# Patient Record
Sex: MALE | Race: WHITE | NOT HISPANIC OR LATINO | Employment: OTHER | URBAN - METROPOLITAN AREA
[De-identification: names, ages, dates, MRNs, and addresses within clinical notes are randomized per-mention and may not be internally consistent; named-entity substitution may affect disease eponyms.]

---

## 2017-01-01 ENCOUNTER — COMMUNICATION - HEALTHEAST (OUTPATIENT)
Dept: FAMILY MEDICINE | Facility: CLINIC | Age: 67
End: 2017-01-01

## 2017-01-01 DIAGNOSIS — E11.9 DIABETES MELLITUS (H): ICD-10-CM

## 2017-01-23 ENCOUNTER — COMMUNICATION - HEALTHEAST (OUTPATIENT)
Dept: FAMILY MEDICINE | Facility: CLINIC | Age: 67
End: 2017-01-23

## 2017-01-23 DIAGNOSIS — E78.5 HYPERLIPIDEMIA: ICD-10-CM

## 2017-02-13 ENCOUNTER — OFFICE VISIT - HEALTHEAST (OUTPATIENT)
Dept: NURSING | Facility: CLINIC | Age: 67
End: 2017-02-13

## 2017-02-13 DIAGNOSIS — E11.9 DM TYPE 2 (DIABETES MELLITUS, TYPE 2) (H): ICD-10-CM

## 2017-02-13 DIAGNOSIS — E11.8 TYPE 2 DIABETES MELLITUS WITH COMPLICATION, WITHOUT LONG-TERM CURRENT USE OF INSULIN (H): ICD-10-CM

## 2017-02-13 DIAGNOSIS — I10 ESSENTIAL HYPERTENSION WITH GOAL BLOOD PRESSURE LESS THAN 140/90: ICD-10-CM

## 2017-02-13 DIAGNOSIS — E78.5 HYPERLIPIDEMIA: ICD-10-CM

## 2017-02-13 DIAGNOSIS — E11.9 DIABETES (H): ICD-10-CM

## 2017-02-13 DIAGNOSIS — E11.9 DIABETES MELLITUS (H): ICD-10-CM

## 2017-02-13 DIAGNOSIS — E55.9 VITAMIN D DEFICIENCY: ICD-10-CM

## 2017-02-13 DIAGNOSIS — E78.00 PURE HYPERCHOLESTEROLEMIA: ICD-10-CM

## 2017-02-13 LAB — HBA1C MFR BLD: 7.1 % (ref 3.5–6)

## 2017-02-14 ENCOUNTER — COMMUNICATION - HEALTHEAST (OUTPATIENT)
Dept: FAMILY MEDICINE | Facility: CLINIC | Age: 67
End: 2017-02-14

## 2017-02-20 ENCOUNTER — AMBULATORY - HEALTHEAST (OUTPATIENT)
Dept: NURSING | Facility: CLINIC | Age: 67
End: 2017-02-20

## 2017-02-20 ENCOUNTER — COMMUNICATION - HEALTHEAST (OUTPATIENT)
Dept: NURSING | Facility: CLINIC | Age: 67
End: 2017-02-20

## 2017-02-20 DIAGNOSIS — I10 ESSENTIAL HYPERTENSION WITH GOAL BLOOD PRESSURE LESS THAN 130/80: ICD-10-CM

## 2017-02-20 DIAGNOSIS — Z01.30 BP CHECK: ICD-10-CM

## 2017-03-24 ENCOUNTER — AMBULATORY - HEALTHEAST (OUTPATIENT)
Dept: LAB | Facility: CLINIC | Age: 67
End: 2017-03-24

## 2017-03-24 ENCOUNTER — AMBULATORY - HEALTHEAST (OUTPATIENT)
Dept: NURSING | Facility: CLINIC | Age: 67
End: 2017-03-24

## 2017-03-24 DIAGNOSIS — I10 ESSENTIAL HYPERTENSION WITH GOAL BLOOD PRESSURE LESS THAN 130/80: ICD-10-CM

## 2017-03-29 ENCOUNTER — AMBULATORY - HEALTHEAST (OUTPATIENT)
Dept: NURSING | Facility: CLINIC | Age: 67
End: 2017-03-29

## 2017-03-29 DIAGNOSIS — I10 ESSENTIAL HYPERTENSION WITH GOAL BLOOD PRESSURE LESS THAN 130/80: ICD-10-CM

## 2017-03-31 ENCOUNTER — COMMUNICATION - HEALTHEAST (OUTPATIENT)
Dept: FAMILY MEDICINE | Facility: CLINIC | Age: 67
End: 2017-03-31

## 2017-04-18 ENCOUNTER — COMMUNICATION - HEALTHEAST (OUTPATIENT)
Dept: NURSING | Facility: CLINIC | Age: 67
End: 2017-04-18

## 2017-04-28 ENCOUNTER — OFFICE VISIT - HEALTHEAST (OUTPATIENT)
Dept: FAMILY MEDICINE | Facility: CLINIC | Age: 67
End: 2017-04-28

## 2017-04-28 DIAGNOSIS — I10 ESSENTIAL HYPERTENSION WITH GOAL BLOOD PRESSURE LESS THAN 130/80: ICD-10-CM

## 2017-04-28 ASSESSMENT — MIFFLIN-ST. JEOR: SCORE: 2191.01

## 2017-05-12 ENCOUNTER — OFFICE VISIT - HEALTHEAST (OUTPATIENT)
Dept: NURSING | Facility: CLINIC | Age: 67
End: 2017-05-12

## 2017-05-12 DIAGNOSIS — E11.8 TYPE 2 DIABETES MELLITUS WITH COMPLICATION, WITHOUT LONG-TERM CURRENT USE OF INSULIN (H): ICD-10-CM

## 2017-05-12 DIAGNOSIS — E11.9 DIABETES MELLITUS (H): ICD-10-CM

## 2017-05-12 DIAGNOSIS — I10 ESSENTIAL HYPERTENSION WITH GOAL BLOOD PRESSURE LESS THAN 130/80: ICD-10-CM

## 2017-05-12 LAB — HBA1C MFR BLD: 8.2 % (ref 3.5–6)

## 2017-05-18 ENCOUNTER — RECORDS - HEALTHEAST (OUTPATIENT)
Dept: ADMINISTRATIVE | Facility: OTHER | Age: 67
End: 2017-05-18

## 2017-06-04 ENCOUNTER — COMMUNICATION - HEALTHEAST (OUTPATIENT)
Dept: NURSING | Facility: CLINIC | Age: 67
End: 2017-06-04

## 2017-06-08 ENCOUNTER — COMMUNICATION - HEALTHEAST (OUTPATIENT)
Dept: FAMILY MEDICINE | Facility: CLINIC | Age: 67
End: 2017-06-08

## 2017-06-08 DIAGNOSIS — I10 ESSENTIAL HYPERTENSION WITH GOAL BLOOD PRESSURE LESS THAN 130/80: ICD-10-CM

## 2017-06-12 ENCOUNTER — OFFICE VISIT - HEALTHEAST (OUTPATIENT)
Dept: NURSING | Facility: CLINIC | Age: 67
End: 2017-06-12

## 2017-06-12 DIAGNOSIS — E11.9 DIABETES MELLITUS (H): ICD-10-CM

## 2017-06-12 DIAGNOSIS — E11.9 TYPE 2 DIABETES MELLITUS WITHOUT COMPLICATION, WITHOUT LONG-TERM CURRENT USE OF INSULIN (H): ICD-10-CM

## 2017-06-26 ENCOUNTER — RECORDS - HEALTHEAST (OUTPATIENT)
Dept: ADMINISTRATIVE | Facility: OTHER | Age: 67
End: 2017-06-26

## 2017-06-27 ENCOUNTER — RECORDS - HEALTHEAST (OUTPATIENT)
Dept: ADMINISTRATIVE | Facility: OTHER | Age: 67
End: 2017-06-27

## 2017-07-03 ENCOUNTER — OFFICE VISIT - HEALTHEAST (OUTPATIENT)
Dept: FAMILY MEDICINE | Facility: CLINIC | Age: 67
End: 2017-07-03

## 2017-07-03 ENCOUNTER — AMBULATORY - HEALTHEAST (OUTPATIENT)
Dept: FAMILY MEDICINE | Facility: CLINIC | Age: 67
End: 2017-07-03

## 2017-07-03 DIAGNOSIS — Z01.818 PREOPERATIVE EVALUATION TO RULE OUT SURGICAL CONTRAINDICATION: ICD-10-CM

## 2017-07-03 DIAGNOSIS — D12.4 ADENOMA OF DESCENDING COLON: ICD-10-CM

## 2017-07-03 DIAGNOSIS — I48.92 ATRIAL FLUTTER WITH RAPID VENTRICULAR RESPONSE (H): ICD-10-CM

## 2017-07-03 DIAGNOSIS — K91.840 COLONOSCOPY CAUSING POST-PROCEDURAL BLEEDING: ICD-10-CM

## 2017-07-03 DIAGNOSIS — R93.89 ABNORMAL FINDINGS ON DIAGNOSTIC IMAGING OF OTHER SPECIFIED BODY STRUCTURES: ICD-10-CM

## 2017-07-03 LAB
ATRIAL RATE - MUSE: 123 BPM
ATRIAL RATE - MUSE: 248 BPM
DIASTOLIC BLOOD PRESSURE - MUSE: NORMAL MMHG
DIASTOLIC BLOOD PRESSURE - MUSE: NORMAL MMHG
INTERPRETATION ECG - MUSE: NORMAL
INTERPRETATION ECG - MUSE: NORMAL
P AXIS - MUSE: 259 DEGREES
P AXIS - MUSE: NORMAL DEGREES
PR INTERVAL - MUSE: NORMAL MS
PR INTERVAL - MUSE: NORMAL MS
QRS DURATION - MUSE: 100 MS
QRS DURATION - MUSE: 142 MS
QT - MUSE: 282 MS
QT - MUSE: 284 MS
QTC - MUSE: 403 MS
QTC - MUSE: 408 MS
R AXIS - MUSE: -22 DEGREES
R AXIS - MUSE: -27 DEGREES
SYSTOLIC BLOOD PRESSURE - MUSE: NORMAL MMHG
SYSTOLIC BLOOD PRESSURE - MUSE: NORMAL MMHG
T AXIS - MUSE: -4 DEGREES
T AXIS - MUSE: 16 DEGREES
VENTRICULAR RATE- MUSE: 123 BPM
VENTRICULAR RATE- MUSE: 124 BPM

## 2017-07-03 ASSESSMENT — MIFFLIN-ST. JEOR: SCORE: 2207.06

## 2017-07-05 ENCOUNTER — OFFICE VISIT - HEALTHEAST (OUTPATIENT)
Dept: CARDIOLOGY | Facility: CLINIC | Age: 67
End: 2017-07-05

## 2017-07-05 DIAGNOSIS — I10 ESSENTIAL HYPERTENSION WITH GOAL BLOOD PRESSURE LESS THAN 130/80: ICD-10-CM

## 2017-07-05 DIAGNOSIS — I35.0 AORTIC STENOSIS, MILD: ICD-10-CM

## 2017-07-05 DIAGNOSIS — E78.2 MIXED HYPERLIPIDEMIA: ICD-10-CM

## 2017-07-05 DIAGNOSIS — G47.30 SLEEP APNEA, UNSPECIFIED TYPE: ICD-10-CM

## 2017-07-05 DIAGNOSIS — I48.92 ATRIAL FLUTTER WITH RAPID VENTRICULAR RESPONSE (H): ICD-10-CM

## 2017-07-05 ASSESSMENT — MIFFLIN-ST. JEOR: SCORE: 2203.59

## 2017-07-12 ENCOUNTER — HOSPITAL ENCOUNTER (OUTPATIENT)
Dept: CARDIOLOGY | Facility: HOSPITAL | Age: 67
Discharge: HOME | End: 2017-07-12
Attending: INTERNAL MEDICINE

## 2017-07-12 DIAGNOSIS — I35.0 AORTIC STENOSIS, MILD: ICD-10-CM

## 2017-07-12 DIAGNOSIS — I48.92 ATRIAL FLUTTER WITH RAPID VENTRICULAR RESPONSE (H): ICD-10-CM

## 2017-07-12 DIAGNOSIS — G47.30 SLEEP APNEA, UNSPECIFIED TYPE: ICD-10-CM

## 2017-07-12 ASSESSMENT — MIFFLIN-ST. JEOR: SCORE: 2193.28

## 2017-07-13 ENCOUNTER — COMMUNICATION - HEALTHEAST (OUTPATIENT)
Dept: FAMILY MEDICINE | Facility: CLINIC | Age: 67
End: 2017-07-13

## 2017-07-13 LAB
AORTIC ROOT: 3.7 CM
AORTIC VALVE MEAN VELOCITY: 136 CM/S
AV DIMENSIONLESS INDEX VTI: 0.3
AV MEAN GRADIENT: 8 MMHG
AV PEAK GRADIENT: 13.4 MMHG
AV VALVE AREA: 1.1 CM2
AV VELOCITY RATIO: 0.4
BSA FOR ECHO PROCEDURE: 2.65 M2
CV BLOOD PRESSURE: NORMAL MMHG
CV ECHO HEIGHT: 71 IN
CV ECHO WEIGHT: 310 LBS
DOP CALC AO PEAK VEL: 183 CM/S
DOP CALC AO VTI: 43.1 CM
DOP CALC LVOT AREA: 3.46 CM2
DOP CALC LVOT DIAMETER: 2.1 CM
DOP CALC LVOT PEAK VEL: 68.8 CM/S
DOP CALC LVOT STROKE VOLUME: 49.5 CM3
DOP CALCLVOT PEAK VEL VTI: 14.3 CM
EJECTION FRACTION: 66 % (ref 55–75)
FRACTIONAL SHORTENING: 24.1 % (ref 28–44)
INTERVENTRICULAR SEPTUM IN END DIASTOLE: 1 CM (ref 0.6–1)
IVS/PW RATIO: 0.7
LA AREA 1: 19.9 CM2
LA AREA 2: 16.5 CM2
LEFT ATRIUM LENGTH: 4.91 CM
LEFT ATRIUM SIZE: 4.1 CM
LEFT ATRIUM TO AORTIC ROOT RATIO: 1.11 NO UNITS
LEFT ATRIUM VOLUME INDEX: 21.5 ML/M2
LEFT ATRIUM VOLUME: 56.8 CM3
LEFT VENTRICLE CARDIAC INDEX: 1.8 L/MIN/M2
LEFT VENTRICLE CARDIAC OUTPUT: 4.8 L/MIN
LEFT VENTRICLE DIASTOLIC VOLUME INDEX: 54.3 CM3/M2 (ref 34–74)
LEFT VENTRICLE DIASTOLIC VOLUME: 144 CM3 (ref 62–150)
LEFT VENTRICLE HEART RATE: 96 BPM
LEFT VENTRICLE MASS INDEX: 112.1 G/M2
LEFT VENTRICLE SYSTOLIC VOLUME INDEX: 18.5 CM3/M2 (ref 11–31)
LEFT VENTRICLE SYSTOLIC VOLUME: 49 CM3 (ref 21–61)
LEFT VENTRICULAR INTERNAL DIMENSION IN DIASTOLE: 5.8 CM (ref 4.2–5.8)
LEFT VENTRICULAR INTERNAL DIMENSION IN SYSTOLE: 4.4 CM (ref 2.5–4)
LEFT VENTRICULAR MASS: 297 G
LEFT VENTRICULAR OUTFLOW TRACT MEAN GRADIENT: 1 MMHG
LEFT VENTRICULAR OUTFLOW TRACT MEAN VELOCITY: 49.3 CM/S
LEFT VENTRICULAR OUTFLOW TRACT PEAK GRADIENT: 2 MMHG
LEFT VENTRICULAR POSTERIOR WALL IN END DIASTOLE: 1.4 CM (ref 0.6–1)
LV STROKE VOLUME INDEX: 18.7 ML/M2
MITRAL VALVE DECELERATION SLOPE: 3800 MM/S2
MITRAL VALVE PRESSURE HALF-TIME: 66 MS
MV AVERAGE E/E' RATIO: 10.1 CM/S
MV DECELERATION TIME: 275 MS
MV E'TISSUE VEL-LAT: 8.97 CM/S
MV E'TISSUE VEL-MED: 8.77 CM/S
MV LATERAL E/E' RATIO: 10
MV MEDIAL E/E' RATIO: 10.2
MV PEAK E VELOCITY: 89.8 CM/S
MV VALVE AREA PRESSURE 1/2 METHOD: 3.3 CM2
NUC REST DIASTOLIC VOLUME INDEX: 4960 LBS
NUC REST SYSTOLIC VOLUME INDEX: 71 IN
RIGHT VENTRICULAR INTERNAL DIMENSION IN DYSTOLE: 3.1 CM
TRICUSPID VALVE ANULAR PLANE SYSTOLIC EXCURSION: 1.8 CM

## 2017-07-14 ENCOUNTER — COMMUNICATION - HEALTHEAST (OUTPATIENT)
Dept: CARDIOLOGY | Facility: CLINIC | Age: 67
End: 2017-07-14

## 2017-07-14 DIAGNOSIS — I48.92 ATRIAL FLUTTER WITH RAPID VENTRICULAR RESPONSE (H): ICD-10-CM

## 2017-07-17 ENCOUNTER — AMBULATORY - HEALTHEAST (OUTPATIENT)
Dept: NURSING | Facility: CLINIC | Age: 67
End: 2017-07-17

## 2017-07-18 ENCOUNTER — OFFICE VISIT - HEALTHEAST (OUTPATIENT)
Dept: CARDIOLOGY | Facility: CLINIC | Age: 67
End: 2017-07-18

## 2017-07-18 DIAGNOSIS — I10 ESSENTIAL HYPERTENSION WITH GOAL BLOOD PRESSURE LESS THAN 130/80: ICD-10-CM

## 2017-07-18 DIAGNOSIS — I48.92 ATRIAL FLUTTER WITH RAPID VENTRICULAR RESPONSE (H): ICD-10-CM

## 2017-07-18 ASSESSMENT — MIFFLIN-ST. JEOR: SCORE: 2161.53

## 2017-07-19 ENCOUNTER — AMBULATORY - HEALTHEAST (OUTPATIENT)
Dept: CARDIOLOGY | Facility: CLINIC | Age: 67
End: 2017-07-19

## 2017-07-19 ENCOUNTER — COMMUNICATION - HEALTHEAST (OUTPATIENT)
Dept: FAMILY MEDICINE | Facility: CLINIC | Age: 67
End: 2017-07-19

## 2017-07-19 DIAGNOSIS — I48.92 ATRIAL FLUTTER WITH RAPID VENTRICULAR RESPONSE (H): ICD-10-CM

## 2017-07-25 ENCOUNTER — AMBULATORY - HEALTHEAST (OUTPATIENT)
Dept: LAB | Facility: CLINIC | Age: 67
End: 2017-07-25

## 2017-07-25 DIAGNOSIS — I48.92 ATRIAL FLUTTER WITH RAPID VENTRICULAR RESPONSE (H): ICD-10-CM

## 2017-07-28 ENCOUNTER — AMBULATORY - HEALTHEAST (OUTPATIENT)
Dept: LAB | Facility: CLINIC | Age: 67
End: 2017-07-28

## 2017-07-28 DIAGNOSIS — I48.92 ATRIAL FLUTTER WITH RAPID VENTRICULAR RESPONSE (H): ICD-10-CM

## 2017-07-31 ENCOUNTER — AMBULATORY - HEALTHEAST (OUTPATIENT)
Dept: LAB | Facility: CLINIC | Age: 67
End: 2017-07-31

## 2017-07-31 ENCOUNTER — COMMUNICATION - HEALTHEAST (OUTPATIENT)
Dept: FAMILY MEDICINE | Facility: CLINIC | Age: 67
End: 2017-07-31

## 2017-07-31 DIAGNOSIS — I48.92 ATRIAL FLUTTER WITH RAPID VENTRICULAR RESPONSE (H): ICD-10-CM

## 2017-08-01 ENCOUNTER — COMMUNICATION - HEALTHEAST (OUTPATIENT)
Dept: FAMILY MEDICINE | Facility: CLINIC | Age: 67
End: 2017-08-01

## 2017-08-01 DIAGNOSIS — I48.92 ATRIAL FLUTTER WITH RAPID VENTRICULAR RESPONSE (H): ICD-10-CM

## 2017-08-02 ENCOUNTER — COMMUNICATION - HEALTHEAST (OUTPATIENT)
Dept: CARDIOLOGY | Facility: CLINIC | Age: 67
End: 2017-08-02

## 2017-08-02 ENCOUNTER — COMMUNICATION - HEALTHEAST (OUTPATIENT)
Dept: FAMILY MEDICINE | Facility: CLINIC | Age: 67
End: 2017-08-02

## 2017-08-02 DIAGNOSIS — I48.92 ATRIAL FLUTTER WITH RAPID VENTRICULAR RESPONSE (H): ICD-10-CM

## 2017-08-07 ENCOUNTER — COMMUNICATION - HEALTHEAST (OUTPATIENT)
Dept: FAMILY MEDICINE | Facility: CLINIC | Age: 67
End: 2017-08-07

## 2017-08-07 ENCOUNTER — AMBULATORY - HEALTHEAST (OUTPATIENT)
Dept: LAB | Facility: CLINIC | Age: 67
End: 2017-08-07

## 2017-08-07 DIAGNOSIS — I48.92 ATRIAL FLUTTER WITH RAPID VENTRICULAR RESPONSE (H): ICD-10-CM

## 2017-08-14 ENCOUNTER — AMBULATORY - HEALTHEAST (OUTPATIENT)
Dept: LAB | Facility: CLINIC | Age: 67
End: 2017-08-14

## 2017-08-14 ENCOUNTER — OFFICE VISIT - HEALTHEAST (OUTPATIENT)
Dept: NURSING | Facility: CLINIC | Age: 67
End: 2017-08-14

## 2017-08-14 DIAGNOSIS — I48.92 ATRIAL FLUTTER WITH RAPID VENTRICULAR RESPONSE (H): ICD-10-CM

## 2017-08-14 LAB
CHOLEST SERPL-MCNC: 109 MG/DL
FASTING STATUS PATIENT QL REPORTED: YES
HBA1C MFR BLD: 7.6 % (ref 3.5–6)
HDLC SERPL-MCNC: 28 MG/DL
LDLC SERPL CALC-MCNC: 50 MG/DL
TRIGL SERPL-MCNC: 155 MG/DL

## 2017-08-21 ENCOUNTER — COMMUNICATION - HEALTHEAST (OUTPATIENT)
Dept: FAMILY MEDICINE | Facility: CLINIC | Age: 67
End: 2017-08-21

## 2017-08-21 ENCOUNTER — COMMUNICATION - HEALTHEAST (OUTPATIENT)
Dept: INTERNAL MEDICINE | Facility: CLINIC | Age: 67
End: 2017-08-21

## 2017-08-21 ENCOUNTER — AMBULATORY - HEALTHEAST (OUTPATIENT)
Dept: CARDIOLOGY | Facility: CLINIC | Age: 67
End: 2017-08-21

## 2017-08-21 ENCOUNTER — OFFICE VISIT - HEALTHEAST (OUTPATIENT)
Dept: CARDIOLOGY | Facility: CLINIC | Age: 67
End: 2017-08-21

## 2017-08-21 DIAGNOSIS — E78.2 MIXED HYPERLIPIDEMIA: ICD-10-CM

## 2017-08-21 DIAGNOSIS — I48.92 ATRIAL FLUTTER WITH RAPID VENTRICULAR RESPONSE (H): ICD-10-CM

## 2017-08-21 DIAGNOSIS — I35.0 AORTIC STENOSIS, MILD: ICD-10-CM

## 2017-08-21 DIAGNOSIS — I48.92 ATRIAL FLUTTER (H): ICD-10-CM

## 2017-08-21 DIAGNOSIS — G47.33 OBSTRUCTIVE SLEEP APNEA SYNDROME: ICD-10-CM

## 2017-08-21 ASSESSMENT — MIFFLIN-ST. JEOR: SCORE: 2197.82

## 2017-08-25 ENCOUNTER — ANESTHESIA - HEALTHEAST (OUTPATIENT)
Dept: CARDIOLOGY | Facility: CLINIC | Age: 67
End: 2017-08-25

## 2017-08-25 ENCOUNTER — HOSPITAL ENCOUNTER (OUTPATIENT)
Dept: CARDIOLOGY | Facility: CLINIC | Age: 67
Discharge: HOME | End: 2017-08-25
Attending: NURSE PRACTITIONER | Admitting: INTERNAL MEDICINE

## 2017-08-25 DIAGNOSIS — I48.92 ATRIAL FLUTTER WITH RAPID VENTRICULAR RESPONSE (H): ICD-10-CM

## 2017-08-25 ASSESSMENT — MIFFLIN-ST. JEOR: SCORE: 2220.26

## 2017-08-26 LAB
ATRIAL RATE - MUSE: 81 BPM
DIASTOLIC BLOOD PRESSURE - MUSE: NORMAL MMHG
INTERPRETATION ECG - MUSE: NORMAL
P AXIS - MUSE: 54 DEGREES
PR INTERVAL - MUSE: 192 MS
QRS DURATION - MUSE: 102 MS
QT - MUSE: 384 MS
QTC - MUSE: 446 MS
R AXIS - MUSE: -25 DEGREES
SYSTOLIC BLOOD PRESSURE - MUSE: NORMAL MMHG
T AXIS - MUSE: 7 DEGREES
VENTRICULAR RATE- MUSE: 81 BPM

## 2017-08-28 ENCOUNTER — COMMUNICATION - HEALTHEAST (OUTPATIENT)
Dept: INTERNAL MEDICINE | Facility: CLINIC | Age: 67
End: 2017-08-28

## 2017-09-01 ENCOUNTER — AMBULATORY - HEALTHEAST (OUTPATIENT)
Dept: LAB | Facility: CLINIC | Age: 67
End: 2017-09-01

## 2017-09-01 ENCOUNTER — COMMUNICATION - HEALTHEAST (OUTPATIENT)
Dept: INTERNAL MEDICINE | Facility: CLINIC | Age: 67
End: 2017-09-01

## 2017-09-01 DIAGNOSIS — I48.92 ATRIAL FLUTTER (H): ICD-10-CM

## 2017-09-08 ENCOUNTER — COMMUNICATION - HEALTHEAST (OUTPATIENT)
Dept: INTERNAL MEDICINE | Facility: CLINIC | Age: 67
End: 2017-09-08

## 2017-09-08 ENCOUNTER — AMBULATORY - HEALTHEAST (OUTPATIENT)
Dept: LAB | Facility: CLINIC | Age: 67
End: 2017-09-08

## 2017-09-08 DIAGNOSIS — I48.92 ATRIAL FLUTTER (H): ICD-10-CM

## 2017-09-21 ENCOUNTER — OFFICE VISIT - HEALTHEAST (OUTPATIENT)
Dept: CARDIOLOGY | Facility: CLINIC | Age: 67
End: 2017-09-21

## 2017-09-21 DIAGNOSIS — I35.0 AORTIC STENOSIS, MILD: ICD-10-CM

## 2017-09-21 DIAGNOSIS — I48.92 ATRIAL FLUTTER WITH RAPID VENTRICULAR RESPONSE (H): ICD-10-CM

## 2017-09-21 DIAGNOSIS — I10 ESSENTIAL HYPERTENSION WITH GOAL BLOOD PRESSURE LESS THAN 130/80: ICD-10-CM

## 2017-09-21 DIAGNOSIS — G47.33 OBSTRUCTIVE SLEEP APNEA SYNDROME: ICD-10-CM

## 2017-09-21 ASSESSMENT — MIFFLIN-ST. JEOR: SCORE: 2228.43

## 2017-10-23 ENCOUNTER — AMBULATORY - HEALTHEAST (OUTPATIENT)
Dept: FAMILY MEDICINE | Facility: CLINIC | Age: 67
End: 2017-10-23

## 2017-10-23 ENCOUNTER — OFFICE VISIT - HEALTHEAST (OUTPATIENT)
Dept: FAMILY MEDICINE | Facility: CLINIC | Age: 67
End: 2017-10-23

## 2017-10-23 ENCOUNTER — COMMUNICATION - HEALTHEAST (OUTPATIENT)
Dept: INTERNAL MEDICINE | Facility: CLINIC | Age: 67
End: 2017-10-23

## 2017-10-23 DIAGNOSIS — C18.6 ADENOCARCINOMA OF DESCENDING COLON (H): ICD-10-CM

## 2017-10-23 DIAGNOSIS — I35.0 AS (AORTIC STENOSIS): ICD-10-CM

## 2017-10-23 DIAGNOSIS — I48.92 ATRIAL FLUTTER (H): ICD-10-CM

## 2017-10-23 DIAGNOSIS — I10 ESSENTIAL HYPERTENSION WITH GOAL BLOOD PRESSURE LESS THAN 130/80: ICD-10-CM

## 2017-10-23 DIAGNOSIS — Z01.818 PREOPERATIVE EVALUATION TO RULE OUT SURGICAL CONTRAINDICATION: ICD-10-CM

## 2017-10-23 LAB
ATRIAL RATE - MUSE: 62 BPM
DIASTOLIC BLOOD PRESSURE - MUSE: NORMAL MMHG
INTERPRETATION ECG - MUSE: NORMAL
P AXIS - MUSE: 63 DEGREES
PR INTERVAL - MUSE: 186 MS
QRS DURATION - MUSE: 100 MS
QT - MUSE: 416 MS
QTC - MUSE: 422 MS
R AXIS - MUSE: -8 DEGREES
SYSTOLIC BLOOD PRESSURE - MUSE: NORMAL MMHG
T AXIS - MUSE: 9 DEGREES
VENTRICULAR RATE- MUSE: 62 BPM

## 2017-10-23 ASSESSMENT — MIFFLIN-ST. JEOR: SCORE: 2228.43

## 2017-10-24 ENCOUNTER — COMMUNICATION - HEALTHEAST (OUTPATIENT)
Dept: FAMILY MEDICINE | Facility: CLINIC | Age: 67
End: 2017-10-24

## 2017-10-28 ENCOUNTER — COMMUNICATION - HEALTHEAST (OUTPATIENT)
Dept: FAMILY MEDICINE | Facility: CLINIC | Age: 67
End: 2017-10-28

## 2017-11-03 ENCOUNTER — RECORDS - HEALTHEAST (OUTPATIENT)
Dept: ADMINISTRATIVE | Facility: OTHER | Age: 67
End: 2017-11-03

## 2017-11-07 ENCOUNTER — COMMUNICATION - HEALTHEAST (OUTPATIENT)
Dept: INTERNAL MEDICINE | Facility: CLINIC | Age: 67
End: 2017-11-07

## 2017-11-07 ENCOUNTER — OFFICE VISIT - HEALTHEAST (OUTPATIENT)
Dept: FAMILY MEDICINE | Facility: CLINIC | Age: 67
End: 2017-11-07

## 2017-11-07 DIAGNOSIS — I48.92 ATRIAL FLUTTER (H): ICD-10-CM

## 2017-11-07 DIAGNOSIS — Z90.49 S/P LAPAROSCOPIC COLECTOMY: ICD-10-CM

## 2017-11-07 DIAGNOSIS — D62 ACUTE BLOOD LOSS ANEMIA: ICD-10-CM

## 2017-11-07 ASSESSMENT — MIFFLIN-ST. JEOR: SCORE: 2187.6

## 2017-11-08 ENCOUNTER — RECORDS - HEALTHEAST (OUTPATIENT)
Dept: ADMINISTRATIVE | Facility: OTHER | Age: 67
End: 2017-11-08

## 2017-11-12 ENCOUNTER — COMMUNICATION - HEALTHEAST (OUTPATIENT)
Dept: FAMILY MEDICINE | Facility: CLINIC | Age: 67
End: 2017-11-12

## 2017-11-15 ENCOUNTER — AMBULATORY - HEALTHEAST (OUTPATIENT)
Dept: LAB | Facility: CLINIC | Age: 67
End: 2017-11-15

## 2017-11-15 ENCOUNTER — COMMUNICATION - HEALTHEAST (OUTPATIENT)
Dept: INTERNAL MEDICINE | Facility: CLINIC | Age: 67
End: 2017-11-15

## 2017-11-15 DIAGNOSIS — I48.92 ATRIAL FLUTTER (H): ICD-10-CM

## 2017-11-17 ENCOUNTER — OFFICE VISIT - HEALTHEAST (OUTPATIENT)
Dept: NURSING | Facility: CLINIC | Age: 67
End: 2017-11-17

## 2017-11-17 LAB — HBA1C MFR BLD: 7.6 % (ref 3.5–6)

## 2017-11-18 ENCOUNTER — COMMUNICATION - HEALTHEAST (OUTPATIENT)
Dept: NURSING | Facility: CLINIC | Age: 67
End: 2017-11-18

## 2017-11-18 DIAGNOSIS — E11.9 DIABETES MELLITUS (H): ICD-10-CM

## 2017-11-27 ENCOUNTER — RECORDS - HEALTHEAST (OUTPATIENT)
Dept: ADMINISTRATIVE | Facility: OTHER | Age: 67
End: 2017-11-27

## 2017-11-29 ENCOUNTER — COMMUNICATION - HEALTHEAST (OUTPATIENT)
Dept: INTERNAL MEDICINE | Facility: CLINIC | Age: 67
End: 2017-11-29

## 2017-11-29 ENCOUNTER — AMBULATORY - HEALTHEAST (OUTPATIENT)
Dept: LAB | Facility: CLINIC | Age: 67
End: 2017-11-29

## 2017-11-29 DIAGNOSIS — I48.92 ATRIAL FLUTTER (H): ICD-10-CM

## 2017-12-01 ENCOUNTER — RECORDS - HEALTHEAST (OUTPATIENT)
Dept: ADMINISTRATIVE | Facility: OTHER | Age: 67
End: 2017-12-01

## 2017-12-13 ENCOUNTER — COMMUNICATION - HEALTHEAST (OUTPATIENT)
Dept: FAMILY MEDICINE | Facility: CLINIC | Age: 67
End: 2017-12-13

## 2017-12-13 ENCOUNTER — AMBULATORY - HEALTHEAST (OUTPATIENT)
Dept: LAB | Facility: CLINIC | Age: 67
End: 2017-12-13

## 2017-12-13 ENCOUNTER — COMMUNICATION - HEALTHEAST (OUTPATIENT)
Dept: INTERNAL MEDICINE | Facility: CLINIC | Age: 67
End: 2017-12-13

## 2017-12-13 DIAGNOSIS — I48.92 ATRIAL FLUTTER WITH RAPID VENTRICULAR RESPONSE (H): ICD-10-CM

## 2017-12-13 DIAGNOSIS — I48.92 ATRIAL FLUTTER (H): ICD-10-CM

## 2017-12-21 ENCOUNTER — RECORDS - HEALTHEAST (OUTPATIENT)
Dept: ADMINISTRATIVE | Facility: OTHER | Age: 67
End: 2017-12-21

## 2017-12-27 ENCOUNTER — AMBULATORY - HEALTHEAST (OUTPATIENT)
Dept: LAB | Facility: CLINIC | Age: 67
End: 2017-12-27

## 2017-12-27 ENCOUNTER — COMMUNICATION - HEALTHEAST (OUTPATIENT)
Dept: INTERNAL MEDICINE | Facility: CLINIC | Age: 67
End: 2017-12-27

## 2017-12-27 DIAGNOSIS — I48.92 ATRIAL FLUTTER (H): ICD-10-CM

## 2018-01-10 ENCOUNTER — AMBULATORY - HEALTHEAST (OUTPATIENT)
Dept: LAB | Facility: CLINIC | Age: 68
End: 2018-01-10

## 2018-01-10 ENCOUNTER — COMMUNICATION - HEALTHEAST (OUTPATIENT)
Dept: NURSING | Facility: CLINIC | Age: 68
End: 2018-01-10

## 2018-01-10 DIAGNOSIS — I48.92 ATRIAL FLUTTER (H): ICD-10-CM

## 2018-01-10 LAB — INR PPP: 4.4 (ref 0.9–1.1)

## 2018-01-24 ENCOUNTER — AMBULATORY - HEALTHEAST (OUTPATIENT)
Dept: LAB | Facility: CLINIC | Age: 68
End: 2018-01-24

## 2018-01-24 ENCOUNTER — COMMUNICATION - HEALTHEAST (OUTPATIENT)
Dept: INTERNAL MEDICINE | Facility: CLINIC | Age: 68
End: 2018-01-24

## 2018-01-24 DIAGNOSIS — I48.92 ATRIAL FLUTTER (H): ICD-10-CM

## 2018-01-24 LAB — INR PPP: 2 (ref 0.9–1.1)

## 2018-01-26 ENCOUNTER — COMMUNICATION - HEALTHEAST (OUTPATIENT)
Dept: CARDIOLOGY | Facility: CLINIC | Age: 68
End: 2018-01-26

## 2018-01-26 DIAGNOSIS — I48.92 ATRIAL FLUTTER WITH RAPID VENTRICULAR RESPONSE (H): ICD-10-CM

## 2018-02-01 ENCOUNTER — COMMUNICATION - HEALTHEAST (OUTPATIENT)
Dept: FAMILY MEDICINE | Facility: CLINIC | Age: 68
End: 2018-02-01

## 2018-02-01 DIAGNOSIS — I10 ESSENTIAL HYPERTENSION WITH GOAL BLOOD PRESSURE LESS THAN 130/80: ICD-10-CM

## 2018-02-01 DIAGNOSIS — I10 ESSENTIAL HYPERTENSION WITH GOAL BLOOD PRESSURE LESS THAN 140/90: ICD-10-CM

## 2018-02-09 ENCOUNTER — AMBULATORY - HEALTHEAST (OUTPATIENT)
Dept: LAB | Facility: CLINIC | Age: 68
End: 2018-02-09

## 2018-02-09 ENCOUNTER — COMMUNICATION - HEALTHEAST (OUTPATIENT)
Dept: INTERNAL MEDICINE | Facility: CLINIC | Age: 68
End: 2018-02-09

## 2018-02-09 DIAGNOSIS — I48.92 ATRIAL FLUTTER (H): ICD-10-CM

## 2018-02-09 LAB — INR PPP: 2.8 (ref 0.9–1.1)

## 2018-02-19 ENCOUNTER — OFFICE VISIT - HEALTHEAST (OUTPATIENT)
Dept: NURSING | Facility: CLINIC | Age: 68
End: 2018-02-19

## 2018-02-19 ENCOUNTER — COMMUNICATION - HEALTHEAST (OUTPATIENT)
Dept: INTERNAL MEDICINE | Facility: CLINIC | Age: 68
End: 2018-02-19

## 2018-02-19 DIAGNOSIS — I48.92 ATRIAL FLUTTER (H): ICD-10-CM

## 2018-02-19 DIAGNOSIS — E11.9 DM TYPE 2 (DIABETES MELLITUS, TYPE 2) (H): ICD-10-CM

## 2018-02-19 DIAGNOSIS — D62 ACUTE BLOOD LOSS ANEMIA: ICD-10-CM

## 2018-02-19 DIAGNOSIS — E55.9 VITAMIN D DEFICIENCY: ICD-10-CM

## 2018-02-19 DIAGNOSIS — E11.9 DIABETES MELLITUS (H): ICD-10-CM

## 2018-02-19 DIAGNOSIS — E78.5 HYPERLIPIDEMIA: ICD-10-CM

## 2018-02-19 LAB
BASOPHILS # BLD AUTO: 0.1 THOU/UL (ref 0–0.2)
BASOPHILS NFR BLD AUTO: 1 % (ref 0–2)
EOSINOPHIL # BLD AUTO: 0.5 THOU/UL (ref 0–0.4)
EOSINOPHIL NFR BLD AUTO: 6 % (ref 0–6)
ERYTHROCYTE [DISTWIDTH] IN BLOOD BY AUTOMATED COUNT: 13.9 % (ref 11–14.5)
HBA1C MFR BLD: 8 % (ref 3.5–6)
HCT VFR BLD AUTO: 39.2 % (ref 40–54)
HGB BLD-MCNC: 13.2 G/DL (ref 14–18)
INR PPP: 2.4 (ref 0.9–1.1)
LYMPHOCYTES # BLD AUTO: 3.2 THOU/UL (ref 0.8–4.4)
LYMPHOCYTES NFR BLD AUTO: 37 % (ref 20–40)
MCH RBC QN AUTO: 29.1 PG (ref 27–34)
MCHC RBC AUTO-ENTMCNC: 33.6 G/DL (ref 32–36)
MCV RBC AUTO: 87 FL (ref 80–100)
MONOCYTES # BLD AUTO: 0.9 THOU/UL (ref 0–0.9)
MONOCYTES NFR BLD AUTO: 10 % (ref 2–10)
NEUTROPHILS # BLD AUTO: 4 THOU/UL (ref 2–7.7)
NEUTROPHILS NFR BLD AUTO: 47 % (ref 50–70)
PLATELET # BLD AUTO: 315 THOU/UL (ref 140–440)
PMV BLD AUTO: 7.2 FL (ref 7–10)
RBC # BLD AUTO: 4.52 MILL/UL (ref 4.4–6.2)
WBC: 8.7 THOU/UL (ref 4–11)

## 2018-02-20 ENCOUNTER — COMMUNICATION - HEALTHEAST (OUTPATIENT)
Dept: FAMILY MEDICINE | Facility: CLINIC | Age: 68
End: 2018-02-20

## 2018-02-20 DIAGNOSIS — E11.9 DM TYPE 2 (DIABETES MELLITUS, TYPE 2) (H): ICD-10-CM

## 2018-02-20 LAB — 25(OH)D3 SERPL-MCNC: 32.3 NG/ML (ref 30–80)

## 2018-03-01 ENCOUNTER — COMMUNICATION - HEALTHEAST (OUTPATIENT)
Dept: FAMILY MEDICINE | Facility: CLINIC | Age: 68
End: 2018-03-01

## 2018-03-01 DIAGNOSIS — E78.5 HYPERLIPIDEMIA: ICD-10-CM

## 2018-03-10 ENCOUNTER — COMMUNICATION - HEALTHEAST (OUTPATIENT)
Dept: FAMILY MEDICINE | Facility: CLINIC | Age: 68
End: 2018-03-10

## 2018-03-10 DIAGNOSIS — I48.92 ATRIAL FLUTTER (H): ICD-10-CM

## 2018-03-12 ENCOUNTER — COMMUNICATION - HEALTHEAST (OUTPATIENT)
Dept: FAMILY MEDICINE | Facility: CLINIC | Age: 68
End: 2018-03-12

## 2018-03-19 ENCOUNTER — AMBULATORY - HEALTHEAST (OUTPATIENT)
Dept: LAB | Facility: CLINIC | Age: 68
End: 2018-03-19

## 2018-03-19 ENCOUNTER — COMMUNICATION - HEALTHEAST (OUTPATIENT)
Dept: INTERNAL MEDICINE | Facility: CLINIC | Age: 68
End: 2018-03-19

## 2018-03-19 DIAGNOSIS — I48.92 ATRIAL FLUTTER (H): ICD-10-CM

## 2018-03-19 LAB — INR PPP: 2.7 (ref 0.9–1.1)

## 2018-03-20 ENCOUNTER — OFFICE VISIT - HEALTHEAST (OUTPATIENT)
Dept: FAMILY MEDICINE | Facility: CLINIC | Age: 68
End: 2018-03-20

## 2018-03-20 DIAGNOSIS — R10.9 LEFT LATERAL ABDOMINAL PAIN: ICD-10-CM

## 2018-03-20 DIAGNOSIS — L72.3 SEBACEOUS CYST: ICD-10-CM

## 2018-03-20 ASSESSMENT — MIFFLIN-ST. JEOR: SCORE: 2228.43

## 2018-04-28 ENCOUNTER — COMMUNICATION - HEALTHEAST (OUTPATIENT)
Dept: FAMILY MEDICINE | Facility: CLINIC | Age: 68
End: 2018-04-28

## 2018-04-28 DIAGNOSIS — E11.9 DIABETES MELLITUS (H): ICD-10-CM

## 2018-04-30 ENCOUNTER — AMBULATORY - HEALTHEAST (OUTPATIENT)
Dept: LAB | Facility: CLINIC | Age: 68
End: 2018-04-30

## 2018-04-30 ENCOUNTER — COMMUNICATION - HEALTHEAST (OUTPATIENT)
Dept: ANTICOAGULATION | Facility: CLINIC | Age: 68
End: 2018-04-30

## 2018-04-30 DIAGNOSIS — I48.92 ATRIAL FLUTTER (H): ICD-10-CM

## 2018-04-30 LAB — INR PPP: 3 (ref 0.9–1.1)

## 2018-05-02 ENCOUNTER — AMBULATORY - HEALTHEAST (OUTPATIENT)
Dept: PHARMACY | Facility: CLINIC | Age: 68
End: 2018-05-02

## 2018-05-19 ENCOUNTER — AMBULATORY - HEALTHEAST (OUTPATIENT)
Dept: PHARMACY | Facility: CLINIC | Age: 68
End: 2018-05-19

## 2018-05-19 DIAGNOSIS — D64.9 ANEMIA: ICD-10-CM

## 2018-05-21 ENCOUNTER — COMMUNICATION - HEALTHEAST (OUTPATIENT)
Dept: PHARMACY | Facility: CLINIC | Age: 68
End: 2018-05-21

## 2018-05-21 ENCOUNTER — COMMUNICATION - HEALTHEAST (OUTPATIENT)
Dept: ANTICOAGULATION | Facility: CLINIC | Age: 68
End: 2018-05-21

## 2018-05-21 ENCOUNTER — OFFICE VISIT - HEALTHEAST (OUTPATIENT)
Dept: PHARMACY | Facility: CLINIC | Age: 68
End: 2018-05-21

## 2018-05-21 ENCOUNTER — AMBULATORY - HEALTHEAST (OUTPATIENT)
Dept: LAB | Facility: CLINIC | Age: 68
End: 2018-05-21

## 2018-05-21 DIAGNOSIS — D64.9 ANEMIA: ICD-10-CM

## 2018-05-21 DIAGNOSIS — I48.92 ATRIAL FLUTTER (H): ICD-10-CM

## 2018-05-21 LAB
BASOPHILS # BLD AUTO: 0.1 THOU/UL (ref 0–0.2)
BASOPHILS NFR BLD AUTO: 1 % (ref 0–2)
CHOLEST SERPL-MCNC: 98 MG/DL
EOSINOPHIL # BLD AUTO: 0.6 THOU/UL (ref 0–0.4)
EOSINOPHIL NFR BLD AUTO: 7 % (ref 0–6)
ERYTHROCYTE [DISTWIDTH] IN BLOOD BY AUTOMATED COUNT: 14.1 % (ref 11–14.5)
FASTING STATUS PATIENT QL REPORTED: YES
HBA1C MFR BLD: 8.3 % (ref 3.5–6)
HCT VFR BLD AUTO: 39.7 % (ref 40–54)
HDLC SERPL-MCNC: 26 MG/DL
HGB BLD-MCNC: 13.2 G/DL (ref 14–18)
INR PPP: 3.6 (ref 0.9–1.1)
LDLC SERPL CALC-MCNC: 40 MG/DL
LYMPHOCYTES # BLD AUTO: 4.1 THOU/UL (ref 0.8–4.4)
LYMPHOCYTES NFR BLD AUTO: 46 % (ref 20–40)
MCH RBC QN AUTO: 28.4 PG (ref 27–34)
MCHC RBC AUTO-ENTMCNC: 33.1 G/DL (ref 32–36)
MCV RBC AUTO: 86 FL (ref 80–100)
MONOCYTES # BLD AUTO: 0.7 THOU/UL (ref 0–0.9)
MONOCYTES NFR BLD AUTO: 8 % (ref 2–10)
NEUTROPHILS # BLD AUTO: 3.5 THOU/UL (ref 2–7.7)
NEUTROPHILS NFR BLD AUTO: 39 % (ref 50–70)
PLATELET # BLD AUTO: 296 THOU/UL (ref 140–440)
PMV BLD AUTO: 6.8 FL (ref 7–10)
RBC # BLD AUTO: 4.64 MILL/UL (ref 4.4–6.2)
TRIGL SERPL-MCNC: 158 MG/DL
WBC: 9 THOU/UL (ref 4–11)

## 2018-05-22 ENCOUNTER — COMMUNICATION - HEALTHEAST (OUTPATIENT)
Dept: FAMILY MEDICINE | Facility: CLINIC | Age: 68
End: 2018-05-22

## 2018-06-04 ENCOUNTER — AMBULATORY - HEALTHEAST (OUTPATIENT)
Dept: LAB | Facility: CLINIC | Age: 68
End: 2018-06-04

## 2018-06-04 ENCOUNTER — COMMUNICATION - HEALTHEAST (OUTPATIENT)
Dept: ANTICOAGULATION | Facility: CLINIC | Age: 68
End: 2018-06-04

## 2018-06-04 DIAGNOSIS — I48.92 ATRIAL FLUTTER (H): ICD-10-CM

## 2018-06-04 LAB — INR PPP: 2.9 (ref 0.9–1.1)

## 2018-06-08 ENCOUNTER — COMMUNICATION - HEALTHEAST (OUTPATIENT)
Dept: FAMILY MEDICINE | Facility: CLINIC | Age: 68
End: 2018-06-08

## 2018-06-08 ENCOUNTER — COMMUNICATION - HEALTHEAST (OUTPATIENT)
Dept: ANTICOAGULATION | Facility: CLINIC | Age: 68
End: 2018-06-08

## 2018-06-08 DIAGNOSIS — I48.92 ATRIAL FLUTTER (H): ICD-10-CM

## 2018-06-14 ENCOUNTER — COMMUNICATION - HEALTHEAST (OUTPATIENT)
Dept: FAMILY MEDICINE | Facility: CLINIC | Age: 68
End: 2018-06-14

## 2018-06-14 DIAGNOSIS — E11.9 DIABETES MELLITUS (H): ICD-10-CM

## 2018-06-18 ENCOUNTER — COMMUNICATION - HEALTHEAST (OUTPATIENT)
Dept: ANTICOAGULATION | Facility: CLINIC | Age: 68
End: 2018-06-18

## 2018-06-18 ENCOUNTER — AMBULATORY - HEALTHEAST (OUTPATIENT)
Dept: LAB | Facility: CLINIC | Age: 68
End: 2018-06-18

## 2018-06-18 DIAGNOSIS — I48.92 ATRIAL FLUTTER (H): ICD-10-CM

## 2018-06-18 LAB — INR PPP: 4.6 (ref 0.9–1.1)

## 2018-06-27 ENCOUNTER — AMBULATORY - HEALTHEAST (OUTPATIENT)
Dept: LAB | Facility: CLINIC | Age: 68
End: 2018-06-27

## 2018-06-27 ENCOUNTER — COMMUNICATION - HEALTHEAST (OUTPATIENT)
Dept: ANTICOAGULATION | Facility: CLINIC | Age: 68
End: 2018-06-27

## 2018-06-27 DIAGNOSIS — I48.92 ATRIAL FLUTTER (H): ICD-10-CM

## 2018-06-27 LAB — INR PPP: 2.8 (ref 0.9–1.1)

## 2018-07-11 ENCOUNTER — AMBULATORY - HEALTHEAST (OUTPATIENT)
Dept: LAB | Facility: CLINIC | Age: 68
End: 2018-07-11

## 2018-07-11 ENCOUNTER — COMMUNICATION - HEALTHEAST (OUTPATIENT)
Dept: ANTICOAGULATION | Facility: CLINIC | Age: 68
End: 2018-07-11

## 2018-07-11 DIAGNOSIS — I48.92 ATRIAL FLUTTER (H): ICD-10-CM

## 2018-07-11 LAB — INR PPP: 3 (ref 0.9–1.1)

## 2018-07-25 ENCOUNTER — AMBULATORY - HEALTHEAST (OUTPATIENT)
Dept: LAB | Facility: CLINIC | Age: 68
End: 2018-07-25

## 2018-07-25 ENCOUNTER — COMMUNICATION - HEALTHEAST (OUTPATIENT)
Dept: FAMILY MEDICINE | Facility: CLINIC | Age: 68
End: 2018-07-25

## 2018-07-25 ENCOUNTER — COMMUNICATION - HEALTHEAST (OUTPATIENT)
Dept: LAB | Facility: CLINIC | Age: 68
End: 2018-07-25

## 2018-07-25 DIAGNOSIS — I48.92 ATRIAL FLUTTER (H): ICD-10-CM

## 2018-07-25 LAB — INR PPP: 4.56 (ref 0.9–1.1)

## 2018-08-01 ENCOUNTER — AMBULATORY - HEALTHEAST (OUTPATIENT)
Dept: LAB | Facility: CLINIC | Age: 68
End: 2018-08-01

## 2018-08-01 ENCOUNTER — COMMUNICATION - HEALTHEAST (OUTPATIENT)
Dept: ANTICOAGULATION | Facility: CLINIC | Age: 68
End: 2018-08-01

## 2018-08-01 DIAGNOSIS — I48.92 ATRIAL FLUTTER (H): ICD-10-CM

## 2018-08-01 LAB — INR PPP: 2 (ref 0.9–1.1)

## 2018-08-08 ENCOUNTER — COMMUNICATION - HEALTHEAST (OUTPATIENT)
Dept: ADMINISTRATIVE | Facility: CLINIC | Age: 68
End: 2018-08-08

## 2018-08-10 ENCOUNTER — AMBULATORY - HEALTHEAST (OUTPATIENT)
Dept: LAB | Facility: CLINIC | Age: 68
End: 2018-08-10

## 2018-08-10 ENCOUNTER — COMMUNICATION - HEALTHEAST (OUTPATIENT)
Dept: ANTICOAGULATION | Facility: CLINIC | Age: 68
End: 2018-08-10

## 2018-08-10 DIAGNOSIS — I48.92 ATRIAL FLUTTER (H): ICD-10-CM

## 2018-08-10 LAB — INR PPP: 2.4 (ref 0.9–1.1)

## 2018-08-22 ENCOUNTER — AMBULATORY - HEALTHEAST (OUTPATIENT)
Dept: LAB | Facility: CLINIC | Age: 68
End: 2018-08-22

## 2018-08-22 ENCOUNTER — COMMUNICATION - HEALTHEAST (OUTPATIENT)
Dept: ANTICOAGULATION | Facility: CLINIC | Age: 68
End: 2018-08-22

## 2018-08-22 ENCOUNTER — OFFICE VISIT - HEALTHEAST (OUTPATIENT)
Dept: PHARMACY | Facility: CLINIC | Age: 68
End: 2018-08-22

## 2018-08-22 DIAGNOSIS — I48.92 ATRIAL FLUTTER (H): ICD-10-CM

## 2018-08-22 DIAGNOSIS — I48.92 ATRIAL FLUTTER WITH RAPID VENTRICULAR RESPONSE (H): ICD-10-CM

## 2018-08-22 DIAGNOSIS — E78.5 HYPERLIPIDEMIA: ICD-10-CM

## 2018-08-22 DIAGNOSIS — I10 ESSENTIAL HYPERTENSION WITH GOAL BLOOD PRESSURE LESS THAN 130/80: ICD-10-CM

## 2018-08-22 DIAGNOSIS — E11.9 DIABETES MELLITUS (H): ICD-10-CM

## 2018-08-22 LAB
ANION GAP SERPL CALCULATED.3IONS-SCNC: 11 MMOL/L (ref 5–18)
BUN SERPL-MCNC: 12 MG/DL (ref 8–22)
CALCIUM SERPL-MCNC: 9.5 MG/DL (ref 8.5–10.5)
CHLORIDE BLD-SCNC: 107 MMOL/L (ref 98–107)
CO2 SERPL-SCNC: 23 MMOL/L (ref 22–31)
CREAT SERPL-MCNC: 0.88 MG/DL (ref 0.7–1.3)
GFR SERPL CREATININE-BSD FRML MDRD: >60 ML/MIN/1.73M2
GLUCOSE BLD-MCNC: 138 MG/DL (ref 70–125)
HBA1C MFR BLD: 7 % (ref 3.5–6)
INR PPP: 1.6 (ref 0.9–1.1)
POTASSIUM BLD-SCNC: 4.2 MMOL/L (ref 3.5–5)
SODIUM SERPL-SCNC: 141 MMOL/L (ref 136–145)

## 2018-08-31 ENCOUNTER — COMMUNICATION - HEALTHEAST (OUTPATIENT)
Dept: ANTICOAGULATION | Facility: CLINIC | Age: 68
End: 2018-08-31

## 2018-08-31 ENCOUNTER — AMBULATORY - HEALTHEAST (OUTPATIENT)
Dept: LAB | Facility: CLINIC | Age: 68
End: 2018-08-31

## 2018-08-31 DIAGNOSIS — I48.92 ATRIAL FLUTTER (H): ICD-10-CM

## 2018-08-31 LAB — INR PPP: 1.6 (ref 0.9–1.1)

## 2018-09-17 ENCOUNTER — COMMUNICATION - HEALTHEAST (OUTPATIENT)
Dept: ANTICOAGULATION | Facility: CLINIC | Age: 68
End: 2018-09-17

## 2018-09-17 ENCOUNTER — AMBULATORY - HEALTHEAST (OUTPATIENT)
Dept: LAB | Facility: CLINIC | Age: 68
End: 2018-09-17

## 2018-09-17 DIAGNOSIS — I48.92 ATRIAL FLUTTER (H): ICD-10-CM

## 2018-09-17 LAB — INR PPP: 2.2 (ref 0.9–1.1)

## 2018-10-01 ENCOUNTER — COMMUNICATION - HEALTHEAST (OUTPATIENT)
Dept: ANTICOAGULATION | Facility: CLINIC | Age: 68
End: 2018-10-01

## 2018-10-01 ENCOUNTER — AMBULATORY - HEALTHEAST (OUTPATIENT)
Dept: LAB | Facility: CLINIC | Age: 68
End: 2018-10-01

## 2018-10-01 DIAGNOSIS — I48.92 ATRIAL FLUTTER (H): ICD-10-CM

## 2018-10-01 LAB — INR PPP: 1.8 (ref 0.9–1.1)

## 2018-10-15 ENCOUNTER — COMMUNICATION - HEALTHEAST (OUTPATIENT)
Dept: ANTICOAGULATION | Facility: CLINIC | Age: 68
End: 2018-10-15

## 2018-10-15 ENCOUNTER — AMBULATORY - HEALTHEAST (OUTPATIENT)
Dept: LAB | Facility: CLINIC | Age: 68
End: 2018-10-15

## 2018-10-15 DIAGNOSIS — I48.92 ATRIAL FLUTTER (H): ICD-10-CM

## 2018-10-15 LAB — INR PPP: 1.9 (ref 0.9–1.1)

## 2018-10-31 ENCOUNTER — RECORDS - HEALTHEAST (OUTPATIENT)
Dept: ADMINISTRATIVE | Facility: OTHER | Age: 68
End: 2018-10-31

## 2018-11-07 ENCOUNTER — COMMUNICATION - HEALTHEAST (OUTPATIENT)
Dept: FAMILY MEDICINE | Facility: CLINIC | Age: 68
End: 2018-11-07

## 2018-11-07 DIAGNOSIS — I10 ESSENTIAL HYPERTENSION WITH GOAL BLOOD PRESSURE LESS THAN 140/90: ICD-10-CM

## 2018-11-12 ENCOUNTER — COMMUNICATION - HEALTHEAST (OUTPATIENT)
Dept: FAMILY MEDICINE | Facility: CLINIC | Age: 68
End: 2018-11-12

## 2018-11-12 ENCOUNTER — AMBULATORY - HEALTHEAST (OUTPATIENT)
Dept: LAB | Facility: CLINIC | Age: 68
End: 2018-11-12

## 2018-11-12 ENCOUNTER — COMMUNICATION - HEALTHEAST (OUTPATIENT)
Dept: ANTICOAGULATION | Facility: CLINIC | Age: 68
End: 2018-11-12

## 2018-11-12 DIAGNOSIS — I48.92 ATRIAL FLUTTER (H): ICD-10-CM

## 2018-11-12 LAB — INR PPP: 1.8 (ref 0.9–1.1)

## 2018-11-19 ENCOUNTER — AMBULATORY - HEALTHEAST (OUTPATIENT)
Dept: LAB | Facility: CLINIC | Age: 68
End: 2018-11-19

## 2018-11-19 ENCOUNTER — COMMUNICATION - HEALTHEAST (OUTPATIENT)
Dept: ANTICOAGULATION | Facility: CLINIC | Age: 68
End: 2018-11-19

## 2018-11-19 DIAGNOSIS — I48.92 ATRIAL FLUTTER (H): ICD-10-CM

## 2018-11-19 LAB — INR PPP: 2 (ref 0.9–1.1)

## 2018-11-26 ENCOUNTER — AMBULATORY - HEALTHEAST (OUTPATIENT)
Dept: PHARMACY | Facility: CLINIC | Age: 68
End: 2018-11-26

## 2018-11-27 ENCOUNTER — OFFICE VISIT - HEALTHEAST (OUTPATIENT)
Dept: PHARMACY | Facility: CLINIC | Age: 68
End: 2018-11-27

## 2018-11-27 ENCOUNTER — AMBULATORY - HEALTHEAST (OUTPATIENT)
Dept: LAB | Facility: CLINIC | Age: 68
End: 2018-11-27

## 2018-11-27 ENCOUNTER — COMMUNICATION - HEALTHEAST (OUTPATIENT)
Dept: ANTICOAGULATION | Facility: CLINIC | Age: 68
End: 2018-11-27

## 2018-11-27 DIAGNOSIS — I48.92 ATRIAL FLUTTER (H): ICD-10-CM

## 2018-11-27 DIAGNOSIS — E11.65 UNCONTROLLED TYPE 2 DIABETES MELLITUS WITH HYPERGLYCEMIA (H): ICD-10-CM

## 2018-11-27 DIAGNOSIS — R20.8 DECREASED SENSATION OF FOOT: ICD-10-CM

## 2018-11-27 LAB
CREAT UR-MCNC: 127.1 MG/DL
HBA1C MFR BLD: 8.3 % (ref 3.5–6)
INR PPP: 2.5 (ref 0.9–1.1)
MICROALBUMIN UR-MCNC: 1.5 MG/DL (ref 0–1.99)
MICROALBUMIN/CREAT UR: 11.8 MG/G

## 2018-11-30 ENCOUNTER — COMMUNICATION - HEALTHEAST (OUTPATIENT)
Dept: FAMILY MEDICINE | Facility: CLINIC | Age: 68
End: 2018-11-30

## 2018-11-30 ENCOUNTER — COMMUNICATION - HEALTHEAST (OUTPATIENT)
Dept: ADMINISTRATIVE | Facility: CLINIC | Age: 68
End: 2018-11-30

## 2018-11-30 DIAGNOSIS — R20.8 DECREASED SENSATION OF FOOT: ICD-10-CM

## 2018-12-11 ENCOUNTER — COMMUNICATION - HEALTHEAST (OUTPATIENT)
Dept: ANTICOAGULATION | Facility: CLINIC | Age: 68
End: 2018-12-11

## 2018-12-11 ENCOUNTER — AMBULATORY - HEALTHEAST (OUTPATIENT)
Dept: LAB | Facility: CLINIC | Age: 68
End: 2018-12-11

## 2018-12-11 DIAGNOSIS — I48.92 ATRIAL FLUTTER (H): ICD-10-CM

## 2018-12-11 LAB — INR PPP: 2.3 (ref 0.9–1.1)

## 2018-12-28 ENCOUNTER — COMMUNICATION - HEALTHEAST (OUTPATIENT)
Dept: FAMILY MEDICINE | Facility: CLINIC | Age: 68
End: 2018-12-28

## 2018-12-28 DIAGNOSIS — R20.8 DECREASED SENSATION OF FOOT: ICD-10-CM

## 2019-01-15 ENCOUNTER — COMMUNICATION - HEALTHEAST (OUTPATIENT)
Dept: ANTICOAGULATION | Facility: CLINIC | Age: 69
End: 2019-01-15

## 2019-01-17 ENCOUNTER — AMBULATORY - HEALTHEAST (OUTPATIENT)
Dept: LAB | Facility: CLINIC | Age: 69
End: 2019-01-17

## 2019-01-17 ENCOUNTER — COMMUNICATION - HEALTHEAST (OUTPATIENT)
Dept: ANTICOAGULATION | Facility: CLINIC | Age: 69
End: 2019-01-17

## 2019-01-17 DIAGNOSIS — I48.92 ATRIAL FLUTTER (H): ICD-10-CM

## 2019-01-17 LAB — INR PPP: 2.7 (ref 0.9–1.1)

## 2019-02-13 ENCOUNTER — COMMUNICATION - HEALTHEAST (OUTPATIENT)
Dept: NURSING | Facility: CLINIC | Age: 69
End: 2019-02-13

## 2019-02-13 DIAGNOSIS — E11.9 DM TYPE 2 (DIABETES MELLITUS, TYPE 2) (H): ICD-10-CM

## 2019-02-14 ENCOUNTER — RECORDS - HEALTHEAST (OUTPATIENT)
Dept: ADMINISTRATIVE | Facility: OTHER | Age: 69
End: 2019-02-14

## 2019-02-18 ENCOUNTER — COMMUNICATION - HEALTHEAST (OUTPATIENT)
Dept: ANTICOAGULATION | Facility: CLINIC | Age: 69
End: 2019-02-18

## 2019-02-18 ENCOUNTER — AMBULATORY - HEALTHEAST (OUTPATIENT)
Dept: LAB | Facility: CLINIC | Age: 69
End: 2019-02-18

## 2019-02-18 DIAGNOSIS — I48.92 ATRIAL FLUTTER (H): ICD-10-CM

## 2019-02-18 LAB — INR PPP: 2.4 (ref 0.9–1.1)

## 2019-03-14 ENCOUNTER — RECORDS - HEALTHEAST (OUTPATIENT)
Dept: ADMINISTRATIVE | Facility: OTHER | Age: 69
End: 2019-03-14

## 2019-03-25 ENCOUNTER — COMMUNICATION - HEALTHEAST (OUTPATIENT)
Dept: FAMILY MEDICINE | Facility: CLINIC | Age: 69
End: 2019-03-25

## 2019-03-25 DIAGNOSIS — R20.8 DECREASED SENSATION OF FOOT: ICD-10-CM

## 2019-04-05 ENCOUNTER — OFFICE VISIT - HEALTHEAST (OUTPATIENT)
Dept: CARDIOLOGY | Facility: CLINIC | Age: 69
End: 2019-04-05

## 2019-04-05 DIAGNOSIS — I35.0 AORTIC STENOSIS, MILD: ICD-10-CM

## 2019-04-05 DIAGNOSIS — G47.33 OBSTRUCTIVE SLEEP APNEA SYNDROME: ICD-10-CM

## 2019-04-05 DIAGNOSIS — I48.92 ATRIAL FLUTTER (H): ICD-10-CM

## 2019-04-05 ASSESSMENT — MIFFLIN-ST. JEOR: SCORE: 2239.95

## 2019-04-09 ENCOUNTER — COMMUNICATION - HEALTHEAST (OUTPATIENT)
Dept: ANTICOAGULATION | Facility: CLINIC | Age: 69
End: 2019-04-09

## 2019-04-09 ENCOUNTER — AMBULATORY - HEALTHEAST (OUTPATIENT)
Dept: LAB | Facility: CLINIC | Age: 69
End: 2019-04-09

## 2019-04-09 DIAGNOSIS — I48.92 ATRIAL FLUTTER (H): ICD-10-CM

## 2019-04-09 LAB
ATRIAL RATE - MUSE: 73 BPM
DIASTOLIC BLOOD PRESSURE - MUSE: NORMAL MMHG
INR PPP: 2.7 (ref 0.9–1.1)
INTERPRETATION ECG - MUSE: NORMAL
P AXIS - MUSE: 52 DEGREES
PR INTERVAL - MUSE: 182 MS
QRS DURATION - MUSE: 98 MS
QT - MUSE: 404 MS
QTC - MUSE: 445 MS
R AXIS - MUSE: -36 DEGREES
SYSTOLIC BLOOD PRESSURE - MUSE: NORMAL MMHG
T AXIS - MUSE: -1 DEGREES
VENTRICULAR RATE- MUSE: 73 BPM

## 2019-04-19 ENCOUNTER — HOSPITAL ENCOUNTER (OUTPATIENT)
Dept: CARDIOLOGY | Facility: CLINIC | Age: 69
Discharge: HOME | End: 2019-04-19
Attending: INTERNAL MEDICINE

## 2019-04-19 DIAGNOSIS — G47.33 OBSTRUCTIVE SLEEP APNEA SYNDROME: ICD-10-CM

## 2019-04-19 DIAGNOSIS — I48.92 ATRIAL FLUTTER (H): ICD-10-CM

## 2019-04-19 LAB
AORTIC ROOT: 3.2 CM
AORTIC VALVE MEAN VELOCITY: 2 CM/S
ASCENDING AORTA: 4.2 CM
AV DIMENSIONLESS INDEX VTI: 0.3
AV MEAN GRADIENT: 21 MMHG
AV VALVE AREA: 1.9 CM2
BSA FOR ECHO PROCEDURE: 2.69 M2
CV BLOOD PRESSURE: NORMAL MMHG
CV ECHO HEIGHT: 71.5 IN
CV ECHO WEIGHT: 318 LBS
DOP CALC AO VTI: 64 CM
DOP CALC LVOT AREA: 5.72 CM2
DOP CALC LVOT DIAMETER: 2.7 CM
DOP CALC LVOT STROKE VOLUME: 120.2 CM3
DOP CALC MV VTI: 34.7 CM
DOP CALCLVOT PEAK VEL VTI: 21 CM
EJECTION FRACTION: 65 % (ref 55–75)
FRACTIONAL SHORTENING: 31.4 % (ref 28–44)
INTERVENTRICULAR SEPTUM IN END DIASTOLE: 1.7 CM (ref 0.6–1)
IVS/PW RATIO: 1.3
LA AREA 1: 19.2 CM2
LA AREA 2: 15.6 CM2
LEFT ATRIUM LENGTH: 5.2 CM
LEFT ATRIUM SIZE: 4 CM
LEFT ATRIUM VOLUME INDEX: 18.2 ML/M2
LEFT ATRIUM VOLUME: 49 ML
LEFT VENTRICLE CARDIAC INDEX: 2.9 L/MIN/M2
LEFT VENTRICLE CARDIAC OUTPUT: 7.9 L/MIN
LEFT VENTRICLE DIASTOLIC VOLUME INDEX: 42 CM3/M2 (ref 34–74)
LEFT VENTRICLE DIASTOLIC VOLUME: 113 CM3 (ref 62–150)
LEFT VENTRICLE HEART RATE: 66 BPM
LEFT VENTRICLE MASS INDEX: 123.6 G/M2
LEFT VENTRICLE SYSTOLIC VOLUME INDEX: 14.9 CM3/M2 (ref 11–31)
LEFT VENTRICLE SYSTOLIC VOLUME: 40 CM3 (ref 21–61)
LEFT VENTRICULAR INTERNAL DIMENSION IN DIASTOLE: 5.1 CM (ref 4.2–5.8)
LEFT VENTRICULAR INTERNAL DIMENSION IN SYSTOLE: 3.5 CM (ref 2.5–4)
LEFT VENTRICULAR MASS: 332.4 G
LEFT VENTRICULAR POSTERIOR WALL IN END DIASTOLE: 1.3 CM (ref 0.6–1)
LV STROKE VOLUME INDEX: 44.7 ML/M2
Lab: 8.2 MSEC
MITRAL VALVE E/A RATIO: 0.9
MV AREA VTI: 3.46 CM2
MV DECELERATION TIME: 236 MSEC
MV E'TISSUE VEL-MED: 6.43 CM/S
MV MEAN GRADIENT: 2 MMHG
MV MEDIAL E/E' RATIO: 12.6
MV PEAK A VELOCITY: 94.9 CM/S
MV PEAK E VELOCITY: 81.2 CM/S
MV VALVE AREA BY CONTINUITY EQUATION: 3.5 CM2
NUC REST DIASTOLIC VOLUME INDEX: 5088 LBS
NUC REST SYSTOLIC VOLUME INDEX: 71.5 IN
TRICUSPID VALVE ANULAR PLANE SYSTOLIC EXCURSION: 1.9 CM

## 2019-04-19 ASSESSMENT — MIFFLIN-ST. JEOR: SCORE: 2237.5

## 2019-04-26 ENCOUNTER — COMMUNICATION - HEALTHEAST (OUTPATIENT)
Dept: FAMILY MEDICINE | Facility: CLINIC | Age: 69
End: 2019-04-26

## 2019-04-26 DIAGNOSIS — I48.92 ATRIAL FLUTTER (H): ICD-10-CM

## 2019-05-02 ENCOUNTER — COMMUNICATION - HEALTHEAST (OUTPATIENT)
Dept: FAMILY MEDICINE | Facility: CLINIC | Age: 69
End: 2019-05-02

## 2019-05-02 DIAGNOSIS — I10 ESSENTIAL HYPERTENSION WITH GOAL BLOOD PRESSURE LESS THAN 140/90: ICD-10-CM

## 2019-05-16 ENCOUNTER — OFFICE VISIT - HEALTHEAST (OUTPATIENT)
Dept: FAMILY MEDICINE | Facility: CLINIC | Age: 69
End: 2019-05-16

## 2019-05-16 ENCOUNTER — COMMUNICATION - HEALTHEAST (OUTPATIENT)
Dept: ANTICOAGULATION | Facility: CLINIC | Age: 69
End: 2019-05-16

## 2019-05-16 DIAGNOSIS — E78.2 MIXED HYPERLIPIDEMIA: ICD-10-CM

## 2019-05-16 DIAGNOSIS — E11.59 TYPE 2 DIABETES MELLITUS WITH OTHER CIRCULATORY COMPLICATIONS (H): ICD-10-CM

## 2019-05-16 DIAGNOSIS — I48.92 ATRIAL FLUTTER (H): ICD-10-CM

## 2019-05-16 DIAGNOSIS — E66.9 OBESITY WITHOUT SERIOUS COMORBIDITY, UNSPECIFIED CLASSIFICATION, UNSPECIFIED OBESITY TYPE: ICD-10-CM

## 2019-05-16 DIAGNOSIS — N42.9 DISORDER OF PROSTATE: ICD-10-CM

## 2019-05-16 DIAGNOSIS — M79.661 PAIN OF RIGHT LOWER LEG: ICD-10-CM

## 2019-05-16 DIAGNOSIS — I10 ESSENTIAL HYPERTENSION WITH GOAL BLOOD PRESSURE LESS THAN 130/80: ICD-10-CM

## 2019-05-16 DIAGNOSIS — Z12.5 SCREENING FOR PROSTATE CANCER: ICD-10-CM

## 2019-05-16 LAB
ALBUMIN SERPL-MCNC: 3.9 G/DL (ref 3.5–5)
ALP SERPL-CCNC: 77 U/L (ref 45–120)
ALT SERPL W P-5'-P-CCNC: 58 U/L (ref 0–45)
ANION GAP SERPL CALCULATED.3IONS-SCNC: 14 MMOL/L (ref 5–18)
AST SERPL W P-5'-P-CCNC: 48 U/L (ref 0–40)
BILIRUB SERPL-MCNC: 0.8 MG/DL (ref 0–1)
BUN SERPL-MCNC: 12 MG/DL (ref 8–22)
CALCIUM SERPL-MCNC: 9.6 MG/DL (ref 8.5–10.5)
CHLORIDE BLD-SCNC: 103 MMOL/L (ref 98–107)
CHOLEST SERPL-MCNC: 89 MG/DL
CO2 SERPL-SCNC: 23 MMOL/L (ref 22–31)
CREAT SERPL-MCNC: 1.03 MG/DL (ref 0.7–1.3)
CREAT UR-MCNC: 170.2 MG/DL
FASTING STATUS PATIENT QL REPORTED: YES
GFR SERPL CREATININE-BSD FRML MDRD: >60 ML/MIN/1.73M2
GLUCOSE BLD-MCNC: 179 MG/DL (ref 70–125)
HBA1C MFR BLD: 8.8 % (ref 3.5–6)
HDLC SERPL-MCNC: 23 MG/DL
INR PPP: 2.7 (ref 0.9–1.1)
LDLC SERPL CALC-MCNC: 32 MG/DL
MICROALBUMIN UR-MCNC: 4.21 MG/DL (ref 0–1.99)
MICROALBUMIN/CREAT UR: 24.7 MG/G
POTASSIUM BLD-SCNC: 4.3 MMOL/L (ref 3.5–5)
PROT SERPL-MCNC: 7.7 G/DL (ref 6–8)
PSA SERPL-MCNC: 1.4 NG/ML (ref 0–4.5)
SODIUM SERPL-SCNC: 140 MMOL/L (ref 136–145)
TRIGL SERPL-MCNC: 171 MG/DL
TSH SERPL DL<=0.005 MIU/L-ACNC: 1.67 UIU/ML (ref 0.3–5)

## 2019-05-16 ASSESSMENT — MIFFLIN-ST. JEOR: SCORE: 2253.38

## 2019-05-20 ENCOUNTER — COMMUNICATION - HEALTHEAST (OUTPATIENT)
Dept: FAMILY MEDICINE | Facility: CLINIC | Age: 69
End: 2019-05-20

## 2019-06-04 ENCOUNTER — RECORDS - HEALTHEAST (OUTPATIENT)
Dept: HEALTH INFORMATION MANAGEMENT | Facility: CLINIC | Age: 69
End: 2019-06-04

## 2019-06-04 ENCOUNTER — COMMUNICATION - HEALTHEAST (OUTPATIENT)
Dept: FAMILY MEDICINE | Facility: CLINIC | Age: 69
End: 2019-06-04

## 2019-06-04 DIAGNOSIS — E11.42 DIABETIC PERIPHERAL NEUROPATHY (H): ICD-10-CM

## 2019-06-05 ENCOUNTER — COMMUNICATION - HEALTHEAST (OUTPATIENT)
Dept: FAMILY MEDICINE | Facility: CLINIC | Age: 69
End: 2019-06-05

## 2019-06-05 DIAGNOSIS — E11.42 DIABETIC PERIPHERAL NEUROPATHY (H): ICD-10-CM

## 2019-06-29 ENCOUNTER — COMMUNICATION - HEALTHEAST (OUTPATIENT)
Dept: FAMILY MEDICINE | Facility: CLINIC | Age: 69
End: 2019-06-29

## 2019-06-29 DIAGNOSIS — E11.42 DIABETIC PERIPHERAL NEUROPATHY (H): ICD-10-CM

## 2019-07-02 ENCOUNTER — COMMUNICATION - HEALTHEAST (OUTPATIENT)
Dept: ANTICOAGULATION | Facility: CLINIC | Age: 69
End: 2019-07-02

## 2019-07-02 DIAGNOSIS — I48.92 ATRIAL FLUTTER (H): ICD-10-CM

## 2019-07-18 ENCOUNTER — COMMUNICATION - HEALTHEAST (OUTPATIENT)
Dept: ANTICOAGULATION | Facility: CLINIC | Age: 69
End: 2019-07-18

## 2019-07-24 ENCOUNTER — COMMUNICATION - HEALTHEAST (OUTPATIENT)
Dept: ANTICOAGULATION | Facility: CLINIC | Age: 69
End: 2019-07-24

## 2019-07-24 ENCOUNTER — AMBULATORY - HEALTHEAST (OUTPATIENT)
Dept: LAB | Facility: CLINIC | Age: 69
End: 2019-07-24

## 2019-07-24 DIAGNOSIS — I48.92 ATRIAL FLUTTER (H): ICD-10-CM

## 2019-07-24 LAB — INR PPP: 3.8 (ref 0.9–1.1)

## 2019-08-07 ENCOUNTER — AMBULATORY - HEALTHEAST (OUTPATIENT)
Dept: LAB | Facility: CLINIC | Age: 69
End: 2019-08-07

## 2019-08-07 ENCOUNTER — COMMUNICATION - HEALTHEAST (OUTPATIENT)
Dept: ANTICOAGULATION | Facility: CLINIC | Age: 69
End: 2019-08-07

## 2019-08-07 DIAGNOSIS — I48.92 ATRIAL FLUTTER (H): ICD-10-CM

## 2019-08-07 LAB — INR PPP: 3 (ref 0.9–1.1)

## 2019-08-10 ENCOUNTER — COMMUNICATION - HEALTHEAST (OUTPATIENT)
Dept: FAMILY MEDICINE | Facility: CLINIC | Age: 69
End: 2019-08-10

## 2019-08-10 DIAGNOSIS — I48.92 ATRIAL FLUTTER WITH RAPID VENTRICULAR RESPONSE (H): ICD-10-CM

## 2019-08-15 ENCOUNTER — COMMUNICATION - HEALTHEAST (OUTPATIENT)
Dept: FAMILY MEDICINE | Facility: CLINIC | Age: 69
End: 2019-08-15

## 2019-08-15 DIAGNOSIS — I48.92 ATRIAL FLUTTER (H): ICD-10-CM

## 2019-08-21 ENCOUNTER — COMMUNICATION - HEALTHEAST (OUTPATIENT)
Dept: ANTICOAGULATION | Facility: CLINIC | Age: 69
End: 2019-08-21

## 2019-08-21 ENCOUNTER — AMBULATORY - HEALTHEAST (OUTPATIENT)
Dept: LAB | Facility: CLINIC | Age: 69
End: 2019-08-21

## 2019-08-21 DIAGNOSIS — I48.92 ATRIAL FLUTTER (H): ICD-10-CM

## 2019-08-21 LAB — INR PPP: 3.8 (ref 0.9–1.1)

## 2019-08-23 ENCOUNTER — COMMUNICATION - HEALTHEAST (OUTPATIENT)
Dept: FAMILY MEDICINE | Facility: CLINIC | Age: 69
End: 2019-08-23

## 2019-08-23 DIAGNOSIS — I10 ESSENTIAL HYPERTENSION WITH GOAL BLOOD PRESSURE LESS THAN 130/80: ICD-10-CM

## 2019-08-23 DIAGNOSIS — E11.42 DIABETIC PERIPHERAL NEUROPATHY (H): ICD-10-CM

## 2019-09-04 ENCOUNTER — AMBULATORY - HEALTHEAST (OUTPATIENT)
Dept: LAB | Facility: CLINIC | Age: 69
End: 2019-09-04

## 2019-09-04 ENCOUNTER — COMMUNICATION - HEALTHEAST (OUTPATIENT)
Dept: ANTICOAGULATION | Facility: CLINIC | Age: 69
End: 2019-09-04

## 2019-09-04 DIAGNOSIS — I48.92 ATRIAL FLUTTER (H): ICD-10-CM

## 2019-09-04 LAB — INR PPP: 2.3 (ref 0.9–1.1)

## 2019-09-11 ENCOUNTER — COMMUNICATION - HEALTHEAST (OUTPATIENT)
Dept: FAMILY MEDICINE | Facility: CLINIC | Age: 69
End: 2019-09-11

## 2019-09-11 DIAGNOSIS — E11.9 DIABETES MELLITUS (H): ICD-10-CM

## 2019-09-17 ENCOUNTER — AMBULATORY - HEALTHEAST (OUTPATIENT)
Dept: PHARMACY | Facility: CLINIC | Age: 69
End: 2019-09-17

## 2019-09-17 DIAGNOSIS — E11.59 TYPE 2 DIABETES MELLITUS WITH OTHER CIRCULATORY COMPLICATIONS (H): ICD-10-CM

## 2019-09-18 ENCOUNTER — COMMUNICATION - HEALTHEAST (OUTPATIENT)
Dept: FAMILY MEDICINE | Facility: CLINIC | Age: 69
End: 2019-09-18

## 2019-09-18 ENCOUNTER — COMMUNICATION - HEALTHEAST (OUTPATIENT)
Dept: ANTICOAGULATION | Facility: CLINIC | Age: 69
End: 2019-09-18

## 2019-09-18 ENCOUNTER — AMBULATORY - HEALTHEAST (OUTPATIENT)
Dept: LAB | Facility: CLINIC | Age: 69
End: 2019-09-18

## 2019-09-18 DIAGNOSIS — I48.92 ATRIAL FLUTTER (H): ICD-10-CM

## 2019-09-18 DIAGNOSIS — E11.59 TYPE 2 DIABETES MELLITUS WITH OTHER CIRCULATORY COMPLICATIONS (H): ICD-10-CM

## 2019-09-18 LAB
HBA1C MFR BLD: 7.7 % (ref 3.5–6)
INR PPP: 2 (ref 0.9–1.1)

## 2019-09-23 ENCOUNTER — COMMUNICATION - HEALTHEAST (OUTPATIENT)
Dept: FAMILY MEDICINE | Facility: CLINIC | Age: 69
End: 2019-09-23

## 2019-09-30 ENCOUNTER — COMMUNICATION - HEALTHEAST (OUTPATIENT)
Dept: NURSING | Facility: CLINIC | Age: 69
End: 2019-09-30

## 2019-10-02 ENCOUNTER — COMMUNICATION - HEALTHEAST (OUTPATIENT)
Dept: FAMILY MEDICINE | Facility: CLINIC | Age: 69
End: 2019-10-02

## 2019-10-16 ENCOUNTER — AMBULATORY - HEALTHEAST (OUTPATIENT)
Dept: LAB | Facility: CLINIC | Age: 69
End: 2019-10-16

## 2019-10-16 ENCOUNTER — OFFICE VISIT - HEALTHEAST (OUTPATIENT)
Dept: PHARMACY | Facility: CLINIC | Age: 69
End: 2019-10-16

## 2019-10-16 ENCOUNTER — COMMUNICATION - HEALTHEAST (OUTPATIENT)
Dept: ANTICOAGULATION | Facility: CLINIC | Age: 69
End: 2019-10-16

## 2019-10-16 DIAGNOSIS — E11.42 DIABETIC PERIPHERAL NEUROPATHY (H): ICD-10-CM

## 2019-10-16 DIAGNOSIS — N40.1 BENIGN PROSTATIC HYPERPLASIA WITH INCOMPLETE BLADDER EMPTYING: ICD-10-CM

## 2019-10-16 DIAGNOSIS — R39.14 BENIGN PROSTATIC HYPERPLASIA WITH INCOMPLETE BLADDER EMPTYING: ICD-10-CM

## 2019-10-16 DIAGNOSIS — I48.92 ATRIAL FLUTTER (H): ICD-10-CM

## 2019-10-16 DIAGNOSIS — Z23 FLU VACCINE NEED: ICD-10-CM

## 2019-10-16 DIAGNOSIS — R20.8 DECREASED SENSATION OF FOOT: ICD-10-CM

## 2019-10-16 DIAGNOSIS — I48.92 ATRIAL FLUTTER WITH RAPID VENTRICULAR RESPONSE (H): ICD-10-CM

## 2019-10-16 DIAGNOSIS — E11.59 TYPE 2 DIABETES MELLITUS WITH OTHER CIRCULATORY COMPLICATIONS (H): ICD-10-CM

## 2019-10-16 LAB — INR PPP: 2.6 (ref 0.9–1.1)

## 2019-10-23 ENCOUNTER — COMMUNICATION - HEALTHEAST (OUTPATIENT)
Dept: FAMILY MEDICINE | Facility: CLINIC | Age: 69
End: 2019-10-23

## 2019-10-23 DIAGNOSIS — I10 ESSENTIAL HYPERTENSION WITH GOAL BLOOD PRESSURE LESS THAN 140/90: ICD-10-CM

## 2019-11-08 ENCOUNTER — COMMUNICATION - HEALTHEAST (OUTPATIENT)
Dept: FAMILY MEDICINE | Facility: CLINIC | Age: 69
End: 2019-11-08

## 2019-11-08 DIAGNOSIS — N40.1 BENIGN PROSTATIC HYPERPLASIA WITH INCOMPLETE BLADDER EMPTYING: ICD-10-CM

## 2019-11-08 DIAGNOSIS — R39.14 BENIGN PROSTATIC HYPERPLASIA WITH INCOMPLETE BLADDER EMPTYING: ICD-10-CM

## 2019-11-08 DIAGNOSIS — E78.5 HYPERLIPIDEMIA: ICD-10-CM

## 2019-11-13 ENCOUNTER — AMBULATORY - HEALTHEAST (OUTPATIENT)
Dept: LAB | Facility: CLINIC | Age: 69
End: 2019-11-13

## 2019-11-13 ENCOUNTER — COMMUNICATION - HEALTHEAST (OUTPATIENT)
Dept: ANTICOAGULATION | Facility: CLINIC | Age: 69
End: 2019-11-13

## 2019-11-13 DIAGNOSIS — I48.92 ATRIAL FLUTTER (H): ICD-10-CM

## 2019-11-13 LAB — INR PPP: 2.4 (ref 0.9–1.1)

## 2019-12-19 ENCOUNTER — AMBULATORY - HEALTHEAST (OUTPATIENT)
Dept: PHARMACY | Facility: CLINIC | Age: 69
End: 2019-12-19

## 2019-12-19 DIAGNOSIS — E11.59 TYPE 2 DIABETES MELLITUS WITH OTHER CIRCULATORY COMPLICATIONS (H): ICD-10-CM

## 2019-12-20 ENCOUNTER — AMBULATORY - HEALTHEAST (OUTPATIENT)
Dept: LAB | Facility: CLINIC | Age: 69
End: 2019-12-20

## 2019-12-20 ENCOUNTER — COMMUNICATION - HEALTHEAST (OUTPATIENT)
Dept: ANTICOAGULATION | Facility: CLINIC | Age: 69
End: 2019-12-20

## 2019-12-20 ENCOUNTER — OFFICE VISIT - HEALTHEAST (OUTPATIENT)
Dept: PHARMACY | Facility: CLINIC | Age: 69
End: 2019-12-20

## 2019-12-20 DIAGNOSIS — I48.92 ATRIAL FLUTTER (H): ICD-10-CM

## 2019-12-20 DIAGNOSIS — R39.14 BENIGN PROSTATIC HYPERPLASIA WITH INCOMPLETE BLADDER EMPTYING: ICD-10-CM

## 2019-12-20 DIAGNOSIS — R20.8 DECREASED SENSATION OF FOOT: ICD-10-CM

## 2019-12-20 DIAGNOSIS — E11.59 TYPE 2 DIABETES MELLITUS WITH OTHER CIRCULATORY COMPLICATIONS (H): ICD-10-CM

## 2019-12-20 DIAGNOSIS — N40.1 BENIGN PROSTATIC HYPERPLASIA WITH INCOMPLETE BLADDER EMPTYING: ICD-10-CM

## 2019-12-20 LAB
HBA1C MFR BLD: 7.8 % (ref 3.5–6)
INR PPP: 2.8 (ref 0.9–1.1)

## 2020-01-24 ENCOUNTER — AMBULATORY - HEALTHEAST (OUTPATIENT)
Dept: LAB | Facility: CLINIC | Age: 70
End: 2020-01-24

## 2020-01-24 ENCOUNTER — COMMUNICATION - HEALTHEAST (OUTPATIENT)
Dept: ANTICOAGULATION | Facility: CLINIC | Age: 70
End: 2020-01-24

## 2020-01-24 DIAGNOSIS — I48.92 ATRIAL FLUTTER (H): ICD-10-CM

## 2020-01-24 LAB — INR PPP: 2.9 (ref 0.9–1.1)

## 2020-02-01 ENCOUNTER — COMMUNICATION - HEALTHEAST (OUTPATIENT)
Dept: NURSING | Facility: CLINIC | Age: 70
End: 2020-02-01

## 2020-02-01 DIAGNOSIS — E11.9 DM TYPE 2 (DIABETES MELLITUS, TYPE 2) (H): ICD-10-CM

## 2020-02-03 ENCOUNTER — COMMUNICATION - HEALTHEAST (OUTPATIENT)
Dept: FAMILY MEDICINE | Facility: CLINIC | Age: 70
End: 2020-02-03

## 2020-02-06 ENCOUNTER — COMMUNICATION - HEALTHEAST (OUTPATIENT)
Dept: FAMILY MEDICINE | Facility: CLINIC | Age: 70
End: 2020-02-06

## 2020-02-06 DIAGNOSIS — I48.92 ATRIAL FLUTTER (H): ICD-10-CM

## 2020-02-07 ENCOUNTER — COMMUNICATION - HEALTHEAST (OUTPATIENT)
Dept: FAMILY MEDICINE | Facility: CLINIC | Age: 70
End: 2020-02-07

## 2020-02-07 DIAGNOSIS — N40.1 BENIGN PROSTATIC HYPERPLASIA WITH INCOMPLETE BLADDER EMPTYING: ICD-10-CM

## 2020-02-07 DIAGNOSIS — R39.14 BENIGN PROSTATIC HYPERPLASIA WITH INCOMPLETE BLADDER EMPTYING: ICD-10-CM

## 2020-02-28 ENCOUNTER — AMBULATORY - HEALTHEAST (OUTPATIENT)
Dept: LAB | Facility: CLINIC | Age: 70
End: 2020-02-28

## 2020-02-28 ENCOUNTER — COMMUNICATION - HEALTHEAST (OUTPATIENT)
Dept: ANTICOAGULATION | Facility: CLINIC | Age: 70
End: 2020-02-28

## 2020-02-28 DIAGNOSIS — I48.92 ATRIAL FLUTTER (H): ICD-10-CM

## 2020-02-28 LAB — INR PPP: 2.7 (ref 0.9–1.1)

## 2020-03-25 ENCOUNTER — AMBULATORY - HEALTHEAST (OUTPATIENT)
Dept: CARDIOLOGY | Facility: CLINIC | Age: 70
End: 2020-03-25

## 2020-03-25 DIAGNOSIS — I35.0 AORTIC STENOSIS, MILD: ICD-10-CM

## 2020-03-25 DIAGNOSIS — I48.92 ATRIAL FLUTTER (H): ICD-10-CM

## 2020-03-25 DIAGNOSIS — G47.33 OBSTRUCTIVE SLEEP APNEA SYNDROME: ICD-10-CM

## 2020-03-26 ENCOUNTER — COMMUNICATION - HEALTHEAST (OUTPATIENT)
Dept: CARDIOLOGY | Facility: CLINIC | Age: 70
End: 2020-03-26

## 2020-04-16 ENCOUNTER — COMMUNICATION - HEALTHEAST (OUTPATIENT)
Dept: FAMILY MEDICINE | Facility: CLINIC | Age: 70
End: 2020-04-16

## 2020-04-16 DIAGNOSIS — I10 ESSENTIAL HYPERTENSION WITH GOAL BLOOD PRESSURE LESS THAN 140/90: ICD-10-CM

## 2020-04-24 ENCOUNTER — COMMUNICATION - HEALTHEAST (OUTPATIENT)
Dept: ANTICOAGULATION | Facility: CLINIC | Age: 70
End: 2020-04-24

## 2020-04-24 ENCOUNTER — AMBULATORY - HEALTHEAST (OUTPATIENT)
Dept: LAB | Facility: CLINIC | Age: 70
End: 2020-04-24

## 2020-04-24 DIAGNOSIS — I48.92 ATRIAL FLUTTER (H): ICD-10-CM

## 2020-04-24 LAB — INR PPP: 3 (ref 0.9–1.1)

## 2020-05-04 ENCOUNTER — COMMUNICATION - HEALTHEAST (OUTPATIENT)
Dept: FAMILY MEDICINE | Facility: CLINIC | Age: 70
End: 2020-05-04

## 2020-05-04 DIAGNOSIS — E78.5 HYPERLIPIDEMIA: ICD-10-CM

## 2020-05-06 ENCOUNTER — OFFICE VISIT - HEALTHEAST (OUTPATIENT)
Dept: FAMILY MEDICINE | Facility: CLINIC | Age: 70
End: 2020-05-06

## 2020-05-06 DIAGNOSIS — Z12.5 SCREENING FOR PROSTATE CANCER: ICD-10-CM

## 2020-05-06 DIAGNOSIS — I10 ESSENTIAL HYPERTENSION WITH GOAL BLOOD PRESSURE LESS THAN 130/80: ICD-10-CM

## 2020-05-06 DIAGNOSIS — R39.14 BENIGN PROSTATIC HYPERPLASIA WITH INCOMPLETE BLADDER EMPTYING: ICD-10-CM

## 2020-05-06 DIAGNOSIS — N40.1 BENIGN PROSTATIC HYPERPLASIA WITH INCOMPLETE BLADDER EMPTYING: ICD-10-CM

## 2020-05-06 DIAGNOSIS — R21 RASH AND NONSPECIFIC SKIN ERUPTION: ICD-10-CM

## 2020-05-06 DIAGNOSIS — E11.59 TYPE 2 DIABETES MELLITUS WITH OTHER CIRCULATORY COMPLICATIONS (H): ICD-10-CM

## 2020-05-06 DIAGNOSIS — E78.2 MIXED HYPERLIPIDEMIA: ICD-10-CM

## 2020-05-14 ENCOUNTER — COMMUNICATION - HEALTHEAST (OUTPATIENT)
Dept: FAMILY MEDICINE | Facility: CLINIC | Age: 70
End: 2020-05-14

## 2020-05-14 DIAGNOSIS — I10 ESSENTIAL HYPERTENSION WITH GOAL BLOOD PRESSURE LESS THAN 130/80: ICD-10-CM

## 2020-06-07 ENCOUNTER — COMMUNICATION - HEALTHEAST (OUTPATIENT)
Dept: FAMILY MEDICINE | Facility: CLINIC | Age: 70
End: 2020-06-07

## 2020-06-07 DIAGNOSIS — E11.9 DIABETES MELLITUS (H): ICD-10-CM

## 2020-06-13 ENCOUNTER — COMMUNICATION - HEALTHEAST (OUTPATIENT)
Dept: FAMILY MEDICINE | Facility: CLINIC | Age: 70
End: 2020-06-13

## 2020-06-13 DIAGNOSIS — E11.42 DIABETIC PERIPHERAL NEUROPATHY (H): ICD-10-CM

## 2020-07-07 ENCOUNTER — COMMUNICATION - HEALTHEAST (OUTPATIENT)
Dept: ANTICOAGULATION | Facility: CLINIC | Age: 70
End: 2020-07-07

## 2020-07-09 ENCOUNTER — AMBULATORY - HEALTHEAST (OUTPATIENT)
Dept: PHARMACY | Facility: CLINIC | Age: 70
End: 2020-07-09

## 2020-07-09 DIAGNOSIS — R39.14 BENIGN PROSTATIC HYPERPLASIA WITH INCOMPLETE BLADDER EMPTYING: ICD-10-CM

## 2020-07-09 DIAGNOSIS — E11.59 TYPE 2 DIABETES MELLITUS WITH OTHER CIRCULATORY COMPLICATIONS (H): ICD-10-CM

## 2020-07-09 DIAGNOSIS — N40.1 BENIGN PROSTATIC HYPERPLASIA WITH INCOMPLETE BLADDER EMPTYING: ICD-10-CM

## 2020-07-09 PROCEDURE — 99606 MTMS BY PHARM EST 15 MIN: CPT | Performed by: PHARMACIST

## 2020-07-13 ENCOUNTER — AMBULATORY - HEALTHEAST (OUTPATIENT)
Dept: LAB | Facility: CLINIC | Age: 70
End: 2020-07-13

## 2020-07-13 ENCOUNTER — HOSPITAL ENCOUNTER (OUTPATIENT)
Dept: CARDIOLOGY | Facility: CLINIC | Age: 70
Discharge: HOME | End: 2020-07-13
Attending: INTERNAL MEDICINE

## 2020-07-13 ENCOUNTER — COMMUNICATION - HEALTHEAST (OUTPATIENT)
Dept: ANTICOAGULATION | Facility: CLINIC | Age: 70
End: 2020-07-13

## 2020-07-13 DIAGNOSIS — G47.33 OBSTRUCTIVE SLEEP APNEA SYNDROME: ICD-10-CM

## 2020-07-13 DIAGNOSIS — I48.92 ATRIAL FLUTTER (H): ICD-10-CM

## 2020-07-13 DIAGNOSIS — E11.59 TYPE 2 DIABETES MELLITUS WITH OTHER CIRCULATORY COMPLICATIONS (H): ICD-10-CM

## 2020-07-13 DIAGNOSIS — R39.14 BENIGN PROSTATIC HYPERPLASIA WITH INCOMPLETE BLADDER EMPTYING: ICD-10-CM

## 2020-07-13 DIAGNOSIS — R21 RASH AND NONSPECIFIC SKIN ERUPTION: ICD-10-CM

## 2020-07-13 DIAGNOSIS — Z12.5 SCREENING FOR PROSTATE CANCER: ICD-10-CM

## 2020-07-13 DIAGNOSIS — I35.0 AORTIC STENOSIS, MILD: ICD-10-CM

## 2020-07-13 DIAGNOSIS — E78.2 MIXED HYPERLIPIDEMIA: ICD-10-CM

## 2020-07-13 DIAGNOSIS — N40.1 BENIGN PROSTATIC HYPERPLASIA WITH INCOMPLETE BLADDER EMPTYING: ICD-10-CM

## 2020-07-13 LAB
ALBUMIN SERPL-MCNC: 3.9 G/DL (ref 3.5–5)
ALP SERPL-CCNC: 79 U/L (ref 45–120)
ALT SERPL W P-5'-P-CCNC: 34 U/L (ref 0–45)
ANION GAP SERPL CALCULATED.3IONS-SCNC: 9 MMOL/L (ref 5–18)
AORTIC VALVE MEAN VELOCITY: 223 CM/S
ASCENDING AORTA: 3.4 CM
AST SERPL W P-5'-P-CCNC: 30 U/L (ref 0–40)
AV DIMENSIONLESS INDEX VTI: 0.3
AV MEAN GRADIENT: 23 MMHG
AV PEAK GRADIENT: 38.9 MMHG
AV VALVE AREA: 1 CM2
AV VELOCITY RATIO: 0.3
BILIRUB SERPL-MCNC: 1 MG/DL (ref 0–1)
BSA FOR ECHO PROCEDURE: 2.65 M2
BUN SERPL-MCNC: 13 MG/DL (ref 8–22)
CALCIUM SERPL-MCNC: 9.4 MG/DL (ref 8.5–10.5)
CHLORIDE BLD-SCNC: 101 MMOL/L (ref 98–107)
CHOLEST SERPL-MCNC: 96 MG/DL
CO2 SERPL-SCNC: 25 MMOL/L (ref 22–31)
CREAT SERPL-MCNC: 1.15 MG/DL (ref 0.7–1.3)
CREAT UR-MCNC: 252.7 MG/DL
CV BLOOD PRESSURE: ABNORMAL MMHG
CV ECHO HEIGHT: 72.5 IN
CV ECHO WEIGHT: 303 LBS
DOP CALC AO PEAK VEL: 312 CM/S
DOP CALC AO VTI: 64.8 CM
DOP CALC LVOT AREA: 3.14 CM2
DOP CALC LVOT DIAMETER: 2 CM
DOP CALC LVOT PEAK VEL: 89.3 CM/S
DOP CALC LVOT STROKE VOLUME: 65.9 CM3
DOP CALC MV VTI: 29.6 CM
DOP CALCLVOT PEAK VEL VTI: 21 CM
ECHO EJECTION FRACTION ESTIMATED: 65 %
FASTING STATUS PATIENT QL REPORTED: YES
FRACTIONAL SHORTENING: 20 % (ref 28–44)
GFR SERPL CREATININE-BSD FRML MDRD: >60 ML/MIN/1.73M2
GLUCOSE BLD-MCNC: 153 MG/DL (ref 70–125)
HBA1C MFR BLD: 7.9 %
HDLC SERPL-MCNC: 19 MG/DL
INR PPP: 2.67 (ref 0.9–1.1)
INTERVENTRICULAR SEPTUM IN END DIASTOLE: 1 CM (ref 0.6–1)
IVS/PW RATIO: 0.9
LA AREA 1: 20.8 CM2
LA AREA 2: 16.9 CM2
LDLC SERPL CALC-MCNC: 36 MG/DL
LEFT ATRIUM LENGTH: 5.65 CM
LEFT ATRIUM VOLUME INDEX: 20 ML/M2
LEFT ATRIUM VOLUME: 52.9 ML
LEFT VENTRICLE MASS INDEX: 85.8 G/M2
LEFT VENTRICULAR INTERNAL DIMENSION IN DIASTOLE: 5.5 CM (ref 4.2–5.8)
LEFT VENTRICULAR INTERNAL DIMENSION IN SYSTOLE: 4.4 CM (ref 2.5–4)
LEFT VENTRICULAR MASS: 227.4 G
LEFT VENTRICULAR OUTFLOW TRACT MEAN GRADIENT: 2 MMHG
LEFT VENTRICULAR OUTFLOW TRACT MEAN VELOCITY: 56.9 CM/S
LEFT VENTRICULAR OUTFLOW TRACT PEAK GRADIENT: 3 MMHG
LEFT VENTRICULAR POSTERIOR WALL IN END DIASTOLE: 1.1 CM (ref 0.6–1)
LV STROKE VOLUME INDEX: 24.9 ML/M2
MICROALBUMIN UR-MCNC: 5.21 MG/DL (ref 0–1.99)
MICROALBUMIN/CREAT UR: 20.6 MG/G
MITRAL VALVE E/A RATIO: 0.9
MITRAL VALVE MEAN INFLOW VELOCITY: 55.8 CM/S
MITRAL VALVE PEAK VELOCITY: 92.3 CM/S
MV AREA VTI: 2.23 CM2
MV AVERAGE E/E' RATIO: 11.3 CM/S
MV DECELERATION TIME: 303 MS
MV E'TISSUE VEL-LAT: 7.41 CM/S
MV E'TISSUE VEL-MED: 5.85 CM/S
MV LATERAL E/E' RATIO: 10.1
MV MEAN GRADIENT: 1 MMHG
MV MEDIAL E/E' RATIO: 12.8
MV PEAK A VELOCITY: 83.9 CM/S
MV PEAK E VELOCITY: 75.1 CM/S
MV PEAK GRADIENT: 3.4 MMHG
MV VALVE AREA BY CONTINUITY EQUATION: 2.2 CM2
NUC REST DIASTOLIC VOLUME INDEX: 4848 LBS
NUC REST SYSTOLIC VOLUME INDEX: 72.5 IN
POTASSIUM BLD-SCNC: 4.2 MMOL/L (ref 3.5–5)
PROT SERPL-MCNC: 7.8 G/DL (ref 6–8)
PSA SERPL-MCNC: 1.7 NG/ML (ref 0–4.5)
SODIUM SERPL-SCNC: 135 MMOL/L (ref 136–145)
TRICUSPID VALVE ANULAR PLANE SYSTOLIC EXCURSION: 2 CM
TRIGL SERPL-MCNC: 205 MG/DL

## 2020-07-13 ASSESSMENT — MIFFLIN-ST. JEOR: SCORE: 2185.34

## 2020-07-16 ENCOUNTER — COMMUNICATION - HEALTHEAST (OUTPATIENT)
Dept: FAMILY MEDICINE | Facility: CLINIC | Age: 70
End: 2020-07-16

## 2020-07-20 ENCOUNTER — COMMUNICATION - HEALTHEAST (OUTPATIENT)
Dept: FAMILY MEDICINE | Facility: CLINIC | Age: 70
End: 2020-07-20

## 2020-07-20 DIAGNOSIS — I10 ESSENTIAL HYPERTENSION WITH GOAL BLOOD PRESSURE LESS THAN 140/90: ICD-10-CM

## 2020-07-21 ENCOUNTER — COMMUNICATION - HEALTHEAST (OUTPATIENT)
Dept: CARDIOLOGY | Facility: CLINIC | Age: 70
End: 2020-07-21

## 2020-07-22 ENCOUNTER — OFFICE VISIT - HEALTHEAST (OUTPATIENT)
Dept: CARDIOLOGY | Facility: CLINIC | Age: 70
End: 2020-07-22

## 2020-07-22 DIAGNOSIS — G47.33 OBSTRUCTIVE SLEEP APNEA SYNDROME: ICD-10-CM

## 2020-07-22 DIAGNOSIS — I10 ESSENTIAL HYPERTENSION WITH GOAL BLOOD PRESSURE LESS THAN 130/80: ICD-10-CM

## 2020-07-22 DIAGNOSIS — I35.0 AORTIC VALVE STENOSIS, MODERATE: ICD-10-CM

## 2020-07-22 DIAGNOSIS — I48.92 PAROXYSMAL ATRIAL FLUTTER (H): ICD-10-CM

## 2020-07-23 ENCOUNTER — OFFICE VISIT - HEALTHEAST (OUTPATIENT)
Dept: FAMILY MEDICINE | Facility: CLINIC | Age: 70
End: 2020-07-23

## 2020-07-23 DIAGNOSIS — R22.1 NECK MASS: ICD-10-CM

## 2020-07-23 ASSESSMENT — MIFFLIN-ST. JEOR: SCORE: 2133.87

## 2020-07-26 ENCOUNTER — COMMUNICATION - HEALTHEAST (OUTPATIENT)
Dept: FAMILY MEDICINE | Facility: CLINIC | Age: 70
End: 2020-07-26

## 2020-07-26 ENCOUNTER — HOSPITAL ENCOUNTER (OUTPATIENT)
Dept: CT IMAGING | Facility: HOSPITAL | Age: 70
Discharge: HOME | End: 2020-07-26
Attending: FAMILY MEDICINE

## 2020-07-26 DIAGNOSIS — I48.92 ATRIAL FLUTTER WITH RAPID VENTRICULAR RESPONSE (H): ICD-10-CM

## 2020-07-26 DIAGNOSIS — R22.1 NECK MASS: ICD-10-CM

## 2020-07-27 ENCOUNTER — AMBULATORY - HEALTHEAST (OUTPATIENT)
Dept: ULTRASOUND IMAGING | Facility: CLINIC | Age: 70
End: 2020-07-27

## 2020-07-27 ENCOUNTER — AMBULATORY - HEALTHEAST (OUTPATIENT)
Dept: FAMILY MEDICINE | Facility: CLINIC | Age: 70
End: 2020-07-27

## 2020-07-27 ENCOUNTER — COMMUNICATION - HEALTHEAST (OUTPATIENT)
Dept: SCHEDULING | Facility: CLINIC | Age: 70
End: 2020-07-27

## 2020-07-27 DIAGNOSIS — Z11.59 ENCOUNTER FOR SCREENING FOR OTHER VIRAL DISEASES: ICD-10-CM

## 2020-07-27 DIAGNOSIS — R22.1 NECK MASS: ICD-10-CM

## 2020-07-27 DIAGNOSIS — R93.89 ABNORMAL CT SCAN, NECK: ICD-10-CM

## 2020-07-29 ENCOUNTER — OFFICE VISIT - HEALTHEAST (OUTPATIENT)
Dept: OTOLARYNGOLOGY | Facility: CLINIC | Age: 70
End: 2020-07-29

## 2020-07-29 DIAGNOSIS — E07.9 THYROID MASS: ICD-10-CM

## 2020-08-10 ENCOUNTER — COMMUNICATION - HEALTHEAST (OUTPATIENT)
Dept: OTOLARYNGOLOGY | Facility: CLINIC | Age: 70
End: 2020-08-10

## 2020-08-10 DIAGNOSIS — C85.90 LYMPHOMA (H): ICD-10-CM

## 2020-08-10 DIAGNOSIS — C83.398 DIFFUSE LARGE B-CELL LYMPHOMA OF EXTRANODAL SITE: ICD-10-CM

## 2020-08-11 ENCOUNTER — COMMUNICATION - HEALTHEAST (OUTPATIENT)
Dept: ONCOLOGY | Facility: HOSPITAL | Age: 70
End: 2020-08-11

## 2020-08-14 ENCOUNTER — OFFICE VISIT - HEALTHEAST (OUTPATIENT)
Dept: ONCOLOGY | Facility: HOSPITAL | Age: 70
End: 2020-08-14

## 2020-08-14 ENCOUNTER — COMMUNICATION - HEALTHEAST (OUTPATIENT)
Dept: ONCOLOGY | Facility: HOSPITAL | Age: 70
End: 2020-08-14

## 2020-08-14 ENCOUNTER — AMBULATORY - HEALTHEAST (OUTPATIENT)
Dept: INTERVENTIONAL RADIOLOGY/VASCULAR | Facility: HOSPITAL | Age: 70
End: 2020-08-14

## 2020-08-14 ENCOUNTER — AMBULATORY - HEALTHEAST (OUTPATIENT)
Dept: INFUSION THERAPY | Facility: HOSPITAL | Age: 70
End: 2020-08-14

## 2020-08-14 DIAGNOSIS — C83.398 DIFFUSE LARGE B-CELL LYMPHOMA OF EXTRANODAL SITE: ICD-10-CM

## 2020-08-14 DIAGNOSIS — C85.90 LYMPHOMA (H): ICD-10-CM

## 2020-08-14 DIAGNOSIS — C83.31 DIFFUSE LARGE B-CELL LYMPHOMA, LYMPH NODES OF HEAD, FACE, AND NECK (H): ICD-10-CM

## 2020-08-14 DIAGNOSIS — Z11.59 ENCOUNTER FOR SCREENING FOR OTHER VIRAL DISEASES: ICD-10-CM

## 2020-08-14 LAB
BASOPHILS # BLD AUTO: 0 THOU/UL (ref 0–0.2)
BASOPHILS NFR BLD AUTO: 0 % (ref 0–2)
EOSINOPHIL # BLD AUTO: 0.3 THOU/UL (ref 0–0.4)
EOSINOPHIL NFR BLD AUTO: 2 % (ref 0–6)
ERYTHROCYTE [DISTWIDTH] IN BLOOD BY AUTOMATED COUNT: 14.5 % (ref 11–14.5)
HCT VFR BLD AUTO: 40.1 % (ref 40–54)
HGB BLD-MCNC: 12.8 G/DL (ref 14–18)
LDH SERPL L TO P-CCNC: 226 U/L (ref 125–220)
LYMPHOCYTES # BLD AUTO: 3.9 THOU/UL (ref 0.8–4.4)
LYMPHOCYTES NFR BLD AUTO: 37 % (ref 20–40)
MCH RBC QN AUTO: 29 PG (ref 27–34)
MCHC RBC AUTO-ENTMCNC: 31.9 G/DL (ref 32–36)
MCV RBC AUTO: 91 FL (ref 80–100)
MONOCYTES # BLD AUTO: 0.9 THOU/UL (ref 0–0.9)
MONOCYTES NFR BLD AUTO: 9 % (ref 2–10)
NEUTROPHILS # BLD AUTO: 5.4 THOU/UL (ref 2–7.7)
NEUTROPHILS NFR BLD AUTO: 51 % (ref 50–70)
PLATELET # BLD AUTO: 348 THOU/UL (ref 140–440)
PMV BLD AUTO: 8.6 FL (ref 8.5–12.5)
RBC # BLD AUTO: 4.42 MILL/UL (ref 4.4–6.2)
WBC: 10.6 THOU/UL (ref 4–11)

## 2020-08-14 ASSESSMENT — MIFFLIN-ST. JEOR: SCORE: 2120.7

## 2020-08-17 ENCOUNTER — AMBULATORY - HEALTHEAST (OUTPATIENT)
Dept: ONCOLOGY | Facility: HOSPITAL | Age: 70
End: 2020-08-17

## 2020-08-17 ENCOUNTER — COMMUNICATION - HEALTHEAST (OUTPATIENT)
Dept: FAMILY MEDICINE | Facility: CLINIC | Age: 70
End: 2020-08-17

## 2020-08-17 DIAGNOSIS — Z51.11 ENCOUNTER FOR ANTINEOPLASTIC CHEMOTHERAPY: ICD-10-CM

## 2020-08-17 LAB
LAB AP CHARGES (HE HISTORICAL CONVERSION): ABNORMAL
PATH REPORT.ADDENDUM SPEC: ABNORMAL
PATH REPORT.COMMENTS IMP SPEC: ABNORMAL
PATH REPORT.COMMENTS IMP SPEC: ABNORMAL
PATH REPORT.FINAL DX SPEC: ABNORMAL
PATH REPORT.GROSS SPEC: ABNORMAL
PATH REPORT.MICROSCOPIC SPEC OTHER STN: ABNORMAL
PATH REPORT.MICROSCOPIC SPEC OTHER STN: ABNORMAL
PATH REPORT.RELEVANT HX SPEC: ABNORMAL
RESULT FLAG (HE HISTORICAL CONVERSION): ABNORMAL

## 2020-08-18 ENCOUNTER — COMMUNICATION - HEALTHEAST (OUTPATIENT)
Dept: ONCOLOGY | Facility: HOSPITAL | Age: 70
End: 2020-08-18

## 2020-08-19 ENCOUNTER — INFUSION - HEALTHEAST (OUTPATIENT)
Dept: INFUSION THERAPY | Facility: HOSPITAL | Age: 70
End: 2020-08-19

## 2020-08-19 ENCOUNTER — AMBULATORY - HEALTHEAST (OUTPATIENT)
Dept: LAB | Facility: HOSPITAL | Age: 70
End: 2020-08-19

## 2020-08-19 DIAGNOSIS — C83.31 DIFFUSE LARGE B-CELL LYMPHOMA, LYMPH NODES OF HEAD, FACE, AND NECK (H): ICD-10-CM

## 2020-08-19 DIAGNOSIS — C85.90 LYMPHOMA (H): ICD-10-CM

## 2020-08-19 LAB
BASOPHILS # BLD AUTO: 0 THOU/UL (ref 0–0.2)
BASOPHILS NFR BLD AUTO: 0 % (ref 0–2)
EOSINOPHIL # BLD AUTO: 0.3 THOU/UL (ref 0–0.4)
EOSINOPHIL NFR BLD AUTO: 3 % (ref 0–6)
ERYTHROCYTE [DISTWIDTH] IN BLOOD BY AUTOMATED COUNT: 14.6 % (ref 11–14.5)
HCT VFR BLD AUTO: 39.4 % (ref 40–54)
HGB BLD-MCNC: 12.5 G/DL (ref 14–18)
IMM GRANULOCYTES # BLD: 0 THOU/UL
IMM GRANULOCYTES NFR BLD: 0 %
LYMPHOCYTES # BLD AUTO: 3.1 THOU/UL (ref 0.8–4.4)
LYMPHOCYTES NFR BLD AUTO: 35 % (ref 20–40)
MCH RBC QN AUTO: 29.3 PG (ref 27–34)
MCHC RBC AUTO-ENTMCNC: 31.7 G/DL (ref 32–36)
MCV RBC AUTO: 92 FL (ref 80–100)
MONOCYTES # BLD AUTO: 0.9 THOU/UL (ref 0–0.9)
MONOCYTES NFR BLD AUTO: 10 % (ref 2–10)
NEUTROPHILS # BLD AUTO: 4.6 THOU/UL (ref 2–7.7)
NEUTROPHILS NFR BLD AUTO: 51 % (ref 50–70)
PLATELET # BLD AUTO: 289 THOU/UL (ref 140–440)
PMV BLD AUTO: 8.5 FL (ref 8.5–12.5)
RBC # BLD AUTO: 4.27 MILL/UL (ref 4.4–6.2)
WBC: 8.9 THOU/UL (ref 4–11)

## 2020-08-20 ENCOUNTER — AMBULATORY - HEALTHEAST (OUTPATIENT)
Dept: FAMILY MEDICINE | Facility: CLINIC | Age: 70
End: 2020-08-20

## 2020-08-20 DIAGNOSIS — Z11.59 ENCOUNTER FOR SCREENING FOR OTHER VIRAL DISEASES: ICD-10-CM

## 2020-08-20 LAB
LAB AP CHARGES (HE HISTORICAL CONVERSION): NORMAL
PATH REPORT.COMMENTS IMP SPEC: NORMAL
PATH REPORT.FINAL DX SPEC: NORMAL
PATH REPORT.MICROSCOPIC SPEC OTHER STN: NORMAL
RESULT FLAG (HE HISTORICAL CONVERSION): NORMAL

## 2020-08-21 ENCOUNTER — COMMUNICATION - HEALTHEAST (OUTPATIENT)
Dept: FAMILY MEDICINE | Facility: CLINIC | Age: 70
End: 2020-08-21

## 2020-08-22 ENCOUNTER — COMMUNICATION - HEALTHEAST (OUTPATIENT)
Dept: SCHEDULING | Facility: CLINIC | Age: 70
End: 2020-08-22

## 2020-08-24 ENCOUNTER — COMMUNICATION - HEALTHEAST (OUTPATIENT)
Dept: ANTICOAGULATION | Facility: CLINIC | Age: 70
End: 2020-08-24

## 2020-08-24 ENCOUNTER — HOSPITAL ENCOUNTER (OUTPATIENT)
Dept: INTERVENTIONAL RADIOLOGY/VASCULAR | Facility: HOSPITAL | Age: 70
Setting detail: RADIATION/ONCOLOGY SERIES
Discharge: STILL ADMITTED OR ADMINISTRATIVE | End: 2020-08-24
Attending: INTERNAL MEDICINE

## 2020-08-24 DIAGNOSIS — C85.90 LYMPHOMA (H): ICD-10-CM

## 2020-08-24 LAB — INR PPP: 1.13 (ref 0.9–1.1)

## 2020-08-24 ASSESSMENT — MIFFLIN-ST. JEOR: SCORE: 2118.43

## 2020-08-27 ENCOUNTER — HOSPITAL ENCOUNTER (OUTPATIENT)
Dept: PET IMAGING | Facility: HOSPITAL | Age: 70
Discharge: HOME | End: 2020-08-27
Attending: INTERNAL MEDICINE

## 2020-08-27 ENCOUNTER — HOSPITAL ENCOUNTER (OUTPATIENT)
Dept: PET IMAGING | Facility: HOSPITAL | Age: 70
Setting detail: RADIATION/ONCOLOGY SERIES
Discharge: STILL ADMITTED OR ADMINISTRATIVE | End: 2020-08-27
Attending: INTERNAL MEDICINE

## 2020-08-27 DIAGNOSIS — C85.90 LYMPHOMA (H): ICD-10-CM

## 2020-08-27 DIAGNOSIS — C83.31 DIFFUSE LARGE B-CELL LYMPHOMA, LYMPH NODES OF HEAD, FACE, AND NECK (H): ICD-10-CM

## 2020-08-27 LAB — GLUCOSE BLDC GLUCOMTR-MCNC: 111 MG/DL (ref 70–139)

## 2020-08-27 ASSESSMENT — MIFFLIN-ST. JEOR: SCORE: 2118.43

## 2020-08-28 LAB — SPECIMEN STATUS: NORMAL

## 2020-08-31 ENCOUNTER — AMBULATORY - HEALTHEAST (OUTPATIENT)
Dept: LAB | Facility: CLINIC | Age: 70
End: 2020-08-31

## 2020-08-31 ENCOUNTER — OFFICE VISIT - HEALTHEAST (OUTPATIENT)
Dept: ONCOLOGY | Facility: HOSPITAL | Age: 70
End: 2020-08-31

## 2020-08-31 ENCOUNTER — COMMUNICATION - HEALTHEAST (OUTPATIENT)
Dept: ANTICOAGULATION | Facility: CLINIC | Age: 70
End: 2020-08-31

## 2020-08-31 DIAGNOSIS — I48.92 ATRIAL FLUTTER (H): ICD-10-CM

## 2020-08-31 DIAGNOSIS — C83.398 DIFFUSE LARGE B-CELL LYMPHOMA OF EXTRANODAL SITE: ICD-10-CM

## 2020-08-31 DIAGNOSIS — Z51.11 ENCOUNTER FOR ANTINEOPLASTIC CHEMOTHERAPY: ICD-10-CM

## 2020-08-31 LAB
BKR LAB AP CORE BIOPSY/ASPIRATE CLOT: NORMAL
INR PPP: 2.3 (ref 0.9–1.1)
LAB AP ASPIRATE SMEAR (HE HISTORICAL CONVERSION): NORMAL
LAB AP BONE MARROW DIFF (HE HISTORICAL CONVERSION): NORMAL
LAB AP CHARGES (HE HISTORICAL CONVERSION): NORMAL
PATH REPORT.ADDENDUM SPEC: NORMAL
PATH REPORT.COMMENTS IMP SPEC: NORMAL
PATH REPORT.FINAL DX SPEC: NORMAL
PATH REPORT.MICROSCOPIC SPEC OTHER STN: NORMAL
PATH REPORT.RELEVANT HX SPEC: NORMAL
RESULT FLAG (HE HISTORICAL CONVERSION): NORMAL

## 2020-09-10 ENCOUNTER — COMMUNICATION - HEALTHEAST (OUTPATIENT)
Dept: ONCOLOGY | Facility: HOSPITAL | Age: 70
End: 2020-09-10

## 2020-09-11 ENCOUNTER — COMMUNICATION - HEALTHEAST (OUTPATIENT)
Dept: ONCOLOGY | Facility: HOSPITAL | Age: 70
End: 2020-09-11

## 2020-09-14 ENCOUNTER — COMMUNICATION - HEALTHEAST (OUTPATIENT)
Dept: ANTICOAGULATION | Facility: CLINIC | Age: 70
End: 2020-09-14

## 2020-09-14 ENCOUNTER — AMBULATORY - HEALTHEAST (OUTPATIENT)
Dept: INFUSION THERAPY | Facility: HOSPITAL | Age: 70
End: 2020-09-14

## 2020-09-14 ENCOUNTER — OFFICE VISIT - HEALTHEAST (OUTPATIENT)
Dept: ONCOLOGY | Facility: HOSPITAL | Age: 70
End: 2020-09-14

## 2020-09-14 DIAGNOSIS — Z51.11 ENCOUNTER FOR ANTINEOPLASTIC CHEMOTHERAPY: ICD-10-CM

## 2020-09-14 DIAGNOSIS — I48.92 ATRIAL FLUTTER (H): ICD-10-CM

## 2020-09-14 DIAGNOSIS — C83.398 DIFFUSE LARGE B-CELL LYMPHOMA OF EXTRANODAL SITE: ICD-10-CM

## 2020-09-14 LAB
ALBUMIN SERPL-MCNC: 3.8 G/DL (ref 3.5–5)
ALP SERPL-CCNC: 88 U/L (ref 45–120)
ALT SERPL W P-5'-P-CCNC: 40 U/L (ref 0–45)
ANION GAP SERPL CALCULATED.3IONS-SCNC: 10 MMOL/L (ref 5–18)
AST SERPL W P-5'-P-CCNC: 31 U/L (ref 0–40)
BASOPHILS # BLD AUTO: 0 THOU/UL (ref 0–0.2)
BASOPHILS NFR BLD AUTO: 0 % (ref 0–2)
BILIRUB SERPL-MCNC: 0.8 MG/DL (ref 0–1)
BUN SERPL-MCNC: 16 MG/DL (ref 8–22)
CALCIUM SERPL-MCNC: 9.1 MG/DL (ref 8.5–10.5)
CHLORIDE BLD-SCNC: 101 MMOL/L (ref 98–107)
CO2 SERPL-SCNC: 27 MMOL/L (ref 22–31)
CREAT SERPL-MCNC: 1.2 MG/DL (ref 0.7–1.3)
EOSINOPHIL # BLD AUTO: 0.3 THOU/UL (ref 0–0.4)
EOSINOPHIL NFR BLD AUTO: 4 % (ref 0–6)
ERYTHROCYTE [DISTWIDTH] IN BLOOD BY AUTOMATED COUNT: 14.7 % (ref 11–14.5)
GFR SERPL CREATININE-BSD FRML MDRD: 60 ML/MIN/1.73M2
GLUCOSE BLD-MCNC: 143 MG/DL (ref 70–125)
HBV CORE AB SERPL QL IA: NEGATIVE
HBV SURFACE AG SERPL QL IA: NEGATIVE
HCT VFR BLD AUTO: 38 % (ref 40–54)
HGB BLD-MCNC: 12.1 G/DL (ref 14–18)
IMM GRANULOCYTES # BLD: 0 THOU/UL
IMM GRANULOCYTES NFR BLD: 0 %
INR PPP: 2.57 (ref 0.9–1.1)
LYMPHOCYTES # BLD AUTO: 2.9 THOU/UL (ref 0.8–4.4)
LYMPHOCYTES NFR BLD AUTO: 37 % (ref 20–40)
MCH RBC QN AUTO: 29.4 PG (ref 27–34)
MCHC RBC AUTO-ENTMCNC: 31.8 G/DL (ref 32–36)
MCV RBC AUTO: 92 FL (ref 80–100)
MONOCYTES # BLD AUTO: 0.7 THOU/UL (ref 0–0.9)
MONOCYTES NFR BLD AUTO: 9 % (ref 2–10)
NEUTROPHILS # BLD AUTO: 3.9 THOU/UL (ref 2–7.7)
NEUTROPHILS NFR BLD AUTO: 50 % (ref 50–70)
PLATELET # BLD AUTO: 300 THOU/UL (ref 140–440)
PMV BLD AUTO: 8.6 FL (ref 8.5–12.5)
POTASSIUM BLD-SCNC: 4.3 MMOL/L (ref 3.5–5)
PROT SERPL-MCNC: 7.4 G/DL (ref 6–8)
RBC # BLD AUTO: 4.12 MILL/UL (ref 4.4–6.2)
SODIUM SERPL-SCNC: 138 MMOL/L (ref 136–145)
WBC: 7.8 THOU/UL (ref 4–11)

## 2020-09-14 ASSESSMENT — MIFFLIN-ST. JEOR: SCORE: 2127.05

## 2020-09-15 ENCOUNTER — INFUSION - HEALTHEAST (OUTPATIENT)
Dept: INFUSION THERAPY | Facility: HOSPITAL | Age: 70
End: 2020-09-15

## 2020-09-15 DIAGNOSIS — Z51.11 ENCOUNTER FOR ANTINEOPLASTIC CHEMOTHERAPY: ICD-10-CM

## 2020-09-15 DIAGNOSIS — C83.398 DIFFUSE LARGE B-CELL LYMPHOMA OF EXTRANODAL SITE: ICD-10-CM

## 2020-09-18 ENCOUNTER — COMMUNICATION - HEALTHEAST (OUTPATIENT)
Dept: ANTICOAGULATION | Facility: CLINIC | Age: 70
End: 2020-09-18

## 2020-09-18 ENCOUNTER — AMBULATORY - HEALTHEAST (OUTPATIENT)
Dept: LAB | Facility: CLINIC | Age: 70
End: 2020-09-18

## 2020-09-18 DIAGNOSIS — C83.398 DIFFUSE LARGE B-CELL LYMPHOMA OF EXTRANODAL SITE: ICD-10-CM

## 2020-09-18 DIAGNOSIS — I48.92 ATRIAL FLUTTER (H): ICD-10-CM

## 2020-09-18 LAB
ALBUMIN SERPL-MCNC: 3.6 G/DL (ref 3.5–5)
ALP SERPL-CCNC: 87 U/L (ref 45–120)
ALT SERPL W P-5'-P-CCNC: 33 U/L (ref 0–45)
ANION GAP SERPL CALCULATED.3IONS-SCNC: 15 MMOL/L (ref 5–18)
AST SERPL W P-5'-P-CCNC: 19 U/L (ref 0–40)
BASOPHILS # BLD AUTO: 0 THOU/UL (ref 0–0.2)
BASOPHILS NFR BLD AUTO: 0 % (ref 0–2)
BILIRUB SERPL-MCNC: 0.5 MG/DL (ref 0–1)
BUN SERPL-MCNC: 24 MG/DL (ref 8–22)
CALCIUM SERPL-MCNC: 8.7 MG/DL (ref 8.5–10.5)
CHLORIDE BLD-SCNC: 102 MMOL/L (ref 98–107)
CO2 SERPL-SCNC: 20 MMOL/L (ref 22–31)
CREAT SERPL-MCNC: 1.1 MG/DL (ref 0.7–1.3)
EOSINOPHIL COUNT (ABSOLUTE): 0 THOU/UL (ref 0–0.4)
EOSINOPHIL NFR BLD AUTO: 0 % (ref 0–6)
ERYTHROCYTE [DISTWIDTH] IN BLOOD BY AUTOMATED COUNT: 14.7 % (ref 11–14.5)
GFR SERPL CREATININE-BSD FRML MDRD: >60 ML/MIN/1.73M2
GLUCOSE BLD-MCNC: 185 MG/DL (ref 70–125)
HCT VFR BLD AUTO: 36.3 % (ref 40–54)
HGB BLD-MCNC: 11.9 G/DL (ref 14–18)
IMMATURE GRANULOCYTE % - MAN (DIFF): 2 %
IMMATURE GRANULOCYTE ABSOLUTE - MAN (DIFF): 0.3 THOU/UL
INR PPP: 4.6 (ref 0.9–1.1)
LYMPHOCYTES # BLD AUTO: 3.2 THOU/UL (ref 0.8–4.4)
LYMPHOCYTES NFR BLD AUTO: 14 % (ref 20–40)
MCH RBC QN AUTO: 29.5 PG (ref 27–34)
MCHC RBC AUTO-ENTMCNC: 32.8 G/DL (ref 32–36)
MCV RBC AUTO: 90 FL (ref 80–100)
METAMYELOCYTES (ABSOLUTE): 0.2 THOU/UL
METAMYELOCYTES NFR BLD MANUAL: 1 %
MONOCYTES # BLD AUTO: 0.7 THOU/UL (ref 0–0.9)
MONOCYTES NFR BLD AUTO: 3 % (ref 2–10)
MYELOCYTES (ABSOLUTE): 0.1 THOU/UL
MYELOCYTES NFR BLD MANUAL: 1 %
OVALOCYTES: ABNORMAL
PLAT MORPH BLD: NORMAL
PLATELET # BLD AUTO: 278 THOU/UL (ref 140–440)
PMV BLD AUTO: 9 FL (ref 8.5–12.5)
POTASSIUM BLD-SCNC: 4 MMOL/L (ref 3.5–5)
PROT SERPL-MCNC: 6.9 G/DL (ref 6–8)
RBC # BLD AUTO: 4.04 MILL/UL (ref 4.4–6.2)
SODIUM SERPL-SCNC: 137 MMOL/L (ref 136–145)
TOTAL NEUTROPHILS-ABS(DIFF): 18.9 THOU/UL (ref 2–7.7)
TOTAL NEUTROPHILS-REL(DIFF): 82 % (ref 50–70)
WBC: 23.2 THOU/UL (ref 4–11)

## 2020-09-21 ENCOUNTER — COMMUNICATION - HEALTHEAST (OUTPATIENT)
Dept: ANTICOAGULATION | Facility: CLINIC | Age: 70
End: 2020-09-21

## 2020-09-21 ENCOUNTER — AMBULATORY - HEALTHEAST (OUTPATIENT)
Dept: INFUSION THERAPY | Facility: HOSPITAL | Age: 70
End: 2020-09-21

## 2020-09-21 ENCOUNTER — OFFICE VISIT - HEALTHEAST (OUTPATIENT)
Dept: ONCOLOGY | Facility: HOSPITAL | Age: 70
End: 2020-09-21

## 2020-09-21 DIAGNOSIS — Z51.11 ENCOUNTER FOR ANTINEOPLASTIC CHEMOTHERAPY: ICD-10-CM

## 2020-09-21 DIAGNOSIS — C83.398 DIFFUSE LARGE B-CELL LYMPHOMA OF EXTRANODAL SITE: ICD-10-CM

## 2020-09-21 DIAGNOSIS — I48.92 ATRIAL FLUTTER (H): ICD-10-CM

## 2020-09-21 LAB
ALBUMIN SERPL-MCNC: 3.6 G/DL (ref 3.5–5)
ALP SERPL-CCNC: 92 U/L (ref 45–120)
ALT SERPL W P-5'-P-CCNC: 27 U/L (ref 0–45)
ANION GAP SERPL CALCULATED.3IONS-SCNC: 9 MMOL/L (ref 5–18)
AST SERPL W P-5'-P-CCNC: 16 U/L (ref 0–40)
BASOPHILS # BLD AUTO: 0 THOU/UL (ref 0–0.2)
BASOPHILS NFR BLD AUTO: 0 % (ref 0–2)
BILIRUB SERPL-MCNC: 1 MG/DL (ref 0–1)
BUN SERPL-MCNC: 26 MG/DL (ref 8–22)
CALCIUM SERPL-MCNC: 8.7 MG/DL (ref 8.5–10.5)
CHLORIDE BLD-SCNC: 101 MMOL/L (ref 98–107)
CO2 SERPL-SCNC: 27 MMOL/L (ref 22–31)
CREAT SERPL-MCNC: 1.07 MG/DL (ref 0.7–1.3)
DOHLE BODIES: ABNORMAL
EOSINOPHIL COUNT (ABSOLUTE): 0 THOU/UL (ref 0–0.4)
EOSINOPHIL NFR BLD AUTO: 0 % (ref 0–6)
ERYTHROCYTE [DISTWIDTH] IN BLOOD BY AUTOMATED COUNT: 14.6 % (ref 11–14.5)
GFR SERPL CREATININE-BSD FRML MDRD: >60 ML/MIN/1.73M2
GLUCOSE BLD-MCNC: 150 MG/DL (ref 70–125)
HCT VFR BLD AUTO: 39 % (ref 40–54)
HGB BLD-MCNC: 12.5 G/DL (ref 14–18)
IMMATURE GRANULOCYTE % - MAN (DIFF): 0 %
IMMATURE GRANULOCYTE ABSOLUTE - MAN (DIFF): 0 THOU/UL
INR PPP: 4.18 (ref 0.9–1.1)
LYMPHOCYTES # BLD AUTO: 2.2 THOU/UL (ref 0.8–4.4)
LYMPHOCYTES NFR BLD AUTO: 35 % (ref 20–40)
MCH RBC QN AUTO: 29.1 PG (ref 27–34)
MCHC RBC AUTO-ENTMCNC: 32.1 G/DL (ref 32–36)
MCV RBC AUTO: 91 FL (ref 80–100)
MONOCYTES # BLD AUTO: 0 THOU/UL (ref 0–0.9)
MONOCYTES NFR BLD AUTO: 0 % (ref 2–10)
PLAT MORPH BLD: NORMAL
PLATELET # BLD AUTO: 226 THOU/UL (ref 140–440)
PMV BLD AUTO: 8.6 FL (ref 8.5–12.5)
POTASSIUM BLD-SCNC: 4 MMOL/L (ref 3.5–5)
PROT SERPL-MCNC: 6.8 G/DL (ref 6–8)
RBC # BLD AUTO: 4.29 MILL/UL (ref 4.4–6.2)
SODIUM SERPL-SCNC: 137 MMOL/L (ref 136–145)
TOTAL NEUTROPHILS-ABS(DIFF): 4 THOU/UL (ref 2–7.7)
TOTAL NEUTROPHILS-REL(DIFF): 65 % (ref 50–70)
TOXIC GRANULATION: ABNORMAL
WBC: 6.2 THOU/UL (ref 4–11)

## 2020-09-28 ENCOUNTER — COMMUNICATION - HEALTHEAST (OUTPATIENT)
Dept: ANTICOAGULATION | Facility: CLINIC | Age: 70
End: 2020-09-28

## 2020-09-28 ENCOUNTER — AMBULATORY - HEALTHEAST (OUTPATIENT)
Dept: LAB | Facility: CLINIC | Age: 70
End: 2020-09-28

## 2020-09-28 DIAGNOSIS — I48.92 ATRIAL FLUTTER (H): ICD-10-CM

## 2020-09-28 LAB — INR PPP: 3.3 (ref 0.9–1.1)

## 2020-10-01 ENCOUNTER — COMMUNICATION - HEALTHEAST (OUTPATIENT)
Dept: ONCOLOGY | Facility: HOSPITAL | Age: 70
End: 2020-10-01

## 2020-10-01 DIAGNOSIS — C83.398 DIFFUSE LARGE B-CELL LYMPHOMA OF EXTRANODAL SITE: ICD-10-CM

## 2020-10-01 DIAGNOSIS — Z51.11 ENCOUNTER FOR ANTINEOPLASTIC CHEMOTHERAPY: ICD-10-CM

## 2020-10-05 ENCOUNTER — INFUSION - HEALTHEAST (OUTPATIENT)
Dept: INFUSION THERAPY | Facility: HOSPITAL | Age: 70
End: 2020-10-05

## 2020-10-05 ENCOUNTER — OFFICE VISIT - HEALTHEAST (OUTPATIENT)
Dept: ONCOLOGY | Facility: HOSPITAL | Age: 70
End: 2020-10-05

## 2020-10-05 ENCOUNTER — COMMUNICATION - HEALTHEAST (OUTPATIENT)
Dept: ANTICOAGULATION | Facility: CLINIC | Age: 70
End: 2020-10-05

## 2020-10-05 ENCOUNTER — AMBULATORY - HEALTHEAST (OUTPATIENT)
Dept: INFUSION THERAPY | Facility: HOSPITAL | Age: 70
End: 2020-10-05

## 2020-10-05 DIAGNOSIS — I48.92 ATRIAL FLUTTER (H): ICD-10-CM

## 2020-10-05 DIAGNOSIS — C83.398 DIFFUSE LARGE B-CELL LYMPHOMA OF EXTRANODAL SITE: ICD-10-CM

## 2020-10-05 DIAGNOSIS — Z51.11 ENCOUNTER FOR ANTINEOPLASTIC CHEMOTHERAPY: ICD-10-CM

## 2020-10-05 LAB
ALBUMIN SERPL-MCNC: 3.6 G/DL (ref 3.5–5)
ALP SERPL-CCNC: 98 U/L (ref 45–120)
ALT SERPL W P-5'-P-CCNC: 41 U/L (ref 0–45)
ANION GAP SERPL CALCULATED.3IONS-SCNC: 10 MMOL/L (ref 5–18)
AST SERPL W P-5'-P-CCNC: 26 U/L (ref 0–40)
BASOPHILS # BLD AUTO: 0 THOU/UL (ref 0–0.2)
BASOPHILS NFR BLD AUTO: 0 % (ref 0–2)
BILIRUB SERPL-MCNC: 0.7 MG/DL (ref 0–1)
BUN SERPL-MCNC: 13 MG/DL (ref 8–22)
CALCIUM SERPL-MCNC: 9 MG/DL (ref 8.5–10.5)
CHLORIDE BLD-SCNC: 102 MMOL/L (ref 98–107)
CO2 SERPL-SCNC: 27 MMOL/L (ref 22–31)
CREAT SERPL-MCNC: 1.23 MG/DL (ref 0.7–1.3)
EOSINOPHIL # BLD AUTO: 0 THOU/UL (ref 0–0.4)
EOSINOPHIL NFR BLD AUTO: 0 % (ref 0–6)
ERYTHROCYTE [DISTWIDTH] IN BLOOD BY AUTOMATED COUNT: 15 % (ref 11–14.5)
GFR SERPL CREATININE-BSD FRML MDRD: 59 ML/MIN/1.73M2
GLUCOSE BLD-MCNC: 206 MG/DL (ref 70–125)
HCT VFR BLD AUTO: 37.2 % (ref 40–54)
HGB BLD-MCNC: 12 G/DL (ref 14–18)
IMM GRANULOCYTES # BLD: 0.1 THOU/UL
IMM GRANULOCYTES NFR BLD: 1 %
INR PPP: 2.98 (ref 0.9–1.1)
LYMPHOCYTES # BLD AUTO: 2.6 THOU/UL (ref 0.8–4.4)
LYMPHOCYTES NFR BLD AUTO: 24 % (ref 20–40)
MCH RBC QN AUTO: 29.4 PG (ref 27–34)
MCHC RBC AUTO-ENTMCNC: 32.3 G/DL (ref 32–36)
MCV RBC AUTO: 91 FL (ref 80–100)
MONOCYTES # BLD AUTO: 0.9 THOU/UL (ref 0–0.9)
MONOCYTES NFR BLD AUTO: 8 % (ref 2–10)
NEUTROPHILS # BLD AUTO: 7.1 THOU/UL (ref 2–7.7)
NEUTROPHILS NFR BLD AUTO: 67 % (ref 50–70)
PLATELET # BLD AUTO: 307 THOU/UL (ref 140–440)
PMV BLD AUTO: 8.7 FL (ref 8.5–12.5)
POTASSIUM BLD-SCNC: 4 MMOL/L (ref 3.5–5)
PROT SERPL-MCNC: 7.3 G/DL (ref 6–8)
RBC # BLD AUTO: 4.08 MILL/UL (ref 4.4–6.2)
SODIUM SERPL-SCNC: 139 MMOL/L (ref 136–145)
WBC: 10.7 THOU/UL (ref 4–11)

## 2020-10-05 ASSESSMENT — MIFFLIN-ST. JEOR: SCORE: 2090.31

## 2020-10-07 ENCOUNTER — COMMUNICATION - HEALTHEAST (OUTPATIENT)
Dept: FAMILY MEDICINE | Facility: CLINIC | Age: 70
End: 2020-10-07

## 2020-10-08 ENCOUNTER — AMBULATORY - HEALTHEAST (OUTPATIENT)
Dept: LAB | Facility: CLINIC | Age: 70
End: 2020-10-08

## 2020-10-08 ENCOUNTER — COMMUNICATION - HEALTHEAST (OUTPATIENT)
Dept: ANTICOAGULATION | Facility: CLINIC | Age: 70
End: 2020-10-08

## 2020-10-08 DIAGNOSIS — I48.92 ATRIAL FLUTTER (H): ICD-10-CM

## 2020-10-08 LAB — INR PPP: 4.1 (ref 0.9–1.1)

## 2020-10-12 ENCOUNTER — AMBULATORY - HEALTHEAST (OUTPATIENT)
Dept: PHARMACY | Facility: CLINIC | Age: 70
End: 2020-10-12

## 2020-10-12 DIAGNOSIS — E11.59 TYPE 2 DIABETES MELLITUS WITH OTHER CIRCULATORY COMPLICATIONS (H): ICD-10-CM

## 2020-10-13 ENCOUNTER — AMBULATORY - HEALTHEAST (OUTPATIENT)
Dept: LAB | Facility: CLINIC | Age: 70
End: 2020-10-13

## 2020-10-13 ENCOUNTER — COMMUNICATION - HEALTHEAST (OUTPATIENT)
Dept: ANTICOAGULATION | Facility: CLINIC | Age: 70
End: 2020-10-13

## 2020-10-13 ENCOUNTER — COMMUNICATION - HEALTHEAST (OUTPATIENT)
Dept: SCHEDULING | Facility: CLINIC | Age: 70
End: 2020-10-13

## 2020-10-13 DIAGNOSIS — E11.59 TYPE 2 DIABETES MELLITUS WITH OTHER CIRCULATORY COMPLICATIONS (H): ICD-10-CM

## 2020-10-13 DIAGNOSIS — I48.92 ATRIAL FLUTTER (H): ICD-10-CM

## 2020-10-13 LAB
HBA1C MFR BLD: 8.6 %
INR PPP: 2.1 (ref 0.9–1.1)

## 2020-10-16 ENCOUNTER — AMBULATORY - HEALTHEAST (OUTPATIENT)
Dept: LAB | Facility: CLINIC | Age: 70
End: 2020-10-16

## 2020-10-16 ENCOUNTER — COMMUNICATION - HEALTHEAST (OUTPATIENT)
Dept: ANTICOAGULATION | Facility: CLINIC | Age: 70
End: 2020-10-16

## 2020-10-16 DIAGNOSIS — I48.92 ATRIAL FLUTTER (H): ICD-10-CM

## 2020-10-16 LAB — INR PPP: 2.6 (ref 0.9–1.1)

## 2020-10-26 ENCOUNTER — INFUSION - HEALTHEAST (OUTPATIENT)
Dept: INFUSION THERAPY | Facility: HOSPITAL | Age: 70
End: 2020-10-26

## 2020-10-26 ENCOUNTER — COMMUNICATION - HEALTHEAST (OUTPATIENT)
Dept: FAMILY MEDICINE | Facility: CLINIC | Age: 70
End: 2020-10-26

## 2020-10-26 ENCOUNTER — OFFICE VISIT - HEALTHEAST (OUTPATIENT)
Dept: ONCOLOGY | Facility: HOSPITAL | Age: 70
End: 2020-10-26

## 2020-10-26 ENCOUNTER — AMBULATORY - HEALTHEAST (OUTPATIENT)
Dept: INFUSION THERAPY | Facility: HOSPITAL | Age: 70
End: 2020-10-26

## 2020-10-26 ENCOUNTER — COMMUNICATION - HEALTHEAST (OUTPATIENT)
Dept: ANTICOAGULATION | Facility: CLINIC | Age: 70
End: 2020-10-26

## 2020-10-26 DIAGNOSIS — E11.9 DIABETES MELLITUS (H): ICD-10-CM

## 2020-10-26 DIAGNOSIS — Z79.899 ENCOUNTER FOR MONITORING CARDIOTOXIC DRUG THERAPY: ICD-10-CM

## 2020-10-26 DIAGNOSIS — C83.398 DIFFUSE LARGE B-CELL LYMPHOMA OF EXTRANODAL SITE: ICD-10-CM

## 2020-10-26 DIAGNOSIS — Z51.11 ENCOUNTER FOR ANTINEOPLASTIC CHEMOTHERAPY: ICD-10-CM

## 2020-10-26 DIAGNOSIS — Z51.81 ENCOUNTER FOR MONITORING CARDIOTOXIC DRUG THERAPY: ICD-10-CM

## 2020-10-26 DIAGNOSIS — I48.92 ATRIAL FLUTTER (H): ICD-10-CM

## 2020-10-26 LAB
ALBUMIN SERPL-MCNC: 3.5 G/DL (ref 3.5–5)
ALP SERPL-CCNC: 98 U/L (ref 45–120)
ALT SERPL W P-5'-P-CCNC: 40 U/L (ref 0–45)
ANION GAP SERPL CALCULATED.3IONS-SCNC: 14 MMOL/L (ref 5–18)
AST SERPL W P-5'-P-CCNC: 24 U/L (ref 0–40)
BASOPHILS # BLD AUTO: 0 THOU/UL (ref 0–0.2)
BASOPHILS NFR BLD AUTO: 0 % (ref 0–2)
BILIRUB SERPL-MCNC: 0.8 MG/DL (ref 0–1)
BUN SERPL-MCNC: 16 MG/DL (ref 8–22)
CALCIUM SERPL-MCNC: 8.6 MG/DL (ref 8.5–10.5)
CHLORIDE BLD-SCNC: 102 MMOL/L (ref 98–107)
CO2 SERPL-SCNC: 25 MMOL/L (ref 22–31)
CREAT SERPL-MCNC: 1.47 MG/DL (ref 0.7–1.3)
EOSINOPHIL # BLD AUTO: 0 THOU/UL (ref 0–0.4)
EOSINOPHIL NFR BLD AUTO: 0 % (ref 0–6)
ERYTHROCYTE [DISTWIDTH] IN BLOOD BY AUTOMATED COUNT: 16 % (ref 11–14.5)
GFR SERPL CREATININE-BSD FRML MDRD: 48 ML/MIN/1.73M2
GLUCOSE BLD-MCNC: 197 MG/DL (ref 70–125)
HCT VFR BLD AUTO: 34.9 % (ref 40–54)
HGB BLD-MCNC: 11.2 G/DL (ref 14–18)
IMM GRANULOCYTES # BLD: 0.1 THOU/UL
IMM GRANULOCYTES NFR BLD: 1 %
INR PPP: 3.29 (ref 0.9–1.1)
LYMPHOCYTES # BLD AUTO: 2.4 THOU/UL (ref 0.8–4.4)
LYMPHOCYTES NFR BLD AUTO: 19 % (ref 20–40)
MCH RBC QN AUTO: 29.5 PG (ref 27–34)
MCHC RBC AUTO-ENTMCNC: 32.1 G/DL (ref 32–36)
MCV RBC AUTO: 92 FL (ref 80–100)
MONOCYTES # BLD AUTO: 0.9 THOU/UL (ref 0–0.9)
MONOCYTES NFR BLD AUTO: 7 % (ref 2–10)
NEUTROPHILS # BLD AUTO: 9.2 THOU/UL (ref 2–7.7)
NEUTROPHILS NFR BLD AUTO: 73 % (ref 50–70)
PLATELET # BLD AUTO: 276 THOU/UL (ref 140–440)
PMV BLD AUTO: 8.8 FL (ref 8.5–12.5)
POTASSIUM BLD-SCNC: 3.5 MMOL/L (ref 3.5–5)
PROT SERPL-MCNC: 6.7 G/DL (ref 6–8)
RBC # BLD AUTO: 3.8 MILL/UL (ref 4.4–6.2)
SODIUM SERPL-SCNC: 141 MMOL/L (ref 136–145)
WBC: 12.6 THOU/UL (ref 4–11)

## 2020-10-26 ASSESSMENT — MIFFLIN-ST. JEOR: SCORE: 2042.23

## 2020-10-30 ENCOUNTER — AMBULATORY - HEALTHEAST (OUTPATIENT)
Dept: LAB | Facility: CLINIC | Age: 70
End: 2020-10-30

## 2020-10-30 ENCOUNTER — COMMUNICATION - HEALTHEAST (OUTPATIENT)
Dept: ANTICOAGULATION | Facility: CLINIC | Age: 70
End: 2020-10-30

## 2020-10-30 DIAGNOSIS — I48.92 ATRIAL FLUTTER (H): ICD-10-CM

## 2020-10-30 LAB — INR PPP: 2.6 (ref 0.9–1.1)

## 2020-11-12 ENCOUNTER — COMMUNICATION - HEALTHEAST (OUTPATIENT)
Dept: ANTICOAGULATION | Facility: CLINIC | Age: 70
End: 2020-11-12

## 2020-11-13 ENCOUNTER — HOSPITAL ENCOUNTER (OUTPATIENT)
Dept: PET IMAGING | Facility: HOSPITAL | Age: 70
Setting detail: RADIATION/ONCOLOGY SERIES
Discharge: STILL ADMITTED OR ADMINISTRATIVE | End: 2020-11-13
Attending: INTERNAL MEDICINE

## 2020-11-13 ENCOUNTER — HOSPITAL ENCOUNTER (OUTPATIENT)
Dept: CARDIOLOGY | Facility: HOSPITAL | Age: 70
Discharge: HOME | End: 2020-11-13

## 2020-11-13 DIAGNOSIS — Z51.81 ENCOUNTER FOR MONITORING CARDIOTOXIC DRUG THERAPY: ICD-10-CM

## 2020-11-13 DIAGNOSIS — Z79.899 ENCOUNTER FOR MONITORING CARDIOTOXIC DRUG THERAPY: ICD-10-CM

## 2020-11-13 DIAGNOSIS — C83.398 DIFFUSE LARGE B-CELL LYMPHOMA OF EXTRANODAL SITE: ICD-10-CM

## 2020-11-13 DIAGNOSIS — Z51.11 ENCOUNTER FOR ANTINEOPLASTIC CHEMOTHERAPY: ICD-10-CM

## 2020-11-13 LAB
AORTIC ROOT: 2.2 CM
AORTIC VALVE MEAN VELOCITY: 231 CM/S
ASCENDING AORTA: 3.9 CM
AV DIMENSIONLESS INDEX VTI: 0.3
AV MEAN GRADIENT: 25 MMHG
AV PEAK GRADIENT: 44.1 MMHG
AV VALVE AREA: 1 CM2
AV VELOCITY RATIO: 0.3
BSA FOR ECHO PROCEDURE: 2.51 M2
CV BLOOD PRESSURE: ABNORMAL MMHG
CV ECHO HEIGHT: 72 IN
CV ECHO WEIGHT: 273 LBS
DOP CALC AO PEAK VEL: 332 CM/S
DOP CALC AO VTI: 66.5 CM
DOP CALC LVOT AREA: 4.15 CM2
DOP CALC LVOT DIAMETER: 2.3 CM
DOP CALC LVOT PEAK VEL: 89.4 CM/S
DOP CALC LVOT STROKE VOLUME: 69.3 CM3
DOP CALCLVOT PEAK VEL VTI: 16.7 CM
EJECTION FRACTION: 67 % (ref 55–75)
FRACTIONAL SHORTENING: 40.2 % (ref 28–44)
GLUCOSE BLDC GLUCOMTR-MCNC: 166 MG/DL (ref 70–139)
INTERVENTRICULAR SEPTUM IN END DIASTOLE: 1.15 CM (ref 0.6–1)
IVS/PW RATIO: 1.1
LA AREA 1: 16.1 CM2
LA AREA 2: 15.9 CM2
LEFT ATRIUM LENGTH: 4.7 CM
LEFT ATRIUM SIZE: 3 CM
LEFT ATRIUM VOLUME INDEX: 18.4 ML/M2
LEFT ATRIUM VOLUME: 46.3 ML
LEFT VENTRICLE CARDIAC INDEX: 1.9 L/MIN/M2
LEFT VENTRICLE CARDIAC OUTPUT: 4.6 L/MIN
LEFT VENTRICLE DIASTOLIC VOLUME INDEX: 43.8 CM3/M2 (ref 34–74)
LEFT VENTRICLE DIASTOLIC VOLUME: 110 CM3 (ref 62–150)
LEFT VENTRICLE HEART RATE: 67 BPM
LEFT VENTRICLE MASS INDEX: 83.5 G/M2
LEFT VENTRICLE SYSTOLIC VOLUME INDEX: 14.5 CM3/M2 (ref 11–31)
LEFT VENTRICLE SYSTOLIC VOLUME: 36.3 CM3 (ref 21–61)
LEFT VENTRICULAR INTERNAL DIMENSION IN DIASTOLE: 4.98 CM (ref 4.2–5.8)
LEFT VENTRICULAR INTERNAL DIMENSION IN SYSTOLE: 2.98 CM (ref 2.5–4)
LEFT VENTRICULAR MASS: 209.7 G
LEFT VENTRICULAR OUTFLOW TRACT MEAN GRADIENT: 2 MMHG
LEFT VENTRICULAR OUTFLOW TRACT MEAN VELOCITY: 57.2 CM/S
LEFT VENTRICULAR OUTFLOW TRACT PEAK GRADIENT: 3 MMHG
LEFT VENTRICULAR POSTERIOR WALL IN END DIASTOLE: 1.08 CM (ref 0.6–1)
LV STROKE VOLUME INDEX: 27.6 ML/M2
MITRAL VALVE E/A RATIO: 1
MV AVERAGE E/E' RATIO: 7.2 CM/S
MV DECELERATION TIME: 356 MS
MV E'TISSUE VEL-LAT: 10.5 CM/S
MV E'TISSUE VEL-MED: 6.96 CM/S
MV LATERAL E/E' RATIO: 6
MV MEDIAL E/E' RATIO: 9
MV PEAK A VELOCITY: 64 CM/S
MV PEAK E VELOCITY: 62.6 CM/S
NUC REST DIASTOLIC VOLUME INDEX: 4368 LBS
NUC REST SYSTOLIC VOLUME INDEX: 72 IN
TRICUSPID VALVE ANULAR PLANE SYSTOLIC EXCURSION: 1.8 CM

## 2020-11-13 ASSESSMENT — MIFFLIN-ST. JEOR: SCORE: 2041.32

## 2020-11-16 ENCOUNTER — OFFICE VISIT - HEALTHEAST (OUTPATIENT)
Dept: ONCOLOGY | Facility: HOSPITAL | Age: 70
End: 2020-11-16

## 2020-11-16 ENCOUNTER — AMBULATORY - HEALTHEAST (OUTPATIENT)
Dept: INFUSION THERAPY | Facility: HOSPITAL | Age: 70
End: 2020-11-16

## 2020-11-16 ENCOUNTER — COMMUNICATION - HEALTHEAST (OUTPATIENT)
Dept: ANTICOAGULATION | Facility: CLINIC | Age: 70
End: 2020-11-16

## 2020-11-16 ENCOUNTER — INFUSION - HEALTHEAST (OUTPATIENT)
Dept: INFUSION THERAPY | Facility: HOSPITAL | Age: 70
End: 2020-11-16

## 2020-11-16 DIAGNOSIS — Z51.11 ENCOUNTER FOR ANTINEOPLASTIC CHEMOTHERAPY: ICD-10-CM

## 2020-11-16 DIAGNOSIS — C83.398 DIFFUSE LARGE B-CELL LYMPHOMA OF EXTRANODAL SITE: ICD-10-CM

## 2020-11-16 DIAGNOSIS — I48.92 ATRIAL FLUTTER (H): ICD-10-CM

## 2020-11-16 LAB
ALBUMIN SERPL-MCNC: 3.1 G/DL (ref 3.5–5)
ALP SERPL-CCNC: 104 U/L (ref 45–120)
ALT SERPL W P-5'-P-CCNC: 23 U/L (ref 0–45)
ANION GAP SERPL CALCULATED.3IONS-SCNC: 14 MMOL/L (ref 5–18)
AST SERPL W P-5'-P-CCNC: 22 U/L (ref 0–40)
BASOPHILS # BLD AUTO: 0 THOU/UL (ref 0–0.2)
BASOPHILS NFR BLD AUTO: 0 % (ref 0–2)
BILIRUB SERPL-MCNC: 0.8 MG/DL (ref 0–1)
BUN SERPL-MCNC: 15 MG/DL (ref 8–22)
CALCIUM SERPL-MCNC: 8.2 MG/DL (ref 8.5–10.5)
CHLORIDE BLD-SCNC: 103 MMOL/L (ref 98–107)
CO2 SERPL-SCNC: 23 MMOL/L (ref 22–31)
CREAT SERPL-MCNC: 1.9 MG/DL (ref 0.7–1.3)
EOSINOPHIL # BLD AUTO: 0 THOU/UL (ref 0–0.4)
EOSINOPHIL NFR BLD AUTO: 0 % (ref 0–6)
ERYTHROCYTE [DISTWIDTH] IN BLOOD BY AUTOMATED COUNT: 18.2 % (ref 11–14.5)
GFR SERPL CREATININE-BSD FRML MDRD: 35 ML/MIN/1.73M2
GLUCOSE BLD-MCNC: 201 MG/DL (ref 70–125)
HCT VFR BLD AUTO: 33.3 % (ref 40–54)
HGB BLD-MCNC: 10.8 G/DL (ref 14–18)
IMM GRANULOCYTES # BLD: 0.1 THOU/UL
IMM GRANULOCYTES NFR BLD: 1 %
INR PPP: 1.64 (ref 0.9–1.1)
LYMPHOCYTES # BLD AUTO: 1.5 THOU/UL (ref 0.8–4.4)
LYMPHOCYTES NFR BLD AUTO: 16 % (ref 20–40)
MCH RBC QN AUTO: 29.7 PG (ref 27–34)
MCHC RBC AUTO-ENTMCNC: 32.4 G/DL (ref 32–36)
MCV RBC AUTO: 92 FL (ref 80–100)
MONOCYTES # BLD AUTO: 0.8 THOU/UL (ref 0–0.9)
MONOCYTES NFR BLD AUTO: 8 % (ref 2–10)
NEUTROPHILS # BLD AUTO: 6.8 THOU/UL (ref 2–7.7)
NEUTROPHILS NFR BLD AUTO: 74 % (ref 50–70)
PLATELET # BLD AUTO: 295 THOU/UL (ref 140–440)
PMV BLD AUTO: 8.8 FL (ref 8.5–12.5)
POTASSIUM BLD-SCNC: 2.7 MMOL/L (ref 3.5–5)
PROT SERPL-MCNC: 6.4 G/DL (ref 6–8)
RBC # BLD AUTO: 3.64 MILL/UL (ref 4.4–6.2)
SODIUM SERPL-SCNC: 140 MMOL/L (ref 136–145)
WBC: 9.2 THOU/UL (ref 4–11)

## 2020-11-19 ENCOUNTER — AMBULATORY - HEALTHEAST (OUTPATIENT)
Dept: INFUSION THERAPY | Facility: HOSPITAL | Age: 70
End: 2020-11-19

## 2020-11-19 ENCOUNTER — COMMUNICATION - HEALTHEAST (OUTPATIENT)
Dept: ONCOLOGY | Facility: HOSPITAL | Age: 70
End: 2020-11-19

## 2020-11-19 DIAGNOSIS — E87.6 HYPOKALEMIA: ICD-10-CM

## 2020-11-19 DIAGNOSIS — C83.398 DIFFUSE LARGE B-CELL LYMPHOMA OF EXTRANODAL SITE: ICD-10-CM

## 2020-11-19 LAB
ANION GAP SERPL CALCULATED.3IONS-SCNC: 11 MMOL/L (ref 5–18)
BUN SERPL-MCNC: 27 MG/DL (ref 8–22)
CALCIUM SERPL-MCNC: 7.8 MG/DL (ref 8.5–10.5)
CHLORIDE BLD-SCNC: 107 MMOL/L (ref 98–107)
CO2 SERPL-SCNC: 22 MMOL/L (ref 22–31)
CREAT SERPL-MCNC: 1.86 MG/DL (ref 0.7–1.3)
GFR SERPL CREATININE-BSD FRML MDRD: 36 ML/MIN/1.73M2
GLUCOSE BLD-MCNC: 189 MG/DL (ref 70–125)
POTASSIUM BLD-SCNC: 3.1 MMOL/L (ref 3.5–5)
SODIUM SERPL-SCNC: 140 MMOL/L (ref 136–145)

## 2020-11-20 ENCOUNTER — AMBULATORY - HEALTHEAST (OUTPATIENT)
Dept: LAB | Facility: CLINIC | Age: 70
End: 2020-11-20

## 2020-11-20 ENCOUNTER — COMMUNICATION - HEALTHEAST (OUTPATIENT)
Dept: ANTICOAGULATION | Facility: CLINIC | Age: 70
End: 2020-11-20

## 2020-11-20 DIAGNOSIS — I48.92 ATRIAL FLUTTER (H): ICD-10-CM

## 2020-11-20 LAB — INR PPP: 2.9 (ref 0.9–1.1)

## 2020-11-23 ENCOUNTER — COMMUNICATION - HEALTHEAST (OUTPATIENT)
Dept: ONCOLOGY | Facility: HOSPITAL | Age: 70
End: 2020-11-23

## 2020-11-25 ENCOUNTER — COMMUNICATION - HEALTHEAST (OUTPATIENT)
Dept: ONCOLOGY | Facility: HOSPITAL | Age: 70
End: 2020-11-25

## 2020-11-25 ENCOUNTER — AMBULATORY - HEALTHEAST (OUTPATIENT)
Dept: INFUSION THERAPY | Facility: HOSPITAL | Age: 70
End: 2020-11-25

## 2020-11-25 ENCOUNTER — COMMUNICATION - HEALTHEAST (OUTPATIENT)
Dept: ANTICOAGULATION | Facility: CLINIC | Age: 70
End: 2020-11-25

## 2020-11-25 DIAGNOSIS — I48.92 ATRIAL FLUTTER (H): ICD-10-CM

## 2020-11-25 DIAGNOSIS — E87.6 HYPOKALEMIA: ICD-10-CM

## 2020-11-25 LAB
INR PPP: 3.51 (ref 0.9–1.1)
POTASSIUM BLD-SCNC: 2.7 MMOL/L (ref 3.5–5)

## 2020-11-27 ENCOUNTER — COMMUNICATION - HEALTHEAST (OUTPATIENT)
Dept: SCHEDULING | Facility: CLINIC | Age: 70
End: 2020-11-27

## 2020-12-01 ENCOUNTER — AMBULATORY - HEALTHEAST (OUTPATIENT)
Dept: CARE COORDINATION | Facility: CLINIC | Age: 70
End: 2020-12-01

## 2020-12-01 ENCOUNTER — COMMUNICATION - HEALTHEAST (OUTPATIENT)
Dept: ANTICOAGULATION | Facility: CLINIC | Age: 70
End: 2020-12-01

## 2020-12-01 DIAGNOSIS — D70.2 DRUG-INDUCED NEUTROPENIA (H): ICD-10-CM

## 2020-12-02 ENCOUNTER — OFFICE VISIT - HEALTHEAST (OUTPATIENT)
Dept: PHARMACY | Facility: CLINIC | Age: 70
End: 2020-12-02

## 2020-12-02 DIAGNOSIS — I48.92 PAROXYSMAL ATRIAL FLUTTER (H): ICD-10-CM

## 2020-12-02 DIAGNOSIS — C83.398 DIFFUSE LARGE B-CELL LYMPHOMA OF EXTRANODAL SITE: ICD-10-CM

## 2020-12-02 DIAGNOSIS — E87.6 HYPOKALEMIA: ICD-10-CM

## 2020-12-02 DIAGNOSIS — E11.59 TYPE 2 DIABETES MELLITUS WITH OTHER CIRCULATORY COMPLICATIONS (H): ICD-10-CM

## 2020-12-02 PROCEDURE — 99605 MTMS BY PHARM NP 15 MIN: CPT | Performed by: PHARMACIST

## 2020-12-04 ENCOUNTER — COMMUNICATION - HEALTHEAST (OUTPATIENT)
Dept: CARE COORDINATION | Facility: CLINIC | Age: 70
End: 2020-12-04

## 2020-12-05 ENCOUNTER — AMBULATORY - HEALTHEAST (OUTPATIENT)
Dept: ONCOLOGY | Facility: HOSPITAL | Age: 70
End: 2020-12-05

## 2020-12-05 DIAGNOSIS — C83.398 DIFFUSE LARGE B-CELL LYMPHOMA OF EXTRANODAL SITE: ICD-10-CM

## 2020-12-07 ENCOUNTER — AMBULATORY - HEALTHEAST (OUTPATIENT)
Dept: ANTICOAGULATION | Facility: CLINIC | Age: 70
End: 2020-12-07

## 2020-12-08 ENCOUNTER — AMBULATORY - HEALTHEAST (OUTPATIENT)
Dept: OTHER | Facility: CLINIC | Age: 70
End: 2020-12-08

## 2020-12-09 ENCOUNTER — COMMUNICATION - HEALTHEAST (OUTPATIENT)
Dept: ONCOLOGY | Facility: HOSPITAL | Age: 70
End: 2020-12-09

## 2020-12-09 ENCOUNTER — COMMUNICATION - HEALTHEAST (OUTPATIENT)
Dept: SCHEDULING | Facility: CLINIC | Age: 70
End: 2020-12-09

## 2020-12-09 DIAGNOSIS — C83.398 DIFFUSE LARGE B-CELL LYMPHOMA OF EXTRANODAL SITE: ICD-10-CM

## 2020-12-10 ENCOUNTER — COMMUNICATION - HEALTHEAST (OUTPATIENT)
Dept: ANTICOAGULATION | Facility: CLINIC | Age: 70
End: 2020-12-10

## 2020-12-11 ENCOUNTER — COMMUNICATION - HEALTHEAST (OUTPATIENT)
Dept: GERIATRICS | Facility: CLINIC | Age: 70
End: 2020-12-11

## 2020-12-13 ASSESSMENT — MIFFLIN-ST. JEOR: SCORE: 2004.12

## 2020-12-14 ENCOUNTER — RECORDS - HEALTHEAST (OUTPATIENT)
Dept: LAB | Facility: CLINIC | Age: 70
End: 2020-12-14

## 2020-12-14 ENCOUNTER — OFFICE VISIT - HEALTHEAST (OUTPATIENT)
Dept: GERIATRICS | Facility: CLINIC | Age: 70
End: 2020-12-14

## 2020-12-14 DIAGNOSIS — E66.01 MORBID OBESITY (H): ICD-10-CM

## 2020-12-14 DIAGNOSIS — S82.832D CLOSED FRACTURE OF DISTAL END OF LEFT FIBULA WITH ROUTINE HEALING, UNSPECIFIED FRACTURE MORPHOLOGY, SUBSEQUENT ENCOUNTER: ICD-10-CM

## 2020-12-14 DIAGNOSIS — N18.31 TYPE 2 DIABETES MELLITUS WITH STAGE 3A CHRONIC KIDNEY DISEASE, WITHOUT LONG-TERM CURRENT USE OF INSULIN (H): ICD-10-CM

## 2020-12-14 DIAGNOSIS — I10 ESSENTIAL HYPERTENSION: ICD-10-CM

## 2020-12-14 DIAGNOSIS — E11.42 DIABETIC POLYNEUROPATHY ASSOCIATED WITH TYPE 2 DIABETES MELLITUS (H): ICD-10-CM

## 2020-12-14 DIAGNOSIS — F34.1 DYSTHYMIA: ICD-10-CM

## 2020-12-14 DIAGNOSIS — I48.0 PAROXYSMAL ATRIAL FIBRILLATION (H): ICD-10-CM

## 2020-12-14 DIAGNOSIS — C83.398 DIFFUSE LARGE B-CELL LYMPHOMA OF EXTRANODAL SITE: ICD-10-CM

## 2020-12-14 DIAGNOSIS — E11.22 TYPE 2 DIABETES MELLITUS WITH STAGE 3A CHRONIC KIDNEY DISEASE, WITHOUT LONG-TERM CURRENT USE OF INSULIN (H): ICD-10-CM

## 2020-12-15 LAB
ANION GAP SERPL CALCULATED.3IONS-SCNC: 13 MMOL/L (ref 5–18)
BUN SERPL-MCNC: 15 MG/DL (ref 8–22)
CALCIUM SERPL-MCNC: 8.7 MG/DL (ref 8.5–10.5)
CHLORIDE BLD-SCNC: 106 MMOL/L (ref 98–107)
CO2 SERPL-SCNC: 18 MMOL/L (ref 22–31)
CREAT SERPL-MCNC: 1.32 MG/DL (ref 0.7–1.3)
GFR SERPL CREATININE-BSD FRML MDRD: 54 ML/MIN/1.73M2
GLUCOSE BLD-MCNC: 131 MG/DL (ref 70–125)
POTASSIUM BLD-SCNC: 4.7 MMOL/L (ref 3.5–5)
SODIUM SERPL-SCNC: 137 MMOL/L (ref 136–145)

## 2020-12-15 ASSESSMENT — MIFFLIN-ST. JEOR: SCORE: 2004.12

## 2020-12-16 ENCOUNTER — OFFICE VISIT - HEALTHEAST (OUTPATIENT)
Dept: GERIATRICS | Facility: CLINIC | Age: 70
End: 2020-12-16

## 2020-12-16 DIAGNOSIS — I48.0 PAROXYSMAL ATRIAL FIBRILLATION (H): ICD-10-CM

## 2020-12-16 DIAGNOSIS — E11.22 TYPE 2 DIABETES MELLITUS WITH STAGE 3A CHRONIC KIDNEY DISEASE, WITHOUT LONG-TERM CURRENT USE OF INSULIN (H): ICD-10-CM

## 2020-12-16 DIAGNOSIS — N18.31 TYPE 2 DIABETES MELLITUS WITH STAGE 3A CHRONIC KIDNEY DISEASE, WITHOUT LONG-TERM CURRENT USE OF INSULIN (H): ICD-10-CM

## 2020-12-16 DIAGNOSIS — E11.42 DIABETIC POLYNEUROPATHY ASSOCIATED WITH TYPE 2 DIABETES MELLITUS (H): ICD-10-CM

## 2020-12-16 DIAGNOSIS — S82.832D CLOSED FRACTURE OF DISTAL END OF LEFT FIBULA WITH ROUTINE HEALING, UNSPECIFIED FRACTURE MORPHOLOGY, SUBSEQUENT ENCOUNTER: ICD-10-CM

## 2020-12-16 DIAGNOSIS — F34.1 DYSTHYMIA: ICD-10-CM

## 2020-12-16 DIAGNOSIS — E66.01 MORBID OBESITY (H): ICD-10-CM

## 2020-12-16 DIAGNOSIS — C83.398 DIFFUSE LARGE B-CELL LYMPHOMA OF EXTRANODAL SITE: ICD-10-CM

## 2020-12-16 DIAGNOSIS — I10 ESSENTIAL HYPERTENSION: ICD-10-CM

## 2020-12-19 ENCOUNTER — COMMUNICATION - HEALTHEAST (OUTPATIENT)
Dept: FAMILY MEDICINE | Facility: CLINIC | Age: 70
End: 2020-12-19

## 2020-12-19 DIAGNOSIS — I48.92 ATRIAL FLUTTER (H): ICD-10-CM

## 2020-12-20 ASSESSMENT — MIFFLIN-ST. JEOR: SCORE: 2004.12

## 2020-12-21 ENCOUNTER — OFFICE VISIT - HEALTHEAST (OUTPATIENT)
Dept: GERIATRICS | Facility: CLINIC | Age: 70
End: 2020-12-21

## 2020-12-21 DIAGNOSIS — I10 ESSENTIAL HYPERTENSION: ICD-10-CM

## 2020-12-21 DIAGNOSIS — E66.01 MORBID OBESITY (H): ICD-10-CM

## 2020-12-21 DIAGNOSIS — E11.42 DIABETIC POLYNEUROPATHY ASSOCIATED WITH TYPE 2 DIABETES MELLITUS (H): ICD-10-CM

## 2020-12-21 DIAGNOSIS — E11.22 TYPE 2 DIABETES MELLITUS WITH STAGE 3A CHRONIC KIDNEY DISEASE, WITHOUT LONG-TERM CURRENT USE OF INSULIN (H): ICD-10-CM

## 2020-12-21 DIAGNOSIS — S82.832D CLOSED FRACTURE OF DISTAL END OF LEFT FIBULA WITH ROUTINE HEALING, UNSPECIFIED FRACTURE MORPHOLOGY, SUBSEQUENT ENCOUNTER: ICD-10-CM

## 2020-12-21 DIAGNOSIS — F34.1 DYSTHYMIA: ICD-10-CM

## 2020-12-21 DIAGNOSIS — N18.31 TYPE 2 DIABETES MELLITUS WITH STAGE 3A CHRONIC KIDNEY DISEASE, WITHOUT LONG-TERM CURRENT USE OF INSULIN (H): ICD-10-CM

## 2020-12-21 DIAGNOSIS — I48.0 PAROXYSMAL ATRIAL FIBRILLATION (H): ICD-10-CM

## 2020-12-21 DIAGNOSIS — C83.398 DIFFUSE LARGE B-CELL LYMPHOMA OF EXTRANODAL SITE: ICD-10-CM

## 2020-12-23 ENCOUNTER — COMMUNICATION - HEALTHEAST (OUTPATIENT)
Dept: GERIATRICS | Facility: CLINIC | Age: 70
End: 2020-12-23

## 2020-12-23 ENCOUNTER — AMBULATORY - HEALTHEAST (OUTPATIENT)
Dept: GERIATRICS | Facility: CLINIC | Age: 70
End: 2020-12-23

## 2020-12-28 ENCOUNTER — COMMUNICATION - HEALTHEAST (OUTPATIENT)
Dept: ONCOLOGY | Facility: HOSPITAL | Age: 70
End: 2020-12-28

## 2020-12-30 ENCOUNTER — COMMUNICATION - HEALTHEAST (OUTPATIENT)
Dept: ONCOLOGY | Facility: HOSPITAL | Age: 70
End: 2020-12-30

## 2020-12-30 DIAGNOSIS — C83.398 DIFFUSE LARGE B-CELL LYMPHOMA OF EXTRANODAL SITE: ICD-10-CM

## 2021-01-05 ENCOUNTER — COMMUNICATION - HEALTHEAST (OUTPATIENT)
Dept: ANTICOAGULATION | Facility: CLINIC | Age: 71
End: 2021-01-05

## 2021-01-20 ENCOUNTER — COMMUNICATION - HEALTHEAST (OUTPATIENT)
Dept: FAMILY MEDICINE | Facility: CLINIC | Age: 71
End: 2021-01-20

## 2021-01-20 DIAGNOSIS — I48.92 ATRIAL FLUTTER WITH RAPID VENTRICULAR RESPONSE (H): ICD-10-CM

## 2021-01-22 ENCOUNTER — COMMUNICATION - HEALTHEAST (OUTPATIENT)
Dept: ANTICOAGULATION | Facility: CLINIC | Age: 71
End: 2021-01-22

## 2021-02-08 ENCOUNTER — COMMUNICATION - HEALTHEAST (OUTPATIENT)
Dept: ADMINISTRATIVE | Facility: HOSPITAL | Age: 71
End: 2021-02-08

## 2021-02-09 ENCOUNTER — COMMUNICATION - HEALTHEAST (OUTPATIENT)
Dept: ANTICOAGULATION | Facility: CLINIC | Age: 71
End: 2021-02-09

## 2021-02-09 ENCOUNTER — OFFICE VISIT - HEALTHEAST (OUTPATIENT)
Dept: FAMILY MEDICINE | Facility: CLINIC | Age: 71
End: 2021-02-09

## 2021-02-09 DIAGNOSIS — I48.92 ATRIAL FLUTTER (H): ICD-10-CM

## 2021-02-09 DIAGNOSIS — C83.398 DIFFUSE LARGE B-CELL LYMPHOMA OF EXTRANODAL SITE: ICD-10-CM

## 2021-02-09 DIAGNOSIS — E66.01 MORBID OBESITY (H): ICD-10-CM

## 2021-02-09 DIAGNOSIS — D70.2 OTHER DRUG-INDUCED AGRANULOCYTOSIS (H): ICD-10-CM

## 2021-02-09 DIAGNOSIS — E11.42 DIABETIC POLYNEUROPATHY ASSOCIATED WITH TYPE 2 DIABETES MELLITUS (H): ICD-10-CM

## 2021-02-09 DIAGNOSIS — D62 ACUTE BLOOD LOSS ANEMIA: ICD-10-CM

## 2021-02-09 LAB
ALBUMIN SERPL-MCNC: 3.8 G/DL (ref 3.5–5)
ALP SERPL-CCNC: 96 U/L (ref 45–120)
ALT SERPL W P-5'-P-CCNC: 38 U/L (ref 0–45)
ANION GAP SERPL CALCULATED.3IONS-SCNC: 15 MMOL/L (ref 5–18)
AST SERPL W P-5'-P-CCNC: 35 U/L (ref 0–40)
BASOPHILS # BLD AUTO: 0 THOU/UL (ref 0–0.2)
BASOPHILS NFR BLD AUTO: 1 % (ref 0–2)
BILIRUB SERPL-MCNC: 0.5 MG/DL (ref 0–1)
BUN SERPL-MCNC: 13 MG/DL (ref 8–22)
CALCIUM SERPL-MCNC: 8.6 MG/DL (ref 8.5–10.5)
CHLORIDE BLD-SCNC: 106 MMOL/L (ref 98–107)
CO2 SERPL-SCNC: 22 MMOL/L (ref 22–31)
CREAT SERPL-MCNC: 1.27 MG/DL (ref 0.7–1.3)
EOSINOPHIL # BLD AUTO: 0.2 THOU/UL (ref 0–0.4)
EOSINOPHIL NFR BLD AUTO: 2 % (ref 0–6)
ERYTHROCYTE [DISTWIDTH] IN BLOOD BY AUTOMATED COUNT: 14 % (ref 11–14.5)
GFR SERPL CREATININE-BSD FRML MDRD: 56 ML/MIN/1.73M2
GLUCOSE BLD-MCNC: 147 MG/DL (ref 70–125)
HBA1C MFR BLD: 5.2 %
HCT VFR BLD AUTO: 37.8 % (ref 40–54)
HGB BLD-MCNC: 11.6 G/DL (ref 14–18)
IMM GRANULOCYTES # BLD: 0.1 THOU/UL
IMM GRANULOCYTES NFR BLD: 1 %
INR PPP: 1.6 (ref 0.9–1.1)
LYMPHOCYTES # BLD AUTO: 1.4 THOU/UL (ref 0.8–4.4)
LYMPHOCYTES NFR BLD AUTO: 21 % (ref 20–40)
MCH RBC QN AUTO: 29.3 PG (ref 27–34)
MCHC RBC AUTO-ENTMCNC: 30.7 G/DL (ref 32–36)
MCV RBC AUTO: 96 FL (ref 80–100)
MONOCYTES # BLD AUTO: 0.6 THOU/UL (ref 0–0.9)
MONOCYTES NFR BLD AUTO: 9 % (ref 2–10)
NEUTROPHILS # BLD AUTO: 4.6 THOU/UL (ref 2–7.7)
NEUTROPHILS NFR BLD AUTO: 66 % (ref 50–70)
PLATELET # BLD AUTO: 246 THOU/UL (ref 140–440)
PMV BLD AUTO: 8.3 FL (ref 7–10)
POTASSIUM BLD-SCNC: 4.1 MMOL/L (ref 3.5–5)
PROT SERPL-MCNC: 6.8 G/DL (ref 6–8)
RBC # BLD AUTO: 3.96 MILL/UL (ref 4.4–6.2)
SODIUM SERPL-SCNC: 143 MMOL/L (ref 136–145)
WBC: 7 THOU/UL (ref 4–11)

## 2021-02-09 ASSESSMENT — MIFFLIN-ST. JEOR: SCORE: 1946.06

## 2021-02-12 ENCOUNTER — COMMUNICATION - HEALTHEAST (OUTPATIENT)
Dept: FAMILY MEDICINE | Facility: CLINIC | Age: 71
End: 2021-02-12

## 2021-02-12 ENCOUNTER — RECORDS - HEALTHEAST (OUTPATIENT)
Dept: ADMINISTRATIVE | Facility: OTHER | Age: 71
End: 2021-02-12

## 2021-02-12 DIAGNOSIS — R39.14 BENIGN PROSTATIC HYPERPLASIA WITH INCOMPLETE BLADDER EMPTYING: ICD-10-CM

## 2021-02-12 DIAGNOSIS — N40.1 BENIGN PROSTATIC HYPERPLASIA WITH INCOMPLETE BLADDER EMPTYING: ICD-10-CM

## 2021-02-15 ENCOUNTER — AMBULATORY - HEALTHEAST (OUTPATIENT)
Dept: ANTICOAGULATION | Facility: CLINIC | Age: 71
End: 2021-02-15

## 2021-02-17 ENCOUNTER — OFFICE VISIT - HEALTHEAST (OUTPATIENT)
Dept: ONCOLOGY | Facility: HOSPITAL | Age: 71
End: 2021-02-17

## 2021-02-17 ENCOUNTER — AMBULATORY - HEALTHEAST (OUTPATIENT)
Dept: INFUSION THERAPY | Facility: HOSPITAL | Age: 71
End: 2021-02-17

## 2021-02-17 DIAGNOSIS — C83.398 DIFFUSE LARGE B-CELL LYMPHOMA OF EXTRANODAL SITE: ICD-10-CM

## 2021-02-17 DIAGNOSIS — E83.42 HYPOMAGNESEMIA: ICD-10-CM

## 2021-02-17 LAB
ALBUMIN SERPL-MCNC: 3.8 G/DL (ref 3.5–5)
ALP SERPL-CCNC: 102 U/L (ref 45–120)
ALT SERPL W P-5'-P-CCNC: 39 U/L (ref 0–45)
ANION GAP SERPL CALCULATED.3IONS-SCNC: 12 MMOL/L (ref 5–18)
AST SERPL W P-5'-P-CCNC: 34 U/L (ref 0–40)
BASOPHILS # BLD AUTO: 0 THOU/UL (ref 0–0.2)
BASOPHILS NFR BLD AUTO: 1 % (ref 0–2)
BILIRUB SERPL-MCNC: 0.3 MG/DL (ref 0–1)
BUN SERPL-MCNC: 13 MG/DL (ref 8–22)
CALCIUM SERPL-MCNC: 8.6 MG/DL (ref 8.5–10.5)
CHLORIDE BLD-SCNC: 106 MMOL/L (ref 98–107)
CO2 SERPL-SCNC: 24 MMOL/L (ref 22–31)
CREAT SERPL-MCNC: 1.22 MG/DL (ref 0.7–1.3)
EOSINOPHIL # BLD AUTO: 0.2 THOU/UL (ref 0–0.4)
EOSINOPHIL NFR BLD AUTO: 3 % (ref 0–6)
ERYTHROCYTE [DISTWIDTH] IN BLOOD BY AUTOMATED COUNT: 14 % (ref 11–14.5)
GFR SERPL CREATININE-BSD FRML MDRD: 59 ML/MIN/1.73M2
GLUCOSE BLD-MCNC: 136 MG/DL (ref 70–125)
HCT VFR BLD AUTO: 36.1 % (ref 40–54)
HGB BLD-MCNC: 11.1 G/DL (ref 14–18)
IMM GRANULOCYTES # BLD: 0.1 THOU/UL
IMM GRANULOCYTES NFR BLD: 2 %
LYMPHOCYTES # BLD AUTO: 1.8 THOU/UL (ref 0.8–4.4)
LYMPHOCYTES NFR BLD AUTO: 25 % (ref 20–40)
MAGNESIUM SERPL-MCNC: 1 MG/DL (ref 1.8–2.6)
MCH RBC QN AUTO: 29 PG (ref 27–34)
MCHC RBC AUTO-ENTMCNC: 30.7 G/DL (ref 32–36)
MCV RBC AUTO: 94 FL (ref 80–100)
MONOCYTES # BLD AUTO: 0.7 THOU/UL (ref 0–0.9)
MONOCYTES NFR BLD AUTO: 10 % (ref 2–10)
NEUTROPHILS # BLD AUTO: 4.5 THOU/UL (ref 2–7.7)
NEUTROPHILS NFR BLD AUTO: 61 % (ref 50–70)
PLATELET # BLD AUTO: 267 THOU/UL (ref 140–440)
PMV BLD AUTO: 8.9 FL (ref 8.5–12.5)
POTASSIUM BLD-SCNC: 4.2 MMOL/L (ref 3.5–5)
PROT SERPL-MCNC: 6.8 G/DL (ref 6–8)
RBC # BLD AUTO: 3.83 MILL/UL (ref 4.4–6.2)
SODIUM SERPL-SCNC: 142 MMOL/L (ref 136–145)
WBC: 7.3 THOU/UL (ref 4–11)

## 2021-02-17 ASSESSMENT — MIFFLIN-ST. JEOR: SCORE: 1938.81

## 2021-02-24 ENCOUNTER — COMMUNICATION - HEALTHEAST (OUTPATIENT)
Dept: ANTICOAGULATION | Facility: CLINIC | Age: 71
End: 2021-02-24

## 2021-02-26 ENCOUNTER — AMBULATORY - HEALTHEAST (OUTPATIENT)
Dept: LAB | Facility: CLINIC | Age: 71
End: 2021-02-26

## 2021-02-26 ENCOUNTER — COMMUNICATION - HEALTHEAST (OUTPATIENT)
Dept: ANTICOAGULATION | Facility: CLINIC | Age: 71
End: 2021-02-26

## 2021-02-26 DIAGNOSIS — I48.92 ATRIAL FLUTTER (H): ICD-10-CM

## 2021-02-26 LAB — INR PPP: 1.9 (ref 0.9–1.1)

## 2021-03-05 ENCOUNTER — RECORDS - HEALTHEAST (OUTPATIENT)
Dept: ADMINISTRATIVE | Facility: OTHER | Age: 71
End: 2021-03-05

## 2021-03-09 ENCOUNTER — THERAPY VISIT (OUTPATIENT)
Dept: PHYSICAL THERAPY | Facility: CLINIC | Age: 71
End: 2021-03-09
Payer: MEDICARE

## 2021-03-09 DIAGNOSIS — M25.572 ANKLE PAIN, LEFT: Primary | ICD-10-CM

## 2021-03-09 DIAGNOSIS — S82.899A ANKLE FRACTURE: ICD-10-CM

## 2021-03-09 PROCEDURE — 97161 PT EVAL LOW COMPLEX 20 MIN: CPT | Mod: GP | Performed by: PHYSICAL THERAPIST

## 2021-03-09 PROCEDURE — 97110 THERAPEUTIC EXERCISES: CPT | Mod: GP | Performed by: PHYSICAL THERAPIST

## 2021-03-09 PROCEDURE — 97530 THERAPEUTIC ACTIVITIES: CPT | Mod: GP | Performed by: PHYSICAL THERAPIST

## 2021-03-09 NOTE — PROGRESS NOTES
Physical Therapy Initial Evaluation  March 9, 2021     Precautions/Restrictions/MD instructions: PT eval and treat.gait, ROM, strength, proprioception, up to 12 visits    Subjective:   Date of Onset: Mid December 2020. MD order 3/5/21  C/C: ankle tightness, weakness and swelling  Quality of pain is dull and aching. Pains are described as constant in nature. Pain is worse: morning. Pain is rated 0/10.   History of symptoms: Pains began suddenly as the result of a fall on the ice. Since onset, symptoms are improving. Previous episodes: history of a left ankle fracture   Worsened by:  Walking, standing, stairs.    Alleviated by: Nothing, Rest, Ice, Movement and Elevation.    General health as reported by patient: good  Pertinent medical/surgical history: history of lymphonic cancer, concussions/dizziness, diabetes, HTN, sleep disorder,/apnea,  Numbness/tingling from diabetes. He denies night pain, significant current illness or recent hospital admission. He denies any regional implanted devices. He denies history of surgery in the area.    Imaging: x-ray. Current occupational status: retired. Patient's goals are: decrease pain, improve tolerance to walking without the brace. Return to MD:  6 weeks if needed      Objective:  ANKLE:   ROM L ROM R   Dorsiflexion 8 10   Plantarflexion 35 55   Inversion 20 20   Eversion 15 15   G Toe Ext 30 65     Joint Mobility:   Left Right   Talocrural hypomobile hypomobile   Subtalar hypomobile WNL   Calcaneonavicular hypomobile WNL   Calcaneocuboid WNL WNL   Cuneonavicular hypomobile WNL   Ray mobility hypomobile WNL       Palpation:     Gait: Ambulates in trilock brace without assistive device. Demos shortened step length and downward gaze.      Assessment/Plan:    The patient is a 71 year old male with chief complaint of left distal fibular fracture.    The patient has the following significant findings with corresponding treatment plan.  Diagnosis 1:  Left distal fibular fracture     Pain -  hot/cold therapy, manual therapy, splint/taping/bracing/orthotics, education and home program  Decreased ROM/flexibility - manual therapy and therapeutic exercise  Decreased strength - therapeutic exercise and therapeutic activities  Impaired balance - neuro re-education and therapeutic activities  Decreased proprioception - neuro re-education and therapeutic activities  Impaired gait - gait training  Impaired muscle performance - neuro re-education  Decreased function - therapeutic activities        Therapy Evaluation Codes:     Cumulative Therapy Evaluation is: Low complexity.    Previous and current functional limitations:  (See Goal Flow Sheet for this information)    Short term and Long term goals: (See Goal Flow Sheet for this information)     Communication ability:  Patient appears to be able to clearly communicate and understand verbal and written communication and follow directions correctly.  Treatment Explanation - The following has been discussed with the patient: RX ordered/plan of care, anticipated outcomes, and possible risks and side effects.  This patient would benefit from PT intervention to resume normal activities.   Rehab potential is good.    Frequency:  1 X week, once daily  Duration:  for 4 weeks tapering to 2 X a month over 2 months  Discharge Plan: Achieve all LTGs, be independent in home treatment program, and reach maximal therapeutic benefit.    Please refer to the daily flowsheet for treatment today, total treatment time and time spent performing 1:1 timed codes.

## 2021-03-09 NOTE — LETTER
DEPARTMENT OF HEALTH AND HUMAN SERVICES  [facility] MEDICA[facility] SERVICES    PLAN/UPDATED PLAN OF PROGRESS FOR OUTPATIENT REHABILITATION        PATIENTS NAME:  Cam Walden   : 1950  PROVIDER NUMBER:    [id number]  [id number]   [facility]: [facility]  MEDICAL RECORD NUMBER: [id number]   START OF CARE DATE:  SOC Date: 21   TYPE:  PT  PRIMARY/TREATMENT DIAGNOSIS: (Pertinent Medical Diagnosis)  Ankle pain, left  Ankle fracture  VISITS FROM START OF CARE:  Rxs Used: 1     Physical Therapy Initial Evaluation  2021     Precautions/Restrictions/MD instructions: PT eval and treat.gait, ROM, strength, proprioception, up to 12 visits    Subjective:   Date of Onset: . MD order 3/5/21  C/C: ankle tightness, weakness and swelling  Quality of pain is dull and aching. Pains are described as constant in nature. Pain is worse: morning. Pain is rated 0/10.   History of symptoms: Pains began suddenly as the result of a fall on the ice. Since onset, symptoms are improving. Previous episodes: history of a left ankle fracture   Worsened by:  Walking, standing, stairs.    Alleviated by: Nothing, Rest, Ice, Movement and Elevation.    General health as reported by patient: good  Pertinent medical/surgical history: history of lymphonic cancer, concussions/dizziness, diabetes, HTN, sleep disorder,/apnea,  Numbness/tingling from diabetes. He denies night pain, significant current illness or recent hospital admission. He denies any regional implanted devices. He denies history of surgery in the area.  Imaging: x-ray. Current occupational status: retired. Patient's goals are: decrease pain, improve tolerance to walking without the brace. Return to MD:  6 weeks if needed      Objective:      PATIENTS NAME:  Cam Walden   : 1950    ANKLE:   ROM L ROM R   Dorsiflexion 8 10   Plantarflexion 35 55   Inversion 20 20   Eversion 15 15    G Toe Ext 30 65     Joint Mobility:   Left Right   Talocrural hypomobile hypomobile   Subtalar hypomobile WNL   Calcaneonavicular hypomobile WNL   Calcaneocuboid WNL WNL   Cuneonavicular hypomobile WNL   Ray mobility hypomobile WNL     Palpation:     Gait: Ambulates in trilock brace without assistive device. Demos shortened step length and downward gaze.    Assessment/Plan:    The patient is a 71 year old male with chief complaint of left distal fibular fracture.    The patient has the following significant findings with corresponding treatment plan.  Diagnosis 1:  Left distal fibular fracture    Pain -  hot/cold therapy, manual therapy, splint/taping/bracing/orthotics, education and home program  Decreased ROM/flexibility - manual therapy and therapeutic exercise  Decreased strength - therapeutic exercise and therapeutic activities  Impaired balance - neuro re-education and therapeutic activities  Decreased proprioception - neuro re-education and therapeutic activities  Impaired gait - gait training  Impaired muscle performance - neuro re-education  Decreased function - therapeutic activities    Therapy Evaluation Codes:     Cumulative Therapy Evaluation is: Low complexity.    Previous and current functional limitations:  (See Goal Flow Sheet for this information)    Short term and Long term goals: (See Goal Flow Sheet for this information)     Communication ability:  Patient appears to be able to clearly communicate and understand verbal and written communication and follow directions correctly.  PATIENTS NAME:  Cam Walden   : 1950    Treatment Explanation - The following has been discussed with the patient: RX ordered/plan of care, anticipated outcomes, and possible risks and side effects.  This patient would benefit from PT intervention to resume normal activities.   Rehab potential is good.    Frequency:  1 X week, once daily  Duration:  for 4 weeks tapering to 2 X a month over 2  "months  Discharge Plan: Achieve all LTGs, be independent in home treatment program, and reach maximal therapeutic benefit.         Caregiver Signature/Credentials _____________________________ Date ________       Treating Provider: Gi Zaragoza DPT   I have reviewed and certified the need for these services and plan of treatment while under my care.        PHYSICIAN'S SIGNATURE:   _________________________________________  Date___________   Elpidio Cox MD    Certification period:  Beginning of Cert date period: 03/09/21 to  End of Cert period date: 06/06/21     Functional Level Progress Report: Please see attached \"Goal Flow sheet for Functional level.\"    ____X____ Continue Services or       ________ DC Services                Service dates: From  SOC Date: 03/09/21 date to present                         "

## 2021-03-12 ENCOUNTER — COMMUNICATION - HEALTHEAST (OUTPATIENT)
Dept: ANTICOAGULATION | Facility: CLINIC | Age: 71
End: 2021-03-12

## 2021-03-12 ENCOUNTER — AMBULATORY - HEALTHEAST (OUTPATIENT)
Dept: LAB | Facility: CLINIC | Age: 71
End: 2021-03-12

## 2021-03-12 DIAGNOSIS — I48.92 ATRIAL FLUTTER (H): ICD-10-CM

## 2021-03-12 LAB — INR PPP: 2 (ref 0.9–1.1)

## 2021-03-15 ENCOUNTER — THERAPY VISIT (OUTPATIENT)
Dept: PHYSICAL THERAPY | Facility: CLINIC | Age: 71
End: 2021-03-15
Payer: MEDICARE

## 2021-03-15 DIAGNOSIS — S82.899A ANKLE FRACTURE: ICD-10-CM

## 2021-03-15 DIAGNOSIS — M25.572 ANKLE PAIN, LEFT: ICD-10-CM

## 2021-03-15 PROCEDURE — 97112 NEUROMUSCULAR REEDUCATION: CPT | Mod: GP | Performed by: PHYSICAL THERAPIST

## 2021-03-15 PROCEDURE — 97140 MANUAL THERAPY 1/> REGIONS: CPT | Mod: GP | Performed by: PHYSICAL THERAPIST

## 2021-03-15 PROCEDURE — 97110 THERAPEUTIC EXERCISES: CPT | Mod: GP | Performed by: PHYSICAL THERAPIST

## 2021-03-17 ENCOUNTER — AMBULATORY - HEALTHEAST (OUTPATIENT)
Dept: INFUSION THERAPY | Facility: HOSPITAL | Age: 71
End: 2021-03-17

## 2021-03-17 ENCOUNTER — COMMUNICATION - HEALTHEAST (OUTPATIENT)
Dept: ONCOLOGY | Facility: HOSPITAL | Age: 71
End: 2021-03-17

## 2021-03-17 DIAGNOSIS — C83.398 DIFFUSE LARGE B-CELL LYMPHOMA OF EXTRANODAL SITE: ICD-10-CM

## 2021-03-17 DIAGNOSIS — E83.42 HYPOMAGNESEMIA: ICD-10-CM

## 2021-03-17 LAB — MAGNESIUM SERPL-MCNC: 1.4 MG/DL (ref 1.8–2.6)

## 2021-03-23 ENCOUNTER — THERAPY VISIT (OUTPATIENT)
Dept: PHYSICAL THERAPY | Facility: CLINIC | Age: 71
End: 2021-03-23
Payer: MEDICARE

## 2021-03-23 DIAGNOSIS — M25.572 ANKLE PAIN, LEFT: ICD-10-CM

## 2021-03-23 DIAGNOSIS — S82.899A ANKLE FRACTURE: ICD-10-CM

## 2021-03-23 PROCEDURE — 97112 NEUROMUSCULAR REEDUCATION: CPT | Mod: GP | Performed by: PHYSICAL THERAPIST

## 2021-03-23 PROCEDURE — 97110 THERAPEUTIC EXERCISES: CPT | Mod: GP | Performed by: PHYSICAL THERAPIST

## 2021-03-23 PROCEDURE — 97530 THERAPEUTIC ACTIVITIES: CPT | Mod: GP | Performed by: PHYSICAL THERAPIST

## 2021-03-25 ENCOUNTER — AMBULATORY - HEALTHEAST (OUTPATIENT)
Dept: ANTICOAGULATION | Facility: CLINIC | Age: 71
End: 2021-03-25

## 2021-03-25 DIAGNOSIS — I48.92 ATRIAL FLUTTER (H): ICD-10-CM

## 2021-03-30 ENCOUNTER — THERAPY VISIT (OUTPATIENT)
Dept: PHYSICAL THERAPY | Facility: CLINIC | Age: 71
End: 2021-03-30
Payer: MEDICARE

## 2021-03-30 DIAGNOSIS — M25.572 ANKLE PAIN, LEFT: ICD-10-CM

## 2021-03-30 DIAGNOSIS — S82.899A ANKLE FRACTURE: ICD-10-CM

## 2021-03-30 PROCEDURE — 97110 THERAPEUTIC EXERCISES: CPT | Mod: GP | Performed by: PHYSICAL THERAPIST

## 2021-03-30 PROCEDURE — 97112 NEUROMUSCULAR REEDUCATION: CPT | Mod: GP | Performed by: PHYSICAL THERAPIST

## 2021-03-31 ENCOUNTER — AMBULATORY - HEALTHEAST (OUTPATIENT)
Dept: INFUSION THERAPY | Facility: HOSPITAL | Age: 71
End: 2021-03-31

## 2021-03-31 DIAGNOSIS — C83.398 DIFFUSE LARGE B-CELL LYMPHOMA OF EXTRANODAL SITE: ICD-10-CM

## 2021-03-31 DIAGNOSIS — E83.42 HYPOMAGNESEMIA: ICD-10-CM

## 2021-03-31 LAB — MAGNESIUM SERPL-MCNC: 1.3 MG/DL (ref 1.8–2.6)

## 2021-04-02 ENCOUNTER — COMMUNICATION - HEALTHEAST (OUTPATIENT)
Dept: ANTICOAGULATION | Facility: CLINIC | Age: 71
End: 2021-04-02

## 2021-04-02 ENCOUNTER — AMBULATORY - HEALTHEAST (OUTPATIENT)
Dept: LAB | Facility: CLINIC | Age: 71
End: 2021-04-02

## 2021-04-02 DIAGNOSIS — I48.92 ATRIAL FLUTTER (H): ICD-10-CM

## 2021-04-02 LAB — INR PPP: 3 (ref 0.9–1.1)

## 2021-04-06 ENCOUNTER — THERAPY VISIT (OUTPATIENT)
Dept: PHYSICAL THERAPY | Facility: CLINIC | Age: 71
End: 2021-04-06
Payer: MEDICARE

## 2021-04-06 DIAGNOSIS — S82.899A ANKLE FRACTURE: ICD-10-CM

## 2021-04-06 DIAGNOSIS — M25.572 ANKLE PAIN, LEFT: ICD-10-CM

## 2021-04-06 PROCEDURE — 97110 THERAPEUTIC EXERCISES: CPT | Mod: GP | Performed by: PHYSICAL THERAPIST

## 2021-04-06 PROCEDURE — 97112 NEUROMUSCULAR REEDUCATION: CPT | Mod: GP | Performed by: PHYSICAL THERAPIST

## 2021-04-21 ENCOUNTER — COMMUNICATION - HEALTHEAST (OUTPATIENT)
Dept: FAMILY MEDICINE | Facility: CLINIC | Age: 71
End: 2021-04-21

## 2021-04-21 DIAGNOSIS — K64.9 HEMORRHOIDS, UNSPECIFIED HEMORRHOID TYPE: ICD-10-CM

## 2021-04-21 DIAGNOSIS — I48.92 ATRIAL FLUTTER WITH RAPID VENTRICULAR RESPONSE (H): ICD-10-CM

## 2021-04-23 ENCOUNTER — COMMUNICATION - HEALTHEAST (OUTPATIENT)
Dept: NURSING | Facility: CLINIC | Age: 71
End: 2021-04-23

## 2021-04-23 DIAGNOSIS — E11.9 DM TYPE 2 (DIABETES MELLITUS, TYPE 2) (H): ICD-10-CM

## 2021-05-12 ENCOUNTER — COMMUNICATION - HEALTHEAST (OUTPATIENT)
Dept: ANTICOAGULATION | Facility: CLINIC | Age: 71
End: 2021-05-12

## 2021-05-14 ENCOUNTER — HOSPITAL ENCOUNTER (OUTPATIENT)
Dept: CT IMAGING | Facility: HOSPITAL | Age: 71
Discharge: HOME | End: 2021-05-14
Payer: MEDICARE

## 2021-05-14 ENCOUNTER — AMBULATORY - HEALTHEAST (OUTPATIENT)
Dept: INFUSION THERAPY | Facility: HOSPITAL | Age: 71
End: 2021-05-14

## 2021-05-14 ENCOUNTER — HOSPITAL ENCOUNTER (OUTPATIENT)
Dept: CT IMAGING | Facility: HOSPITAL | Age: 71
Setting detail: RADIATION/ONCOLOGY SERIES
Discharge: STILL ADMITTED OR ADMINISTRATIVE | End: 2021-05-14
Attending: INTERNAL MEDICINE
Payer: MEDICARE

## 2021-05-14 ENCOUNTER — COMMUNICATION - HEALTHEAST (OUTPATIENT)
Dept: ANTICOAGULATION | Facility: CLINIC | Age: 71
End: 2021-05-14

## 2021-05-14 DIAGNOSIS — C83.398 DIFFUSE LARGE B-CELL LYMPHOMA OF EXTRANODAL SITE: ICD-10-CM

## 2021-05-14 DIAGNOSIS — I48.92 ATRIAL FLUTTER (H): ICD-10-CM

## 2021-05-14 LAB
ALBUMIN SERPL-MCNC: 4 G/DL (ref 3.5–5)
ALP SERPL-CCNC: 128 U/L (ref 45–120)
ALT SERPL W P-5'-P-CCNC: 37 U/L (ref 0–45)
ANION GAP SERPL CALCULATED.3IONS-SCNC: 10 MMOL/L (ref 5–18)
AST SERPL W P-5'-P-CCNC: 29 U/L (ref 0–40)
BASOPHILS # BLD AUTO: 0 THOU/UL (ref 0–0.2)
BASOPHILS NFR BLD AUTO: 0 % (ref 0–2)
BILIRUB SERPL-MCNC: 0.7 MG/DL (ref 0–1)
BUN SERPL-MCNC: 15 MG/DL (ref 8–22)
CALCIUM SERPL-MCNC: 8.6 MG/DL (ref 8.5–10.5)
CHLORIDE BLD-SCNC: 108 MMOL/L (ref 98–107)
CO2 SERPL-SCNC: 22 MMOL/L (ref 22–31)
CREAT SERPL-MCNC: 1.45 MG/DL (ref 0.7–1.3)
EOSINOPHIL # BLD AUTO: 0.2 THOU/UL (ref 0–0.4)
EOSINOPHIL NFR BLD AUTO: 2 % (ref 0–6)
ERYTHROCYTE [DISTWIDTH] IN BLOOD BY AUTOMATED COUNT: 16.7 % (ref 11–14.5)
GFR SERPL CREATININE-BSD FRML MDRD: 48 ML/MIN/1.73M2
GLUCOSE BLD-MCNC: 151 MG/DL (ref 70–125)
HCT VFR BLD AUTO: 36.6 % (ref 40–54)
HGB BLD-MCNC: 11.5 G/DL (ref 14–18)
IMM GRANULOCYTES # BLD: 0.2 THOU/UL
IMM GRANULOCYTES NFR BLD: 3 %
INR PPP: 2.68 (ref 0.9–1.1)
LDH SERPL L TO P-CCNC: 294 U/L (ref 125–220)
LYMPHOCYTES # BLD AUTO: 2.3 THOU/UL (ref 0.8–4.4)
LYMPHOCYTES NFR BLD AUTO: 24 % (ref 20–40)
MCH RBC QN AUTO: 28.3 PG (ref 27–34)
MCHC RBC AUTO-ENTMCNC: 31.4 G/DL (ref 32–36)
MCV RBC AUTO: 90 FL (ref 80–100)
MONOCYTES # BLD AUTO: 0.9 THOU/UL (ref 0–0.9)
MONOCYTES NFR BLD AUTO: 10 % (ref 2–10)
NEUTROPHILS # BLD AUTO: 5.7 THOU/UL (ref 2–7.7)
NEUTROPHILS NFR BLD AUTO: 61 % (ref 50–70)
PLATELET # BLD AUTO: 271 THOU/UL (ref 140–440)
PMV BLD AUTO: 8.7 FL (ref 8.5–12.5)
POTASSIUM BLD-SCNC: 3.8 MMOL/L (ref 3.5–5)
PROT SERPL-MCNC: 7.2 G/DL (ref 6–8)
RBC # BLD AUTO: 4.06 MILL/UL (ref 4.4–6.2)
SODIUM SERPL-SCNC: 140 MMOL/L (ref 136–145)
WBC: 9.4 THOU/UL (ref 4–11)

## 2021-05-17 ENCOUNTER — OFFICE VISIT - HEALTHEAST (OUTPATIENT)
Dept: ONCOLOGY | Facility: HOSPITAL | Age: 71
End: 2021-05-17

## 2021-05-17 DIAGNOSIS — C83.398 DIFFUSE LARGE B-CELL LYMPHOMA OF EXTRANODAL SITE: ICD-10-CM

## 2021-05-26 VITALS
SYSTOLIC BLOOD PRESSURE: 109 MMHG | TEMPERATURE: 98.2 F | OXYGEN SATURATION: 95 % | HEART RATE: 60 BPM | DIASTOLIC BLOOD PRESSURE: 60 MMHG

## 2021-05-27 VITALS
DIASTOLIC BLOOD PRESSURE: 59 MMHG | HEART RATE: 65 BPM | TEMPERATURE: 98.7 F | SYSTOLIC BLOOD PRESSURE: 110 MMHG | OXYGEN SATURATION: 95 %

## 2021-05-27 VITALS
SYSTOLIC BLOOD PRESSURE: 132 MMHG | HEART RATE: 76 BPM | BODY MASS INDEX: 33.39 KG/M2 | DIASTOLIC BLOOD PRESSURE: 81 MMHG | OXYGEN SATURATION: 98 % | TEMPERATURE: 98.5 F | WEIGHT: 253.1 LBS

## 2021-05-27 NOTE — PROGRESS NOTES
[facility] Office Note    Assessment / Plan:    1. Atrial flutter.  No symptomatic recurrence since cardioversion 2 years ago.  Continue warfarin and metoprolol  2. Aortic stenosis mild 2017.    Will recheck echocardiogram  3. Morbid obesity, mild sleep apnea currently not on treatment.  Not interested in another trial of treatment   4. sedentary lifestyle.  Encouraged walking in a swimming pool or using a stationary bicycle with low resistance as means to exercise without bothering his knees so much    Plan follow-up in 1 year  ______________________________________________________________________    Subjective:    I had the opportunity to see Cam Walden at the [facility]. Cam Walden is a 69 y.o. male with a history of hypertension and morbid obesity who presented with new diagnosis of atrial flutter 7/2017.  Rate control was achieved with metoprolol 200 mg daily, he was treated with warfarin anticoagulation and underwent cardioversion.  Since the cardioversion he has not noticed any further heart racing.  He does not feel that his stamina has improved any.  He still does not engage in any regular exercise.  His weight is been stable.  He is tolerated warfarin with no bleeding or bruising issues.  He has had no evidence of recurrent colon cancer since his partial colectomy.    He continues to sleep in his recliner.  He was diagnosed with mild sleep apnea in years past and feels that his sleep is restorative.  He sleeps 4 hours then is up for a while and goes back to sleep for another couple of hours.  He denies daytime sleepiness otherwise.  He is also not interested in another trial of CPAP.  His activities  have not changed since I saw him last, remaining quite sedentary.  He continues to teach piano lessons.    His weight is up 2 pounds from when I saw him last.  His blood sugars have been somewhat challenging to control.  We discussed options for exercise that her lower  stress on his knees      ______________________________________________________________________    Problem List:  Patient Active Problem List   Diagnosis     Aortic stenosis, mild     Essential hypertension with goal blood pressure less than 130/80     Sleep apnea     Hyperlipidemia     Decreased sensation of feet.     Plantar fasciitis, bilateral     Skin lesion     Colon polyps     Colonoscopy causing post-procedural bleeding     Acute blood loss anemia     Type 2 diabetes mellitus, uncontrolled (H)     Atrial flutter with rapid ventricular response (H)     Atrial flutter (H)     Adenocarcinoma of descending colon (H)     Medical History:  Past Medical History:   Diagnosis Date     Aortic stenosis     mild     Atrial fibrillation (H)      Atrial flutter with rapid ventricular response (H)      Colon polyps 08/08/2016    Per colonoscopy 8/8/16.  Large and small.  Some removed. Others to be removed surgically. (per patient)     Diabetes mellitus (H)     DM II     Diabetes mellitus, type II (H)      GI bleed     Following colon polyp removal     Hyperlipidemia      Hypertension      Morbid obesity (H)      JOHNNY (obstructive sleep apnea)     no cpap     Rectal bleeding      Surgical History:  Past Surgical History:   Procedure Laterality Date     CARDIOVERSION  08/25/2017     Social History:  Social History     Socioeconomic History     Marital status: Single     Spouse name: Not on file     Number of children: Not on file     Years of education: Not on file     Highest education level: Not on file   Occupational History     Not on file   Social Needs     Financial resource strain: Not on file     Food insecurity:     Worry: Not on file     Inability: Not on file     Transportation needs:     Medical: Not on file     Non-medical: Not on file   Tobacco Use     Smoking status: Never Smoker     Smokeless tobacco: Never Used   Substance and Sexual Activity     Alcohol use: No     Drug use: No     Sexual activity: No    Lifestyle     Physical activity:     Days per week: Not on file     Minutes per session: Not on file     Stress: Not on file   Relationships     Social connections:     Talks on phone: Not on file     Gets together: Not on file     Attends [employer] service: Not on file     Active member of club or organization: Not on file     Attends meetings of clubs or organizations: Not on file     Relationship status: Not on file     Intimate partner violence:     Fear of current or ex partner: Not on file     Emotionally abused: Not on file     Physically abused: Not on file     Forced sexual activity: Not on file   Other Topics Concern     Not on file   Social History Narrative     Not on file     Sleep History:  Sleeps for 4 hours in a recliner then later in the morning sleeps for another 2 hours in his recliner  Exercise History:  None    Review of Systems:   General: WNL  Eyes: WNL  Ears/Nose/Throat: WNL  Lungs: WNL  Heart: WNL  Stomach: WNL  Bladder: WNL  Muscle/Joints: Joint Pain  Skin: WNL  Nervous System: WNL  Mental Health: WNL     Blood: WNL          Family History:  Family History   Problem Relation Age of Onset     Dementia Mother      Asthma Father          Allergies:  Allergies   Allergen Reactions     Lisinopril Cough     Trulicity [Dulaglutide] Nausea Only     Medications:  Current Outpatient Medications   Medication Sig Dispense Refill     atorvastatin (LIPITOR) 40 MG tablet Take 1 tablet (40 mg total) by mouth daily. 90 tablet 3     blood glucose test (ACCU-CHEK SMARTVIEW TEST STRIP) strips Use to check blood sugars twice daily as directed 100 strip 11     gabapentin (NEURONTIN) 300 MG capsule TAKE 2 CAPSULES (600 MG TOTAL) BY MOUTH AT BEDTIME. 60 capsule 2     generic lancets (ACCU-CHEK FASTCLIX) Use to check blood sugar twice per day. 102 each 11     glipiZIDE (GLUCOTROL XL) 10 MG 24 hr tablet Take 1 tablet (10 mg total) by mouth every morning. 90 tablet 3     hydroCHLOROthiazide (HYDRODIURIL) 25 MG  "tablet Take 1 tablet (25 mg total) by mouth daily. 90 tablet 3     losartan (COZAAR) 100 MG tablet Take 1 tablet (100 mg total) by mouth daily. 90 tablet 1     metFORMIN (GLUCOPHAGE) 1000 MG tablet TAKE 1 TABLET (1,000 MG TOTAL) BY MOUTH 2 (TWO) TIMES A DAY WITH MEALS. 180 tablet 3     warfarin (COUMADIN/JANTOVEN) 5 MG tablet Take 5 mg on Wed and Saturday and 10 mg rest of the week as directed. Dose adjusted according to INR result.. 144 tablet 1     cholecalciferol, vitamin D3, (VITAMIN D3) 1,000 unit capsule Take 1 capsule (1,000 Units total) by mouth daily. 100 capsule 2     metFORMIN (GLUCOPHAGE) 1000 MG tablet TAKE 1 TABLET BY MOUTH TWICE DAILY WITH MEALS 60 tablet 0     metoprolol tartrate (LOPRESSOR) 100 MG tablet Take 1 tablet (100 mg total) by mouth 2 (two) times a day. 180 tablet 3     senna (SENOKOT) 8.6 mg tablet Take 1 tablet by mouth daily as needed.        No current facility-administered medications for this visit.        Objective:   Wt Readings from Last 3 Encounters:   04/05/19 (!) 318 lb (144.2 kg)   11/27/18 (!) 315 lb (142.9 kg)   08/22/18 (!) 306 lb (138.8 kg)     Vital signs:  /72 (Patient Site: Right Arm, Patient Position: Sitting, Cuff Size: Adult Large)   Pulse 76   Resp 16   Ht 5' 11.65\" (1.82 m)   Wt (!) 318 lb (144.2 kg)   BMI 43.55 kg/m        Physical Exam:    GENERAL APPEARANCE: Alert, cooperative and in no acute distress.  HEENT: Conjunctivae not injected.  No scleral icterus. No Xanthelasma. Oral mucous membranes pink and moist.  NECK: No JVD.  No Hepatojugular reflux. Thyroid not enlarged.  CHEST: clear to auscultation  CARDIOVASCULAR: Regular S1, S2 without clicks or rubs.  2/6 harsh systolic murmur best audible at upper right sternal border radiating throughout the precordium.  Brachial, radial and posterior tibial pulses are intact and symmetric. No carotid bruits noted.  ABDOMEN: Obese.  nOntender. BS+. No bruits.  EXTREMITIES: Trace bilateral pretibial " edema.  SKIN:  No rash, bruising      ECG today shows sinus rhythm at 73 bpm.  Left axis deviation, and borderline criteria for left ventricular hypertrophy    Lab Results:  LIPIDS:  Lab Results   Component Value Date    CHOL 98 05/21/2018    CHOL 109 08/14/2017    CHOL 104 01/12/2016     Lab Results   Component Value Date    HDL 26 (L) 05/21/2018    HDL 28 (L) 08/14/2017    HDL 35 (L) 01/12/2016     Lab Results   Component Value Date    LDLCALC 40 05/21/2018    LDLCALC 50 08/14/2017    LDLCALC 49 01/12/2016     Lab Results   Component Value Date    TRIG 158 (H) 05/21/2018    TRIG 155 (H) 08/14/2017    TRIG 102 01/12/2016     Holter monitor 7/2017:  24-hour Holter monitor was applied 7/12/2017 with data analysis/interpretation 7/13/2017  Atrial flutter seen throughout the recording  Which ventricular rate is 104 bpm and ranges between  bpm  No bradycardia or pauses  A single PVC  This activity diary was reviewed-no symptoms or awareness of palpitations/arrhythmias noted  Discussion  Persistent atrial flutter.  This appears to be typical flutter and may be isthmus dependent  Elevated ventricular rate is noted with elevated average heart rate and at times heart rate up to 194 bpm  This rhythm may be amenable to isthmus/flutter ablation    Echocardiogram 7/2017:  Summary   1. The left ventricle is mildly enlarged. Left ventricular systolic performance is at the lower limits of normal. The ejection fraction is estimated to be 50-55%.   2. There is mild concentric increase in left ventricular wall thickness.   3. There is mild aortic stenosis.   4. There is mild left atrial enlargement.            HANNAH LIM MD [facility]  [phone]    This note created using Dragon voice recognition software.  Sound alike errors may have escaped editing.

## 2021-05-27 NOTE — TELEPHONE ENCOUNTER
ANTICOAGULATION  MANAGEMENT    Assessment     Today's INR result of 2.70 is Therapeutic (goal INR of 2.0-3.0)        Warfarin taken as previously instructed    No new diet changes affecting INR    No new medication/supplements affecting INR    Continues to tolerate warfarin with no reported s/s of bleeding or thromboembolism     Previous INR was Therapeutic    Plan:     Left a detailed message for Cam regarding INR result and instructed:     Warfarin Dosing Instructions:  Continue current warfarin dose 5 mg daily on WED / SAT; and 10 mg daily rest of week  (0 % change)    Instructed patient to follow up no later than: 6-8 weeks    Education provided: importance of consistent vitamin K intake, importance of notifying clinic for changes in medications and importance of notifying clinic for diarrhea, nausea/vomiting, reduced intake and/or illness    Instructed to call the [facility] for any changes, questions or concerns. (#[phone])   ?   Yvonne Duncan RN    Subjective/Objective:      Cam Walden, a 69 y.o. male is on warfarin.     Cam reports:     Home warfarin dose: as updated on anticoagulation calendar per template     Missed doses: No     Medication changes:  No     S/S of bleeding or thromboembolism:  No     New Injury or illness:  No     Changes in diet or alcohol consumption:  No     Upcoming surgery, procedure or cardioversion:  No    Anticoagulation Episode Summary     Current INR goal:   2.0-3.0   TTR:   55.9 % (1.6 y)   Next INR check:   6/4/2019   INR from last check:   2.70 (4/9/2019)   Weekly max warfarin dose:      Target end date:   Indefinite   INR check location:      Preferred lab:      Send INR reminders to:   ANTICOAGULATION POOL C (DTN,VAD,CGR,GAV)    Indications    Atrial flutter (H) [I48.92]           Comments:            Anticoagulation Care Providers     Provider Role Specialty Phone number    Kamaljit Plata MD Referring Family Medicine [phone]

## 2021-05-27 NOTE — TELEPHONE ENCOUNTER
ANTICOAGULATION  MANAGEMENT PROGRAM    Cam Walden is overdue for INR check.  Reminder call made.    Left message for Cam. If returning call, please schedule INR check as soon as possible.    Yvonne Duncan RN

## 2021-05-27 NOTE — TELEPHONE ENCOUNTER
RN cannot approve Refill Request    RN can NOT refill this medication PCP messaged that patient is overdue for Office Visit.      Madhavi Stallings, [facility] Triage/Med Refill 3/25/2019    Requested Prescriptions   Pending Prescriptions Disp Refills     gabapentin (NEURONTIN) 300 MG capsule [Pharmacy Med Name: GABAPENTIN 300 MG CAPSULE] 60 capsule 2     Sig: TAKE 2 CAPSULES (600 MG TOTAL) BY MOUTH AT BEDTIME.    Gabapentin/Levetiracetam/Tiagabine Refill Protocol  Failed - 3/25/2019  1:55 AM       Failed - PCP or prescribing provider visit in past 12 months or next 3 months    Last office visit with prescriber/PCP: 3/20/2018 Kamaljit Plata MD OR same dept: Visit date not found OR same specialty: 3/20/2018 Kamaljit Plata MD  Last physical: 10/23/2017 Last MTM visit: Visit date not found   Next visit within 3 mo: Visit date not found  Next physical within 3 mo: Visit date not found  Prescriber OR PCP: Kamaljit Plata MD  Last diagnosis associated with med order: 1. Decreased sensation of feet.  - gabapentin (NEURONTIN) 300 MG capsule [Pharmacy Med Name: GABAPENTIN 300 MG CAPSULE]; TAKE 2 CAPSULES (600 MG TOTAL) BY MOUTH AT BEDTIME.  Dispense: 60 capsule; Refill: 2    If protocol passes may refill for 12 months if within 3 months of last provider visit (or a total of 15 months).

## 2021-05-27 NOTE — PATIENT INSTRUCTIONS - HE
1. Try using a stationary bicycle with low resistance, or walking in a swimming pool, to help get your 20 minutes of daily exercise in.  This will help with weight control as well as blood sugar control and lower your risk of cardiovascular disease.  2. We will recheck an echocardiogram to look for any evidence of significant narrowing of your aortic valve.  3. I look forward to seeing you again in 1 year.  No medication changes today

## 2021-05-28 NOTE — PROGRESS NOTES
OFFICE VISIT - FAMILY MEDICINE     ASSESSMENT AND PLAN     1. Type 2 diabetes mellitus with other circulatory complications (H)   Glycosylated Hemoglobin A1c    Glycosylated Hemoglobin A1c    US Ankle Brachial Indices   2. Essential hypertension with goal blood pressure less than 130/80  Comprehensive Metabolic Panel    Microalbumin, Random Urine    Comprehensive Metabolic Panel    Microalbumin, Random Urine   3. Mixed hyperlipidemia  Lipid Cascade    Lipid Cascade   4. Obesity without serious comorbidity, unspecified classification, unspecified obesity type  Thyroid Cascade    Thyroid Stearns   5. Screening for prostate cancer  PSA (Prostatic-Specific Antigen), Diagnostic    PSA (Prostatic-Specific Antigen), Diagnostic   6. Disorder of prostate   PSA (Prostatic-Specific Antigen), Diagnostic    PSA (Prostatic-Specific Antigen), Diagnostic   7. Atrial flutter (H)  INR   8. Pain of right lower leg  US Ankle Brachial Indices   Uncontrolled diabetes type 2 with circulatory problem included peripheral neuropathies, will get an DENIS to rule out PAD, unfortunately taking high-dose gabapentin with no improvement, I did check with patient insurance Lyrica price is prohibitive, could do topical treatment like CBD oil, compound gabapentin, amitriptyline could also be an option at a low but this could increased his  risk of fall.  A1c has been increasing discussed about engaging in healthy lifestyle changes, weight loss program, unfortunately insurance would not cover DPP 4 inhibitor, he is allergic to Trulicity, could consider low-dose daily insulin like Basaglar etc.or an additional half a tablet of glipizide later in the evening, continue to monitor his blood sugar at home..  Atrial flutter fibrillation has been rate controlled with metoprolol,he is also anticoagulated with Coumadin, has been managed by the anticoagulation clinic.  Addendum, left message the patient voicemail with different option treatment.    CHIEF  "COMPLAINT   Tremors (rt. foot at night, gabapentin not helping now.)    HPI   Cam Walden is a 69 y.o. male.  Updated MIIC - Needs PC-23 -  No Colonoscopy, Per Dr. Plata  Following up on diabetes type 2, blood sugar usually 145 and above, he checks once a day, unfortunately A1c went up from 8.3% last time to 8.8%.  Currently taking metformin 1000 g twice daily, and glipizide XL 10 mg around noontime with a major meal.  Right more than left foot peripheral neuropathy symptoms is getting worse has been taking high dose of gabapentin with no significant improvement.  He does have atrial flutter, is on anticoagulation with Coumadin daily, recent INR is 2.7 denies any bleeding.  Unfortunately patient has not been active with regular physical activity.  Does have a history of colon cancer surgery.    Review of Systems As per [facility], otherwise negative.    OBJECTIVE   /80 (Patient Site: Left Arm, Patient Position: Sitting, Cuff Size: Adult Large)   Pulse 66   Ht 6' 0.5\" (1.842 m)   Wt (!) 318 lb (144.2 kg)   SpO2 93%   BMI 42.54 kg/m     Physical Exam   Constitutional: He is oriented to person, place, and time. He appears well-developed and well-nourished.   HENT:   Head: Normocephalic and atraumatic.   Neck: Normal range of motion. Neck supple. No JVD present. No tracheal deviation present. No thyromegaly present.   Cardiovascular: Normal rate, regular rhythm, normal heart sounds and intact distal pulses. Exam reveals no gallop and no friction rub.   No murmur heard.  Pulmonary/Chest: Effort normal and breath sounds normal. No respiratory distress. He has no wheezes. He has no rales.   Musculoskeletal: He exhibits no edema or tenderness.   Lymphadenopathy:     He has no cervical adenopathy.   Neurological: He is alert and oriented to person, place, and time. Coordination normal.   Loss of sensation of monofilament testing in both feet.   Psychiatric: He has a normal mood and affect. Judgment and thought " content normal.       [facility]     Family History   Problem Relation Age of Onset     Dementia Mother      Asthma Father      Social History     Socioeconomic History     Marital status: Single     Spouse name: Not on file     Number of children: Not on file     Years of education: Not on file     Highest education level: Not on file   Occupational History     Not on file   Social Needs     Financial resource strain: Not on file     Food insecurity:     Worry: Not on file     Inability: Not on file     Transportation needs:     Medical: Not on file     Non-medical: Not on file   Tobacco Use     Smoking status: Never Smoker     Smokeless tobacco: Never Used   Substance and Sexual Activity     Alcohol use: No     Drug use: No     Sexual activity: Never   Lifestyle     Physical activity:     Days per week: Not on file     Minutes per session: Not on file     Stress: Not on file   Relationships     Social connections:     Talks on phone: Not on file     Gets together: Not on file     Attends Zoroastrianism service: Not on file     Active member of club or organization: Not on file     Attends meetings of clubs or organizations: Not on file     Relationship status: Not on file     Intimate partner violence:     Fear of current or ex partner: Not on file     Emotionally abused: Not on file     Physically abused: Not on file     Forced sexual activity: Not on file   Other Topics Concern     Not on file   Social History Narrative     Not on file     Relevant history was reviewed with the patient today, unless noted in HPI, nothing is pertinent for this visit.  [facility]     Patient Active Problem List    Diagnosis Date Noted     Atrial flutter (H) 08/21/2017     Atrial flutter with rapid ventricular response (H) 07/03/2017     Type 2 diabetes mellitus with other circulatory complications (H) 08/12/2016     Acute blood loss anemia      Colon polyps 08/08/2016     Colonoscopy causing post-procedural bleeding 08/08/2016     Plantar  fasciitis, bilateral 07/26/2016     Skin lesion 07/26/2016     Hyperlipidemia 01/12/2016     Decreased sensation of feet. 01/12/2016     Essential hypertension with goal blood pressure less than 130/80 06/03/2015     Sleep apnea 06/03/2015     Aortic stenosis, mild 04/16/2015     Past Surgical History:   Procedure Laterality Date     CARDIOVERSION  08/25/2017       RESULTS/CONSULTS (Lab/Rad)     Recent Results (from the past 168 hour(s))   Glycosylated Hemoglobin A1c   Result Value Ref Range    Hemoglobin A1c 8.8 (H) 3.5 - 6.0 %   Comprehensive Metabolic Panel   Result Value Ref Range    Sodium 140 136 - 145 mmol/L    Potassium 4.3 3.5 - 5.0 mmol/L    Chloride 103 98 - 107 mmol/L    CO2 23 22 - 31 mmol/L    Anion Gap, Calculation 14 5 - 18 mmol/L    Glucose 179 (H) 70 - 125 mg/dL    BUN 12 8 - 22 mg/dL    Creatinine 1.03 0.70 - 1.30 mg/dL    GFR MDRD Af Amer >60 >60 mL/min/1.73m2    GFR MDRD Non Af Amer >60 >60 mL/min/1.73m2    Bilirubin, Total 0.8 0.0 - 1.0 mg/dL    Calcium 9.6 8.5 - 10.5 mg/dL    Protein, Total 7.7 6.0 - 8.0 g/dL    Albumin 3.9 3.5 - 5.0 g/dL    Alkaline Phosphatase 77 45 - 120 U/L    AST 48 (H) 0 - 40 U/L    ALT 58 (H) 0 - 45 U/L   PSA (Prostatic-Specific Antigen), Diagnostic   Result Value Ref Range    PSA 1.4 0.0 - 4.5 ng/mL   Lipid Cascade   Result Value Ref Range    Cholesterol 89 <=199 mg/dL    Triglycerides 171 (H) <=149 mg/dL    HDL Cholesterol 23 (L) >=40 mg/dL    LDL Calculated 32 <=129 mg/dL    Patient Fasting > 8hrs? Yes    Thyroid Cascade   Result Value Ref Range    TSH 1.67 0.30 - 5.00 uIU/mL   INR   Result Value Ref Range    INR 2.70 (H) 0.90 - 1.10   Microalbumin, Random Urine   Result Value Ref Range    Microalbumin, Random Urine 4.21 (H) 0.00 - 1.99 mg/dL    Creatinine, Urine 170.2 mg/dL    Microalbumin/Creatinine Ratio Random Urine 24.7 (H) <=19.9 mg/g     No results found.  MEDICATIONS     Current Outpatient Medications on File Prior to Visit   Medication Sig Dispense Refill      atorvastatin (LIPITOR) 40 MG tablet Take 1 tablet (40 mg total) by mouth daily. 90 tablet 3     blood glucose test (ACCU-CHEK SMARTVIEW TEST STRIP) strips Use to check blood sugars twice daily as directed 100 strip 11     cholecalciferol, vitamin D3, (VITAMIN D3) 1,000 unit capsule Take 1 capsule (1,000 Units total) by mouth daily. 100 capsule 2     gabapentin (NEURONTIN) 300 MG capsule TAKE 2 CAPSULES (600 MG TOTAL) BY MOUTH AT BEDTIME. 60 capsule 2     generic lancets (ACCU-CHEK FASTCLIX) Use to check blood sugar twice per day. 102 each 11     glipiZIDE (GLUCOTROL XL) 10 MG 24 hr tablet Take 1 tablet (10 mg total) by mouth every morning. 90 tablet 3     hydroCHLOROthiazide (HYDRODIURIL) 25 MG tablet Take 1 tablet (25 mg total) by mouth daily. 90 tablet 3     hydrocortisone 2.5 % cream        ketoconazole (NIZORAL) 2 % cream MIX IN 1:1 RATIO WITH HYDROCORTISONE AND APPLY TO AFFECTED AREAS TWICE DAILY TOPICALLY  5     losartan (COZAAR) 100 MG tablet TAKE 1 TABLET BY MOUTH EVERY DAY 90 tablet 1     metFORMIN (GLUCOPHAGE) 1000 MG tablet TAKE 1 TABLET (1,000 MG TOTAL) BY MOUTH 2 (TWO) TIMES A DAY WITH MEALS. 180 tablet 3     metoprolol tartrate (LOPRESSOR) 100 MG tablet Take 1 tablet (100 mg total) by mouth 2 (two) times a day. 180 tablet 3     warfarin (COUMADIN/JANTOVEN) 5 MG tablet TAKE ONE TO TWO TABLETS (5 -10 MG) BY MOUTH DAILY AS DIRECTED. ADUST DOSE ACCORDING TO INR. 60 tablet 0     [DISCONTINUED] metFORMIN (GLUCOPHAGE) 1000 MG tablet TAKE 1 TABLET BY MOUTH TWICE DAILY WITH MEALS 60 tablet 0     [DISCONTINUED] senna (SENOKOT) 8.6 mg tablet Take 1 tablet by mouth daily as needed.        No current facility-administered medications on file prior to visit.        HEALTH MAINTENANCE / SCREENING   PHQ-2 Total Score: 0 (5/16/2019  9:38 AM)  , No data recorded,No data recorded  Immunization History   Administered Date(s) Administered     Hep A, Adult IM (19yr & older) 09/29/2015     Hep B, Adult 09/29/2015,  10/27/2015     Influenza, inj, historic,unspecified 09/29/2015     Influenza, seasonal,quad inj 36+ mos 09/29/2015     Influenza,seasonal quad, PF, 36+MOS 10/17/2016     Pneumo Conj 13-V (2010&after) 09/29/2015     Tdap 09/29/2015     ZOSTER, LIVE 10/02/2015     Health Maintenance   Topic     ZOSTER VACCINES (2 of 3)     PNEUMOCOCCAL POLYSACCHARIDE VACCINE AGE 65 AND OVER      INFLUENZA VACCINE RULE BASED (Season Ended)     DIABETES HEMOGLOBIN A1C      DIABETES FOLLOW-UP      DIABETES FOOT EXAM      DIABETES OPHTHALMOLOGY EXAM      DIABETES URINE MICROALBUMIN      FALL RISK ASSESSMENT      ADVANCE DIRECTIVES DISCUSSED WITH PATIENT      TD 18+ HE      PNEUMOCOCCAL CONJUGATE VACCINE FOR ADULTS (PCV13 OR PREVNAR)      COLONOSCOPY      The following high BMI interventions were performed this visit: encouragement to exercise and weight loss from baseline weight  Kamaljit Plata MD  Family Medicine, [facility]     This note was dictated using a voice recognition software.  Any grammatical or context distortion are unintentional and inherent to the software.

## 2021-05-28 NOTE — TELEPHONE ENCOUNTER
Routed to the anticoagulation pool.    Yvonne Harding, RN, BSN, PHN  [facility] Medication Refill Nurse  4/26/2019  8:17 AM

## 2021-05-28 NOTE — TELEPHONE ENCOUNTER
ACN called patient to discuss INR result. No answer and no voicemail to leave message. ACN will try again later today.    Yvonne Duncan, RN, ACN  [facility] Anticoagulation Nurse  [phone]

## 2021-05-28 NOTE — TELEPHONE ENCOUNTER
Sent refill for Warfarin 5 mg tablets, 60 count with 0 refill. (30-day supply)    Patient over-due for an OC with PCP, last OV 3/20/18. Included message with RX to schedule an OV with provider.    Lab Results   Component Value Date    INR 2.70 (H) 04/09/2019    INR 2.40 (H) 02/18/2019    INR 2.70 (H) 01/17/2019       Yvonne Duncan, RN  Anticoagulation Nurse  [phone]

## 2021-05-28 NOTE — TELEPHONE ENCOUNTER
ANTICOAGULATION  MANAGEMENT    Assessment     Today's INR result of 2.70 is Therapeutic (goal INR of 2.0-3.0)        Warfarin taken as previously instructed    No new diet changes affecting INR    No new medication/supplements affecting INR    Continues to tolerate warfarin with no reported s/s of bleeding or thromboembolism     Previous INR was Therapeutic    Plan:     Left a detailed message for Cam regarding INR result and instructed:     Warfarin Dosing Instructions:  Continue current warfarin dose 5 mg daily on WED / SAT; and 10 mg daily rest of week  (0 % change)    Instructed patient to follow up no later than: 6-8 weeks    Education provided: importance of consistent vitamin K intake, importance of notifying clinic for changes in medications and importance of notifying clinic for diarrhea, nausea/vomiting, reduced intake and/or illness    Instructed to call the [facility] for any changes, questions or concerns. (#[phone])   ?   Yvonne Duncan RN    Subjective/Objective:      Cam Walden, a 69 y.o. male is on warfarin.     Cam reports:     Home warfarin dose: as updated on anticoagulation calendar per template     Missed doses: No     Medication changes:  No     S/S of bleeding or thromboembolism:  No     New Injury or illness:  No     Changes in diet or alcohol consumption:  No     Upcoming surgery, procedure or cardioversion:  No    Anticoagulation Episode Summary     Current INR goal:   2.0-3.0   TTR:   58.5 % (1.7 y)   Next INR check:   7/11/2019   INR from last check:   2.70 (5/16/2019)   Weekly max warfarin dose:      Target end date:   Indefinite   INR check location:      Preferred lab:      Send INR reminders to:   ANTICOAGULATION POOL C (DTN,VAD,CGR,GAV)    Indications    Atrial flutter (H) [I48.92]           Comments:            Anticoagulation Care Providers     Provider Role Specialty Phone number    Kamaljit Plata MD Referring Family Medicine [phone]

## 2021-05-29 NOTE — TELEPHONE ENCOUNTER
Pt came into the clinic today. States the neuropathy is worse, medication that he has is not working. [place] originally suggested taking lyrica but pt did not want to pay for it. But since, pt has not had any improvement with current medication and is willing to pay for the Lyrica now.     Please send prescription for Lyrica to pts pharmacy ([facility]).  Please call pt with any questions.

## 2021-05-29 NOTE — TELEPHONE ENCOUNTER
New prescription Lyrica was sent to patient pharmacy, to start with 50 mg p.o. 3 times a day, may increase to 100 mg 3 times again after a 1 week based on response and tolerability.    Stop the gabapentin before starting the Lyrica.

## 2021-05-29 NOTE — TELEPHONE ENCOUNTER
Spoke with patient, relayed message and directions below per Dr. Plata. Patient verbalized understanding.

## 2021-05-29 NOTE — TELEPHONE ENCOUNTER
Medication Request  Medication name: Gabapentin   Pharmacy Name and Location: [facility]/pharmacy #[id number] - [place], [place]   Reason for request: Per [facility] / [facility]/pharmacy #[id number] - [place], [place] the cost of the Lyrica (below) is $400.00 per month and they are looking for more affordable option for the patient.     pregabalin (LYRICA) 50 MG capsule 90 capsule 2 6/4/2019     Sig: Initial, 50 mg po 3 times a day; may increase to MAX, 100 mg po 3 times a day (300 mg/day) within 1 week based on efficacy and tolerability    Sent to pharmacy as: pregabalin (LYRICA) 50 MG capsule    E-Prescribing Status: Receipt confirmed by pharmacy (6/4/2019  2:06 PM CDT)        When did you use medication last?:  NA  Patient offered appointment:  No  Okay to leave a detailed message: no

## 2021-05-30 VITALS — HEIGHT: 70 IN | WEIGHT: 313 LBS | BODY MASS INDEX: 44.81 KG/M2

## 2021-05-30 VITALS — WEIGHT: 312.5 LBS | BODY MASS INDEX: 44.84 KG/M2

## 2021-05-30 NOTE — TELEPHONE ENCOUNTER
ANTICOAGULATION  MANAGEMENT    Assessment     Today's INR result of 3.8 is Supratherapeutic (goal INR of 2.0-3.0)        Warfarin taken as previously instructed    No new diet changes affecting INR    Interaction between duloxetine and warfarin may be affecting INR started 6/9    Continues to tolerate warfarin with no reported s/s of bleeding or thromboembolism     Previous INR was Therapeutic    Plan:     Spoke with Cam regarding INR result and instructed:     Warfarin Dosing Instructions:  hold today then change warfarin dose to 5 mg daily on mon/wed/sat; and 10 mg daily rest of week  (8.3 % change)    Instructed patient to follow up no later than: two weeks, scheduled    Cam verbalizes understanding and agrees to warfarin dosing plan.    Instructed to call the [facility] for any changes, questions or concerns. (#[phone])   ?   Jaqueline Chua RN    Subjective/Objective:      Cam Walden, a 69 y.o. male is on warfarin.     Cam reports:     Home warfarin dose: as updated on anticoagulation calendar per template     Missed doses: No     Medication changes:  No     S/S of bleeding or thromboembolism:  No     New Injury or illness:  No     Changes in diet or alcohol consumption:  No     Upcoming surgery, procedure or cardioversion:  No    Anticoagulation Episode Summary     Current INR goal:   2.0-3.0   TTR:   55.4 % (1.9 y)   Next INR check:   8/7/2019   INR from last check:   3.80! (7/24/2019)   Weekly max warfarin dose:      Target end date:   Indefinite   INR check location:      Preferred lab:      Send INR reminders to:   [facility]    Indications    Atrial flutter (H) [I48.92]           Comments:            Anticoagulation Care Providers     Provider Role Specialty Phone number    Kamaljit Plata MD Referring Family Medicine [phone]

## 2021-05-30 NOTE — TELEPHONE ENCOUNTER
Anticoagulation Annual Referral Renewal Review    Cam Walden's chart reviewed for annual renewal of referral to anticoagulation monitoring.        Criteria for anticoagulation nurse and/or pharmacist renewal met   Warfarin indication: Atrial Flutter Yes, per indication   Current with INR monitoring/compliant Yes Yes   Date of last office visit 5/16/19 Yes, had office visit within last year   Time in Therapeutic Range (TTR) 95.28 % Yes, TTR > 60%       Cam Walden met all criteria for anticoagulation management program initiated renewal.  New INR standing orders and anticoagulation referral renewal placed.      Yvonne Duncan RN  10:05 AM

## 2021-05-30 NOTE — TELEPHONE ENCOUNTER
ANTICOAGULATION  MANAGEMENT PROGRAM    Cam Walden is overdue for INR check.  Reminder call made.    Spoke with Cam and scheduled INR appointment on 7/24/19 .    Yvonne Duncan RN

## 2021-05-31 VITALS — HEIGHT: 72 IN | WEIGHT: 314.2 LBS | BODY MASS INDEX: 42.56 KG/M2

## 2021-05-31 VITALS — BODY MASS INDEX: 43.4 KG/M2 | WEIGHT: 310 LBS | HEIGHT: 71 IN

## 2021-05-31 VITALS — WEIGHT: 307 LBS | HEIGHT: 72 IN | BODY MASS INDEX: 41.58 KG/M2

## 2021-05-31 VITALS — BODY MASS INDEX: 41.99 KG/M2 | WEIGHT: 310 LBS | HEIGHT: 72 IN

## 2021-05-31 VITALS — HEIGHT: 71 IN | BODY MASS INDEX: 43.54 KG/M2 | WEIGHT: 311 LBS

## 2021-05-31 VITALS — WEIGHT: 310.75 LBS | BODY MASS INDEX: 42.09 KG/M2 | HEIGHT: 72 IN

## 2021-05-31 VITALS — HEIGHT: 72 IN | WEIGHT: 315 LBS | BODY MASS INDEX: 42.66 KG/M2

## 2021-05-31 VITALS — HEIGHT: 71 IN | BODY MASS INDEX: 42.42 KG/M2 | WEIGHT: 303 LBS

## 2021-05-31 NOTE — TELEPHONE ENCOUNTER
ANTICOAGULATION  MANAGEMENT    Assessment     Today's INR result of 3.8 is Supratherapeutic (goal INR of 2.0-3.0)        Warfarin taken as previously instructed    No new diet changes affecting INR    No new medication/supplements affecting INR    Continues to tolerate warfarin with no reported s/s of bleeding or thromboembolism     Previous INR was Therapeutic    Plan:     Spoke with Cam regarding INR result and instructed:     Warfarin Dosing Instructions:  Take 2.5 mg today then change warfarin dose to 10 mg daily on Sundays, Tuesdays and Thursdays; and 5 mg daily rest of week  (9.1 % change)    Instructed patient to follow up no later than: 1-2 weeks.    Education provided: importance of consistent vitamin K intake, impact of vitamin K foods on INR, importance of therapeutic range, importance of following up for INR monitoring at instructed interval and importance of taking warfarin as instructed    Cam verbalizes understanding and agrees to warfarin dosing plan.    Instructed to call the [facility] for any changes, questions or concerns. (#[phone])   ?   Jackie Rao RN    Subjective/Objective:      Cam Walden, a 69 y.o. male is on warfarin.     Cam reports:     Home warfarin dose: verbally confirmed home dose with Cam and updated on anticoagulation calendar     Missed doses: No     Medication changes:  No     S/S of bleeding or thromboembolism:  No     New Injury or illness:  No     Changes in diet or alcohol consumption:  No     Upcoming surgery, procedure or cardioversion:  No    Anticoagulation Episode Summary     Current INR goal:   2.0-3.0   TTR:   53.2 % (2 y)   Next INR check:   9/4/2019   INR from last check:   3.80! (8/21/2019)   Weekly max warfarin dose:      Target end date:   Indefinite   INR check location:      Preferred lab:      Send INR reminders to:   [facility]    Indications    Atrial flutter (H) [I48.92]           Comments:            Anticoagulation Care  Providers     Provider Role Specialty Phone number    Kamaljit Plata MD Referring Family Medicine [phone]

## 2021-05-31 NOTE — TELEPHONE ENCOUNTER
RN cannot approve Refill Request    RN can NOT refill this medication Protocol failed and NO refill given. Last office visit: 5/16/2019 Kamaljit Plata MD Last Physical: 10/23/2017 Last MTM visit: Visit date not found Last visit same specialty: 5/16/2019 Kamaljit Plata MD.  Next visit within 3 mo: Visit date not found  Next physical within 3 mo: Visit date not found      Yvonne Harding, [facility] Triage/Med Refill 8/15/2019    Requested Prescriptions   Pending Prescriptions Disp Refills     warfarin (COUMADIN/JANTOVEN) 5 MG tablet [Pharmacy Med Name: WARFARIN SODIUM 5 MG TABLET] 180 tablet 1     Sig: TAKE 1 TO 2 TABLETS BY MOUTH EVERY DAY AS DIRECTED *ADJUST PER INR*       Warfarin Refill Protocol  Failed - 8/15/2019  1:12 AM        Failed -  Route to appropriate pool/provider     Last Anticoagulation Summary:   Anticoagulation Episode Summary     Current INR goal:   2.0-3.0   TTR:   54.3 % (1.9 y)   Next INR check:   8/21/2019   INR from last check:   3.00 (8/7/2019)   Weekly max warfarin dose:      Target end date:   Indefinite   INR check location:      Preferred lab:      Send INR reminders to:   [facility]    Indications    Atrial flutter (H) [I48.92]           Comments:            Anticoagulation Care Providers     Provider Role Specialty Phone number    Kamaljit Plata MD Referring Family Medicine [phone]                Passed - Provider visit in last year     Last office visit with prescriber/PCP: 5/16/2019 Kamaljit Plata MD OR same dept: 5/16/2019 Kamaljit Plata MD OR same specialty: 5/16/2019 Kamaljit Plata MD  Last physical: 10/23/2017 Last MTM visit: Visit date not found    Next appt within 3 mo: Visit date not found Next physical within 3 mo: Visit date not found  Prescriber OR PCP: Kamaljit Plata MD  Last diagnosis associated with med order: 1. Atrial flutter (H)  - warfarin (COUMADIN/JANTOVEN) 5 MG  tablet [Pharmacy Med Name: WARFARIN SODIUM 5 MG TABLET]; TAKE 1 TO 2 TABLETS BY MOUTH EVERY DAY AS DIRECTED *ADJUST PER INR*  Dispense: 180 tablet; Refill: 1    If protocol passes may refill for 6 months if within 3 months of last provider visit (or a total of 9 months).

## 2021-05-31 NOTE — TELEPHONE ENCOUNTER
Refill Approved    Rx renewed per Medication Renewal Policy. Medication was last renewed on 6/5/19.8/22/18    Madhavi Stallings, [facility] Triage/Med Refill 8/23/2019     Requested Prescriptions   Pending Prescriptions Disp Refills     hydroCHLOROthiazide (HYDRODIURIL) 25 MG tablet [Pharmacy Med Name: HYDROCHLOROTHIAZIDE 25 MG TAB] 90 tablet 3     Sig: TAKE 1 TABLET BY MOUTH DAILY.       Diuretics/Combination Diuretics Refill Protocol  Passed - 8/23/2019  1:31 PM        Passed - Visit with PCP or prescribing provider visit in past 12 months     Last office visit with prescriber/PCP: 5/16/2019 Kamaljit Plata MD OR same dept: 5/16/2019 Kamaljit Plata MD OR same specialty: 5/16/2019 Kamaljit Plata MD  Last physical: 10/23/2017 Last MTM visit: Visit date not found   Next visit within 3 mo: Visit date not found  Next physical within 3 mo: Visit date not found  Prescriber OR PCP: Kamaljit Plata MD  Last diagnosis associated with med order: 1. Essential hypertension with goal blood pressure less than 130/80  - hydroCHLOROthiazide (HYDRODIURIL) 25 MG tablet [Pharmacy Med Name: HYDROCHLOROTHIAZIDE 25 MG TAB]; TAKE 1 TABLET BY MOUTH DAILY.  Dispense: 90 tablet; Refill: 3    2. Diabetic peripheral neuropathy (H)  - DULoxetine (CYMBALTA) 30 MG capsule [Pharmacy Med Name: DULOXETINE HCL DR 30 MG CAP]; TAKE 1 CAPSULE BY MOUTH ONCE DAILY FOR ONE WEEK, THEN INCREASE TO 1 CAPSULE TWO TIMES A DAY  Dispense: 120 capsule; Refill: 1    If protocol passes may refill for 12 months if within 3 months of last provider visit (or a total of 15 months).             Passed - Serum Potassium in past 12 months      Lab Results   Component Value Date    Potassium 4.3 05/16/2019             Passed - Serum Sodium in past 12 months      Lab Results   Component Value Date    Sodium 140 05/16/2019             Passed - Blood pressure on file in past 12 months     BP Readings from Last 1 Encounters:   05/16/19  110/80             Passed - Serum Creatinine in past 12 months      Creatinine   Date Value Ref Range Status   05/16/2019 1.03 0.70 - 1.30 mg/dL Final             DULoxetine (CYMBALTA) 30 MG capsule [Pharmacy Med Name: DULOXETINE HCL DR 30 MG CAP] 120 capsule 1     Sig: TAKE 1 CAPSULE BY MOUTH ONCE DAILY FOR ONE WEEK, THEN INCREASE TO 1 CAPSULE TWO TIMES A DAY       Tricyclics/Misc Antidepressant/Antianxiety Meds Refill Protocol Passed - 8/23/2019  1:31 PM        Passed - PCP or prescribing provider visit in last year     Last office visit with prescriber/PCP: 5/16/2019 Kamaljit Plata MD OR same dept: 5/16/2019 Kamaljit Plata MD OR same specialty: 5/16/2019 Kamaljit Plata MD  Last physical: 10/23/2017 Last MTM visit: Visit date not found   Next visit within 3 mo: Visit date not found  Next physical within 3 mo: Visit date not found  Prescriber OR PCP: Kamaljit Plata MD  Last diagnosis associated with med order: 1. Essential hypertension with goal blood pressure less than 130/80  - hydroCHLOROthiazide (HYDRODIURIL) 25 MG tablet [Pharmacy Med Name: HYDROCHLOROTHIAZIDE 25 MG TAB]; TAKE 1 TABLET BY MOUTH DAILY.  Dispense: 90 tablet; Refill: 3    2. Diabetic peripheral neuropathy (H)  - DULoxetine (CYMBALTA) 30 MG capsule [Pharmacy Med Name: DULOXETINE HCL DR 30 MG CAP]; TAKE 1 CAPSULE BY MOUTH ONCE DAILY FOR ONE WEEK, THEN INCREASE TO 1 CAPSULE TWO TIMES A DAY  Dispense: 120 capsule; Refill: 1    If protocol passes may refill for 12 months if within 3 months of last provider visit (or a total of 15 months).

## 2021-05-31 NOTE — TELEPHONE ENCOUNTER
Refill Approved    Rx renewed per Medication Renewal Policy. Medication was last renewed on 8/22/18.    Denisha Wilson, [facility] Triage/Med Refill 8/10/2019     Requested Prescriptions   Pending Prescriptions Disp Refills     metoprolol tartrate (LOPRESSOR) 100 MG tablet [Pharmacy Med Name: METOPROLOL TARTRATE 100 MG TAB] 180 tablet 3     Sig: TAKE 1 TABLET BY MOUTH TWICE A DAY. **REPLACES 50MG STRENGTH**       Beta-Blockers Refill Protocol Passed - 8/10/2019  8:31 AM        Passed - PCP or prescribing provider visit in past 12 months or next 3 months     Last office visit with prescriber/PCP: 5/16/2019 Kamaljit Plata MD OR same dept: 5/16/2019 Kamaljit Plata MD OR same specialty: 5/16/2019 Kamaljit Plata MD  Last physical: 10/23/2017 Last MTM visit: Visit date not found   Next visit within 3 mo: Visit date not found  Next physical within 3 mo: Visit date not found  Prescriber OR PCP: Kamaljit Plata MD  Last diagnosis associated with med order: 1. Atrial flutter with rapid ventricular response (H)  - metoprolol tartrate (LOPRESSOR) 100 MG tablet [Pharmacy Med Name: METOPROLOL TARTRATE 100 MG TAB]; TAKE 1 TABLET BY MOUTH TWICE A DAY. **REPLACES 50MG STRENGTH**  Dispense: 180 tablet; Refill: 3    If protocol passes may refill for 12 months if within 3 months of last provider visit (or a total of 15 months).             Passed - Blood pressure filed in past 12 months     BP Readings from Last 1 Encounters:   05/16/19 110/80

## 2021-05-31 NOTE — TELEPHONE ENCOUNTER
ANTICOAGULATION  MANAGEMENT    Assessment     Today's INR result of 3.0 is Therapeutic (goal INR of 2.0-3.0)        Warfarin taken differently than instructed, but no impact to total weekly dose    No new diet changes affecting INR    No new medication/supplements affecting INR    Continues to tolerate warfarin with no reported s/s of bleeding or thromboembolism     Previous INR was Supratherapeutic    Plan:     Spoke with Cam regarding INR result and instructed:     Warfarin Dosing Instructions:  Continue current warfarin dose 5 mg daily on Mon/Wed/Fri; and 10 mg daily rest of week  (0 % change)    Instructed patient to follow up no later than: two weeks    Education provided: importance of therapeutic range    Cam verbalizes understanding and agrees to warfarin dosing plan.    Instructed to call the [facility] for any changes, questions or concerns. (#[phone])   ?   Blessing Oliveira RN    Subjective/Objective:      Cam Walden, a 69 y.o. male is on warfarin.     Cam reports:     Home warfarin dose: template incorrect; verbally confirmed home dose with Cam and updated on anticoagulation calendar     Missed doses: No     Medication changes:  No     S/S of bleeding or thromboembolism:  No     New Injury or illness:  No     Changes in diet or alcohol consumption:  No     Upcoming surgery, procedure or cardioversion:  No    Anticoagulation Episode Summary     Current INR goal:   2.0-3.0   TTR:   54.3 % (1.9 y)   Next INR check:   8/21/2019   INR from last check:   3.00 (8/7/2019)   Weekly max warfarin dose:      Target end date:   Indefinite   INR check location:      Preferred lab:      Send INR reminders to:   [facility]    Indications    Atrial flutter (H) [I48.92]           Comments:            Anticoagulation Care Providers     Provider Role Specialty Phone number    Kamaljit Plata MD Referring Family Medicine [phone]

## 2021-06-01 ENCOUNTER — RECORDS - HEALTHEAST (OUTPATIENT)
Dept: ADMINISTRATIVE | Facility: CLINIC | Age: 71
End: 2021-06-01

## 2021-06-01 VITALS — WEIGHT: 306 LBS | BODY MASS INDEX: 42.08 KG/M2

## 2021-06-01 VITALS — WEIGHT: 315 LBS | HEIGHT: 72 IN | BODY MASS INDEX: 42.66 KG/M2

## 2021-06-01 VITALS — WEIGHT: 315 LBS | BODY MASS INDEX: 43.73 KG/M2

## 2021-06-01 NOTE — TELEPHONE ENCOUNTER
Medication Question or Clarification  Who is calling: Pharmacy: [facility]  What medication are you calling about? (include dose and sig) Accu-Chek Smartview Test Strips, use to check blood sugar twice a day  Who prescribed the medication?: Kamaljit Plaat MD   What is your question/concern?: Medicare B can not be billed for non-insulin dependent diabetics for testing more than once a day.   Could pharmacy have a new prescription for testing once a day?  Pharmacy: [facility] #[id number]  Okay to leave a detailed message?: Yes  Site CMT - Please call the pharmacy to obtain any additional needed information.

## 2021-06-01 NOTE — TELEPHONE ENCOUNTER
ANTICOAGULATION  MANAGEMENT    Assessment     Today's INR result of 2.30 is Therapeutic (goal INR of 2.0-3.0)        Warfarin taken as previously instructed    No new diet changes affecting INR    No new medication/supplements affecting INR    Continues to tolerate warfarin with no reported s/s of bleeding or thromboembolism     Previous INR was Supratherapeutic    Plan:     Spoke with Cam regarding INR result and instructed:     Warfarin Dosing Instructions:  Continue current warfarin dose 10 mg daily on SUN / TUE / THUR; and 5 mg daily rest of week  (0 % change)    Instructed patient to follow up no later than: 2 weeks    Education provided: importance of therapeutic range and target INR goal and significance of current INR result    Cam verbalizes understanding and agrees to warfarin dosing plan.    Instructed to call the [facility] for any changes, questions or concerns. (#[phone])   ?   Yvonne Duncan RN    Subjective/Objective:      Cam Walden, a 69 y.o. male is on warfarin.     Cam reports:     Home warfarin dose: as updated on anticoagulation calendar per template     Missed doses: No     Medication changes:  No     S/S of bleeding or thromboembolism:  No     New Injury or illness:  No     Changes in diet or alcohol consumption:  No     Upcoming surgery, procedure or cardioversion:  No    Anticoagulation Episode Summary     Current INR goal:   2.0-3.0   TTR:   66.5 %   Next INR check:   9/18/2019   INR from last check:   2.30 (9/4/2019)   Weekly max warfarin dose:      Target end date:   Indefinite   INR check location:      Preferred lab:      Send INR reminders to:   [facility]    Indications    Atrial flutter (H) [I48.92]           Comments:            Anticoagulation Care Providers     Provider Role Specialty Phone number    Kamaljit Plata MD Referring Family Medicine [phone]

## 2021-06-01 NOTE — TELEPHONE ENCOUNTER
ANTICOAGULATION  MANAGEMENT    Assessment     Today's INR result of 2.00 is Therapeutic (goal INR of 2.0-3.0)        Warfarin taken as previously instructed    No new diet changes affecting INR    No new medication/supplements affecting INR    Continues to tolerate warfarin with no reported s/s of bleeding or thromboembolism     Previous INR was Therapeutic    Plan:     Spoke with Cam regarding INR result and instructed:     Warfarin Dosing Instructions:  Continue current warfarin dose 10 mg daily on SUN / TUE / THUR; and 5 mg daily rest of week  (0 % change)    Instructed patient to follow up no later than: 2-4 weeks    Education provided: target INR goal and significance of current INR result    Cam verbalizes understanding and agrees to warfarin dosing plan.    Instructed to call the [facility] for any changes, questions or concerns. (#[phone])   ?   Yvonne Duncan RN    Subjective/Objective:      Cam Walden, a 69 y.o. male is on warfarin.     Cam reports:     Home warfarin dose: as updated on anticoagulation calendar per template     Missed doses: No     Medication changes:  No     S/S of bleeding or thromboembolism:  No     New Injury or illness:  No     Changes in diet or alcohol consumption:  No     Upcoming surgery, procedure or cardioversion:  No    Anticoagulation Episode Summary     Current INR goal:   2.0-3.0   TTR:   69.1 %   Next INR check:   10/16/2019   INR from last check:   2.00 (9/18/2019)   Weekly max warfarin dose:      Target end date:   Indefinite   INR check location:      Preferred lab:      Send INR reminders to:   [facility]    Indications    Atrial flutter (H) [I48.92]           Comments:            Anticoagulation Care Providers     Provider Role Specialty Phone number    Kamaljit Plata MD Referring Family Medicine [phone]

## 2021-06-01 NOTE — TELEPHONE ENCOUNTER
Refill Approved    Rx renewed per Medication Renewal Policy. Medication was last renewed on 8/22/18.    Madhavi Stallings, [facility] Triage/Med Refill 9/11/2019     Requested Prescriptions   Pending Prescriptions Disp Refills     glipiZIDE (GLUCOTROL XL) 10 MG 24 hr tablet [Pharmacy Med Name: GLIPIZIDE ER 10 MG TABLET] 90 tablet 3     Sig: TAKE 1 TABLET BY MOUTH EVERY DAY IN THE MORNING       Oral Hypoglycemics Refill Protocol Passed - 9/11/2019  1:54 AM        Passed - Visit with PCP or prescribing provider visit in last 6 months       Last office visit with prescriber/PCP: 5/16/2019 OR same dept: 5/16/2019 Kamaljit Plata MD OR same specialty: 5/16/2019 Kamaljit Plata MD Last physical: Visit date not found Last MTM visit: Visit date not found         Next appt within 3 mo: Visit date not found  Next physical within 3 mo: Visit date not found  Prescriber OR PCP: Kamaljit Plata MD  Last diagnosis associated with med order: 1. Diabetes mellitus (H)  - glipiZIDE (GLUCOTROL XL) 10 MG 24 hr tablet [Pharmacy Med Name: GLIPIZIDE ER 10 MG TABLET]; TAKE 1 TABLET BY MOUTH EVERY DAY IN THE MORNING  Dispense: 90 tablet; Refill: 3     If protocol passes may refill for 12 months if within 3 months of last provider visit (or a total of 15 months).           Passed - A1C in last 6 months     Hemoglobin A1c   Date Value Ref Range Status   05/16/2019 8.8 (H) 3.5 - 6.0 % Final               Passed - Microalbumin in last year      Microalbumin, Random Urine   Date Value Ref Range Status   05/16/2019 4.21 (H) 0.00 - 1.99 mg/dL Final                  Passed - Blood pressure in last year     BP Readings from Last 1 Encounters:   05/16/19 110/80             Passed - Serum creatinine in last year     Creatinine   Date Value Ref Range Status   05/16/2019 1.03 0.70 - 1.30 mg/dL Final

## 2021-06-01 NOTE — TELEPHONE ENCOUNTER
----- Message from Angie Russell PharmD sent at 9/18/2019  9:59 AM CDT -----  Please let patient know that his A1c has improved to 7.7%! Goal is <7%, but it is moving in the right direction, keep it up! I will try to see him at his next INR appointment to discuss further.

## 2021-06-01 NOTE — TELEPHONE ENCOUNTER
Refill Approved    Rx renewed per Medication Renewal Policy. Medication was last renewed on 2/19/2018.  Last OV 5/16/2019.    Dolores Almanzar, [facility] Triage/Med Refill 9/30/2019     Requested Prescriptions   Pending Prescriptions Disp Refills     blood glucose test (ACCU-CHEK SMARTVIEW TEST STRIP) strips [Pharmacy Med Name: ACCU-CHEK SMARTVIEW TEST STRIP] 100 strip 11     Sig: USE TO CHECK BLOOD SUGARS TWICE DAILY AS DIRECTED       Diabetic Supplies Refill Protocol Passed - 9/30/2019  1:13 PM        Passed - Visit with PCP or prescribing provider visit in last 6 months     Last office visit with prescriber/PCP: 5/16/2019 Kamaljit Plata MD OR same dept: Visit date not found OR same specialty: Visit date not found  Last physical: 10/23/2017 Last MTM visit: 2/19/2018 Angie Russell, PharmD   Next visit within 3 mo: Visit date not found  Next physical within 3 mo: Visit date not found  Prescriber OR PCP: Kamaljit Plata MD  Last diagnosis associated with med order: 1. Uncontrolled type 2 diabetes mellitus with complication, without long-term current use of insulin (H)  - ACCU-CHEK SMARTVIEW TEST STRIP strips [Pharmacy Med Name: ACCU-CHEK SMARTVIEW TEST STRIP]; USE TO CHECK BLOOD SUGARS TWICE DAILY AS DIRECTED  Dispense: 100 strip; Refill: 11    If protocol passes may refill for 12 months if within 3 months of last provider visit (or a total of 15 months).             Passed - A1C in last 6 months     Hemoglobin A1c   Date Value Ref Range Status   09/18/2019 7.7 (H) 3.5 - 6.0 % Final

## 2021-06-02 VITALS — WEIGHT: 315 LBS | BODY MASS INDEX: 42.66 KG/M2 | HEIGHT: 72 IN

## 2021-06-02 VITALS — BODY MASS INDEX: 43.32 KG/M2 | WEIGHT: 315 LBS

## 2021-06-02 PROBLEM — S82.899A ANKLE FRACTURE: Status: RESOLVED | Noted: 2021-03-09 | Resolved: 2021-06-02

## 2021-06-02 PROBLEM — M25.572 ANKLE PAIN, LEFT: Status: RESOLVED | Noted: 2021-03-09 | Resolved: 2021-06-02

## 2021-06-02 NOTE — TELEPHONE ENCOUNTER
Relayed MD message to patient. Patient verbalized understanding. No further questions.     KWABENA/RIVER

## 2021-06-02 NOTE — TELEPHONE ENCOUNTER
Patient insurance will only cover blood sugar test strips for once a day, I did send a new  prescription to his pharmacy with a new sig.

## 2021-06-02 NOTE — TELEPHONE ENCOUNTER
FYI - Status Update  Who is Calling: Patient  Update: Patient was checking the status on why he can't fill his test strips. Patient updated with the pharmacy's message below. Patient stated he is almost out and wants this addressed as soon as possible.   Okay to leave a detailed message?:  No

## 2021-06-02 NOTE — TELEPHONE ENCOUNTER
ANTICOAGULATION  MANAGEMENT    Assessment     Today's INR result of 2.60 is Therapeutic (goal INR of 2.0-3.0)        Warfarin taken as previously instructed    No new diet changes affecting INR    No new medication/supplements affecting INR    Continues to tolerate warfarin with no reported s/s of bleeding or thromboembolism     Previous INR was Therapeutic    Plan:     Spoke with Cam regarding INR result and instructed:     Warfarin Dosing Instructions:  Continue current warfarin dose 10 mg daily on SUN / TUE / THUR; and 5 mg daily rest of week  (0 % change)    Instructed patient to follow up no later than: 4 weeks    Education provided: importance of therapeutic range    Cam verbalizes understanding and agrees to warfarin dosing plan.    Instructed to call the [facility] for any changes, questions or concerns. (#[phone])   ?   Yvonne Duncan RN    Subjective/Objective:      Cam Walden, a 69 y.o. male is on warfarin.     Cam reports:     Home warfarin dose: as updated on anticoagulation calendar per template     Missed doses: No     Medication changes:  No     S/S of bleeding or thromboembolism:  No     New Injury or illness:  No     Changes in diet or alcohol consumption:  No     Upcoming surgery, procedure or cardioversion:  No    Anticoagulation Episode Summary     Current INR goal:   2.0-3.0   TTR:   76.4 %   Next INR check:   11/13/2019   INR from last check:   2.60 (10/16/2019)   Weekly max warfarin dose:      Target end date:   Indefinite   INR check location:      Preferred lab:      Send INR reminders to:   [facility]    Indications    Atrial flutter (H) [I48.92]           Comments:            Anticoagulation Care Providers     Provider Role Specialty Phone number    Kamaljit Plata MD Referring Family Medicine [phone]

## 2021-06-02 NOTE — PROGRESS NOTES
MTM Follow Up Encounter  Assessment & Plan                                                     Neuropathy: Improved with duloxetine 30 mg two times a day. Will refill today.      Type 2 Diabetes:  improved from lifestyle changes. He has lost weight. A1C not at goal of <7% but improved. FBG overall at goal  mg/dL. Will have him continue lifestyle changes and see if his A1c improves further at follow up.     Atrial Flutter: Stable. Continue to follow with cardiology.     Urinary Issues: May be a prostate issue. Will trial tamsulosin 0.4 mg daily after dinner and see if his symptoms improve.   PLAN:   1. Start tamsulosin 0.4 mg daily    Follow Up  2 months     Subjective & Objective                                                       Cam Walden is a 69 y.o. male coming in for a follow up visit for Medication Therapy Management. He was referred to me from Kamaljit Plata MD    Chief Complaint: Diabetes follow up. Having some urinary issues. Refill of duloxetine.     Neuropathy: Currently taking duloxetine 30 mg two times a day. No longer taking gabapentin. Lyrica was $400 therefore started on duloxetine 30 mg two times a day. Was taking once daily AM but found that it did not last until bedtime, therefore last week satrted two times a day and it has been helpful. More effective than gabapentin. Gabapentin started at last MTM appt due to tingling in right foot, on the edge of his foot. Reports he is now sleeping well and would like to continue the medication.      Type 2 Diabetes: Currently taking metformin 1000 mg two times a day and glipizide XL 10 mg once daily.   Tests BG 1 times daily fasting AM.   Blood sugars per lo, 130, 96, 120, 132, 122, 138, 120, 154, 98, 127, 134, 102, 105, 118, 107, 117, 98, 98, 92  Last A1c checked 19 = 7.7%, previously 8.8% on 19.   Made improvement on A1c and weight loss by eating less carbs and smaller portions. Was eating a lot of spahetti and now  eats half and saves the rest.   Microalbumin checked 5/16/19  Last lipids checked 5/16/19. Taking atorvastatin 40 mg daily.   Wt Readings from Last 3 Encounters:   10/16/19 (!) 305 lb (138.3 kg)   05/16/19 (!) 318 lb (144.2 kg)   04/19/19 (!) 318 lb (144.2 kg)     Atrial Flutter: Taking metoprolol tartrate 100 mg two times a day and warfarin. Follows with cardiology.     Urinary Issues: Reports trouble urinating. Sleeps in a recliner and feels he needs to walk around in order to make himself urinate. Stream varies, usually better after walking around. Not emptying bladder. Nocturia x1-2.     PMH: reviewed in EPIC   Allergies/ADRs: reviewed in EPIC   Alcohol: reviewed in EPIC  Tobacco:   Social History     Tobacco Use   Smoking Status Never Smoker   Smokeless Tobacco Never Used     Today's Vitals:   Vitals:    10/16/19 1027   Weight: (!) 305 lb (138.3 kg)     ----------------    Much or all of the text in this note was generated through the use of Dragon Dictate voice-to-text software. Errors in spelling or words which seem out of context are unintentional. Sound alike errors, in particular, may have escaped editing.    The patient declined an after visit summary    I spent 30 minutes with this patient today;   All changes were made via collaborative practice agreement with Kamaljit Plata MD. A copy of the visit note was provided to the patient's provider.     Angie Russell, Pharm.D., [facility]  Medication Therapy Management Pharmacist  [facility]     Current Outpatient Medications   Medication Sig Dispense Refill     atorvastatin (LIPITOR) 40 MG tablet Take 1 tablet (40 mg total) by mouth daily. 90 tablet 3     blood glucose test (ACCU-CHEK SMARTVIEW TEST STRIP) strips USE TO CHECK BLOOD SUGARS  DAILY AS DIRECTED 200 strip prn     cholecalciferol, vitamin D3, (VITAMIN D3) 1,000 unit capsule Take 1 capsule (1,000 Units total) by mouth daily. 100 capsule 2     DULoxetine (CYMBALTA) 30 MG  capsule TAKE 1 CAPSULE BY MOUTH ONCE DAILY FOR ONE WEEK, THEN INCREASE TO 1 CAPSULE TWO TIMES A  capsule 2     gabapentin (NEURONTIN) 300 MG capsule TAKE 2 CAPSULES (600 MG TOTAL) BY MOUTH AT BEDTIME. 60 capsule 2     generic lancets (ACCU-CHEK FASTCLIX) Use to check blood sugar twice per day. 102 each 11     glipiZIDE (GLUCOTROL XL) 10 MG 24 hr tablet TAKE 1 TABLET BY MOUTH EVERY DAY IN THE MORNING 90 tablet 2     hydroCHLOROthiazide (HYDRODIURIL) 25 MG tablet TAKE 1 TABLET BY MOUTH DAILY. 90 tablet 2     hydrocortisone 2.5 % cream        ketoconazole (NIZORAL) 2 % cream MIX IN 1:1 RATIO WITH HYDROCORTISONE AND APPLY TO AFFECTED AREAS TWICE DAILY TOPICALLY  5     losartan (COZAAR) 100 MG tablet TAKE 1 TABLET BY MOUTH EVERY DAY 90 tablet 1     metFORMIN (GLUCOPHAGE) 1000 MG tablet TAKE 1 TABLET (1,000 MG TOTAL) BY MOUTH 2 (TWO) TIMES A DAY WITH MEALS. 180 tablet 3     metoprolol tartrate (LOPRESSOR) 100 MG tablet TAKE 1 TABLET BY MOUTH TWICE A DAY. **REPLACES 50MG STRENGTH** 180 tablet 3     warfarin (COUMADIN/JANTOVEN) 5 MG tablet TAKE ONE TO TWO TABLETS (5 -10 MG) BY MOUTH DAILY AS DIRECTED. ADUST DOSE ACCORDING TO INR. 60 tablet 0     warfarin (COUMADIN/JANTOVEN) 5 MG tablet TAKE 1 TO 2 TABLETS (5 - 10 MG) BY MOUTH DAILY AS DIRECTED. DOSE ADJUSTED PER INR RESULT. 180 tablet 1     No current facility-administered medications for this visit.

## 2021-06-02 NOTE — PROGRESS NOTES
Discharge Note    Progress reporting period is from initial evaluation date (please see noted date below) to Apr 6, 2021.      Cam failed to follow up and current status is unknown.  Please see information below for last relevant information on current status.  Patient seen for 5 visits.    SUBJECTIVE  Subjective changes noted by patient:  Cam notes he is walking slightly faster. He has been mildly sore in his thighs but thinks this is realated to the new exericses.  He feels 75% back to normal     Changes in function:  Yes (See Goal flowsheet attached for changes in current functional level)  Adverse reaction to treatment or activity: None    OBJECTIVE  Changes noted in objective findings: Full and pain free ROM. Prone knee flexion  on R,  100-115 on L.      ASSESSMENT/PLAN  Diagnosis: distal fibular fracture   Updated problem list and treatment plan:   Decreased ROM/flexibility - HEP  Decreased strength - HEP  Impaired gait - HEP  Impaired balance - HEP  STG/LTGs have been met or progress has been made towards goals:  Yes, please see goal flowsheet for most current information  Assessment of Progress: current status is unknown.    Last current status: Pt is progressing as expected   Self Management Plans:  HEP  I have re-evaluated this patient and find that the nature, scope, duration and intensity of the therapy is appropriate for the medical condition of the patient.  Cam continues to require the following intervention to meet STG and LTG's:  HEP.    Recommendations:  Discharge with current home program.  Patient to follow up with MD as needed.    Please refer to the daily flowsheet for treatment today, total treatment time and time spent performing 1:1 timed codes.

## 2021-06-02 NOTE — TELEPHONE ENCOUNTER
Refill Approved    Rx renewed per Medication Renewal Policy. Medication was last renewed on 5/2/19.    Madhavi Stallings, [facility] Connection Triage/Med Refill 10/23/2019     Requested Prescriptions   Pending Prescriptions Disp Refills     losartan (COZAAR) 100 MG tablet [Pharmacy Med Name: LOSARTAN POTASSIUM 100 MG TAB] 90 tablet 1     Sig: TAKE 1 TABLET BY MOUTH EVERY DAY       Angiotensin Receptor Blocker Protocol Passed - 10/23/2019  2:07 AM        Passed - PCP or prescribing provider visit in past 12 months       Last office visit with prescriber/PCP: 5/16/2019 Kamaljit Plata MD OR same dept: 5/16/2019 Kamaljit Plata MD OR same specialty: 5/16/2019 Kamaljit Plata MD  Last physical: 10/23/2017 Last MTM visit: Visit date not found   Next visit within 3 mo: Visit date not found  Next physical within 3 mo: Visit date not found  Prescriber OR PCP: Kamaljit Plata MD  Last diagnosis associated with med order: 1. Essential hypertension with goal blood pressure less than 140/90  - losartan (COZAAR) 100 MG tablet [Pharmacy Med Name: LOSARTAN POTASSIUM 100 MG TAB]; TAKE 1 TABLET BY MOUTH EVERY DAY  Dispense: 90 tablet; Refill: 1    If protocol passes may refill for 12 months if within 3 months of last provider visit (or a total of 15 months).             Passed - Serum potassium within the past 12 months     Lab Results   Component Value Date    Potassium 4.3 05/16/2019             Passed - Blood pressure filed in past 12 months     BP Readings from Last 1 Encounters:   05/16/19 110/80             Passed - Serum creatinine within the past 12 months     Creatinine   Date Value Ref Range Status   05/16/2019 1.03 0.70 - 1.30 mg/dL Final

## 2021-06-03 VITALS — HEIGHT: 73 IN | WEIGHT: 315 LBS | BODY MASS INDEX: 41.75 KG/M2

## 2021-06-03 VITALS — BODY MASS INDEX: 40.8 KG/M2 | WEIGHT: 305 LBS

## 2021-06-03 NOTE — TELEPHONE ENCOUNTER
Refill Approved    Rx renewed per Medication Renewal Policy. Medication was last renewed on 10/16/19.    Denisha Wilson, [facility] Connection Triage/Med Refill 11/8/2019     Requested Prescriptions   Pending Prescriptions Disp Refills     tamsulosin (FLOMAX) 0.4 mg cap [Pharmacy Med Name: TAMSULOSIN HCL 0.4 MG CAPSULE] 30 capsule 2     Sig: TAKE 1 CAPSULE BY MOUTH DAILY AFTER SUPPER.       Alfuzosin/Tamsulosin/Silodosin Refill Protocol  Passed - 11/8/2019 11:33 AM        Passed - PCP or prescribing provider visit in past 12 months       Last office visit with prescriber/PCP: 5/16/2019 Kamaljit Plata MD OR same dept: 5/16/2019 Kamaljit Plata MD OR same specialty: 5/16/2019 Kamaljit Plata MD  Last physical: 10/23/2017 Last MTM visit: Visit date not found   Next visit within 3 mo: Visit date not found  Next physical within 3 mo: Visit date not found  Prescriber OR PCP: Kamaljit Plata MD  Last diagnosis associated with med order: 1. Benign prostatic hyperplasia with incomplete bladder emptying  - tamsulosin (FLOMAX) 0.4 mg cap [Pharmacy Med Name: TAMSULOSIN HCL 0.4 MG CAPSULE]; TAKE 1 CAPSULE BY MOUTH DAILY AFTER SUPPER.  Dispense: 30 capsule; Refill: 2    If protocol passes may refill for 12 months if within 3 months of last provider visit (or a total of 15 months).

## 2021-06-03 NOTE — TELEPHONE ENCOUNTER
ANTICOAGULATION  MANAGEMENT    Assessment     Today's INR result of 2.40 is Therapeutic (goal INR of 2.0-3.0)        Warfarin taken as previously instructed    No new diet changes affecting INR    No new medication/supplements affecting INR    Continues to tolerate warfarin with no reported s/s of bleeding or thromboembolism     Previous INR was Therapeutic at 2.60 on 10/15/19.    Reported doing well with no new complaints / concerns.    Plan:     Spoke with Cam regarding INR result and instructed:     Warfarin Dosing Instructions:  (has 5mg tabs)   - Continue current warfarin dose 10 mg daily on Sun/Tues/Thurs; and 5 mg daily rest of week.    Instructed patient to follow up no later than:  4-6 wks.   - scheduled on 12/20/19 during f/u with [facility].    Education provided: target INR goal and significance of current INR result    Cam verbalizes understanding and agrees to warfarin dosing plan.    Instructed to call the [facility] for any changes, questions or concerns. (#[phone])   ?   Fabiola Rodriguez RN    Subjective/Objective:      Cam Walden, a 69 y.o. male is on warfarin.     Cam reports:     Home warfarin dose: as updated on anticoagulation calendar per template     Missed doses: No     Medication changes:  No     S/S of bleeding or thromboembolism:  No     New Injury or illness:  No     Changes in diet or alcohol consumption:  No     Upcoming surgery, procedure or cardioversion:  No    Anticoagulation Episode Summary     Current INR goal:   2.0-3.0   TTR:   85.9 %   Next INR check:   12/25/2019   INR from last check:   2.40 (11/13/2019)   Weekly max warfarin dose:      Target end date:   Indefinite   INR check location:      Preferred lab:      Send INR reminders to:   [facility]    Indications    Atrial flutter (H) [I48.92]           Comments:            Anticoagulation Care Providers     Provider Role Specialty Phone number    Kamaljit Plata MD Referring Family  Medicine [phone]

## 2021-06-03 NOTE — TELEPHONE ENCOUNTER
Refill Approved    Rx renewed per Medication Renewal Policy. Medication was last renewed on 8/22/18    Fay Maza, [facility] Connection Triage/Med Refill 11/8/2019     Requested Prescriptions   Pending Prescriptions Disp Refills     atorvastatin (LIPITOR) 40 MG tablet [Pharmacy Med Name: ATORVASTATIN 40 MG TABLET] 90 tablet 3     Sig: TAKE 1 TABLET BY MOUTH DAILY.       Statins Refill Protocol (Hmg CoA Reductase Inhibitors) Passed - 11/8/2019  3:47 AM        Passed - PCP or prescribing provider visit in past 12 months      Last office visit with prescriber/PCP: 5/16/2019 Kamaljit Plata MD OR same dept: 5/16/2019 Kamaljit Plata MD OR same specialty: 5/16/2019 Kamaljit Plata MD  Last physical: 10/23/2017 Last MTM visit: Visit date not found   Next visit within 3 mo: Visit date not found  Next physical within 3 mo: Visit date not found  Prescriber OR PCP: Kamaljit Plata MD  Last diagnosis associated with med order: 1. Hyperlipidemia  - atorvastatin (LIPITOR) 40 MG tablet [Pharmacy Med Name: ATORVASTATIN 40 MG TABLET]; TAKE 1 TABLET BY MOUTH DAILY.  Dispense: 90 tablet; Refill: 3    If protocol passes may refill for 12 months if within 3 months of last provider visit (or a total of 15 months).

## 2021-06-04 VITALS
WEIGHT: 290.5 LBS | TEMPERATURE: 98.4 F | SYSTOLIC BLOOD PRESSURE: 147 MMHG | DIASTOLIC BLOOD PRESSURE: 75 MMHG | OXYGEN SATURATION: 96 % | BODY MASS INDEX: 39.4 KG/M2 | HEART RATE: 76 BPM

## 2021-06-04 VITALS
WEIGHT: 290.5 LBS | BODY MASS INDEX: 39.35 KG/M2 | SYSTOLIC BLOOD PRESSURE: 128 MMHG | HEIGHT: 72 IN | OXYGEN SATURATION: 96 % | DIASTOLIC BLOOD PRESSURE: 75 MMHG | HEART RATE: 62 BPM | TEMPERATURE: 98.7 F

## 2021-06-04 VITALS — WEIGHT: 290 LBS | BODY MASS INDEX: 39.28 KG/M2 | HEIGHT: 72 IN

## 2021-06-04 VITALS
SYSTOLIC BLOOD PRESSURE: 122 MMHG | HEART RATE: 68 BPM | HEIGHT: 72 IN | DIASTOLIC BLOOD PRESSURE: 76 MMHG | TEMPERATURE: 99.1 F | BODY MASS INDEX: 39.74 KG/M2 | RESPIRATION RATE: 20 BRPM | WEIGHT: 293.38 LBS

## 2021-06-04 VITALS — BODY MASS INDEX: 40.16 KG/M2 | HEIGHT: 73 IN | WEIGHT: 303 LBS

## 2021-06-04 VITALS
WEIGHT: 292.6 LBS | SYSTOLIC BLOOD PRESSURE: 110 MMHG | DIASTOLIC BLOOD PRESSURE: 72 MMHG | HEART RATE: 81 BPM | BODY MASS INDEX: 39.14 KG/M2 | RESPIRATION RATE: 16 BRPM | OXYGEN SATURATION: 97 %

## 2021-06-04 VITALS — BODY MASS INDEX: 40.53 KG/M2 | WEIGHT: 303 LBS

## 2021-06-04 NOTE — TELEPHONE ENCOUNTER
ANTICOAGULATION  MANAGEMENT    Assessment     Today's INR result of 2.8 is Therapeutic (goal INR of 2.0-3.0)        Warfarin taken as previously instructed    No new diet changes affecting INR    No new medication/supplements affecting INR    Continues to tolerate warfarin with no reported s/s of bleeding or thromboembolism     Previous INR was Therapeutic    Plan:     Spoke with Cam regarding INR result and instructed:     Warfarin Dosing Instructions:  Continue current warfarin dose 10 mg daily on Sundays, Tuesdays and Thursdays; and 5 mg daily rest of week  (0 % change)    Instructed patient to follow up no later than: 4-6 weeks.    Education provided: importance of therapeutic range, importance of following up for INR monitoring at instructed interval and importance of taking warfarin as instructed    Cam verbalizes understanding and agrees to warfarin dosing plan.    Instructed to call the [facility] for any changes, questions or concerns. (#[phone])   ?   Jackie Rao RN    Subjective/Objective:      Cam Walden, a 69 y.o. male is on warfarin.     Cam reports:     Home warfarin dose: verbally confirmed home dose with Cam and updated on anticoagulation calendar     Missed doses: No     Medication changes:  No     S/S of bleeding or thromboembolism:  No     New Injury or illness:  No     Changes in diet or alcohol consumption:  No     Upcoming surgery, procedure or cardioversion:  No    Anticoagulation Episode Summary     Current INR goal:   2.0-3.0   TTR:   76.6 % (1 y)   Next INR check:   1/31/2020   INR from last check:   2.80 (12/20/2019)   Weekly max warfarin dose:      Target end date:   Indefinite   INR check location:      Preferred lab:      Send INR reminders to:   [facility]    Indications    Atrial flutter (H) [I48.92]           Comments:            Anticoagulation Care Providers     Provider Role Specialty Phone number    Kamaljit Plata MD Referring  Family Medicine [phone]

## 2021-06-04 NOTE — PROGRESS NOTES
MTM Follow Up Encounter  Assessment & Plan                                                     Type 2 Diabetes:  stable. A1C not at goal of <7%. FBG overall at goal  mg/dL and look great. He continues to lose weight. Elected to recheck a1c in 3 months therefore will have him continue diet changes and weight loss. He would benefit from an GLP1 agonist to help with weight loss (nausea with Trulicity).  PLAN:   1. A1c today. Continue current medications, lifestyle changes, and recheck in 3 months.      Urinary Issues: Improvement in symptoms. Will have patient continue current dose.     Neuropathy: Stable. Recommended to continue current regimen.     Follow Up  3 months - call to set up per patient     Subjective & Objective                                                       Cam Walden is a 69 y.o. male coming in for a follow up visit for Medication Therapy Management. He was referred to me from Kamaljit Plata MD    Chief Complaint: follow up since last MTM appt 10/16/19     Type 2 Diabetes: Currently taking metformin 1000 mg two times a day and glipizide XL 10 mg once daily.   Tests BG 1 times daily fasting AM.   Blood sugars per lo, 137, 127, 130, 115, 156, 126, 130, 131, 127, 133, 133, 91, 135, 152, 141, 166, 128, 117, 115, 94, 116, 123, 94, 130, [id number], 123, 111  Last A1c checked today = 7.8%, previously 19 = 7.7%  Made improvement on A1c and weight loss by eating less carbs and smaller portions. Was eating a lot of spahetti and now eats half and saves the rest.   Microalbumin checked 19  Last lipids checked 19. Taking atorvastatin 40 mg daily.         Wt Readings from Last 3 Encounters:   19 (!) 303 lb (137.4 kg)   10/16/19 (!) 305 lb (138.3 kg)   19 (!) 318 lb (144.2 kg)     Urinary Issues: Taking tamsulosin 0.4 mg daily -- started at last MTM appt due to trouble urinating. Reports it was extremely helpful even after first dose. Urination is much  "\"free-er,\" not pushing, more complete. Having steady stream. Does still have nocturia every 4 hours. A little dizziness when he gets up quick but not bothersome and no falls.     Neuropathy: Currently taking duloxetine 30 mg two times a day. No longer taking gabapentin. Lyrica was $400 therefore started on duloxetine 30 mg two times a day. Was taking once daily AM but found that it did not last until bedtime, therefore last week started two times a day and it has been helpful. More effective than gabapentin. Gabapentin started at past [facility] appt due to tingling in right foot, on the edge of his foot. Reports he is now sleeping well and would like to continue the medication. No neuropathy complaints today.        PMH: reviewed in EPIC   Allergies/ADRs: reviewed in EPIC   Alcohol: reviewed in EPIC  Tobacco:   Social History     Tobacco Use   Smoking Status Never Smoker   Smokeless Tobacco Never Used     Today's Vitals:   Vitals:    12/20/19 0839   Weight: (!) 303 lb (137.4 kg)     ----------------    The patient declined an after visit summary    I spent 15 minutes with this patient today;   All changes were made via collaborative practice agreement with Kamaljit Plata MD. A copy of the visit note was provided to the patient's provider.     Angie Russell, Pharm.D., [facility]  Medication Therapy Management Pharmacist  [facility]     Current Outpatient Medications   Medication Sig Dispense Refill     atorvastatin (LIPITOR) 40 MG tablet TAKE 1 TABLET BY MOUTH DAILY. 90 tablet 1     blood glucose test (ACCU-CHEK SMARTVIEW TEST STRIP) strips USE TO CHECK BLOOD SUGARS  DAILY AS DIRECTED 200 strip prn     cholecalciferol, vitamin D3, (VITAMIN D3) 1,000 unit capsule Take 1 capsule (1,000 Units total) by mouth daily. 100 capsule 2     DULoxetine (CYMBALTA) 30 MG capsule Take 1 capsule (30 mg total) by mouth 2 (two) times a day. 180 capsule 2     generic lancets (ACCU-CHEK FASTCLIX) Use to check blood " sugar twice per day. 102 each 11     glipiZIDE (GLUCOTROL XL) 10 MG 24 hr tablet TAKE 1 TABLET BY MOUTH EVERY DAY IN THE MORNING 90 tablet 2     hydroCHLOROthiazide (HYDRODIURIL) 25 MG tablet TAKE 1 TABLET BY MOUTH DAILY. 90 tablet 2     hydrocortisone 2.5 % cream        ketoconazole (NIZORAL) 2 % cream MIX IN 1:1 RATIO WITH HYDROCORTISONE AND APPLY TO AFFECTED AREAS TWICE DAILY TOPICALLY  5     losartan (COZAAR) 100 MG tablet TAKE 1 TABLET BY MOUTH EVERY DAY 90 tablet 1     metFORMIN (GLUCOPHAGE) 1000 MG tablet TAKE 1 TABLET (1,000 MG TOTAL) BY MOUTH 2 (TWO) TIMES A DAY WITH MEALS. 180 tablet 3     metoprolol tartrate (LOPRESSOR) 100 MG tablet TAKE 1 TABLET BY MOUTH TWICE A DAY. **REPLACES 50MG STRENGTH** 180 tablet 3     tamsulosin (FLOMAX) 0.4 mg cap TAKE 1 CAPSULE BY MOUTH DAILY AFTER SUPPER. 90 capsule 0     warfarin (COUMADIN/JANTOVEN) 5 MG tablet TAKE 1 TO 2 TABLETS (5 - 10 MG) BY MOUTH DAILY AS DIRECTED. DOSE ADJUSTED PER INR RESULT. 180 tablet 1     No current facility-administered medications for this visit.

## 2021-06-05 VITALS
HEART RATE: 78 BPM | WEIGHT: 264.8 LBS | SYSTOLIC BLOOD PRESSURE: 103 MMHG | DIASTOLIC BLOOD PRESSURE: 67 MMHG | RESPIRATION RATE: 20 BRPM | OXYGEN SATURATION: 98 % | TEMPERATURE: 98.2 F | BODY MASS INDEX: 35.87 KG/M2 | HEIGHT: 72 IN

## 2021-06-05 VITALS
HEART RATE: 92 BPM | SYSTOLIC BLOOD PRESSURE: 136 MMHG | TEMPERATURE: 98.2 F | OXYGEN SATURATION: 95 % | BODY MASS INDEX: 34.72 KG/M2 | DIASTOLIC BLOOD PRESSURE: 80 MMHG | WEIGHT: 256 LBS

## 2021-06-05 VITALS
TEMPERATURE: 98 F | HEIGHT: 72 IN | WEIGHT: 273.2 LBS | DIASTOLIC BLOOD PRESSURE: 62 MMHG | SYSTOLIC BLOOD PRESSURE: 116 MMHG | OXYGEN SATURATION: 95 % | HEART RATE: 78 BPM | BODY MASS INDEX: 37 KG/M2

## 2021-06-05 VITALS
DIASTOLIC BLOOD PRESSURE: 68 MMHG | WEIGHT: 289 LBS | HEART RATE: 72 BPM | SYSTOLIC BLOOD PRESSURE: 137 MMHG | BODY MASS INDEX: 39.2 KG/M2 | OXYGEN SATURATION: 97 % | TEMPERATURE: 98.5 F

## 2021-06-05 VITALS
DIASTOLIC BLOOD PRESSURE: 76 MMHG | HEIGHT: 72 IN | TEMPERATURE: 98 F | HEART RATE: 78 BPM | OXYGEN SATURATION: 93 % | BODY MASS INDEX: 38.44 KG/M2 | WEIGHT: 283.8 LBS | SYSTOLIC BLOOD PRESSURE: 130 MMHG

## 2021-06-05 VITALS
BODY MASS INDEX: 34.13 KG/M2 | WEIGHT: 252 LBS | TEMPERATURE: 96.3 F | HEIGHT: 72 IN | DIASTOLIC BLOOD PRESSURE: 84 MMHG | RESPIRATION RATE: 17 BRPM | SYSTOLIC BLOOD PRESSURE: 130 MMHG | HEART RATE: 88 BPM

## 2021-06-05 VITALS
HEIGHT: 72 IN | BODY MASS INDEX: 33.92 KG/M2 | DIASTOLIC BLOOD PRESSURE: 76 MMHG | WEIGHT: 250.4 LBS | HEART RATE: 75 BPM | SYSTOLIC BLOOD PRESSURE: 133 MMHG | OXYGEN SATURATION: 96 %

## 2021-06-05 VITALS
HEART RATE: 70 BPM | RESPIRATION RATE: 16 BRPM | HEIGHT: 72 IN | OXYGEN SATURATION: 94 % | BODY MASS INDEX: 35.87 KG/M2 | DIASTOLIC BLOOD PRESSURE: 82 MMHG | SYSTOLIC BLOOD PRESSURE: 120 MMHG | WEIGHT: 264.8 LBS | TEMPERATURE: 98 F

## 2021-06-05 VITALS — WEIGHT: 259 LBS | BODY MASS INDEX: 35.13 KG/M2

## 2021-06-05 VITALS
HEIGHT: 72 IN | BODY MASS INDEX: 39.54 KG/M2 | OXYGEN SATURATION: 94 % | SYSTOLIC BLOOD PRESSURE: 146 MMHG | WEIGHT: 291.9 LBS | DIASTOLIC BLOOD PRESSURE: 83 MMHG | HEART RATE: 87 BPM | TEMPERATURE: 98.3 F

## 2021-06-05 VITALS — WEIGHT: 273 LBS | WEIGHT: 255.8 LBS | BODY MASS INDEX: 34.69 KG/M2 | HEIGHT: 72 IN | BODY MASS INDEX: 36.98 KG/M2

## 2021-06-05 VITALS
RESPIRATION RATE: 18 BRPM | WEIGHT: 264.8 LBS | BODY MASS INDEX: 35.87 KG/M2 | HEART RATE: 71 BPM | SYSTOLIC BLOOD PRESSURE: 119 MMHG | OXYGEN SATURATION: 95 % | TEMPERATURE: 98.7 F | DIASTOLIC BLOOD PRESSURE: 67 MMHG | HEIGHT: 72 IN

## 2021-06-05 NOTE — TELEPHONE ENCOUNTER
Refill  NOT   Given    Refill given per Policy, patient informed they are overdue for Office Visit   OV 5/16/19    Denisha Wilson, [facility] Connection Triage/Med Refill 2/2/2020    Requested Prescriptions   Pending Prescriptions Disp Refills     metFORMIN (GLUCOPHAGE) 1000 MG tablet [Pharmacy Med Name: METFORMIN HCL 1,000 MG TABLET] 180 tablet 3     Sig: TAKE 1 TABLET BY MOUTH TWICE A DAY WITH MEALS       Metformin Refill Protocol Failed - 2/1/2020 10:56 AM        Failed - Visit with PCP or prescribing provider visit in last 6 months or next 3 months     Last office visit with prescriber/PCP: Visit date not found OR same dept: Visit date not found OR same specialty: Visit date not found Last physical: Visit date not found Last MTM visit: 2/19/2018 Angie Russell, PharmD         Next appt within 3 mo: Visit date not found  Next physical within 3 mo: Visit date not found  Prescriber OR PCP: Kamaljit Plata MD  Last diagnosis associated with med order: 1. DM type 2 (diabetes mellitus, type 2) (H)  - metFORMIN (GLUCOPHAGE) 1000 MG tablet [Pharmacy Med Name: METFORMIN HCL 1,000 MG TABLET]; TAKE 1 TABLET BY MOUTH TWICE A DAY WITH MEALS  Dispense: 180 tablet; Refill: 3     If protocol passes may refill for 12 months if within 3 months of last provider visit (or a total of 15 months).           Passed - Blood pressure in last 12 months     BP Readings from Last 1 Encounters:   05/16/19 110/80             Passed - LFT or AST or ALT in last 12 months     Albumin   Date Value Ref Range Status   05/16/2019 3.9 3.5 - 5.0 g/dL Final     Bilirubin, Total   Date Value Ref Range Status   05/16/2019 0.8 0.0 - 1.0 mg/dL Final     Bilirubin, Direct   Date Value Ref Range Status   01/12/2016 0.3 <=0.5 mg/dL Final     Alkaline Phosphatase   Date Value Ref Range Status   05/16/2019 77 45 - 120 U/L Final     AST   Date Value Ref Range Status   05/16/2019 48 (H) 0 - 40 U/L Final     ALT   Date Value Ref Range Status   05/16/2019  58 (H) 0 - 45 U/L Final     Protein, Total   Date Value Ref Range Status   05/16/2019 7.7 6.0 - 8.0 g/dL Final                Passed - GFR or Serum Creatinine in last 6 months     GFR MDRD Non Af Amer   Date Value Ref Range Status   05/16/2019 >60 >60 mL/min/1.73m2 Final     GFR MDRD Af Amer   Date Value Ref Range Status   05/16/2019 >60 >60 mL/min/1.73m2 Final             Passed - A1C in last 6 months     Hemoglobin A1c   Date Value Ref Range Status   12/20/2019 7.8 (H) 3.5 - 6.0 % Final               Passed - Microalbumin in last year      Microalbumin, Random Urine   Date Value Ref Range Status   05/16/2019 4.21 (H) 0.00 - 1.99 mg/dL Final

## 2021-06-05 NOTE — TELEPHONE ENCOUNTER
Medication Question or Clarification  Who is calling: Patient  What medication are you calling about (include dose and sig)?:   tamsulosin (FLOMAX) 0.4 mg cap 90 capsule 2 12/20/2019  No   Sig: TAKE 1 CAPSULE BY MOUTH DAILY AFTER SUPPER.   Sent to pharmacy as: tamsulosin 0.4 mg capsule (FLOMAX       Who prescribed the medication?: Angie Russell- Authorized by: Kamaljit Plata MD  What is your question/concern?: Please forward this message to Angie as this medication is not as effective as it was prior and Angie advised this could be increased.   Requested Pharmacy: [facility]  Okay to leave a detailed message?: Yes

## 2021-06-05 NOTE — TELEPHONE ENCOUNTER
Central PA team  [phone]  Pool: HE PA MED ([id number])          PA has been initiated.       PA form completed and faxed insurance via Cover My Meds     Key:  [id number]     Medication:  tamsulosin     Insurance:  [employer]        Response will be received via fax and may take up to 5-10 business days depending on plan

## 2021-06-05 NOTE — TELEPHONE ENCOUNTER
Medication Question or Clarification  Who is calling: Patient  What medication are you calling about (include dose and sig)?:    Disp Refills Start End    tamsulosin (FLOMAX) 0.4 mg cap 90 capsule 2 12/20/2019     Sig: TAKE 1 CAPSULE BY MOUTH DAILY AFTER SUPPER.    Sent to pharmacy as: tamsulosin 0.4 mg capsule (FLOMAX)    E-Prescribing Status: Receipt confirmed by pharmacy (12/20/2019  8:45 AM CST)      Who prescribed the medication?: Kamaljit Plata MD   What is your question/concern?: Patient stated that the medication is not working as well as before. Patient stated that he was informed by Lesley Adams that if this should happen to call the clinic and Lesley Adams will increase the dose. Patient stated that he got 6 pills left and would like a new prescription with increased dose sent.  Requested Pharmacy: [facility] #[id number]  Okay to leave a detailed message?: Yes  [phone]

## 2021-06-05 NOTE — TELEPHONE ENCOUNTER
ANTICOAGULATION  MANAGEMENT    Assessment     Today's INR result of 2.9 is Therapeutic (goal INR of 2.0-3.0)        Warfarin taken as previously instructed    No new diet changes affecting INR    No new medication/supplements affecting INR    Continues to tolerate warfarin with no reported s/s of bleeding or thromboembolism     Previous INR was Therapeutic    Plan:     Spoke with Cam regarding INR result and instructed:     Warfarin Dosing Instructions:  Continue current warfarin dose 10 mg daily on Sundays, Tuesdays and Thursdays; and 5 mg daily rest of week  (0 % change)    Instructed patient to follow up no later than: 4-6 weeks.    Education provided: importance of therapeutic range, importance of following up for INR monitoring at instructed interval and importance of taking warfarin as instructed    Cam verbalizes understanding and agrees to warfarin dosing plan.    Instructed to call the [facility] for any changes, questions or concerns. (#[phone])   ?   Jackie Rao RN    Subjective/Objective:      Cam Walden, a 69 y.o. male is on warfarin.     Cam reports:     Home warfarin dose: verbally confirmed home dose with Cam and updated on anticoagulation calendar     Missed doses: No     Medication changes:  No     S/S of bleeding or thromboembolism:  No     New Injury or illness:  No     Changes in diet or alcohol consumption:  No     Upcoming surgery, procedure or cardioversion:  No    Anticoagulation Episode Summary     Current INR goal:   2.0-3.0   TTR:   76.6 % (1 y)   Next INR check:   3/6/2020   INR from last check:   2.90 (1/24/2020)   Weekly max warfarin dose:      Target end date:   Indefinite   INR check location:      Preferred lab:      Send INR reminders to:   [facility]    Indications    Atrial flutter (H) [I48.92]           Comments:            Anticoagulation Care Providers     Provider Role Specialty Phone number    Kamaljit Plata MD Referring Family  Medicine [phone]

## 2021-06-05 NOTE — TELEPHONE ENCOUNTER
RN cannot approve Refill Request    RN can NOT refill this medication Protocol failed and NO refill given..    Madhavi Stallings, [facility] Connection Triage/Med Refill 2/6/2020    Requested Prescriptions   Pending Prescriptions Disp Refills     warfarin ANTICOAGULANT (COUMADIN/JANTOVEN) 5 MG tablet [Pharmacy Med Name: WARFARIN SODIUM 5 MG TABLET] 180 tablet 1     Sig: TAKE 1 TO 2 TABLETS (5 - 10 MG) BY MOUTH DAILY AS DIRECTED. DOSE ADJUSTED PER INR RESULT.       Warfarin Refill Protocol  Failed - 2/6/2020  9:41 AM        Failed -  Route to appropriate pool/provider     Last Anticoagulation Summary:   Anticoagulation Episode Summary     Current INR goal:   2.0-3.0   TTR:   75.7 % (11.7 mo)   Next INR check:   3/6/2020   INR from last check:   2.90 (1/24/2020)   Weekly max warfarin dose:      Target end date:   Indefinite   INR check location:      Preferred lab:      Send INR reminders to:   [facility]    Indications    Atrial flutter (H) [I48.92]           Comments:            Anticoagulation Care Providers     Provider Role Specialty Phone number    Kamaljit Plata MD Referring Family Medicine [phone]                Passed - Provider visit in last year     Last office visit with prescriber/PCP: 5/16/2019 Kamaljit Plata MD OR same dept: 5/16/2019 Kamaljit Plata MD OR same specialty: 5/16/2019 Kamaljit Plata MD  Last physical: 10/23/2017 Last MTM visit: Visit date not found    Next appt within 3 mo: Visit date not found Next physical within 3 mo: Visit date not found  Prescriber OR PCP: Kamaljit Plata MD  Last diagnosis associated with med order: 1. Atrial flutter (H)  - warfarin ANTICOAGULANT (COUMADIN/JANTOVEN) 5 MG tablet [Pharmacy Med Name: WARFARIN SODIUM 5 MG TABLET]; TAKE 1 TO 2 TABLETS (5 - 10 MG) BY MOUTH DAILY AS DIRECTED. DOSE ADJUSTED PER INR RESULT.  Dispense: 180 tablet; Refill: 1    If protocol passes may refill for 6 months if within 3  months of last provider visit (or a total of 9 months).

## 2021-06-06 NOTE — TELEPHONE ENCOUNTER
ANTICOAGULATION  MANAGEMENT    Assessment     Today's INR result of 2.7 is Therapeutic (goal INR of 2.0-3.0)        Warfarin taken as previously instructed    No new diet changes affecting INR    No new medication/supplements affecting INR    No interaction expected between tamsulosin and warfarin    Continues to tolerate warfarin with no reported s/s of bleeding or thromboembolism     Previous INR was Therapeutic    Plan:     Spoke with Cam regarding INR result and instructed:     Warfarin Dosing Instructions:  Continue current warfarin dose 10 mg daily on Sundays, Tuesdays, and Thursdays; and 5 mg daily rest of week  (0 % change)    Instructed patient to follow up no later than: 6-8 weeks     Education provided: importance of therapeutic range, importance of following up for INR monitoring at instructed interval and importance of taking warfarin as instructed    Cam verbalizes understanding and agrees to warfarin dosing plan.    Instructed to call the [facility] for any changes, questions or concerns. (#[phone])   ?   Sobia Ibrahim RN    Subjective/Objective:      Cam Walden, a 70 y.o. male is on warfarin.     Cam reports:     Home warfarin dose: verbally confirmed home dose with Cam and updated on anticoagulation calendar     Missed doses: No     Medication changes:  Yes, tamsulosin not potential interaction (appears patient may have been on this medication previously)      S/S of bleeding or thromboembolism:  No     New Injury or illness:  No     Changes in diet or alcohol consumption:  No     Upcoming surgery, procedure or cardioversion:  No    Anticoagulation Episode Summary     Current INR goal:   2.0-3.0   TTR:   76.6 % (1 y)   Next INR check:   4/24/2020   INR from last check:   2.70 (2/28/2020)   Weekly max warfarin dose:      Target end date:   Indefinite   INR check location:      Preferred lab:      Send INR reminders to:   [facility]    Indications    Atrial flutter (H)  [I48.92]           Comments:            Anticoagulation Care Providers     Provider Role Specialty Phone number    Kamaljit Plata MD Referring Family Medicine [phone]

## 2021-06-07 NOTE — TELEPHONE ENCOUNTER
ANTICOAGULATION  MANAGEMENT    Assessment     Today's INR result of 3.0 is Therapeutic (goal INR of 2.0-3.0)        Warfarin taken as previously instructed    No new diet changes affecting INR    No new medication/supplements affecting INR    Continues to tolerate warfarin with no reported s/s of bleeding or thromboembolism     Previous INR was Therapeutic    Plan:     Spoke with Cam regarding INR result and instructed:     Warfarin Dosing Instructions:  Continue current warfarin dose 10 mg daily on Sundays, Tuesdays, and Thursdays; and 5 mg daily rest of week  (0 % change)    Instructed patient to follow up no later than: 8 weeks     Education provided: importance of therapeutic range, importance of following up for INR monitoring at instructed interval and importance of taking warfarin as instructed    Cam verbalizes understanding and agrees to warfarin dosing plan.    Instructed to call the [facility] for any changes, questions or concerns. (#[phone])   ?   Sobia Ibrahim RN    Subjective/Objective:      Cam Walden, a 70 y.o. male is on warfarin.     Cam reports:     Home warfarin dose: verbally confirmed home dose with Cam and updated on anticoagulation calendar     Missed doses: No     Medication changes:  No     S/S of bleeding or thromboembolism:  No     New Injury or illness:  No     Changes in diet or alcohol consumption:  No     Upcoming surgery, procedure or cardioversion:  No    Anticoagulation Episode Summary     Current INR goal:   2.0-3.0   TTR:   76.6 % (1 y)   Next INR check:   6/19/2020   INR from last check:   3.00 (4/24/2020)   Weekly max warfarin dose:      Target end date:   Indefinite   INR check location:      Preferred lab:      Send INR reminders to:   [facility]    Indications    Atrial flutter (H) [I48.92]           Comments:            Anticoagulation Care Providers     Provider Role Specialty Phone number    Kamaljit Plata MD Referring Family  Medicine [phone]

## 2021-06-07 NOTE — TELEPHONE ENCOUNTER
Spoke with patient and I have scheduled him a phone visit with PCP for a med check.    KWABENA/RIVER

## 2021-06-07 NOTE — PROGRESS NOTES
"Cam Walden is a 70 y.o. male who is being evaluated via a billable video visit.      The patient has been notified of following:     \"This video visit will be conducted via a call between you and your physician/provider. We have found that certain health care needs can be provided without the need for an in-person physical exam.  This service lets us provide the care you need with a video conversation.  If a prescription is necessary we can send it directly to your pharmacy.  If lab work is needed we can place an order for that and you can then stop by our lab to have the test done at a later time.    Video visits are billed at different rates depending on your insurance coverage. Please reach out to your insurance provider with any questions.    If during the course of the call the physician/provider feels a video visit is not appropriate, you will not be charged for this service.\"    Patient has given verbal consent to a Video visit? Yes    Patient would like to receive their AVS by AVS Preference: Mail a copy.    Patient would like the video invitation sent by: Send to e-mail at: [email or web address]    Will anyone else be joining your video visit? No        Video Start Time: 10:00 AM    Additional provider notes: Benign prostatic hypertrophy with lots, currently on Flomax 0.4 mg for about 7 to 9 months, initially medication was helping with flow of the urine, but has been noticing less efficacy lately.  Wondering if going  up in the dosage could help make a difference.  New rash in the anterior neck area, mildly itchy, according to patient related to him sleeping with this head forward.  Minimal improvement with antifungal cream.  No sign of progression.  Diabetes type 2, appears stable, has not been checking daily.  Is on glipizide on metformin.  Hypertension controlled with losartan HCTZ.  Teaching piano, currently doing it with Zoom application.    Cam had a video visit  today for follow-up and " rash.    Diagnoses and all orders for this visit:    Benign prostatic hyperplasia with incomplete bladder emptying  -     tamsulosin (FLOMAX) 0.4 mg cap; Take 2 capsules (0.8 mg total) by mouth daily.  -     PSA (Prostatic-Specific Antigen), Annual Screen; Future    Rash and nonspecific skin eruption  -     ketoconazole (NIZORAL) 2 % cream; Apply topically daily.  -     Microalbumin, Random Urine; Future  -     Comprehensive Metabolic Panel; Future    Essential hypertension with goal blood pressure less than 130/80  Continue current management, continue healthy lifestyle changes.  Type 2 diabetes mellitus with other circulatory complications (H)  -     Glycosylated Hemoglobin A1c; Future  -     Microalbumin, Random Urine; Future  -     Comprehensive Metabolic Panel; Future  ReCheck A1c with next blood drawn.  Mixed hyperlipidemia  -     Lipid Cascade; Future    Screening for prostate cancer  -     PSA (Prostatic-Specific Antigen), Annual Screen; Future      Video-Visit Details    Type of service:  Video Visit    Video End Time (time video stopped): 10:21 AM  Originating Location (pt. Location): Home    Distant Location (provider location):  [facility]     Platform used for Video Visit: Rita Plata MD

## 2021-06-07 NOTE — TELEPHONE ENCOUNTER
----- Message from Osvaldo Alfonso MD sent at 3/25/2020  4:23 PM CDT -----  Regarding: RE: yearly f/u scheduled 4/1  Yes, f/u after echo. Office visit is best.  [facility] [place] Message -----  From: Dee Dee Sanches RN  Sent: 3/25/2020   8:42 AM CDT  To: Osvaldo Alfonso MD  Subject: yearly f/u scheduled 4/1                         Yearly follow up with echo prior - echo is not done.  Do you want to postpone yearly follow up until echo is done?  Thank you - Dee Dee

## 2021-06-08 NOTE — TELEPHONE ENCOUNTER
RN cannot approve Refill Request    RN can NOT refill this medication Protocol failed and NO refill given.    Madhavi Stallings, [facility] Triage/Med Refill 6/9/2020    Requested Prescriptions   Pending Prescriptions Disp Refills     glipiZIDE (GLUCOTROL XL) 10 MG 24 hr tablet [Pharmacy Med Name: GLIPIZIDE ER 10 MG TABLET] 90 tablet 2     Sig: TAKE 1 TABLET BY MOUTH EVERY DAY IN THE MORNING       Oral Hypoglycemics Refill Protocol Failed - 6/7/2020 10:03 AM        Failed - Visit with PCP or prescribing provider visit in last 6 months       Last office visit with prescriber/PCP: Visit date not found OR same dept: Visit date not found OR same specialty: 5/16/2019 Kamaljit Plata MD Last physical: Visit date not found Last MTM visit: Visit date not found         Next appt within 3 mo: Visit date not found  Next physical within 3 mo: Visit date not found  Prescriber OR PCP: Kamaljit Plata MD  Last diagnosis associated with med order: 1. Diabetes mellitus (H)  - glipiZIDE (GLUCOTROL XL) 10 MG 24 hr tablet [Pharmacy Med Name: GLIPIZIDE ER 10 MG TABLET]; TAKE 1 TABLET BY MOUTH EVERY DAY IN THE MORNING  Dispense: 90 tablet; Refill: 2     If protocol passes may refill for 12 months if within 3 months of last provider visit (or a total of 15 months).           Failed - Microalbumin in last year      Microalbumin, Random Urine   Date Value Ref Range Status   05/16/2019 4.21 (H) 0.00 - 1.99 mg/dL Final                  Failed - Blood pressure in last year     BP Readings from Last 1 Encounters:   05/16/19 110/80             Failed - Serum creatinine in last year     Creatinine   Date Value Ref Range Status   05/16/2019 1.03 0.70 - 1.30 mg/dL Final             Passed - A1C in last 6 months     Hemoglobin A1c   Date Value Ref Range Status   12/20/2019 7.8 (H) 3.5 - 6.0 % Final

## 2021-06-08 NOTE — PROGRESS NOTES
Medication Therapy Management Follow Up Visit     ASSESSMENT AND PLAN    Diabetes: nearly controlled at goal of <7 per ADA guidelines. Self reported FBGs are mostly within goal <130. Cam would benefit from increasing physical activity and making diet modifications to have tighter control of his BG. Additional drug therapy is not indicated at this time.    -Checked A1c today   -Educated on glipizide side effects  -Continue metformin and glipizide    Hypertension: Uncontrolled with elevated BP today above goal <140/90 per JNC8 guidelines. Cam would benefit from rechecking BP again to determine if additional drug therapy is needed at this time. Cam is currently at maximum dose of losartan.   -Recheck blood pressure in 1 week to assess need for additional drug therapy     Stroke/ASCVD Prevention Controlled with low aspirin and high intensity statin per ADA and AHA/ACC guidelines.     History of Vitamin D Deficiency: Possibly controlled with restarting vitamin D 1000 units at last visit.   -Checked vitamin D level today       FOLLOW-UP PLAN  Cam was advised to follow up for a blood pressure check in 1 week and full MTM visit in three months.      SUBJECTIVE AND OBJECTIVE  Cam Walden is a 66 y.o. male who was referred by Kamaljit Plata MD for MTM services.  Cam's chief concern today is blood sugar.      Diabetes: Cam did not bring his glucometer today. Blood sugars have been up for the past week and thinks it's due to diet. Had one FBG of 157 after eating [employer] the day before. Was 137 this morning, but typically is around 120s. Was wondering if it's time to increase his glipizide. When he stopped Januvia (due to cost) he did not notice a spike in BG. Denies hypoglycemia with glipizide, but is gaining weight. Sometimes checks BG after work, before dinner and has readings in the high 90s. Cam doesn't think he can increase exercise due to schedule and knee pain. His house  has stairs which puts too much pressure on his knees. He is considering going to pool therapy once a week with a friend, but states that he's been resistant to it. He knows he should watch his diet and has diabetes cookbooks. Now has [employer] Part D plan and is unsure if Accu-Chek is covered.    A1c = 7.1 (2/13/17)    Hypertension: Does not check blood pressure at home. Does take losartan 100mg daily. Denies headache, chest pain, blurry vision.   BP Readings from Last 3 Encounters:   02/13/17 154/96, (!) 168/100 (repeat 30 mins later)   11/10/16 138/88   10/17/16 132/82     Stroke/ASCVD Prevention  Restarted aspirin 81mg after GI surgery was cancelled. Takes atorvastatin 40mg daily.     History of Vitamin D Deficiency: Takes vitamin D 1000 units daily.  Vitamin D level = 31.2 (07/26/16)    We reviewed Cam's medication list with them, discussing reason for use, directions for use, and potential side effects of each medication.  Indication, safety, efficacy, and convenience was assessed for all reviewed medications.     Current Outpatient Prescriptions   Medication Sig Dispense Refill     ACCU-CHEK SMARTVIEW TEST STRIP strips TEST BLOOD GLUCOSE 2 TIMES DAILY 50 each 10     aspirin 81 MG EC tablet TAKE 1 TABLET BY MOUTH DAILY 90 tablet 3     atorvastatin (LIPITOR) 40 MG tablet TAKE ONE TABLET BY MOUTH AT BEDTIME  30 tablet 0     blood-glucose meter (ACCU-CHEK ABIMBOLA) Misc Use to check blood sugar twice per day. 1 each 1     cholecalciferol, vitamin D3, (VITAMIN D3) 1,000 unit capsule Take 1 capsule (1,000 Units total) by mouth daily. 30 capsule 5     glipiZIDE (GLUCOTROL) 5 MG 24 hr tablet Take 1 tablet (5 mg total) by mouth daily with breakfast. 30 tablet 5     glipiZIDE (GLUCOTROL) 5 MG 24 hr tablet TAKE 1 TABLET (5 MG TOTAL) BY MOUTH DAILY WITH BREAKFAST. 30 tablet 4     lancets (ACCU-CHEK FASTCLIX) Misc Use to check blood sugar twice per day. 102 each 11     losartan (COZAAR) 100 MG tablet Take 1 tablet by mouth  daily 90 tablet 3     metFORMIN (GLUCOPHAGE) 1000 MG tablet TAKE ONE TABLET BY MOUTH TWO TIMES A DAY WITH MEALS (Patient taking differently: Take 1,000 mg by mouth 2 (two) times a day with meals. TAKE ONE TABLET BY MOUTH TWO TIMES A DAY WITH MEALS) 60 tablet 11     senna (SENOKOT) 8.6 mg tablet Take 1 tablet by mouth bedtime.       sitaGLIPtin (JANUVIA) 100 MG tablet Take 1 tablet by mouth once daily in the morning (Patient taking differently: Take 100 mg by mouth daily. Take 1 tablet by mouth once daily in the morning ) 30 tablet 11     No current facility-administered medications for this visit.        Cam was provided with a printed AVS, and this care plan was communicated via EMR with his primary care provider, Kamaljit Plata MD, and is the authorizing prescriber for this visit.  Direct supervision was available by either the patient's PCP or another available physician when needed.    Time spent: 30 minutes    Landry Amador PharmD IV Student  Note was reviewed and precepted by Angie Russell PharmBurakD., BCACP.

## 2021-06-08 NOTE — TELEPHONE ENCOUNTER
Due to be seen with labs    Rx renewed per Medication Renewal Policy. Medication was last renewed on 8/23/19.    Madhavi Stallings, [facility] Triage/Med Refill 5/15/2020     Requested Prescriptions   Pending Prescriptions Disp Refills     hydroCHLOROthiazide (HYDRODIURIL) 25 MG tablet [Pharmacy Med Name: HYDROCHLOROTHIAZIDE 25 MG TAB] 90 tablet 2     Sig: TAKE 1 TABLET BY MOUTH EVERY DAY       Diuretics/Combination Diuretics Refill Protocol  Passed - 5/14/2020  8:23 AM        Passed - Visit with PCP or prescribing provider visit in past 12 months     Last office visit with prescriber/PCP: 5/16/2019 Kamaljit Plata MD OR same dept: 5/16/2019 Kamaljit Plata MD OR same specialty: 5/16/2019 Kamaljit Plata MD  Last physical: 10/23/2017 Last MTM visit: Visit date not found   Next visit within 3 mo: Visit date not found  Next physical within 3 mo: Visit date not found  Prescriber OR PCP: Kamaljit Plata MD  Last diagnosis associated with med order: 1. Essential hypertension with goal blood pressure less than 130/80  - hydroCHLOROthiazide (HYDRODIURIL) 25 MG tablet [Pharmacy Med Name: HYDROCHLOROTHIAZIDE 25 MG TAB]; TAKE 1 TABLET BY MOUTH EVERY DAY  Dispense: 90 tablet; Refill: 2    If protocol passes may refill for 12 months if within 3 months of last provider visit (or a total of 15 months).             Passed - Serum Potassium in past 12 months      No results found for: LN-POTASSIUM          Passed - Serum Sodium in past 12 months      No results found for: LN-SODIUM          Passed - Blood pressure on file in past 12 months     BP Readings from Last 1 Encounters:   05/16/19 110/80             Passed - Serum Creatinine in past 12 months      Creatinine   Date Value Ref Range Status   05/16/2019 1.03 0.70 - 1.30 mg/dL Final

## 2021-06-08 NOTE — TELEPHONE ENCOUNTER
RN cannot approve Refill Request    RN can NOT refill this medication PCP messaged that patient is overdue for Office Visit. Last office visit: 5/16/2019 Kamaljit Plata MD Last Physical: 10/23/2017 Last MTM visit: Visit date not found Last visit same specialty: 5/16/2019 Kamaljit Plata MD.  Next visit within 3 mo: Visit date not found  Next physical within 3 mo: Visit date not found      Sonya Sweet, [facility] Triage/Med Refill 6/14/2020    Requested Prescriptions   Pending Prescriptions Disp Refills     DULoxetine (CYMBALTA) 30 MG capsule [Pharmacy Med Name: DULOXETINE HCL DR 30 MG CAP] 180 capsule 2     Sig: TAKE 1 CAPSULE BY MOUTH TWICE A DAY       Tricyclics/Misc Antidepressant/Antianxiety Meds Refill Protocol Failed - 6/13/2020 10:02 AM        Failed - PCP or prescribing provider visit in last year     Last office visit with prescriber/PCP: 5/16/2019 Kamaljit Plata MD OR same dept: Visit date not found OR same specialty: 5/16/2019 Kamaljit Plata MD  Last physical: 10/23/2017 Last MTM visit: Visit date not found   Next visit within 3 mo: Visit date not found  Next physical within 3 mo: Visit date not found  Prescriber OR PCP: Kamaljit Plaat MD  Last diagnosis associated with med order: 1. Diabetic peripheral neuropathy (H)  - DULoxetine (CYMBALTA) 30 MG capsule [Pharmacy Med Name: DULOXETINE HCL DR 30 MG CAP]; TAKE 1 CAPSULE BY MOUTH TWICE A DAY  Dispense: 180 capsule; Refill: 2    If protocol passes may refill for 12 months if within 3 months of last provider visit (or a total of 15 months).

## 2021-06-09 NOTE — TELEPHONE ENCOUNTER
Wellness Screening Tool  Symptom Screening:  Do you have one of the following NEW symptoms:    Fever (subjective or >100.0)?  No    A new cough?  No    Shortness of breath?  No     Chills? No     New loss of taste or smell? No     Generalized body aches? No     New persistent headache? No     New sore throat? No     Nausea, vomiting, or diarrhea?  No    Within the past 3 weeks, have you been exposed to someone with a known positive illness below:    COVID-19 (known or suspected)?  No    Chicken pox?  No    Mealses?  No    Pertussis?  No    Patient notified of visitor policy- They may have one person accompany them to their appointment, but they will need to wear a mask and will be screened upon arrival for symptoms: Yes  Pt informed to wear a mask: Yes  Pt notified if they develop any symptoms listed above, prior to their appointment, they are to call the clinic directly at [phone] for further instructions.  Yes  Patient's appointment status: Patient will be seen in clinic as scheduled on Wed. 7-22-20 @ 0910 in [facility] clinic with Dr. Alfonso.  mg

## 2021-06-09 NOTE — TELEPHONE ENCOUNTER
Refill Approved    Rx renewed per Medication Renewal Policy. Medication was last renewed on 4/16/20.    Madhavi Stallings, [facility] Triage/Med Refill 7/21/2020     Requested Prescriptions   Pending Prescriptions Disp Refills     losartan (COZAAR) 100 MG tablet [Pharmacy Med Name: LOSARTAN POTASSIUM 100 MG TAB] 90 tablet 0     Sig: TAKE 1 TABLET BY MOUTH EVERY DAY       Angiotensin Receptor Blocker Protocol Passed - 7/20/2020 12:34 PM        Passed - PCP or prescribing provider visit in past 12 months       Last office visit with prescriber/PCP: 5/16/2019 Kamaljit Plata MD OR same dept: Visit date not found OR same specialty: 5/16/2019 Kamalijt Plata MD  Last physical: 10/23/2017 Last MTM visit: Visit date not found   Next visit within 3 mo: Visit date not found  Next physical within 3 mo: Visit date not found  Prescriber OR PCP: Kamaljit Plata MD  Last diagnosis associated with med order: 1. Essential hypertension with goal blood pressure less than 140/90  - losartan (COZAAR) 100 MG tablet [Pharmacy Med Name: LOSARTAN POTASSIUM 100 MG TAB]; TAKE 1 TABLET BY MOUTH EVERY DAY  Dispense: 90 tablet; Refill: 0    If protocol passes may refill for 12 months if within 3 months of last provider visit (or a total of 15 months).             Passed - Serum potassium within the past 12 months     Lab Results   Component Value Date    Potassium 4.2 07/13/2020             Passed - Blood pressure filed in past 12 months     BP Readings from Last 1 Encounters:   07/13/20 130/70             Passed - Serum creatinine within the past 12 months     Creatinine   Date Value Ref Range Status   07/13/2020 1.15 0.70 - 1.30 mg/dL Final

## 2021-06-09 NOTE — TELEPHONE ENCOUNTER
ANTICOAGULATION  MANAGEMENT PROGRAM    Cam Walden is overdue for INR check.     Was unable to reach patient and was unable to leave a voicemail. If patient calls, please schedule INR appointment as soon as possible.       Sobia Ibrahim RN

## 2021-06-09 NOTE — PATIENT INSTRUCTIONS - HE
1. Try to walk for 20 minutes such as around the block at least once a week, and time your trip to see that your stamina is stable.  2. Check in with Dr. Mateusz rodriguez about your neck mass  3. Call if lightheadedness develops while walking, or if you develop chest pain or loss of consciousness.

## 2021-06-09 NOTE — PROGRESS NOTES
ASSESSMENT and pain  1. Neck mass  He has a large anterior neck swelling measuring approximately 6 cm x 2 cm in the mass is nonpulsatile it is firm.  The posterior aspect of the mass is darkly pigmented.  He needs to have a CT of his neck to ascertain the nature of the mass, differential could include lymph node enlargement, malignancy thyroiditis or thyroid involvement.  I will also refer him to ENT I will call him with the CT results when they are available  - CT Soft Tissue Neck With Contrast; Future  - Ambulatory referral to ENT    There are no Patient Instructions on file for this visit.    Orders Placed This Encounter   Procedures     CT Soft Tissue Neck With Contrast     Standing Status:   Future     Standing Expiration Date:   7/23/2021     Order Specific Question:   Can the procedure be changed per Radiologist protocol?     Answer:   Yes     Order Specific Question:   If this is a diagnostic procedure, have the patient's age and recent imaging history been considered?     Answer:   Yes     Ambulatory referral to ENT     Referral Priority:   Urgent     Referral Type:   Consultation     Referral Reason:   Evaluation and Treatment     Requested Specialty:   Otolaryngology     Number of Visits Requested:   1     There are no discontinued medications.    No follow-ups on file.    CHIEF COMPLAINT:  Chief Complaint   Patient presents with     Pos, grouth front of neck.       HISTORY OF PRESENT ILLNESS:  Cam is a 70 y.o. male here to follow-up for an anterior neck mass that was noted cardiology visit approximately 1 day ago.  He has a medical history significant for hypertension, atrial flutter, type 2 diabetes and aortic stenosis.  He says the mass in his neck is been increasing in size over the last 6 months he states it is not tender or painful.  He states that he does sleep in a chair and he believes that his chin is been rubbing on the anterior portion of his neck.  He denies any changes in his voice or  "inability to swallow.  He notes no major changes in weight he has been losing weight by not eating out.  Notes no new fatigue.  He notes no other swellings in his neck.  He is on warfarin.  He notes no bruising on any other portions of his body.    REVIEW OF SYSTEMS:   HEENT positive for mass on anterior portion of neck  Otherwise 12 point review  All other systems are negative according to the patient    PFSH:    He is a  and teaches piano  He has never used tobacco related products    TOBACCO USE:  Social History     Tobacco Use   Smoking Status Never Smoker   Smokeless Tobacco Never Used       VITALS:  Vitals:    07/23/20 1000   BP: 122/76   Pulse: 68   Resp: 20   Temp: 99.1  F (37.3  C)   TempSrc: Oral   Weight: (!) 293 lb 6 oz (133.1 kg)   Height: 6' 0.01\" (1.829 m)     Wt Readings from Last 3 Encounters:   07/23/20 (!) 293 lb 6 oz (133.1 kg)   07/22/20 (!) 292 lb 9.6 oz (132.7 kg)   07/13/20 (!) 303 lb (137.4 kg)       PHYSICAL EXAM:  Obese  male sitting comfortably exam no acute distress  HEENT neck supple there is a large well demarcated darkly pigmented skin mass measuring 8 x 3 cm located on the posterior aspect of his neck approximately 2 cm anterior to the thyroid gland.  The area is nontender and its nonpulsatile.,  There is no lymph enlargement noted in the neck  Respiratory system clear to auscultation equal breath sounds no wheeze no crackles  CVS regular rhythm grade 2/6 systolic murmur noted    DATA REVIEWED:  Additional History from Old Records Summarized (2): 2  Reviewed clinic note from Dr. Alfonso from cardiology.  No change in treatment.  Decision to Obtain Records (1): 9  Radiology Tests Summarized or Ordered (1): 1  CT neck ordered  Labs Reviewed or Ordered (1): 0  Medicine Test Summarized or Ordered (1): 0  Independent Review of EKG or X-RAY(2 each): 0    The visit lasted a total of 22 minutes face to face with the patient. Over 50% of the time was spent counseling and " educating the patient about neck masses with diagrams    MEDICATIONS:  Current Outpatient Medications   Medication Sig Dispense Refill     atorvastatin (LIPITOR) 40 MG tablet Take 1 tablet (40 mg total) by mouth daily. 90 tablet 3     blood glucose test (ACCU-CHEK SMARTVIEW TEST STRIP) strips USE TO CHECK BLOOD SUGARS  DAILY AS DIRECTED 200 strip prn     cholecalciferol, vitamin D3, (VITAMIN D3) 1,000 unit capsule Take 1 capsule (1,000 Units total) by mouth daily. 100 capsule 2     DULoxetine (CYMBALTA) 30 MG capsule TAKE 1 CAPSULE BY MOUTH TWICE A  capsule 2     generic lancets (ACCU-CHEK FASTCLIX) Use to check blood sugar twice per day. 102 each 11     glipiZIDE (GLUCOTROL XL) 10 MG 24 hr tablet TAKE 1 TABLET BY MOUTH EVERY DAY IN THE MORNING 90 tablet 1     hydroCHLOROthiazide (HYDRODIURIL) 25 MG tablet TAKE 1 TABLET BY MOUTH EVERY DAY 90 tablet 2     ketoconazole (NIZORAL) 2 % cream Apply topically daily. 60 g 1     losartan (COZAAR) 100 MG tablet TAKE 1 TABLET BY MOUTH EVERY DAY 90 tablet 3     metFORMIN (GLUCOPHAGE) 1000 MG tablet TAKE 1 TABLET BY MOUTH TWICE A DAY WITH MEALS 180 tablet 3     metoprolol tartrate (LOPRESSOR) 100 MG tablet TAKE 1 TABLET BY MOUTH TWICE A DAY. **REPLACES 50MG STRENGTH** 180 tablet 3     tamsulosin (FLOMAX) 0.4 mg cap Take 2 capsules (0.8 mg total) by mouth daily. 180 capsule 2     warfarin ANTICOAGULANT (COUMADIN/JANTOVEN) 5 MG tablet TAKE 1 TO 2 TABLETS (5 - 10 MG) BY MOUTH DAILY AS DIRECTED. DOSE ADJUSTED PER INR RESULT. 180 tablet 1     No current facility-administered medications for this visit.      Korin KYLE

## 2021-06-09 NOTE — PROGRESS NOTES
MTM Follow Up Encounter  Assessment & Plan                                                     Type 2 Diabetes:  borderline controlled. A1C was not at goal of <7%, he has always been borderline 7-8% and hesitant for additional medication changes. FBG not at goal  mg/dL per reported values. Will be at [facility] next week and I encouraged him to get his labs done then (future orders placed by Dr. Plata). I will follow up with him after the lab results have returned.     Urinary Issues: Improved. Continue current dose.     Follow Up  After labs on 7/13 -- will be having ECHO and I recommended completing labs at [facility].     Subjective & Objective                                                       Cam Walden is a 70 y.o. male coming in for a follow up visit for Medication Therapy Management. He was referred to me from Kamaljit Plata MD    Patient consented to a telehealth visit: Yes    Chief Complaint: Follow up since last MTM appt 12/20/19.   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     Medication Adherence/Access: No issues.     Type 2 Diabetes: Currently taking metformin 1000 mg two times a day and glipizide XL 10 mg once daily.   Tests BG 0-1 times daily fasting AM.   Blood sugars per log: fasting AM tend to be 130-140. Earlier on they were 160, not sure what changed. This morning not checked.   Last A1c checked 12/20/19 = 7.8%, previously 9/18/19 = 7.7%  Microalbumin checked 5/16/19  Last lipids checked 5/16/19. Taking atorvastatin 40 mg daily.   Wt Readings from Last 3 Encounters:   12/20/19 (!) 303 lb (137.4 kg)   10/16/19 (!) 305 lb (138.3 kg)   05/16/19 (!) 318 lb (144.2 kg)     Urinary Issues: Taking tamsulosin 0.8 mg daily -- dose increased on 5/6/20 by Dr. Plata since it stopped working as well.  Reports the higher dose has been more helpful. Less pushing. When urinate feels like completing. Less nocturia, now only once.         PMH: reviewed in  EPIC   Allergies/ADRs: reviewed in EPIC   Alcohol: reviewed in EPIC  Tobacco:   Social History     Tobacco Use   Smoking Status Never Smoker   Smokeless Tobacco Never Used     Today's Vitals: There were no vitals filed for this visit.  ----------------    The patient declined an after visit summary    I spent 7 minutes with this patient today;   All changes were made via collaborative practice agreement with Kamaljit Plata MD. A copy of the visit note was provided to the patient's provider.     Angie Russell, Pharm.D., BCACP  Medication Therapy Management Pharmacist  [facility]    Telemedicine Visit Details    Type of service:  Telephone     Start Time: 1:18 pm  End Time: 1:26 pm    Originating Location (pt. Location): Home    Distant Location (provider location):  [facility] MEDICATION THERAPY MANAGEMENT [facility]    Mode of Communication: Telephone    Current Outpatient Medications   Medication Sig Dispense Refill     atorvastatin (LIPITOR) 40 MG tablet Take 1 tablet (40 mg total) by mouth daily. 90 tablet 3     blood glucose test (ACCU-CHEK SMARTVIEW TEST STRIP) strips USE TO CHECK BLOOD SUGARS  DAILY AS DIRECTED 200 strip prn     cholecalciferol, vitamin D3, (VITAMIN D3) 1,000 unit capsule Take 1 capsule (1,000 Units total) by mouth daily. 100 capsule 2     DULoxetine (CYMBALTA) 30 MG capsule TAKE 1 CAPSULE BY MOUTH TWICE A  capsule 2     generic lancets (ACCU-CHEK FASTCLIX) Use to check blood sugar twice per day. 102 each 11     glipiZIDE (GLUCOTROL XL) 10 MG 24 hr tablet TAKE 1 TABLET BY MOUTH EVERY DAY IN THE MORNING 90 tablet 1     hydroCHLOROthiazide (HYDRODIURIL) 25 MG tablet TAKE 1 TABLET BY MOUTH EVERY DAY 90 tablet 2     ketoconazole (NIZORAL) 2 % cream MIX IN 1:1 RATIO WITH HYDROCORTISONE AND APPLY TO AFFECTED AREAS TWICE DAILY TOPICALLY  5     ketoconazole (NIZORAL) 2 % cream Apply topically daily. 60 g 1     losartan (COZAAR) 100 MG tablet TAKE 1 TABLET BY  MOUTH EVERY DAY 90 tablet 0     metFORMIN (GLUCOPHAGE) 1000 MG tablet TAKE 1 TABLET BY MOUTH TWICE A DAY WITH MEALS 180 tablet 3     metoprolol tartrate (LOPRESSOR) 100 MG tablet TAKE 1 TABLET BY MOUTH TWICE A DAY. **REPLACES 50MG STRENGTH** 180 tablet 3     tamsulosin (FLOMAX) 0.4 mg cap Take 2 capsules (0.8 mg total) by mouth daily. 180 capsule 2     warfarin ANTICOAGULANT (COUMADIN/JANTOVEN) 5 MG tablet TAKE 1 TO 2 TABLETS (5 - 10 MG) BY MOUTH DAILY AS DIRECTED. DOSE ADJUSTED PER INR RESULT. 180 tablet 1     No current facility-administered medications for this visit.

## 2021-06-09 NOTE — TELEPHONE ENCOUNTER
ANTICOAGULATION  MANAGEMENT    Assessment     Today's INR result of 2.67 is Therapeutic (goal INR of 2.0-3.0)        Warfarin taken as previously instructed    No new diet changes affecting INR    Concurrent use of ibuprofen  and warfarin may increase risk of bleeding, but not expected to affect INR - informed patient of this     Continues to tolerate warfarin with no reported s/s of bleeding or thromboembolism     Previous INR was Therapeutic    Plan:     Spoke with Cam regarding INR result and instructed:     Warfarin Dosing Instructions:  Continue current warfarin dose 10 mg daily on Sundays, Tuesdays, and Thursdays; and 5 mg daily rest of week  (0 % change)    Instructed patient to follow up no later than: 8 weeks     Education provided: importance of therapeutic range, importance of following up for INR monitoring at instructed interval and importance of taking warfarin as instructed    Cam verbalizes understanding and agrees to warfarin dosing plan.    Instructed to call the [facility] for any changes, questions or concerns. (#[phone])   ?   Sobia Ibrahim RN    Subjective/Objective:      Cam Walden, a 70 y.o. male is on warfarin.     Cam reports:     Home warfarin dose: verbally confirmed home dose with Cam  and updated on anticoagulation calendar     Missed doses: No     Medication changes:  Yes, ibuprofen      S/S of bleeding or thromboembolism:  No     New Injury or illness:  No, reports had a fall 5 weeks ago      Changes in diet or alcohol consumption:  No     Upcoming surgery, procedure or cardioversion:  No    Anticoagulation Episode Summary     Current INR goal:   2.0-3.0   TTR:   87.5 % (1 y)   Next INR check:   9/7/2020   INR from last check:   2.67 (7/13/2020)   Weekly max warfarin dose:      Target end date:   Indefinite   INR check location:      Preferred lab:      Send INR reminders to:   [facility]    Indications    Atrial flutter (H) [I48.92]           Comments:             Anticoagulation Care Providers     Provider Role Specialty Phone number    Kamaljit Plata MD Referring Family Medicine [phone]

## 2021-06-10 NOTE — TELEPHONE ENCOUNTER
Please call Aurea at ph. [phone]/[facility] requesting to speak to Dr García regarding results.     Libra Carpenter RN  [facility]    Additional Information    Nursing judgment    Protocols used: NO PROTOCOL AVAILABLE - INFORMATION ONLY-A-[place]

## 2021-06-10 NOTE — TELEPHONE ENCOUNTER
Medication Question or Clarification  Who is calling: Celia, [facility]  What medication are you calling about (include dose and sig)?: warfarin 5 mg, varying dose in correlation with INR level  Who prescribed the medication?: Kamaljit Plata MD   What is your question/concern?: Patient is having chemo port placed on 8/4/24/20.  [facility] would like patient off warfarin for 3 to 5 days.  Patient will need to have his INR level less than or equal to 3.0 on the day of placement.  [facility] will check INR level Cam arrives to his appointment.  Please notified patient of holding of warfarin instructions.  No need to call Na back.  Requested Pharmacy: n/a  Okay to leave a detailed message?: No

## 2021-06-10 NOTE — PRE-PROCEDURE
Procedure Name: right chest port placement   Date/Time: 8/24/2020 7:57 AM  Written consent obtained?: Yes  Risks and benefits: Risks, benefits and alternatives were discussed  Consent given by: patient  Expected level of sedation: moderate  ASA Class: Class 3- Severe systemic disease, definite functional limitations  Mallampati: Grade 2- soft palate, base of uvula, tonsillar pillars, and portion of posterior pharyngeal wall visible  Patient states understanding of procedure being performed: Yes  Patient's understanding of procedure matches consent: Yes  Procedure consent matches procedure scheduled: Yes  Appropriately NPO: yes  Lungs: lungs clear with good breath sounds bilaterally  Heart: normal heart sounds and rate  History & Physical reviewed: Abbreviated history and physical done prior to moderate sedation  Statement of review: I have reviewed the lab findings, diagnostic data, medications, and the plan for sedation

## 2021-06-10 NOTE — PROGRESS NOTES
"MTM Follow Up Encounter  ASSESSMENT AND PLAN  Hypertension: BP is at goal of <140/90 mmHg per JNC8.  Will recheck a BMP today since it has not been rechecked since HCTZ dose increased.  Increased urination likely due to fluid intake in the morning.  Continue current regimen.  PLAN:   1.  BMP checked today, results pending.     Type 2 Diabetes:  Worsened. A1C not at goal of <7%. FBP unknown if at goal  mg/dL -- patient did not have glucometer and roughly reported values. Recommended to bring blood sugars to future appointments. He seemed surprised by his worsened A1c. Will increased glipizide today to 10 mg. Reviewed the risk of weight gain with glipizide and the importance of making lifestyle changes. Discussed portion sizes and filling pasta with protein to \"bulk\" it with less carbs. Encouraged activity. Will call him in one month to have him report me his blood sugars, which he plans on writing in a log book.   PLAN:   1. Increase glipizide XL to 10 mg QAM.     MTM FOLLOW UP  1 month phone call   3 months in person - A1c check    SUBJECTIVE AND OBJECTIVE  Cam Walden is a 67 y.o. male here for a follow-up medication therapy management (MTM) appointment.     Hypertension: Currently taking HCTZ 25 mg QAM (dose increased form 12.5 mg on 3/31/17) and losartan 100 mg daily.  Made an appointment for blood pressure check a few weeks ago due to high blood pressure found at his colonoscopy.  Patient does not monitor BP at home. Complains of no side effects, denies lightheadedness/dizziness.  Denies chest pain, headache, anuria, or blurry vision.  Denies significant urination since starting hydrochlorothiazide- he drinks 2 cups of coffee in the morning and 4 cups of water when taking his morning medicines which she feels like more so contributes.  Last BMP 3/24/2017    Type 2 Diabetes: Currently taking glipizide XL 5 mg every morning and metformin 1000 mg twice daily. Tests BG 2 times daily.  Did not bring " glucometer today.  Reports blood sugars: Usually 130s in the morning, denies blood sugars in the 200s.  Reports sometimes checking 2 hours after dinner and it would be 158.  Last A1c checked today = 8.2% (previously 7.1% on 2/13/2017).   Hypoglycemia none  Admits to making no lifestyle changes since last MTM appointment.  Feels like his biggest dietary issue is eating a lot of spaghetti.  Has not started exercising at all.          Cam's medication list was reviewed with them, discussing reason for use, directions for use, and potential side effects of each medication as needed. Indication, safety, efficacy, and convenience was assessed for all medications addressed above.  No environmental factors were noted currently affecting patient.  This care plan was communicated via EMR with his primary care provider, Kamaljit Plata MD, who is the authorizing prescriber for this visit.  Direct supervision was available by either the patient's PCP or other available provider.    Time and complexity billing metrics are included in the docflowsheet linked to this visit    Time spent: 20 min  Angie Russell, Pharm.D., BCACP   MTM Pharmacist at [facility]

## 2021-06-10 NOTE — TELEPHONE ENCOUNTER
I called Cam to check in as I haven't spoken to him since I helped schedule his initial consult.  He states he is doing ok and has his first procedure tomorrow.  He has multiple things scheduled and I asked but he feels well informed and doesn't have any questions for me at this time.  I encouraged him to call me if he needs anything and I will continue to follow up with him.  He appreciated the check in.

## 2021-06-10 NOTE — TELEPHONE ENCOUNTER
Spoke with Na at [facility]. Reports patient is having port placement on 8/24/2020. INR needs to be 3.0 or less than in order to undergo procedure. Requesting holding orders.     Patient reports he has been holding warfarin since 8/18/2020. Patient had a past surgery polyp removal (10/31/207) and no bridging was required (see 10/23/2017). Warfarin holding instructions needed. Pharmacist to review.     Sobia Ibrahim RN

## 2021-06-10 NOTE — PROGRESS NOTES
ASSESSMENT & PLAN:  1. Essential hypertension   Encouraged patient to continue healthy lifestyle changes, hydrochlorothiazide was just recently increased, will give a little bit of time for the medication to work, if blood pressure is persistently elevated will add a beta-blocker like bystolic, because patient does have anxiety hopefully that will also help relieve his mild tachycardia associated with anxiety.  He will follow-up with the in-house pharmacist in 1 month.      No orders of the defined types were placed in this encounter.    There are no discontinued medications.    No Follow-up on file.    CHIEF COMPLAINT:  Chief Complaint   Patient presents with     Hypertension     F/U     Diabetes     F/U       HISTORY OF PRESENT ILLNESS:  Cam is a 67 y.o. male presenting to the clinic today for a medication check.     Hypertension: He is currently taking 100 mg of losartan and 25 mg of hydrochlorothiazide daily. The hydrochlorothiazide was recently increased in the past few months. He has been urinating frequently with the addition of the HCTZ, but he admits that he drinks quite a bit of water. He had a colonoscopy one week ago, and his blood pressure was elevated at that time, so he was told to follow up with his PCP. He thinks that this blood pressure was elevated during the colonoscopy because he was nervous. He has another appointment with the [facility] pharmacist next month.     Diabetes: His last hemoglobin A1c was 7.1% on 2/13/2017. He is taking 1000 mg of metformin twice daily and 5 mg of glipizide daily.     Hyperlipidemia: He takes 40 mg of atorvastatin daily.     Health Maintenance: There was a polyp seen on his recent colonoscopy, and it was biopsied. He was told he may need to have a partial colon resection pending the biopsy results.      REVIEW OF SYSTEMS:   He denies blood in the stool. He takes a vitamin D supplement and a baby aspirin. All other systems are negative.    PFSH:  Reviewed, as below.  "    TOBACCO USE:  History   Smoking Status     Never Smoker   Smokeless Tobacco     Never Used       VITALS:  Vitals:    04/28/17 0742 04/28/17 0802   BP: 134/88 134/88   Patient Site: Right Arm    Pulse: 72    Resp: 15    Temp: 97.4  F (36.3  C)    TempSrc: Oral    SpO2: 95%    Weight: (!) 313 lb (142 kg)    Height: 5' 10\" (1.778 m)      Wt Readings from Last 3 Encounters:   04/28/17 (!) 313 lb (142 kg)   02/13/17 (!) 312 lb 8 oz (141.7 kg)   10/17/16 (!) 308 lb (139.7 kg)     Body mass index is 44.91 kg/(m^2).    PHYSICAL EXAM:  GENERAL:  Patient alert, in no acute distress.  REPEAT BP BY MD: 134/88, RUE, sitting.   RESP:  Clear to auscultation without crackles, wheezes or distress.  Normal respiratory effort.   CV:  Regular rate and rhythm without murmurs, rubs or gallops.    NEURO:  CN II-XII intact, motor & sensory function all intact.  DTR and reflexes normal.  PSYCHIATRIC:  Alert & oriented with normal mood and affect.  Good judgment and insight.      ADDITIONAL HISTORY SUMMARIZED (2): Reviewed 2/13/2017 [facility] pharmacy note regarding medications.   DECISION TO OBTAIN EXTRA INFORMATION (1): None.   RADIOLOGY TESTS (1): None.  LABS (1): Labs from 2/13/2017 and 3/24/2017 reviewed.   MEDICINE TESTS (1): None.  INDEPENDENT REVIEW (2 each): None.     The visit lasted a total of 11 minutes face to face with the patient. Over 50% of the time was spent counseling and educating the patient about his medications.    IKeith, am scribing for and in the presence of, Dr. Plata.    I, Dr. Plata, personally performed the services described in this documentation, as scribed by Keith Mcqueen in my presence, and it is both accurate and complete.    Dragon dictation was used for this note.  Speech recognition errors are a possibility.    MEDICATIONS:  Current Outpatient Prescriptions   Medication Sig Dispense Refill     aspirin 81 MG EC tablet Take 1 tablet (81 mg total) by mouth daily. 90 tablet 3     atorvastatin " (LIPITOR) 40 MG tablet Take 1 tablet (40 mg total) by mouth daily. 90 tablet 3     blood glucose test (ACCU-CHEK SMARTVIEW TEST STRIP) strips Use to check blood sugars twice daily as directed 100 each 11     blood-glucose meter (ACCU-CHEK ABIMBOLA) Misc Use to check blood sugar twice per day. 1 each 1     cholecalciferol, vitamin D3, (VITAMIN D3) 1,000 unit capsule Take 1 capsule (1,000 Units total) by mouth daily. 100 capsule 2     generic lancets (ACCU-CHEK FASTCLIX) Use to check blood sugar twice per day. 102 each 11     glipiZIDE (GLUCOTROL) 5 MG 24 hr tablet Take 1 tablet (5 mg total) by mouth every morning. 90 tablet 0     hydroCHLOROthiazide (HYDRODIURIL) 25 MG tablet Take 1 tablet (25 mg total) by mouth daily. 30 tablet 1     losartan (COZAAR) 100 MG tablet Take 1 tablet (100 mg total) by mouth daily. 90 tablet 3     metFORMIN (GLUCOPHAGE) 1000 MG tablet Take 1 tablet (1,000 mg total) by mouth 2 (two) times a day with meals. 180 tablet 3     senna (SENOKOT) 8.6 mg tablet Take 1 tablet by mouth bedtime.       No current facility-administered medications for this visit.        Total data points: 3      There are no Patient Instructions on file for this visit.

## 2021-06-10 NOTE — TELEPHONE ENCOUNTER
Orders being requested: Coumadin/Warfarin Hold  Reason service is needed/diagnosis: Patient is having chemo port placed on 8/4/24/20.  [facility] would like patient off warfarin for 3 to 5 days.  Patient will need to have his INR level less than or equal to 3.0 on the day of placement.  [facility] will check INR level Cam arrives to his appointment.  When are orders needed by: asap- this information got relayed to the patient based on the encounter from 8.17.2020-but radiology never got the documentation.  Encounter from the [facility] radiology did not need a call back, but Na is saying she needs this documentation.    Where to send Orders:  Phone [phone]  Okay to leave detailed message?  Yes

## 2021-06-10 NOTE — TELEPHONE ENCOUNTER
Informed patient of plan below. He will resume warfarin  if cleared by provider performing the port placement and start by taking  a one time booster dose of warfarin 15 mg, and resume his maintenance dose warfarin 10 mg Sundays, Tuesdays, and Thursdays, and 5 mg all other days of the week. Patient agreed with plan, verbalized understanding, INR recheck scheduled 8/31.    Confirmed with NA earlier, she has access to Epic so she will be able to obtain these holding orders.     Sobia Ibrahim RN

## 2021-06-10 NOTE — PROGRESS NOTES
Pt arrived [facility] for his bone marrow Bx, pt has a bruised area on his marshal from his lymph node Bx, Went over today's plan of care, questions and follow up, Went over BM procedure, and after care, Pre med's given, DR Estrada here BM Bx done, Pt observed for 3 hrs after waiting for his ride, Irene box lunch, vs's bandage dry AVS given ad gone over . PT left [facility] at 1430 to meet his ride with staff.   Mylene Ramos

## 2021-06-10 NOTE — TELEPHONE ENCOUNTER
TERESE-PROCEDURAL ANTICOAGULATION  MANAGEMENT    Assessment     Warfarin interruption plan for Port Placement on 8/24.    Atrial Fibrillation      Risk stratification for thromboembolism due to afib indication for anticoag: low. (2017 ACC periprocedure pathway for NVAF)      [id number] = 3 (Hypertension, Diabetes, Age 65-74)      Tolerated hold without bridge 2017 for surgery      Additional factors: Diffuse Large B-cell lymphoma      3-5 day hold requested by radiology; Patient started holding already 8/18 day after he was contacted by clinic (= 6 day hold)    NVAF: 2017 ACC periprocedure pathway for NVAF advises NO bridge for low risk stratification ([id number] score <=4 or and no prior hx of stroke, TIA or systemic embolism)    Plan       Pre-Procedure:    Reasonable to continue holding warfarin (shorter hold would have been preferred however)    No bridge from Afib perspective         Post-Procedure:    Resume warfarin dose if okay with provider doing procedure on night of procedure, 8/24 PM:  15 mg x 1 then resume 10 mg on Tue, Thur, Sun; 5 mg daily rest of week    Recheck INR 7 days after resuming warfarin     Will request confirmation from PCP/ONC if bridging should be added for lymphoma  ?   Osiris Ash, PharmD    Subjective/Objective:      Cam Walden, a 70 y.o. male    Reason for Anticoagulation: Atrial Fibrillation    Goal INR Range: 2-3    Patient bridged in past: No 9/2017 polyp removal surgery    Pertinent History: cardioversion 2017    Wt Readings from Last 3 Encounters:   08/14/20 (!) 290 lb 8 oz (131.8 kg)   07/23/20 (!) 293 lb 6 oz (133.1 kg)   07/22/20 (!) 292 lb 9.6 oz (132.7 kg)      Ideal body weight: 77.6 kg (171 lb 1.2 oz)  Adjusted ideal body weight: 99.3 kg (218 lb 13.5 oz)     Lab Results   Component Value Date    INR 2.67 (H) 07/13/2020    INR 3.00 (H) 04/24/2020    INR 2.70 (H) 02/28/2020     Lab Results   Component Value Date    HGB 12.5 (L) 08/19/2020    HCT 39.4 (L)  08/19/2020     08/19/2020

## 2021-06-10 NOTE — TELEPHONE ENCOUNTER
ANTICOAGULATION  MANAGEMENT    Assessment     Today's INR result of 1.13 is Subtherapeutic (goal INR of 2.0-3.0)        Warfarin recently held as instructed which may be affecting INR - patient had port placement with sedation today    No new diet changes affecting INR    No new medication/supplements affecting INR    Continues to tolerate warfarin with no reported s/s of bleeding or thromboembolism     Previous INR was Therapeutic    Plan:     Spoke on phone with Cam regarding INR result and instructed:     Warfarin Dosing Instructions:  Take one time booster dose warfarin 15 mg  then continue current warfarin dose 10 mg daily on Sundays, Tuesdays and Thursdays; and 5 mg daily rest of week  (0 % change)    (as previously instructed)     No bridging was needed for this procedure see 8/21 for further details     Instructed patient to follow up no later than: 8/31    Education provided: importance of therapeutic range, importance of following up for INR monitoring at instructed interval, importance of taking warfarin as instructed and when to seek medical attention/emergency care    Cam verbalizes understanding and agrees to warfarin dosing plan.    Instructed to call the [facility] for any changes, questions or concerns. (#[phone])   ?   Sobia Ibrahim RN    Subjective/Objective:      Cam LUIS MIGUEL Walden, a 70 y.o. male is on warfarin.     Cam reports:     Home warfarin dose: verbally confirmed home dose with Cam and updated on anticoagulation calendar     Missed doses: No     Medication changes:  No     S/S of bleeding or thromboembolism:  No     New Injury or illness:  No     Changes in diet or alcohol consumption:  No     Upcoming surgery, procedure or cardioversion:  No    Anticoagulation Episode Summary     Current INR goal:   2.0-3.0   TTR:   92.5 % (1 y)   Next INR check:   8/31/2020   INR from last check:   1.13! (8/24/2020)   Weekly max warfarin dose:      Target end date:   Indefinite   INR check  location:      Preferred lab:      Send INR reminders to:   [facility]    Indications    Atrial flutter (H) (Resolved) [I48.92]           Comments:            Anticoagulation Care Providers     Provider Role Specialty Phone number    Kamaljit Plata MD Referring Family Medicine [phone]

## 2021-06-10 NOTE — TELEPHONE ENCOUNTER
Patient's surgeon or provider: Dr. Dang    Caller: Cam    Phone Number: [phone]  OK to leave message: Yes    Reason for Call: Pt is calling for the US results that was performed on 7/29/2020. Please call patient back with results

## 2021-06-10 NOTE — CONSULTS
[facility] Hematology and Oncology Consult Note    Patient: Cam Walden  MRN: [id number]  Date of Service: 08/14/2020        Reason for Visit    I was consulted by Dr. 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  Patient appears to have lymphoma arising from the thyroid and involving the neck musculature and skin of the anterior neck.    Explained the diagnosis to the patient and its natural history.    Recommend staging with lab work, PET scan and bone marrow aspirate and biopsy.    Reviewed that based on the final staging we can make recommendations about specific treatment.  Treatment will likely involve between 3-8 cycles of chemotherapy and may involve radiation therapy as well.    Reviewed the bone marrow procedure and small risks for pain, bleeding and infection.    Discussed placement of Port-A-Cath which will be scheduled.  Patient has had recent echocardiogram showing normal ejection fraction.    Denisha that treatment is typically done with curative intent.    Patient's questions answered.    Plan: Check CBC and LDH today  Schedule PET scan, bone marrow aspirate and biopsy and Port-A-Cath placement  Return in 1 to 2 weeks to review results  Tentative plan to start chemotherapy the week after he returns  Asked him to call us if he develops any new symptoms related to the neck mass            ECOG Performance   ECOG Performance Status: 1  Distress Assessment  Distress Assessment Score: No distress        Problem List    1. Lymphoma (H)  HM1(CBC and Differential)    LD(LDH)    NM PET CT Skull to Mid Thigh    IR Port Placement 5+ Years    Bone Marrow Biopsy    Bone Marrow Aspiration    lidocaine (PF) 10 mg/mL (1 %) injection 10 mL (XYLOCAINE-MPF)    Bone Marrow  Biopsy and Exam    Flow cytometry    Chromosome Analysis, Bone Marrow    LORazepam tablet 0.5 mg (ATIVAN)    HYDROcodone-acetaminophen 5-325 mg per tablet 1 tablet    naloxone injection 0.2-0.4 mg (NARCAN)    naloxone injection 0.2-0.4 mg (NARCAN)   2. Diffuse large b-cell lymphoma, lymph nodes of head, face, and neck (H)   NM PET CT Skull to Mid Thigh    Chromosome Analysis, Bone Marrow   3. Diffuse large B-cell lymphoma of extranodal site (H)             CC: Kamaljit Plata MD      ______________________________________________________________________________      Staging History    Cancer Staging  No matching staging information was found for the patient.    History    Mr. 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    He developed a palpable neck mass with skin changes about 2 months ago.  Was initially treated for skin infection without improvement.  Then underwent CT of the neck and referral to ENT.  Had punch biopsy of the skin which shows diffuse large B-cell lymphoma.    Patient has a palpable neck mass but no associated pain or tenderness or change in his speech, swallowing or voice.  Denies change of appetite or weight.  No fever chills or night sweats.  No itching.  He has history of lightheadedness and history of neuropathy related to diabetes.  No headaches or dizziness.  No shortness of breath or cough.  No abdominal symptoms.  No change with bowels or urine.  No bleeding.  No rash elsewhere.    Past medical history as above and also history of obstructive sleep apnea.  Past surgical history includes surgery on the large intestine in 2017 to remove remnants of a colonic polyp.    He had colonoscopy in 2016.  PSA normal from July 2020.    Patient is single and works as a  and lives in his home.    He has never  smoked and does not drink.      Past History  Past Medical History:   Diagnosis Date     Aortic stenosis     mild     Atrial fibrillation (H)      Atrial flutter (H) 8/21/2017     Atrial flutter with rapid ventricular response (H)      Colon polyps 08/08/2016    Per colonoscopy 8/8/16.  Large and small.  Some removed. Others to be removed surgically. (per patient)     Diabetes mellitus (H)     DM II     Diabetes mellitus, type II (H)      GI bleed     Following colon polyp removal     Hyperlipidemia      Hypertension      Morbid obesity (H)      JOHNNY (obstructive sleep apnea)     no cpap     Rectal bleeding      Past Surgical History:   Procedure Laterality Date     CARDIOVERSION  08/25/2017     Family History   Problem Relation Age of Onset     Dementia Mother      Asthma Father      Social History     Socioeconomic History     Marital status: Single     Spouse name: None     Number of children: 0     Years of education: None     Highest education level: None   Occupational History     None   Social Needs     Financial resource strain: None     Food insecurity     Worry: None     Inability: None     Transportation needs     Medical: None     Non-medical: None   Tobacco Use     Smoking status: Never Smoker     Smokeless tobacco: Never Used   Substance and Sexual Activity     Alcohol use: No     Drug use: No     Sexual activity: Never   Lifestyle     Physical activity     Days per week: None     Minutes per session: None     Stress: None   Relationships     Social connections     Talks on phone: None     Gets together: None     Attends Sabianist service: None     Active member of club or organization: None     Attends meetings of clubs or organizations: None     Relationship status: None     Intimate partner violence     Fear of current or ex partner: None     Emotionally abused: None     Physically abused: None     Forced sexual activity: None   Other Topics Concern     None   Social History Narrative     None  "    Allergies    Allergies   Allergen Reactions     Lisinopril Cough     Trulicity [Dulaglutide] Nausea Only       Review of Systems    General  General (WDL): All general elements are within defined limits  ENT  ENT (WDL): All ENT elements are within defined limits  Vertigo (Dizziness): Yes, Recent (Less than 3 months)  Respiratory  Respiratory (WDL): All respiratory elements are within defined limits  Cardiovascular  Cardiovascular (WDL): Exceptions to WDL  Lightheadedness: Yes - Recent (Less than 3 months)  Endocrine  Endocrine (WDL): All endocrine elements are within defined limits  Gastrointestinal  Gastrointestinal (WDL): All gastrointestinal elements are within defined limits  Musculoskeletal  Musculoskeletal (WDL): Exceptions to WDL  Back Pain: Yes - Chronic (Greater than 3 months)  Neurological  Neurological (WDL): Exceptions to WDL  Vertigo (Dizziness): Yes, Recent (Less than 3 months)  Dominant Hand: Right  Numbness and/or tingling: Yes - Chronic (Greater than 3 months)(Numbness to feet )  Psychological/Emotional  Psychological/Emotional (WDL): All psychological/emotional elements are within defined limits  Hematological/Lymphatic  Hematological/Lymphatic (WDL): Exceptions to WDL  Swollen glands: Yes - Recent (Less than 3 months)  Dermatological  Dermatologic (WDL): Exceptions to WDL  Rash : Yes - Recent (Less than 3 months)  Genitourinary/Reproductive  Genitourinary/Reproductive (WDL): All genitourinary/reproductive elements are within defined limits  Reproductive (Females only)     Pain  Currently in Pain: No/denies    Physical Exam    Recent Vitals 8/14/2020   Height 6' 0\"   Weight 290 lbs 8 oz   BSA (m2) 2.59 m2   /75   Pulse 62   Temp 98.7   Temp src 1   SpO2 96   Some recent data might be hidden       GENERAL: Alert and oriented to time place and person. Seated comfortably. In no distress.    HEAD: Atraumatic and normocephalic.    EYES: BRIDGETTE, EOMI.  No pallor.  No icterus.    Oral cavity: no " mucosal lesion or tonsillar enlargement.    NECK: supple. JVP normal.  Palpable neck mass arising from the thyroid measuring 7 x 7 cm with erythematous, nodular infiltration of the skin in the anterior neck    LYMPH NODES: No palpable, cervical, axillary or inguinal lymphadenopathy.    CHEST: clear to auscultation bilaterally.  Resonant to percussion throughout bilaterally.  Symmetrical breath movements bilaterally.    CVS: S1 and S2 are heard. Regular rate and rhythm.  No murmur or gallop or rub heard.  No peripheral edema.    ABDOMEN: Soft. Not tender. Not distended.  No palpable hepatomegaly or splenomegaly.  No other mass palpable.  Bowel sounds heard.    EXTREMITIES: Warm.    SKIN: no rash, or bruising or purpura.  Has a full head of hair.    CNS: Nonfocal      Lab Results    Recent CMP is normal.  Imaging Results    Ct Soft Tissue Neck With Contrast    Result Date: 7/27/2020  EXAM: CT SOFT TISSUE NECK W CONTRAST LOCATION: [facility] DATE/TIME: 7/26/2020 1:01 PM INDICATION: Neck mass COMPARISON: None available at time of dictation CONTRAST: Iohexol (Omni) 100 mL TECHNIQUE: Routine with IV contrast. Multiplanar reformats. Dose reduction techniques were used. FINDINGS: VISUALIZED INTRACRANIAL/ORBITS/SINUSES: No abnormality of the visualized intracranial compartment. Postoperative change of the left lens, otherwise the partially visualized orbits are unremarkable. Possibility of retention cyst in the right maxillary sinus, otherwise the paranasal sinuses and temporal bone structures are well aerated. SUPRAHYOID NECK: Normal nasopharynx and oropharynx. Normal parapharyngeal and  spaces. Normal oral cavity and floor of mouth structures. INFRAHYOID NECK: Normal supraglottic larynx, vocal cords, and hypopharynx. SALIVARY GLANDS: Normal parotid and submandibular glands. LYMPH NODES: Slightly rounded subcentimeter left level III (series 3 image #198), left level IV (series 3 image #226), left  supraclavicular (series 3 image #[id number]), and right level III/V (series 3 image #215) lymph nodes suspicious for early metastases VESSELS: Vascular structures of the neck are patent. THYROID: Large soft tissue mass presumably arising from the thyroid gland tracking from the level of the hyoid bone to the thoracic inlet with involvement of the strap and sternocleidomastoid musculature as extends toward the skin of lower neck measuring  7.2 x 7.2 x 8.5 cm. OTHER: No destructive osseous lesion. The included lung apices are clear.     1.  Large soft tissue mass presumably arising from the thyroid gland tracking from the level of the hyoid bone to the thoracic inlet with involvement of the strap and sternocleidomastoid musculature as extends toward the skin of lower neck. While the exact etiology is indeterminate these findings are suspicious for a locally advanced neoplasm, which is amenable to ultrasound-guided histologic sampling if desired. 2.  Slightly rounded subcentimeter left level III, left level V, left supraclavicular, and right level III/V lymph nodes suspicious for early metastases. - - - - - - - - - - - - - - - - - - - - - - - - - - - - - - - - - - - - - - - - - - - - - - - - - - - - - - - - - - - - - - - - - - - - - - - - - - - - The results above were discussed with Haven, a nurse that works directly with ALISON BUCKLEY on 7/27/2020 9:39 AM by Dr. Kenney Whitten. - - - - - - - - - - - - - - - - - - - - - - - - - - - - - - - - - - - - - - - - - - - - - - - - - - - - - - - - - - - - - - - - - - - - - - - - - - - -         Signed by: Blade Ibanez MD

## 2021-06-10 NOTE — TELEPHONE ENCOUNTER
I spoke with patient regarding results of CT.   He has an appt with ENT - Dr Dang on Wed 7/29/20  I ordered the US guided biopsy as recommended by radiology and patient was given the number to make an appt.       . Dr. Parvin Larry  7/27/2020

## 2021-06-10 NOTE — TELEPHONE ENCOUNTER
BMBX schedule on Wednesday 8/19/20 with DR Estrada @ 9:30am arrival time.  BMBX schedule with Stewart @ [facility] lab [id number]

## 2021-06-10 NOTE — PROGRESS NOTES
HISTORY OF PRESENT ILLNESS  Asked to see by Dr. García for evaluation of neck mass. Patient reports that he sleeps in a chair with his neck flexed. He believes that it is rated to the flexion of his neck. No pain. No problems with swallowing. No change in voice.     REVIEW OF SYSTEMS  Review of Systems: a 10-system review was performed. Pertinent positives are noted in the HPI and on a separate scanned document in the chart.    PMH, PSH, FH and SH has documented in the EHR.      EXAM    CONSTITUTIONAL  General Appearance:  Normal, well developed, well nourished, no obvious distress  Ability to Communicate:  communicates appropriately.    HEAD AND FACE  Appearance and Symmetry:  Normal, no scalp or facial scarring or suspicious lesions.  Paranasal sinuses tenderness:  Normal, Paranasal sinuses non tender    EARS  Clinical speech reception threshold:  Normal, able to hear normal speech.  Auricle:  Normal, Auricles without scars, lesions, masses.  External auditory canal:  Normal, External auditory canal normal.  Tympanic membrane:  Normal, Tympanic membranes normal without swelling or erythema.  Tympanic membrane mobility:  Normal, Normal tympanic membrane mobility.    NOSE (speculum or scope)  Architecture:  Normal, Grossly normal external nasal architecture with no masses or lesions.  Mucosa:  Normal mucosa, No polyps or masses.  Septum:  Normal, Septum non-obstructing.  Turbinates:  Normal, No turbinate abnormalities    ORAL CAVITY AND OROPHARYNX  Lips:  Normal.  Dental and gingiva:  Normal, No obvious dental or gingival disease.  Mucosa:  Normal, Moist mucous membranes.  Tongue:  Normal, Tongue mobile with no mucosal abnormalities  Hard and soft palate:  Normal, Hard and soft palate without cleft or mucosal lesions.  Oral pharynx:  Normal, Posterior pharynx without lesions or remarkable asymmetry.  Saliva:  Normal, Clear saliva.  Masses:  Normal, No palpable masses or pathologically enlarged lymph  nodes.    NECK  Masses/lymph nodes: Neck mass with apparent skin involvement.  Salivary glands:  Normal, Parotid and submandibular glands.  Trachea and larynx position:  Normal, Trachea and larynx midline.  Thyroid:  Normal, No thyroid abnormality.  Tenderness:  Normal, No cervical tenderness.  Suppleness:  Normal, Neck supple    NEUROLOGICAL  Speech pattern:  Normal, Proasaic    RESPIRATORY  Symmetry and Respiratory effort:  Normal, Symmetric chest movement and expansion with no increased intercostal retractions or use of accessory muscles.     CT SOFT TISSUE NECK  Reviewed images.    IMPRESSION  Likely thyroid malignancy.    RECOMMENDATION  Biopsy obtained from the anterior neck mass after injection with lido + epi. Will contact with path results.    Jourdan Dang MD

## 2021-06-10 NOTE — TELEPHONE ENCOUNTER
Epic records    7/29/20 - ENT Consult note and neck biopsy - Dr. Jourdan Dang    7/23/20 - Progress note - Dr. Aakash García  7/22/20 - [facility] Progress note - Dr. Osvaldo Alfonso    Imaging    7/26/20 - CT Soft tissue neck - [facility]  Images viewable in [facility].    Pathology    7/29/20 - Surgical pathology results

## 2021-06-10 NOTE — TELEPHONE ENCOUNTER
Spoke to patient and let him know that Dr. Dang just received his pathology results and will be calling him.    Kaitlin Lock RN  [facility]  [phone]

## 2021-06-10 NOTE — TELEPHONE ENCOUNTER
Called [facility] and spoke with radiologist Sachin. Pt had seen Dr. García on Thursday with neck mass.  CT was ordered by Dr. García.  Sachin said results shows what appears to be an aggressive malignancy involving the clint's apple through the thoracic chest with some muscle involvement.  Also some small lymph nodes are involved.  Sachin suspects could be aggressive thyroid cancer however, Sachin suggests that a US guided biopsy be performed to determine malignancy.  From there can do a Iodine uptake, total thryoidectomy.  Pt was referred to Jourdan Dang , ENT, but no apt made as of today.  Sachin suggests first step is to do the US guided biopsy.      Pt wished to be called with results.  How would covering provider like to proceed?  Thanks.

## 2021-06-10 NOTE — TELEPHONE ENCOUNTER
Refill Approved    Rx renewed per Medication Renewal Policy. Medication was last renewed on 8/10/19.    Madhavi Stallings, [facility] Triage/Med Refill 7/28/2020     Requested Prescriptions   Pending Prescriptions Disp Refills     metoprolol tartrate (LOPRESSOR) 100 MG tablet [Pharmacy Med Name: METOPROLOL TARTRATE 100 MG TAB] 180 tablet 3     Sig: TAKE 1 TABLET BY MOUTH TWICE A DAY.       Beta-Blockers Refill Protocol Passed - 7/27/2020  9:41 AM        Passed - PCP or prescribing provider visit in past 12 months or next 3 months     Last office visit with prescriber/PCP: 5/16/2019 Kamaljit Plata MD OR same dept: Visit date not found OR same specialty: 7/23/2020 Aakash García MD  Last physical: 10/23/2017 Last MTM visit: Visit date not found   Next visit within 3 mo: Visit date not found  Next physical within 3 mo: Visit date not found  Prescriber OR PCP: Kamaljit Plata MD  Last diagnosis associated with med order: 1. Atrial flutter with rapid ventricular response (H)  - metoprolol tartrate (LOPRESSOR) 100 MG tablet [Pharmacy Med Name: METOPROLOL TARTRATE 100 MG TAB]; TAKE 1 TABLET BY MOUTH TWICE A DAY.  Dispense: 180 tablet; Refill: 3    If protocol passes may refill for 12 months if within 3 months of last provider visit (or a total of 15 months).             Passed - Blood pressure filed in past 12 months     BP Readings from Last 1 Encounters:   07/23/20 122/76

## 2021-06-10 NOTE — PROGRESS NOTES
Bone Marrow Biopsy and Aspiration Procedure Note     Informed consent was obtained and potential risks including bleeding, infection and pain were reviewed with the patient.     Posterior iliac crest(s) prepped with Betadine.     Lidocaine 1% local anesthesia infiltrated into the subcutaneous tissue.    Right PIC bone marrow biopsy and bone marrow aspirate was obtained.     The procedure was tolerated well and there were no complications.    Specimens sent for: routine.    Physician: Jignesh Estrada

## 2021-06-10 NOTE — TELEPHONE ENCOUNTER
Spoke w/patient, relayed warfarin instructions from [facility]. Patient verbalized understanding.

## 2021-06-11 NOTE — PROGRESS NOTES
Pt here for C1D1 R-chop  All medications reviewed prior to administration. Pt tolerated rituxan without incident. Positive blood return throughout. neulasta injector explained and pt aware of care. Pt given written handouts of medications and chemotherapy and you booklet and thermometer. Pt given opportunity for questions and port flushed and deaccessed upon completion. Pt has phone # to call and is aware of treatment plan.

## 2021-06-11 NOTE — TELEPHONE ENCOUNTER
PA initiated for Emla Cream.     Medication Prior Authorization    Start Date:  9/10/20  Type:  New  Urgency:  Urgent  Med Type:  Non-Specialty  Insurance Info:  [employer] - Phone [phone] Fax [phone]  Method:  ePA  Reference #:  Key: [id number] - PA Case ID: [id number]  ___________________________________________________________________________________________________________________:

## 2021-06-11 NOTE — PROGRESS NOTES
Pt seen by Naomi Morales 7/18 see note  CV ordered and will follow  Pt is on Xarelto will change over to coumadin, 7/20,  rx sent thru  Pt is to start 5 mg 1 qd and get INR at [facility] in 5 days  PMD office called to inform and review plan  Pt understands and agrees to plan.

## 2021-06-11 NOTE — TELEPHONE ENCOUNTER
I called to check in with Cam before he starts his chemo on Monday.  He is actually looking forward to it so he can get treatment started.  He did not have any questions for me.  I encouraged him to call if he does and I will continue to follow up with him as well.

## 2021-06-11 NOTE — PROGRESS NOTES
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  Will return 10/5 for cycle 2 and 10/26 for cycle 3.    PLAN:  As above    Measurable disease: PET scan    Current therapy: R-CHOP, cycle 1 on 9/15    ECOG Performance   ECOG Performance Status: 1    Distress Assessment  Distress Assessment Score: No distress    Pain         Problem List    1. Diffuse large B-cell lymphoma of extranodal site (H)  CC OFFICE VISIT LONG    CC OFFICE VISIT LONG    Infusion Appointment   2. Encounter for antineoplastic chemotherapy  CC OFFICE VISIT LONG    CC OFFICE VISIT LONG    Infusion Appointment        CC: Kamaljit Plata MD    ______________________________________________________________________________    History of Present Illness    Mr. Cam Walden is here for follow-up.  Received cycle 1 of RCHOP on 9/15.  Excellent tolerance.  No questions.    Pain Status  Currently in Pain: No/denies    Review of Systems    Constitutional  Constitutional (WDL): Exceptions to WDL  Fatigue: Fatigue relieved by rest  Neurosensory  Neurosensory (WDL): Exceptions to WDL  Ataxia: Asymptomatic, clinical or diagnostic observations only, intervention not indicated(Slow gait, shuflfing too)  Cardiovascular  Cardiovascular (WDL): All cardiovascular elements are within defined limits  Pulmonary  Respiratory (WDL): Within Defined Limits  Gastrointestinal  Gastrointestinal (WDL): Exceptions to WDL  Diarrhea: Increase of <4  stools per day over baseline, mild increase in ostomy output compared to baseline  Genitourinary  Genitourinary (WDL): All genitourinary elements are within defined limits  Integumentary  Integumentary (WDL): Exceptions to WDL(Thyroid area; swelling, purple)  Patient Coping  Patient Coping: Accepting  Distress Assessment  Distress Assessment Score: No distress  Accompanied by  Accompanied by: Alone    Past History  Past Medical History:   Diagnosis Date     Aortic stenosis     mild     Atrial fibrillation (H)      Atrial flutter (H) 8/21/2017     Atrial flutter with rapid ventricular response (H)      Cancer (H)     lymp     Colon polyps 08/08/2016    Per colonoscopy 8/8/16.  Large and small.  Some removed. Others to be removed surgically. (per patient)     Diabetes mellitus (H)     DM II     Diabetes mellitus, type II (H)      GI bleed     Following colon polyp removal     Hyperlipidemia      Hypertension      Morbid obesity (H)      JOHNNY (obstructive sleep apnea)     no cpap     Rectal bleeding          Past Surgical History:   Procedure Laterality Date     CARDIOVERSION  08/25/2017     IR PORT PLACEMENT >5 YEARS  8/24/2020       Physical Exam    Recent Vitals 9/21/2020   Height -   Weight 289 lbs   BSA (m2) 2.58 m2   /68   Pulse 72   Temp 98.5   Temp src 1   SpO2 97   Some recent data might be hidden       GENERAL: Alert and oriented to time place and person. Seated comfortably. In no distress.    HEAD: Atraumatic and normocephalic.    EYES: BRIDGETTE, EOMI.  No pallor.  No icterus.    Oral cavity: no mucosal lesion or tonsillar enlargement.    NECK: supple. JVP normal.  No thyroid enlargement.    LYMPH NODES: No palpable, cervical, axillary or inguinal lymphadenopathy.    CHEST: clear to auscultation bilaterally.  Resonant to percussion throughout bilaterally.  Symmetrical breath movements bilaterally.    CVS: S1 and S2 are heard. Regular rate and rhythm.  No murmur or gallop or rub heard.  No peripheral  edema.    ABDOMEN: Soft. Not tender. Not distended.  No palpable hepatomegaly or splenomegaly.  No other mass palpable.  Bowel sounds heard.    EXTREMITIES: Warm.    SKIN: no rash, or bruising or purpura.  Has a full head of hair.      Lab Results    Recent Results (from the past 168 hour(s))   INR   Result Value Ref Range    INR 4.60 (H) 0.90 - 1.10   Comprehensive Metabolic Panel   Result Value Ref Range    Sodium 137 136 - 145 mmol/L    Potassium 4.0 3.5 - 5.0 mmol/L    Chloride 102 98 - 107 mmol/L    CO2 20 (L) 22 - 31 mmol/L    Anion Gap, Calculation 15 5 - 18 mmol/L    Glucose 185 (H) 70 - 125 mg/dL    BUN 24 (H) 8 - 22 mg/dL    Creatinine 1.10 0.70 - 1.30 mg/dL    GFR MDRD Af Amer >60 >60 mL/min/1.73m2    GFR MDRD Non Af Amer >60 >60 mL/min/1.73m2    Bilirubin, Total 0.5 0.0 - 1.0 mg/dL    Calcium 8.7 8.5 - 10.5 mg/dL    Protein, Total 6.9 6.0 - 8.0 g/dL    Albumin 3.6 3.5 - 5.0 g/dL    Alkaline Phosphatase 87 45 - 120 U/L    AST 19 0 - 40 U/L    ALT 33 0 - 45 U/L   HM1 (CBC with Diff)   Result Value Ref Range    WBC 23.2 (H) 4.0 - 11.0 thou/uL    RBC 4.04 (L) 4.40 - 6.20 mill/uL    Hemoglobin 11.9 (L) 14.0 - 18.0 g/dL    Hematocrit 36.3 (L) 40.0 - 54.0 %    MCV 90 80 - 100 fL    MCH 29.5 27.0 - 34.0 pg    MCHC 32.8 32.0 - 36.0 g/dL    RDW 14.7 (H) 11.0 - 14.5 %    Platelets 278 140 - 440 thou/uL    MPV 9.0 8.5 - 12.5 fL   Manual Differential   Result Value Ref Range    Total Neutrophils % 82 (H) 50 - 70 %    Lymphocytes % 14 (L) 20 - 40 %    Monocytes % 3 2 - 10 %    Eosinophils %  0 0 - 6 %    Basophils % 0 0 - 2 %    Metamyelocytes % 1 <=1 %    Myelocytes % 1 <=1 %    Immature Granulocyte % - Manual 2 (H) <=0 %    Total Neutrophils Absolute 18.9 (H) 2.0 - 7.7 thou/ul    Lymphocytes Absolute 3.2 0.8 - 4.4 thou/uL    Monocytes Absolute 0.7 0.0 - 0.9 thou/uL    Eosinophils Absolute 0.0 0.0 - 0.4 thou/uL    Basophils Absolute 0.0 0.0 - 0.2 thou/uL    Metamyelocytes Absolute 0.2 (H) <=0.1 thou/uL    Myelocytes  Absolute 0.1 <=0.1 thou/uL    Immature Granulocyte Absolute - Manual 0.3 (H) <=0.0 thou/uL    Platelet Estimate Normal Normal    Ovalocytes 1+ (!) Negative   Comprehensive Metabolic Panel   Result Value Ref Range    Sodium 137 136 - 145 mmol/L    Potassium 4.0 3.5 - 5.0 mmol/L    Chloride 101 98 - 107 mmol/L    CO2 27 22 - 31 mmol/L    Anion Gap, Calculation 9 5 - 18 mmol/L    Glucose 150 (H) 70 - 125 mg/dL    BUN 26 (H) 8 - 22 mg/dL    Creatinine 1.07 0.70 - 1.30 mg/dL    GFR MDRD Af Amer >60 >60 mL/min/1.73m2    GFR MDRD Non Af Amer >60 >60 mL/min/1.73m2    Bilirubin, Total 1.0 0.0 - 1.0 mg/dL    Calcium 8.7 8.5 - 10.5 mg/dL    Protein, Total 6.8 6.0 - 8.0 g/dL    Albumin 3.6 3.5 - 5.0 g/dL    Alkaline Phosphatase 92 45 - 120 U/L    AST 16 0 - 40 U/L    ALT 27 0 - 45 U/L   HM1 (CBC with Diff)   Result Value Ref Range    WBC 6.2 4.0 - 11.0 thou/uL    RBC 4.29 (L) 4.40 - 6.20 mill/uL    Hemoglobin 12.5 (L) 14.0 - 18.0 g/dL    Hematocrit 39.0 (L) 40.0 - 54.0 %    MCV 91 80 - 100 fL    MCH 29.1 27.0 - 34.0 pg    MCHC 32.1 32.0 - 36.0 g/dL    RDW 14.6 (H) 11.0 - 14.5 %    Platelets 226 140 - 440 thou/uL    MPV 8.6 8.5 - 12.5 fL   INR   Result Value Ref Range    INR 4.18 (H) 0.90 - 1.10       Imaging    Ir Port Placement 5+ Years    Result Date: 8/24/2020  [facility] RADIOLOGY LOCATION: [facility] DATE: 8/24/2020 PROCEDURES: 1. SUBCUTANEOUS IMPLANTED VENOUS ACCESS PORT. 2. ULTRASOUND GUIDANCE FOR VASCULAR ACCESS. 3. FLUOROSCOPIC GUIDANCE FOR CATHETER PLACEMENT. 4. MODERATE SEDATION INTERVENTIONAL RADIOLOGIST: Darrell Alves MD INDICATION: Patient is a 70-year-old male the history of lymphoma. The patient presents to Interventional Radiology for placement of a Port-A-Cath for long-term central venous access to allow for infusion and blood draws. CONSENT: The risks, benefits and alternatives of Port placement were discussed with the patient  in detail. All questions were answered. Informed consent was given to  proceed with the procedure. MODERATE SEDATION: Versed 3 mg IV; Fentanyl 125 mcg IV. During the time out, immediately prior to the administration of medications, the patient was reassessed for adequacy to receive conscious sedation.  Under physician supervision, Versed and fentanyl were administered for moderate sedation. Pulse oximetry, heart rate and blood pressure were continuously monitored by an independent trained observer. The physician spent 29 minutes of face-to-face sedation time with the patient. CONTRAST: None. ANTIBIOTICS: 2 g of IV Ancef. ADDITIONAL MEDICATIONS: None. FLUOROSCOPIC TIME: 0.7 minutes. RADIATION DOSE: Air Kerma: 10 mGy. COMPLICATIONS: No immediate complications. STERILE BARRIER TECHNIQUE: Maximum sterile barrier technique was used. Cutaneous antisepsis was performed at the operative site with application of 2% chlorhexidine and large sterile drape. Prior to the procedure, the  and assistant performed hand hygiene and wore hat, mask, sterile gown, and sterile gloves during the entire procedure. PROCEDURE: After discussing the procedure and risks, informed consent was obtained. Permanent ultrasound images were obtained of the right internal jugular vein, documenting patency and compressibility. The right neck and upper chest were prepped and draped. After local anesthesia, the jugular vein was punctured under direct ultrasound guidance, and a small dilator was placed. A short transverse skin incision was made below the clavicle, and a pocket was created for the port. A skin tunnel was created from the pocket to the vein entry site, and the catheter was pulled through the tunnel. The vein was dilated and the catheter inserted through a peel-away sheath, positioning the tip fluoroscopically at  the SVC/atriocaval junction. The catheter was cut to an appropriate length. The catheter was then attached to the port itself and the port was placed within the subcutaneous pocket. The port was  flushed with heparin. The incision was closed with layered  absorbable suture and covered with Dermabond. The patient tolerated the procedure, and there were no immediate complications. FINDINGS: Ultrasound shows an anechoic and compressible jugular vein. A permanent ultrasound image of this was saved. After placement, fluoroscopic spot images show that the tip of the catheter is at the SVC/atriocaval junction.     1.  Uneventful venous access port placement. This port is power injector compatible. CPT codes: 92085, 96916, 81812, 65277, 96336    Nm Pet Ct Skull To Mid Thigh    Result Date: 8/27/2020  EXAM: NM PET CT SKULL TO MID THIGH LOCATION: [facility] DATE/TIME: 8/27/2020 3:15 PM INDICATION: Initial treatment planning and staging for diffuse large B-cell lymphoma, extranodal and solid organs sites. COMPARISON: Neck CT dated 07/26/2020 TECHNIQUE: Serum glucose level 111 mg/dL. One hour post intravenous administration of 18.1 mCi F-18 FDG, PET imaging was performed from the skull base to the mid thighs utilizing attenuation correction with concurrent axial CT and PET/CT image fusion. Dose reduction techniques were used. FINDINGS: Large soft tissue mass presumably arising from the thyroid gland tracking from the level of the hyoid bone to the thoracic inlet with involvement of the strap and sternocleidomastoid musculature as extends to involve the skin of lower neck measuring 7.2 x 7.2 x 8.5 cm (Max SUV 28.6) suspicious for locally advanced biopsy-proven diffuse large B-cell lymphoma. The remaining FDG uptake is physiologic from the skull base to mid thigh. Moderate senescent intracranial changes. Right chest port with tip terminating near the superior cavoatrial junction. Mild aortic valve calcification. Mild coronary artery calcium. Bibasilar scarring/atelectasis. Pelvic phleboliths.     Findings suspicious for isolated locally advanced biopsy-proven diffuse large B-cell lymphoma arising from the thyroid  gland with involvement of the neck musculature and skin of the lower neck.        Signed by: Blade Ibanez MD

## 2021-06-11 NOTE — TELEPHONE ENCOUNTER
ANTICOAGULATION  MANAGEMENT    Assessment     Today's INR result of 4.6 is Supratherapeutic (goal INR of 2.0-3.0)        Warfarin taken as previously instructed    No new diet changes affecting INR    Interaction between prednisone and warfarin may be affecting INR - to be complete 9/19    Continues to tolerate warfarin with no reported s/s of bleeding or thromboembolism     Previous INR was Therapeutic    Plan:     Spoke on phone with Cam regarding INR result and instructed:     Warfarin Dosing Instructions:  Hold warfarin tonight, then continue current warfarin dose 10 mg daily on Sundays, Tuesdays, and Thursdays; and 5 mg daily rest of week  (0 % change)    Instructed patient to follow up no later than: 9/21, will have INR drawn along with other labs     Education provided: importance of therapeutic range, importance of following up for INR monitoring at instructed interval, importance of taking warfarin as instructed, monitoring for bleeding signs and symptoms and when to seek medical attention/emergency care    Cam verbalizes understanding and agrees to warfarin dosing plan.    Instructed to call the [facility] for any changes, questions or concerns. (#[phone])   ?   Sobia Ibrahim RN    Subjective/Objective:      Cam Walden, a 70 y.o. male is on warfarin.     Cam reports:     Home warfarin dose: verbally confirmed home dose with Cam and updated on anticoagulation calendar     Missed doses: No     Medication changes:  Yes, prednisone, will be complete 9/19     S/S of bleeding or thromboembolism:  No     New Injury or illness:  No     Changes in diet or alcohol consumption:  No     Upcoming surgery, procedure or cardioversion:  No    Anticoagulation Episode Summary     Current INR goal:   2.0-3.0   TTR:   91.2 % (1 y)   Next INR check:   9/21/2020   INR from last check:   4.60! (9/18/2020)   Weekly max warfarin dose:      Target end date:   Indefinite   INR check location:      Preferred lab:       Send INR reminders to:   [facility]    Indications    Atrial flutter (H) (Resolved) [I48.92]           Comments:            Anticoagulation Care Providers     Provider Role Specialty Phone number    Kamaljit Plata MD [facility] Family Medicine [phone]

## 2021-06-11 NOTE — TELEPHONE ENCOUNTER
ANTICOAGULATION  MANAGEMENT    Assessment     Today's INR result of 2.3 is Therapeutic (goal INR of 2.0-3.0)        Warfarin taken as previously instructed    No new diet changes affecting INR    No new medication/supplements affecting INR     Will be taking prednisone once after chemo 2 weeks from now     Continues to tolerate warfarin with no reported s/s of bleeding or thromboembolism     Previous INR was Subtherapeutic     Plan:     Spoke on phone with Cam regarding INR result and instructed:     Warfarin Dosing Instructions:  Continue current warfarin dose 10 mg daily on Sundays, Tuesdays and Thursdays; and 5 mg daily rest of week  (0 % change)    Instructed patient to follow up no later than: 2 weeks     Education provided: importance of therapeutic range, importance of following up for INR monitoring at instructed interval, importance of taking warfarin as instructed and potential interaction between warfarin and predinsone     Cam verbalizes understanding and agrees to warfarin dosing plan.    Instructed to call the [facility] for any changes, questions or concerns. (#[phone])   ?   Sobia Ibrahim RN    Subjective/Objective:      Cam Walden, a 70 y.o. male is on warfarin.     Cam reports:     Home warfarin dose: verbally confirmed home dose with Cam and updated on anticoagulation calendar     Missed doses: No     Medication changes:  No     S/S of bleeding or thromboembolism:  No     New Injury or illness:  No     Changes in diet or alcohol consumption:  No     Upcoming surgery, procedure or cardioversion:  No    Anticoagulation Episode Summary     Current INR goal:   2.0-3.0   TTR:   92.1 % (1 y)   Next INR check:   9/14/2020   INR from last check:   2.30 (8/31/2020)   Weekly max warfarin dose:      Target end date:   Indefinite   INR check location:      Preferred lab:      Send INR reminders to:   [facility]    Indications    Atrial flutter (H) (Resolved) [I48.92]            Comments:            Anticoagulation Care Providers     Provider Role Specialty Phone number    Kamaljit Plata MD Referring Family Medicine [phone]

## 2021-06-11 NOTE — PROGRESS NOTES
[facility]--Electrophysiology    Assessment/Plan:      Problem List Items Addressed This Visit     Essential hypertension with goal blood pressure less than 130/80    Relevant Medications    metoprolol tartrate (LOPRESSOR) 50 MG tablet    Atrial flutter with rapid ventricular response - Primary    Relevant Medications    metoprolol tartrate (LOPRESSOR) 50 MG tablet        1.  Persistent atrial flutter: Newly diagnosed, but unknown onset.  Morphology consistent with typical isthmus dependent right atrial flutter.  Ventricular response remains somewhat elevated.  Increase metoprolol tartrate to 100 mg twice daily.  We had a lengthy discussion of the physiology and natural progression of atrial flutter as well as treatment options of medications versus ablation.  It is not entirely known whether symptoms of dyspnea on exertion are related to arrhythmia, so recommend cardioversion for symptom clarification or proceeding directly to ablation as this appears to be isthmus dependent and so amenable to ablation.  He had a lengthy discussion of both cardioversion and ablation procedures.  He was given some information to review at home.  If he is interested in ablation, recommend that he returns to clinic for further discussion with an EP MD.    He was reassured that atrial fibrillation is not life-threatening, but carries an increased risk for stroke.  He has a [id number] score of 3 for diabetes, hypertension, and age 65-74; current guidelines recommend oral anticoagulation unless the risk for bleed outweighs the risk for stroke.  He was started on Xarelto 20 mg daily which she has been taking with a light breakfast.  We discussed the need to take this with a full meal for proper effectiveness and he will switch this to lunch.  However, he is unable to afford Xarelto beyond his current prescription.  We discussed OAC options of Eliquis, Pradaxa, and warfarin.  We discussed the pros and cons of each.  He will  check into his insurance coverage and call us back with the information.  We discussed the need to be adequately anticoagulated for 3-4 weeks prior to either cardioversion or ablation as well as 1 month following procedure.  Recommend postponing hemicolectomy for approximately 2 months, as he will be lower risk for complications while off OAC if he is in sinus rhythm at the time of his surgery.    Addendum:  Cam will switch to warfarin, start 5 mg daily, INR at primary care clinic in 5 days.  Discontinue Xarelto the day after starting warfarin.    2.  Hypertension: Blood pressure at target today, but should allow for increase in metoprolol for rate control.    EP follow-up pending his decision regarding cardioversion versus ablation.  Follow-up with Dr. Alfonso on 8/21/2017.    Addendum: Cam called back and states he would like to undergo cardioversion.    Subjective:      Cam Walden, a very pleasant 67-year-old gentleman, comes in today for urgent EP evaluation of atrial flutter.  He was newly diagnosed with atrial flutter with rapid ventricular response during a preop exam for hemicolectomy due to colon polyps.  His surgery was canceled due to uncontrolled heart rates.  Onset is unknown as he has no specific awareness of arrhythmia.  He does note some dyspnea on exertion, but it is unknown if this is related to arrhythmia.  He was started on metoprolol for rate control and Xarelto for stroke prophylaxis.  He has not been taking his Xarelto with a full meal, but with a light breakfast.  Echocardiogram showed normal left ventricular systolic performance, though perhaps slightly decreased from previous and only a mild increased in left atrial size.  Holter monitor showed continued elevation in ventricular rates.  He also has a history of hypertension and type 2 diabetes, but denies any known history of coronary artery disease or prior thromboembolic event.  He has known mild obstructive sleep apnea; he does  not wear CPAP, but he states he sleeps in a chair.    Cam states he is feeling pretty well.  He denies chest discomfort, palpitations, abdominal bloating/fullness or peripheral edema, shortness of breath with light activity or at rest, paroxysmal nocturnal dyspnea, orthopnea, lightheadedness, dizziness, pre-syncope, or syncope.    Medical, surgical, family, social history, and medications were reviewed and updated as necessary.    Patient Active Problem List   Diagnosis     Aortic stenosis, mild     Essential hypertension with goal blood pressure less than 130/80     Sleep apnea     Hyperlipidemia     Decreased sensation of feet.     Plantar fasciitis, bilateral     Skin lesion     Colon polyps     Colonoscopy causing post-procedural bleeding     Acute blood loss anemia     Type 2 diabetes mellitus, uncontrolled     Atrial flutter with rapid ventricular response       Past Medical History:   Diagnosis Date     Colon polyps 08/08/2016    Per colonoscopy 8/8/16.  Large and small.  Some removed. Others to be removed surgically. (per patient)     Diabetes mellitus     DM II     Diabetes mellitus, type II      GI bleed      Hypertension      Rectal bleeding        Past Surgical History:   Procedure Laterality Date     colon polyp removal  08/08/2016       Allergies   Allergen Reactions     Lisinopril Cough     Trulicity [Dulaglutide] Nausea Only       Current Outpatient Prescriptions   Medication Sig Dispense Refill     atorvastatin (LIPITOR) 40 MG tablet Take 1 tablet (40 mg total) by mouth daily. 90 tablet 3     blood glucose test (ACCU-CHEK SMARTVIEW TEST STRIP) strips Use to check blood sugars twice daily as directed 100 each 11     blood-glucose meter (ACCU-CHEK ABIMBOLA) Misc Use to check blood sugar twice per day. 1 each 1     cholecalciferol, vitamin D3, (VITAMIN D3) 1,000 unit capsule Take 1 capsule (1,000 Units total) by mouth daily. 100 capsule 2     generic lancets (ACCU-CHEK FASTCLIX) Use to check blood sugar  "twice per day. 102 each 11     glipiZIDE (GLUCOTROL XL) 10 MG 24 hr tablet Take 1 tablet (10 mg total) by mouth every morning. 90 tablet 0     hydroCHLOROthiazide (HYDRODIURIL) 25 MG tablet Take 1 tablet (25 mg total) by mouth daily. 90 tablet 2     losartan (COZAAR) 100 MG tablet Take 1 tablet (100 mg total) by mouth daily. 90 tablet 3     metFORMIN (GLUCOPHAGE) 1000 MG tablet Take 1 tablet (1,000 mg total) by mouth 2 (two) times a day with meals. 180 tablet 3     metoprolol tartrate (LOPRESSOR) 50 MG tablet Take 2 tablets (100 mg total) by mouth 2 (two) times a day. 270 tablet 2     rivaroxaban 20 mg Tab Take 1 tablet (20 mg total) by mouth daily. 30 tablet 3     senna (SENOKOT) 8.6 mg tablet Take 1 tablet by mouth bedtime.       No current facility-administered medications for this visit.        Family History   Problem Relation Age of Onset     Dementia Mother      Asthma Father        Social History   Substance Use Topics     Smoking status: Never Smoker     Smokeless tobacco: Never Used     Alcohol use No       Review of Systems:   General: WNL  Eyes: WNL  Ears/Nose/Throat: WNL  Lungs: WNL  Heart: WNL  Stomach: WNL  Bladder: WNL  Muscle/Joints: WNL  Skin: WNL  Nervous System: WNL  Mental Health: WNL     Blood: WNL      Objective:    /88 (Patient Site: Left Arm, Patient Position: Sitting, Cuff Size: Adult Large)  Pulse 96  Resp 18  Ht 5' 11\" (1.803 m)  Wt (!) 303 lb (137.4 kg)  BMI 42.26 kg/m2    Physical Exam  General Appearance:  Alert, well-appearing, in no acute distress.     HEENT:  Atraumatic, normocephalic; no scleral icterus, EOM intact, PERRL; the mucous membranes were pink and moist.   Chest: Chest symmetric, spine straight.     Lungs:   Respirations unlabored; the lungs are clear to auscultation.   Cardiovascular:   Auscultation reveals normal first and second heart sounds with no murmurs, rubs, or gallops.  Regular rhythm, mildly tachycardic. No obvious JVD, but difficult to assess due to " body habitus.  Radial and posterior tibial pulses are intact.    Abdomen:  Soft, obese, nontender, nondistended, bowel sounds present.     Extremities: No cyanosis, clubbing, or edema.   Musculoskeletal:  Moves all extremities.     Skin: No xanthelasma.   Neurologic: Mood and affect are appropriate. Oriented to person, place, time, and situation.       Cardiographics  EC/3/17 was personally reviewed, shows atrial flutter with rapid ventricular response at 124 bpm, morphology consistent with typical atrial flutter.    24 hour Holter monitor worn 2017  Persistent atrial flutter average ventricular response of 104 per minute, ranging between  bpm.  No bradycardia or pauses.  This appears to be typical flutter and may be isthmus dependent.  This rhythm may be amenable to isthmus/flutter ablation    Echocardiogram: Done 17  1. The left ventricle is mildly enlarged. Left ventricular systolic performance is at the lower limits of normal. The ejection fraction is estimated to be 50-55%.   2. There is mild concentric increase in left ventricular wall thickness.   3. There is mild aortic stenosis.   4. There is mild left atrial enlargement.   When compared to the prior real-time echocardiogram dated 14 May 2015, the ejection fraction appears slightly lower on the current examination.     Lab Review   Lab Results   Component Value Date    CREATININE 0.94 2017    BUN 13 2017     2017    K 4.1 2017     2017    CO2 25 2017     Lab Results   Component Value Date    TSH 2.60 2017     Lab Results   Component Value Date    WBC 12.2 (H) 2017    HGB 13.5 (L) 2017    HCT 41.3 2017    MCV 88 2017     2017       Greater than 60 minutes were spent face to face in this visit with more than 50% of the time discussing diagnoses as listed above, counseling, and coordination of care.      Naomi Morales, [facility],  Electrophysiology  [phone]    This note has been dictated using voice recognition software. Any grammatical, typographical, or context distortions are unintentional and inherent to the software.

## 2021-06-11 NOTE — PROGRESS NOTES
CARDIOLOGY rapid access CLINIC CONSULT NOTE     Assessment/Plan:   1. Atrial flutter with rapid ventricular response.  Duration uncertain but likely months to year or more.  Minimally symptomatic.  Discussed pathophysiology and need for treatment to help prevent stroke and fatigue and cardiomyopathy.  We will initiate Xarelto 20 mg daily.  He was advised to hold this medicine 3 days before his surgical procedure.  We will also start metoprolol 50 mg twice daily in hopes of providing adequate rate slowing.  We will check a 24 hour monitor in 1 week to assess for adequacy of rate control  2. Aortic stenosis by history mild in degree.  We will recheck an echocardiogram to look for continued preservation of left ventricular systolic function and reassess degree of aortic stenosis    Follow up one month to discuss whether to pursue cardioversion, and assess adequacy of treatment     History of Present Illness:     It is my pleasure to [facility] for evaluation of new onset atrial flutter.    Cam Walden is a 67 y.o. male with a past medical history of hypertension and diabetes mellitus type 2.  He has colon polyps and anemia and is scheduled for hemicolectomy July 18..  Preop assessment included an ECG that showed atrial flutter with 2:1 conduction.  He is referred here for further evaluation.  Dr. Plata did start him on Xarelto 20 mg daily and metoprolol  50 mg twice daily, but he has not started taking these medications yet.    Mr. Walden has noted for a couple of years a racing heartbeat which is he reports is been nearly continuous.  It is not caused him much dyspnea except when bending over to pick things up in the yard.  He is sedentary and does not engage in right any regular exercise.  He is a , and naps in the afternoons.  He has mild obstructive sleep apnea and sleeps in a chair.  He denies daytime fatigue otherwise.  He is only  aware of his heart racing when he tries to be more active.    He denies any palpitations or lightheadedness.  He has had no peripheral edema.  He has had no syncope or near syncopal spells or falls.  He did have a bloody stool after his colonoscopy where the polyp was discovered, but this did not recur        Past Medical History:     Patient Active Problem List   Diagnosis     Aortic stenosis, mild     Essential hypertension with goal blood pressure less than 130/80     Sleep apnea     Hyperlipidemia     Decreased sensation of feet.     Plantar fasciitis, bilateral     Skin lesion     Colon polyps     Colonoscopy causing post-procedural bleeding     GI bleed     Rectal bleeding     Acute blood loss anemia     Type 2 diabetes mellitus, uncontrolled     Atrial flutter with rapid ventricular response       Past Surgical History:     Past Surgical History:   Procedure Laterality Date     colon polyp removal  08/08/2016       Family History:     Family History   Problem Relation Age of Onset     Dementia Mother      Asthma Father        Social History:    reports that he has never smoked. He has never used smokeless tobacco. He reports that he does not drink alcohol or use illicit drugs.    Exercise: None    Sleep: Sleeps in a recliner with good restorative function    Meds:     Current Outpatient Prescriptions on File Prior to Visit   Medication Sig Dispense Refill     atorvastatin (LIPITOR) 40 MG tablet Take 1 tablet (40 mg total) by mouth daily. 90 tablet 3     blood glucose test (ACCU-CHEK SMARTVIEW TEST STRIP) strips Use to check blood sugars twice daily as directed 100 each 11     blood-glucose meter (ACCU-CHEK ABIMBOLA) Misc Use to check blood sugar twice per day. 1 each 1     cholecalciferol, vitamin D3, (VITAMIN D3) 1,000 unit capsule Take 1 capsule (1,000 Units total) by mouth daily. 100 capsule 2     generic lancets (ACCU-CHEK FASTCLIX) Use to check blood sugar twice per day. 102 each 11     glipiZIDE (GLUCOTROL  "XL) 10 MG 24 hr tablet Take 1 tablet (10 mg total) by mouth every morning. 90 tablet 0     hydroCHLOROthiazide (HYDRODIURIL) 25 MG tablet Take 1 tablet (25 mg total) by mouth daily. 90 tablet 2     losartan (COZAAR) 100 MG tablet Take 1 tablet (100 mg total) by mouth daily. 90 tablet 3     metFORMIN (GLUCOPHAGE) 1000 MG tablet Take 1 tablet (1,000 mg total) by mouth 2 (two) times a day with meals. 180 tablet 3     senna (SENOKOT) 8.6 mg tablet Take 1 tablet by mouth bedtime.       aspirin 81 MG EC tablet Take 1 tablet (81 mg total) by mouth daily. 90 tablet 3     metoprolol tartrate (LOPRESSOR) 50 MG tablet Take 1 tablet (50 mg total) by mouth 2 (two) times a day. 60 tablet 1     rivaroxaban 20 mg Tab Take 1 tablet (20 mg total) by mouth daily. 30 tablet 3     No current facility-administered medications on file prior to visit.        Allergies:   Lisinopril and Trulicity [dulaglutide]    Review of Systems:     General: WNL  Eyes: WNL  Ears/Nose/Throat: WNL  Lungs: WNL  Heart: WNL  Stomach: WNL  Bladder: WNL  Muscle/Joints: WNL  Skin: WNL  Nervous System: WNL  Mental Health: WNL     Blood: WNL        Objective:      Physical Exam  (!) 310 lb (140.6 kg)  5' 11.65\" (1.82 m)  Body mass index is 42.46 kg/(m^2).  BP (!) 130/92 (Patient Site: Left Arm, Patient Position: Sitting, Cuff Size: Adult Large)  Pulse (!) 127  Resp 18  Ht 5' 11.65\" (1.82 m)  Wt (!) 310 lb (140.6 kg)  BMI 42.46 kg/m2    General Appearance : Awake, Alert, No acute distress  HEENT: No Scleral icterus; the mucous membranes were pink and moist.  Conjunctivae not injected  Neck:  No cervical bruits, jugular venous distention, or thyromegaly   Chest: The spine was straight  Lungs: Respirations unlabored; the lungs are clear to auscultation.  No wheezing   Cardiovascular: Nonpalpable point of maximal impulse.  Auscultation reveals rapid, regular first and second heart sounds with no murmurs, rubs, or gallops.  Carotid, radial, and dorsalis pedal " pulses and intact  Abdomen: Obese.  No organomegaly, masses, bruits, or tenderness. Bowels sounds are present  Extremities: Trace edema with intact distal pulses  Skin: No xanthelasma. Warm, Dry.  Musculoskeletal: No tenderness.  Neurologic: Alert, oriented, and appropriate.      EK/3/2017: Atrial flutter with 2-1 conduction at 124 bpm    Cardiac Imaging Studies:  Echocardiogram 2015:  Summary   Mild concentric left ventricular hypertrophy.   Left ventricular ejection fraction is visually estimated to be 55%.   Normal right ventricular size and systolic function.   Mild aortic stenosis is present.   Estimated right ventricular systolic pressure is 35 mmHg.    Lab Review   Lab Results   Component Value Date     2017    K 4.1 2017     2017    CO2 25 2017    BUN 13 2017    CREATININE 0.94 2017    CALCIUM 10.0 2017     Lab Results   Component Value Date    WBC 12.2 (H) 2017    HGB 13.5 (L) 2017    HCT 41.3 2017    MCV 88 2017     2017     Lab Results   Component Value Date    CHOL 104 2016    TRIG 102 2016    HDL 35 (L) 2016    LDLCALC 49 2016     No results found for: TROPONINI  No results found for: BNP  Lab Results   Component Value Date    TSH 2.60 2017       Osvaldo Alfonso MD [facility]    His note created using Dragon voice recognition software. Sound alike errors may have escaped editing.

## 2021-06-11 NOTE — TELEPHONE ENCOUNTER
ANTICOAGULATION  MANAGEMENT    Assessment     Today's INR result of 4.18 is Supratherapeutic (goal INR of 2.0-3.0)        Warfarin taken as previously instructed    No new diet changes affecting INR    Interaction between prednisone  and warfarin may be affecting INR    Continues to tolerate warfarin with no reported s/s of bleeding or thromboembolism     Previous INR was Supratherapeutic    Plan:     Spoke on phone with Cam regarding INR result and instructed:     Warfarin Dosing Instructions:  Hold warfarin tonight then change warfarin dose to 10 mg daily on Sundays and Thursdays; and 5 mg daily rest of week  (10 % change)    Patient reports he will be getting prednisone every 2 weeks via oncology, per micromedex may also have a delayed response. Minor change to dose.       Instructed patient to follow up no later than: 7-10 days     Education provided: importance of therapeutic range, importance of following up for INR monitoring at instructed interval, importance of taking warfarin as instructed, importance of notifying clinic for changes in medications, monitoring for bleeding signs and symptoms and importance of notifying clinic for diarrhea, nausea/vomiting, reduced intake and/or illness    Cam verbalizes understanding and agrees to warfarin dosing plan.    Instructed to call the [facility] for any changes, questions or concerns. (#[phone])   ?   Sobia Ibrahim RN    Subjective/Objective:      Cam Walden, a 70 y.o. male is on warfarin.     Cam reports:     Home warfarin dose: verbally confirmed home dose with Cam and updated on anticoagulation calendar     Missed doses: No     Medication changes:  No     S/S of bleeding or thromboembolism:  No     New Injury or illness:  No     Changes in diet or alcohol consumption:  No     Upcoming surgery, procedure or cardioversion:  No    Anticoagulation Episode Summary     Current INR goal:   2.0-3.0   TTR:   90.4 % (1 y)   Next INR check:   9/28/2020    INR from last check:   4.18! (9/21/2020)   Weekly max warfarin dose:      Target end date:   Indefinite   INR check location:      Preferred lab:      Send INR reminders to:   [facility]    Indications    Atrial flutter (H) (Resolved) [I48.92]           Comments:            Anticoagulation Care Providers     Provider Role Specialty Phone number    Kamaljit Plata MD Referring Family Medicine [phone]

## 2021-06-11 NOTE — TELEPHONE ENCOUNTER
ANTICOAGULATION  MANAGEMENT    Assessment     Today's INR result of 3.3 is Therapeutic (goal INR of 2.0-3.0)        Less warfarin taken than instructed which may be affecting INR patient took warfarin 5 mg on Thursday instead of scheduled warfarin 10 mg     No new diet changes affecting INR    No new medication/supplements affecting INR - per micromedex prednisone may have a delayed onset. Patient completed prednisone 9/26, he will start another 5 day course of prednisone starting 10/5    Continues to tolerate warfarin with no reported s/s of bleeding or thromboembolism     Previous INR was Supratherapeutic    Plan:     Spoke on phone with Cam regarding INR result and instructed:     Warfarin Dosing Instructions:  Change warfarin dose to 7.5 mg daily on Sundays; and 5 mg daily rest of week  (10 % change)    This is a 10% change of what the patient reported he took within the last 7 days     Instructed patient to follow up no later than: 10/8 (3 days into another round of prednisone)     Education provided: importance of therapeutic range, target INR goal and significance of current INR result, importance of following up for INR monitoring at instructed interval, importance of taking warfarin as instructed, potential interaction between warfarin and prednisone , importance of notifying clinic for changes in medications, when to seek medical attention/emergency care and importance of notifying clinic for diarrhea, nausea/vomiting, reduced intake and/or illness    Cam verbalizes understanding and agrees to warfarin dosing plan.    Instructed to call the [facility] for any changes, questions or concerns. (#[phone])   ?   Kiara Ibrahim RN    Subjective/Objective:      Cam Walden, a 70 y.o. male is on warfarin.     Cam reports:     Home warfarin dose: verbally confirmed home dose with Cam and updated on anticoagulation calendar     Missed doses: No     Medication changes:  No     S/S of bleeding or  thromboembolism:  No     New Injury or illness:  No     Changes in diet or alcohol consumption:  No     Upcoming surgery, procedure or cardioversion:  No    Anticoagulation Episode Summary     Current INR goal:   2.0-3.0   TTR:   88.4 % (1 y)   Next INR check:   10/8/2020   INR from last check:   3.30! (9/28/2020)   Weekly max warfarin dose:      Target end date:   Indefinite   INR check location:      Preferred lab:      Send INR reminders to:   [facility]    Indications    Atrial flutter (H) (Resolved) [I48.92]           Comments:            Anticoagulation Care Providers     Provider Role Specialty Phone number    Kamaljit Plata MD Referring Family Medicine [phone]

## 2021-06-11 NOTE — PROGRESS NOTES
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  CBC is fine and LDH is slightly elevated.  Patient has had Port-A-Cath placement.    Discussed diagnosis and stage.  Recommend initial treatment with 3 cycles of R-CHOP chemotherapy followed by PET scan and additional treatment based on that PET scan result.    Discussed the R-CHOP regimen and how it is administered 1 day every 3 weeks taking 4 to 5 hours each time.  Reviewed potential side effects including but not limited to alopecia, nausea and vomiting, fatigue, myelosuppression with risk for infections, cardiomyopathy, neuropathy and side effects of Rituxan and prednisone.  He understands and is willing to proceed and did sign a consent.    Discussed use of Compazine for nausea.  Discussed taking prednisone 100 mg p.o. daily for 5 days.  Discussed Neulasta to help lower risk for infection.    Discussed that treatment is with curative intent.    Patient's questions answered.    He will return September 10 for first cycle, labs and visit.  He will follow-up in a week later for toxicity check.    40 minutes spent majority in counseling and coordination of care.    He is provided with information about all of the chemotherapy drugs.    Plan: Return September 10 to start first cycle of R-CHOP therapy with Neulasta support  Follow-up a week later for labs and toxicity check    Measurable disease: PET scan    Current therapy: R-CHOP to start  September 10    ECOG Performance   ECOG Performance Status: 1    Distress Assessment  Distress Assessment Score: No distress    Pain         Problem List    1. Diffuse large B-cell lymphoma of extranodal site (H)  prochlorperazine (COMPAZINE) 10 MG tablet    predniSONE (DELTASONE) 50 MG tablet    lidocaine-prilocaine (EMLA) cream    HM1(CBC and Differential)    Comprehensive Metabolic Panel    CC OFFICE VISIT LONG    Infusion Appointment    CC OFFICE VISIT LONG   2. Encounter for antineoplastic chemotherapy  prochlorperazine (COMPAZINE) 10 MG tablet    predniSONE (DELTASONE) 50 MG tablet    lidocaine-prilocaine (EMLA) cream    CC OFFICE VISIT LONG    Infusion Appointment    CC OFFICE VISIT LONG        CC: Kamaljit Plata MD    ______________________________________________________________________________    History of Present Illness    Mr. Cam Walden is here for follow-up.  He is undergone PET scan, bone marrow and port placement.  He reports no change in the neck mass and the skin infiltration.  He denies any new dysphasia, dysphonia or difficulty breathing.    Pain Status  Currently in Pain: No/denies    Review of Systems    Constitutional  Constitutional (WDL): All constitutional elements are within defined limits  Neurosensory  Neurosensory (WDL): Exceptions to WDL  Ataxia: Asymptomatic, clinical or diagnostic observations only, intervention not indicated(Slow gait, shuflfing too)  Cardiovascular  Cardiovascular (WDL): All cardiovascular elements are within defined limits  Pulmonary  Respiratory (WDL): Within Defined Limits  Gastrointestinal  Gastrointestinal (WDL): All gastrointestinal elements are within defined limits  Genitourinary  Genitourinary (WDL): All genitourinary elements are within defined limits  Integumentary  Integumentary (WDL): Exceptions to WDL(Thyroid area; swelling, purple)  Patient Coping  Patient Coping: Accepting  Distress Assessment  Distress Assessment Score: No  distress  Accompanied by  Accompanied by: Alone    Past History  Past Medical History:   Diagnosis Date     Aortic stenosis     mild     Atrial fibrillation (H)      Atrial flutter (H) 8/21/2017     Atrial flutter with rapid ventricular response (H)      Cancer (H)     lymp     Colon polyps 08/08/2016    Per colonoscopy 8/8/16.  Large and small.  Some removed. Others to be removed surgically. (per patient)     Diabetes mellitus (H)     DM II     Diabetes mellitus, type II (H)      GI bleed     Following colon polyp removal     Hyperlipidemia      Hypertension      Morbid obesity (H)      JOHNNY (obstructive sleep apnea)     no cpap     Rectal bleeding          Past Surgical History:   Procedure Laterality Date     CARDIOVERSION  08/25/2017     IR PORT PLACEMENT >5 YEARS  8/24/2020       Physical Exam    Recent Vitals 8/31/2020   Height -   Weight 290 lbs 8 oz   BSA (m2) 2.59 m2   /75   Pulse 76   Temp 98.4   Temp src 1   SpO2 96   Some recent data might be hidden       GENERAL: Alert and oriented to time place and person. Seated comfortably. In no distress.    HEAD: Atraumatic and normocephalic.    EYES: BRIDGETTE, EOMI.  No pallor.  No icterus.    Oral cavity: no mucosal lesion or tonsillar enlargement.    NECK: supple. JVP normal.  No thyroid enlargement.    LYMPH NODES: No palpable, cervical, axillary or inguinal lymphadenopathy.    CHEST: clear to auscultation bilaterally.  Resonant to percussion throughout bilaterally.  Symmetrical breath movements bilaterally.    CVS: S1 and S2 are heard. Regular rate and rhythm.  No murmur or gallop or rub heard.  No peripheral edema.    ABDOMEN: Soft. Not tender. Not distended.  No palpable hepatomegaly or splenomegaly.  No other mass palpable.  Bowel sounds heard.    EXTREMITIES: Warm.    SKIN: no rash, or bruising or purpura.  Has a full head of hair.      Lab Results    Recent Results (from the past 168 hour(s))   POCT Glucose    Specimen: Capillary; Blood   Result Value  Ref Range    Glucose 111 70 - 139 mg/dL       Imaging    Ir Port Placement 5+ Years    Result Date: 8/24/2020  [facility] RADIOLOGY LOCATION: [facility] DATE: 8/24/2020 PROCEDURES: 1. SUBCUTANEOUS IMPLANTED VENOUS ACCESS PORT. 2. ULTRASOUND GUIDANCE FOR VASCULAR ACCESS. 3. FLUOROSCOPIC GUIDANCE FOR CATHETER PLACEMENT. 4. MODERATE SEDATION INTERVENTIONAL RADIOLOGIST: Darrell Alves MD INDICATION: Patient is a 70-year-old male the history of lymphoma. The patient presents to Interventional Radiology for placement of a Port-A-Cath for long-term central venous access to allow for infusion and blood draws. CONSENT: The risks, benefits and alternatives of Port placement were discussed with the patient  in detail. All questions were answered. Informed consent was given to proceed with the procedure. MODERATE SEDATION: Versed 3 mg IV; Fentanyl 125 mcg IV. During the time out, immediately prior to the administration of medications, the patient was reassessed for adequacy to receive conscious sedation.  Under physician supervision, Versed and fentanyl were administered for moderate sedation. Pulse oximetry, heart rate and blood pressure were continuously monitored by an independent trained observer. The physician spent 29 minutes of face-to-face sedation time with the patient. CONTRAST: None. ANTIBIOTICS: 2 g of IV Ancef. ADDITIONAL MEDICATIONS: None. FLUOROSCOPIC TIME: 0.7 minutes. RADIATION DOSE: Air Kerma: 10 mGy. COMPLICATIONS: No immediate complications. STERILE BARRIER TECHNIQUE: Maximum sterile barrier technique was used. Cutaneous antisepsis was performed at the operative site with application of 2% chlorhexidine and large sterile drape. Prior to the procedure, the  and assistant performed hand hygiene and wore hat, mask, sterile gown, and sterile gloves during the entire procedure. PROCEDURE: After discussing the procedure and risks, informed consent was obtained. Permanent ultrasound images were  obtained of the right internal jugular vein, documenting patency and compressibility. The right neck and upper chest were prepped and draped. After local anesthesia, the jugular vein was punctured under direct ultrasound guidance, and a small dilator was placed. A short transverse skin incision was made below the clavicle, and a pocket was created for the port. A skin tunnel was created from the pocket to the vein entry site, and the catheter was pulled through the tunnel. The vein was dilated and the catheter inserted through a peel-away sheath, positioning the tip fluoroscopically at  the SVC/atriocaval junction. The catheter was cut to an appropriate length. The catheter was then attached to the port itself and the port was placed within the subcutaneous pocket. The port was flushed with heparin. The incision was closed with layered  absorbable suture and covered with Dermabond. The patient tolerated the procedure, and there were no immediate complications. FINDINGS: Ultrasound shows an anechoic and compressible jugular vein. A permanent ultrasound image of this was saved. After placement, fluoroscopic spot images show that the tip of the catheter is at the SVC/atriocaval junction.     1.  Uneventful venous access port placement. This port is power injector compatible. CPT codes: 32117, 17212, 76683, 67687, 21054    Nm Pet Ct Skull To Mid Thigh    Result Date: 8/27/2020  EXAM: NM PET CT SKULL TO MID THIGH LOCATION: [facility] DATE/TIME: 8/27/2020 3:15 PM INDICATION: Initial treatment planning and staging for diffuse large B-cell lymphoma, extranodal and solid organs sites. COMPARISON: Neck CT dated 07/26/2020 TECHNIQUE: Serum glucose level 111 mg/dL. One hour post intravenous administration of 18.1 mCi F-18 FDG, PET imaging was performed from the skull base to the mid thighs utilizing attenuation correction with concurrent axial CT and PET/CT image fusion. Dose reduction techniques were used. FINDINGS:  Large soft tissue mass presumably arising from the thyroid gland tracking from the level of the hyoid bone to the thoracic inlet with involvement of the strap and sternocleidomastoid musculature as extends to involve the skin of lower neck measuring 7.2 x 7.2 x 8.5 cm (Max SUV 28.6) suspicious for locally advanced biopsy-proven diffuse large B-cell lymphoma. The remaining FDG uptake is physiologic from the skull base to mid thigh. Moderate senescent intracranial changes. Right chest port with tip terminating near the superior cavoatrial junction. Mild aortic valve calcification. Mild coronary artery calcium. Bibasilar scarring/atelectasis. Pelvic phleboliths.     Findings suspicious for isolated locally advanced biopsy-proven diffuse large B-cell lymphoma arising from the thyroid gland with involvement of the neck musculature and skin of the lower neck.        Signed by: Blade Ibanez MD

## 2021-06-11 NOTE — TELEPHONE ENCOUNTER
PA was approved for Lidocaine/Prilocaine Cream.     Medication Prior Authorization    Start Date:  9/10/20  Status:  Approved  Approval Date:  9/10/20  Authorization Effective Date:  9/10/20  Authorization Expiration Date:  9/10/21  Type:  New  Urgency:  Urgent  Med Type:  Non-Specialty  Insurance Info:  [employer] - Phone [phone] Fax [phone]  Method:  ePA  Reference #:  Key: [id number] - PA Case ID: [id number]  Pharmacy Notified:  Yes  Patient Notified:  Yes  ___________________________________________________________________________________________________________________:

## 2021-06-11 NOTE — PROGRESS NOTES
"     Subjective:     Cam is a 67 y.o. male here for a family medicine pre-operative consultation. The exam is requested by Dr. Stallworth in preparation for left robotic hemicolectomy to be performed at [facility] on July 18 2017. Today s examination on 07/03/17 is done to review the underlying surgical condition, clear for anesthesia, and review medical problems with appropriate changes in medications.  Patient had adenoma of the sigmoid/descending colon diagnosed by colonoscopy a few months ago, scheduled for left hemicolectomy.    Cam has tolerated previous surgeries well without bleeding or anesthesia difficulty.      Review of Systems  Constitutional: negative for anorexia, fatigue and malaise  Eyes: negative for redness  Ears, nose, mouth, throat, and face: negative for earaches, epistaxis, nasal congestion and sore throat  Respiratory: negative for cough, dyspnea on exertion and wheezing  Cardiovascular: negative for chest pain, fatigue, irregular heart beat, near-syncope, orthopnea, palpitations, paroxysmal nocturnal dyspnea and syncope  Gastrointestinal: negative for abdominal pain, change in bowel habits, constipation, diarrhea, dyspepsia, dysphagia, melena, nausea, odynophagia and vomiting  Genitourinary:negative for genital lesions and penile discharge  Hematologic/lymphatic: negative for easy bruising and lymphadenopathy  Musculoskeletal:negative for arthralgias, bone pain, myalgias and stiff joints  Neurological: negative for coordination problems, dizziness, gait problems, headaches, tremors, vertigo and weakness  Behavioral/Psych: negative for anxiety, depression, fatigue and irritability  Endocrine: negative for temperature intolerance  Allergic/Immunologic: negative for urticaria      Objective:      /70 (Patient Site: Right Arm, Patient Position: Sitting, Cuff Size: Adult Large)  Pulse (!) 120  Temp 97.9  F (36.6  C) (Oral)   Resp 20  Ht 5' 11.65\" (1.82 m)  Wt (!) 310 lb 12 oz (141 " kg)  BMI 42.55 kg/m2    General Appearance:    Alert, cooperative, no distress, appears stated age   Head:    Normocephalic, without obvious abnormality, atraumatic   Eyes:    PERRL, conjunctiva/corneas clear, EOM's intact, fundi     benign, both eyes        Ears:    Normal TM's and external ear canals, both ears   Nose:   Nares normal, septum midline, mucosa normal, no drainage    or sinus tenderness   Throat:   Lips, mucosa, and tongue normal; teeth and gums normal   Neck:   Supple, symmetrical, trachea midline, no adenopathy;        thyroid:  No enlargement/tenderness/nodules; no carotid    bruit or JVD   Back:     Symmetric, no curvature, ROM normal, no CVA tenderness   Lungs:     Clear to auscultation bilaterally, respirations unlabored   Chest wall:    No tenderness or deformity   Heart:    Regular rate and rhythm, S1 and S2 normal, no murmur, rub   or gallop   Abdomen:     Soft, non-tender, bowel sounds active all four quadrants,     no masses, no organomegaly   Musculoskeletal:   no limitation of range of motion, no joint tenderness    Extremities:   Extremities normal, atraumatic, no cyanosis or edema   Pulses:   2+ and symmetric all extremities   Skin:   Skin color, texture, turgor normal, no rashes or lesions   Lymph nodes:   Cervical, supraclavicular, and axillary nodes normal   Neurologic:   CNII-XII intact. Normal strength, sensation and reflexes       throughout     Past Medical History:   Diagnosis Date     Colon polyps 08/08/2016    Per colonoscopy 8/8/16.  Large and small.  Some removed. Others to be removed surgically. (per patient)     Diabetes mellitus     DM II     Diabetes mellitus, type II      GI bleed      Hypertension      Rectal bleeding      Past Surgical History:   Procedure Laterality Date     colon polyp removal  08/08/2016       Current Outpatient Prescriptions:      atorvastatin (LIPITOR) 40 MG tablet, Take 1 tablet (40 mg total) by mouth daily., Disp: 90 tablet, Rfl: 3     blood  glucose test (ACCU-CHEK SMARTVIEW TEST STRIP) strips, Use to check blood sugars twice daily as directed, Disp: 100 each, Rfl: 11     blood-glucose meter (ACCU-CHEK ABIMBOLA) Misc, Use to check blood sugar twice per day., Disp: 1 each, Rfl: 1     cholecalciferol, vitamin D3, (VITAMIN D3) 1,000 unit capsule, Take 1 capsule (1,000 Units total) by mouth daily., Disp: 100 capsule, Rfl: 2     generic lancets (ACCU-CHEK FASTCLIX), Use to check blood sugar twice per day., Disp: 102 each, Rfl: 11     glipiZIDE (GLUCOTROL XL) 10 MG 24 hr tablet, Take 1 tablet (10 mg total) by mouth every morning., Disp: 90 tablet, Rfl: 0     hydroCHLOROthiazide (HYDRODIURIL) 25 MG tablet, Take 1 tablet (25 mg total) by mouth daily., Disp: 90 tablet, Rfl: 2     losartan (COZAAR) 100 MG tablet, Take 1 tablet (100 mg total) by mouth daily., Disp: 90 tablet, Rfl: 3     metFORMIN (GLUCOPHAGE) 1000 MG tablet, Take 1 tablet (1,000 mg total) by mouth 2 (two) times a day with meals., Disp: 180 tablet, Rfl: 3     senna (SENOKOT) 8.6 mg tablet, Take 1 tablet by mouth bedtime., Disp: , Rfl:      aspirin 81 MG EC tablet, Take 1 tablet (81 mg total) by mouth daily., Disp: 90 tablet, Rfl: 3  Allergies   Allergen Reactions     Lisinopril Cough     Trulicity [Dulaglutide] Nausea Only      reports that he has never smoked. He has never used smokeless tobacco. He reports that he does not drink alcohol or use illicit drugs.       Predictors of intubation difficulty:   Morbid obesity? yes    Neck range of motion: normal   Dentition: No chipped, loose, or missing teeth.    Cardiographics  Personally reviewed and discussed with the cardiologist, showed atrial flutter with a 2-1 AV conduction, nonspecific ST-T wave abnormalities.  Left ventricular hypertrophy.      Lab Review   [facility] on 05/12/2017   Component Date Value     Hemoglobin A1c 05/12/2017 8.2*     Sodium 05/12/2017 139      Potassium 05/12/2017 4.2      Chloride 05/12/2017 102      CO2 05/12/2017 23       Anion Gap, Calculation 05/12/2017 14      Glucose 05/12/2017 147*     Calcium 05/12/2017 9.9      BUN 05/12/2017 13      Creatinine 05/12/2017 0.92      GFR MDRD Af Amer 05/12/2017 >60      GFR MDRD Non Af Amer 05/12/2017 >60          Assessment:        67 y.o. male with planned surgery as above.    Known risk factors for perioperative complications: Anemia    Difficulty with intubation is anticipated.    Cardiac Risk Estimation: intermediate to moderate because of the newly diagnosed atrial flutter    Current medications which may produce withdrawal symptoms if withheld perioperatively: none       Plan:     1. Preoperative workup as follows hemoglobin, hematocrit, electrolytes, creatinine, glucose, liver function studies. Cardiology consult prior to surgery.  2. Change in medication regimen before surgery: Discontinue Xarelto 3 days before the surgery.  3. Prophylaxis for cardiac events with perioperative beta-blockers: Started on metoprolol 50 mg twice a day for newly diagnosed atrial flutter  4. Invasive hemodynamic monitoring perioperatively: at the discretion of anesthesiologist.  5. Deep vein thrombosis prophylaxis postoperatively:regimen to be chosen by surgical team.  6. Surveillance for postoperative MI with ECG immediately postoperatively and on postoperative days 1 and 2 AND troponin levels 24 hours postoperatively and on day 4 or hospital discharge (whichever comes first): at the discretion of anesthesiologist.  7. Other measures: Patient had  asymptomatic atrial flutter, I discussed with Dr. Blair, the recommendation from cardiologist was to start the patient on the beta-blocker and anticoagulation, and have him make an appointment to be seen at the rapid access clinic.  Discussed with the patient about anticoagulation, with possible side effect including bleeding etc. the need to stop that 2 or 3 days before surgery  8. Reviewed risks (not limited to bleeding,infection,pain,un-anticipated response  to anesthesia and even death) and benefits (diagnostic and therapeutic) of surgery with patient, he understands the risks of the procedure and would like to proceed.  Patient's active problems diagnostically and therapeutically optimized for planned procedure with, Local or General anesthesia    Recent Results (from the past 240 hour(s))   HM2(CBC w/o Differential)   Result Value Ref Range    WBC 12.2 (H) 4.0 - 11.0 thou/uL    RBC 4.69 4.40 - 6.20 mill/uL    Hemoglobin 13.5 (L) 14.0 - 18.0 g/dL    Hematocrit 41.3 40.0 - 54.0 %    MCV 88 80 - 100 fL    MCH 28.7 27.0 - 34.0 pg    MCHC 32.6 32.0 - 36.0 g/dL    RDW 14.3 11.0 - 14.5 %    Platelets 300 140 - 440 thou/uL    MPV 6.9 (L) 7.0 - 10.0 fL   Comprehensive Metabolic Panel   Result Value Ref Range    Sodium 141 136 - 145 mmol/L    Potassium 4.1 3.5 - 5.0 mmol/L    Chloride 103 98 - 107 mmol/L    CO2 25 22 - 31 mmol/L    Anion Gap, Calculation 13 5 - 18 mmol/L    Glucose 130 (H) 70 - 125 mg/dL    BUN 13 8 - 22 mg/dL    Creatinine 0.94 0.70 - 1.30 mg/dL    GFR MDRD Af Amer >60 >60 mL/min/1.73m2    GFR MDRD Non Af Amer >60 >60 mL/min/1.73m2    Bilirubin, Total 0.8 0.0 - 1.0 mg/dL    Calcium 10.0 8.5 - 10.5 mg/dL    Protein, Total 7.6 6.0 - 8.0 g/dL    Albumin 3.9 3.5 - 5.0 g/dL    Alkaline Phosphatase 66 45 - 120 U/L    AST 30 0 - 40 U/L    ALT 36 0 - 45 U/L   Thyroid Stimulating Hormone (TSH)   Result Value Ref Range    TSH 2.60 0.30 - 5.00 uIU/mL   Electrocardiogram Perform - Clinic   Result Value Ref Range    SYSTOLIC BLOOD PRESSURE  mmHg    DIASTOLIC BLOOD PRESSURE  mmHg    VENTRICULAR RATE 123 BPM    ATRIAL RATE 123 BPM    P-R INTERVAL  ms    QRS DURATION 142 ms    Q-T INTERVAL 282 ms    QTC CALCULATION (BEZET) 403 ms    P Axis  degrees    R AXIS -22 degrees    T AXIS -4 degrees    MUSE DIAGNOSIS       Atrial flutter  Non-specific intra-ventricular conduction block  Nonspecific T wave abnormality  Abnormal ECG  When compared with ECG of 08-AUG-2016 10:41,  Atrial  flutter has replaced Sinus rhythm  Nonspecific T wave abnormality now evident in Lateral leads  Confirmed by YANIV IVAN MD LOC:[id number]) on 7/3/2017 2:30:15 PM     Electrocardiogram Perform and Read   Result Value Ref Range    SYSTOLIC BLOOD PRESSURE  mmHg    DIASTOLIC BLOOD PRESSURE  mmHg    VENTRICULAR RATE 124 BPM    ATRIAL RATE 248 BPM    P-R INTERVAL  ms    QRS DURATION 100 ms    Q-T INTERVAL 284 ms    QTC CALCULATION (BEZET) 408 ms    P Axis 259 degrees    R AXIS -27 degrees    T AXIS 16 degrees    MUSE DIAGNOSIS       Atrial flutter with 2:1 A-V conduction  Nonspecific ST and T wave abnormality  Abnormal ECG  When compared with ECG of 03-JUL-2017 08:22,  QRS duration has decreased    Confirmed by YANIV IVAN MD LOC:[id number]) on 7/3/2017 2:30:43 PM         Jonathon Plata MD  [facility].  [address] [place] [address].  [place], [address]  Ph:[phone]  Fax:[phone]

## 2021-06-11 NOTE — PROGRESS NOTES
Patient is here for labs, provider, and treatment for Diffuse large B-cell lymphoma of extranodal site (H).

## 2021-06-11 NOTE — PROGRESS NOTES
[facility] Hematology and Oncology Progress Note    Patient: Cam Walden  MRN: [id number]  Date of Service: 09/14/2020        Reason for Visit    1. Diffuse large B cell lymphoma    ECOG Performance   ECOG Performance Status: 0    Distress Assessment  Distress Assessment Score: 2    Pain  0    Assessment/Plan  1. Stage I diffuse large B cell lymphoma  Will initiate cycle 1 R-CHOP tomorrow.   Side effects/printed material had been reviewed. Answered patient's questions, he had minimal and felt well-informed.  Consent signed already.    He has his compazine. He will start Prednisone 100 mg for 5 days tomorrow morning.  He will return tomorrow to start cycle 1, day 1.    I reviewed he may experience arthralgias after Neulasta, can treat with tylenol and Claritin days 1-7 each cycle.    Will see back mid-cycle this cycle with labs.   Will return in 3 weeks for cycle 2.    Planning 3 cycles, then PET. Pending response, will decide on completing up to 6 cycles.    2.   DM2  He is on oral medications. Reviewed the Prednisone will likely cause hyperglycemia each cycle (days 1-5 or so). He will watch his sugars closely this week and update his diabetes educator for guidance, if needed.  ______________________________________________________________________________    History of Present Illness/ Interval History    Mr. Cam Walden  is a 70 y.o. here to initiate cycle 1 R-CHOP.    Neck mass/skin infiltration is stable. No dysphagia, dysphonia or breathing difficulties. No new symptoms, he feels prepared to start chemo tomorrow.    Review of Systems  Constitutional  Constitutional (WDL): All constitutional elements are within defined limits  Neurosensory  Neurosensory (WDL): Exceptions to WDL  Ataxia: Concerns(slowly, shuffling)  Eye   Eye Disorder (WDL): All eye disorder elements are within defined limits  Ear  Ear Disorder (WDL): All ear disorder elements are within defined limits  Cardiovascular  Cardiovascular  "(WDL): All cardiovascular elements are within defined limits  Pulmonary  Respiratory (WDL): Within Defined Limits  Gastrointestinal  Gastrointestinal (WDL): All gastrointestinal elements are within defined limits  Genitourinary  Genitourinary (WDL): All genitourinary elements are within defined limits  Lymphatic  Lymph (WDL): All lymph disorder elements are within defined limits  Musculoskeletal and Connective Tissue  Musculoskeletal and Connetive Tissue Disorders (WDL): All Musculoskeletal and Connetive Tissue Disorder elements are within defined limits  Integumentary  Integumentary (WDL): All integumentary elements are within defined limits  Patient Coping  Patient Coping: Accepting;Open/discussion  Accompanied by  Accompanied by: Alone  Patient Coping: Accepting;Open/discussion  Distress Assessment  Distress Assessment Score: 2      Oncology History/Treatment  Diagnosis/Stage:   8/2020: diffuse large B cell lymphoma, GCB Type, stage I  -Elevated LDH  -No bone marrow involvement  -PET: local involvement of thyroid/regional neck only    Treatment:  9/15/20: Will initiate curative-intent R-CHOP + Neulasta       Past History  Past Medical History:   Diagnosis Date     Aortic stenosis     mild     Atrial fibrillation (H)      Atrial flutter (H) 8/21/2017     Atrial flutter with rapid ventricular response (H)      Cancer (H)     lymp     Colon polyps 08/08/2016    Per colonoscopy 8/8/16.  Large and small.  Some removed. Others to be removed surgically. (per patient)     Diabetes mellitus (H)     DM II     Diabetes mellitus, type II (H)      GI bleed     Following colon polyp removal     Hyperlipidemia      Hypertension      Morbid obesity (H)      JOHNNY (obstructive sleep apnea)     no cpap     Rectal bleeding          Past Surgical History:   Procedure Laterality Date     CARDIOVERSION  08/25/2017     IR PORT PLACEMENT >5 YEARS  8/24/2020       Physical Exam    Recent Vitals 9/14/2020   Height 6' 0\"   Weight 291 lbs 14 " oz   BSA (m2) 2.59 m2   /83   Pulse 87   Temp 98.3   Temp src 1   SpO2 94   Some recent data might be hidden       GENERAL: Alert and oriented to time place and person. Seated comfortably. In no distress. Alone, masked.  HEAD: Atraumatic and normocephalic. No alopecia.  EYES: BRIDGETTE, EOMI. No erythema. No icterus.  ORAL CAVITY: Moist. No mucosal lesion or tonsillar enlargement.  NECK: supple. Indurated erythematous firm/fixed mass midline neck measures ~6-7 cm  LYMPH NODES: No palpable supraclavicular, cervical lymphadenopathy.  CHEST: clear to auscultation bilaterally. Resonant to percussion throughout bilaterally. Symmetrical breath movements bilaterally.  CVS: S1 and S2 are heard. Regular rate and rhythm. No murmur or gallop or rub heard.  ABDOMEN: Soft. Not tender. Not distended. No palpable hepatomegaly or splenomegaly. No other mass palpable. Bowel sounds present.  EXTREMITIES: Warm. No peripheral edema.  SKIN: no rash, or bruising or purpura.   NEURO: No gross deficit noted. Non-antalgic gait.      Lab Results    Recent Results (from the past 168 hour(s))   Comprehensive Metabolic Panel   Result Value Ref Range    Sodium 138 136 - 145 mmol/L    Potassium 4.3 3.5 - 5.0 mmol/L    Chloride 101 98 - 107 mmol/L    CO2 27 22 - 31 mmol/L    Anion Gap, Calculation 10 5 - 18 mmol/L    Glucose 143 (H) 70 - 125 mg/dL    BUN 16 8 - 22 mg/dL    Creatinine 1.20 0.70 - 1.30 mg/dL    GFR MDRD Af Amer >60 >60 mL/min/1.73m2    GFR MDRD Non Af Amer 60 (L) >60 mL/min/1.73m2    Bilirubin, Total 0.8 0.0 - 1.0 mg/dL    Calcium 9.1 8.5 - 10.5 mg/dL    Protein, Total 7.4 6.0 - 8.0 g/dL    Albumin 3.8 3.5 - 5.0 g/dL    Alkaline Phosphatase 88 45 - 120 U/L    AST 31 0 - 40 U/L    ALT 40 0 - 45 U/L   HM1 (CBC with Diff)   Result Value Ref Range    WBC 7.8 4.0 - 11.0 thou/uL    RBC 4.12 (L) 4.40 - 6.20 mill/uL    Hemoglobin 12.1 (L) 14.0 - 18.0 g/dL    Hematocrit 38.0 (L) 40.0 - 54.0 %    MCV 92 80 - 100 fL    MCH 29.4 27.0 - 34.0  pg    MCHC 31.8 (L) 32.0 - 36.0 g/dL    RDW 14.7 (H) 11.0 - 14.5 %    Platelets 300 140 - 440 thou/uL    MPV 8.6 8.5 - 12.5 fL    Neutrophils % 50 50 - 70 %    Lymphocytes % 37 20 - 40 %    Monocytes % 9 2 - 10 %    Eosinophils % 4 0 - 6 %    Basophils % 0 0 - 2 %    Immature Granulocyte % 0 <=0 %    Neutrophils Absolute 3.9 2.0 - 7.7 thou/uL    Lymphocytes Absolute 2.9 0.8 - 4.4 thou/uL    Monocytes Absolute 0.7 0.0 - 0.9 thou/uL    Eosinophils Absolute 0.3 0.0 - 0.4 thou/uL    Basophils Absolute 0.0 0.0 - 0.2 thou/uL    Immature Granulocyte Absolute 0.0 <=0.0 thou/uL   INR   Result Value Ref Range    INR 2.57 (H) 0.90 - 1.10       Imaging    Ir Port Placement 5+ Years    Result Date: 8/24/2020  [facility] LOCATION: [facility] DATE: 8/24/2020 PROCEDURES: 1. SUBCUTANEOUS IMPLANTED VENOUS ACCESS PORT. 2. ULTRASOUND GUIDANCE FOR VASCULAR ACCESS. 3. FLUOROSCOPIC GUIDANCE FOR CATHETER PLACEMENT. 4. MODERATE SEDATION INTERVENTIONAL RADIOLOGIST: Darrell Alves MD INDICATION: Patient is a 70-year-old male the history of lymphoma. The patient presents to Interventional Radiology for placement of a Port-A-Cath for long-term central venous access to allow for infusion and blood draws. CONSENT: The risks, benefits and alternatives of Port placement were discussed with the patient  in detail. All questions were answered. Informed consent was given to proceed with the procedure. MODERATE SEDATION: Versed 3 mg IV; Fentanyl 125 mcg IV. During the time out, immediately prior to the administration of medications, the patient was reassessed for adequacy to receive conscious sedation.  Under physician supervision, Versed and fentanyl were administered for moderate sedation. Pulse oximetry, heart rate and blood pressure were continuously monitored by an independent trained observer. The physician spent 29 minutes of face-to-face sedation time with the patient. CONTRAST: None. ANTIBIOTICS: 2 g of IV Ancef. ADDITIONAL  MEDICATIONS: None. FLUOROSCOPIC TIME: 0.7 minutes. RADIATION DOSE: Air Kerma: 10 mGy. COMPLICATIONS: No immediate complications. STERILE BARRIER TECHNIQUE: Maximum sterile barrier technique was used. Cutaneous antisepsis was performed at the operative site with application of 2% chlorhexidine and large sterile drape. Prior to the procedure, the  and assistant performed hand hygiene and wore hat, mask, sterile gown, and sterile gloves during the entire procedure. PROCEDURE: After discussing the procedure and risks, informed consent was obtained. Permanent ultrasound images were obtained of the right internal jugular vein, documenting patency and compressibility. The right neck and upper chest were prepped and draped. After local anesthesia, the jugular vein was punctured under direct ultrasound guidance, and a small dilator was placed. A short transverse skin incision was made below the clavicle, and a pocket was created for the port. A skin tunnel was created from the pocket to the vein entry site, and the catheter was pulled through the tunnel. The vein was dilated and the catheter inserted through a peel-away sheath, positioning the tip fluoroscopically at  the SVC/atriocaval junction. The catheter was cut to an appropriate length. The catheter was then attached to the port itself and the port was placed within the subcutaneous pocket. The port was flushed with heparin. The incision was closed with layered  absorbable suture and covered with Dermabond. The patient tolerated the procedure, and there were no immediate complications. FINDINGS: Ultrasound shows an anechoic and compressible jugular vein. A permanent ultrasound image of this was saved. After placement, fluoroscopic spot images show that the tip of the catheter is at the SVC/atriocaval junction.     1.  Uneventful venous access port placement. This port is power injector compatible. CPT codes: 28416, 57661, 70143, 70802, 49604    Nm Pet Ct Skull  To Mid Thigh    Result Date: 8/27/2020  EXAM: NM PET CT SKULL TO MID THIGH LOCATION: [facility] DATE/TIME: 8/27/2020 3:15 PM INDICATION: Initial treatment planning and staging for diffuse large B-cell lymphoma, extranodal and solid organs sites. COMPARISON: Neck CT dated 07/26/2020 TECHNIQUE: Serum glucose level 111 mg/dL. One hour post intravenous administration of 18.1 mCi F-18 FDG, PET imaging was performed from the skull base to the mid thighs utilizing attenuation correction with concurrent axial CT and PET/CT image fusion. Dose reduction techniques were used. FINDINGS: Large soft tissue mass presumably arising from the thyroid gland tracking from the level of the hyoid bone to the thoracic inlet with involvement of the strap and sternocleidomastoid musculature as extends to involve the skin of lower neck measuring 7.2 x 7.2 x 8.5 cm (Max SUV 28.6) suspicious for locally advanced biopsy-proven diffuse large B-cell lymphoma. The remaining FDG uptake is physiologic from the skull base to mid thigh. Moderate senescent intracranial changes. Right chest port with tip terminating near the superior cavoatrial junction. Mild aortic valve calcification. Mild coronary artery calcium. Bibasilar scarring/atelectasis. Pelvic phleboliths.     Findings suspicious for isolated locally advanced biopsy-proven diffuse large B-cell lymphoma arising from the thyroid gland with involvement of the neck musculature and skin of the lower neck.      Billing  Total face to face time 20 minutes, with 15 minutes of that dedicated to counseling, education or coordination of care.     Signed by: Oumou Sánchez

## 2021-06-11 NOTE — TELEPHONE ENCOUNTER
ANTICOAGULATION  MANAGEMENT    Assessment     Today's INR result of 2.57 is Therapeutic (goal INR of 2.0-3.0)        Warfarin taken as previously instructed    No new diet changes affecting INR    No new medication/supplements affecting INR    Continues to tolerate warfarin with no reported s/s of bleeding or thromboembolism     Previous INR was Therapeutic    Plan:     Spoke on phone with Cam regarding INR result and instructed:     Warfarin Dosing Instructions:  Continue current warfarin dose 10 mg daily on Sundays, Tuesdays and Thursdays; and 5 mg daily rest of week  (0 % change)    Instructed patient to follow up no later than: 9/18    Education provided: importance of therapeutic range, importance of following up for INR monitoring at instructed interval, importance of taking warfarin as instructed, potential interaction between warfarin and prednisone and when to seek medical attention/emergency care    Cam verbalizes understanding and agrees to warfarin dosing plan.    Instructed to call the [facility] for any changes, questions or concerns. (#[phone])   ?   Sobia Ibrahim RN    Subjective/Objective:      Cam Walden, a 70 y.o. male is on warfarin.     Cam reports:     Home warfarin dose: verbally confirmed home dose with Cam and updated on anticoagulation calendar     Missed doses: No     Medication changes:  Yes: starting today will be taking prednisone for 5 days 9/14-9/18     S/S of bleeding or thromboembolism:  No     New Injury or illness:  No     Changes in diet or alcohol consumption:  No     Upcoming surgery, procedure or cardioversion:  No    Anticoagulation Episode Summary     Current INR goal:   2.0-3.0   TTR:   92.0 % (1 y)   Next INR check:   10/12/2020   INR from last check:   2.57 (9/14/2020)   Weekly max warfarin dose:      Target end date:   Indefinite   INR check location:      Preferred lab:      Send INR reminders to:   [facility]    Indications    Atrial flutter  (H) (Resolved) [I48.92]           Comments:            Anticoagulation Care Providers     Provider Role Specialty Phone number    Kamaljit Plata MD Referring Family Medicine [phone]

## 2021-06-11 NOTE — PROGRESS NOTES
MTM Follow Up Encounter  ASSESSMENT AND PLAN  Type 2 Diabetes:  reasonably well controlled. A1C was not at goal of <7%. FBP borderline at goal  mg/dL.Reviewed that I am not surprised that his FBG did not change since increasing the glipizide, since sulfonylureas decrease postprandial blood sugars. Since his A1c was elevated, likely his blood sugars are high at other times. Recommended him to check 2 hours post prandial so that we can assess the effectiveness of glipizide and he can see the impact of specific meals on his blood sugars. Reviewed goal 2 hour post prandial <180. Encouraged lifestyle changes. Will recheck A1c in 2 months and consider adding a third drug if needed.   PLAN:   1. Continue current regimen.   2. Check 2 hour post prandial blood sugars.     MTM FOLLOW UP  2 months - check A1c and fasting lipid panel    SUBJECTIVE AND OBJECTIVE  Cam Walden is a 67 y.o. male here for a follow-up medication therapy management (MTM) appointment.     Medication Concern(s)/Question(s): diabetes follow up    Type 2 Diabetes: Currently taking metformin 1000 mg BID and glipizide XR 10 mg daily -- dose increased at last MTM appt. Reports he has not noticed a significant difference but feels like his blood sugars are ok. Tests BG 1-2 times daily. Blood sugars per his log:  AM = 131, 127, 140, 141, 137, 123, 137, 134, 150, 137, 108, 150, 133, 126  Before dinner = 79, 159, 139, 180  3pm (2 hours after lunch) = 132  830pm = 98, 109  2pm 95   Last A1c checked 5/12/17 = 8.2%.   Hypoglycemia none since increasing glipizide. Reports no symptoms when BG was 79.   Highest value was 180, and he notes eating a roll at a bakery that day. Does not really check 2 hours post prandial.   Has made no lifestyle changes.           Cam's medication list was reviewed with them, discussing reason for use, directions for use, and potential side effects of each medication as needed. Indication, safety, efficacy, and convenience was  assessed for all medications addressed above.  No environmental factors were noted currently affecting patient.  This care plan was communicated via EMR with his primary care provider, Kamaljit Plata MD, who is the authorizing prescriber for this visit.  Direct supervision was available by either the patient's PCP or other available provider.    Time and complexity billing metrics are included in the docflowsheet linked to this visit    Time spent: 15 min  Angie Russell, Pharm.D., BCACP   MTM Pharmacist at [facility]

## 2021-06-11 NOTE — PATIENT INSTRUCTIONS - HE
Patient Education     Rituximab Solution for injection  What is this medicine?  RITUXIMAB (ri TUX i mab) is a monoclonal antibody. This medicine changes the way the body's immune system works. It is used commonly to treat non-Hodgkin's lymphoma and other conditions. In cancer cells, this drug targets a specific protein within cancer cells and stops the cancer cells from growing. It is also used to treat rhuematoid arthritis (RA). In RA, this medicine slow the inflammatory process and help reduce joint pain and swelling. This medicine is often used with other cancer or arthritis medications.  This medicine may be used for other purposes; ask your health care provider or pharmacist if you have questions.  What should I tell my health care provider before I take this medicine?  They need to know if you have any of these conditions:    blood disorders    heart disease    history of hepatitis B    infection (especially a virus infection such as chickenpox, cold sores, or herpes)    irregular heartbeat    kidney disease    lung or breathing disease, like asthma    lupus    an unusual or allergic reaction to rituximab, mouse proteins, other medicines, foods, dyes, or preservatives    pregnant or trying to get pregnant    breast-feeding  How should I use this medicine?  This medicine is for infusion into a vein. It is administered in a hospital or clinic by a specially trained health care professional.  A special MedGuide will be given to you by the pharmacist with each prescription and refill. Be sure to read this information carefully each time.  Talk to your pediatrician regarding the use of this medicine in children. This medicine is not approved for use in children.  Overdosage: If you think you have taken too much of this medicine contact a poison control center or emergency room at once.  NOTE: This medicine is only for you. Do not share this medicine with others.  What if I miss a dose?  It is important not to miss  a dose. Call your doctor or health care professional if you are unable to keep an appointment.  What may interact with this medicine?    cisplatin    medicines for blood pressure    some other medicines for arthritis    vaccines  This list may not describe all possible interactions. Give your health care provider a list of all the medicines, herbs, non-prescription drugs, or dietary supplements you use. Also tell them if you smoke, drink alcohol, or use illegal drugs. Some items may interact with your medicine.  What should I watch for while using this medicine?  Report any side effects that you notice during your treatment right away, such as changes in your breathing, fever, chills, dizziness or lightheadedness. These effects are more common with the first dose.  Visit your prescriber or health care professional for checks on your progress. You will need to have regular blood work. Report any other side effects. The side effects of this medicine can continue after you finish your treatment. Continue your course of treatment even though you feel ill unless your doctor tells you to stop.  Call your doctor or health care professional for advice if you get a fever, chills or sore throat, or other symptoms of a cold or flu. Do not treat yourself. This drug decreases your body's ability to fight infections. Try to avoid being around people who are sick.  This medicine may increase your risk to bruise or bleed. Call your doctor or health care professional if you notice any unusual bleeding.  Be careful brushing and flossing your teeth or using a toothpick because you may get an infection or bleed more easily. If you have any dental work done, tell your dentist you are receiving this medicine.  Avoid taking products that contain aspirin, acetaminophen, ibuprofen, naproxen, or ketoprofen unless instructed by your doctor. These medicines may hide a fever.  Do not become pregnant while taking this medicine. Women should  inform their doctor if they wish to become pregnant or think they might be pregnant. There is a potential for serious side effects to an unborn child. Talk to your health care professional or pharmacist for more information. Do not breast-feed an infant while taking this medicine.  What side effects may I notice from receiving this medicine?  Side effects that you should report to your doctor or health care professional as soon as possible:    allergic reactions like skin rash, itching or hives, swelling of the face, lips, or tongue    low blood counts - this medicine may decrease the number of white blood cells, red blood cells and platelets. You may be at increased risk for infections and bleeding.    signs of infection - fever or chills, cough, sore throat, pain or difficulty passing urine    signs of decreased platelets or bleeding - bruising, pinpoint red spots on the skin, black, tarry stools, blood in the urine    signs of decreased red blood cells - unusually weak or tired, fainting spells, lightheadedness    breathing problems    confused, not responsive    chest pain    fast, irregular heartbeat    feeling faint or lightheaded, falls    mouth sores    redness, blistering, peeling or loosening of the skin, including inside the mouth    stomach pain    swelling of the ankles, feet, or hands    trouble passing urine or change in the amount of urine  Side effects that usually do not require medical attention (report to your doctor or other health care professional if they continue or are bothersome):    anxiety    headache    loss of appetite    muscle aches    nausea    night sweats  This list may not describe all possible side effects. Call your doctor for medical advice about side effects. You may report side effects to FDA at 5-074-FDA-5515.  Where should I keep my medicine?  This drug is given in a hospital or clinic and will not be stored at home.  NOTE:This sheet is a summary. It may not cover all  possible information. If you have questions about this medicine, talk to your doctor, pharmacist, or health care provider. Copyright  2015 [employer]         Patient Education   Cyclophosphamide Solution for injection  What is this medicine?  CYCLOPHOSPHAMIDE (sye kloe EDDY fa mide) is a chemotherapy drug. It slows the growth of cancer cells. This medicine is used to treat many types of cancer like lymphoma, myeloma, leukemia, breast cancer, and ovarian cancer, to name a few.  This medicine may be used for other purposes; ask your health care provider or pharmacist if you have questions.  What should I tell my health care provider before I take this medicine?  They need to know if you have any of these conditions:    blood disorders    history of other chemotherapy    infection    kidney disease    liver disease    recent or ongoing radiation therapy    tumors in the bone marrow    an unusual or allergic reaction to cyclophosphamide, other chemotherapy, other medicines, foods, dyes, or preservatives    pregnant or trying to get pregnant    breast-feeding  How should I use this medicine?  This drug is usually given as an injection into a vein or muscle or by infusion into a vein. It is administered in a hospital or clinic by a specially trained health care professional.  Talk to your pediatrician regarding the use of this medicine in children. Special care may be needed.  Overdosage: If you think you have taken too much of this medicine contact a poison control center or emergency room at once.  NOTE: This medicine is only for you. Do not share this medicine with others.  What if I miss a dose?  It is important not to miss your dose. Call your doctor or health care professional if you are unable to keep an appointment.  What may interact with this medicine?  This medicine may interact with the following medications:    amiodarone    amphotericin B    azathioprine    certain antiviral medicines for HIV or AIDS such  as protease inhibitors (e.g., indinavir, ritonavir) and zidovudine    certain blood pressure medications such as benazepril, captopril, enalapril, fosinopril, lisinopril, moexipril, monopril, perindopril, quinapril, ramipril, trandolapril    certain cancer medications such as anthracyclines (e.g., daunorubicin, doxorubicin), busulfan, cytarabine, paclitaxel, pentostatin, tamoxifen, trastuzumab    certain diuretics such as chlorothiazide, chlorthalidone, hydrochlorothiazide, indapamide, metolazone    certain medicines that treat or prevent blood clots like warfarin    certain muscle relaxants such as succinylcholine    cyclosporine    etanercept    indomethacin    medicines to increase blood counts like filgrastim, pegfilgrastim, sargramostim    medicines used as general anesthesia    metronidazole    natalizumab  This list may not describe all possible interactions. Give your health care provider a list of all the medicines, herbs, non-prescription drugs, or dietary supplements you use. Also tell them if you smoke, drink alcohol, or use illegal drugs. Some items may interact with your medicine.  What should I watch for while using this medicine?  Visit your doctor for checks on your progress. This drug may make you feel generally unwell. This is not uncommon, as chemotherapy can affect healthy cells as well as cancer cells. Report any side effects. Continue your course of treatment even though you feel ill unless your doctor tells you to stop.  Drink water or other fluids as directed. Urinate often, even at night.  In some cases, you may be given additional medicines to help with side effects. Follow all directions for their use.  Call your doctor or health care professional for advice if you get a fever, chills or sore throat, or other symptoms of a cold or flu. Do not treat yourself. This drug decreases your body's ability to fight infections. Try to avoid being around people who are sick.  This medicine may  increase your risk to bruise or bleed. Call your doctor or health care professional if you notice any unusual bleeding.  Be careful brushing and flossing your teeth or using a toothpick because you may get an infection or bleed more easily. If you have any dental work done, tell your dentist you are receiving this medicine.  You may get drowsy or dizzy. Do not drive, use machinery, or do anything that needs mental alertness until you know how this medicine affects you.  Do not become pregnant while taking this medicine or for 1 year after stopping it. Women should inform their doctor if they wish to become pregnant or think they might be pregnant. Men should not father a child while taking thismedicine and for 4 months after stopping it. There is a potential for serious side effects to an unborn child. Talk to your health care professional or pharmacist for more information. Do not breast-feed an infant while taking this medicine.  This medicine may interfere with the ability to have a child. This medicine has caused ovarian failure in some women. This medicine has caused reduced sperm counts in some men. You should talk with your doctor or health care professional if you are concerned about your fertility.  If you are going to have surgery, tell your doctor or health care professional that you have taken this medicine.  What side effects may I notice from receiving this medicine?  Side effects that you should report to your doctor or health care professional as soon as possible:    allergic reactions like skin rash, itching or hives, swelling of the face, lips, or tongue    low blood counts - this medicine may decrease the number of white blood cells, red blood cells and platelets. You may be at increased risk for infections and bleeding.    signs of infection - fever or chills, cough, sore throat, pain or difficulty passing urine    signs of decreased platelets or bleeding - bruising, pinpoint red spots on the  skin, black, tarry stools, blood in the urine    signs of decreased red blood cells - unusually weak or tired, fainting spells, lightheadedness    breathing problems    dark urine    dizziness    palpitations    swelling of the ankles, feet, hands    trouble passing urine or change in the amount of urine    weight gain    yellowing of the eyes or skin  Side effects that usually do not require medical attention (report to your doctor or health care professional if they continue or are bothersome):    changes in nail or skin color    hair loss    missed menstrual periods    mouth sores    nausea, vomiting  This list may not describe all possible side effects. Call your doctor for medical advice about side effects. You may report side effects to FDA at 2-432-FDA-1970.  Where should I keep my medicine?  This drug is given in a hospital or clinic and will not be stored at home.  NOTE:This sheet is a summary. It may not cover all possible information. If you have questions about this medicine, talk to your doctor, pharmacist, or health care provider. Copyright  2015 [employer]         Patient Education     Doxorubicin Hydrochloride Solution for injection  What is this medicine?  DOXORUBICIN (dox oh SEFERINO bi sin) is a chemotherapy drug. It is used to treat many kinds of cancer like Hodgkin's disease, leukemia, non-Hodgkin's lymphoma, neuroblastoma, sarcoma, and Wilms' tumor. It is also used to treat bladder cancer, breast cancer, lung cancer, ovarian cancer, stomach cancer, and thyroid cancer.  This medicine may be used for other purposes; ask your health care provider or pharmacist if you have questions.  What should I tell my health care provider before I take this medicine?  They need to know if you have any of these conditions:    blood disorders    heart disease, recent heart attack    infection (especially a virus infection such as chickenpox, cold sores, or herpes)    irregular heartbeat    liver disease    recent  or ongoing radiation therapy    an unusual or allergic reaction to doxorubicin, other chemotherapy agents, other medicines, foods, dyes, or preservatives    pregnant or trying to get pregnant    breast-feeding  How should I use this medicine?  This drug is given as an infusion into a vein. It is administered in a hospital or clinic by a specially trained health care professional. If you have pain, swelling, burning or any unusual feeling around the site of your injection, tell your health care professional right away.  Talk to your pediatrician regarding the use of this medicine in children. Special care may be needed.  Overdosage: If you think you have taken too much of this medicine contact a poison control center or emergency room at once.  NOTE: This medicine is only for you. Do not share this medicine with others.  What if I miss a dose?  It is important not to miss your dose. Call your doctor or health care professional if you are unable to keep an appointment.  What may interact with this medicine?  Do not take this medicine with any of the following medications:    cisapride    droperidol    halofantrine    pimozide    zidovudine  This medicine may also interact with the following medications:    chloroquine    chlorpromazine    clarithromycin    cyclophosphamide    cyclosporine    erythromycin    medicines for depression, anxiety, or psychotic disturbances    medicines for irregular heart beat like amiodarone, bepridil, dofetilide, encainide, flecainide, propafenone, quinidine    medicines for seizures like ethotoin, fosphenytoin, phenytoin    medicines for nausea, vomiting like dolasetron, ondansetron, palonosetron    medicines to increase blood counts like filgrastim, pegfilgrastim, sargramostim    methadone    methotrexate    pentamidine    progesterone    vaccines    verapamil  Talk to your doctor or health care professional before taking any of these  medicines:    acetaminophen    aspirin    ibuprofen    ketoprofen    naproxen  This list may not describe all possible interactions. Give your health care provider a list of all the medicines, herbs, non-prescription drugs, or dietary supplements you use. Also tell them if you smoke, drink alcohol, or use illegal drugs. Some items may interact with your medicine.  What should I watch for while using this medicine?  Your condition will be monitored carefully while you are receiving this medicine. You will need important blood work done while you are taking this medicine.  This drug may make you feel generally unwell. This is not uncommon, as chemotherapy can affect healthy cells as well as cancer cells. Report any side effects. Continue your course of treatment even though you feel ill unless your doctor tells you to stop.  Your urine may turn red for a few days after your dose. This is not blood. If your urine is dark or brown, call your doctor.  In some cases, you may be given additional medicines to help with side effects. Follow all directions for their use.  Call your doctor or health care professional for advice if you get a fever, chills or sore throat, or other symptoms of a cold or flu. Do not treat yourself. This drug decreases your body's ability to fight infections. Try to avoid being around people who are sick.  This medicine may increase your risk to bruise or bleed. Call your doctor or health care professional if you notice any unusual bleeding.  Be careful brushing and flossing your teeth or using a toothpick because you may get an infection or bleed more easily. If you have any dental work done, tell your dentist you are receiving this medicine.  Avoid taking products that contain aspirin, acetaminophen, ibuprofen, naproxen, or ketoprofen unless instructed by your doctor. These medicines may hide a fever.  Men and women of childbearing age should use effective birth control methods while using taking  this medicine. Do not become pregnant while taking this medicine. There is a potential for serious side effects to an unborn child. Talk to your health care professional or pharmacist for more information. Do not breast-feed an infant while taking this medicine.  Do not let others touch your urine or other body fluids for 5 days after each treatment with this medicine. Caregivers should wear latex gloves to avoid touching body fluids during this time.  There is a maximum amount of this medicine you should receive throughout your life. The amount depends on the medical condition being treated and your overall health. Your doctor will watch how much of this medicine you receive in your lifetime. Tell your doctor if you have taken this medicine before.  What side effects may I notice from receiving this medicine?  Side effects that you should report to your doctor or health care professional as soon as possible:    allergic reactions like skin rash, itching or hives, swelling of the face, lips, or tongue    low blood counts - this medicine may decrease the number of white blood cells, red blood cells and platelets. You may be at increased risk for infections and bleeding.    signs of infection - fever or chills, cough, sore throat, pain or difficulty passing urine    signs of decreased platelets or bleeding - bruising, pinpoint red spots on the skin, black, tarry stools, blood in the urine    signs of decreased red blood cells - unusually weak or tired, fainting spells, lightheadedness    breathing problems    chest pain    fast, irregular heartbeat    mouth sores    nausea, vomiting    pain, swelling, redness at site where injected    pain, tingling, numbness in the hands or feet    swelling of ankles, feet, or hands    unusual bleeding or bruising  Side effects that usually do not require medical attention (report to your doctor or health care professional if they continue or are bothersome):    diarrhea    facial  flushing    hair loss    loss of appetite    missed menstrual periods    nail discoloration or damage    red or watery eyes    red colored urine    stomach upset  This list may not describe all possible side effects. Call your doctor for medical advice about side effects. You may report side effects to FDA at 1-136-FDA-6979.  Where should I keep my medicine?  This drug is given in a hospital or clinic and will not be stored at home.  NOTE:This sheet is a summary. It may not cover all possible information. If you have questions about this medicine, talk to your doctor, pharmacist, or health care provider. Copyright  2015 [employer] Standard         Patient Education     Vincristine Sulfate Solution for injection  What is this medicine?  VINCRISTINE (tiffany FUNMILAYO teen) is a chemotherapy drug. It slows the growth of cancer cells. This medicine is used to treat many types of cancer like Hodgkin's disease, leukemia, non-Hodgkin's lymphoma, neuroblastoma (brain cancer), rhabdomyosarcoma, and Wilms' tumor.  This medicine may be used for other purposes; ask your health care provider or pharmacist if you have questions.  What should I tell my health care provider before I take this medicine?  They need to know if you have any of these conditions:    blood disorders    gout    infection (especially chickenpox, cold sores, or herpes)    kidney disease    liver disease    lung disease    nervous system disease like Charcot-Mirian-Tooth (CMT)    recent or ongoing radiation therapy    an unusual or allergic reaction to vincristine, other chemotherapy agents, other medicines, foods, dyes, or preservatives    pregnant or trying to get pregnant    breast-feeding  How should I use this medicine?  This drug is given as an infusion into a vein. It is administered in a hospital or clinic by a specially trained health care professional. If you have pain, swelling, burning, or any unusual feeling around the site of your injection, tell your health  care professional right away.  Talk to your pediatrician regarding the use of this medicine in children. While this drug may be prescribed for selected conditions, precautions do apply.  Overdosage: If you think you have taken too much of this medicine contact a poison control center or emergency room at once.  NOTE: This medicine is only for you. Do not share this medicine with others.  What if I miss a dose?  It is important not to miss your dose. Call your doctor or health care professional if you are unable to keep an appointment.  What may interact with this medicine?  Do not take this medicine with any of the following medications:    itraconazole    mibefradil    voriconazole  This medicine may also interact with the following medications:    cyclosporine    erythromycin    fluconazole    ketoconazole    medicines for HIV like delavirdine, efavirenz, nevirapine    medicines for seizures like ethotoin, fosphenotoin, phenytoin    medicines to increase blood counts like filgrastim, pegfilgrastim, sargramostim    other chemotherapy drugs like cisplatin, L-asparaginase, methotrexate, mitomycin, paclitaxel    pegaspargase    vaccines    zalcitabine, ddC  Talk to your doctor or health care professional before taking any of these medicines:    acetaminophen    aspirin    ibuprofen    ketoprofen    naproxen  This list may not describe all possible interactions. Give your health care provider a list of all the medicines, herbs, non-prescription drugs, or dietary supplements you use. Also tell them if you smoke, drink alcohol, or use illegal drugs. Some items may interact with your medicine.  What should I watch for while using this medicine?  Your condition will be monitored carefully while you are receiving this medicine. You will need important blood work done while you are taking this medicine.  This drug may make you feel generally unwell. This is not uncommon, as chemotherapy can affect healthy cells as well as  cancer cells. Report any side effects. Continue your course of treatment even though you feel ill unless your doctor tells you to stop.  In some cases, you may be given additional medicines to help with side effects. Follow all directions for their use.  Call your doctor or health care professional for advice if you get a fever, chills or sore throat, or other symptoms of a cold or flu. Do not treat yourself.  Avoid taking products that contain aspirin, acetaminophen, ibuprofen, naproxen, or ketoprofen unless instructed by your doctor. These medicines may hide a fever.  Do not become pregnant while taking this medicine. Women should inform their doctor if they wish to become pregnant or think they might be pregnant. There is a potential for serious side effects to an unborn child. Talk to your health care professional or pharmacist for more information. Do not breast-feed an infant while taking this medicine.  Men may have a lower sperm count while taking this medicine. Talk to your doctor if you plan to father a child.  What side effects may I notice from receiving this medicine?  Side effects that you should report to your doctor or health care professional as soon as possible:    allergic reactions like skin rash, itching or hives, swelling of the face, lips, or tongue    breathing problems    confusion or changes in emotions or moods    constipation    cough    mouth sores    muscle weakness    nausea and vomiting    pain, swelling, redness or irritation at the injection site    pain, tingling, numbness in the hands or feet    problems with balance, talking, walking    seizures    stomach pain    trouble passing urine or change in the amount of urine  Side effects that usually do not require medical attention (report to your doctor or health care professional if they continue or are bothersome):    diarrhea    hair loss    jaw pain    loss of appetite  This list may not describe all possible side effects. Call  your doctor for medical advice about side effects. You may report side effects to FDA at 4-614-XRV-4169.  Where should I keep my medicine?  This drug is given in a hospital or clinic and will not be stored at home.  NOTE:This sheet is a summary. It may not cover all possible information. If you have questions about this medicine, talk to your doctor, pharmacist, or health care provider. Copyright  2015 Gold Standard         Patient Education   Prednisone Oral tablet  What is this medicine?  PREDNISONE (PRED ni sone) is a corticosteroid. It is commonly used to treat inflammation of the skin, joints, lungs, and other organs. Common conditions treated include asthma, allergies, and arthritis. It is also used for other conditions, such as blood disorders and diseases of the adrenal glands.  This medicine may be used for other purposes; ask your health care provider or pharmacist if you have questions.  What should I tell my health care provider before I take this medicine?  They need to know if you have any of these conditions:    Cushing's syndrome    diabetes    glaucoma    heart disease    high blood pressure    infection (especially a virus infection such as chickenpox, cold sores, or herpes)    kidney disease    liver disease    mental illness    myasthenia gravis    osteoporosis    seizures    stomach or intestine problems    thyroid disease    an unusual or allergic reaction to lactose, prednisone, other medicines, foods, dyes, or preservatives    pregnant or trying to get pregnant    breast-feeding  How should I use this medicine?  Take this medicine by mouth with a glass of water. Follow the directions on the prescription label. Take this medicine with food. If you are taking this medicine once a day, take it in the morning. Do not take more medicine than you are told to take. Do not suddenly stop taking your medicine because you may develop a severe reaction. Your doctor will tell you how much medicine to  take. If your doctor wants you to stop the medicine, the dose may be slowly lowered over time to avoid any side effects.  Talk to your pediatrician regarding the use of this medicine in children. Special care may be needed.  Overdosage: If you think you have taken too much of this medicine contact a poison control center or emergency room at once.  NOTE: This medicine is only for you. Do not share this medicine with others.  What if I miss a dose?  If you miss a dose, take it as soon as you can. If it is almost time for your next dose, talk to your doctor or health care professional. You may need to miss a dose or take an extra dose. Do not take double or extra doses without advice.  What may interact with this medicine?  Do not take this medicine with any of the following medications:    metyrapone    mifepristone  This medicine may also interact with the following medications:    aminoglutethimide    amphotericin B    aspirin and aspirin-like medicines    barbiturates    certain medicines for diabetes, like glipizide or glyburide    cholestyramine    cholinesterase inhibitors    cyclosporine    digoxin    diuretics    ephedrine    female hormones, like estrogens and birth control pills    isoniazid    ketoconazole    NSAIDS, medicines for pain and inflammation, like ibuprofen or naproxen    phenytoin    rifampin    toxoids    vaccines    warfarin  This list may not describe all possible interactions. Give your health care provider a list of all the medicines, herbs, non-prescription drugs, or dietary supplements you use. Also tell them if you smoke, drink alcohol, or use illegal drugs. Some items may interact with your medicine.  What should I watch for while using this medicine?  Visit your doctor or health care professional for regular checks on your progress. If you are taking this medicine over a prolonged period, carry an identification card with your name and address, the type and dose of your medicine, and  your doctor's name and address.  This medicine may increase your risk of getting an infection. Tell your doctor or health care professional if you are around anyone with measles or chickenpox, or if you develop sores or blisters that do not heal properly.  If you are going to have surgery, tell your doctor or health care professional that you have taken this medicine within the last twelve months.  Ask your doctor or health care professional about your diet. You may need to lower the amount of salt you eat.  This medicine may affect blood sugar levels. If you have diabetes, check with your doctor or health care professional before you change your diet or the dose of your diabetic medicine.  What side effects may I notice from receiving this medicine?  Side effects that you should report to your doctor or health care professional as soon as possible:    allergic reactions like skin rash, itching or hives, swelling of the face, lips, or tongue    changes in emotions or moods    changes in vision    depressed mood    eye pain    fever or chills, cough, sore throat, pain or difficulty passing urine    increased thirst    swelling of ankles, feet  Side effects that usually do not require medical attention (report to your doctor or health care professional if they continue or are bothersome):    confusion, excitement, restlessness    headache    nausea, vomiting    skin problems, acne, thin and shiny skin    trouble sleeping    weight gain  This list may not describe all possible side effects. Call your doctor for medical advice about side effects. You may report side effects to FDA at 1-920-FDA-7289.  Where should I keep my medicine?  Keep out of the reach of children.  Store at room temperature between 15 and 30 degrees C (59 and 86 degrees F). Protect from light. Keep container tightly closed. Throw away any unused medicine after the expiration date.  NOTE:This sheet is a summary. It may not cover all possible  information. If you have questions about this medicine, talk to your doctor, pharmacist, or health care provider. Copyright  2015 Gold Standard

## 2021-06-12 NOTE — TELEPHONE ENCOUNTER
ANTICOAGULATION  MANAGEMENT    Assessment     Today's INR result of 2.6 is Therapeutic (goal INR of 2.0-3.0)        Missed dose(s) may be affecting INR - skipped 10/28, reports was nervous about INR being too high     No new diet changes affecting INR    No new medication/supplements affecting INR    Continues to tolerate warfarin with no reported s/s of bleeding or thromboembolism     Previous INR was Supratherapeutic     Patient has some diarrhea and nausea; chemo induced, encouraged to take immodium as prescribed and the prochlorperazine     Plan:     Spoke on phone with Cam regarding INR result and instructed:     Warfarin Dosing Instructions:  Continue current warfarin dose 2.5 mg daily on Wednesdays; and 5 mg daily rest of week  (0 % change)    Consulted with Osiris Ash, Pharm D     Instructed patient to follow up no later than: 11/5    Education provided: target INR goal and significance of current INR result, importance of following up for INR monitoring at instructed interval, importance of taking warfarin as instructed, importance of notifying clinic for changes in medications, when to seek medical attention/emergency care and importance of notifying clinic for diarrhea, nausea/vomiting, reduced intake and/or illness    Cam verbalizes understanding and agrees to warfarin dosing plan.    Instructed to call the [facility] for any changes, questions or concerns. (#[phone])   ?   Kiara Ibrahim RN    Subjective/Objective:      Cam Walden, a 70 y.o. male is on warfarin.     Cam reports:     Home warfarin dose: verbally confirmed home dose with Cam and updated on anticoagulation calendar     Missed doses: Yes: 10/28, one dose     Medication changes:  No     S/S of bleeding or thromboembolism:  No     New Injury or illness:  No     Changes in diet or alcohol consumption:  No     Upcoming surgery, procedure or cardioversion:  No    Anticoagulation Episode Summary     Current INR goal:  2.0-3.0    TTR:  83.4 % (1 y)   Next INR check:  11/5/2020   INR from last check:  2.60 (10/30/2020)   Weekly max warfarin dose:     Target end date:  Indefinite   INR check location:     Preferred lab:     Send INR reminders to:  [facility]    Indications    Atrial flutter (H) (Resolved) [I48.92]           Comments:           Anticoagulation Care Providers     Provider Role Specialty Phone number    Kamaljit Plata MD Referring Family Medicine [phone]

## 2021-06-12 NOTE — ANESTHESIA CARE TRANSFER NOTE
Last vitals:   Vitals:    08/25/17 1237   Pulse: (!) 115   Resp: 18   Temp: 36.7  C (98.1  F)   SpO2: 95%     Patient's level of consciousness is drowsy  Spontaneous respirations: yes  Maintains airway independently: yes  Dentition unchanged: yes  Oropharynx: oropharynx clear of all foreign objects    QCDR Measures:  ASA# 20 - Surgical No Data Recorded  PQRS# 430 - Adult PONV Prevention: NA - Not adult patient, not GA or 3 or more risk factors NOT present  ASA# 8 - Peds PONV Prevention: NA - Not pediatric patient, not GA or 2 or more risk factors NOT present  PQRS# 424 - Fern-op Temp Management: NA - MAC anesthesia or case < 60 minutes  PQRS# 426 - PACU Transfer Protocol: - Transfer of care checklist used  ASA# 14 - Acute Post-op Pain: ASA14B - Patient did NOT experience pain >= 7 out of 10

## 2021-06-12 NOTE — PROGRESS NOTES
MTM Follow Up Encounter  ASSESSMENT AND PLAN  Atrial Fibrillation: INR is at goal 2-3. [id number] score 3 indicates appropriate anticoagulation.  Pt is on appropriate rate control therapy - reviewed role of metoprolol today. Addition of metoprolol to his antihypertensive regimen likely contributed to the dizziness he occasionally experiences. Pt is aware of food and OTC interactions with warfarin.     Type 2 Diabetes:  improved. A1C is not at goal of <7%, but improved based off of diet changes. FBP is at goal  mg/dL and 2 hour post prandial BG are at goal <180. Will check microalbuminuria at follow up with A1c. Encouraged him to continue lifestyle changes since that appears to have made a significant difference on his A1c. Reviewed that he is not to the point of needing insulin now, but one day he might. That seems to be a motivator for him. Encouraged him to exercise, but he cannot due to pain.   PLAN:   No changes today. Continue diet changes.     MTM FOLLOW UP  3 months    SUBJECTIVE AND OBJECTIVE  Cam Walden is a 67 y.o. male here for a follow-up medication therapy management (MTM) appointment.     Atrial Fibrillation: Currently taking warfarin 7.5 mg 5 days a week and 10 mg Thurs/Sat and metoprolol tartrate 50 mg x2 (100 mg) BID. Metoprolol added 7/5/17 and dose has been increased. Does feel like when he gets up quickly he feels a little dizzy, but it does not happen often and it is short-lived. Currently sees a cardiologist.  Will be having a cardioversion on 8/25/17.  Last INR 2.4. Denies significant bleeding/bruising.   [id number] score = 3.     Type 2 Diabetes: Currently taking metformin 1000 mg BID and glipizide XL 10 mg daily.   Tests BG 1-2 times daily. Blood sugars per log:   AM = 132, 94, 110, 103, 123, 138, 141, 127.   2 hour post prandial: 127, 154, 133, 138, 169  Before dinner: 88, 129, 89, 78  Last A1c checked today = 7.6%, previously 8.2% on 5/12/17  Hypoglycemia none    Microalbumin checked 1/12/16  Has not been eating sugar, sweets. Still has pasta, but is trying to limit his portions. Has motivated himself to make changes to his diet because he is concerned about the price of insulin, which he has seen in the news.   He is unable to exercise due to pain.           Cam's medication list was reviewed with them, discussing reason for use, directions for use, and potential side effects of each medication as needed. Indication, safety, efficacy, and convenience was assessed for all medications addressed above.  No environmental factors were noted currently affecting patient.  This care plan was communicated via EMR with his primary care provider, Kamaljit Plata MD, who is the authorizing prescriber for this visit.  Direct supervision was available by either the patient's PCP or other available provider.    Time and complexity billing metrics are included in the docflowsheet linked to this visit    Time spent: 20 min  Angie Russell, Pharm.D., BCACP   MTM Pharmacist at [facility] and [facility]

## 2021-06-12 NOTE — PROGRESS NOTES
[facility] Hematology and Oncology Progress Note    Patient: Cam Walden  MRN: [id number]  Date of Service: 10/26/2020        Reason for Visit    1. Diffuse large B cell lymphoma    ECOG Performance   ECOG Performance Status: 1    Distress Assessment  Distress Assessment Score: No distress    Pain  0    Assessment/Plan  1. Stage I diffuse large B cell lymphoma  Cam tolerated cycle 2 R-CHOP fairly well. He had diarrhea (5-6/day) and nausea with intermittent emesis late week 1 into week 2 (4-5 days). He was not taking routine anti-diarrheals nor antiemetics and we reviewed that.    Labwork adequate. Creatinine up slightly from his baseline, but no dose-adjustments required.     Clinically, the neck mass is resolved; residual skin discoloration in the area remains.    Proceed with cycle 3 at same doses.    Will return in 3 weeks, ahead of cycle 4.    Will repeat PET and ECHO after this cycle. Pending response, will decide on completing up to 6 cycles.    2.  Chemo-induced nausea  3.  Chemo-induced diarrhea  4.  Mild creatinine elevation  We discussed using his supportive meds routinely. For the nausea, he will start taking the compazine on a scheduled basis the days he is having more nausea (up to four times a day). I offered sending a Rx for Zofran to use in addition to Compazine, but he feels he will do better just taking the Compazine more routinely.    He will start imodium for his diarrhea, when needed. Reviewed taking 2 tabs in morning and 1 tab after each loose stool, to max 8/day.     Encouraged he drink more fluids. He feels confident he can do that. Avoid NSAIDs.    5.   DM2  He is on oral medications. He had minimal hyperglycemia on the steroids. He will work with his diabetes educator for guidance, if needed.  _________________________________________________________________________    History of Present Illness/ Interval History    Mr. Cam Walden  is a 70 y.o. who returns for cycle 3  R-CHOP.    Tolerating chemo fairly well. This past cycle he had diarrhea around day 5-9, up to 5-6 loose stools/day. He took Gax-X, but not any antidiarrheals. He had more nausea with intermittent bile emesis days 5-9 as well, he took his compazine just once/day. He had a poor appetite with some weight loss, better this past week. He is doing generally good with fluid intake, but is not really keeping track. No neuropathy. No fevers/chills/sweats.     Neck mass resolved. No dysphagia, dysphonia or breathing difficulties.     Review of Systems  Constitutional  Constitutional (WDL): Exceptions to WDL  Fatigue: Concerns  Weight Loss: Concerns(283# 10/05)  Neurosensory  Neurosensory (WDL): Exceptions to WDL  Ataxia: Concerns(slow gait)  Eye   Eye Disorder (WDL): All eye disorder elements are within defined limits  Ear  Ear Disorder (WDL): All ear disorder elements are within defined limits  Cardiovascular  Cardiovascular (WDL): All cardiovascular elements are within defined limits  Pulmonary  Respiratory (WDL): Within Defined Limits  Gastrointestinal  Gastrointestinal (WDL): Exceptions to WDL  Anorexia: Concerns(5 days post treatment)  Nausea: Concerns(post treatment)  Vomiting: Concerns(emesis of bile mostly)  Diarrhea: Concerns(gas-x not working well)  Genitourinary  Genitourinary (WDL): All genitourinary elements are within defined limits  Lymphatic  Lymph (WDL): All lymph disorder elements are within defined limits  Musculoskeletal and Connective Tissue  Musculoskeletal and Connetive Tissue Disorders (WDL): All Musculoskeletal and Connetive Tissue Disorder elements are within defined limits  Integumentary  Integumentary (WDL): All integumentary elements are within defined limits  Patient Coping  Patient Coping: Accepting;Open/discussion  Accompanied by  Accompanied by: Alone  Patient Coping: Accepting;Open/discussion  Distress Assessment  Distress Assessment Score: No distress      Oncology  "History/Treatment  Diagnosis/Stage:   8/2020: diffuse large B cell lymphoma, GCB Type, stage I  -Elevated LDH  -No bone marrow involvement  -PET: local involvement of thyroid/regional neck only    Treatment:  9/15/20: Initiated curative-intent R-CHOP + Neulasta       Past History  Past Medical History:   Diagnosis Date     Aortic stenosis     mild     Atrial fibrillation (H)      Atrial flutter (H) 8/21/2017     Atrial flutter with rapid ventricular response (H)      Cancer (H)     lymp     Colon polyps 08/08/2016    Per colonoscopy 8/8/16.  Large and small.  Some removed. Others to be removed surgically. (per patient)     Diabetes mellitus (H)     DM II     Diabetes mellitus, type II (H)      GI bleed     Following colon polyp removal     Hyperlipidemia      Hypertension      Morbid obesity (H)      JOHNNY (obstructive sleep apnea)     no cpap     Rectal bleeding          Past Surgical History:   Procedure Laterality Date     CARDIOVERSION  08/25/2017     IR PORT PLACEMENT >5 YEARS  8/24/2020       Physical Exam    Recent Vitals 10/26/2020   Height 6' 0\"   Weight 273 lbs 3 oz   BSA (m2) 2.51 m2   /62   Pulse 78   Temp 98   Temp src 1   SpO2 95   Some recent data might be hidden       GENERAL: Alert and oriented to time place and person. Seated comfortably. In no distress. Alone, masked.  HEAD: Atraumatic and normocephalic. No alopecia.  EYES: BRIDGETTE, EOMI. No erythema. No icterus.  ORAL CAVITY: Moist. No mucosal lesion or tonsillar enlargement.  NECK: supple. The previous neck mass is resolved with residual dark pink skin discoloration noted at the site.   LYMPH NODES: No palpable supraclavicular, cervical lymphadenopathy.  CHEST: clear to auscultation bilaterally. Resonant to percussion throughout bilaterally. Symmetrical breath movements bilaterally.  CVS: S1 and S2 are heard. Regular rate and rhythm. No murmur or gallop or rub heard.  ABDOMEN: Soft. Not tender. Not distended. No palpable hepatomegaly or " splenomegaly. No other mass palpable. Bowel sounds present.  EXTREMITIES: Warm. No peripheral edema.  SKIN: no rash, or bruising or purpura.   NEURO: No gross deficit noted. Non-antalgic gait.      Lab Results    Recent Results (from the past 168 hour(s))   INR   Result Value Ref Range    INR 3.29 (H) 0.90 - 1.10   Comprehensive Metabolic Panel   Result Value Ref Range    Sodium 141 136 - 145 mmol/L    Potassium 3.5 3.5 - 5.0 mmol/L    Chloride 102 98 - 107 mmol/L    CO2 25 22 - 31 mmol/L    Anion Gap, Calculation 14 5 - 18 mmol/L    Glucose 197 (H) 70 - 125 mg/dL    BUN 16 8 - 22 mg/dL    Creatinine 1.47 (H) 0.70 - 1.30 mg/dL    GFR MDRD Af Amer 58 (L) >60 mL/min/1.73m2    GFR MDRD Non Af Amer 48 (L) >60 mL/min/1.73m2    Bilirubin, Total 0.8 0.0 - 1.0 mg/dL    Calcium 8.6 8.5 - 10.5 mg/dL    Protein, Total 6.7 6.0 - 8.0 g/dL    Albumin 3.5 3.5 - 5.0 g/dL    Alkaline Phosphatase 98 45 - 120 U/L    AST 24 0 - 40 U/L    ALT 40 0 - 45 U/L   HM1 (CBC with Diff)   Result Value Ref Range    WBC 12.6 (H) 4.0 - 11.0 thou/uL    RBC 3.80 (L) 4.40 - 6.20 mill/uL    Hemoglobin 11.2 (L) 14.0 - 18.0 g/dL    Hematocrit 34.9 (L) 40.0 - 54.0 %    MCV 92 80 - 100 fL    MCH 29.5 27.0 - 34.0 pg    MCHC 32.1 32.0 - 36.0 g/dL    RDW 16.0 (H) 11.0 - 14.5 %    Platelets 276 140 - 440 thou/uL    MPV 8.8 8.5 - 12.5 fL    Neutrophils % 73 (H) 50 - 70 %    Lymphocytes % 19 (L) 20 - 40 %    Monocytes % 7 2 - 10 %    Eosinophils % 0 0 - 6 %    Basophils % 0 0 - 2 %    Immature Granulocyte % 1 (H) <=0 %    Neutrophils Absolute 9.2 (H) 2.0 - 7.7 thou/uL    Lymphocytes Absolute 2.4 0.8 - 4.4 thou/uL    Monocytes Absolute 0.9 0.0 - 0.9 thou/uL    Eosinophils Absolute 0.0 0.0 - 0.4 thou/uL    Basophils Absolute 0.0 0.0 - 0.2 thou/uL    Immature Granulocyte Absolute 0.1 (H) <=0.0 thou/uL       Imaging    No results found.    Billing  Total face to face time 20 minutes, with 15 minutes of that dedicated to counseling, education or coordination of care.      Signed by: Oumou Sánchez

## 2021-06-12 NOTE — TELEPHONE ENCOUNTER
ANTICOAGULATION  MANAGEMENT    Assessment     Today's INR result of 2.6 is Therapeutic (goal INR of 2.0-3.0)        Warfarin taken as previously instructed    No new diet changes affecting INR    Potential interaction between prednisone  and warfarin which may affect subsequent INRs     Patient will start prednisone again 10/19 for one week prior to chemo on 10/26    Continues to tolerate warfarin with no reported s/s of bleeding or thromboembolism     Previous INR was Therapeutic    Plan:     Spoke on phone with Cam regarding INR result and instructed:     Warfarin Dosing Instructions:  Continue current warfarin dose 5 mg daily (0 % change)    Consulted with Osiris Ash, Pharm D     Instructed patient to follow up no later than: 10/22    Education provided: importance of therapeutic range, target INR goal and significance of current INR result, importance of following up for INR monitoring at instructed interval, importance of taking warfarin as instructed, potential interaction between warfarin and prednisone , when to seek medical attention/emergency care and importance of notifying clinic for diarrhea, nausea/vomiting, reduced intake and/or illness    Cam verbalizes understanding and agrees to warfarin dosing plan.    Instructed to call the [facility] for any changes, questions or concerns. (#[phone])   ?   Kiara Ibrahim RN    Subjective/Objective:      Cam Walden, a 70 y.o. male is on warfarin.     Cam reports:     Home warfarin dose: verbally confirmed home dose with Cam and updated on anticoagulation calendar     Missed doses: No     Medication changes:  No     S/S of bleeding or thromboembolism:  No     New Injury or illness:  No     Changes in diet or alcohol consumption:  No     Upcoming surgery, procedure or cardioversion:  No    Anticoagulation Episode Summary     Current INR goal:  2.0-3.0   TTR:  85.1 % (1 y)   Next INR check:  10/22/2020   INR from last check:  2.60 (10/16/2020)    Weekly max warfarin dose:     Target end date:  Indefinite   INR check location:     Preferred lab:     Send INR reminders to:  [facility]    Indications    Atrial flutter (H) (Resolved) [I48.92]           Comments:           Anticoagulation Care Providers     Provider Role Specialty Phone number    Kamaljit Plata MD Referring Family Medicine [phone]

## 2021-06-12 NOTE — PROGRESS NOTES
PT here ambulatory for cycle 3 day 1 txt after exam with NP. Lab results reviewed and approved for txt. txt reviewed with pt and administered as ordered. Rituxan infused at rapid infusion rate and tolerated without any problems . Positive blood return throughout adriamycin ivp. Pt tolerated remainder of txt without any problems. neulasta injector placed on and reviewed with pt that it will start tomorrow around 235 pmand be completed around 320 pm. Follow up reviewed and pt dc'd steady gait

## 2021-06-12 NOTE — PROGRESS NOTES
"[facility] Hematology and Oncology Progress Note    Patient: Cam Walden  MRN: [id number]  Date of Service: 10/05/2020        Reason for Visit    1. Diffuse large B cell lymphoma    ECOG Performance   ECOG Performance Status: 1    Distress Assessment  Distress Assessment Score: No distress    Pain  0    Assessment/Plan  1. Stage I diffuse large B cell lymphoma  Cam tolerated cycle 1 R-CHOP quite well. A bit more fatigued and had some gas/mild diarrhea, well managed.   Labwork adequate.    Clinically, the neck mass is resolved; residual skin discoloration in the area remains.    Will return in 3 weeks, ahead of cycle 3.    Will repeat PET after 3rd cycle. Pending response, will decide on completing up to 6 cycles.    Will need ECHO after 3rd cycle.    2.   DM2  He is on oral medications. He had minimal hyperglycemia on the steroids. He will work with his diabetes educator for guidance, if needed.  _________________________________________________________________________    History of Present Illness/ Interval History    Mr. Cam Walden  is a 70 y.o. who returns for cycle 2 R-CHOP.    Tolerating chemo quite well. Developed some increased gas/mild diarrhea last week, using Gas-X with good benefit. A bit more fatigued, naps each day. No nausea/vomiting. Good appetite. No neuropathy. No fevers/chills/sweats.     Neck mass is largely improved. No dysphagia, dysphonia or breathing difficulties.     Review of Systems  Constitutional  Constitutional (WDL): Exceptions to WDL  Fatigue: Concerns(\"not great\")  Neurosensory  Neurosensory (WDL): Exceptions to WDL  Ataxia: Concerns(slower gait)  Peripheral Sensory Neuropathy: Concerns(numbness in feet)  Eye   Eye Disorder (WDL): All eye disorder elements are within defined limits  Ear  Ear Disorder (WDL): All ear disorder elements are within defined limits  Cardiovascular  Cardiovascular (WDL): All cardiovascular elements are within defined limits  Pulmonary  Respiratory " "(WDL): Within Defined Limits  Gastrointestinal  Gastrointestinal (WDL): Exceptions to WDL  Diarrhea: Concerns(controlled with gas-x)  Genitourinary  Genitourinary (WDL): All genitourinary elements are within defined limits  Lymphatic  Lymph (WDL): All lymph disorder elements are within defined limits  Musculoskeletal and Connective Tissue  Musculoskeletal and Connetive Tissue Disorders (WDL): All Musculoskeletal and Connetive Tissue Disorder elements are within defined limits  Integumentary  Integumentary (WDL): Exceptions to WDL(bruise on left side of abdomen)  Patient Coping  Patient Coping: Accepting;Open/discussion  Accompanied by  Accompanied by: Alone  Patient Coping: Accepting;Open/discussion  Distress Assessment  Distress Assessment Score: No distress      Oncology History/Treatment  Diagnosis/Stage:   8/2020: diffuse large B cell lymphoma, GCB Type, stage I  -Elevated LDH  -No bone marrow involvement  -PET: local involvement of thyroid/regional neck only    Treatment:  9/15/20: Initiated curative-intent R-CHOP + Neulasta       Past History  Past Medical History:   Diagnosis Date     Aortic stenosis     mild     Atrial fibrillation (H)      Atrial flutter (H) 8/21/2017     Atrial flutter with rapid ventricular response (H)      Cancer (H)     lymp     Colon polyps 08/08/2016    Per colonoscopy 8/8/16.  Large and small.  Some removed. Others to be removed surgically. (per patient)     Diabetes mellitus (H)     DM II     Diabetes mellitus, type II (H)      GI bleed     Following colon polyp removal     Hyperlipidemia      Hypertension      Morbid obesity (H)      JOHNNY (obstructive sleep apnea)     no cpap     Rectal bleeding          Past Surgical History:   Procedure Laterality Date     CARDIOVERSION  08/25/2017     IR PORT PLACEMENT >5 YEARS  8/24/2020       Physical Exam    Recent Vitals 10/5/2020   Height 6' 0\"   Weight 283 lbs 13 oz   BSA (m2) 2.56 m2   /76   Pulse 78   Temp 98   Temp src 1   SpO2 93 "   Some recent data might be hidden       GENERAL: Alert and oriented to time place and person. Seated comfortably. In no distress. Alone, masked.  HEAD: Atraumatic and normocephalic. No alopecia.  EYES: BRIDGETTE, EOMI. No erythema. No icterus.  ORAL CAVITY: Moist. No mucosal lesion or tonsillar enlargement.  NECK: supple. The previous neck mass is resolved with residual dark pink skin discoloration noted.   LYMPH NODES: No palpable supraclavicular, cervical lymphadenopathy.  CHEST: clear to auscultation bilaterally. Resonant to percussion throughout bilaterally. Symmetrical breath movements bilaterally.  CVS: S1 and S2 are heard. Regular rate and rhythm. No murmur or gallop or rub heard.  ABDOMEN: Soft. Not tender. Not distended. No palpable hepatomegaly or splenomegaly. No other mass palpable. Bowel sounds present.  EXTREMITIES: Warm. No peripheral edema.  SKIN: no rash, or bruising or purpura.   NEURO: No gross deficit noted. Non-antalgic gait.      Lab Results    Recent Results (from the past 168 hour(s))   INR   Result Value Ref Range    INR 3.30 (H) 0.90 - 1.10   Comprehensive Metabolic Panel   Result Value Ref Range    Sodium 139 136 - 145 mmol/L    Potassium 4.0 3.5 - 5.0 mmol/L    Chloride 102 98 - 107 mmol/L    CO2 27 22 - 31 mmol/L    Anion Gap, Calculation 10 5 - 18 mmol/L    Glucose 206 (H) 70 - 125 mg/dL    BUN 13 8 - 22 mg/dL    Creatinine 1.23 0.70 - 1.30 mg/dL    GFR MDRD Af Amer >60 >60 mL/min/1.73m2    GFR MDRD Non Af Amer 59 (L) >60 mL/min/1.73m2    Bilirubin, Total 0.7 0.0 - 1.0 mg/dL    Calcium 9.0 8.5 - 10.5 mg/dL    Protein, Total 7.3 6.0 - 8.0 g/dL    Albumin 3.6 3.5 - 5.0 g/dL    Alkaline Phosphatase 98 45 - 120 U/L    AST 26 0 - 40 U/L    ALT 41 0 - 45 U/L   HM1 (CBC with Diff)   Result Value Ref Range    WBC 10.7 4.0 - 11.0 thou/uL    RBC 4.08 (L) 4.40 - 6.20 mill/uL    Hemoglobin 12.0 (L) 14.0 - 18.0 g/dL    Hematocrit 37.2 (L) 40.0 - 54.0 %    MCV 91 80 - 100 fL    MCH 29.4 27.0 - 34.0 pg     MCHC 32.3 32.0 - 36.0 g/dL    RDW 15.0 (H) 11.0 - 14.5 %    Platelets 307 140 - 440 thou/uL    MPV 8.7 8.5 - 12.5 fL    Neutrophils % 67 50 - 70 %    Lymphocytes % 24 20 - 40 %    Monocytes % 8 2 - 10 %    Eosinophils % 0 0 - 6 %    Basophils % 0 0 - 2 %    Immature Granulocyte % 1 (H) <=0 %    Neutrophils Absolute 7.1 2.0 - 7.7 thou/uL    Lymphocytes Absolute 2.6 0.8 - 4.4 thou/uL    Monocytes Absolute 0.9 0.0 - 0.9 thou/uL    Eosinophils Absolute 0.0 0.0 - 0.4 thou/uL    Basophils Absolute 0.0 0.0 - 0.2 thou/uL    Immature Granulocyte Absolute 0.1 (H) <=0.0 thou/uL   INR   Result Value Ref Range    INR 2.98 (H) 0.90 - 1.10       Imaging    No results found.    Billing  Total face to face time 20 minutes, with 15 minutes of that dedicated to counseling, education or coordination of care.     Signed by: Oumou Sánchez

## 2021-06-12 NOTE — PROGRESS NOTES
Persistent atrial flutter; appears to be typical isthmus dependent flutter  Anticoagulation with warfarin with satisfactory INR  Hypertension  Obesity  Preserved left ventricular function as shown by echocardiogram    Plan  Proceed to cardioversion  Discussed with patient and informed consent obtained  He has relapse of atrial flutter, would be candidate for an isthmus, atrial flutter ablation

## 2021-06-12 NOTE — PROGRESS NOTES
[facility] Office Note    Assessment / Plan:    1. Atrial flutter with rapid ventricular response.  Duration uncertain .  Minimally symptomatic.    He has now been therapeutically anticoagulated for 3 weeks.  We will plan to proceed with direct-current cardioversion.  We discussed that if his flutter is recurrent, flutter ablation will be planned at that time.    2. Aortic stenosis mild in degree.    3. Morbid obesity, history of mild sleep apnea currently not on treatment.    Plan follow-up in 1 month after cardioversion  ______________________________________________________________________    Subjective:    I had the opportunity to see Cam Walden at the [facility]. Cam Walden is a 67 y.o. male with a history of hypertension and morbid obesity who I met last month when he presented with new diagnosis of atrial flutter.  Rate control was attempted with Toprol 100 mg twice daily, he has been on warfarin anticoagulation with therapeutic INRs for the last 3 weeks.  Returns today in preparation for cardioversion procedure.    24-hour Holter monitor continued to show fairly rapid atrial flutter with an average rate of 104 beats a minute.  He had no pauses, but at times heart rates were up into the 190s.  He was unaware of this.  He indeed feels very little different from when he was diagnosed and was basically asymptomatic at that time.  Echocardiogram shows preserved left ventricular systolic function.    He continues to sleep in his recliner.  He was diagnosed with mild sleep apnea in years past and feels that his sleep is restorative.  He sleeps 4 hours then is up for a while and goes back to sleep for another couple of hours.  He denies daytime sleepiness otherwise.  His activities  have not changed since I saw him last.  He continues to teach piano lessons.      ______________________________________________________________________    Problem List:  Patient Active Problem  List   Diagnosis     Aortic stenosis, mild     Essential hypertension with goal blood pressure less than 130/80     Sleep apnea     Hyperlipidemia     Decreased sensation of feet.     Plantar fasciitis, bilateral     Skin lesion     Colon polyps     Colonoscopy causing post-procedural bleeding     Acute blood loss anemia     Type 2 diabetes mellitus, uncontrolled     Atrial flutter with rapid ventricular response     Medical History:  Past Medical History:   Diagnosis Date     Colon polyps 08/08/2016    Per colonoscopy 8/8/16.  Large and small.  Some removed. Others to be removed surgically. (per patient)     Diabetes mellitus     DM II     Diabetes mellitus, type II      GI bleed     Following colon polyp removal     Hypertension      Rectal bleeding      Surgical History:  Past Surgical History:   Procedure Laterality Date     colon polyp removal  08/08/2016     Social History:  Social History     Social History     Marital status: Single     Spouse name: N/A     Number of children: N/A     Years of education: N/A     Occupational History     Not on file.     Social History Main Topics     Smoking status: Never Smoker     Smokeless tobacco: Never Used     Alcohol use No     Drug use: No     Sexual activity: No     Other Topics Concern     Not on file     Social History Narrative     Sleep History:  Sleeps for 4 hours in a recliner then later in the morning sleeps for another 2 hours in his recliner  Exercise History:  None    Review of Systems:   General: Night Sweats  Eyes: Visual Distubance  Ears/Nose/Throat: WNL  Lungs: WNL  Heart: WNL  Stomach: WNL  Bladder: WNL  Muscle/Joints: WNL  Skin: WNL  Nervous System: WNL  Mental Health: WNL     Blood: WNL          Family History:  Family History   Problem Relation Age of Onset     Dementia Mother      Asthma Father          Allergies:  Allergies   Allergen Reactions     Lisinopril Cough     Trulicity [Dulaglutide] Nausea Only     Medications:  Current Outpatient  "Prescriptions   Medication Sig Dispense Refill     atorvastatin (LIPITOR) 40 MG tablet Take 1 tablet (40 mg total) by mouth daily. 90 tablet 3     blood glucose test (ACCU-CHEK SMARTVIEW TEST STRIP) strips Use to check blood sugars twice daily as directed 100 each 11     blood-glucose meter (ACCU-CHEK ABIMBOLA) Misc Use to check blood sugar twice per day. 1 each 1     cholecalciferol, vitamin D3, (VITAMIN D3) 1,000 unit capsule Take 1 capsule (1,000 Units total) by mouth daily. 100 capsule 2     generic lancets (ACCU-CHEK FASTCLIX) Use to check blood sugar twice per day. 102 each 11     glipiZIDE (GLUCOTROL XL) 10 MG 24 hr tablet Take 1 tablet (10 mg total) by mouth every morning. 90 tablet 0     hydroCHLOROthiazide (HYDRODIURIL) 25 MG tablet Take 1 tablet (25 mg total) by mouth daily. 90 tablet 2     losartan (COZAAR) 100 MG tablet Take 1 tablet (100 mg total) by mouth daily. 90 tablet 3     metFORMIN (GLUCOPHAGE) 1000 MG tablet Take 1 tablet (1,000 mg total) by mouth 2 (two) times a day with meals. 180 tablet 3     metoprolol tartrate (LOPRESSOR) 50 MG tablet Take 2 tablets (100 mg total) by mouth 2 (two) times a day. 270 tablet 2     senna (SENOKOT) 8.6 mg tablet Take 1 tablet by mouth bedtime.       warfarin (COUMADIN) 5 MG tablet Take 7.5 mg PO 5 days a week and 10 mg PO  on 2 days a week or per INR result. 100 tablet 1     No current facility-administered medications for this visit.        Objective:   Wt Readings from Last 3 Encounters:   08/21/17 (!) 311 lb (141.1 kg)   07/18/17 (!) 303 lb (137.4 kg)   07/12/17 (!) 310 lb (140.6 kg)     Vital signs:  /86 (Patient Site: Left Arm, Patient Position: Sitting, Cuff Size: Adult Large)  Pulse 84  Resp 16  Ht 5' 11\" (1.803 m)  Wt (!) 311 lb (141.1 kg)  BMI 43.38 kg/m2      Physical Exam:    GENERAL APPEARANCE: Alert, cooperative and in no acute distress.  HEENT: Conjunctivae not injected.  No scleral icterus. No Xanthelasma. Oral mucous membranes pink and " moist.  NECK: No JVD.  No Hepatojugular reflux. Thyroid not enlarged.  CHEST: clear to auscultation  CARDIOVASCULAR: Rapid, irregularly irregular S1, S2 without murmur ,clicks or rubs. Brachial, radial and posterior tibial pulses are intact and symmetric. No carotid bruits noted.    ABDOMEN: Obese.  nOntender. BS+. No bruits.  EXTREMITIES: Trace bilateral pretibial edema.  SKIN:  No rash, bruising    Lab Results:  LIPIDS:  Lab Results   Component Value Date    CHOL 109 08/14/2017    CHOL 104 01/12/2016    CHOL 168 06/03/2015     Lab Results   Component Value Date    HDL 28 (L) 08/14/2017    HDL 35 (L) 01/12/2016    HDL 31 (L) 06/03/2015     Lab Results   Component Value Date    LDLCALC 50 08/14/2017    LDLCALC 49 01/12/2016    LDLCALC 97 06/03/2015     Lab Results   Component Value Date    TRIG 155 (H) 08/14/2017    TRIG 102 01/12/2016    TRIG 202 (H) 06/03/2015     Holter monitor 7/2017:  24-hour Holter monitor was applied 7/12/2017 with data analysis/interpretation 7/13/2017  Atrial flutter seen throughout the recording  Which ventricular rate is 104 bpm and ranges between  bpm  No bradycardia or pauses  A single PVC  This activity diary was reviewed-no symptoms or awareness of palpitations/arrhythmias noted     Discussion  Persistent atrial flutter.  This appears to be typical flutter and may be isthmus dependent  Elevated ventricular rate is noted with elevated average heart rate and at times heart rate up to 194 bpm  This rhythm may be amenable to isthmus/flutter ablation    Echocardiogram 7/2017:  Summary   4. The left ventricle is mildly enlarged. Left ventricular systolic performance is at the lower limits of normal. The ejection fraction is estimated to be 50-55%.   5. There is mild concentric increase in left ventricular wall thickness.   6. There is mild aortic stenosis.   7. There is mild left atrial enlargement.                HANNAH LIM MD [facility]  [phone]    This  note created using Dragon voice recognition software.  Sound alike errors may have escaped editing.

## 2021-06-12 NOTE — TELEPHONE ENCOUNTER
ANTICOAGULATION  MANAGEMENT    Assessment     Today's INR result of 2.1 is Therapeutic (goal INR of 2.0-3.0)        Missed dose(s) may be affecting INR    No new diet changes affecting INR    No new medication/supplements affecting INR    Continues to tolerate warfarin with no reported s/s of bleeding or thromboembolism     Previous INR was Supratherapeutic    Plan:     Spoke on phone with Cam regarding INR result and instructed:     Warfarin Dosing Instructions:  Continue current warfarin dose 5 mg daily (0 % change)    Instructed patient to follow up no later than: 10/16    Education provided: importance of therapeutic range, importance of following up for INR monitoring at instructed interval and importance of taking warfarin as instructed    Cam verbalizes understanding and agrees to warfarin dosing plan.    Instructed to call the [facility] for any changes, questions or concerns. (#[phone])   ?   Jackie Rao RN    Subjective/Objective:      Cam Walden, a 70 y.o. male is on warfarin.     Cam reports:     Home warfarin dose: verbally confirmed home dose with Cam and updated on anticoagulation calendar     Missed doses: Yes: Saturdays     Medication changes:  No     S/S of bleeding or thromboembolism:  No     New Injury or illness:  Yes: he complains of being very tired today.     Changes in diet or alcohol consumption:  No     Upcoming surgery, procedure or cardioversion:  No    Anticoagulation Episode Summary     Current INR goal:  2.0-3.0   TTR:  85.1 % (1 y)   Next INR check:  10/16/2020   INR from last check:  2.10 (10/13/2020)   Weekly max warfarin dose:     Target end date:  Indefinite   INR check location:     Preferred lab:     Send INR reminders to:  [facility]    Indications    Atrial flutter (H) (Resolved) [I48.92]           Comments:           Anticoagulation Care Providers     Provider Role Specialty Phone number    Kamaljit Plata MD Referring Family  Medicine [phone]

## 2021-06-12 NOTE — PROGRESS NOTES
PT here ambulatory for txt after exam with Np. Pt has dizziness when ambulating distances so using wheelchair for transportation. Labs drawn from port approved for txt. txt reviewed with pt. First rituxan tolerated without any problems. Rituxan infused at rapid infusion rate and pt tolerated without any problems. Remainder of txt administered without any problems. Positive blood return during adriamycin ivp. Txt completed and port flushed/deaccessed with 2x2 to site. neulasta injector placed on pt and reviewed it will start tomorrow around 315 and be completed around 4. PT dc'd in wheelchair.

## 2021-06-12 NOTE — PROGRESS NOTES
Cardioversion Nurse Note    EP Procedure: Cardioversion  Diagnosis: A flutter with RVR    RN/CV  Prep    Primary Cardiologist: Dr. Alfonso  PCP: Dr. Boni Kay MD: Dr. Alfonso  Order Date: 7/17    Procedure Date: 8/25  Procedure Start Time: 2 pm  Patient Arrival Time: 12:30 pm  Anesthesia Required: yes  Existing Device: none  Rep Needed: no    H&P: 8/21 completed Dr. Alfonso  CPAP: no  Diabetic: yes     Echo Date: 7/12    Teach: on 8/21 with John Chavez RN    Patient Education: Explained indications/risks for Elective Cardioversion:     >90% acute success rate, <10% failure to convert or   reverts shortly after cardioversion.    <1% embolic event of (CVA, pulmonary embolism, or   other site).    75% risk for superficial burn.    Risks associated with general anesthesia will be addressed by the Anesthesiology Department    Pre-procedure instructions:   Patient instructed to be NPO after midnight.  The patient was notified of the date and time of the procedure.  Patient instructed to arrange for transportation home following procedure.  Post-procedure follow up process.  Anticoagulation Medication: Coumadin Antiarrhythmic Medication  Instructed to hold a.m. of procedure: Glipizide and Metformin   The pre-procedure packet will be sent to [facility] prior to the procedure.    Allergies   Allergen Reactions     Lisinopril Cough     Trulicity [Dulaglutide] Nausea Only       John Chavez

## 2021-06-12 NOTE — ANESTHESIA POSTPROCEDURE EVALUATION
Patient: Cam Walden  * No procedures listed *  Anesthesia type: general    Patient location: [facility] Surgical Floor  Last vitals:   Vitals:    08/25/17 1339   BP:    Pulse:    Resp:    Temp: 36.8  C (98.2  F)   SpO2:      Post vital signs: stable  Level of consciousness: awake and responds to simple questions  Post-anesthesia pain: pain controlled  Post-anesthesia nausea and vomiting: no  Pulmonary: unassisted, return to baseline  Cardiovascular: stable and blood pressure at baseline  Hydration: adequate  Anesthetic events: no    QCDR Measures:  ASA# 11 - Fern-op Cardiac Arrest: ASA11B - Patient did NOT experience unanticipated cardiac arrest  ASA# 12 - Fern-op Mortality Rate: ASA12B - Patient did NOT die  ASA# 13 - PACU Re-Intubation Rate: NA - No ETT / LMA used for case  ASA# 10 - Composite Anes Safety: ASA10A - No serious adverse event  ASA# 38 - New Corneal Injury: ASA38A - No new exposure keratitis or corneal abrasion in PACU    Additional Notes:

## 2021-06-12 NOTE — TELEPHONE ENCOUNTER
Refill Approved    Rx renewed per Medication Renewal Policy. Medication was last renewed on 6/10/20.    Madhavi Stallings, [facility] Connection Triage/Med Refill 10/27/2020     Requested Prescriptions   Pending Prescriptions Disp Refills     glipiZIDE (GLUCOTROL XL) 10 MG 24 hr tablet [Pharmacy Med Name: GLIPIZIDE ER 10 MG TABLET] 90 tablet 1     Sig: TAKE 1 TABLET BY MOUTH EVERY DAY IN THE MORNING       Oral Hypoglycemics Refill Protocol Passed - 10/26/2020  9:33 PM        Passed - Visit with PCP or prescribing provider visit in last 6 months       Last office visit with prescriber/PCP: Visit date not found OR same dept: Visit date not found OR same specialty: 7/23/2020 Aakash García MD Last physical: Visit date not found Last MTM visit: Visit date not found         Next appt within 3 mo: Visit date not found  Next physical within 3 mo: Visit date not found  Prescriber OR PCP: Kamaljit Plata MD  Last diagnosis associated with med order: 1. Diabetes mellitus (H)  - glipiZIDE (GLUCOTROL XL) 10 MG 24 hr tablet [Pharmacy Med Name: GLIPIZIDE ER 10 MG TABLET]; TAKE 1 TABLET BY MOUTH EVERY DAY IN THE MORNING  Dispense: 90 tablet; Refill: 1     If protocol passes may refill for 12 months if within 3 months of last provider visit (or a total of 15 months).           Passed - A1C in last 6 months     Hemoglobin A1c   Date Value Ref Range Status   10/13/2020 8.6 (H) <=5.6 % Final     Comment:     Normal <5.7% Prediabete 5.7-6.4% Diabletes 6.5% or higher - adopted from ADA consensus guidelines               Passed - Microalbumin in last year      Microalbumin, Random Urine   Date Value Ref Range Status   07/13/2020 5.21 (H) 0.00 - 1.99 mg/dL Final                  Passed - Blood pressure in last year     BP Readings from Last 1 Encounters:   10/26/20 116/62             Passed - Serum creatinine in last year     Creatinine   Date Value Ref Range Status   10/26/2020 1.47 (H) 0.70 - 1.30 mg/dL Final

## 2021-06-12 NOTE — TELEPHONE ENCOUNTER
ANTICOAGULATION  MANAGEMENT    Assessment     Today's INR result of 2.98 is Therapeutic (goal INR of 2.0-3.0)        Warfarin taken as previously instructed    No new diet changes affecting INR    Potential interaction between prednisone  and warfarin which may affect subsequent INRs    Continues to tolerate warfarin with no reported s/s of bleeding or thromboembolism     Previous INR was Supratherapeutic    Plan:     Spoke on phone with Cam regarding INR result and instructed:     Warfarin Dosing Instructions:  Continue current warfarin dose 7.5 mg daily on Sundays; and 5 mg daily rest of week  (0 % change)    Instructed patient to follow up no later than: 10/8  (couple days into prednisone)     Education provided: importance of therapeutic range, target INR goal and significance of current INR result, importance of following up for INR monitoring at instructed interval and importance of taking warfarin as instructed    Cam verbalizes understanding and agrees to warfarin dosing plan.    Instructed to call the [facility] for any changes, questions or concerns. (#[phone])   ?   Kiara Ibrahim RN    Subjective/Objective:      Cam Walden, a 70 y.o. male is on warfarin.     Cam reports:     Home warfarin dose: verbally confirmed home dose with Cam and updated on anticoagulation calendar     Missed doses: No     Medication changes:  Yes: prednisone, chemo today      S/S of bleeding or thromboembolism:  No     New Injury or illness:  No     Changes in diet or alcohol consumption:  No     Upcoming surgery, procedure or cardioversion:  No    Anticoagulation Episode Summary     Current INR goal:  2.0-3.0   TTR:  86.7 % (1 y)   Next INR check:  10/8/2020   INR from last check:  2.98 (10/5/2020)   Weekly max warfarin dose:     Target end date:  Indefinite   INR check location:     Preferred lab:     Send INR reminders to:  [facility]    Indications    Atrial flutter (H) (Resolved) [I48.92]            Comments:           Anticoagulation Care Providers     Provider Role Specialty Phone number    Kamaljit lPata MD Referring Family Medicine [phone]

## 2021-06-12 NOTE — TELEPHONE ENCOUNTER
Anticoagulation    Cam 70 y.o., male on warfarin for Atrial flutter INR goal 2-3 with stage I diffuse large B cell lymphoma. Completed 2 cycles of R-CHOP and at the [facility] center for cycle 3 today.     Prior to chemo stable on 50 mg/week.Patient had INR elevations both cycles by Day 4 which has been responsive to holds and dose reductions. Currently on 35 mg/week (30% reduction)  and INR today at end of cycle is 3.27.     Oncology visit with Oumou Sánchez CNP reviewed which noted: He had diarrhea (5-6/day) and nausea with intermittent emesis late week 1 into week 2 (4-5 days). He was not taking routine anti-diarrheals nor antiemetics.  Plan for this cycle is to use scheduled compazine when nausea present and start imodium for diarrhea when needed.    Recommendation:    Due to supratheraeptuic INR today, Dexamethasone pre-med (12 mg x 1 IV ) today for next cycle and  Prednisone 100 mg daily 1-5 and hx of INR elevation by day 4 of chemo recommend holding warfarin today.    With end of cycle INR slightly supratherapeutic today at 3.29, recommend slight further maintenance reduction.     Note: may not need as aggressive of mid-cycle holds if improvement nausea/diarrhea symptom control.     Hold warfarin 10/26 then reduce to 2.5 mg on wed and 5 mg daily rest of week. INR 10/29 or 10/30.    Osiris Ash, PharmD

## 2021-06-12 NOTE — ANESTHESIA PREPROCEDURE EVALUATION
Anesthesia Evaluation      Patient summary reviewed   No history of anesthetic complications     Airway   Mallampati: II  Neck ROM: full   Pulmonary - normal exam    breath sounds clear to auscultation  (+) sleep apnea,                          Cardiovascular   Exercise tolerance: > or = 4 METS  (+) hypertension, dysrhythmias (A flutter), ,     Rhythm: irregular  Rate: normal,         Neuro/Psych      Endo/Other    (+) diabetes mellitus type 2 poorly controlled, obesity,      GI/Hepatic/Renal            Dental    (+) poor dentition                       Anesthesia Plan  Planned anesthetic: general mask    ASA 3   Induction: intravenous   Anesthetic plan and risks discussed with: patient    Post-op plan: routine recovery

## 2021-06-13 LAB — INR PPP: 1.9 (ref 0.9–1.1)

## 2021-06-13 NOTE — PROGRESS NOTES
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  Based on his response I recommend just 1 more cycle of R-CHOP chemotherapy and no radiation therapy.    We will send prescription for Zofran for his nausea.    Noted with diarrhea, low potassium and increased creatinine.  We will give him IV potassium 20 mEq +40 p.o. in the office today.  He will start oral potassium 20 milliequivalents twice daily as well.  We will recheck potassium in 3 days.  Asked him to increase potassium in his diet.  We will see him back again in 3 weeks with recheck of labs as well.  He needs to take Imodium for the diarrhea.    40 minutes spent majority in counseling and coordination of care.    Plan: Proceed with fourth and final cycle of R-CHOP chemotherapy today, November 16, 2020  Replace potassium IV and p.o. in the office today and start potassium twice daily by mouth  Recheck potassium in 3 days and with follow-up in 3 weeks  Zofran for nausea and Imodium for diarrhea      Measurable disease: PET scan    Current therapy: R-CHOP, cycle 4 today November 16, 2020   cycle 1 on 9/15    ECOG Performance   ECOG Performance Status: 1    Distress Assessment  Distress Assessment Score: No distress    Pain         Problem List    1. Diffuse large B-cell lymphoma of extranodal site (H)  CC OFFICE VISIT LONG    CC OFFICE VISIT  LONG    Basic Metabolic Panel    ondansetron (ZOFRAN) 8 MG tablet    potassium chloride (K-DUR,KLOR-CON) 20 MEQ tablet    potassium chloride in water 20 mEq IVPB central IV    potassium chloride CR tablet 40 mEq (K-DUR,KLOR-CON)   2. Encounter for antineoplastic chemotherapy  CC OFFICE VISIT LONG    CC OFFICE VISIT LONG        CC: Kamaljit Plata, MD    ______________________________________________________________________________    History of Present Illness    Mr. Cam Walden is here for follow-up.  Received  cycle 3 of R-CHOP chemotherapy on October 26.  Has been having problems with diarrhea and nausea with dry heaves.  Not taking Imodium.  No fever or mouth sores.  Some fatigue.  No shortness of breath, PND orthopnea or leg swelling.  ECOG status is 1.  Complete resolution of mass in the neck.      Pain Status       Review of Systems    Constitutional  Constitutional (WDL): Exceptions to WDL  Fatigue: Fatigue not relieved by rest - Limiting instrumental ADL  Weight Loss: to <10% from baseline, intervention not indicated  Neurosensory  Neurosensory (WDL): Exceptions to WDL  Ataxia: Asymptomatic, clinical or diagnostic observations only, intervention not indicated(Slow, shuffled gait)  Cardiovascular  Cardiovascular (WDL): All cardiovascular elements are within defined limits  Pulmonary  Respiratory (WDL): Within Defined Limits  Gastrointestinal  Gastrointestinal (WDL): Exceptions to WDL  Anorexia: Oral intake altered without significant weight loss or malnutrition, oral nutritional supplements indicated  Diarrhea: Increase of <4 stools per day over baseline, mild increase in ostomy output compared to baseline  Nausea: Oral intake decreased without significant weight loss, dehydration or malnutrition(Dry heaves)  Dysgeusia: Altered taste with change in diet (e.g., oral supplements), noxious or unpleasant taste, loss of taste(Reports having to brush his teeth often. Bad taste in mouth.)  Dry Mouth:  Symptomatic (e.g., dry or thick saliva) without significant dietary alteration, unstimulated saliva flow >0.2 ml/min  Genitourinary  Genitourinary (WDL): All genitourinary elements are within defined limits  Integumentary  Integumentary (WDL): All integumentary elements are within defined limits  Patient Coping  Patient Coping: Accepting  Distress Assessment  Distress Assessment Score: No distress  Accompanied by  Accompanied by: Alone    Past History  Past Medical History:   Diagnosis Date     Aortic stenosis     mild     Atrial fibrillation (H)      Atrial flutter (H) 8/21/2017     Atrial flutter with rapid ventricular response (H)      Cancer (H)     lymp     Colon polyps 08/08/2016    Per colonoscopy 8/8/16.  Large and small.  Some removed. Others to be removed surgically. (per patient)     Diabetes mellitus (H)     DM II     Diabetes mellitus, type II (H)      GI bleed     Following colon polyp removal     Hyperlipidemia      Hypertension      Morbid obesity (H)      JOHNNY (obstructive sleep apnea)     no cpap     Rectal bleeding          Past Surgical History:   Procedure Laterality Date     CARDIOVERSION  08/25/2017     IR PORT PLACEMENT >5 YEARS  8/24/2020       Physical Exam    Recent Vitals 11/16/2020   Height -   Weight 256 lbs   BSA (m2) 2.43 m2   /80   Pulse 92   Temp 98.2   Temp src 1   SpO2 95   Some recent data might be hidden       GENERAL: Alert and oriented to time place and person. Seated comfortably. In no distress.    HEAD: Atraumatic and normocephalic.    EYES: BRIDGETTE, EOMI.  No pallor.  No icterus.    Oral cavity: no mucosal lesion or tonsillar enlargement.    NECK: supple. JVP normal.  No thyroid enlargement.    LYMPH NODES: No palpable, cervical, axillary or inguinal lymphadenopathy.    CHEST: clear to auscultation bilaterally.  Resonant to percussion throughout bilaterally.  Symmetrical breath movements bilaterally.    CVS: S1 and S2 are heard. Regular rate and rhythm.  No murmur or  gallop or rub heard.  No peripheral edema.    ABDOMEN: Soft. Not tender. Not distended.  No palpable hepatomegaly or splenomegaly.  No other mass palpable.  Bowel sounds heard.    EXTREMITIES: Warm.    SKIN: no rash, or bruising or purpura.  Has a full head of hair.      Lab Results    Recent Results (from the past 168 hour(s))   POCT Glucose    Specimen: Capillary; Blood   Result Value Ref Range    Glucose 166 (H) 70 - 139 mg/dL   Echo Complete   Result Value Ref Range    LV volume diastolic 110 62 - 150 cm3    LV volume systolic 36.3 21 - 61 cm3    HR 67 bpm    IVSd 1.15 (!) 0.6 - 1.0 cm    LVIDd 4.98 4.2 - 5.8 cm    LVIDs 2.98 2.5 - 4.0 cm    LVOT diam 2.3 cm    LVOT mean gradient 2 mmHg    LVOT peak VTI 16.7 cm    LVOT mean liza 57.2 cm/s    LVOT peak liza 89.4 cm/s    LVOT peak gradient 3 mmHg    LV PWd 1.08 (!) 0.6 - 1.0 cm    MV E' lat liza 10.5 cm/s    MV E' med liza 6.96 cm/s    AV mean liza 231 cm/s    AV mean gradient 25 mmHg    AV VTI 66.5 cm    AV peak liza 332 cm/s    AO root 2.2 cm    AO ascending 3.9 cm    LA size 3 cm    MV decel time 356 ms    MV peak A liza 64 cm/s    MV peak E liza 62.6 cm/s    BSA 2.51 m2    Hieght 72 in    Weight 4,368 lbs    /60 mmHg    IVS/PW ratio 1.1     LV FS 40.2 28 - 44 %    Echo LVEF calculated 67 55 - 75 %    LA volume 46.3 mL    LV mass 209.7 g    LVOT area 4.15 cm2    LVOT SV 69.3 cm3    LV systolic volume index 14.5 11 - 31 cm3/m2    LV diastolic volume index 43.8 34 - 74 cm3/m2    LA volume index 18.4 mL/m2    LV mass index 83.5 g/m2    LV SVi 27.6 ml/m2    TAPSE 1.8 cm    LV CO 4.6 l/min    LV Ci 1.9 l/min/m2    LA area 2 15.9 cm2    LA area 1 16.1 cm2    Height 72.0 in    Weight 273 lbs    LA length 4.7 cm    AV area 1.0 cm2    AV DIM IND liza 0.3     MV E/A Ratio 1.0     AV peak gradient 44.1 mmHg    MV med E/e' ratio 9.0     MV lat E/e' ratio 6.0     MV Avg E/e' Ratio 7.2 cm/s    AV DIM IND VTI 0.3    Comprehensive Metabolic Panel   Result Value Ref Range     Sodium 140 136 - 145 mmol/L    Potassium 2.7 (LL) 3.5 - 5.0 mmol/L    Chloride 103 98 - 107 mmol/L    CO2 23 22 - 31 mmol/L    Anion Gap, Calculation 14 5 - 18 mmol/L    Glucose 201 (H) 70 - 125 mg/dL    BUN 15 8 - 22 mg/dL    Creatinine 1.90 (H) 0.70 - 1.30 mg/dL    GFR MDRD Af Amer 43 (L) >60 mL/min/1.73m2    GFR MDRD Non Af Amer 35 (L) >60 mL/min/1.73m2    Bilirubin, Total 0.8 0.0 - 1.0 mg/dL    Calcium 8.2 (L) 8.5 - 10.5 mg/dL    Protein, Total 6.4 6.0 - 8.0 g/dL    Albumin 3.1 (L) 3.5 - 5.0 g/dL    Alkaline Phosphatase 104 45 - 120 U/L    AST 22 0 - 40 U/L    ALT 23 0 - 45 U/L   HM1 (CBC with Diff)   Result Value Ref Range    WBC 9.2 4.0 - 11.0 thou/uL    RBC 3.64 (L) 4.40 - 6.20 mill/uL    Hemoglobin 10.8 (L) 14.0 - 18.0 g/dL    Hematocrit 33.3 (L) 40.0 - 54.0 %    MCV 92 80 - 100 fL    MCH 29.7 27.0 - 34.0 pg    MCHC 32.4 32.0 - 36.0 g/dL    RDW 18.2 (H) 11.0 - 14.5 %    Platelets 295 140 - 440 thou/uL    MPV 8.8 8.5 - 12.5 fL    Neutrophils % 74 (H) 50 - 70 %    Lymphocytes % 16 (L) 20 - 40 %    Monocytes % 8 2 - 10 %    Eosinophils % 0 0 - 6 %    Basophils % 0 0 - 2 %    Immature Granulocyte % 1 (H) <=0 %    Neutrophils Absolute 6.8 2.0 - 7.7 thou/uL    Lymphocytes Absolute 1.5 0.8 - 4.4 thou/uL    Monocytes Absolute 0.8 0.0 - 0.9 thou/uL    Eosinophils Absolute 0.0 0.0 - 0.4 thou/uL    Basophils Absolute 0.0 0.0 - 0.2 thou/uL    Immature Granulocyte Absolute 0.1 (H) <=0.0 thou/uL       Imaging    Echo Complete    Result Date: 11/13/2020  1. Normal left ventricular size and systolic performance with a visually estimated ejection fraction of 60%. 2. There is moderate aortic stenosis. 3. Normal right ventricular size and systolic performance. When compared to the prior real-time echocardiogram dated 13 July 2020, the findings are felt to be fairly similar on both examinations.    Nm Pet Ct Skull To Mid Thigh    Result Date: 11/13/2020  EXAM: NM PET CT SKULL TO MID THIGH LOCATION: [facility]  HOSPITAL DATE/TIME: 11/13/2020 9:47 AM INDICATION: Diffuse large B-cell lymphoma, extranodal and solid organs sites. Currently receiving chemotherapy with R-CHOP (2 cycles completed). Monitor treatment response. COMPARISON: FDG PET/CT dated 08/27/2020. TECHNIQUE: Serum glucose level 166 mg/dL. One hour post intravenous administration of 14.5 mCi F-18 FDG, PET imaging was performed from the skull base to the mid thighs utilizing attenuation correction with concurrent axial CT and PET/CT image fusion. Dose reduction techniques were used. FINDINGS: Diffuse FDG uptake within the axial and appendicular skeleton in a pattern typical of marrow stimulation. The remaining FDG uptake is physiologic from the skull base to mid thigh including diffuse colonic uptake (metformin effect), specifically  there is resolution of the large FDG avid thyroid gland lymphoma suggesting complete response to therapy (De[place]ville 1). Mild senescent intracranial changes. Enlarged left retrocerebellar extra-axial space. Mild paranasal sinus mucosal thickening. Right chest port with tip terminating near the superior cavoatrial junction. Mild coronary artery calcium. Mitral annulus and aortic valve calcifications. Bibasilar scarring/atelectasis. Pelvic phleboliths. Multilevel degenerative changes of the spine.     Complete response to therapy (Deauville 1).        Signed by: Blade Ibanez MD

## 2021-06-13 NOTE — PROGRESS NOTES
Subjective:     Cam is a 67 y.o. male here for a Family Medicine pre-operative consultation. The exam is requested by Dr. Stallworth in preparation for left hemicolectomy to be performed at [facility] on October 31 2017. Today s examination on 10/24/17 is done to review the underlying surgical condition, clear for anesthesia, and review medical problems with appropriate changes in medications.  Advanced adenomatous polyps of descending colon found on screening colonoscopy done August 9, 2016 at [facility], then repeated colonoscopy done at the colorectal office confirmed 2.5 cm adenoma of the descending colon at about 50 cm from the anal verge.  Cam has tolerated previous surgeries well without bleeding or anesthesia difficulty.      Review of Systems  Constitutional: negative for anorexia, fatigue and malaise  Eyes: negative for redness  Ears, nose, mouth, throat, and face: negative for earaches, epistaxis, nasal congestion and sore throat  Respiratory: negative for cough, dyspnea on exertion and wheezing  Cardiovascular: negative for chest pain, fatigue, irregular heart beat, near-syncope, orthopnea, palpitations, paroxysmal nocturnal dyspnea and syncope  Gastrointestinal: Occasional blood in the stool, but otherwise negative for abdominal pain, change in bowel habits, constipation, diarrhea, dyspepsia, dysphagia, nausea, odynophagia and vomiting  Genitourinary:negative for genital lesions  Hematologic/lymphatic: negative for easy bruising and lymphadenopathy  Musculoskeletal:negative for arthralgias, bone pain, myalgias and stiff joints  Neurological: negative for coordination problems, dizziness, gait problems, headaches, tremors, vertigo and weakness  Behavioral/Psych: negative for anxiety, depression, fatigue and irritability  Endocrine: negative for temperature intolerance  Allergic/Immunologic: negative for urticaria      Objective:      /70 (Patient Site: Right Arm)  Pulse 72  Temp 97.7  " F (36.5  C) (Oral)   Resp 14  Ht 5' 11.5\" (1.816 m)  Wt (!) 316 lb (143.3 kg)  BMI 43.46 kg/m2    General Appearance:    Alert, cooperative, no distress, appears stated age   Head:    Normocephalic, without obvious abnormality, atraumatic   Eyes:    PERRL, conjunctiva/corneas clear, EOM's intact, fundi     benign, both eyes        Ears:    Normal TM's and external ear canals, both ears   Nose:   Nares normal, septum midline, mucosa normal, no drainage    or sinus tenderness   Throat:   Lips, mucosa, and tongue normal; teeth and gums normal   Neck:   Supple, symmetrical, trachea midline, no adenopathy;        thyroid:  No enlargement/tenderness/nodules; no carotid    bruit or JVD   Back:     Symmetric, no curvature, ROM normal, no CVA tenderness   Lungs:     Clear to auscultation bilaterally, respirations unlabored   Chest wall:    No tenderness or deformity   Heart:    Regular rate and rhythm, S1 and S2 normal, no murmur, rub   or gallop   Abdomen:     Soft, non-tender, bowel sounds active all four quadrants,     no masses, no organomegaly   Musculoskeletal:   no limitation of range of motion, no joint tenderness    Extremities:   Extremities normal, atraumatic, no cyanosis or edema   Pulses:   2+ and symmetric all extremities   Skin:   Skin color, texture, turgor normal, no rashes or lesions   Lymph nodes:   Cervical, supraclavicular, and axillary nodes normal   Neurologic:   CNII-XII intact. Normal strength, sensation and reflexes       throughout     Past Medical History:   Diagnosis Date     Aortic stenosis     mild     Atrial fibrillation      Atrial flutter with rapid ventricular response      Colon polyps 08/08/2016    Per colonoscopy 8/8/16.  Large and small.  Some removed. Others to be removed surgically. (per patient)     Diabetes mellitus     DM II     Diabetes mellitus, type II      GI bleed     Following colon polyp removal     Hyperlipidemia      Hypertension      Morbid obesity      JOHNNY " (obstructive sleep apnea)     no cpap     Rectal bleeding      Past Surgical History:   Procedure Laterality Date     CARDIOVERSION  08/25/2017       Current Outpatient Prescriptions:      aspirin 81 MG EC tablet, Take 81 mg by mouth daily., Disp: , Rfl:      atorvastatin (LIPITOR) 40 MG tablet, Take 1 tablet (40 mg total) by mouth daily. (Patient taking differently: Take 40 mg by mouth at bedtime. ), Disp: 90 tablet, Rfl: 3     blood glucose test (ACCU-CHEK SMARTVIEW TEST STRIP) strips, Use to check blood sugars twice daily as directed, Disp: 100 each, Rfl: 11     blood-glucose meter (ACCU-CHEK ABIMBOLA) Misc, Use to check blood sugar twice per day., Disp: 1 each, Rfl: 1     generic lancets (ACCU-CHEK FASTCLIX), Use to check blood sugar twice per day., Disp: 102 each, Rfl: 11     glipiZIDE (GLUCOTROL XL) 10 MG 24 hr tablet, Take 1 tablet (10 mg total) by mouth every morning., Disp: 90 tablet, Rfl: 0     hydroCHLOROthiazide (HYDRODIURIL) 25 MG tablet, Take 1 tablet (25 mg total) by mouth daily., Disp: 90 tablet, Rfl: 2     losartan (COZAAR) 100 MG tablet, Take 1 tablet (100 mg total) by mouth daily., Disp: 90 tablet, Rfl: 3     metFORMIN (GLUCOPHAGE) 1000 MG tablet, Take 1 tablet (1,000 mg total) by mouth 2 (two) times a day with meals., Disp: 180 tablet, Rfl: 3     metoprolol tartrate (LOPRESSOR) 50 MG tablet, Take 2 tablets (100 mg total) by mouth 2 (two) times a day., Disp: 270 tablet, Rfl: 2     senna (SENOKOT) 8.6 mg tablet, Take 1 tablet by mouth daily as needed. , Disp: , Rfl:      warfarin (COUMADIN) 5 MG tablet, Take 5 mg by mouth daily. Take 10 mg mon, wed and fri then 7.5 mg rest of week. Adjust dose based on INR results as directed., Disp: , Rfl:      cholecalciferol, vitamin D3, (VITAMIN D3) 1,000 unit capsule, Take 1 capsule (1,000 Units total) by mouth daily., Disp: 100 capsule, Rfl: 2  Allergies   Allergen Reactions     Lisinopril Cough     Trulicity [Dulaglutide] Nausea Only      reports that he has  never smoked. He has never used smokeless tobacco. He reports that he does not drink alcohol or use illicit drugs.       Predictors of intubation difficulty:   Morbid obesity? yes - BMI 43.4   Neck range of motion: normal   Dentition: No chipped, loose, or missing teeth.    Cardiographics  ECG: Show normal personally reviewed did show normal sinus rhythm, nonspecific ST-T wave abnormalities.  Summary Echo 7/12/17  1. The left ventricle is mildly enlarged. Left ventricular systolic performance is at the lower limits of normal. The ejection fraction is estimated to be 50-55%.   2. There is mild concentric increase in left ventricular wall thickness.   3. There is mild aortic stenosis.   4. There is mild left atrial enlargement.      When compared to the prior real-time echocardiogram dated 14 May 2015, the ejection fraction appears slightly lower on the current examination       Lab Review   Lab on 09/08/2017   Component Date Value     INR 09/08/2017 2.10*   Lab on 09/01/2017   Component Date Value     INR 09/01/2017 2.10*   Admission on 08/25/2017, Discharged on 08/25/2017   Component Date Value     POC INR 08/25/2017 2.20*     Glucose, POC 08/25/2017 115      VENTRICULAR RATE 08/25/2017 81      ATRIAL RATE 08/25/2017 81      P-R INTERVAL 08/25/2017 192      QRS DURATION 08/25/2017 102      Q-T INTERVAL 08/25/2017 384      QTC CALCULATION (BEZET) 08/25/2017 446      P Axis 08/25/2017 54      R AXIS 08/25/2017 -25      T AXIS 08/25/2017 7      MUSE DIAGNOSIS 08/25/2017                      Value:Normal sinus rhythm  Normal ECG  When compared with ECG of 03-JUL-2017 08:58,  Sinus rhythm has replaced Atrial flutter  Vent. rate has decreased BY  43 BPM  ST no longer depressed in Inferior leads  Confirmed by EULALIO KYLE, KENDRICK LOC: ([id number]) on 8/26/2017 2:56:40 PM           Assessment:        67 y.o. male with planned surgery as above.    Known risk factors for perioperative complications: Diabetes mellitus  History  of atrial flutter, improved with cardioversion, currently on metoprolol    Difficulty with intubation is not anticipated.    Cardiac Risk Estimation: intermediate    Current medications which may produce withdrawal symptoms if withheld perioperatively: none       Plan:     1. Preoperative workup as follows hemoglobin, hematocrit, electrolytes, creatinine, glucose, liver function studies, coagulation studies.  2. Change in medication regimen before surgery: warfarin management: Discontinue warfarin 4-5  days before surgery and resume 1-2 days after if no bleeding after the procedure.Last dose of  medication could be taken the night before the procedures.  3. Prophylaxis for cardiac events with perioperative beta-blockers: already taking.  4. Invasive hemodynamic monitoring perioperatively: at the discretion of anesthesiologist.  5. Deep vein thrombosis prophylaxis postoperatively:regimen to be chosen by surgical team.  6. Reviewed risks (not limited to bleeding,infection,pain,un-anticipated response to anesthesia, and even death) and benefits (diagnostic and therapeutic) of surgery with patient, He understands the risks of the procedure and would like to proceed.  Patient's active problems diagnostically and therapeutically optimized for planned procedure with, Local or General anesthesia    Recent Results (from the past 240 hour(s))   INR   Result Value Ref Range    INR 1.90 (H) 0.90 - 1.10   HM2(CBC w/o Differential)   Result Value Ref Range    WBC 8.4 4.0 - 11.0 thou/uL    RBC 4.61 4.40 - 6.20 mill/uL    Hemoglobin 13.3 (L) 14.0 - 18.0 g/dL    Hematocrit 40.3 40.0 - 54.0 %    MCV 87 80 - 100 fL    MCH 28.8 27.0 - 34.0 pg    MCHC 33.0 32.0 - 36.0 g/dL    RDW 14.6 (H) 11.0 - 14.5 %    Platelets 288 140 - 440 thou/uL    MPV 7.2 7.0 - 10.0 fL   Comprehensive Metabolic Panel   Result Value Ref Range    Sodium 138 136 - 145 mmol/L    Potassium 4.2 3.5 - 5.0 mmol/L    Chloride 103 98 - 107 mmol/L    CO2 24 22 - 31 mmol/L     Anion Gap, Calculation 11 5 - 18 mmol/L    Glucose 144 (H) 70 - 125 mg/dL    BUN 12 8 - 22 mg/dL    Creatinine 0.92 0.70 - 1.30 mg/dL    GFR MDRD Af Amer >60 >60 mL/min/1.73m2    GFR MDRD Non Af Amer >60 >60 mL/min/1.73m2    Bilirubin, Total 0.8 0.0 - 1.0 mg/dL    Calcium 9.5 8.5 - 10.5 mg/dL    Protein, Total 7.7 6.0 - 8.0 g/dL    Albumin 3.6 3.5 - 5.0 g/dL    Alkaline Phosphatase 62 45 - 120 U/L    AST 25 0 - 40 U/L    ALT 31 0 - 45 U/L   Electrocardiogram Perform - Clinic   Result Value Ref Range    SYSTOLIC BLOOD PRESSURE  mmHg    DIASTOLIC BLOOD PRESSURE  mmHg    VENTRICULAR RATE 62 BPM    ATRIAL RATE 62 BPM    P-R INTERVAL 186 ms    QRS DURATION 100 ms    Q-T INTERVAL 416 ms    QTC CALCULATION (BEZET) 422 ms    P Axis 63 degrees    R AXIS -8 degrees    T AXIS 9 degrees    MUSE DIAGNOSIS       Normal sinus rhythm  Normal ECG  When compared with ECG of 25-AUG-2017 14:28,  No significant change was found  Confirmed by MARY KYLE, [facility] LOC:JN ([id number]) on 10/23/2017 4:35:57 PM     Microalbumin, Random Urine   Result Value Ref Range    Microalbumin, Random Urine 1.46 0.00 - 1.99 mg/dL    Creatinine, Urine 118.1 mg/dL    Microalbumin/Creatinine Ratio Random Urine 12.4 <=19.9 mg/g       More than  50% of this 40 minutes visit was spent in counseling and coordination of care.    Jonathon Plata MD  [facility].  [address] [place] [address].  [place], [place], [address]  Ph:[phone]  Fax:[phone]

## 2021-06-13 NOTE — PROGRESS NOTES
"OFFICE VISIT - FAMILY MEDICINE     ASSESSMENT AND PLAN     1. S/P laparoscopic colectomy  HM1(CBC and Differential)    HM1 (CBC with Diff)   2. Acute blood loss anemia  HM1(CBC and Differential)    HM1 (CBC with Diff)   3. Atrial flutter  INR   Study post left partial hemicolectomy secondary to adenocarcinoma, overall has been doing fine no immediate complication.  he did resume Coumadin, and is transitioned with Lovenox started in the hospital.  No bleeding.  Consider iron replacement therapy to improve his hemoglobin.  He will follow-up with surgeon on November 27.  Atrial flutter.  Rate controlled, Coumadin was restarted, due for an INR check today.  Management by the anticoagulation clinic.  CHIEF COMPLAINT   Post Op Visit (1/03, [facility] - REMOVED SECTION, LARGE INTESTINE) and Labs Only (INR)    HPI   Cam Walden is a 67 y.o. male.  Updated [facility] - Needs PC-23 -  No Colonoscopy, Per Dr. Plata    Patient had an uneventful partial robotic left hemicolectomy surgery on October 31, he has been doing fine, denies any diarrhea or constipation, bowel movement has been okay on soft, no bleeding.  He was told to follow with primary care physician, he does have an appointment with surgeon next week.  Patient was started on lower molecular weight heparin in the hospital, has been self giving an injection once a day, Coumadin has been held prior to the surgery, and he did resume that after the procedure.  He denies any chest pain or shortness of breath.  He did have mild post surgery anemia but denies any excessive fatigue.  Medication has been reviewed and reconciled.    Review of Systems As per HPI, otherwise negative.    OBJECTIVE   /76 (Patient Site: Right Arm)  Pulse 68  Temp 97.4  F (36.3  C) (Oral)   Resp 14  Ht 5' 11.5\" (1.816 m)  Wt (!) 307 lb (139.3 kg)  SpO2 94%  BMI 42.22 kg/m2  Physical Exam   Constitutional: He is oriented to person, place, and time. He appears well-developed and " well-nourished.   HENT:   Head: Normocephalic and atraumatic.   Neck: Normal range of motion. Neck supple. No JVD present. No tracheal deviation present. No thyromegaly present.   Cardiovascular: Normal rate, regular rhythm, normal heart sounds and intact distal pulses.  Exam reveals no gallop and no friction rub.    No murmur heard.  Pulmonary/Chest: Effort normal and breath sounds normal. No respiratory distress. He has no wheezes. He has no rales.   Abdominal: He exhibits no distension. There is no tenderness. There is no rebound and no guarding.   Status post robotic partial left hemicolectomy with well-healed scars.   Musculoskeletal: He exhibits no edema or tenderness.   Lymphadenopathy:     He has no cervical adenopathy.   Neurological: He is alert and oriented to person, place, and time. Coordination normal.   Psychiatric: He has a normal mood and affect. Judgment and thought content normal.       [facility]     Family History   Problem Relation Age of Onset     Dementia Mother      Asthma Father      Social History     Social History     Marital status: Single     Spouse name: N/A     Number of children: N/A     Years of education: N/A     Occupational History     Not on file.     Social History Main Topics     Smoking status: Never Smoker     Smokeless tobacco: Never Used     Alcohol use No     Drug use: No     Sexual activity: No     Other Topics Concern     Not on file     Social History Narrative     Relevant history was reviewed with the patient today, unless noted in HPI, nothing is pertinent for this visit.  [facility]     Patient Active Problem List    Diagnosis Date Noted     Adenocarcinoma of descending colon 10/24/2017     Atrial flutter 08/21/2017     Atrial flutter with rapid ventricular response 07/03/2017     Type 2 diabetes mellitus, uncontrolled 08/12/2016     Acute blood loss anemia      Colon polyps 08/08/2016     Colonoscopy causing post-procedural bleeding 08/08/2016     Plantar fasciitis,  bilateral 07/26/2016     Skin lesion 07/26/2016     Hyperlipidemia 01/12/2016     Decreased sensation of feet. 01/12/2016     Essential hypertension with goal blood pressure less than 130/80 06/03/2015     Sleep apnea 06/03/2015     Aortic stenosis, mild 04/16/2015     Past Surgical History:   Procedure Laterality Date     CARDIOVERSION  08/25/2017       RESULTS/CONSULTS (Lab/Rad)     Recent Results (from the past 168 hour(s))   HM1 (CBC with Diff)   Result Value Ref Range    WBC 10.1 4.0 - 11.0 thou/uL    RBC 4.44 4.40 - 6.20 mill/uL    Hemoglobin 12.7 (L) 14.0 - 18.0 g/dL    Hematocrit 38.6 (L) 40.0 - 54.0 %    MCV 87 80 - 100 fL    MCH 28.6 27.0 - 34.0 pg    MCHC 32.8 32.0 - 36.0 g/dL    RDW 14.7 (H) 11.0 - 14.5 %    Platelets 344 140 - 440 thou/uL    MPV 6.8 (L) 7.0 - 10.0 fL    Neutrophils % 56 50 - 70 %    Lymphocytes % 29 20 - 40 %    Monocytes % 10 2 - 10 %    Eosinophils % 5 0 - 6 %    Basophils % 1 0 - 2 %    Neutrophils Absolute 5.6 2.0 - 7.7 thou/uL    Lymphocytes Absolute 3.0 0.8 - 4.4 thou/uL    Monocytes Absolute 1.0 (H) 0.0 - 0.9 thou/uL    Eosinophils Absolute 0.5 (H) 0.0 - 0.4 thou/uL    Basophils Absolute 0.1 0.0 - 0.2 thou/uL   INR   Result Value Ref Range    INR 1.20 (H) 0.90 - 1.10     No results found.  MEDICATIONS     Current Outpatient Prescriptions on File Prior to Visit   Medication Sig Dispense Refill     atorvastatin (LIPITOR) 40 MG tablet Take 1 tablet (40 mg total) by mouth daily. (Patient taking differently: Take 40 mg by mouth at bedtime. ) 90 tablet 3     blood glucose test (ACCU-CHEK SMARTVIEW TEST STRIP) strips Use to check blood sugars twice daily as directed 100 each 11     blood-glucose meter (ACCU-CHEK ABIMBOLA) Misc Use to check blood sugar twice per day. 1 each 1     cholecalciferol, vitamin D3, (VITAMIN D3) 1,000 unit capsule Take 1 capsule (1,000 Units total) by mouth daily. 100 capsule 2     generic lancets (ACCU-CHEK FASTCLIX) Use to check blood sugar twice per day. 102 each  11     glipiZIDE (GLUCOTROL XL) 10 MG 24 hr tablet Take 1 tablet (10 mg total) by mouth every morning. 90 tablet 0     hydroCHLOROthiazide (HYDRODIURIL) 25 MG tablet Take 1 tablet (25 mg total) by mouth daily. 90 tablet 2     losartan (COZAAR) 100 MG tablet Take 1 tablet (100 mg total) by mouth daily. 90 tablet 3     metFORMIN (GLUCOPHAGE) 1000 MG tablet Take 1 tablet (1,000 mg total) by mouth 2 (two) times a day with meals. 180 tablet 3     metoprolol tartrate (LOPRESSOR) 50 MG tablet Take 2 tablets (100 mg total) by mouth 2 (two) times a day. 270 tablet 2     senna (SENOKOT) 8.6 mg tablet Take 1 tablet by mouth daily as needed.        warfarin (COUMADIN) 5 MG tablet Take 5 mg by mouth daily. Take 10 mg mon, wed and fri then 7.5 mg rest of week. Adjust dose based on INR results as directed.       aspirin 81 MG EC tablet Take 81 mg by mouth daily.       No current facility-administered medications on file prior to visit.        HEALTH MAINTENANCE / SCREENING   PHQ-2 Total Score: 0 (7/3/2017  8:16 AM), No Data Recorded,No Data Recorded  Immunization History   Administered Date(s) Administered     Hep A, Adult IM (19yr & older) 09/29/2015     Hep B, Adult 09/29/2015, 10/27/2015     Influenza, seasonal,quad inj 36+ mos 09/29/2015     Influenza,seasonal quad, PF, 36+MOS 10/17/2016     Pneumo Conj 13-V (2010&after) 09/29/2015     Tdap 09/29/2015     ZOSTER 10/02/2015     Health Maintenance   Topic     PNEUMOCOCCAL POLYSACCHARIDE VACCINE AGE 65 AND OVER      INFLUENZA VACCINE RULE BASED (1)     DIABETES HEMOGLOBIN A1C      DIABETES FOLLOW-UP      DIABETES OPHTHALMOLOGY EXAM      DIABETES FOOT EXAM      DIABETES URINE MICROALBUMIN      FALL RISK ASSESSMENT      ADVANCE DIRECTIVES DISCUSSED WITH PATIENT      TDAP ADULT ONE TIME DOSE      PNEUMOCOCCAL CONJUGATE VACCINE FOR ADULTS (PCV13 OR PREVNAR)      ZOSTER VACCINE      TD 18+ HE      COLONOSCOPY        Kamaljit Plata MD  Family Medicine, [facility]  Clinic     This note was dictated using a voice recognition software.  Any grammatical or context distortion are unintentional and inherent to the software.

## 2021-06-13 NOTE — PROGRESS NOTES
[facility]    Name:   Cam Walden  : 1950  Facility:   [facility] [[id number]]   Room:   Code Status: FULL CODE -   Fac type:   SNF (Skilled Nursing Facility, TCU) -     PCP:  Kamaljit Plata MD    CHIEF COMPLAINT / REASON FOR VISIT:  Chief Complaint   Patient presents with     Discharge Summary     TCU discharge: Left distal fibular fracture secondary to a fall.  Other issues included orthostatic hypotension and acute kidney injury.      [facility] from 2020 until 12/10/2020 (left distal fibular fracture, orthostatic hypotension, acute kidney injury)  [facility]CU from 12/10/2020 until 2020      HPI: Cam is a 70 y.o. 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  He endorsed syncope which he had been having repeatedly over several days with increased weakness.  He was found to have a nondisplaced left distal fibular fracture superimposed over an old healed fracture of the distal fibular shaft.  He was also found to have acute kidney injury and orthostatic hypotension.  He was treated with IV fluids (with KEVIN resolution) and received an Ortho consult for the fracture, along with PT and OT.      He did need adjustments to his antihypertensive medications in the setting of recent significant weight loss (47 pounds) related to his cancer.  Hydrochlorothiazide and losartan were not resumed, although he was continued on his PTA metoprolol dose.  I believe his tamsulosin was also held.     For his diabetes, he had been on oral medications, metformin and  glipizide XL.  He is receiving these medications in addition to sliding scale insulin here.      CURRENT/RECENT ISSUES    Disposition: He seems to be doing just fine.  He does have a cam boot to wear, and he is to follow-up with Ortho (foot and ankle provider) in 7 to 10 days.  He may be weightbearing as tolerated.  Pain is being controlled with scheduled nonopioids and as needed opioids and is fairly well managed.  He arrived here with orders for PT and OT.  As for his B-cell lymphoma, he does have an oncology appointment once he recovers.    Anticoagulation: He is supratherapeutic with an INR 3.5.  There seems to be some confusion as to his PTA dose.  From what he tells me, he was taking 5 mg daily except for 2 days/week of 10 mg.  However, he was discharged with orders for 5 mg on Tuesday, Thursday, and Saturday, with 2.5 mg all other days of the week.  His INR on 12/14/2020 was 3.5.  We went with 5 mg on Wednesday and Sunday, 2.5 mg all other days of the week, rechecking an INR today (12/21/2020).  At 2.8, we will continue the same dose, rechecking an INR on 01/04/2021.    Diarrhea: He is complaining of loose stools (watery).  He tells me that yesterday was particularly bad.  I discussed providing some relief.  He indicated that he had not been receiving antibiotics recently.  I discovered that he already has orders for loperamide.    Diabetic sequelae: He does have numbness and tingling in the feet, not so much in the hands.  He states that his vision is okay. We did discuss his poor diabetes management and how important it is to bring his A1c down.    Discharge planning: Currently, physical therapy is teaching him how to get around with his boot.  He is expected to discharge on 12/22/2020.  He will require a home health aide to assist with ADLs such as bathing, home health nurse for INR draws, home PT and OT to continue therapies in the home setting, and Meals on Wheels.      ROS: Positives: He tells me he  "does not sleep well and believes he has \"a touch of sleep apnea.\"  He was complaining of watery stools but is now receiving loperamide for this.  Negatives: No headaches (despite abrasions to the head) or chest pains, coughing or congestion, nausea or vomiting, current dizziness or dyspnea, dysuria, diplopia, constipation or diarrhea, difficulty chewing or swallowing, or any integumentary issues.    Past Medical History:   Diagnosis Date     Aortic stenosis     mild     Atrial fibrillation (H)      Atrial flutter (H) 8/21/2017     Atrial flutter with rapid ventricular response (H)      Cancer (H)     lymp     Colon polyps 08/08/2016    Per colonoscopy 8/8/16.  Large and small.  Some removed. Others to be removed surgically. (per patient)     Diabetes mellitus (H)     DM II     Diabetes mellitus, type II (H)      Diabetic polyneuropathy associated with type 2 diabetes mellitus (H) 12/16/2020     GI bleed     Following colon polyp removal     Hyperlipidemia      Hypertension      Morbid obesity (H)      JOHNNY (obstructive sleep apnea)     no cpap     Rectal bleeding               Family History   Problem Relation Age of Onset     Dementia Mother      Asthma Father      Social History     Socioeconomic History     Marital status: Single     Spouse name: Not on file     Number of children: 0     Years of education: Not on file     Highest education level: Not on file   Occupational History     Not on file   Social Needs     Financial resource strain: Not on file     Food insecurity     Worry: Not on file     Inability: Not on file     Transportation needs     Medical: Not on file     Non-medical: Not on file   Tobacco Use     Smoking status: Never Smoker     Smokeless tobacco: Never Used   Substance and Sexual Activity     Alcohol use: No     Drug use: No     Sexual activity: Not on file   Lifestyle     Physical activity     Days per week: Not on file     Minutes per session: Not on file     Stress: Not on file "   Relationships     Social connections     Talks on phone: Not on file     Gets together: Not on file     Attends [employer] service: Not on file     Active member of club or organization: Not on file     Attends meetings of clubs or organizations: Not on file     Relationship status: Not on file     Intimate partner violence     Fear of current or ex partner: Not on file     Emotionally abused: Not on file     Physically abused: Not on file     Forced sexual activity: Not on file   Other Topics Concern     Not on file   Social History Narrative    Patient lives alone.       MEDICATIONS: Reviewed from the MAR, physician orders, and/or earlier progress notes.  Post Discharge Medication Reconciliation Status: medication reconciliation previously completed during another office visit.    Current Outpatient Medications   Medication Sig     acetaminophen (TYLENOL) 500 MG tablet Take 2 tablets (1,000 mg total) by mouth 3 (three) times a day.     atorvastatin (LIPITOR) 40 MG tablet Take 1 tablet (40 mg total) by mouth daily.     blood glucose test (ACCU-CHEK SMARTVIEW TEST STRIP) strips USE TO CHECK BLOOD SUGARS DAILY AS DIRECTED     cholecalciferol, vitamin D3, (VITAMIN D3) 1,000 unit capsule Take 1 capsule (1,000 Units total) by mouth daily.     DULoxetine (CYMBALTA) 30 MG capsule TAKE 1 CAPSULE BY MOUTH TWICE A DAY     generic lancets (ACCU-CHEK FASTCLIX) Use to check blood sugar twice per day.     glipiZIDE (GLUCOTROL XL) 10 MG 24 hr tablet TAKE 1 TABLET BY MOUTH EVERY DAY IN THE MORNING     KLOR-CON M20 20 mEq tablet TAKE 1 TABLET BY MOUTH TWO TIMES A DAY.     loperamide (IMODIUM) 2 mg capsule Take 2 mg by mouth 2 (two) times a day as needed.     metFORMIN (GLUCOPHAGE) 1000 MG tablet TAKE 1 TABLET BY MOUTH TWICE A DAY WITH MEALS     metoprolol tartrate (LOPRESSOR) 100 MG tablet TAKE 1 TABLET BY MOUTH TWICE A DAY.     ondansetron (ZOFRAN) 8 MG tablet Take 1 tablet (8 mg total) by mouth every 6 (six) hours as needed for  nausea.     oxyCODONE (ROXICODONE) 5 MG immediate release tablet Take 1 tablet (5 mg total) by mouth every 6 (six) hours as needed.     polyethylene glycol (MIRALAX) 17 gram packet Take 1 packet (17 g total) by mouth daily.     prochlorperazine (COMPAZINE) 10 MG tablet Take 1 tablet (10 mg total) by mouth every 6 (six) hours as needed (For breakthrough nausea/vomiting).     simethicone (MYLICON) 80 MG chewable tablet Chew 80 mg every 6 (six) hours as needed for flatulence.     tamsulosin (FLOMAX) 0.4 mg cap Take 1 capsule (0.4 mg total) by mouth Daily after breakfast.     warfarin sodium (WARFARIN ORAL) Take by mouth. 12/14/2020: INR 3.5.  Take 5 mg Wednesday and Sunday, 2.5 mg AOD.  Recheck 12/21/2020.  12/11/20 INR 2.1  Take 5mg 12/11 and 12/13 and 2.5mg on 12/12.  Next INR 12/14/20.     ALLERGIES:   Allergies   Allergen Reactions     Lisinopril Cough     Trulicity [Dulaglutide] Nausea Only     DIET: Diabetic, regular texture, thin liquids.    Vitals:    12/20/20 1816   BP: 103/67   Pulse: 78   Resp: 20   Temp: 98.2  F (36.8  C)   SpO2: 98%   Weight: (!) 264 lb 12.8 oz (120.1 kg)   Height: 6' (1.829 m)     Body mass index is 35.91 kg/m .    EXAMINATION:   General: Pleasant, alert, and conversant middle-aged male, sitting in a recliner, in no apparent distress.  Head: Normocephalic.  Large scab above the posterior parietal lobe.  Eyes: PERRLA, sclerae clear.   ENT: Moist oral mucosa.  He has his own teeth.  No rhinorrhea or nasal discharge.  Hearing is unimpaired.  Darkened external laryngeal area from chemotherapy.  Cardiovascular: Regular rate and rhythm with intermittent ectopy and a 3/6 systolic ejection murmur at the left sternal border..   Respiratory: Lungs clear to auscultation bilaterally.   Abdomen: Nondistended.   Musculoskeletal/Extremities: Age-related degenerative joint disease.  Mild peripheral edema of the left foot, to be expected.  Integument: Facial abrasions and significant ecchymosis of the  left forearm.  No rashes, clinically significant lesions, or skin breakdown.   Cognitive/Psychiatric: Alert and oriented x3.  He seems unable to give me the PTA dose of his warfarin.  Affect is euthymic.    DIAGNOSTICS:   Results for orders placed or performed in visit on 12/15/20   Basic Metabolic Panel   Result Value Ref Range    Sodium 137 136 - 145 mmol/L    Potassium 4.7 3.5 - 5.0 mmol/L    Chloride 106 98 - 107 mmol/L    CO2 18 (L) 22 - 31 mmol/L    Anion Gap, Calculation 13 5 - 18 mmol/L    Glucose 131 (H) 70 - 125 mg/dL    Calcium 8.7 8.5 - 10.5 mg/dL    BUN 15 8 - 22 mg/dL    Creatinine 1.32 (H) 0.70 - 1.30 mg/dL    GFR MDRD Af Amer >60 >60 mL/min/1.73m2    GFR MDRD Non Af Amer 54 (L) >60 mL/min/1.73m2     Lab Results   Component Value Date    WBC 10.4 12/08/2020    HGB 8.2 (L) 12/08/2020    HCT 26.2 (L) 12/08/2020    MCV 95 12/08/2020     12/09/2020     Estimated Creatinine Clearance: 71.7 mL/min (A) (by C-G formula based on SCr of 1.32 mg/dL (H)).  Lab Results   Component Value Date    HGBA1C 8.6 (H) 10/13/2020       ASSESSMENT/Plan:      ICD-10-CM    1. Closed fracture of distal end of left fibula with routine healing, unspecified fracture morphology, subsequent encounter  S82.832D    2. Paroxysmal atrial fibrillation (H)  I48.0    3. Type 2 diabetes mellitus with stage 3a chronic kidney disease, without long-term current use of insulin (H)  E11.21     N18.31    4. 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 Morbid obesity (H)  E66.01    6. Diabetic polyneuropathy associated with type 2 diabetes mellitus (H)  E11.42    7. Essential hypertension  I10    8. Dysthymia  F34.1      DISCHARGE PLAN/FACE TO FACE:  I certify that this patient is under my care and that I had a face-to-face encounter that meets the physician face-to-face encounter requirements with this patient.     Date of Face-to-Face Encounter: 12/21/2020     I certify that, based on my findings, the following  services are medically necessary home health services: Home health aide, home health nurse, home PT, home OT    My clinical findings support the need for the above skilled services because: (Please write a brief narrative summary that describes what the RN, PT, SLP, or other services will be doing in the home. A list of diagnoses in this section does not meet the CMS requirements): Home health aide to assist with ADLs such as bathing, home health nurse for INR draws, home PT and OT to continue therapies in the home setting.    This patient is homebound because: (Please write a brief narrative summmary describing the functional limitations as to why this patient is homebound and specifically what makes this patient homebound.):  The above services necessarily need to be performed in the home to be of benefit.    The patient is, or has been, under my care and I have initiated the establishment of the plan of care. This patient will be followed by a physician who will periodically review the plan of care.  Initial follow-up should be within 7-10 days.    Approximate time spent with this patient was greater than 30 minutes with greater than 50% spent in discussions regarding services required upon discharge.      The above has been created using voice recognition software. Please be aware that this may unintentionally  produce inaccuracies and/or nonsensical sentences.      Electronically signed by: Bartolo Antony.

## 2021-06-13 NOTE — PROGRESS NOTES
"    TCM DISCHARGE FOLLOW UP CALL      \"Hi, my name is  Oseas, and I am calling from  [facility].  I am calling to follow up and see how things are going for you after your recent hospitalization.      Tell me how you are doing now that you are home?\" Doing, okay energy level is low.      Discharge Instructions     Do you understand your discharge instructions?  Pt. Response: Yes      \"Has an appointment with your primary care provider been scheduled?\" No, wants to wait until the specialty providers appointment.      I can assist you to schedule that now.   CC refer to AVS to schedule when instructed.     Medications    \"Did you have any medication changes?   No   Do you have any concerns with obtaining the medications or questions about your medications that you would like a RN to review with you?  No  **If yes, escalate to CC RN responsible for patient's clinic or [facility] RN  Send an inbasket Epic message if RN is unavailable after call.     \"When you see the provider, I would recommend that you bring your medications with you.\"    Call Summary    \"What questions or concerns do you have about your recent visit and your follow-up care?\" No             Can be addressed if scheduled with RN or SW either Care Coordination or if declining to triage for further questions.         \"If you have questions or things don't continue to improve, we encourage you contact us through the main clinic number [phone].  Even if the clinic is not open, triage nurses are available 24/7 to help you.               I am with Clinic Care Coordination, and we are a team of nurses, social workers, community health workers, and financial resource workers. We work together with your primary doctor.   We are here to see if we can help you with a variety of things - from resources in the community such as food assistance, transportation, help in the home, equipment needs, assistance with medications, coordination of your appointments, help " with any medical questions, and things like this.      We would have a nurse or  speak with you and together come up with goals to help you to improve your health and wellness and do the best to help you stay out of the hospital.

## 2021-06-13 NOTE — PROGRESS NOTES
Pt here for lab port draw. He states he is nauseated but denies wanting fluids today. He will schedule his nausea meds. He is taking his oral K

## 2021-06-13 NOTE — TELEPHONE ENCOUNTER
"Patient came in today for potassium recheck.  Potasium today is 2.7, result reviewed with Annika Diamond, REGINA.  Per Annika patient should increase his potassium to 4 times a day and re check potassium in a week.  However, when I contacted the patient with this instruction, he advised that he has been \"trying\" to take the potassium three times a day but has been vomiting and unable to keep them down.  He reports he is hardly eating and feels dehydrated as his fluid intake is greatly decreased.  Writer asked if pt feels he needs urgent care and he declined, he states \"I just need to get past this, the chemo is just too much\".    "

## 2021-06-13 NOTE — TELEPHONE ENCOUNTER
ANTICOAGULATION  MANAGEMENT: Discharge Review    Cam Walden chart reviewed for anticoagulation continuity of care    Hospital admission on  12/7 to 12/10 for fall. Resulted in injury to left tibial fracture. Noted patient is unable to take care of self, KEVIN found and some dizziness.     Discharge disposition: Skilled Nursing Facility [facility]    INR Results:       Recent labs: (last 7 days)     12/07/20  0810 12/07/20  2041 12/08/20  0750 12/09/20  0638 12/10/20  0634   INR 4.05* 4.62* 3.70* 2.57* 2.23*       Warfarin inpatient management: held warfarin due to elevated INR on 12/7, 12/8, obtained warfarin 3 mg 12/9    Warfarin discharge instructions: home regimen continued     Medication Changes Affecting Anticoagulation: No    Additional Factors Affecting Anticoagulation: No    Plan     Agree with dosing adjustment on discharge    Spoke with  from [facility] reports unknown if patient will be going to this facility       Anticoagulation calendar updated    Kiara Ibrahim RN

## 2021-06-13 NOTE — TELEPHONE ENCOUNTER
Call placed to patient to make sure he received our message to seek immediate care for his critical potassium level.  Again, no answer, left another message for patient to go to ER and to call us and let us know he received our instructions.

## 2021-06-13 NOTE — TELEPHONE ENCOUNTER
ANTICOAGULATION  MANAGEMENT PROGRAM    Cam Walden is overdue for INR check.     Spoke with Cam who declined to schedule INR at this time. If calling back please schedule as soon as possible. Will try to get tomorrow when he has scans done or will do at 11/6 chemo lab draw appt.      Ruchi Florez, SCOT

## 2021-06-13 NOTE — PROGRESS NOTES
Cam Walden, 70 y.o., male, arrived to [facility] at 0925 for lab draw. Port easily accessed, labs drawn, and flushed with 20ml Normal Saline and 600 units Heparin. Port de-accessed and site covered with gauze and papertape. Cam Walden discharged to [facility] at 0950 ambulatory and stable.

## 2021-06-13 NOTE — PATIENT INSTRUCTIONS - HE
Patient Education   Loperamide Hydrochloride Oral tablet  What is this medicine?  LOPERAMIDE (jonas PER a mide) is used to treat diarrhea.  This medicine may be used for other purposes; ask your health care provider or pharmacist if you have questions.  What should I tell my health care provider before I take this medicine?  They need to know if you have any of these conditions:    a black or bloody stool    bacterial food poisoning    colitis or mucus in your stool    currently taking an antibiotic medication for an infection    fever    liver disease    severe abdominal pain, swelling or bulging    an unusual or allergic reaction to loperamide, other medicines, foods, dyes, or preservatives    pregnant or trying to get pregnant    breast-feeding  How should I use this medicine?  Take this medicine by mouth with a glass of water. Follow the directions on the prescription label. Take your doses at regular intervals. Do not take your medicine more often than directed.  Talk to your pediatrician regarding the use of this medicine in children. Special care may be needed.  Overdosage: If you think you have taken too much of this medicine contact a poison control center or emergency room at once.  NOTE: This medicine is only for you. Do not share this medicine with others.  What if I miss a dose?  This does not apply. This medicine is not for regular use. Only take this medicine while you continue to have loose bowel movements. Do not take more medicine than recommended by the packaging label or by your healthcare professional.  What may interact with this medicine?  Do not take this medicine with any of the following medications:    alosetron  This medicine may also interact with the following medications:    quinidine    ritonavir    saquinavir  This list may not describe all possible interactions. Give your health care provider a list of all the medicines, herbs, non-prescription drugs, or dietary supplements you use.  Also tell them if you smoke, drink alcohol, or use illegal drugs. Some items may interact with your medicine.  What should I watch for while using this medicine?  Do not take this medicine for more than 1 week without asking your doctor or health care professional. If your symptoms do not start to get better after two days, you may have a problem that needs further evaluation. Check with your doctor or health care professional right away if you develop a fever, severe abdominal pain, swelling or bulging, or if you have have bloody/black diarrhea or stools.  You may get drowsy or dizzy. Do not drive, use machinery, or do anything that needs mental alertness until you know how this medicine affects you. Do not stand or sit up quickly, especially if you are an older patient. This reduces the risk of dizzy or fainting spells. Alcohol can increase possible drowsiness and dizziness. Avoid alcoholic drinks.  Your mouth may get dry. Chewing sugarless gum or sucking hard candy, and drinking plenty of water may help. Contact your doctor if the problem does not go away or is severe. Drinking plenty of water can also help prevent dehydration that can occur with diarrhea.  Elderly patients may have a more variable response to the effects of this medicine, and are more susceptible to the effects of dehydration.  What side effects may I notice from receiving this medicine?  Side effects that you should report to your doctor or health care professional as soon as possible:    allergic reactions like skin rash, itching or hives, swelling of the face, lips, or tongue    bloated, swollen feeling in your abdomen    blurred vision    loss of appetite    stomach pain  Side effects that usually do not require medical attention (report to your doctor or health care professional if they continue or are bothersome):    constipation    drowsiness or dizziness    dry mouth    nausea, vomiting  This list may not describe all possible side  effects. Call your doctor for medical advice about side effects. You may report side effects to FDA at 7-139-SAZ-8017.  Where should I keep my medicine?  Keep out of the reach of children.  Store at room temperature between 15 and 25 degrees C (59 and 77 degrees F). Keep container tightly closed. Throw away any unused medicine after the expiration date.  NOTE:This sheet is a summary. It may not cover all possible information. If you have questions about this medicine, talk to your doctor, pharmacist, or health care provider. Copyright  2015 Gold Standard

## 2021-06-13 NOTE — TELEPHONE ENCOUNTER
ANTICOAGULATION  MANAGEMENT: Discharge Review    Cam Walden chart reviewed for anticoagulation continuity of care    Hospital admission on  11/25 to 11/30 for evaluation of weakness, dehydration , vomiting since last round of chemotherapy. Multiple abnormal labs found.     Discharge disposition: Home    INR Results:       Recent labs: (last 7 days)     11/25/20  0825 11/25/20  1643 11/26/20  0645 11/27/20  0638 11/28/20  0652 11/29/20  0729 11/30/20  0429   INR 3.51* 3.94* 4.34* 2.28* 1.60* 1.87* 1.97*       Warfarin inpatient management: more warfarin administered than maintenance regimen    Warfarin discharge instructions: home regimen continued     Medication Changes Affecting Anticoagulation: No    Additional Factors Affecting Anticoagulation: No    Plan     Recommend to check INR on 12/4, writer cancelled INR for 12/2, patient does new follow up date 12/4.     Left a detailed message for Cam    Anticoagulation calendar updated    Kiara Ibrahim RN

## 2021-06-13 NOTE — PROGRESS NOTES
[facility] Office Note    Assessment / Plan:    1. Atrial flutter with rapid ventricular response.    Status post cardioversion.  We discussed that long-term with his [id number] score of 3 would benefit from anticoagulant therapy but that he could come off of anticoagulation temporarily for procedures such as his polyp resection.  2. Aortic stenosis mild in degree.    3. Morbid obesity, history of mild sleep apnea currently not on treatment.  4. Sedentary lifestyle.  Advocated a moderate exercise program of walking briskly 20 minutes daily    Plan follow-up in 1 year  ______________________________________________________________________    Subjective:    I had the opportunity to see Cam Walden at the [facility]. Cam Walden is a 67 y.o. male with a history of hypertension and morbid obesity who presented with new diagnosis of atrial flutter 7/2017.  Rate control was achieved with metoprolol 200 mg daily, he was treated with warfarin anticoagulation underwent recurrent cardioversion last month.  He returns today for routine follow-up.      Since the cardioversion he has not noticed any further heart racing.  He does not feel that his stamina has improved any.  He still does not engage in any regular exercise.  His weight is been stable.     He has been diagnosed with a colon polyp and is scheduled for resection.  We discussed that it will be no problem for him to come off of warfarin for 5-7 days around this procedure or another procedure, but that long-term he is best off staying on anticoagulant therapy with his [id number] score of 3    He continues to sleep in his recliner.  He was diagnosed with mild sleep apnea in years past and feels that his sleep is restorative.  He sleeps 4 hours then is up for a while and goes back to sleep for another couple of hours.  He denies daytime sleepiness otherwise.  His activities  have not changed since I saw him last.  He continues to  teach piano lessons.      ______________________________________________________________________    Problem List:  Patient Active Problem List   Diagnosis     Aortic stenosis, mild     Essential hypertension with goal blood pressure less than 130/80     Sleep apnea     Hyperlipidemia     Decreased sensation of feet.     Plantar fasciitis, bilateral     Skin lesion     Colon polyps     Colonoscopy causing post-procedural bleeding     Acute blood loss anemia     Type 2 diabetes mellitus, uncontrolled     Atrial flutter with rapid ventricular response     Atrial flutter     Medical History:  Past Medical History:   Diagnosis Date     Aortic stenosis     mild     Atrial fibrillation      Atrial flutter with rapid ventricular response      Colon polyps 08/08/2016    Per colonoscopy 8/8/16.  Large and small.  Some removed. Others to be removed surgically. (per patient)     Diabetes mellitus     DM II     Diabetes mellitus, type II      GI bleed     Following colon polyp removal     Hyperlipidemia      Hypertension      Morbid obesity      JOHNNY (obstructive sleep apnea)     no cpap     Rectal bleeding      Surgical History:  Past Surgical History:   Procedure Laterality Date     CARDIOVERSION  08/25/2017     Social History:  Social History     Social History     Marital status: Single     Spouse name: N/A     Number of children: N/A     Years of education: N/A     Occupational History     Not on file.     Social History Main Topics     Smoking status: Never Smoker     Smokeless tobacco: Never Used     Alcohol use No     Drug use: No     Sexual activity: No     Other Topics Concern     Not on file     Social History Narrative     Sleep History:  Sleeps for 4 hours in a recliner then later in the morning sleeps for another 2 hours in his recliner  Exercise History:  None    Review of Systems:   General: WNL  Eyes: WNL  Ears/Nose/Throat: WNL  Lungs: WNL  Heart: WNL  Stomach: WNL  Bladder: WNL  Muscle/Joints: WNL  Skin:  WNL  Nervous System: WNL  Mental Health: WNL     Blood: WNL          Family History:  Family History   Problem Relation Age of Onset     Dementia Mother      Asthma Father          Allergies:  Allergies   Allergen Reactions     Lisinopril Cough     Trulicity [Dulaglutide] Nausea Only     Medications:  Current Outpatient Prescriptions   Medication Sig Dispense Refill     atorvastatin (LIPITOR) 40 MG tablet Take 1 tablet (40 mg total) by mouth daily. (Patient taking differently: Take 40 mg by mouth at bedtime. ) 90 tablet 3     blood glucose test (ACCU-CHEK SMARTVIEW TEST STRIP) strips Use to check blood sugars twice daily as directed 100 each 11     blood-glucose meter (ACCU-CHEK ABIMBOLA) Misc Use to check blood sugar twice per day. 1 each 1     cholecalciferol, vitamin D3, (VITAMIN D3) 1,000 unit capsule Take 1 capsule (1,000 Units total) by mouth daily. 100 capsule 2     generic lancets (ACCU-CHEK FASTCLIX) Use to check blood sugar twice per day. 102 each 11     glipiZIDE (GLUCOTROL XL) 10 MG 24 hr tablet Take 1 tablet (10 mg total) by mouth every morning. 90 tablet 0     hydroCHLOROthiazide (HYDRODIURIL) 25 MG tablet Take 1 tablet (25 mg total) by mouth daily. 90 tablet 2     losartan (COZAAR) 100 MG tablet Take 1 tablet (100 mg total) by mouth daily. 90 tablet 3     metFORMIN (GLUCOPHAGE) 1000 MG tablet Take 1 tablet (1,000 mg total) by mouth 2 (two) times a day with meals. 180 tablet 3     metoprolol tartrate (LOPRESSOR) 50 MG tablet Take 2 tablets (100 mg total) by mouth 2 (two) times a day. 270 tablet 2     senna (SENOKOT) 8.6 mg tablet Take 1 tablet by mouth daily as needed.        warfarin (COUMADIN) 5 MG tablet Take 5 mg by mouth daily. Take 10 mg mon, wed and fri then 7.5 mg rest of week. Adjust dose based on INR results as directed.       No current facility-administered medications for this visit.        Objective:   Wt Readings from Last 3 Encounters:   09/21/17 (!) 316 lb (143.3 kg)   08/25/17 (!) 314 lb  "3.2 oz (142.5 kg)   08/21/17 (!) 311 lb (141.1 kg)     Vital signs:  /80 (Patient Site: Left Arm, Patient Position: Sitting, Cuff Size: Adult Large)  Pulse 68  Resp 18  Ht 5' 11.5\" (1.816 m)  Wt (!) 316 lb (143.3 kg)  BMI 43.46 kg/m2      Physical Exam:    GENERAL APPEARANCE: Alert, cooperative and in no acute distress.  HEENT: Conjunctivae not injected.  No scleral icterus. No Xanthelasma. Oral mucous membranes pink and moist.  NECK: No JVD.  No Hepatojugular reflux. Thyroid not enlarged.  CHEST: clear to auscultation  CARDIOVASCULAR: Regular S1, S2 without clicks or rubs.  2/6 harsh systolic murmur best audible at left sternal border radiating throughout the precordium.  Brachial, radial and posterior tibial pulses are intact and symmetric. No carotid bruits noted.    ABDOMEN: Obese.  nOntender. BS+. No bruits.  EXTREMITIES: Trace bilateral pretibial edema.  SKIN:  No rash, bruising    Lab Results:  LIPIDS:  Lab Results   Component Value Date    CHOL 109 08/14/2017    CHOL 104 01/12/2016    CHOL 168 06/03/2015     Lab Results   Component Value Date    HDL 28 (L) 08/14/2017    HDL 35 (L) 01/12/2016    HDL 31 (L) 06/03/2015     Lab Results   Component Value Date    LDLCALC 50 08/14/2017    LDLCALC 49 01/12/2016    LDLCALC 97 06/03/2015     Lab Results   Component Value Date    TRIG 155 (H) 08/14/2017    TRIG 102 01/12/2016    TRIG 202 (H) 06/03/2015     Holter monitor 7/2017:  24-hour Holter monitor was applied 7/12/2017 with data analysis/interpretation 7/13/2017  Atrial flutter seen throughout the recording  Which ventricular rate is 104 bpm and ranges between  bpm  No bradycardia or pauses  A single PVC  This activity diary was reviewed-no symptoms or awareness of palpitations/arrhythmias noted     Discussion  Persistent atrial flutter.  This appears to be typical flutter and may be isthmus dependent  Elevated ventricular rate is noted with elevated average heart rate and at times heart rate up " to 194 bpm  This rhythm may be amenable to isthmus/flutter ablation    Echocardiogram 7/2017:  Summary   5. The left ventricle is mildly enlarged. Left ventricular systolic performance is at the lower limits of normal. The ejection fraction is estimated to be 50-55%.   6. There is mild concentric increase in left ventricular wall thickness.   7. There is mild aortic stenosis.   8. There is mild left atrial enlargement.            HANNAH LIM MD [facility]  [phone]    This note created using Dragon voice recognition software.  Sound alike errors may have escaped editing.

## 2021-06-13 NOTE — TELEPHONE ENCOUNTER
Received message from EDDA Mead here at [facility], pt did return our call and went to the ER, confirmed in patient chart he is at the ED.

## 2021-06-13 NOTE — TELEPHONE ENCOUNTER
I called Cam to check in.  I let him know I wanted to see how he is doing and if he needs any resources, has any questions or just needs to talk with someone.  I let him know I can receive calls again at my office number and left that information as well.

## 2021-06-13 NOTE — PROGRESS NOTES
"S: Patient is a 69 y/o male, 6', 245 lbs seen at [facility], room 319, for the attempted fitting nd delivery of a CAM boot on orders from Maribel Billy PA-C for the treatment of Dx: fibula fracture, left.    O: patient presented sitting at the endge of his bed at the time of my arrival. Patient was currently wearing a CAM boot and reported that \"[employer]\" had just fit him with it. Patient was not interested in being fit with a different CAM boot.    P: Patient was asked to let his nurses know if he would like a different CAM boot at any time.   "

## 2021-06-13 NOTE — TELEPHONE ENCOUNTER
ANTICOAGULATION  MANAGEMENT    Assessment     Today's INR result of 2.9 is Therapeutic (goal INR of 2.0-3.0)        Warfarin taken as previously instructed    No new diet changes affecting INR    No new medication/supplements affecting INR  increase potassium     Continues to tolerate warfarin with no reported s/s of bleeding or thromboembolism     Previous INR was Subtherapeutic    Plan:     Spoke on phone with Cam regarding INR result and instructed:     Warfarin Dosing Instructions:  Continue current warfarin dose 2.5 mg daily on Wednesdays; and 5 mg daily rest of week  (0 % change)    Instructed patient to follow up no later than: 11/25    Education provided: target INR goal and significance of current INR result, importance of following up for INR monitoring at instructed interval, importance of taking warfarin as instructed, importance of notifying clinic for changes in medications, when to seek medical attention/emergency care and importance of notifying clinic for diarrhea, nausea/vomiting, reduced intake and/or illness    Cam verbalizes understanding and agrees to warfarin dosing plan.    Instructed to call the [facility] for any changes, questions or concerns. (#[phone])   ?   Kiara Ibrahim RN    Subjective/Objective:      Cam BOLANOS Walden, a 70 y.o. male is on warfarin.     Cam reports:     Home warfarin dose: verbally confirmed home dose with Cam and updated on anticoagulation calendar     Missed doses: No     Medication changes:  No     S/S of bleeding or thromboembolism:  No     New Injury or illness:  No     Changes in diet or alcohol consumption:  No     Upcoming surgery, procedure or cardioversion:  No    Anticoagulation Episode Summary     Current INR goal:  2.0-3.0   TTR:  81.4 % (1 y)   Next INR check:  11/25/2020   INR from last check:  2.90 (11/20/2020)   Weekly max warfarin dose:     Target end date:  Indefinite   INR check location:     Preferred lab:     Send INR reminders to:   [facility]    Indications    Atrial flutter (H) (Resolved) [I48.92]           Comments:           Anticoagulation Care Providers     Provider Role Specialty Phone number    Kamaljit Plata MD Referring Family Medicine [phone]

## 2021-06-13 NOTE — TELEPHONE ENCOUNTER
[facility] Nurse Triage Anticoagulation Note      Provider: CALE Pena  Facility: [facility]    Facility Type: TCU    Caller: [facility]  Call Back Number:  [phone]    Reason for call: INR    Today s INR: 2.1  Previous INR: 12/10 2.23(5mg), 12/9 2.57(3mg), 12/8 3.70(hold), 12/7 4.62(hold).  Last home dose:  2.5mg Tues and Fri and 5mg AOD.      Diagnosis/Goal: AFIB/A-flutter  Heparin/Lovenox: No   Currently on ABX: No  Other interacting Medications: None  Missed or refused doses: No    Verbal Order/Direction given by Provider: Give Warfarin 5mg on 12/11, 2.5mg on 12/12, and 5mg 12/13.  Next INR 12/14/20.      Provider giving order: CALE Pena    Verbal order given to: Cherie      Cam Guillaume RN

## 2021-06-13 NOTE — TELEPHONE ENCOUNTER
RN cannot approve Refill Request    RN can NOT refill this medication med is not covered by policy/route to provider. Last office visit: 5/16/2019 Kamaljit Plata MD Last Physical: 10/23/2017 Last MTM visit: Visit date not found Last visit same specialty: 7/23/2020 Aakash García MD.  Next visit within 3 mo: Visit date not found  Next physical within 3 mo: Visit date not found      Cindy Temple, [facility] Triage/Med Refill 12/19/2020    Requested Prescriptions   Pending Prescriptions Disp Refills     warfarin ANTICOAGULANT (COUMADIN/JANTOVEN) 5 MG tablet [Pharmacy Med Name: WARFARIN SODIUM 5 MG TABLET] 180 tablet 1     Sig: TAKE 1 TO 2 TABLETS (5 - 10 MG) BY MOUTH DAILY AS DIRECTED. DOSE ADJUSTED PER INR RESULT.       Warfarin Refill Protocol  Failed - 12/19/2020  9:10 AM        Failed -  Route to appropriate pool/provider     Last Anticoagulation Summary:   Anticoagulation Episode Summary     Current INR goal:  2.0-3.0   TTR:  78.9 % (11.4 mo)   Next INR check:  12/17/2020   INR from last check:     Weekly max warfarin dose:     Target end date:  Indefinite   INR check location:     Preferred lab:     Send INR reminders to:  [facility]    Indications    Atrial flutter (H) (Resolved) [I48.92]           Comments:           Anticoagulation Care Providers     Provider Role Specialty Phone number    Kamaljit Plata MD Referring Family Medicine [phone]                Passed - Provider visit in last year     Last office visit with prescriber/PCP: 5/16/2019 Kamaljit Plata MD OR same dept: Visit date not found OR same specialty: 7/23/2020 Aakash García MD  Last physical: 10/23/2017 Last MTM visit: Visit date not found    Next appt within 3 mo: Visit date not found Next physical within 3 mo: Visit date not found  Prescriber OR PCP: Kamaljit Plata MD  Last diagnosis associated with med order: 1. Atrial flutter (H)  - warfarin ANTICOAGULANT (COUMADIN/JANTOVEN) 5 MG  tablet [Pharmacy Med Name: WARFARIN SODIUM 5 MG TABLET]; TAKE 1 TO 2 TABLETS (5 - 10 MG) BY MOUTH DAILY AS DIRECTED. DOSE ADJUSTED PER INR RESULT.  Dispense: 180 tablet; Refill: 1    If protocol passes may refill for 6 months if within 3 months of last provider visit (or a total of 9 months).

## 2021-06-13 NOTE — PROGRESS NOTES
[facility]    Name:   Cam Walden  : 1950  Facility:   [facility] [[id number]]   Room:   Code Status: FULL CODE -   Fac type:   SNF (Skilled Nursing Facility, TCU) -     PCP:  Kamaljit Plata MD    CHIEF COMPLAINT / REASON FOR VISIT:  Chief Complaint   Patient presents with     H & P     ADMISSION: Left distal fibular fracture secondary to a fall.  Other issues included orthostatic hypotension and acute kidney injury.     Anticoagulation     Supratherapeutic INR (3.5).      [facility] from 2020 until 12/10/2020 (left distal fibular fracture, orthostatic hypotension, acute kidney injury)      HPI: Cam is a 70 y.o. 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  He endorsed syncope which he had been having repeatedly over several days with increased weakness.  He was found to have a nondisplaced left distal fibular fracture superimposed over an old healed fracture of the distal fibular shaft.  He was also found to have acute kidney injury and orthostatic hypotension.  He was treated with IV fluids (with KEVIN resolution) and received an Ortho consult for the fracture, along with PT and OT.      He did need adjustments to his antihypertensive medications in the setting of recent significant weight loss (47 pounds) related to his cancer.  Hydrochlorothiazide and losartan were not resumed, although he was continued on his PTA metoprolol dose.  I believe his tamsulosin was also held.     For his diabetes, he had been on oral medications, metformin and glipizide XL.  He is receiving  "these medications in addition to sliding scale insulin here.      CURRENT/RECENT ISSUES    Disposition: He seems to be doing okay.  He does have a cam boot to wear, and he is to follow-up with Ortho (foot and ankle provider) in 7 to 10 days.  He may be weightbearing as tolerated.  Pain is being controlled with scheduled nonopioids and as needed opioids and is fairly well managed.  He arrived here with orders for PT and OT.  As for his B-cell lymphoma, he does have an oncology appointment once he recovers.    Anticoagulation: He is supratherapeutic with an INR 3.5.  There seems to be some confusion as to his PTA dose.  From what he tells me, he was taking 5 mg daily except for 2 days/week of 10 mg.  However, he was discharged with orders for 5 mg on Tuesday, Thursday, and Saturday, with 2.5 mg all other days of the week.  Today, his INR is 3.5.  We are going to go with 5 mg on Wednesday and Sunday, 2.5 mg all other days of the week, and recheck an INR on 12/21/2020.    Diabetic sequelae: He does have numbness and tingling in the feet, not so much in the hands.  He states that his vision is okay.    ROS: Positives: He tells me he does not sleep well and believes he has \"a touch of sleep apnea.\"  Bowel movements, he says, are \"a little too much\" but no diarrhea.  Negatives: No headaches (despite abrasions to the head) or chest pains, coughing or congestion, nausea or vomiting, current dizziness or dyspnea, dysuria, diplopia, constipation or diarrhea, difficulty chewing or swallowing, or any integumentary issues.    Past Medical History:   Diagnosis Date     Aortic stenosis     mild     Atrial fibrillation (H)      Atrial flutter (H) 8/21/2017     Atrial flutter with rapid ventricular response (H)      Cancer (H)     lymp     Colon polyps 08/08/2016    Per colonoscopy 8/8/16.  Large and small.  Some removed. Others to be removed surgically. (per patient)     Diabetes mellitus (H)     DM II     Diabetes mellitus, type II " (H)      Diabetic polyneuropathy associated with type 2 diabetes mellitus (H) 12/16/2020     GI bleed     Following colon polyp removal     Hyperlipidemia      Hypertension      Morbid obesity (H)      JOHNNY (obstructive sleep apnea)     no cpap     Rectal bleeding               Family History   Problem Relation Age of Onset     Dementia Mother      Asthma Father      Social History     Socioeconomic History     Marital status: Single     Spouse name: Not on file     Number of children: 0     Years of education: Not on file     Highest education level: Not on file   Occupational History     Not on file   Social Needs     Financial resource strain: Not on file     Food insecurity     Worry: Not on file     Inability: Not on file     Transportation needs     Medical: Not on file     Non-medical: Not on file   Tobacco Use     Smoking status: Never Smoker     Smokeless tobacco: Never Used   Substance and Sexual Activity     Alcohol use: No     Drug use: No     Sexual activity: Not on file   Lifestyle     Physical activity     Days per week: Not on file     Minutes per session: Not on file     Stress: Not on file   Relationships     Social connections     Talks on phone: Not on file     Gets together: Not on file     Attends Church service: Not on file     Active member of club or organization: Not on file     Attends meetings of clubs or organizations: Not on file     Relationship status: Not on file     Intimate partner violence     Fear of current or ex partner: Not on file     Emotionally abused: Not on file     Physically abused: Not on file     Forced sexual activity: Not on file   Other Topics Concern     Not on file   Social History Narrative    Patient lives alone.       MEDICATIONS: Reviewed from the MAR, physician orders, and/or earlier progress notes.  Post Discharge Medication Reconciliation Status: discharge medications reconciled, continue medications without change.  Current warfarin dosing has been  updated today (12/14/2020) and is reflected below.  Current Outpatient Medications   Medication Sig     acetaminophen (TYLENOL) 500 MG tablet Take 2 tablets (1,000 mg total) by mouth 3 (three) times a day.     atorvastatin (LIPITOR) 40 MG tablet Take 1 tablet (40 mg total) by mouth daily.     blood glucose test (ACCU-CHEK SMARTVIEW TEST STRIP) strips USE TO CHECK BLOOD SUGARS DAILY AS DIRECTED     cholecalciferol, vitamin D3, (VITAMIN D3) 1,000 unit capsule Take 1 capsule (1,000 Units total) by mouth daily.     DULoxetine (CYMBALTA) 30 MG capsule TAKE 1 CAPSULE BY MOUTH TWICE A DAY     generic lancets (ACCU-CHEK FASTCLIX) Use to check blood sugar twice per day.     glipiZIDE (GLUCOTROL XL) 10 MG 24 hr tablet TAKE 1 TABLET BY MOUTH EVERY DAY IN THE MORNING     KLOR-CON M20 20 mEq tablet TAKE 1 TABLET BY MOUTH TWO TIMES A DAY.     loperamide (IMODIUM) 2 mg capsule Take 2 mg by mouth 2 (two) times a day as needed.     metFORMIN (GLUCOPHAGE) 1000 MG tablet TAKE 1 TABLET BY MOUTH TWICE A DAY WITH MEALS     metoprolol tartrate (LOPRESSOR) 100 MG tablet TAKE 1 TABLET BY MOUTH TWICE A DAY.     ondansetron (ZOFRAN) 8 MG tablet Take 1 tablet (8 mg total) by mouth every 6 (six) hours as needed for nausea.     oxyCODONE (ROXICODONE) 5 MG immediate release tablet Take 1 tablet (5 mg total) by mouth every 6 (six) hours as needed.     polyethylene glycol (MIRALAX) 17 gram packet Take 1 packet (17 g total) by mouth daily.     prochlorperazine (COMPAZINE) 10 MG tablet Take 1 tablet (10 mg total) by mouth every 6 (six) hours as needed (For breakthrough nausea/vomiting).     simethicone (MYLICON) 80 MG chewable tablet Chew 80 mg every 6 (six) hours as needed for flatulence.     tamsulosin (FLOMAX) 0.4 mg cap Take 1 capsule (0.4 mg total) by mouth Daily after breakfast.     warfarin sodium (WARFARIN ORAL) Take by mouth. 12/14/2020: INR 3.5.  Take 5 mg Wednesday and Sunday, 2.5 mg AOD.  Recheck 12/21/2020.  12/11/20 INR 2.1  Take 5mg  12/11 and 12/13 and 2.5mg on 12/12.  Next INR 12/14/20.     ALLERGIES:   Allergies   Allergen Reactions     Lisinopril Cough     Trulicity [Dulaglutide] Nausea Only     DIET: Diabetic, regular texture, thin liquids.    Vitals:    12/13/20 1055   BP: 120/82   Pulse: 70   Resp: 16   Temp: 98  F (36.7  C)   SpO2: 94%   Weight: (!) 264 lb 12.8 oz (120.1 kg)   Height: 6' (1.829 m)     Body mass index is 35.91 kg/m .    EXAMINATION:   General: Pleasant, alert, and conversant middle-aged male, lying in bed, in no apparent distress.  Head: Normocephalic.  Abrasions/scabs on the top of the head, on the left cheek.  Eyes: PERRLA, sclerae clear.   ENT: Moist oral mucosa.  He has his own teeth.  No rhinorrhea or nasal discharge.  Hearing is unimpaired.  Darkened external laryngeal area from chemotherapy.  Cardiovascular: Regular rate and rhythm with intermittent ectopy and a 3/6 systolic ejection murmur at the left sternal border..   Respiratory: Lungs clear to auscultation bilaterally.   Abdomen: Nondistended.   Musculoskeletal/Extremities: Age-related degenerative joint disease.  Mild peripheral edema of the left foot, to be expected.  Integument: Facial abrasions and significant ecchymosis of the left forearm.  No rashes, clinically significant lesions, or skin breakdown.   Cognitive/Psychiatric: Alert and oriented x3.  He seems unable to give me the PTA dose of his warfarin.  Affect is euthymic.    DIAGNOSTICS:   Results for orders placed or performed during the hospital encounter of 12/07/20   Basic Metabolic Panel   Result Value Ref Range    Sodium 138 136 - 145 mmol/L    Potassium 3.4 (L) 3.5 - 5.0 mmol/L    Chloride 102 98 - 107 mmol/L    CO2 28 22 - 31 mmol/L    Anion Gap, Calculation 8 5 - 18 mmol/L    Glucose 119 70 - 125 mg/dL    Calcium 7.6 (L) 8.5 - 10.5 mg/dL    BUN 14 8 - 22 mg/dL    Creatinine 1.36 (H) 0.70 - 1.30 mg/dL    GFR MDRD Af Amer >60 >60 mL/min/1.73m2    GFR MDRD Non Af Amer 52 (L) >60 mL/min/1.73m2      Lab Results   Component Value Date    WBC 10.4 12/08/2020    HGB 8.2 (L) 12/08/2020    HCT 26.2 (L) 12/08/2020    MCV 95 12/08/2020     12/09/2020     Estimated Creatinine Clearance: 71.7 mL/min (A) (by C-G formula based on SCr of 1.32 mg/dL (H)).  Lab Results   Component Value Date    HGBA1C 8.6 (H) 10/13/2020       ASSESSMENT/Plan:      ICD-10-CM    1. Closed fracture of distal end of left fibula with routine healing, unspecified fracture morphology, subsequent encounter  S82.832D    2. Paroxysmal atrial fibrillation (H)  I48.0    3. Type 2 diabetes mellitus with stage 3a chronic kidney disease, without long-term current use of insulin (H)  E11.21     N18.31    4. 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 Morbid obesity (H)  E66.01    6. Diabetic polyneuropathy associated with type 2 diabetes mellitus (H)  E11.42    7. Essential hypertension  I10    8. Dysthymia  F34.1      CHANGES:    INR 3.5: Give 5 mg on Wednesday and Sunday, 2.5 mg AOD, rechecking INR on 12/21/2020.    CARE PLAN:    The care plan has been reviewed and all orders signed. Changes to care plan, if any, as noted. Otherwise, continue current plan of care.  Total time spent with this patient was approximately 35 minutes, with greater than 50% spent in counseling and coordination of care that included obtaining information from the patient not available from the hospital discharge, as well as dosing of warfarin for paroxysmal atrial fibrillation.    The above has been created using voice recognition software. Please be aware that this may unintentionally  produce inaccuracies and/or nonsensical sentences.      Electronically signed by: Jose Jerome CNP

## 2021-06-13 NOTE — PROGRESS NOTES
[facility]    Name:   Cam Walden  : 1950  Facility:   [facility] [[id number]]   Room:   Code Status: FULL CODE -   Fac type:   SNF (Skilled Nursing Facility, TCU) -     PCP:  Kamaljit Plata MD    CHIEF COMPLAINT / REASON FOR VISIT:  Chief Complaint   Patient presents with     Follow-up     TCU follow-up: Left distal fibular fracture secondary to a fall.  Other issues included orthostatic hypotension and acute kidney injury.      [facility] from 2020 until 12/10/2020 (left distal fibular fracture, orthostatic hypotension, acute kidney injury)    Patient was last seen by me on 2020, a new admission visit.      HPI: Cam is a 70 y.o. male with a history of B-cell lymphoma (earlier treated with 4 rounds of chemotherapy), hypertension, paroxysmal atrial fibrillation (anticoagulated with warfarin), diabetes mellitus type 2 poorly controlled (A1c 8.6) with renal and neurological complications, and morbid obesity, who was transferred from [facility] after falling down the stairs and lying at the bottom all night.  He endorsed syncope which he had been having repeatedly over several days with increased weakness.  He was found to have a nondisplaced left distal fibular fracture superimposed over an old healed fracture of the distal fibular shaft.  He was also found to have acute kidney injury and orthostatic hypotension.  He was treated with IV fluids (with KEVIN resolution) and received an Ortho consult for the fracture, along with PT and OT.      He did need adjustments to his antihypertensive medications in the setting of recent significant weight loss (47 pounds) related to his cancer.  Hydrochlorothiazide and losartan were not resumed, although he was continued on his PTA metoprolol dose.  I believe his tamsulosin was also held.     For his diabetes, he had been on oral medications, metformin and  "glipizide XL.  He is receiving these medications in addition to sliding scale insulin here.      CURRENT/RECENT ISSUES    Disposition: He seems to be doing okay.  He does have a cam boot to wear, and he is to follow-up with Ortho (foot and ankle provider) in 7 to 10 days.  He may be weightbearing as tolerated.  Pain is being controlled with scheduled nonopioids and as needed opioids and is fairly well managed.  He arrived here with orders for PT and OT.  As for his B-cell lymphoma, he does have an oncology appointment once he recovers.    Anticoagulation: He is supratherapeutic with an INR 3.5.  There seems to be some confusion as to his PTA dose.  From what he tells me, he was taking 5 mg daily except for 2 days/week of 10 mg.  However, he was discharged with orders for 5 mg on Tuesday, Thursday, and Saturday, with 2.5 mg all other days of the week.  His INR on 12/14/2020 was 3.5.  We went with 5 mg on Wednesday and Sunday, 2.5 mg all other days of the week, and recheck an INR on 12/21/2020.    Diarrhea: He is complaining of loose stools (watery).  He tells me that yesterday was particularly bad.  I discussed providing some relief.  He indicated that he had not been receiving antibiotics recently.  I discovered that he already has orders for loperamide.    Diabetic sequelae: He does have numbness and tingling in the feet, not so much in the hands.  He states that his vision is okay. We did discuss his poor diabetes management and how important it is to bring his A1c down.    Discharge planning: Currently, physical therapy is teaching him how to get around with his boot.  He is expected to discharge on 12/22/2020.  He will require home PT and Meals on Wheels.      ROS: Positives: He tells me he does not sleep well and believes he has \"a touch of sleep apnea.\"  He is complaining of watery stools.  Negatives: No headaches (despite abrasions to the head) or chest pains, coughing or congestion, nausea or vomiting, " current dizziness or dyspnea, dysuria, diplopia, constipation or diarrhea, difficulty chewing or swallowing, or any integumentary issues.    Past Medical History:   Diagnosis Date     Aortic stenosis     mild     Atrial fibrillation (H)      Atrial flutter (H) 8/21/2017     Atrial flutter with rapid ventricular response (H)      Cancer (H)     lymp     Colon polyps 08/08/2016    Per colonoscopy 8/8/16.  Large and small.  Some removed. Others to be removed surgically. (per patient)     Diabetes mellitus (H)     DM II     Diabetes mellitus, type II (H)      Diabetic polyneuropathy associated with type 2 diabetes mellitus (H) 12/16/2020     GI bleed     Following colon polyp removal     Hyperlipidemia      Hypertension      Morbid obesity (H)      JOHNNY (obstructive sleep apnea)     no cpap     Rectal bleeding               Family History   Problem Relation Age of Onset     Dementia Mother      Asthma Father      Social History     Socioeconomic History     Marital status: Single     Spouse name: Not on file     Number of children: 0     Years of education: Not on file     Highest education level: Not on file   Occupational History     Not on file   Social Needs     Financial resource strain: Not on file     Food insecurity     Worry: Not on file     Inability: Not on file     Transportation needs     Medical: Not on file     Non-medical: Not on file   Tobacco Use     Smoking status: Never Smoker     Smokeless tobacco: Never Used   Substance and Sexual Activity     Alcohol use: No     Drug use: No     Sexual activity: Not on file   Lifestyle     Physical activity     Days per week: Not on file     Minutes per session: Not on file     Stress: Not on file   Relationships     Social connections     Talks on phone: Not on file     Gets together: Not on file     Attends Yarsani service: Not on file     Active member of club or organization: Not on file     Attends meetings of clubs or organizations: Not on file      Relationship status: Not on file     Intimate partner violence     Fear of current or ex partner: Not on file     Emotionally abused: Not on file     Physically abused: Not on file     Forced sexual activity: Not on file   Other Topics Concern     Not on file   Social History Narrative    Patient lives alone.       MEDICATIONS: Reviewed from the MAR, physician orders, and/or earlier progress notes.  Post Discharge Medication Reconciliation Status: medication reconciliation previously completed during another office visit.  Current warfarin dosing was updated on 12/14/2020 and is reflected below.  Current Outpatient Medications   Medication Sig     acetaminophen (TYLENOL) 500 MG tablet Take 2 tablets (1,000 mg total) by mouth 3 (three) times a day.     atorvastatin (LIPITOR) 40 MG tablet Take 1 tablet (40 mg total) by mouth daily.     blood glucose test (ACCU-CHEK SMARTVIEW TEST STRIP) strips USE TO CHECK BLOOD SUGARS DAILY AS DIRECTED     cholecalciferol, vitamin D3, (VITAMIN D3) 1,000 unit capsule Take 1 capsule (1,000 Units total) by mouth daily.     DULoxetine (CYMBALTA) 30 MG capsule TAKE 1 CAPSULE BY MOUTH TWICE A DAY     generic lancets (ACCU-CHEK FASTCLIX) Use to check blood sugar twice per day.     glipiZIDE (GLUCOTROL XL) 10 MG 24 hr tablet TAKE 1 TABLET BY MOUTH EVERY DAY IN THE MORNING     KLOR-CON M20 20 mEq tablet TAKE 1 TABLET BY MOUTH TWO TIMES A DAY.     loperamide (IMODIUM) 2 mg capsule Take 2 mg by mouth 2 (two) times a day as needed.     metFORMIN (GLUCOPHAGE) 1000 MG tablet TAKE 1 TABLET BY MOUTH TWICE A DAY WITH MEALS     metoprolol tartrate (LOPRESSOR) 100 MG tablet TAKE 1 TABLET BY MOUTH TWICE A DAY.     ondansetron (ZOFRAN) 8 MG tablet Take 1 tablet (8 mg total) by mouth every 6 (six) hours as needed for nausea.     oxyCODONE (ROXICODONE) 5 MG immediate release tablet Take 1 tablet (5 mg total) by mouth every 6 (six) hours as needed.     polyethylene glycol (MIRALAX) 17 gram packet Take 1  packet (17 g total) by mouth daily.     prochlorperazine (COMPAZINE) 10 MG tablet Take 1 tablet (10 mg total) by mouth every 6 (six) hours as needed (For breakthrough nausea/vomiting).     simethicone (MYLICON) 80 MG chewable tablet Chew 80 mg every 6 (six) hours as needed for flatulence.     tamsulosin (FLOMAX) 0.4 mg cap Take 1 capsule (0.4 mg total) by mouth Daily after breakfast.     warfarin sodium (WARFARIN ORAL) Take by mouth. 12/14/2020: INR 3.5.  Take 5 mg Wednesday and Sunday, 2.5 mg AOD.  Recheck 12/21/2020.  12/11/20 INR 2.1  Take 5mg 12/11 and 12/13 and 2.5mg on 12/12.  Next INR 12/14/20.     ALLERGIES:   Allergies   Allergen Reactions     Lisinopril Cough     Trulicity [Dulaglutide] Nausea Only     DIET: Diabetic, regular texture, thin liquids.    Vitals:    12/15/20 0936   BP: 119/67   Pulse: 71   Resp: 18   Temp: 98.7  F (37.1  C)   SpO2: 95%   Weight: (!) 264 lb 12.8 oz (120.1 kg)   Height: 6' (1.829 m)     Body mass index is 35.91 kg/m .    EXAMINATION:   General: Pleasant, alert, and conversant middle-aged male, sitting in a recliner, in no apparent distress.  Head: Normocephalic.  Abrasions/scabs on the top of the head, on the left cheek.  Eyes: PERRLA, sclerae clear.   ENT: Moist oral mucosa.  He has his own teeth.  No rhinorrhea or nasal discharge.  Hearing is unimpaired.  Darkened external laryngeal area from chemotherapy.  Cardiovascular: Regular rate and rhythm with intermittent ectopy and a 3/6 systolic ejection murmur at the left sternal border..   Respiratory: Lungs clear to auscultation bilaterally.   Abdomen: Nondistended.   Musculoskeletal/Extremities: Age-related degenerative joint disease.  Mild peripheral edema of the left foot, to be expected.  Integument: Facial abrasions and significant ecchymosis of the left forearm.  No rashes, clinically significant lesions, or skin breakdown.   Cognitive/Psychiatric: Alert and oriented x3.  He seems unable to give me the PTA dose of his  warfarin.  Affect is euthymic.    DIAGNOSTICS:   Results for orders placed or performed in visit on 12/15/20   Basic Metabolic Panel   Result Value Ref Range    Sodium 137 136 - 145 mmol/L    Potassium 4.7 3.5 - 5.0 mmol/L    Chloride 106 98 - 107 mmol/L    CO2 18 (L) 22 - 31 mmol/L    Anion Gap, Calculation 13 5 - 18 mmol/L    Glucose 131 (H) 70 - 125 mg/dL    Calcium 8.7 8.5 - 10.5 mg/dL    BUN 15 8 - 22 mg/dL    Creatinine 1.32 (H) 0.70 - 1.30 mg/dL    GFR MDRD Af Amer >60 >60 mL/min/1.73m2    GFR MDRD Non Af Amer 54 (L) >60 mL/min/1.73m2     Lab Results   Component Value Date    WBC 10.4 12/08/2020    HGB 8.2 (L) 12/08/2020    HCT 26.2 (L) 12/08/2020    MCV 95 12/08/2020     12/09/2020     Estimated Creatinine Clearance: 71.7 mL/min (A) (by C-G formula based on SCr of 1.32 mg/dL (H)).  Lab Results   Component Value Date    HGBA1C 8.6 (H) 10/13/2020       ASSESSMENT/Plan:      ICD-10-CM    1. Closed fracture of distal end of left fibula with routine healing, unspecified fracture morphology, subsequent encounter  S82.832D    2. Paroxysmal atrial fibrillation (H)  I48.0    3. Type 2 diabetes mellitus with stage 3a chronic kidney disease, without long-term current use of insulin (H)  E11.21     N18.31    4. 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 Morbid obesity (H)  E66.01    6. Diabetic polyneuropathy associated with type 2 diabetes mellitus (H)  E11.42    7. Essential hypertension  I10    8. Dysthymia  F34.1      CHANGES:    None.    CARE PLAN:    The care plan has been reviewed and all orders signed. Changes to care plan, if any, as noted. Otherwise, continue current plan of care.      The above has been created using voice recognition software. Please be aware that this may unintentionally  produce inaccuracies and/or nonsensical sentences.      Electronically signed by: Jose Jerome, CNP

## 2021-06-13 NOTE — TELEPHONE ENCOUNTER
3rd attempt to reach patient to have him report to the ER for critical potassium, again no answer.  Lm for him to report to ER or to call our clinic to update us on his situation.

## 2021-06-13 NOTE — TELEPHONE ENCOUNTER
12:45 pm, spoke directly with Dr Ibanez who feels the patient needs to be seen and recommends ER.  Call placed to patient and only got voicemail.  LM that patient needs to report to ER and to call our office and let us know he will do that.  Writer will call patient again if no reply.

## 2021-06-13 NOTE — PROGRESS NOTES
Just admitted on 12/7/20 due to a acute fall and acute scalp abrasions.  And Acute left ankle fibula fracture.

## 2021-06-13 NOTE — PROGRESS NOTES
Pt here for treatment after seeing MD. Pt received IV and oral K replacement along with treatment. No problem with infusion as directed and neulasta injector applied and pt aware of care.  Pt also has a Rx of oral k to .

## 2021-06-14 NOTE — PROGRESS NOTES
MTM Follow Up Encounter  ASSESSMENT AND PLAN  Type 2 Diabetes:  stable and borderline controlled. A1C not at goal of <7% --no change since 3 months ago, which is surprising since his diet has changed since his operation. FBP at goal  mg/dL.   Encouraged him to focus on physical activity for the next 3 months.  Also incorporating protein into his meals since he can only have white bread at this time.  If no changes to A1c in 3 months, will consider adding an additional medication or trying to increase glipizide.  Labs are up to date.    PLAN:   1. Work on physical activity.   2. A1c today.     MTM FOLLOW UP  3 months    SUBJECTIVE AND OBJECTIVE  Cam Walden is a 67 y.o. male here for a follow-up medication therapy management (MTM) appointment.     Type 2 Diabetes: Currently taking glipizide XL 10 mg QAM and metformin 1000 mg BID. Tests BG 1-2 times daily. Blood sugars per glucometer:   Fasting AM = 101, 111, 121, 111, 108, 87, 88, 116, 103, 93, 116, 117, 135, 135, 128, 126, 103, 130, 175, 129, 138  Before dinner = 127, 98, 90  Januvia d/madi in the past due to cost. Nausea with Trulicity.   ast A1c checked today = 7.6%, previously 7.6% on 8/14/17.   Hypoglycemia none   Using a AccuChek Heide meter.   Microalbumin checked 10/23/17  Working on eating less sugar, not having desserts and carbs somewhat less.  Robotic assisted colectomy left on 10/31/2017.  Since operation he is on a low fiber and soft food diet: no fruit and only white bread. Sees surgeon on 11/27.   No changes in physical activity.          Cam's medication list was reviewed with them, discussing reason for use, directions for use, and potential side effects of each medication as needed. Indication, safety, efficacy, and convenience was assessed for all medications addressed above.  No environmental factors were noted currently affecting patient.  This care plan was communicated via EMR with his primary care provider, Kamaljit Plata,  MD, who is the authorizing prescriber for this visit.  Direct supervision was available by either the patient's PCP or other available provider.    Time and complexity billing metrics are included in the docflowsheet linked to this visit    Time spent: 15 min  Angie Russell, Pharm.D., BCACP   MTM Pharmacist at [facility]

## 2021-06-14 NOTE — TELEPHONE ENCOUNTER
ANTICOAGULATION  MANAGEMENT PROGRAM    Cam Walden is overdue for INR check.     Spoke with Cam and patient will have INR checked at next office visit on 2/4.      Tammie Flores RN

## 2021-06-14 NOTE — PROGRESS NOTES
[facility] Patient Outreach:     Discharge Date::  12/22/20      Reason for TCU stay (discharge diagnosis)::  Fall with left distal fibula fx, KEVIN, orthostatic hypotension      Are you feeling better, the same or worse since your discharge?:  Patient is feeling better          As part of your discharge plan, did they discuss home care with you?: Yes        Have your seen them yet, or are they scheduled to visit?: No        Did you receive any new medications, or was there a change to your medications?: No (same meds as TCU.  )            Do you have any follow up visits scheduled with your PCP or Specialist?:  Yes, with Specialist      Who are you seeing and when is it scheduled?:  Oncology on 12/28/20.        I'm glad to hear you're doing well and we want you to continue to do well. Your PCP would like to see you for a follow-up visit. Can we help set that up for your today?: No        (RN) Provided patient the PCP's phone number to call if they have any questions or concerns?: No (Patient knows the number and will be seeing PCP on 1/11/21.  )

## 2021-06-14 NOTE — TELEPHONE ENCOUNTER
ANTICOAGULATION  MANAGEMENT PROGRAM    Cam Walden is overdue for INR check.     Spoke with aCm and patient will have INR checked at next office visit on 1/11.  States he may cancel appt as still feeling dizzy and unsteady and does not want to risk falling.      Ruchi Florez, RN

## 2021-06-15 NOTE — TELEPHONE ENCOUNTER
Refill Approved    Rx renewed per Medication Renewal Policy. Medication was last renewed on 12/10/20.    Wade Wynn, [facility] Triage/Med Refill 2/12/2021     Requested Prescriptions   Pending Prescriptions Disp Refills     tamsulosin (FLOMAX) 0.4 mg cap [Pharmacy Med Name: TAMSULOSIN HCL 0.4 MG CAPSULE] 180 capsule 2     Sig: TAKE 2 CAPSULES BY MOUTH EVERY DAY       Alfuzosin/Tamsulosin/Silodosin Refill Protocol  Passed - 2/12/2021  1:27 AM        Passed - PCP or prescribing provider visit in past 12 months       Last office visit with prescriber/PCP: 2/9/2021 Kamaljit Plata MD OR same dept: 2/9/2021 Kamaljit Plata MD OR same specialty: 2/9/2021 Kamaljit Plata MD  Last physical: 10/23/2017 Last MTM visit: Visit date not found   Next visit within 3 mo: Visit date not found  Next physical within 3 mo: Visit date not found  Prescriber OR PCP: Kamaljit Plata MD  Last diagnosis associated with med order: 1. Benign prostatic hyperplasia with incomplete bladder emptying  - tamsulosin (FLOMAX) 0.4 mg cap [Pharmacy Med Name: TAMSULOSIN HCL 0.4 MG CAPSULE]; TAKE 2 CAPSULES BY MOUTH EVERY DAY  Dispense: 180 capsule; Refill: 2    If protocol passes may refill for 12 months if within 3 months of last provider visit (or a total of 15 months).

## 2021-06-15 NOTE — TELEPHONE ENCOUNTER
Called and spoke with Cam Walden , Message was given, Cam Walden  understood, no further questions.      ----- Message from Kamaljit Plata MD sent at 2/12/2021 11:20 AM CST -----  Please tell Cam that his diabetes is extremely well controlled, his A1c was 5.2%, probably combination of the weight loss about 50 pounds that he  mentioned, and the medication, at this point we will just have him stop the glipizide and continue the Metformin only, continue to check blood sugar regularly,, recheck at  the clinic in 3 months.

## 2021-06-15 NOTE — TELEPHONE ENCOUNTER
ANTICOAGULATION  MANAGEMENT PROGRAM    Cam Walden is overdue for INR check.     Spoke with Cam and scheduled INR appointment on 2/26.      Fely Cardozo RN

## 2021-06-15 NOTE — TELEPHONE ENCOUNTER
ANTICOAGULATION  MANAGEMENT    Assessment     Today's INR result of 2.0 is Therapeutic (goal INR of 2.0-3.0)        Warfarin taken as previously instructed    No new diet changes affecting INR    No new medication/supplements affecting INR    Continues to tolerate warfarin with no reported s/s of bleeding or thromboembolism     Previous INR was Subtherapeutic    Plan:     Spoke on phone with Cam regarding INR result and instructed:      Warfarin Dosing Instructions:  Continue current warfarin dose 10 mg daily on Sundays, Wednesdays and Fridays; and 5 mg daily rest of week  (0 % change)    Instructed patient to follow up no later than: 2-3 weeks.    Education provided: importance of therapeutic range, target INR goal and significance of current INR result and importance of following up for INR monitoring at instructed interval    Cam verbalizes understanding and agrees to warfarin dosing plan.    Instructed to call the [facility] for any changes, questions or concerns. (#[phone])   ?   Jackie Rao RN    Subjective/Objective:      Cam Walden, a 71 y.o. male is on warfarin. Cam Levy reports:     Home warfarin dose: verbally confirmed home dose with Cam and updated on anticoagulation calendar     Missed doses: No     Medication changes:  No     S/S of bleeding or thromboembolism:  No     New Injury or illness:  No     Changes in diet or alcohol consumption:  No     Upcoming surgery, procedure or cardioversion:  No    Anticoagulation Episode Summary     Current INR goal:  2.0-3.0   TTR:  64.4 % (9.6 mo)   Next INR check:  4/2/2021   INR from last check:  2.00 (3/12/2021)   Weekly max warfarin dose:     Target end date:  Indefinite   INR check location:     Preferred lab:     Send INR reminders to:  [facility]    Indications    Atrial flutter (H) (Resolved) [I48.92]           Comments:           Anticoagulation Care Providers     Provider Role Specialty Phone number    Boni,  Kamaljit MARX MD [facility] [phone]

## 2021-06-15 NOTE — PROGRESS NOTES
OFFICE VISIT - FAMILY MEDICINE     ASSESSMENT AND PLAN     1. Diffuse large B-cell lymphoma of extranodal site (H)  HM1(CBC and Differential)    Comprehensive Metabolic Panel   2. Acute blood loss anemia  HM1(CBC and Differential)   3. Diabetic polyneuropathy associated with type 2 diabetes mellitus (H)  Glycosylated Hemoglobin A1c   4. Atrial flutter (H)  INR   5. Other drug-induced agranulocytosis (H)     6. Morbid obesity (H)     A1c significantly improve to 5.2%, will DC glipizide at this point, continue Metformin, continue to monitor blood sugar.  Recheck in 3 months.  Diabetes neuropathy has been stable, currently taking Cymbalta.  Atrial flutter controlled with metoprolol ,on anticoagulation.  Stating that Lipitor did cause dizziness, could switch to Pravachol or  simvastatin.  CHIEF COMPLAINT   Hospital Visit Follow Up    HPI   Cam Walden is a 70 y.o. male.  No Patient Care Coordination Note on file.  Follow-up on chronic medical issues, diabetes type 2, atrial flutter, in the interim patient was diagnosed with diffuse large B-cell lymphoma involving his left neck, just recently completed chemotherapy, has been doing well.  Unfortunately did have a fall and a fracture of his left foot, is planning to follow-up with Ortho in the next few days.  Has had about 50 pounds weight loss since his diagnosis and treatment.  Blood sugar seems to be stable at home, no hypoglycemic episode.  Still taking glipizide 10 mg daily, Metformin 1000 mg twice daily, Lipitor 40 mg daily.  Cymbalta 30 mg daily, metoprolol 100 mg daily, Coumadin as directed by the anticoagulation clinic no adverse reaction from medication.          Review of Systems As per HPI, otherwise negative.    OBJECTIVE   /84 (Patient Site: Left Arm, Patient Position: Sitting, Cuff Size: Adult Large)   Pulse 88   Temp (!) 96.3  F (35.7  C) (Temporal)   Resp 17   Ht 6' (1.829 m)   Wt (!) 252 lb (114.3 kg)   BMI 34.18 kg/m    Physical Exam    Constitutional: He is oriented to person, place, and time. He appears well-developed and well-nourished.   HENT:   Head: Normocephalic and atraumatic.   Neck: Normal range of motion. Neck supple. No JVD present. No tracheal deviation present. No thyromegaly present.   Cardiovascular: Normal rate, regular rhythm, normal heart sounds and intact distal pulses. Exam reveals no gallop and no friction rub.   No murmur heard.  Pulmonary/Chest: Effort normal and breath sounds normal. No respiratory distress. He has no wheezes. He has no rales.   Musculoskeletal:         General: No tenderness or edema.   Lymphadenopathy:     He has no cervical adenopathy.   Neurological: He is alert and oriented to person, place, and time. Coordination normal.   Psychiatric: He has a normal mood and affect. Judgment and thought content normal.       PFSH     Family History   Problem Relation Age of Onset     Dementia Mother      Asthma Father      Social History     Socioeconomic History     Marital status: Single     Spouse name: Not on file     Number of children: 0     Years of education: Not on file     Highest education level: Not on file   Occupational History     Not on file   Social Needs     Financial resource strain: Not on file     Food insecurity     Worry: Not on file     Inability: Not on file     Transportation needs     Medical: Not on file     Non-medical: Not on file   Tobacco Use     Smoking status: Never Smoker     Smokeless tobacco: Never Used   Substance and Sexual Activity     Alcohol use: No     Drug use: No     Sexual activity: Not on file   Lifestyle     Physical activity     Days per week: Not on file     Minutes per session: Not on file     Stress: Not on file   Relationships     Social connections     Talks on phone: Not on file     Gets together: Not on file     Attends [employer] service: Not on file     Active member of club or organization: Not on file     Attends meetings of clubs or organizations: Not on  file     Relationship status: Not on file     Intimate partner violence     Fear of current or ex partner: Not on file     Emotionally abused: Not on file     Physically abused: Not on file     Forced sexual activity: Not on file   Other Topics Concern     Not on file   Social History Narrative    Patient lives alone.     Relevant history was reviewed with the patient today, unless noted in HPI, nothing is pertinent for this visit.  [facility]     Patient Active Problem List    Diagnosis Date Noted     Diabetic polyneuropathy associated with type 2 diabetes mellitus (H) 12/16/2020     Closed fracture of distal end of left fibula with routine healing 12/14/2020     Paroxysmal atrial fibrillation (H) 12/14/2020     Diffuse large B cell lymphoma (H) 12/14/2020     Morbid obesity (H) 12/14/2020     Dysthymia 12/14/2020     Orthostatic hypotension 12/08/2020     Acute renal insufficiency 12/07/2020     Syncope and collapse 12/07/2020     Hyperkalemia 12/07/2020     Leukocytosis 12/07/2020     Hypomagnesemia 11/25/2020     Hypokalemia 11/25/2020     Hypophosphatemia 11/25/2020     Drug-induced neutropenia (H) 11/25/2020     KEVIN (acute kidney injury) (H) 11/25/2020     Encounter for antineoplastic chemotherapy 08/17/2020     Diffuse large B-cell lymphoma of extranodal site (H) 08/14/2020     Anemia 05/05/2020     Adenoma 05/05/2020     GI bleed 05/05/2020     Diverticulosis of large intestine without perforation or abscess without bleeding 05/05/2020     Paroxysmal atrial flutter (H) 07/03/2017     Type 2 diabetes mellitus with stage 3a chronic kidney disease, without long-term current use of insulin (H) 08/12/2016     Acute blood loss anemia      Colon polyps 08/08/2016     Colonoscopy causing post-procedural bleeding 08/08/2016     Plantar fasciitis, bilateral 07/26/2016     Skin lesion 07/26/2016     Hyperlipidemia 01/12/2016     Decreased sensation of feet. 01/12/2016     Essential hypertension 06/03/2015     Sleep apnea  06/03/2015     Aortic valve stenosis, moderate 04/16/2015     Past Surgical History:   Procedure Laterality Date     CARDIOVERSION  08/25/2017     IR PORT PLACEMENT >5 YEARS  8/24/2020       RESULTS/CONSULTS (Lab/Rad)     Recent Results (from the past 168 hour(s))   Comprehensive Metabolic Panel   Result Value Ref Range    Sodium 143 136 - 145 mmol/L    Potassium 4.1 3.5 - 5.0 mmol/L    Chloride 106 98 - 107 mmol/L    CO2 22 22 - 31 mmol/L    Anion Gap, Calculation 15 5 - 18 mmol/L    Glucose 147 (H) 70 - 125 mg/dL    BUN 13 8 - 22 mg/dL    Creatinine 1.27 0.70 - 1.30 mg/dL    GFR MDRD Af Amer >60 >60 mL/min/1.73m2    GFR MDRD Non Af Amer 56 (L) >60 mL/min/1.73m2    Bilirubin, Total 0.5 0.0 - 1.0 mg/dL    Calcium 8.6 8.5 - 10.5 mg/dL    Protein, Total 6.8 6.0 - 8.0 g/dL    Albumin 3.8 3.5 - 5.0 g/dL    Alkaline Phosphatase 96 45 - 120 U/L    AST 35 0 - 40 U/L    ALT 38 0 - 45 U/L   Glycosylated Hemoglobin A1c   Result Value Ref Range    Hemoglobin A1c 5.2 <=5.6 %   HM1 (CBC with Diff)   Result Value Ref Range    WBC 7.0 4.0 - 11.0 thou/uL    RBC 3.96 (L) 4.40 - 6.20 mill/uL    Hemoglobin 11.6 (L) 14.0 - 18.0 g/dL    Hematocrit 37.8 (L) 40.0 - 54.0 %    MCV 96 80 - 100 fL    MCH 29.3 27.0 - 34.0 pg    MCHC 30.7 (L) 32.0 - 36.0 g/dL    RDW 14.0 11.0 - 14.5 %    Platelets 246 140 - 440 thou/uL    MPV 8.3 7.0 - 10.0 fL    Neutrophils % 66 50 - 70 %    Lymphocytes % 21 20 - 40 %    Monocytes % 9 2 - 10 %    Eosinophils % 2 0 - 6 %    Basophils % 1 0 - 2 %    Immature Granulocyte % 1 (H) <=0 %    Neutrophils Absolute 4.6 2.0 - 7.7 thou/uL    Lymphocytes Absolute 1.4 0.8 - 4.4 thou/uL    Monocytes Absolute 0.6 0.0 - 0.9 thou/uL    Eosinophils Absolute 0.2 0.0 - 0.4 thou/uL    Basophils Absolute 0.0 0.0 - 0.2 thou/uL    Immature Granulocyte Absolute 0.1 (H) <=0.0 thou/uL   INR   Result Value Ref Range    INR 1.60 (H) 0.90 - 1.10     No results found.  MEDICATIONS     Current Outpatient Medications on File Prior to Visit    Medication Sig Dispense Refill     blood glucose test (ACCU-CHEK SMARTVIEW TEST STRIP) strips USE TO CHECK BLOOD SUGARS DAILY AS DIRECTED 100 strip PRN     cholecalciferol, vitamin D3, (VITAMIN D3) 1,000 unit capsule Take 1 capsule (1,000 Units total) by mouth daily. 100 capsule 2     DULoxetine (CYMBALTA) 30 MG capsule TAKE 1 CAPSULE BY MOUTH TWICE A  capsule 2     generic lancets (ACCU-CHEK FASTCLIX) Use to check blood sugar twice per day. 102 each 11     glipiZIDE (GLUCOTROL XL) 10 MG 24 hr tablet TAKE 1 TABLET BY MOUTH EVERY DAY IN THE MORNING 90 tablet 2     loperamide (IMODIUM) 2 mg capsule Take 2 mg by mouth 2 (two) times a day as needed.       metFORMIN (GLUCOPHAGE) 1000 MG tablet TAKE 1 TABLET BY MOUTH TWICE A DAY WITH MEALS 180 tablet 3     metoprolol tartrate (LOPRESSOR) 100 MG tablet TAKE 1 TABLET BY MOUTH TWICE A DAY. 180 tablet 3     ondansetron (ZOFRAN) 8 MG tablet Take 1 tablet (8 mg total) by mouth every 6 (six) hours as needed for nausea. 30 tablet 1     tamsulosin (FLOMAX) 0.4 mg cap Take 1 capsule (0.4 mg total) by mouth Daily after breakfast. 180 capsule 2     warfarin ANTICOAGULANT (COUMADIN/JANTOVEN) 5 MG tablet Take 1/2-1 tablet (2.5-5 mg) daily as directed. Adjust dose per INR results. 90 tablet 1     warfarin sodium (WARFARIN ORAL) Take by mouth. 12/14/2020: INR 3.5.  Take 5 mg Wednesday and Sunday, 2.5 mg AOD.  Recheck 12/21/2020.  12/11/20 INR 2.1  Take 5mg 12/11 and 12/13 and 2.5mg on 12/12.  Next INR 12/14/20.       acetaminophen (TYLENOL) 500 MG tablet Take 2 tablets (1,000 mg total) by mouth 3 (three) times a day.  0     atorvastatin (LIPITOR) 40 MG tablet Take 1 tablet (40 mg total) by mouth daily. 90 tablet 3     oxyCODONE (ROXICODONE) 5 MG immediate release tablet Take 1 tablet (5 mg total) by mouth every 6 (six) hours as needed. 13 tablet 0     polyethylene glycol (MIRALAX) 17 gram packet Take 1 packet (17 g total) by mouth daily.  0     potassium chloride (KLOR-CON M20)  20 MEQ tablet Take 1 tablet (20 mEq total) by mouth 2 (two) times a day. 180 tablet 0     prochlorperazine (COMPAZINE) 10 MG tablet Take 1 tablet (10 mg total) by mouth every 6 (six) hours as needed (For breakthrough nausea/vomiting). 30 tablet 1     simethicone (MYLICON) 80 MG chewable tablet Chew 80 mg every 6 (six) hours as needed for flatulence.       No current facility-administered medications on file prior to visit.        HEALTH MAINTENANCE / SCREENING   PHQ-2 Total Score: 1 (5/6/2020  9:00 AM)  , No data recorded,No data recorded  Immunization History   Administered Date(s) Administered     Hep A, Adult IM (19yr & older) 09/29/2015     Hep B, Adult 09/29/2015, 10/27/2015     INFLUENZA,SEASONAL QUAD, PF, =/> 6months 10/17/2016     Influenza high dose,seasonal,PF, 65+ yrs 10/16/2019     Influenza, inj, historic,unspecified 09/29/2015     Influenza,quad,high Dose,PF, 65yr + 09/21/2020     Influenza,seasonal,quad inj =/> 6months 09/29/2015     Pneumo Conj 13-V (2010&after) 09/29/2015     Tdap 09/29/2015     ZOSTER, LIVE 10/02/2015     Health Maintenance   Topic     DEPRESSION ACTION PLAN      HEPATITIS C SCREENING      Pneumococcal Vaccine: Pediatrics (0 to 5 Years) and At-Risk Patients (6 to 64 Years) (2 of 3 - PPSV23)     ZOSTER VACCINES (2 of 3)     MEDICARE ANNUAL WELLNESS VISIT      Pneumococcal Vaccine: 65+ Years (2 of 2 - PPSV23)     DIABETIC FOOT EXAM      DIABETIC EYE EXAM      ADVANCE CARE PLANNING      LIPID      MICROALBUMIN      A1C      FALL RISK ASSESSMENT      BMP      TD 18+ HE      INFLUENZA VACCINE RULE BASED      COLORECTAL CANCER SCREENING      Review of external notes as documented above       25 minutes spent on the date of the encounter doing chart review, review of outside records, review of test results, interpretation of tests, patient visit and documentation   The following high BMI interventions were performed this visit: encouragement to exercise      Kamaljit Plata,  MD  Family Medicine, [facility]     This note was dictated using a voice recognition software.  Any grammatical or context distortion are unintentional and inherent to the software.

## 2021-06-16 NOTE — PROGRESS NOTES
MTM Follow Up Encounter  ASSESSMENT AND PLAN  Type 2 Diabetes:  Slightly worsened. A1C not at goal of <7%, worsenend. FBP overall at goal  mg/dL -- since his A1c is worse, I would assume that his BG are worse at other times of the day such as evening. Will increase glipizide to 20 mg daily today. Reviewed to monitor for hypoglycemia and that he is at greater risk of weight gain. Encouraged him to work on lifestyle changes. Labs are up to date.    PLAN:   1. Increase glipizide XL 10 mg to BID.     History of anemia: Last hgb level was slightly low. Will recheck today to see if he needs iron supplementation.   PLAN:   1. CBC today    Vitamin D Deficiency: Last Vitamin D level was not at goal -- at the time it was recommended to increase to to 2000 IU daily, but since he has again decreased to 1000 IU daily I would assume that his Vitamin D level will be low again. Will recheck today.   PLAN:   1. Vitamin D level today    MTM FOLLOW UP  3 months    SUBJECTIVE AND OBJECTIVE  Cam Walden is a 68 y.o. male here for a follow-up medication therapy management (MTM) appointment.     Type 2 Diabetes: Currently taking glipizide XL 10 mg daily and metformin 1000 mg BID.   Tests BG 1 times daily. Blood sugars per his log:   Fasting AM (between 3-7am) = 130, 135, 127, 130, 119, 145, 137, 138, 114, 145, 105, 127, 145, 150, 115, 130, 117, 114, 121, 125, 169, 162, 121, 146, 131, 131, 143, 137, 116, 174, 177.  2 hour after breakfast = 148  Before lunch = 157  8:30pm = 82   Last A1c checked today = 8%, previously 7.6% on 11/17/17. He thought his A1c would be higher today because he has been eating more cookies.   Hypoglycemia none he reports  Microalbumin checked 10/23/17    History of anemia: Reports that he has been tired over the last few weeks. Denies recent blood loss, but that he did have surgery.   Last hgb = 12.7 on 11/7/17    Vitamin D Deficiency: Currently taking Vitamin D 1000 IU daily. Reports that he was aware  that he was recommended to take 2000 IU Vitamin D, but he decreased now to 1000 IU daily.   Vitamin D, Total (25-Hydroxy)   Date Value Ref Range Status   02/13/2017 22.8 (L) 30.0 - 80.0 ng/mL Final               Cam's medication list was reviewed with them, discussing reason for use, directions for use, and potential side effects of each medication as needed. Indication, safety, efficacy, and convenience was assessed for all medications addressed above.  No environmental factors were noted currently affecting patient.  This care plan was communicated via EMR with his primary care provider, Kamaljit Plata MD, who is the authorizing prescriber for this visit.  Direct supervision was available by either the patient's PCP or other available provider.    Time and complexity billing metrics are included in the docflowsheet linked to this visit    Time spent: 15 min  Angie Russell, Pharm.D., BCACP   MTM Pharmacist at [facility]

## 2021-06-16 NOTE — TELEPHONE ENCOUNTER
Per [facility]: Patient request new RX. METFORMIN HCL 1000 MG TABLETS

## 2021-06-16 NOTE — TELEPHONE ENCOUNTER
Patient was in today for a recheck of his magnesium level as it was low at 1.0 back on 2/17/2021 when he saw Annika Diamond CNP in the office.  Today the result came back at 1.4.  This has increased but it is still not in the normal range.  Per Annika Diamond CNP, he can continue to take 1 tablet of the magnesium per day and we will recheck in 2 weeks.  Patient verbalized understanding and would like to come in on 3/31/2021 at 8:45 AM.  I let him know that I will have the schedulers work on getting this appointment in.    Rylee Stratton RN

## 2021-06-16 NOTE — TELEPHONE ENCOUNTER
Refill Approved    Rx renewed per Medication Renewal Policy. Medication was last renewed on 2/3/20, last OV 2/9/21.    Sonya Sweet, [facility] Triage/Med Refill 4/23/2021     Requested Prescriptions   Pending Prescriptions Disp Refills     metFORMIN (GLUCOPHAGE) 1000 MG tablet [Pharmacy Med Name: METFORMIN HCL 1,000 MG TABLET] 180 tablet 3     Sig: TAKE 1 TABLET BY MOUTH TWICE A DAY WITH MEALS       Metformin Refill Protocol Passed - 4/23/2021 10:31 AM        Passed - Blood pressure in last 12 months     BP Readings from Last 1 Encounters:   02/17/21 133/76             Passed - LFT or AST or ALT in last 12 months     Albumin   Date Value Ref Range Status   02/17/2021 3.8 3.5 - 5.0 g/dL Final     Bilirubin, Total   Date Value Ref Range Status   02/17/2021 0.3 0.0 - 1.0 mg/dL Final     Bilirubin, Direct   Date Value Ref Range Status   11/25/2020 0.2 <=0.5 mg/dL Final     Alkaline Phosphatase   Date Value Ref Range Status   02/17/2021 102 45 - 120 U/L Final     AST   Date Value Ref Range Status   02/17/2021 34 0 - 40 U/L Final     ALT   Date Value Ref Range Status   02/17/2021 39 0 - 45 U/L Final     Protein, Total   Date Value Ref Range Status   02/17/2021 6.8 6.0 - 8.0 g/dL Final                Passed - GFR or Serum Creatinine in last 6 months     GFR MDRD Non Af Amer   Date Value Ref Range Status   02/17/2021 59 (L) >60 mL/min/1.73m2 Final     GFR MDRD Af Amer   Date Value Ref Range Status   02/17/2021 >60 >60 mL/min/1.73m2 Final             Passed - Visit with PCP or prescribing provider visit in last 6 months or next 3 months     Last office visit with prescriber/PCP: 2/9/2021 OR same dept: Visit date not found OR same specialty: Visit date not found Last physical: Visit date not found Last MTM visit: 2/19/2018 Angie Russell, PharmD         Next appt within 3 mo: Visit date not found  Next physical within 3 mo: Visit date not found  Prescriber OR PCP: Kamaljit Plata MD  Last diagnosis  associated with med order: 1. DM type 2 (diabetes mellitus, type 2) (H)  - metFORMIN (GLUCOPHAGE) 1000 MG tablet [Pharmacy Med Name: METFORMIN HCL 1,000 MG TABLET]; TAKE 1 TABLET BY MOUTH TWICE A DAY WITH MEALS  Dispense: 180 tablet; Refill: 3     If protocol passes may refill for 12 months if within 3 months of last provider visit (or a total of 15 months).           Passed - A1C in last 6 months     Hemoglobin A1c   Date Value Ref Range Status   02/09/2021 5.2 <=5.6 % Final               Passed - Microalbumin in last year      Microalbumin, Random Urine   Date Value Ref Range Status   07/13/2020 5.21 (H) 0.00 - 1.99 mg/dL Final

## 2021-06-16 NOTE — TELEPHONE ENCOUNTER
ANTICOAGULATION  MANAGEMENT    Assessment     Today's INR result of 3.0 is Therapeutic (goal INR of 2.0-3.0)        Warfarin taken as previously instructed    No new diet changes affecting INR    No new medication/supplements affecting INR    Continues to tolerate warfarin with no reported s/s of bleeding or thromboembolism     Previous INR was Therapeutic    Plan:     Spoke on phone with Cam regarding INR result and instructed:      Warfarin Dosing Instructions:  Continue current warfarin dose    10 mg every Sun, Wed, Fri; 5 mg all other days         Instructed patient to follow up no later than: 1 month    Education provided: target INR goal and significance of current INR result    Cam verbalizes understanding and agrees to warfarin dosing plan.    Instructed to call the [facility] for any changes, questions or concerns. (#[phone])   ?   Fely Cardozo RN    Subjective/Objective:      Cam Walden, a 71 y.o. male is on warfarin. Cam      Cam reports:     Home warfarin dose: as updated on anticoagulation calendar per template     Missed doses: No     Medication changes:  No     S/S of bleeding or thromboembolism:  No     New Injury or illness:  No     Changes in diet or alcohol consumption:  No     Upcoming surgery, procedure or cardioversion:  No    Anticoagulation Episode Summary     Current INR goal:  2.0-3.0   TTR:  64.4 % (9.6 mo)   Next INR check:  4/30/2021   INR from last check:  3.00 (4/2/2021)   Weekly max warfarin dose:     Target end date:  Indefinite   INR check location:     Preferred lab:     Send INR reminders to:  [facility]    Indications    Atrial flutter (H) (Resolved) [I48.92]           Comments:           Anticoagulation Care Providers     Provider Role Specialty Phone number    Kamaljit Plata MD Referring Family Medicine [phone]

## 2021-06-16 NOTE — PROGRESS NOTES
"OFFICE VISIT - FAMILY MEDICINE     ASSESSMENT AND PLAN     1. Left lateral abdominal pain     2. Sebaceous cyst     Suspecting muscular improved, but other differential where discussed, he also has a small resolving cyst in that area, and was mildly tender by pushing in that area.  Suggested he try some anti-inflammatory, heating pad as tolerated and follow-up if pain recurs.    CHIEF COMPLAINT   Abdominal Pain (LLQ X 03/17)    HPI   Cam Walden is a 68 y.o. male.  Updated [facility] - Needs PC-23 -  No Colonoscopy, Per Dr. Plata    Left lateral abdominal pain started about 4 days ago, initially sharp, exacerbated with movement, relieved with rest, has not had any change in his bowel, no nausea or constipation, patient has had regular bowel movement since his partial colectomy several months ago.  Denies any fever chills.  Overall pain has improved.  Left groin dermatitis, has been using topical Lamisil with improvement.  Review of Systems As per HPI, otherwise negative.    OBJECTIVE   /60 (Patient Site: Left Arm)  Pulse 68  Temp 98.3  F (36.8  C) (Oral)   Resp 14  Ht 5' 11.5\" (1.816 m)  Wt (!) 316 lb (143.3 kg)  SpO2 94%  BMI 43.46 kg/m2  Physical Exam   Constitutional: He is oriented to person, place, and time. He appears well-developed and well-nourished.   HENT:   Head: Normocephalic and atraumatic.   Neck: Normal range of motion. Neck supple. No JVD present. No tracheal deviation present. No thyromegaly present.   Cardiovascular: Normal rate, regular rhythm, normal heart sounds and intact distal pulses.  Exam reveals no gallop and no friction rub.    No murmur heard.  Pulmonary/Chest: Effort normal and breath sounds normal. No respiratory distress. He has no wheezes. He has no rales.   Abdominal: He exhibits no distension. There is no tenderness. There is no rebound and no guarding.   Musculoskeletal: He exhibits no edema or tenderness.   Lymphadenopathy:     He has no cervical adenopathy. "   Neurological: He is alert and oriented to person, place, and time. Coordination normal.   Skin:   Resolving sebaceous cyst left lateral abdomen.  Resolving left groin fungal dermatitis.   Psychiatric: He has a normal mood and affect. Judgment and thought content normal.       [facility]     Family History   Problem Relation Age of Onset     Dementia Mother      Asthma Father      Social History     Social History     Marital status: Single     Spouse name: N/A     Number of children: N/A     Years of education: N/A     Occupational History     Not on file.     Social History Main Topics     Smoking status: Never Smoker     Smokeless tobacco: Never Used     Alcohol use No     Drug use: No     Sexual activity: No     Other Topics Concern     Not on file     Social History Narrative     Relevant history was reviewed with the patient today, unless noted in HPI, nothing is pertinent for this visit.  [facility]     Patient Active Problem List    Diagnosis Date Noted     Adenocarcinoma of descending colon 10/24/2017     Atrial flutter 08/21/2017     Atrial flutter with rapid ventricular response 07/03/2017     Type 2 diabetes mellitus, uncontrolled 08/12/2016     Acute blood loss anemia      Colon polyps 08/08/2016     Colonoscopy causing post-procedural bleeding 08/08/2016     Plantar fasciitis, bilateral 07/26/2016     Skin lesion 07/26/2016     Hyperlipidemia 01/12/2016     Decreased sensation of feet. 01/12/2016     Essential hypertension with goal blood pressure less than 130/80 06/03/2015     Sleep apnea 06/03/2015     Aortic stenosis, mild 04/16/2015     Past Surgical History:   Procedure Laterality Date     CARDIOVERSION  08/25/2017       RESULTS/CONSULTS (Lab/Rad)     Recent Results (from the past 168 hour(s))   INR   Result Value Ref Range    INR 2.70 (H) 0.90 - 1.10     No results found.  MEDICATIONS     Current Outpatient Prescriptions on File Prior to Visit   Medication Sig Dispense Refill     aspirin 81 MG EC tablet  Take 81 mg by mouth daily.       atorvastatin (LIPITOR) 40 MG tablet TAKE 1 TABLET BY MOUTH ONCE DAILY 30 tablet 0     blood glucose test (ACCU-CHEK SMARTVIEW TEST STRIP) strips Use to check blood sugars twice daily as directed 100 strip 11     cholecalciferol, vitamin D3, (VITAMIN D3) 1,000 unit capsule Take 1 capsule (1,000 Units total) by mouth daily. 100 capsule 2     generic lancets (ACCU-CHEK FASTCLIX) Use to check blood sugar twice per day. 102 each 11     glipiZIDE (GLUCOTROL XL) 10 MG 24 hr tablet Take 1 tablet (10 mg total) by mouth 2 (two) times a day. 180 tablet 1     hydroCHLOROthiazide (HYDRODIURIL) 25 MG tablet Take 1 tablet (25 mg total) by mouth daily. 90 tablet 2     losartan (COZAAR) 100 MG tablet Take 1 tablet (100 mg total) by mouth daily. 90 tablet 2     metFORMIN (GLUCOPHAGE) 1000 MG tablet TAKE 1 TABLET BY MOUTH TWICE DAILY WITH MEALS 60 tablet 0     metoprolol tartrate (LOPRESSOR) 50 MG tablet Take 2 tablets (100 mg total) by mouth 2 (two) times a day. 270 tablet 2     senna (SENOKOT) 8.6 mg tablet Take 1 tablet by mouth daily as needed.        warfarin (COUMADIN) 5 MG tablet Take 2 to 2.5 tablets (10 - 12.5 mg) by mouth daily as directed. Adjust dose based on INR result. 75 tablet 2     No current facility-administered medications on file prior to visit.        HEALTH MAINTENANCE / SCREENING   PHQ-2 Total Score: 0 (7/3/2017  8:16 AM), No Data Recorded,No Data Recorded  Immunization History   Administered Date(s) Administered     Hep A, Adult IM (19yr & older) 09/29/2015     Hep B, Adult 09/29/2015, 10/27/2015     Influenza, inj, historic,unspecified 09/29/2015     Influenza, seasonal,quad inj 36+ mos 09/29/2015     Influenza,seasonal quad, PF, 36+MOS 10/17/2016     Pneumo Conj 13-V (2010&after) 09/29/2015     Tdap 09/29/2015     ZOSTER, LIVE 10/02/2015     Health Maintenance   Topic     PNEUMOCOCCAL POLYSACCHARIDE VACCINE AGE 65 AND OVER      INFLUENZA VACCINE RULE BASED (1)     DIABETES  OPHTHALMOLOGY EXAM      DIABETES HEMOGLOBIN A1C      DIABETES FOLLOW-UP      DIABETES FOOT EXAM      DIABETES URINE MICROALBUMIN      FALL RISK ASSESSMENT      ADVANCE DIRECTIVES DISCUSSED WITH PATIENT      PNEUMOCOCCAL CONJUGATE VACCINE FOR ADULTS (PCV13 OR PREVNAR)      ZOSTER VACCINE      TD 18+ HE      COLONOSCOPY        Kamaljitclaire Plata MD  Family Medicine, [facility]     This note was dictated using a voice recognition software.  Any grammatical or context distortion are unintentional and inherent to the software.

## 2021-06-16 NOTE — TELEPHONE ENCOUNTER
Telephone Encounter by Mariposa Murguia at 2/10/2020 11:16 AM     Author: Mariposa Murguia Service: -- Author Type: --    Filed: 2/10/2020 11:17 AM Encounter Date: 2/3/2020 Status: Signed    : Mariposa Murguia APPROVED:    Approval start date: 2/7/2020  Approval end date:  No exp date    Pharmacy has been notified of approval and will contact patient when medication is ready for pickup.

## 2021-06-17 NOTE — TELEPHONE ENCOUNTER
Telephone Encounter by Kiara Ibrahim RN at 11/25/2020 11:38 AM     Author: Kiara Ibrahim RN Service: -- Author Type: Registered Nurse    Filed: 11/25/2020 12:04 PM Encounter Date: 11/25/2020 Status: Signed    : Kiara Ibrahim RN (Registered Nurse)       ANTICOAGULATION  MANAGEMENT    Assessment     Today's INR result of 3.51 is Supratherapeutic (goal INR of 2.0-3.0)        Warfarin taken as previously instructed    Hardly eating  may be affecting diet and INR    No new medication/supplements affecting INR     Patient completed prednisone 11/21, anticipating medication to leave the body 7-10 days after last dose per micromedex may have a delayed response     Patient also vomiting a lot lately, recent chemo suspected to be the contributing factor     Continues to tolerate warfarin with no reported s/s of bleeding or thromboembolism     Previous INR was Therapeutic    Plan:     Spoke on phone with Cam regarding INR result and instructed:     Warfarin Dosing Instructions:  Hold warfarin tonight,  then change warfarin dose to 2.5 mg daily on Tuesdays and Fridays; and 5 mg daily rest of week  (7.7 % change)    Instructed patient to follow up no later than: 12/2     Education provided: importance of taking warfarin as instructed    Cam verbalizes understanding and agrees to warfarin dosing plan.    Instructed to call the [facility] for any changes, questions or concerns. (#[phone])   ?   Kiara Ibrahim RN    Subjective/Objective:      Cam Walden, a 70 y.o. male is on warfarin.     Cam reports:     Home warfarin dose: verbally confirmed home dose with Cam and updated on anticoagulation calendar     Missed doses: No     Medication changes:  No     S/S of bleeding or thromboembolism:  No     New Injury or illness:  No     Changes in diet or alcohol consumption:  Yes: vomiting, eating less      Upcoming surgery, procedure or cardioversion:  No    Anticoagulation Episode Summary      Current INR goal:  2.0-3.0   TTR:  80.3 % (1 y)   Next INR check:  12/2/2020   INR from last check:  3.51 (11/25/2020)   Weekly max warfarin dose:     Target end date:  Indefinite   INR check location:     Preferred lab:     Send INR reminders to:  [facility]    Indications    Atrial flutter (H) (Resolved) [I48.92]           Comments:           Anticoagulation Care Providers     Provider Role Specialty Phone number    Kamaljit Plata MD Referring Family Medicine [phone]

## 2021-06-17 NOTE — TELEPHONE ENCOUNTER
Telephone Encounter by Kiara Ibrahim RN at 10/26/2020  1:30 PM     Author: Kiara Ibrahim RN Service: -- Author Type: Registered Nurse    Filed: 10/26/2020  1:32 PM Encounter Date: 10/26/2020 Status: Signed    : Kiara Ibrahim RN (Registered Nurse)       ANTICOAGULATION  MANAGEMENT    Assessment     Today's INR result of 3.29 is Supratherapeutic (goal INR of 2.0-3.0)        Warfarin taken as previously instructed    No new diet changes affecting INR    Refer to pharmacist note below     Continues to tolerate warfarin with no reported s/s of bleeding or thromboembolism     Previous INR was Therapeutic    Plan:     Spoke on phone with Cam regarding INR result and instructed:     Warfarin Dosing Instructions:  Hold warfarin tonight  then change warfarin dose to 2.5 mg daily on Wednesdays; and 5 mg daily rest of week  (7.1 % change)    Instructed patient to follow up no later than: 10/30    Education provided: importance of therapeutic range and target INR goal and significance of current INR result    Cam verbalizes understanding and agrees to warfarin dosing plan.    Instructed to call the [facility] for any changes, questions or concerns. (#[phone])   ?   Kiara Ibrahim RN    Subjective/Objective:      Cam Walden, a 70 y.o. male is on warfarin.     Cam reports:     Home warfarin dose: verbally confirmed home dose with Cam and updated on anticoagulation calendar     Missed doses: No     Medication changes:  Yes, see below      S/S of bleeding or thromboembolism:  No     New Injury or illness:  No     Changes in diet or alcohol consumption:  No     Upcoming surgery, procedure or cardioversion:  No    Anticoagulation Episode Summary     Current INR goal:  2.0-3.0   TTR:  83.9 % (1 y)   Next INR check:  10/30/2020   INR from last check:  3.29 (10/26/2020)   Weekly max warfarin dose:     Target end date:  Indefinite   INR check location:     Preferred lab:     Send INR reminders  to:  [facility]    Indications    Atrial flutter (H) (Resolved) [I34.92]           Comments:           Anticoagulation Care Providers     Provider Role Specialty Phone number    Kamaljit Plata MD Referring Family Medicine [phone]

## 2021-06-17 NOTE — PROGRESS NOTES
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  Patient has recovered from side effects of chemotherapy.  We will continue with observation.  We will see him again in 3 months for reevaluation.    Asked him to call if there is any concern about recurrence.    We will plan 3-month visits and 6-month scans until he is 2 years out.    Plan: As above      Measurable disease: PET scan    Current therapy: R-CHOP, cycle 4 today November 16, 2020   cycle 1 on 9/15    ECOG Performance   ECOG Performance Status: 1    Distress Assessment  Distress Assessment Score: No distress    Pain         Problem List    1. 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 Cam Walden is here for follow-up.  Seen 3 months ago.  Completed treatment 6 months ago.  Has recovered from side effects.  Weight did decrease with treatment but is staying stable.  No recurrence of neck or skin swelling.  No other adenopathy.  No fever chills or sweats.  ECOG status is 1.    Pain Status  Currently in Pain: No/denies    Review of Systems    As per the HPI.       Patient Coping  Patient Coping: Accepting  Distress Assessment  Distress Assessment Score: No distress  Accompanied by  Accompanied by: Alone    Past  History  Past Medical History:   Diagnosis Date     Aortic stenosis     mild     Atrial fibrillation (H)      Atrial flutter (H) 8/21/2017     Atrial flutter with rapid ventricular response (H)      Cancer (H)     lymp     Colon polyps 08/08/2016    Per colonoscopy 8/8/16.  Large and small.  Some removed. Others to be removed surgically. (per patient)     Diabetes mellitus (H)     DM II     Diabetes mellitus, type II (H)      Diabetic polyneuropathy associated with type 2 diabetes mellitus (H) 12/16/2020     GI bleed     Following colon polyp removal     Hyperlipidemia      Hypertension      Morbid obesity (H)      JOHNNY (obstructive sleep apnea)     no cpap     Rectal bleeding          Past Surgical History:   Procedure Laterality Date     CARDIOVERSION  08/25/2017     IR PORT PLACEMENT >5 YEARS  8/24/2020       Physical Exam    Recent Vitals 5/17/2021   Height -   Weight 253 lbs 2 oz   BSA (m2) 2.41 m2   /81   Pulse 76   Temp 98.5   Temp src 1   SpO2 98   Some recent data might be hidden       GENERAL: Alert and oriented to time place and person. Seated comfortably. In no distress.    HEAD: Atraumatic and normocephalic.    EYES: BRIDGETTE, EOMI.  No pallor.  No icterus.    Oral cavity: no mucosal lesion or tonsillar enlargement.    NECK: supple. JVP normal.  No thyroid enlargement.    LYMPH NODES: No palpable, cervical, axillary or inguinal lymphadenopathy.    CHEST: clear to auscultation bilaterally.  Resonant to percussion throughout bilaterally.  Symmetrical breath movements bilaterally.    CVS: S1 and S2 are heard. Regular rate and rhythm.  No murmur or gallop or rub heard.  No peripheral edema.    ABDOMEN: Soft. Not tender. Not distended.  No palpable hepatomegaly or splenomegaly.  No other mass palpable.  Bowel sounds heard.    EXTREMITIES: Warm.    SKIN: no rash, or bruising or purpura.  Has a full head of hair.      Lab Results    Recent Results (from the past 168 hour(s))   Comprehensive Metabolic Panel    Result Value Ref Range    Sodium 140 136 - 145 mmol/L    Potassium 3.8 3.5 - 5.0 mmol/L    Chloride 108 (H) 98 - 107 mmol/L    CO2 22 22 - 31 mmol/L    Anion Gap, Calculation 10 5 - 18 mmol/L    Glucose 151 (H) 70 - 125 mg/dL    BUN 15 8 - 22 mg/dL    Creatinine 1.45 (H) 0.70 - 1.30 mg/dL    GFR MDRD Af Amer 58 (L) >60 mL/min/1.73m2    GFR MDRD Non Af Amer 48 (L) >60 mL/min/1.73m2    Bilirubin, Total 0.7 0.0 - 1.0 mg/dL    Calcium 8.6 8.5 - 10.5 mg/dL    Protein, Total 7.2 6.0 - 8.0 g/dL    Albumin 4.0 3.5 - 5.0 g/dL    Alkaline Phosphatase 128 (H) 45 - 120 U/L    AST 29 0 - 40 U/L    ALT 37 0 - 45 U/L   Lactate Dehydrogenase (LDH)   Result Value Ref Range    LD (LDH) 294 (H) 125 - 220 U/L   INR   Result Value Ref Range    INR 2.68 (H) 0.90 - 1.10   HM1 (CBC with Diff)   Result Value Ref Range    WBC 9.4 4.0 - 11.0 thou/uL    RBC 4.06 (L) 4.40 - 6.20 mill/uL    Hemoglobin 11.5 (L) 14.0 - 18.0 g/dL    Hematocrit 36.6 (L) 40.0 - 54.0 %    MCV 90 80 - 100 fL    MCH 28.3 27.0 - 34.0 pg    MCHC 31.4 (L) 32.0 - 36.0 g/dL    RDW 16.7 (H) 11.0 - 14.5 %    Platelets 271 140 - 440 thou/uL    MPV 8.7 8.5 - 12.5 fL    Neutrophils % 61 50 - 70 %    Lymphocytes % 24 20 - 40 %    Monocytes % 10 2 - 10 %    Eosinophils % 2 0 - 6 %    Basophils % 0 0 - 2 %    Immature Granulocyte % 3 (H) <=0 %    Neutrophils Absolute 5.7 2.0 - 7.7 thou/uL    Lymphocytes Absolute 2.3 0.8 - 4.4 thou/uL    Monocytes Absolute 0.9 0.0 - 0.9 thou/uL    Eosinophils Absolute 0.2 0.0 - 0.4 thou/uL    Basophils Absolute 0.0 0.0 - 0.2 thou/uL    Immature Granulocyte Absolute 0.2 (H) <=0.0 thou/uL       Imaging    Ct Soft Tissue Neck With Contrast    Result Date: 5/14/2021  EXAM: CT SOFT TISSUE NECK W CONTRAST LOCATION: [facility] DATE/TIME: 5/14/2021 11:05 AM INDICATION: Diffuse large cell B-cell lymphoma with external bones solid organs status post chemotherapy. Please do involve the thyroid. COMPARISON:11/13/2020. CONTRAST:  Iopamidol (Isovue-370) 75mL TECHNIQUE: Routine CT Soft Tissue Neck with IV contrast. Multiplanar reformats. Dose reduction techniques were used. FINDINGS: MUCOSAL SPACES/SOFT TISSUES: Normal mucosal spaces of the upper aerodigestive tract. No mucosal mass or inflammation identified. Normal vocal cords and infraglottic trachea. Normal parapharyngeal space and subcutaneous soft tissues. Normal oral cavity,  spaces, and floor of mouth structures. LYMPH NODES: No pathologic lymph nodes by size or morphology criteria. SALIVARY GLANDS: Normal parotid and submandibular glands. THYROID: Thyroid is normal in size and has homogeneous density. VESSELS: Vascular structures of the neck are patent. The patient has a right-sided central line in place. VISUALIZED INTRACRANIAL/ORBITS/SINUSES: No abnormality of the visualized intracranial compartment or orbits. There is a mucous retention cyst within the right maxillary sinus. OTHER: The lung apices are clear. Mild degenerative changes of the cervical spine are visualized.     1.  No CT finding of a mass, hemorrhage or abnormal 2.  No discrete mass lesion or abnormal enhancement. 3.  No significant adenopathy. 4.  Thyroid normal in size and has homogeneous density.    Ct Chest With Contrast    Result Date: 5/14/2021  EXAM: CT CHEST W CONTRAST LOCATION: [facility] DATE/TIME: 5/14/2021 11:03 AM INDICATION: lymphoma COMPARISON: PET/CT 11/13/2020 TECHNIQUE: CT chest with IV contrast. Multiplanar reformats were obtained. Dose reduction techniques were used. CONTRAST: Iopamidol (Isovue-370) 75mL FINDINGS: LUNGS AND PLEURA: The lungs are symmetrically inflated. 3 mm benign intrapulmonary lymph node in the right middle lobe near the anterior pleura (series 5, image 207). No actionable lung nodules. No interstitial thickening. Limited bands of atelectasis are present in the left posterior costophrenic sulcus and along the left hemidiaphragm. No airspace  opacities. Trachea and central airways are patent and normal caliber. No pleural fluid. MEDIASTINUM: Right jugular approach chest port catheter terminates at the SVC right atrial junction. Cardiac chambers are normal in size. No pericardial effusion. Moderate degenerative thickening/calcification of aortic valve leaflets. Sinotubular junction is preserved. No thoracic aortic aneurysm. Conventional arch anatomy. No enlarged lymph nodes in the chest. Esophagus is decompressed. No actionable thyroid nodule. CORONARY ARTERY CALCIFICATION: Mild-moderate, left coronary distribution. UPPER ABDOMEN: No actionable findings in the imaged upper abdomen. MUSCULOSKELETAL: Multilevel degenerative disc disease. Mild anterior wedging of T11 due to a chronic appearing superior endplate deformity. No aggressive or destructive bone lesions. No enlarged axillary lymph nodes. Thin-walled subdermal cyst right upper back is unchanged measuring 2.5 cm transverse (series 3, image 14).     No findings to suggest active lymphomatous involvement of the chest.        Signed by: Blade Ibanez MD

## 2021-06-17 NOTE — TELEPHONE ENCOUNTER
Telephone Encounter by Sobia Ibrahim RN at 4/24/2020  2:55 PM     Author: Sobia Ibrahim RN Service: -- Author Type: Registered Nurse    Filed: 4/24/2020  2:58 PM Encounter Date: 4/24/2020 Status: Attested    : Sobia Ibrahim RN (Registered Nurse) Cosigner: Debby Shaffer MD at 4/24/2020  4:01 PM    Attestation signed by Debby Shaffer MD at 4/24/2020  4:01 PM    Agree  Debby Shaffer                 Anticoagulation Annual Referral Renewal Review    Cam Walden's chart reviewed for annual renewal of referral to anticoagulation monitoring.        Criteria for anticoagulation nurse and/or pharmacist renewal met   Warfarin indication: Atrial Flutter Yes, per indication   Current with INR monitoring/compliant Yes Yes   Date of last office visit 5/16/2019 Yes, had office visit within last year   Time in Therapeutic Range (TTR) 76.6 % Yes, TTR > 60%       Cam Walden met all criteria for anticoagulation management program initiated renewal.  New INR standing orders and anticoagulation referral renewal placed.      Sobia Ibrahim RN  2:55 PM

## 2021-06-17 NOTE — TELEPHONE ENCOUNTER
Telephone Encounter by Kiara Ibrahim RN at 10/8/2020  4:14 PM     Author: Kiara Ibrahim RN Service: -- Author Type: Registered Nurse    Filed: 10/8/2020  4:17 PM Encounter Date: 10/8/2020 Status: Signed    : Kiara Ibrahim RN (Registered Nurse)       ANTICOAGULATION  MANAGEMENT    Assessment     Today's INR result of 4.10 is Supratherapeutic (goal INR of 2.0-3.0)        Warfarin taken as previously instructed    No new diet changes affecting INR    Please see pharmacist comments below on patients chemo regimen     Continues to tolerate warfarin with no reported s/s of bleeding or thromboembolism     Previous INR was Therapeutic    Plan:     Spoke on phone with Cam regarding INR result and instructed:     Warfarin Dosing Instructions:  Hold warfarin 10/9, then take partial dose of warfarin 2.5 mg on 10/10 then change warfarin dose to 5 mg daily (6.7 % change)    Confirmed patient has taken warfarin today already, shifted below instructions to accommodate.      Instructed patient to follow up no later than: 10/13    Education provided: importance of following up for INR monitoring at instructed interval, importance of taking warfarin as instructed, monitoring for bleeding signs and symptoms and when to seek medical attention/emergency care    Cam verbalizes understanding and agrees to warfarin dosing plan.    Instructed to call the [facility] for any changes, questions or concerns. (#[phone])   ?   Kiara Ibrahim RN    Subjective/Objective:      Cam Walden, a 70 y.o. male is on warfarin.     Cam reports:     Home warfarin dose: verbally confirmed home dose with Cam and updated on anticoagulation calendar     Missed doses: No     Medication changes:  Yes: see below for details      S/S of bleeding or thromboembolism:  No     New Injury or illness:  No     Changes in diet or alcohol consumption:  No     Upcoming surgery, procedure or cardioversion:  No    Anticoagulation Episode  Summary     Current INR goal:  2.0-3.0   TTR:  85.8 % (1 y)   Next INR check:  10/13/2020   INR from last check:  4.10 (10/8/2020)   Weekly max warfarin dose:     Target end date:  Indefinite   INR check location:     Preferred lab:     Send INR reminders to:  [facility]    Indications    Atrial flutter (H) (Resolved) [I48.92]           Comments:           Anticoagulation Care Providers     Provider Role Specialty Phone number    Kamaljit Plata MD Referring Family Medicine [phone]

## 2021-06-17 NOTE — TELEPHONE ENCOUNTER
Telephone Encounter by Kiara Ibrahim RN at 11/16/2020  5:39 PM     Author: Kiara Ibrahim RN Service: -- Author Type: Registered Nurse    Filed: 11/16/2020  6:21 PM Encounter Date: 11/16/2020 Status: Signed    : Kiara Ibrahim RN (Registered Nurse)       ANTICOAGULATION  MANAGEMENT    Assessment     Today's INR result of 1.64 is Subtherapeutic (goal INR of 2.0-3.0)        Missed dose(s) may be affecting INR - reports was only taking diabetes medications, confirmed missed Saturday and Sunday    No new diet changes affecting INR    No interaction expected between potassium and zofran  and warfarin    Continues to tolerate warfarin with no reported s/s of bleeding or thromboembolism     Previous INR was Therapeutic     PET scan was reviewed and shows completed response. Patients last cycle of R-CHOP chemotherapy was today. He will be starting his prednisone therapy starting today!     Plan:     Spoke on phone with Cam regarding INR result and instructed:     Warfarin Dosing Instructions:  Continue current warfarin dose 2.5 mg daily on Wednesdays; and 5 mg daily rest of week  (0 % change)    Did not consider booster today, he will be starting prednisone today. Patient had a previous history of elevated INR with warfarin and prednisone. Consulted with Osiris Ash, Pharm D     Instructed patient to follow up no later than: 11/19 or 11/20    Education provided: importance of taking warfarin as instructed and potential interaction between warfarin and prednisone     Cam verbalizes understanding and agrees to warfarin dosing plan.    Instructed to call the [facility] for any changes, questions or concerns. (#[phone])   ?   Kiara Ibrahim RN    Subjective/Objective:      Cam Walden, a 70 y.o. male is on warfarin.     Cam reports:     Home warfarin dose: verbally confirmed home dose with Cam and updated on anticoagulation calendar     Missed doses: No     Medication changes:  No      S/S of bleeding or thromboembolism:  No     New Injury or illness:  No     Changes in diet or alcohol consumption:  No     Upcoming surgery, procedure or cardioversion:  No    Anticoagulation Episode Summary     Current INR goal:  2.0-3.0   TTR:  81.7 % (1 y)   Next INR check:  11/23/2020   INR from last check:  1.64 (11/16/2020)   Weekly max warfarin dose:     Target end date:  Indefinite   INR check location:     Preferred lab:     Send INR reminders to:  [facility]    Indications    Atrial flutter (H) (Resolved) [I48.92]           Comments:           Anticoagulation Care Providers     Provider Role Specialty Phone number    Kamaljit Plata MD Referring Family Medicine [phone]

## 2021-06-17 NOTE — TELEPHONE ENCOUNTER
ANTICOAGULATION  MANAGEMENT    Assessment     Today's INR result of 2.68 is Therapeutic (goal INR of 2.0-3.0)        Warfarin taken as previously instructed    No new diet changes affecting INR    No new medication/supplements affecting INR    Continues to tolerate warfarin with no reported s/s of bleeding or thromboembolism     Previous INR was Therapeutic    Plan:     Spoke on phone with Cam regarding INR result and instructed:      Warfarin Dosing Instructions:  Continue current warfarin dose    10 mg every Sun, Wed, Fri; 5 mg all other days         Instructed patient to follow up no later than: 1 month    Education provided: target INR goal and significance of current INR result    Cam verbalizes understanding and agrees to warfarin dosing plan.    Instructed to call the [facility] for any changes, questions or concerns. (#[phone])   ?   Fely Cardozo RN    Subjective/Objective:      Cam Walden, a 71 y.o. male is on warfarin. Cam Levy reports:     Home warfarin dose: verbally confirmed home dose with Cam and updated on anticoagulation calendar     Missed doses: No     Medication changes:  No     S/S of bleeding or thromboembolism:  No     New Injury or illness:  No     Changes in diet or alcohol consumption:  No     Upcoming surgery, procedure or cardioversion:  No    Anticoagulation Episode Summary     Current INR goal:  2.0-3.0   TTR:  64.4 % (9.6 mo)   Next INR check:  6/11/2021   INR from last check:  2.68 (5/14/2021)   Weekly max warfarin dose:     Target end date:  Indefinite   INR check location:     Preferred lab:     Send INR reminders to:  [facility]    Indications    Atrial flutter (H) (Resolved) [I48.92]           Comments:           Anticoagulation Care Providers     Provider Role Specialty Phone number    Kamaljit Plata MD Referring Family Medicine [phone]

## 2021-06-17 NOTE — TELEPHONE ENCOUNTER
Telephone Encounter by Osiris Ash, PharmD at 10/8/2020  3:44 PM     Author: Osiris Ash, PharmD Service: -- Author Type: Pharmacist    Filed: 10/8/2020  4:05 PM Encounter Date: 10/8/2020 Status: Signed    : Osiris Ash PharmD (Pharmacist)       Anticoagulation    Chart reviewed for consideration of warfarin interactions with chemotherapy.    Chemo: R-CHOP:  Rituximab, Doxorubicin, vincristine, cyclophosphamide infused Q 21 days; Prednisone 100 mg Daily on cycles days 1-5.    Cycle 1:  9/14/20  Cycle 2: 10/5/20         Assessment:    Interaction potential of chemotherapy agents often difficult to determine since given in combination.  Known potential interaction of warfarin and prednisone which may increase or decrease INR.  [facility] reports interaction possible with cyclophosphamide (decreased serum concentration warfarin).  [employer] reports possible interactions with doxorubicin, vinciristine and cyclophosphamide all increasing INR when used in combination with other agents in case reports.    In review of Cam's first cycle and initial week of 2nd cycle. INR has increased by Day 4 on both occassions.  In Cycle 1, INR remained supratherapetuic despite holds and dose decrease until end of cycle finally becoming therapeutic just prior to infusion.    Suggest more aggressive hold (1.5 days) this cycle and slight dose reduction. If INRs start to drop subtherapetuic later in cycle but continue to elevate early in cycle could consider an empiric warfarin reduction to while on prednisone/week 1 of cycle.    Plan:     Hold warfarin 10/8, 2.5 mg on 10/9 and reduce dose to 5 mg daily there after.  INR check  10/12 or 10/13    Osiris Ash, PharmD

## 2021-06-17 NOTE — TELEPHONE ENCOUNTER
Telephone Encounter by Haven Kwong RN at 8/11/2020  7:02 PM     Author: Haven Kwong RN Service: -- Author Type: Registered Nurse    Filed: 8/11/2020  7:12 PM Encounter Date: 8/11/2020 Status: Signed    : Haven Kwong RN (Registered Nurse)       New Patient Oncology Nurse Navigator Note     Referring provider: Dr. Jourdan Dang     Referring Clinic/Organization: [facility]     Referred to (specialty): medical oncology    Requested provider (if applicable): n/a     Date Referral Received: 8-     Evaluation for : DLBCL     Clinical History (per Nurse review of records provided):    7-:  At his follow up Cardiology appointment, Dr. Osvaldo Alfonso noted an  Anterior neck mass.  Advised to follow-up with Dr. Child for further assessment  7-:  Saw Dr. Aakash García:  He has a large anterior neck swelling measuring approximately 6 cm x 2 cm in the mass is nonpulsatile it is firm.  The posterior aspect of the mass is darkly pigmented.  CT & ENT Consult ordered.    7-:  CT soft tissue neck w/contrast  IMPRESSION:      1.  Large soft tissue mass presumably arising from the thyroid gland tracking from the level of the hyoid bone to the thoracic inlet with involvement of the strap and sternocleidomastoid musculature as extends toward the skin of lower neck. While the   exact etiology is indeterminate these findings are suspicious for a locally advanced neoplasm, which is amenable to ultrasound-guided histologic sampling if desired.     2.  Slightly rounded subcentimeter left level III, left level V, left supraclavicular, and right level III/V lymph nodes suspicious for early metastases.     7-:  Saw Dr. Jourdan Dang, ENT-biopsy done  SKIN, NECK, MASS, PUNCH BIOPSY:     -  CD20(+) DIFFUSE LARGE B-CELL LYMPHOMA      Clinical Assessment / Barriers to Care (Per Nurse): Of note:  Sleeps in a recliner     Records Location (Care Everywhere, Media, etc.): [facility]     Records  Needed: none     Additional testing needed prior to consult: none          I sent the following e-mail to Cam after speaking with him today:    Nadine Levy!    Here is the information we discussed.        No need to print the health history form as they can give that to you on Friday to complete.      My welcome letter & helpful phone list:    Please let me know if you have any questions!    Take Care,  Haven Kwong, RN, BSN  Nurse Navigator

## 2021-06-17 NOTE — TELEPHONE ENCOUNTER
Telephone Encounter by Jackie Rao RN at 2/26/2021 10:20 AM     Author: Jackie Rao RN Service: -- Author Type: Registered Nurse    Filed: 2/26/2021 11:03 AM Encounter Date: 2/26/2021 Status: Signed    : Jackie Rao RN (Registered Nurse)       ANTICOAGULATION  MANAGEMENT    Assessment     Today's INR result of 1.9 is Subtherapeutic (goal INR of 2.0-3.0)        More warfarin taken than instructed which may be affecting INR    No new diet changes affecting INR    No new medication/supplements affecting INR    Continues to tolerate warfarin with no reported s/s of bleeding or thromboembolism     Previous INR was Subtherapeutic    Plan:     Spoke on phone with Cam regarding INR result and instructed:      Warfarin Dosing Instructions:  Change warfarin dose to 10 mg daily on Sundays, Wednesdays and Fridays; and 5 mg daily rest of week  (11.1 % change)    Instructed patient to follow up no later than: two weeks.    Education provided: importance of therapeutic range, target INR goal and significance of current INR result, importance of following up for INR monitoring at instructed interval and importance of taking warfarin as instructed    Cam verbalizes understanding and agrees to warfarin dosing plan.    Instructed to call the [facility] for any changes, questions or concerns. (#[phone])   ?   Jackie Rao RN    Subjective/Objective:      Cam Walden, a 71 y.o. male is on warfarin. Cam Levy reports:     Home warfarin dose: template incorrect; verbally confirmed home dose with Cam and updated on anticoagulation calendar     Missed doses: No     Medication changes:  No     S/S of bleeding or thromboembolism:  No     New Injury or illness:  No     Changes in diet or alcohol consumption:  No     Upcoming surgery, procedure or cardioversion:  No    Anticoagulation Episode Summary     Current INR goal:  2.0-3.0   TTR:  69.2 % (9.6 mo)   Next INR check:   3/12/2021   INR from last check:  1.90 (2/26/2021)   Weekly max warfarin dose:     Target end date:  Indefinite   INR check location:     Preferred lab:     Send [facility] reminders [facility]:  [facility]    Indications    Atrial flutter (H) (Resolved) [I48.92]           Comments:           Anticoagulation Care Providers     Provider Role Specialty Phone number    Kamaljit Plata MD Referring Family Medicine [phone]

## 2021-06-17 NOTE — PROGRESS NOTES
Oncology Rooming Note    05/17/21 12:56 PM    Cam Walden is a 71 y.o. male who presents for:    Chief Complaint   Patient presents with     HE Cancer     Diffuse large B-cell lymphoma       Initial Vitals: /81   Pulse 76   Temp 98.5  F (36.9  C) (Oral)   Wt (!) 253 lb 1.6 oz (114.8 kg)   SpO2 98%   BMI 34.33 kg/m       Estimated body mass index is 34.33 kg/m  as calculated from the following:    Height as of 2/17/21: 6' (1.829 m).    Weight as of this encounter: 253 lb 1.6 oz (114.8 kg).     Body surface area is 2.41 meters squared.      Allergies reviewed: Yes  Medications reviewed: Yes    Refills needed: No     Clinical concerns: no concerns      Felisa Boudreaux RN, Oncology Clinic   [facility]

## 2021-06-17 NOTE — TELEPHONE ENCOUNTER
ANTICOAGULATION  MANAGEMENT PROGRAM    Cam Walden is overdue for INR check.     Spoke with Cam who declined to schedule INR at this time. States he will go to the outpatient lab at  on Friday when he is there for testing. If calling back please schedule as soon as possible.      Fely Cardozo RN

## 2021-06-18 NOTE — PATIENT INSTRUCTIONS - HE
Patient Instructions by Mylene Ramos RN at 8/19/2020  9:30 AM     Author: Mylene Ramos RN Service: -- Author Type: Registered Nurse    Filed: 8/19/2020 11:45 AM Encounter Date: 8/19/2020 Status: Signed    : Mylene Ramos RN (Registered Nurse)       No heavy or driving today, May shower tomorrow remove the bandage replace with a Band-Aid , don't let water beat on it, NO heat to tub baths for 48 hours Call or go to theer with any bleeding or signs of infection  Mylene Ramos Patient Education     Bone Marrow Aspiration and Biopsy  Does this test have other names?  Bone marrow exam  What is this test?  This is a two-part test that looks at the blood cells in a sample of bone marrow, the spongy tissue within certain bones. This test may help your healthcare provider diagnose or monitor a blood disease or health condition affecting your marrow.  Your bone marrow has a liquid part and a solid part. Aspiration uses a needle to remove a sample of the liquid part of bone marrow. Biopsy uses a larger needle to remove a small amount of bone with its marrow.  Part of the job of bone marrow is to make blood cells. This test can find out how well your bone marrow is working. This test is also done to find some types of cancer.  Why do I need this test?  You might have this test if your healthcare provider wants to find out the health of your bone marrow or to check on how well your marrow is making blood cells.  You may have an aspiration to check for:    The health of your bone marrow for a transplant    Acute leukemia    Multiple myeloma  In some cases, bone marrow aspiration is used to confirm chromosome disorders in newborns.  You may have an aspiration followed by a biopsy if you could have:    Bacterial, fungal, or parasitic infection    Unexplained anemia, leucopenia, or thrombocytopenia    Metastatic cancer or many other diseases  What other tests might I have along with this test?  Your healthcare provider  may also order these tests:    Complete blood count, or CBC    Reticulocyte count to find out your red blood cell survival rate  What do my test results mean?  Many things may affect your lab test results. These include the method each lab uses to do the test. Even if your test results are different from the normal value, you may not have a problem. To learn what the results mean for you, talk with your healthcare provider.  The lab will look at different aspects of your bone marrow to help find certain diseases or conditions. These aspects include:    Type and number of blood cells    Any abnormalities in the size, shape, or look of cells    Level of iron in the bone marrow    Abnormal amount of young white blood cells, called blasts    Any chromosomal abnormalities  Depending on what is seen, your results may mean you have an infection, a blood disease, leukemia, or cancer that has spread to the bone marrow from another site.  Your healthcare provider will take your results and combine this information with information from your physical exam, health history, and other types of tests to make a diagnosis.   If your results are negative, your provider may order other tests to diagnose your condition.   How is this test done?  These tests require a sample of bone marrow. A number of sites on your body can be used for marrow aspiration, but the hip bone is a common spot. You will likely lie on your side or stomach on an exam table. Your healthcare provider will numb the area of the test. You may feel a slight prick from the needle that the provider uses to give the numbing agent.  Does this test pose any risks?  It's not possible to numb the bone, so you may feel slight pain during the procedure. But you shouldn't feel any pain afterward. Risks from a bone marrow test are rare, but you could have bleeding or an infection.  What might affect my test results?  Other factors aren't likely to affect your results.  How do  I get ready for this test?  Tell your healthcare provider if you take aspirin or have any allergies. Also tell your provider if you are pregnant, take any blood-thinner medicines, or have a history of bleeding problems.  Be sure your provider knows about all other medicines, herbs, vitamins, and supplements you are taking. This includes medicines that don't need a prescription and any illicit drugs you may use.     Date Last Reviewed: 10/12/2015    7996-5892 [employer]. [address] [place] [address], [place] [address]. All rights reserved. This information is not intended as a substitute for professional medical care. Always follow your healthcare professional's instructions.

## 2021-06-19 NOTE — LETTER
Letter by Kamaljit Plata MD at      Author: Kamaljit Plata MD Service: -- Author Type: --    Filed:  Encounter Date: 10/2/2019 Status: Signed         Cam Walden  [address]  [place] [address]             October 2, 2019         Dear Mr. Walden,    Below are the results from your recent visit:    Resulted Orders   Glycosylated Hemoglobin A1c   Result Value Ref Range    Hemoglobin A1c 7.7 (H) 3.5 - 6.0 %       Please let patient know that his A1c has improved to 7.7%! Goal is <7%, but it is moving in the right direction, keep it up! I will try to see him at his next INR appointment to discuss further.   Angie    Please call with questions or contact us using [employer]gram.    Sincerely,        Electronically signed by Kamaljit Plata MD

## 2021-06-19 NOTE — LETTER
Letter by Kamaljit Plata MD at      Author: Kamaljit Plata MD Service: -- Author Type: --    Filed:  Encounter Date: 5/20/2019 Status: (Other)         Cam Walden  [address] [place] [address]             May 20, 2019         Dear Mr. Walden,    Below are the results from your recent visit:    Resulted Orders   Glycosylated Hemoglobin A1c   Result Value Ref Range    Hemoglobin A1c 8.8 (H) 3.5 - 6.0 %   Comprehensive Metabolic Panel   Result Value Ref Range    Sodium 140 136 - 145 mmol/L    Potassium 4.3 3.5 - 5.0 mmol/L    Chloride 103 98 - 107 mmol/L    CO2 23 22 - 31 mmol/L    Anion Gap, Calculation 14 5 - 18 mmol/L    Glucose 179 (H) 70 - 125 mg/dL    BUN 12 8 - 22 mg/dL    Creatinine 1.03 0.70 - 1.30 mg/dL    GFR MDRD Af Amer >60 >60 mL/min/1.73m2    GFR MDRD Non Af Amer >60 >60 mL/min/1.73m2    Bilirubin, Total 0.8 0.0 - 1.0 mg/dL    Calcium 9.6 8.5 - 10.5 mg/dL    Protein, Total 7.7 6.0 - 8.0 g/dL    Albumin 3.9 3.5 - 5.0 g/dL    Alkaline Phosphatase 77 45 - 120 U/L    AST 48 (H) 0 - 40 U/L    ALT 58 (H) 0 - 45 U/L    Narrative    Fasting Glucose reference range is 70-99 mg/dL per  American Diabetes Association (ADA) guidelines.   PSA (Prostatic-Specific Antigen), Diagnostic   Result Value Ref Range    PSA 1.4 0.0 - 4.5 ng/mL    Narrative    Method is Abbott Prostate-Specific Antigen (PSA)  Standard-WHO 1st International (90:10)   Lipid Cascade   Result Value Ref Range    Cholesterol 89 <=199 mg/dL    Triglycerides 171 (H) <=149 mg/dL    HDL Cholesterol 23 (L) >=40 mg/dL    LDL Calculated 32 <=129 mg/dL    Patient Fasting > 8hrs? Yes    Thyroid Cascade   Result Value Ref Range    TSH 1.67 0.30 - 5.00 uIU/mL   Microalbumin, Random Urine   Result Value Ref Range    Microalbumin, Random Urine 4.21 (H) 0.00 - 1.99 mg/dL    Creatinine, Urine 170.2 mg/dL    Microalbumin/Creatinine Ratio Random Urine 24.7 (H) <=19.9 mg/g    Narrative    Microalbumin, Random Urine  <2.0 mg/dL . .  . . . . . . Normal  3.0-30.0 mg/dL . . . . . . Microalbuminuria  >30.0 mg/dL . . . . . .  . Clinical Proteinuria  Microalbumin/Creatinine Ratio, Random Urine  <20 mg/g . . . . .. . . . Normal   mg/g . . . . . . . Microalbuminuria  >300 mg/g . . . . . . . . Clinical Proteinuria       Blood sugar still uncontrolled, I will suggest we do a glipizide XL 10 mg in late morning on 9/2 a tablet in the evening continue to monitor your blood sugar at home.  Insurance will not cover Januvia.  For the peripheral neuropathy a check with your insurance unfortunately would not cover Lyrica or similar medication I will suggest to do gabapentin up to 900 mg at night, and some topical treatment could be capsicin or CBD oil available at the whole food supply.  Let Me know if you have any question.    Please call with questions or contact us using [employer].    Sincerely,        Electronically signed by Kamaljit Plata MD

## 2021-06-20 NOTE — LETTER
Letter by Kamaljit Plata MD at      Author: Kamaljit Plata MD Service: -- Author Type: --    Filed:  Encounter Date: 7/16/2020 Status: (Other)         Cam Walden  [address] [place] [address]             July 16, 2020         Dear Mr. Walden,    Below are the results from your recent visit:    Resulted Orders   Glycosylated Hemoglobin A1c   Result Value Ref Range    Hemoglobin A1c 7.9 (H) <=5.6 %      Comment:      Prediabetes:   HBA1c       5.7 to 6.4%        Diabetes:        HBA1c        >=6.5%   Patients with Hgb F >5%, total bilirubin >10.0 mg/dL, abnormal red cell turnover, severe renal or hepatic disease or malignancy should not have this A1C method used to diagnose or monitor diabetes.       PSA (Prostatic-Specific Antigen), Annual Screen   Result Value Ref Range    PSA 1.7 0.0 - 4.5 ng/mL    Narrative    Method is Abbott Prostate-Specific Antigen (PSA)  Standard-WHO 1st International (90:10)   Lipid Cascade   Result Value Ref Range    Cholesterol 96 <=199 mg/dL    Triglycerides 205 (H) <=149 mg/dL    HDL Cholesterol 19 (L) >=40 mg/dL    LDL Calculated 36 <=129 mg/dL    Patient Fasting > 8hrs? Yes    Microalbumin, Random Urine   Result Value Ref Range    Microalbumin, Random Urine 5.21 (H) 0.00 - 1.99 mg/dL    Creatinine, Urine 252.7 mg/dL    Microalbumin/Creatinine Ratio Random Urine 20.6 (H) <=19.9 mg/g    Narrative    Microalbumin, Random Urine  <2.0 mg/dL . . . . . . . . Normal  3.0-30.0 mg/dL . . . . . . Microalbuminuria  >30.0 mg/dL . . . . . .  . Clinical Proteinuria    Microalbumin/Creatinine Ratio, Random Urine  <20 mg/g . . . . .. . . . Normal   mg/g . . . . . . . Microalbuminuria  >300 mg/g . . . . . . . . Clinical Proteinuria       Comprehensive Metabolic Panel   Result Value Ref Range    Sodium 135 (L) 136 - 145 mmol/L    Potassium 4.2 3.5 - 5.0 mmol/L    Chloride 101 98 - 107 mmol/L    CO2 25 22 - 31 mmol/L    Anion Gap, Calculation 9 5 - 18 mmol/L     Glucose 153 (H) 70 - 125 mg/dL    BUN 13 8 - 22 mg/dL    Creatinine 1.15 0.70 - 1.30 mg/dL    GFR MDRD Af Amer >60 >60 mL/min/1.73m2    GFR MDRD Non Af Amer >60 >60 mL/min/1.73m2    Bilirubin, Total 1.0 0.0 - 1.0 mg/dL    Calcium 9.4 8.5 - 10.5 mg/dL    Protein, Total 7.8 6.0 - 8.0 g/dL    Albumin 3.9 3.5 - 5.0 g/dL    Alkaline Phosphatase 79 45 - 120 U/L    AST 30 0 - 40 U/L    ALT 34 0 - 45 U/L    Narrative    Fasting Glucose reference range is 70-99 mg/dL per  American Diabetes Association (ADA) guidelines.       A1c has been stable, urine microalbumin has been stable also, continue with current dose.  Recheck in 3 months.    Please call with questions or contact us using [employer].    Sincerely,        Electronically signed by Kamaljit Plata MD

## 2021-06-20 NOTE — LETTER
Letter by Haven Kwong RN at      Author: Haven Kwong RN Service: -- Author Type: --    Filed:  Encounter Date: 8/11/2020 Status: (Other)       Dear Cam Walden    Thank you for choosing [facility] for your care.  We are committed to providing you with the highest quality and compassionate healthcare services.  The following information pertains to your first appointment with our clinic.    Date/Time of appointment: Friday, August 14th, 2020 arrival of 12:45 pm--a bit earlier for paperwork!    Note: This allows time to complete forms, possible labs and nursing assessment.     Name of your Physician: Blade Ibanez MD    What to bring to your appointment:    Completed Patient History/Initial Nursing Assessment and Medication/Allergy List (these forms were sent to you).    Any paperwork or films from your physician that we have asked you to bring.    Your current insurance card(s).    Parking:    Please refer to the map included to direct you.  The [facility] is located at the [address] end of [facility] in [place], [place].      After turning onto [address] from [facility], take a right turn at the first stop sign.  We have designated parking on the left, identified as parking for Cancer Care patients (Lot D).     The Code to Enter Lot D is: 0801. This code changes monthly and will always coincide with the current month followed by 01. For example August will be 0801.  The month will continue to change but the 01 will remain constant.  If lot D is full please use Parking Lot A, directly across the street.    Please enter the Cancer Care Center on the north end of the [facility].  You will see a sign on the building.        For Medical Oncology or Hematology appointments, please take the elevator to the second floor to check in.   For Radiation Oncology appointments, please go straight through the double doors and check in.     Also please note appointments can  last 1.5-2 hours.      We hope these instructions are helpful to you.  If you have any questions or concerns, please call us at [phone].  It is our pleasure to assist you.    Warm Regards,  Haven Kwong  Nurse Navigator  [phone]

## 2021-06-20 NOTE — PROGRESS NOTES
MTM Follow Up Encounter  Assessment & Plan                                                     Type 2 Diabetes:  improved. A1C now at goal of 7%. FBG unknown if at goal  mg/dL, recommended he bring values to follow up. Strongly encouraged him to continue the lifestyle changes he has made since they have been beneficial.   Reviewed that we can consider switching metformin 1000 mg to XR to see if it helps his diarrhea. He would like to wait since he has a lot of metformin at home.   PLAN:   1. A1c today. Continue current medication doses.      Follow Up  3 months    Subjective & Objective                                                       Cam Walden is a 68 y.o. male coming in for a follow up visit for Medication Therapy Management.     Type 2 Diabetes: Currently taking metformin 1000 mg two times a day and glipizide XL 10 mg daily. He did not increase glipizide to two times a day as recommended.   Did not bring blood sugars today. Reports blood sugars: mostly <130 in the morning, once in a while >130 mg/dL.   Last A1c checked today = 7%. Previously 8.3% on 5/21/18  Hypoglycemia none  Microalbumin checked 10/23/17  Has been having diarrhea for some time. Takes metformin after breakfast and at bedtime, not on empty stomach. Diarrhea does not bother him since he is at home, no incontinence.   He has been watching what he is eating, less sugar, no more bagel at breakfast.   Wt Readings from Last 3 Encounters:   08/22/18 (!) 306 lb (138.8 kg)   05/21/18 (!) 318 lb (144.2 kg)   03/20/18 (!) 316 lb (143.3 kg)         PMH: reviewed in EPIC   Allergies/ADRs: reviewed in EPIC   Alcohol: reviewed in EPIC   Tobacco:   History   Smoking Status     Never Smoker   Smokeless Tobacco     Never Used     Today's Vitals: There were no vitals filed for this visit.  ----------------    Much or all of the text in this note was generated through the use of Dragon Dictate voice-to-text software. Errors in spelling or words  which seem out of context are unintentional. Sound alike errors, in particular, may have escaped editing.    The patient declined an after visit summary    I spent 15 minutes with this patient today;   All changes were made via collaborative practice agreement with Kamaljit Plata MD. A copy of the visit note was provided to the patient's provider.     Angie Russell Pharm.D., [facility]  Medication Therapy Management Pharmacist  [facility] and [facility]       Current Outpatient Prescriptions   Medication Sig Dispense Refill     aspirin 81 MG EC tablet Take 81 mg by mouth daily.       atorvastatin (LIPITOR) 40 MG tablet TAKE 1 TABLET BY MOUTH ONCE DAILY 30 tablet 0     blood glucose test (ACCU-CHEK SMARTVIEW TEST STRIP) strips Use to check blood sugars twice daily as directed 100 strip 11     cholecalciferol, vitamin D3, (VITAMIN D3) 1,000 unit capsule Take 1 capsule (1,000 Units total) by mouth daily. 100 capsule 2     generic lancets (ACCU-CHEK FASTCLIX) Use to check blood sugar twice per day. 102 each 11     glipiZIDE (GLUCOTROL XL) 10 MG 24 hr tablet TAKE 1 TABLET BY MOUTH DAILY IN THE MORNING 90 tablet 0     hydroCHLOROthiazide (HYDRODIURIL) 25 MG tablet Take 1 tablet (25 mg total) by mouth daily. 90 tablet 2     losartan (COZAAR) 100 MG tablet Take 1 tablet (100 mg total) by mouth daily. 90 tablet 2     metFORMIN (GLUCOPHAGE) 1000 MG tablet TAKE 1 TABLET BY MOUTH TWICE DAILY WITH MEALS 60 tablet 0     metoprolol tartrate (LOPRESSOR) 50 MG tablet Take 2 tablets (100 mg total) by mouth 2 (two) times a day. 270 tablet 2     senna (SENOKOT) 8.6 mg tablet Take 1 tablet by mouth daily as needed.        warfarin (COUMADIN) 5 MG tablet Take one to two tablets (5 - 10 mg) by mouth daily as directed. Dose adjusted according to INR result. 180 tablet 1     No current facility-administered medications for this visit.

## 2021-06-21 ENCOUNTER — COMMUNICATION - HEALTHEAST (OUTPATIENT)
Dept: ANTICOAGULATION | Facility: CLINIC | Age: 71
End: 2021-06-21

## 2021-06-21 NOTE — PROGRESS NOTES
MTM Follow Up Encounter  Assessment & Plan                                                     Neuropathy: Appears that patient has a bit of neuropathy which is effecting his sleep. Reviewed a trial of gabapentin at night -- patient was very interested. Reviewed sie effects and that we will start with a low dose since his neuropathy does not seem to be too significant. Reviewed to increase by 100 mg jana 3 days until effective.   PLAN:   1. Start gabapentin 100 mg HS. If ineffective, increase by 100 mg every 3 days until max 300 mg HS.      Type 2 Diabetes:  needs improvement. A1C worsened and not at goal of <7%. FBG overall near goal  mg/dL -- I think his blood sugars the rest of the day are an issue. Reviewed the correlation of his weight and A1c -- he seems aware today that when he gains weight his A1c worsens. Recommended additional therapy today, but patient would like to wait three months. Since he has not seen Dr. Plata in a long time, will have him see Dr. Plata in 3 months and me in 6 months. If no improvement, would recommend addition of a DPPIV inhibitor since it wont contribute to weight gain. If cost is an issue, could try a low-dose of pioglitazone. Ideally, a GLP-1 agonist would be best but patient had nausea on Trulicity.   PLAN:   1. A1c and microalbuminuria today.   2. Weight loss.  Future consideration = DPPIV inhibitor, pioglitazone (if cost is an issue)      Follow Up  3 months Dr. Plata  6 months MT     Subjective & Objective                                                       Cam Walden is a 68 y.o. male coming in for a follow up visit for Medication Therapy Management. He was referred to me from Kamaljit Plata MD    Chief Complaint: Foot tingling     Neuropathy: Reports that when he sleeps at night there is a tingling in right foot, on the edge of his foot. Does not happen right away, after slept for a while. Feels tingly, prickly. Not restless legs. Only on right  side. During the day it is fine. Can get about 4 hours of sleep then wakes up because the foot wakes up him, then wakes up.     Type 2 Diabetes: Currently taking metformin 1000 mg two times a day and glipizide XL 10 mg once daily. Reports diarrhea has gotten better. Still loose but not bothered by it.   Tests BG 1 times daily fasting AM.   Blood sugars per lo, 151, 120, 135, 109, 127, 131, 163, 127, 146, 103, 121, 132, 148, 159, 139, 146, 170 (no pill), 146, 139, 159, 142, 166, 146.   Last A1c checked today = 8.3%, previously 7% on 18.   Microalbumin checked today, results pending.   Believes his previous A1c 7% was well controlled because he lost weight and was not eating as much due to the diarrhea. He has gained the weight back and his A1c is worse now.         PMH: reviewed in EPIC   Allergies/ADRs: reviewed in EPIC   Alcohol: reviewed in EPIC   Tobacco:   Social History     Tobacco Use   Smoking Status Never Smoker   Smokeless Tobacco Never Used     Today's Vitals: There were no vitals filed for this visit.  ----------------    Much or all of the text in this note was generated through the use of Dragon Dictate voice-to-text software. Errors in spelling or words which seem out of context are unintentional. Sound alike errors, in particular, may have escaped editing.    The patient declined an after visit summary    I spent 30 minutes with this patient today;   All changes were made via collaborative practice agreement with Kamaljit Plata MD. A copy of the visit note was provided to the patient's provider.     Marlene MarreroD., [facility]  Medication Therapy Management Pharmacist  [facility] and [facility]     Current Outpatient Medications   Medication Sig Dispense Refill     aspirin 81 MG EC tablet Take 81 mg by mouth daily.       atorvastatin (LIPITOR) 40 MG tablet Take 1 tablet (40 mg total) by mouth daily. 90 tablet 3     blood glucose test (ACCU-CHEK SMARTVIEW TEST STRIP)  strips Use to check blood sugars twice daily as directed 100 strip 11     cholecalciferol, vitamin D3, (VITAMIN D3) 1,000 unit capsule Take 1 capsule (1,000 Units total) by mouth daily. 100 capsule 2     generic lancets (ACCU-CHEK FASTCLIX) Use to check blood sugar twice per day. 102 each 11     glipiZIDE (GLUCOTROL XL) 10 MG 24 hr tablet Take 1 tablet (10 mg total) by mouth every morning. 90 tablet 3     hydroCHLOROthiazide (HYDRODIURIL) 25 MG tablet Take 1 tablet (25 mg total) by mouth daily. 90 tablet 3     losartan (COZAAR) 100 MG tablet Take 1 tablet (100 mg total) by mouth daily. 90 tablet 1     metFORMIN (GLUCOPHAGE) 1000 MG tablet TAKE 1 TABLET BY MOUTH TWICE DAILY WITH MEALS 60 tablet 0     metoprolol tartrate (LOPRESSOR) 100 MG tablet Take 1 tablet (100 mg total) by mouth 2 (two) times a day. 180 tablet 3     senna (SENOKOT) 8.6 mg tablet Take 1 tablet by mouth daily as needed.        warfarin (COUMADIN/JANTOVEN) 5 MG tablet Take 5 mg on Wed and Saturday and 10 mg rest of the week as directed. Dose adjusted according to INR result.. 144 tablet 1     No current facility-administered medications for this visit.

## 2021-06-21 NOTE — LETTER
Letter by Jose Jerome CNP at      Author: Jose Jerome CNP Service: -- Author Type: --    Filed:  Encounter Date: 2020 Status: (Other)         [facility]  [address] [address]                                  2020    Patient: Cam Walden   MR Number: [id number]   YOB: 1950   Date of Visit: 2020     Dear Dr. Diggs:    Thank you for referring Cam Walden to me for evaluation. Below are the relevant portions of my assessment and plan of care.    If you have questions, please do not hesitate to call me. I look forward to following Cam along with you.    Sincerely,        Jose Jerome CNP          CC  No Recipients  Jose Jerome CNP  2020  8:39 AM  Signed  [facility]    Name:   Cam Walden  : 1950  Facility:   [facility] [[id number]]   Room: [facility] 201  Code Status: FULL CODE -   Fac type:   SNF (Skilled Nursing Facility, TCU) -     PCP:  Kamaljit Plata MD    CHIEF COMPLAINT / REASON FOR VISIT:  Chief Complaint   Patient presents with   ? H & P     ADMISSION: Left distal fibular fracture secondary to a fall.  Other issues included orthostatic hypotension and acute kidney injury.   ? Anticoagulation     Supratherapeutic INR (3.5).      [facility] from 2020 until 12/10/2020 (left distal fibular fracture, orthostatic hypotension, acute kidney injury)      HPI: Cam is a 70 y.o. 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  He endorsed syncope which he had been  having repeatedly over several days with increased weakness.  He was found to have a nondisplaced left distal fibular fracture superimposed over an old healed fracture of the distal fibular shaft.  He was also found to have acute kidney injury and orthostatic hypotension.  He was treated with IV fluids (with KEVIN resolution) and received an Ortho consult for the fracture, along with PT and OT.      He did need adjustments to his antihypertensive medications in the setting of recent significant weight loss (47 pounds) related to his cancer.  Hydrochlorothiazide and losartan were not resumed, although he was continued on his PTA metoprolol dose.  I believe his tamsulosin was also held.     For his diabetes, he had been on oral medications, metformin and glipizide XL.  He is receiving these medications in addition to sliding scale insulin here.      CURRENT/RECENT ISSUES    Disposition: He seems to be doing okay.  He does have a cam boot to wear, and he is to follow-up with Ortho (foot and ankle provider) in 7 to 10 days.  He may be weightbearing as tolerated.  Pain is being controlled with scheduled nonopioids and as needed opioids and is fairly well managed.  He arrived here with orders for PT and OT.  As for his B-cell lymphoma, he does have an oncology appointment once he recovers.    Anticoagulation: He is supratherapeutic with an INR 3.5.  There seems to be some confusion as to his PTA dose.  From what he tells me, he was taking 5 mg daily except for 2 days/week of 10 mg.  However, he was discharged with orders for 5 mg on Tuesday, Thursday, and Saturday, with 2.5 mg all other days of the week.  Today, his INR is 3.5.  We are going to go with 5 mg on Wednesday and Sunday, 2.5 mg all other days of the week, and recheck an INR on 12/21/2020.    Diabetic sequelae: He does have numbness and tingling in the feet, not so much in the hands.  He states that his vision is okay.    ROS: Positives: He tells me he does not  "sleep well and believes he has \"a touch of sleep apnea.\"  Bowel movements, he says, are \"a little too much\" but no diarrhea.  Negatives: No headaches (despite abrasions to the head) or chest pains, coughing or congestion, nausea or vomiting, current dizziness or dyspnea, dysuria, diplopia, constipation or diarrhea, difficulty chewing or swallowing, or any integumentary issues.    Past Medical History:   Diagnosis Date   ? Aortic stenosis     mild   ? Atrial fibrillation (H)    ? Atrial flutter (H) 8/21/2017   ? Atrial flutter with rapid ventricular response (H)    ? Cancer (H)     lymp   ? Colon polyps 08/08/2016    Per colonoscopy 8/8/16.  Large and small.  Some removed. Others to be removed surgically. (per patient)   ? Diabetes mellitus (H)     DM II   ? Diabetes mellitus, type II (H)    ? Diabetic polyneuropathy associated with type 2 diabetes mellitus (H) 12/16/2020   ? GI bleed     Following colon polyp removal   ? Hyperlipidemia    ? Hypertension    ? Morbid obesity (H)    ? JOHNNY (obstructive sleep apnea)     no cpap   ? Rectal bleeding               Family History   Problem Relation Age of Onset   ? Dementia Mother    ? Asthma Father      Social History     Socioeconomic History   ? Marital status: Single     Spouse name: Not on file   ? Number of children: 0   ? Years of education: Not on file   ? Highest education level: Not on file   Occupational History   ? Not on file   Social Needs   ? Financial resource strain: Not on file   ? Food insecurity     Worry: Not on file     Inability: Not on file   ? Transportation needs     Medical: Not on file     Non-medical: Not on file   Tobacco Use   ? Smoking status: Never Smoker   ? Smokeless tobacco: Never Used   Substance and Sexual Activity   ? Alcohol use: No   ? Drug use: No   ? Sexual activity: Not on file   Lifestyle   ? Physical activity     Days per week: Not on file     Minutes per session: Not on file   ? Stress: Not on file   Relationships   ? Social " connections     Talks on phone: Not on file     Gets together: Not on file     Attends Yarsanism service: Not on file     Active member of club or organization: Not on file     Attends meetings of clubs or organizations: Not on file     Relationship status: Not on file   ? Intimate partner violence     Fear of current or ex partner: Not on file     Emotionally abused: Not on file     Physically abused: Not on file     Forced sexual activity: Not on file   Other Topics Concern   ? Not on file   Social History Narrative    Patient lives alone.       MEDICATIONS: Reviewed from the MAR, physician orders, and/or earlier progress notes.  Post Discharge Medication Reconciliation Status: discharge medications reconciled, continue medications without change.  Current warfarin dosing has been updated today (12/14/2020) and is reflected below.  Current Outpatient Medications   Medication Sig   ? acetaminophen (TYLENOL) 500 MG tablet Take 2 tablets (1,000 mg total) by mouth 3 (three) times a day.   ? atorvastatin (LIPITOR) 40 MG tablet Take 1 tablet (40 mg total) by mouth daily.   ? blood glucose test (ACCU-CHEK SMARTVIEW TEST STRIP) strips USE TO CHECK BLOOD SUGARS DAILY AS DIRECTED   ? cholecalciferol, vitamin D3, (VITAMIN D3) 1,000 unit capsule Take 1 capsule (1,000 Units total) by mouth daily.   ? DULoxetine (CYMBALTA) 30 MG capsule TAKE 1 CAPSULE BY MOUTH TWICE A DAY   ? generic lancets (ACCU-CHEK FASTCLIX) Use to check blood sugar twice per day.   ? glipiZIDE (GLUCOTROL XL) 10 MG 24 hr tablet TAKE 1 TABLET BY MOUTH EVERY DAY IN THE MORNING   ? KLOR-CON M20 20 mEq tablet TAKE 1 TABLET BY MOUTH TWO TIMES A DAY.   ? loperamide (IMODIUM) 2 mg capsule Take 2 mg by mouth 2 (two) times a day as needed.   ? metFORMIN (GLUCOPHAGE) 1000 MG tablet TAKE 1 TABLET BY MOUTH TWICE A DAY WITH MEALS   ? metoprolol tartrate (LOPRESSOR) 100 MG tablet TAKE 1 TABLET BY MOUTH TWICE A DAY.   ? ondansetron (ZOFRAN) 8 MG tablet Take 1 tablet (8 mg  total) by mouth every 6 (six) hours as needed for nausea.   ? oxyCODONE (ROXICODONE) 5 MG immediate release tablet Take 1 tablet (5 mg total) by mouth every 6 (six) hours as needed.   ? polyethylene glycol (MIRALAX) 17 gram packet Take 1 packet (17 g total) by mouth daily.   ? prochlorperazine (COMPAZINE) 10 MG tablet Take 1 tablet (10 mg total) by mouth every 6 (six) hours as needed (For breakthrough nausea/vomiting).   ? simethicone (MYLICON) 80 MG chewable tablet Chew 80 mg every 6 (six) hours as needed for flatulence.   ? tamsulosin (FLOMAX) 0.4 mg cap Take 1 capsule (0.4 mg total) by mouth Daily after breakfast.   ? warfarin sodium (WARFARIN ORAL) Take by mouth. 12/14/2020: INR 3.5.  Take 5 mg Wednesday and Sunday, 2.5 mg AOD.  Recheck 12/21/2020.  12/11/20 INR 2.1  Take 5mg 12/11 and 12/13 and 2.5mg on 12/12.  Next INR 12/14/20.     ALLERGIES:   Allergies   Allergen Reactions   ? Lisinopril Cough   ? Trulicity [Dulaglutide] Nausea Only     DIET: Diabetic, regular texture, thin liquids.    Vitals:    12/13/20 1055   BP: 120/82   Pulse: 70   Resp: 16   Temp: 98  F (36.7  C)   SpO2: 94%   Weight: (!) 264 lb 12.8 oz (120.1 kg)   Height: 6' (1.829 m)     Body mass index is 35.91 kg/m .    EXAMINATION:   General: Pleasant, alert, and conversant middle-aged male, lying in bed, in no apparent distress.  Head: Normocephalic.  Abrasions/scabs on the top of the head, on the left cheek.  Eyes: PERRLA, sclerae clear.   ENT: Moist oral mucosa.  He has his own teeth.  No rhinorrhea or nasal discharge.  Hearing is unimpaired.  Darkened external laryngeal area from chemotherapy.  Cardiovascular: Regular rate and rhythm with intermittent ectopy and a 3/6 systolic ejection murmur at the left sternal border..   Respiratory: Lungs clear to auscultation bilaterally.   Abdomen: Nondistended.   Musculoskeletal/Extremities: Age-related degenerative joint disease.  Mild peripheral edema of the left foot, to be expected.  Integument:  Facial abrasions and significant ecchymosis of the left forearm.  No rashes, clinically significant lesions, or skin breakdown.   Cognitive/Psychiatric: Alert and oriented x3.  He seems unable to give me the PTA dose of his warfarin.  Affect is euthymic.    DIAGNOSTICS:   Results for orders placed or performed during the hospital encounter of 12/07/20   Basic Metabolic Panel   Result Value Ref Range    Sodium 138 136 - 145 mmol/L    Potassium 3.4 (L) 3.5 - 5.0 mmol/L    Chloride 102 98 - 107 mmol/L    CO2 28 22 - 31 mmol/L    Anion Gap, Calculation 8 5 - 18 mmol/L    Glucose 119 70 - 125 mg/dL    Calcium 7.6 (L) 8.5 - 10.5 mg/dL    BUN 14 8 - 22 mg/dL    Creatinine 1.36 (H) 0.70 - 1.30 mg/dL    GFR MDRD Af Amer >60 >60 mL/min/1.73m2    GFR MDRD Non Af Amer 52 (L) >60 mL/min/1.73m2     Lab Results   Component Value Date    WBC 10.4 12/08/2020    HGB 8.2 (L) 12/08/2020    HCT 26.2 (L) 12/08/2020    MCV 95 12/08/2020     12/09/2020     Estimated Creatinine Clearance: 71.7 mL/min (A) (by C-G formula based on SCr of 1.32 mg/dL (H)).  Lab Results   Component Value Date    HGBA1C 8.6 (H) 10/13/2020       ASSESSMENT/Plan:      ICD-10-CM    1. Closed fracture of distal end of left fibula with routine healing, unspecified fracture morphology, subsequent encounter  S82.832D    2. Paroxysmal atrial fibrillation (H)  I48.0    3. Type 2 diabetes mellitus with stage 3a chronic kidney disease, without long-term current use of insulin (H)  E11.21     N18.31    4. 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 Morbid obesity (H)  E66.01    6. Diabetic polyneuropathy associated with type 2 diabetes mellitus (H)  E11.42    7. Essential hypertension  I10    8. Dysthymia  F34.1      CHANGES:    INR 3.5: Give 5 mg on Wednesday and Sunday, 2.5 mg AOD, rechecking INR on 12/21/2020.    CARE PLAN:    The care plan has been reviewed and all orders signed. Changes to care plan, if any, as noted. Otherwise, continue  current plan of care.  Total time spent with this patient was approximately 35 minutes, with greater than 50% spent in counseling and coordination of care that included obtaining information from the patient not available from the hospital discharge, as well as dosing of warfarin for paroxysmal atrial fibrillation.    The above has been created using voice recognition software. Please be aware that this may unintentionally  produce inaccuracies and/or nonsensical sentences.      Electronically signed by: Jose Jerome CNP

## 2021-06-21 NOTE — LETTER
Letter by Jose Jerome CNP at      Author: Jose Jerome CNP Service: -- Author Type: --    Filed:  Encounter Date: 2020 Status: (Other)         [facility]  [address] [address]                                  2020    Patient: Cam Walden   MR Number: [id number]   YOB: 1950   Date of Visit: 2020     Dear Dr. Diggs:    Thank you for referring Cam Walden to me for evaluation. Below are the relevant portions of my assessment and plan of care.    If you have questions, please do not hesitate to call me. I look forward to following Cam along with you.    Sincerely,        Jose Jerome CNP          CC  No Recipients  Jose Jerome CNP  2020 10:39 AM  Signed  [facility]    Name:   Cam Walden  : 1950  Facility:   [facility] [[id number]]   Room: U 201  Code Status: FULL CODE -   Fac type:   SNF (Skilled Nursing Facility, TCU) -     PCP:  Kamaljit Plata MD    CHIEF COMPLAINT / REASON FOR VISIT:  Chief Complaint   Patient presents with   ? Discharge Summary     TCU discharge: Left distal fibular fracture secondary to a fall.  Other issues included orthostatic hypotension and acute kidney injury.      [facility] from 2020 until 12/10/2020 (left distal fibular fracture, orthostatic hypotension, acute kidney injury)  [facility] from 12/10/2020 until 2020      HPI: Cam is a 70 y.o. male with a history of B-cell lymphoma (earlier treated with 4 rounds of chemotherapy), hypertension, paroxysmal atrial fibrillation (anticoagulated with warfarin), diabetes mellitus type 2 poorly controlled (A1c 8.6) with renal and neurological complications, and morbid obesity, who was transferred from [facility] after falling down the stairs and lying at the bottom all night.  He endorsed  syncope which he had been having repeatedly over several days with increased weakness.  He was found to have a nondisplaced left distal fibular fracture superimposed over an old healed fracture of the distal fibular shaft.  He was also found to have acute kidney injury and orthostatic hypotension.  He was treated with IV fluids (with KEVIN resolution) and received an Ortho consult for the fracture, along with PT and OT.      He did need adjustments to his antihypertensive medications in the setting of recent significant weight loss (47 pounds) related to his cancer.  Hydrochlorothiazide and losartan were not resumed, although he was continued on his PTA metoprolol dose.  I believe his tamsulosin was also held.     For his diabetes, he had been on oral medications, metformin and glipizide XL.  He is receiving these medications in addition to sliding scale insulin here.      CURRENT/RECENT ISSUES    Disposition: He seems to be doing just fine.  He does have a cam boot to wear, and he is to follow-up with Ortho (foot and ankle provider) in 7 to 10 days.  He may be weightbearing as tolerated.  Pain is being controlled with scheduled nonopioids and as needed opioids and is fairly well managed.  He arrived here with orders for PT and OT.  As for his B-cell lymphoma, he does have an oncology appointment once he recovers.    Anticoagulation: He is supratherapeutic with an INR 3.5.  There seems to be some confusion as to his PTA dose.  From what he tells me, he was taking 5 mg daily except for 2 days/week of 10 mg.  However, he was discharged with orders for 5 mg on Tuesday, Thursday, and Saturday, with 2.5 mg all other days of the week.  His INR on 12/14/2020 was 3.5.  We went with 5 mg on Wednesday and Sunday, 2.5 mg all other days of the week, rechecking an INR today (12/21/2020).  At 2.8, we will continue the same dose, rechecking an INR on 01/04/2021.    Diarrhea: He is complaining of loose stools (watery).  He tells me  "that yesterday was particularly bad.  I discussed providing some relief.  He indicated that he had not been receiving antibiotics recently.  I discovered that he already has orders for loperamide.    Diabetic sequelae: He does have numbness and tingling in the feet, not so much in the hands.  He states that his vision is okay. We did discuss his poor diabetes management and how important it is to bring his A1c down.    Discharge planning: Currently, physical therapy is teaching him how to get around with his boot.  He is expected to discharge on 12/22/2020.  He will require a home health aide to assist with ADLs such as bathing, home health nurse for INR draws, home PT and OT to continue therapies in the home setting, and Meals on Wheels.      ROS: Positives: He tells me he does not sleep well and believes he has \"a touch of sleep apnea.\"  He was complaining of watery stools but is now receiving loperamide for this.  Negatives: No headaches (despite abrasions to the head) or chest pains, coughing or congestion, nausea or vomiting, current dizziness or dyspnea, dysuria, diplopia, constipation or diarrhea, difficulty chewing or swallowing, or any integumentary issues.    Past Medical History:   Diagnosis Date   ? Aortic stenosis     mild   ? Atrial fibrillation (H)    ? Atrial flutter (H) 8/21/2017   ? Atrial flutter with rapid ventricular response (H)    ? Cancer (H)     lymp   ? Colon polyps 08/08/2016    Per colonoscopy 8/8/16.  Large and small.  Some removed. Others to be removed surgically. (per patient)   ? Diabetes mellitus (H)     DM II   ? Diabetes mellitus, type II (H)    ? Diabetic polyneuropathy associated with type 2 diabetes mellitus (H) 12/16/2020   ? GI bleed     Following colon polyp removal   ? Hyperlipidemia    ? Hypertension    ? Morbid obesity (H)    ? JOHNNY (obstructive sleep apnea)     no cpap   ? Rectal bleeding               Family History   Problem Relation Age of Onset   ? Dementia Mother    ? " Asthma Father      Social History     Socioeconomic History   ? Marital status: Single     Spouse name: Not on file   ? Number of children: 0   ? Years of education: Not on file   ? Highest education level: Not on file   Occupational History   ? Not on file   Social Needs   ? Financial resource strain: Not on file   ? Food insecurity     Worry: Not on file     Inability: Not on file   ? Transportation needs     Medical: Not on file     Non-medical: Not on file   Tobacco Use   ? Smoking status: Never Smoker   ? Smokeless tobacco: Never Used   Substance and Sexual Activity   ? Alcohol use: No   ? Drug use: No   ? Sexual activity: Not on file   Lifestyle   ? Physical activity     Days per week: Not on file     Minutes per session: Not on file   ? Stress: Not on file   Relationships   ? Social connections     Talks on phone: Not on file     Gets together: Not on file     Attends Hoahaoism service: Not on file     Active member of club or organization: Not on file     Attends meetings of clubs or organizations: Not on file     Relationship status: Not on file   ? Intimate partner violence     Fear of current or ex partner: Not on file     Emotionally abused: Not on file     Physically abused: Not on file     Forced sexual activity: Not on file   Other Topics Concern   ? Not on file   Social History Narrative    Patient lives alone.       MEDICATIONS: Reviewed from the MAR, physician orders, and/or earlier progress notes.  Post Discharge Medication Reconciliation Status: medication reconciliation previously completed during another office visit.    Current Outpatient Medications   Medication Sig   ? acetaminophen (TYLENOL) 500 MG tablet Take 2 tablets (1,000 mg total) by mouth 3 (three) times a day.   ? atorvastatin (LIPITOR) 40 MG tablet Take 1 tablet (40 mg total) by mouth daily.   ? blood glucose test (ACCU-CHEK SMARTVIEW TEST STRIP) strips USE TO CHECK BLOOD SUGARS DAILY AS DIRECTED   ? cholecalciferol, vitamin D3,  (VITAMIN D3) 1,000 unit capsule Take 1 capsule (1,000 Units total) by mouth daily.   ? DULoxetine (CYMBALTA) 30 MG capsule TAKE 1 CAPSULE BY MOUTH TWICE A DAY   ? generic lancets (ACCU-CHEK FASTCLIX) Use to check blood sugar twice per day.   ? glipiZIDE (GLUCOTROL XL) 10 MG 24 hr tablet TAKE 1 TABLET BY MOUTH EVERY DAY IN THE MORNING   ? KLOR-CON M20 20 mEq tablet TAKE 1 TABLET BY MOUTH TWO TIMES A DAY.   ? loperamide (IMODIUM) 2 mg capsule Take 2 mg by mouth 2 (two) times a day as needed.   ? metFORMIN (GLUCOPHAGE) 1000 MG tablet TAKE 1 TABLET BY MOUTH TWICE A DAY WITH MEALS   ? metoprolol tartrate (LOPRESSOR) 100 MG tablet TAKE 1 TABLET BY MOUTH TWICE A DAY.   ? ondansetron (ZOFRAN) 8 MG tablet Take 1 tablet (8 mg total) by mouth every 6 (six) hours as needed for nausea.   ? oxyCODONE (ROXICODONE) 5 MG immediate release tablet Take 1 tablet (5 mg total) by mouth every 6 (six) hours as needed.   ? polyethylene glycol (MIRALAX) 17 gram packet Take 1 packet (17 g total) by mouth daily.   ? prochlorperazine (COMPAZINE) 10 MG tablet Take 1 tablet (10 mg total) by mouth every 6 (six) hours as needed (For breakthrough nausea/vomiting).   ? simethicone (MYLICON) 80 MG chewable tablet Chew 80 mg every 6 (six) hours as needed for flatulence.   ? tamsulosin (FLOMAX) 0.4 mg cap Take 1 capsule (0.4 mg total) by mouth Daily after breakfast.   ? warfarin sodium (WARFARIN ORAL) Take by mouth. 12/14/2020: INR 3.5.  Take 5 mg Wednesday and Sunday, 2.5 mg AOD.  Recheck 12/21/2020.  12/11/20 INR 2.1  Take 5mg 12/11 and 12/13 and 2.5mg on 12/12.  Next INR 12/14/20.     ALLERGIES:   Allergies   Allergen Reactions   ? Lisinopril Cough   ? Trulicity [Dulaglutide] Nausea Only     DIET: Diabetic, regular texture, thin liquids.    Vitals:    12/20/20 1816   BP: 103/67   Pulse: 78   Resp: 20   Temp: 98.2  F (36.8  C)   SpO2: 98%   Weight: (!) 264 lb 12.8 oz (120.1 kg)   Height: 6' (1.829 m)     Body mass index is 35.91 kg/m .    EXAMINATION:    General: Pleasant, alert, and conversant middle-aged male, sitting in a recliner, in no apparent distress.  Head: Normocephalic.  Large scab above the posterior parietal lobe.  Eyes: PERRLA, sclerae clear.   ENT: Moist oral mucosa.  He has his own teeth.  No rhinorrhea or nasal discharge.  Hearing is unimpaired.  Darkened external laryngeal area from chemotherapy.  Cardiovascular: Regular rate and rhythm with intermittent ectopy and a 3/6 systolic ejection murmur at the left sternal border..   Respiratory: Lungs clear to auscultation bilaterally.   Abdomen: Nondistended.   Musculoskeletal/Extremities: Age-related degenerative joint disease.  Mild peripheral edema of the left foot, to be expected.  Integument: Facial abrasions and significant ecchymosis of the left forearm.  No rashes, clinically significant lesions, or skin breakdown.   Cognitive/Psychiatric: Alert and oriented x3.  He seems unable to give me the PTA dose of his warfarin.  Affect is euthymic.    DIAGNOSTICS:   Results for orders placed or performed in visit on 12/15/20   Basic Metabolic Panel   Result Value Ref Range    Sodium 137 136 - 145 mmol/L    Potassium 4.7 3.5 - 5.0 mmol/L    Chloride 106 98 - 107 mmol/L    CO2 18 (L) 22 - 31 mmol/L    Anion Gap, Calculation 13 5 - 18 mmol/L    Glucose 131 (H) 70 - 125 mg/dL    Calcium 8.7 8.5 - 10.5 mg/dL    BUN 15 8 - 22 mg/dL    Creatinine 1.32 (H) 0.70 - 1.30 mg/dL    GFR MDRD Af Amer >60 >60 mL/min/1.73m2    GFR MDRD Non Af Amer 54 (L) >60 mL/min/1.73m2     Lab Results   Component Value Date    WBC 10.4 12/08/2020    HGB 8.2 (L) 12/08/2020    HCT 26.2 (L) 12/08/2020    MCV 95 12/08/2020     12/09/2020     Estimated Creatinine Clearance: 71.7 mL/min (A) (by C-G formula based on SCr of 1.32 mg/dL (H)).  Lab Results   Component Value Date    HGBA1C 8.6 (H) 10/13/2020       ASSESSMENT/Plan:      ICD-10-CM    1. Closed fracture of distal end of left fibula with routine healing, unspecified fracture  morphology, subsequent encounter  S82.832D    2. Paroxysmal atrial fibrillation (H)  I48.0    3. Type 2 diabetes mellitus with stage 3a chronic kidney disease, without long-term current use of insulin (H)  E11.21     N18.31    4. 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 Morbid obesity (H)  E66.01    6. Diabetic polyneuropathy associated with type 2 diabetes mellitus (H)  E11.42    7. Essential hypertension  I10    8. Dysthymia  F34.1      DISCHARGE PLAN/FACE TO FACE:  I certify that this patient is under my care and that I had a face-to-face encounter that meets the physician face-to-face encounter requirements with this patient.     Date of Face-to-Face Encounter: 12/21/2020     I certify that, based on my findings, the following services are medically necessary home health services: Home health aide, home health nurse, home PT, home OT    My clinical findings support the need for the above skilled services because: (Please write a brief narrative summary that describes what the RN, PT, SLP, or other services will be doing in the home. A list of diagnoses in this section does not meet the CMS requirements): Home health aide to assist with ADLs such as bathing, home health nurse for INR draws, home PT and OT to continue therapies in the home setting.    This patient is homebound because: (Please write a brief narrative summmary describing the functional limitations as to why this patient is homebound and specifically what makes this patient homebound.):  The above services necessarily need to be performed in the home to be of benefit.    The patient is, or has been, under my care and I have initiated the establishment of the plan of care. This patient will be followed by a physician who will periodically review the plan of care.  Initial follow-up should be within 7-10 days.    Approximate time spent with this patient was greater than 30 minutes with greater than 50% spent in discussions  regarding services required upon discharge.      The above has been created using voice recognition software. Please be aware that this may unintentionally  produce inaccuracies and/or nonsensical sentences.      Electronically signed by: Jose Jerome CNP's.

## 2021-06-21 NOTE — LETTER
Letter by Jose Jerome CNP at      Author: Jose Jerome CNP Service: -- Author Type: --    Filed:  Encounter Date: 2020 Status: (Other)         [facility]  [address] [address]                                  2020    Patient: Cam Walden   MR Number: [id number]   YOB: 1950   Date of Visit: 2020     Dear Dr. Diggs:    Thank you for referring Cam Walden to me for evaluation. Below are the relevant portions of my assessment and plan of care.    If you have questions, please do not hesitate to call me. I look forward to following Cam along with you.    Sincerely,        Jose Jerome CNP          CC  No Recipients  Jose Jerome CNP  2020  3:36 PM  Signed  [facility]    Name:   Cam Walden  : 1950  Facility:   [facility] [[id number]]   Room: U 201  Code Status: FULL CODE -   Fac type:   SNF (Skilled Nursing Facility, TCU) -     PCP:  Kamaljit Plata MD    CHIEF COMPLAINT / REASON FOR VISIT:  Chief Complaint   Patient presents with   ? Follow-up     TCU follow-up: Left distal fibular fracture secondary to a fall.  Other issues included orthostatic hypotension and acute kidney injury.      [facility] from 2020 until 12/10/2020 (left distal fibular fracture, orthostatic hypotension, acute kidney injury)    Patient was last seen by me on 2020, a new admission visit.      HPI: Cam is a 70 y.o. 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  He endorsed  syncope which he had been having repeatedly over several days with increased weakness.  He was found to have a nondisplaced left distal fibular fracture superimposed over an old healed fracture of the distal fibular shaft.  He was also found to have acute kidney injury and orthostatic hypotension.  He was treated with IV fluids (with KEVIN resolution) and received an Ortho consult for the fracture, along with PT and OT.      He did need adjustments to his antihypertensive medications in the setting of recent significant weight loss (47 pounds) related to his cancer.  Hydrochlorothiazide and losartan were not resumed, although he was continued on his PTA metoprolol dose.  I believe his tamsulosin was also held.     For his diabetes, he had been on oral medications, metformin and glipizide XL.  He is receiving these medications in addition to sliding scale insulin here.      CURRENT/RECENT ISSUES    Disposition: He seems to be doing okay.  He does have a cam boot to wear, and he is to follow-up with Ortho (foot and ankle provider) in 7 to 10 days.  He may be weightbearing as tolerated.  Pain is being controlled with scheduled nonopioids and as needed opioids and is fairly well managed.  He arrived here with orders for PT and OT.  As for his B-cell lymphoma, he does have an oncology appointment once he recovers.    Anticoagulation: He is supratherapeutic with an INR 3.5.  There seems to be some confusion as to his PTA dose.  From what he tells me, he was taking 5 mg daily except for 2 days/week of 10 mg.  However, he was discharged with orders for 5 mg on Tuesday, Thursday, and Saturday, with 2.5 mg all other days of the week.  His INR on 12/14/2020 was 3.5.  We went with 5 mg on Wednesday and Sunday, 2.5 mg all other days of the week, and recheck an INR on 12/21/2020.    Diarrhea: He is complaining of loose stools (watery).  He tells me that yesterday was particularly bad.  I discussed providing some relief.  He  "indicated that he had not been receiving antibiotics recently.  I discovered that he already has orders for loperamide.    Diabetic sequelae: He does have numbness and tingling in the feet, not so much in the hands.  He states that his vision is okay. We did discuss his poor diabetes management and how important it is to bring his A1c down.    Discharge planning: Currently, physical therapy is teaching him how to get around with his boot.  He is expected to discharge on 12/22/2020.  He will require home PT and Meals on Wheels.      ROS: Positives: He tells me he does not sleep well and believes he has \"a touch of sleep apnea.\"  He is complaining of watery stools.  Negatives: No headaches (despite abrasions to the head) or chest pains, coughing or congestion, nausea or vomiting, current dizziness or dyspnea, dysuria, diplopia, constipation or diarrhea, difficulty chewing or swallowing, or any integumentary issues.    Past Medical History:   Diagnosis Date   ? Aortic stenosis     mild   ? Atrial fibrillation (H)    ? Atrial flutter (H) 8/21/2017   ? Atrial flutter with rapid ventricular response (H)    ? Cancer (H)     lymp   ? Colon polyps 08/08/2016    Per colonoscopy 8/8/16.  Large and small.  Some removed. Others to be removed surgically. (per patient)   ? Diabetes mellitus (H)     DM II   ? Diabetes mellitus, type II (H)    ? Diabetic polyneuropathy associated with type 2 diabetes mellitus (H) 12/16/2020   ? GI bleed     Following colon polyp removal   ? Hyperlipidemia    ? Hypertension    ? Morbid obesity (H)    ? JOHNNY (obstructive sleep apnea)     no cpap   ? Rectal bleeding               Family History   Problem Relation Age of Onset   ? Dementia Mother    ? Asthma Father      Social History     Socioeconomic History   ? Marital status: Single     Spouse name: Not on file   ? Number of children: 0   ? Years of education: Not on file   ? Highest education level: Not on file   Occupational History   ? Not on file "   Social Needs   ? Financial resource strain: Not on file   ? Food insecurity     Worry: Not on file     Inability: Not on file   ? Transportation needs     Medical: Not on file     Non-medical: Not on file   Tobacco Use   ? Smoking status: Never Smoker   ? Smokeless tobacco: Never Used   Substance and Sexual Activity   ? Alcohol use: No   ? Drug use: No   ? Sexual activity: Not on file   Lifestyle   ? Physical activity     Days per week: Not on file     Minutes per session: Not on file   ? Stress: Not on file   Relationships   ? Social connections     Talks on phone: Not on file     Gets together: Not on file     Attends Jehovah's witness service: Not on file     Active member of club or organization: Not on file     Attends meetings of clubs or organizations: Not on file     Relationship status: Not on file   ? Intimate partner violence     Fear of current or ex partner: Not on file     Emotionally abused: Not on file     Physically abused: Not on file     Forced sexual activity: Not on file   Other Topics Concern   ? Not on file   Social History Narrative    Patient lives alone.       MEDICATIONS: Reviewed from the MAR, physician orders, and/or earlier progress notes.  Post Discharge Medication Reconciliation Status: medication reconciliation previously completed during another office visit.  Current warfarin dosing was updated on 12/14/2020 and is reflected below.  Current Outpatient Medications   Medication Sig   ? acetaminophen (TYLENOL) 500 MG tablet Take 2 tablets (1,000 mg total) by mouth 3 (three) times a day.   ? atorvastatin (LIPITOR) 40 MG tablet Take 1 tablet (40 mg total) by mouth daily.   ? blood glucose test (ACCU-CHEK SMARTVIEW TEST STRIP) strips USE TO CHECK BLOOD SUGARS DAILY AS DIRECTED   ? cholecalciferol, vitamin D3, (VITAMIN D3) 1,000 unit capsule Take 1 capsule (1,000 Units total) by mouth daily.   ? DULoxetine (CYMBALTA) 30 MG capsule TAKE 1 CAPSULE BY MOUTH TWICE A DAY   ? generic lancets  (ACCU-CHEK FASTCLIX) Use to check blood sugar twice per day.   ? glipiZIDE (GLUCOTROL XL) 10 MG 24 hr tablet TAKE 1 TABLET BY MOUTH EVERY DAY IN THE MORNING   ? KLOR-CON M20 20 mEq tablet TAKE 1 TABLET BY MOUTH TWO TIMES A DAY.   ? loperamide (IMODIUM) 2 mg capsule Take 2 mg by mouth 2 (two) times a day as needed.   ? metFORMIN (GLUCOPHAGE) 1000 MG tablet TAKE 1 TABLET BY MOUTH TWICE A DAY WITH MEALS   ? metoprolol tartrate (LOPRESSOR) 100 MG tablet TAKE 1 TABLET BY MOUTH TWICE A DAY.   ? ondansetron (ZOFRAN) 8 MG tablet Take 1 tablet (8 mg total) by mouth every 6 (six) hours as needed for nausea.   ? oxyCODONE (ROXICODONE) 5 MG immediate release tablet Take 1 tablet (5 mg total) by mouth every 6 (six) hours as needed.   ? polyethylene glycol (MIRALAX) 17 gram packet Take 1 packet (17 g total) by mouth daily.   ? prochlorperazine (COMPAZINE) 10 MG tablet Take 1 tablet (10 mg total) by mouth every 6 (six) hours as needed (For breakthrough nausea/vomiting).   ? simethicone (MYLICON) 80 MG chewable tablet Chew 80 mg every 6 (six) hours as needed for flatulence.   ? tamsulosin (FLOMAX) 0.4 mg cap Take 1 capsule (0.4 mg total) by mouth Daily after breakfast.   ? warfarin sodium (WARFARIN ORAL) Take by mouth. 12/14/2020: INR 3.5.  Take 5 mg Wednesday and Sunday, 2.5 mg AOD.  Recheck 12/21/2020.  12/11/20 INR 2.1  Take 5mg 12/11 and 12/13 and 2.5mg on 12/12.  Next INR 12/14/20.     ALLERGIES:   Allergies   Allergen Reactions   ? Lisinopril Cough   ? Trulicity [Dulaglutide] Nausea Only     DIET: Diabetic, regular texture, thin liquids.    Vitals:    12/15/20 0936   BP: 119/67   Pulse: 71   Resp: 18   Temp: 98.7  F (37.1  C)   SpO2: 95%   Weight: (!) 264 lb 12.8 oz (120.1 kg)   Height: 6' (1.829 m)     Body mass index is 35.91 kg/m .    EXAMINATION:   General: Pleasant, alert, and conversant middle-aged male, sitting in a recliner, in no apparent distress.  Head: Normocephalic.  Abrasions/scabs on the top of the head, on the  left cheek.  Eyes: PERRLA, sclerae clear.   ENT: Moist oral mucosa.  He has his own teeth.  No rhinorrhea or nasal discharge.  Hearing is unimpaired.  Darkened external laryngeal area from chemotherapy.  Cardiovascular: Regular rate and rhythm with intermittent ectopy and a 3/6 systolic ejection murmur at the left sternal border..   Respiratory: Lungs clear to auscultation bilaterally.   Abdomen: Nondistended.   Musculoskeletal/Extremities: Age-related degenerative joint disease.  Mild peripheral edema of the left foot, to be expected.  Integument: Facial abrasions and significant ecchymosis of the left forearm.  No rashes, clinically significant lesions, or skin breakdown.   Cognitive/Psychiatric: Alert and oriented x3.  He seems unable to give me the PTA dose of his warfarin.  Affect is euthymic.    DIAGNOSTICS:   Results for orders placed or performed in visit on 12/15/20   Basic Metabolic Panel   Result Value Ref Range    Sodium 137 136 - 145 mmol/L    Potassium 4.7 3.5 - 5.0 mmol/L    Chloride 106 98 - 107 mmol/L    CO2 18 (L) 22 - 31 mmol/L    Anion Gap, Calculation 13 5 - 18 mmol/L    Glucose 131 (H) 70 - 125 mg/dL    Calcium 8.7 8.5 - 10.5 mg/dL    BUN 15 8 - 22 mg/dL    Creatinine 1.32 (H) 0.70 - 1.30 mg/dL    GFR MDRD Af Amer >60 >60 mL/min/1.73m2    GFR MDRD Non Af Amer 54 (L) >60 mL/min/1.73m2     Lab Results   Component Value Date    WBC 10.4 12/08/2020    HGB 8.2 (L) 12/08/2020    HCT 26.2 (L) 12/08/2020    MCV 95 12/08/2020     12/09/2020     Estimated Creatinine Clearance: 71.7 mL/min (A) (by C-G formula based on SCr of 1.32 mg/dL (H)).  Lab Results   Component Value Date    HGBA1C 8.6 (H) 10/13/2020       ASSESSMENT/Plan:      ICD-10-CM    1. Closed fracture of distal end of left fibula with routine healing, unspecified fracture morphology, subsequent encounter  S82.832D    2. Paroxysmal atrial fibrillation (H)  I48.0    3. Type 2 diabetes mellitus with stage 3a chronic kidney disease,  without long-term current use of insulin (H)  E11.21     N18.31    4. 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 Morbid obesity (H)  E66.01    6. Diabetic polyneuropathy associated with type 2 diabetes mellitus (H)  E11.42    7. Essential hypertension  I10    8. Dysthymia  F34.1      CHANGES:    None.    CARE PLAN:    The care plan has been reviewed and all orders signed. Changes to care plan, if any, as noted. Otherwise, continue current plan of care.      The above has been created using voice recognition software. Please be aware that this may unintentionally  produce inaccuracies and/or nonsensical sentences.      Electronically signed by: Jose Jerome CNP

## 2021-06-21 NOTE — LETTER
Letter by Blade Ibanez MD at      Author: Blade Ibanez MD Service: -- Author Type: --    Filed:  Encounter Date: 2/8/2021 Status: (Other)                   Office Hours: Monday - Friday 8:00 - 4:30PM    Cam Walden  [address] [place] [address]           February 8, 2021      Dear Cam:    Our clinic records indicate we have attempted to contact you to schedule a follow up appointment. Unfortunately, we have been unable to reach you. To prevent further delays in your care, please contact our office to schedule your appointment at [phone]         Sincerely,        Dr. Shamar CORREA [facility]

## 2021-06-23 NOTE — TELEPHONE ENCOUNTER
ANTICOAGULATION  MANAGEMENT    Assessment     Today's INR result of 2.70 is Therapeutic (goal INR of 2.0-3.0)        Warfarin taken as previously instructed    No new diet changes affecting INR    No new medication/supplements affecting INR    Continues to tolerate warfarin with no reported s/s of bleeding or thromboembolism     Previous INR was Therapeutic    Plan:     Left a detailed message for Cam regarding INR result and instructed:     Warfarin Dosing Instructions:  Continue current warfarin dose 5 mg daily on WED / SAT; and 10 mg daily rest of week  (0 % change)    Instructed patient to follow up no later than: 4 weeks    Education provided: importance of consistent vitamin K intake and importance of notifying clinic for changes in medications    Instructed to call the [facility] for any changes, questions or concerns. (#[phone])   ?   Yvonne Duncan RN    Subjective/Objective:      Cam Walden, a 68 y.o. male is on warfarin.     Cam reports:     Home warfarin dose: as updated on anticoagulation calendar per template     Missed doses: No     Medication changes:  No     S/S of bleeding or thromboembolism:  No     New Injury or illness:  No     Changes in diet or alcohol consumption:  No     Upcoming surgery, procedure or cardioversion:  No    Anticoagulation Episode Summary     Current INR goal:   2.0-3.0   TTR:   48.7 % (1.4 y)   Next INR check:   2/14/2019   INR from last check:   2.70 (1/17/2019)   Weekly max warfarin dose:      Target end date:   Indefinite   INR check location:      Preferred lab:      Send INR reminders to:   ANTICOAGULATION POOL C (DTN,VAD,CGR,GAV)    Indications    Atrial flutter (H) [I48.92]           Comments:            Anticoagulation Care Providers     Provider Role Specialty Phone number    Kamaljit Plata MD Referring Family Medicine [phone]

## 2021-06-23 NOTE — TELEPHONE ENCOUNTER
ANTICOAGULATION  MANAGEMENT PROGRAM    Cam Walden is overdue for INR check.  Reminder call made.    Spoke with Cam and scheduled INR appointment on 1/17/19 .    Yvonne Duncan RN

## 2021-06-24 NOTE — TELEPHONE ENCOUNTER
RN cannot approve Refill Request    RN can NOT refill this medication Protocol failed and NO refill given. Last office visit: 3/20/2018 Kamaljit Plata MD Last Physical: 10/23/2017 Last MTM visit: 2/19/2018 Angie Russell, PharmD Last visit same specialty: Visit date not found.  Next visit within 3 mo: Visit date not found  Next physical within 3 mo: Visit date not found      Yvonne Harding, [facility] Triage/Med Refill 2/14/2019    Requested Prescriptions   Pending Prescriptions Disp Refills     metFORMIN (GLUCOPHAGE) 1000 MG tablet [Pharmacy Med Name: METFORMIN HCL 1,000 MG TABLET] 180 tablet 3     Sig: TAKE 1 TABLET (1,000 MG TOTAL) BY MOUTH 2 (TWO) TIMES A DAY WITH MEALS.    Metformin Refill Protocol Failed - 2/13/2019  1:11 AM       Failed - LFT or AST or ALT in last 12 months    Albumin   Date Value Ref Range Status   10/23/2017 3.6 3.5 - 5.0 g/dL Final     Bilirubin, Total   Date Value Ref Range Status   10/23/2017 0.8 0.0 - 1.0 mg/dL Final     Bilirubin, Direct   Date Value Ref Range Status   01/12/2016 0.3 <=0.5 mg/dL Final     Alkaline Phosphatase   Date Value Ref Range Status   10/23/2017 62 45 - 120 U/L Final     AST   Date Value Ref Range Status   10/23/2017 25 0 - 40 U/L Final     ALT   Date Value Ref Range Status   10/23/2017 31 0 - 45 U/L Final     Protein, Total   Date Value Ref Range Status   10/23/2017 7.7 6.0 - 8.0 g/dL Final               Failed - Visit with PCP or prescribing provider visit in last 6 months or next 3 months    Last office visit with prescriber/PCP: Visit date not found OR same dept: Visit date not found OR same specialty: Visit date not found Last physical: Visit date not found Last MTM visit: 2/19/2018 Angie Russell, PharmD         Next appt within 3 mo: Visit date not found  Next physical within 3 mo: Visit date not found  Prescriber OR PCP: Kamaljit Plata MD  Last diagnosis associated with med order: 1. DM type 2 (diabetes mellitus, type  2) (H)  - metFORMIN (GLUCOPHAGE) 1000 MG tablet [Pharmacy Med Name: METFORMIN HCL 1,000 MG TABLET]; Take 1 tablet (1,000 mg total) by mouth 2 (two) times a day with meals.  Dispense: 180 tablet; Refill: 3     If protocol passes may refill for 12 months if within 3 months of last provider visit (or a total of 15 months).          Passed - Blood pressure in last 12 months    BP Readings from Last 1 Encounters:   03/20/18 112/60            Passed - GFR or Serum Creatinine in last 6 months    GFR MDRD Non Af Amer   Date Value Ref Range Status   08/22/2018 >60 >60 mL/min/1.73m2 Final     GFR MDRD Af Amer   Date Value Ref Range Status   08/22/2018 >60 >60 mL/min/1.73m2 Final            Passed - A1C in last 6 months    Hemoglobin A1c   Date Value Ref Range Status   11/27/2018 8.3 (H) 3.5 - 6.0 % Final              Passed - Microalbumin in last year     Microalbumin, Random Urine   Date Value Ref Range Status   11/27/2018 1.50 0.00 - 1.99 mg/dL Final

## 2021-06-24 NOTE — TELEPHONE ENCOUNTER
ANTICOAGULATION  MANAGEMENT    Assessment     Today's INR result of 2.40 is Therapeutic (goal INR of 2.0-3.0)        Warfarin taken as previously instructed    No new diet changes affecting INR    No new medication/supplements affecting INR    Continues to tolerate warfarin with no reported s/s of bleeding or thromboembolism     Previous INR was Therapeutic    Plan:     Left a detailed message for Cam regarding INR result and instructed:     Warfarin Dosing Instructions:  Continue current warfarin dose 5 mg daily on WED / SAT; and 10 mg daily rest of week  (0 % change)    Instructed patient to follow up no later than: 4-6 weeks    Education provided: importance of notifying clinic for changes in medications    Instructed to call the [facility] for any changes, questions or concerns. (#[phone])   ?   Yvonne Duncan RN    Subjective/Objective:      Cam Walden, a 69 y.o. male is on warfarin.     Cam reports:     Home warfarin dose: as updated on anticoagulation calendar per template     Missed doses: No     Medication changes:  No     S/S of bleeding or thromboembolism:  No     New Injury or illness:  No     Changes in diet or alcohol consumption:  No     Upcoming surgery, procedure or cardioversion:  No    Anticoagulation Episode Summary     Current INR goal:   2.0-3.0   TTR:   51.8 % (1.5 y)   Next INR check:   4/1/2019   INR from last check:   2.40 (2/18/2019)   Weekly max warfarin dose:      Target end date:   Indefinite   INR check location:      Preferred lab:      Send INR reminders to:   ANTICOAGULATION POOL C (DTN,VAD,CGR,GAV)    Indications    Atrial flutter (H) [I48.92]           Comments:            Anticoagulation Care Providers     Provider Role Specialty Phone number    Kamaljit Plata MD Referring Family Medicine [phone]

## 2021-06-26 NOTE — TELEPHONE ENCOUNTER
ANTICOAGULATION  MANAGEMENT PROGRAM    Cam Walden is overdue for INR check.     Spoke to Cam, he reports that he fell on 6/13 and broke his humerus and was assessed at [facility]. Noted in Care Everywhere that INR was 1.9.     Verified warfarin dose with patient and updated tracking calendar. He is not having surgery for his arm.     Advised to continue current dose and recheck INR within 1 week. He states he needs to schedule a follow up with PCP and will be calling to do that. Will check INR at that visit.     Patient verbalized understanding and had no other questions.           Fely Cardozo RN

## 2021-06-29 NOTE — PROGRESS NOTES
Progress Notes by Osvaldo Alfonso MD at 7/22/2020  9:10 AM     Author: Osvaldo Alfonso MD Service: -- Author Type: Physician    Filed: 7/22/2020 10:35 AM Encounter Date: 7/22/2020 Status: Signed    : Osvaldo Alfonso MD (Physician)       Heart Care Office Note    Assessment / Plan:    1. Atrial flutter.  No symptomatic recurrence since cardioversion.  Continue warfarin and metoprolol, as he remains at high risk for recurrence with untreated sleep apnea  2. Aortic stenosis moderate.  We will need to follow with yearly echoes.  Encouraged walking some route for about 20 minutes at least weekly, to look for a drop-off in his activity tolerance.  Discussed likelihood of eventual disease progression  3. Morbid obesity, mild sleep apnea currently not on treatment.  Not interested in another trial of treatment   4. Anterior neck mass.  Advised to follow-up with Dr. Child for further assessment    Plan follow-up in 1 year with echocardiogram prior to that visit  ______________________________________________________________________    Subjective:    I had the opportunity to see Cam Walden at the [facility]. Cam Walden is a 70 y.o. male with a history of hypertension and morbid obesity who presented with atrial flutter 7/2017.  Rate control was achieved with metoprolol 200 mg daily, he was treated with warfarin anticoagulation and underwent cardioversion.  Since the cardioversion he has not noticed any further heart racing.  He did not feel that his stamina improved.  He has been diagnosed with mild obstructive sleep apnea but was poorly tolerant of it and is on no treatment.  Mild aortic stenosis was noted in 2017, a repeat echocardiogram recently showed progression to moderate stenosis.  He returns today for routine follow-up.      He continues to teach piano lessons, now virtually via Zoom.  He still does not engage in any exercise.  His weight has dropped 11 pounds in  the last year, as he is dining out less.  He is tolerated warfarin with no bleeding or bruising issues.  He has had no evidence of recurrent colon cancer since his partial colectomy.    He continues to sleep in his recliner.  He was diagnosed with mild sleep apnea in years past and feels that his sleep is restorative.  He sleeps at night, awakens refreshed, then naps in his chair for couple of hours daily.  He denies daytime sleepiness otherwise.  He is also not interested in another trial of CPAP.     He has developed some anterior neck swelling.  There is no pain or fevers.  He does not smoke or chew tobacco  He does not engage in any exercise, but climbs the steps in his home slowly.  He walks around grocery stores without any problems.  He denies any change in his activity tolerance.    ______________________________________________________________________    Problem List:  Patient Active Problem List   Diagnosis   ? Aortic stenosis, mild   ? Essential hypertension with goal blood pressure less than 130/80   ? Sleep apnea   ? Hyperlipidemia   ? Decreased sensation of feet.   ? Plantar fasciitis, bilateral   ? Skin lesion   ? Colon polyps   ? Colonoscopy causing post-procedural bleeding   ? Acute blood loss anemia   ? Type 2 diabetes mellitus with other circulatory complications (H)   ? Atrial flutter with rapid ventricular response (H)   ? Atrial flutter (H)   ? Anemia   ? Adenoma   ? GI bleed   ? Diverticulosis of large intestine without perforation or abscess without bleeding     Medical History:  Past Medical History:   Diagnosis Date   ? Aortic stenosis     mild   ? Atrial fibrillation (H)    ? Atrial flutter with rapid ventricular response (H)    ? Colon polyps 08/08/2016    Per colonoscopy 8/8/16.  Large and small.  Some removed. Others to be removed surgically. (per patient)   ? Diabetes mellitus (H)     DM II   ? Diabetes mellitus, type II (H)    ? GI bleed     Following colon polyp removal   ?  Hyperlipidemia    ? Hypertension    ? Morbid obesity (H)    ? JOHNNY (obstructive sleep apnea)     no cpap   ? Rectal bleeding      Surgical History:  Past Surgical History:   Procedure Laterality Date   ? CARDIOVERSION  08/25/2017     Social History:  Social History     Socioeconomic History   ? Marital status: Single     Spouse name: Not on file   ? Number of children: Not on file   ? Years of education: Not on file   ? Highest education level: Not on file   Occupational History   ? Not on file   Social Needs   ? Financial resource strain: Not on file   ? Food insecurity     Worry: Not on file     Inability: Not on file   ? Transportation needs     Medical: Not on file     Non-medical: Not on file   Tobacco Use   ? Smoking status: Never Smoker   ? Smokeless tobacco: Never Used   Substance and Sexual Activity   ? Alcohol use: No   ? Drug use: No   ? Sexual activity: Never   Lifestyle   ? Physical activity     Days per week: Not on file     Minutes per session: Not on file   ? Stress: Not on file   Relationships   ? Social connections     Talks on phone: Not on file     Gets together: Not on file     Attends [employer] service: Not on file     Active member of club or organization: Not on file     Attends meetings of clubs or organizations: Not on file     Relationship status: Not on file   ? Intimate partner violence     Fear of current or ex partner: Not on file     Emotionally abused: Not on file     Physically abused: Not on file     Forced sexual activity: Not on file   Other Topics Concern   ? Not on file   Social History Narrative   ? Not on file     Sleep History:  Sleeps in a recliner then early afternoon naps for another 2 hours in his recliner with elevated legs.  Exercise History:  None     Review of Systems:   General: WNL  Eyes: WNL  Ears/Nose/Throat: WNL  Lungs: WNL  Heart: WNL  Stomach: WNL  Bladder: WNL  Muscle/Joints: WNL  Skin: WNL  Nervous System: WNL  Mental Health: WNL     Blood:  WNL          Family History:  Family History   Problem Relation Age of Onset   ? Dementia Mother    ? Asthma Father          Allergies:  Allergies   Allergen Reactions   ? Lisinopril Cough   ? Trulicity [Dulaglutide] Nausea Only     Medications:  Current Outpatient Medications   Medication Sig Dispense Refill   ? atorvastatin (LIPITOR) 40 MG tablet Take 1 tablet (40 mg total) by mouth daily. 90 tablet 3   ? blood glucose test (ACCU-CHEK SMARTVIEW TEST STRIP) strips USE TO CHECK BLOOD SUGARS  DAILY AS DIRECTED 200 strip prn   ? cholecalciferol, vitamin D3, (VITAMIN D3) 1,000 unit capsule Take 1 capsule (1,000 Units total) by mouth daily. 100 capsule 2   ? DULoxetine (CYMBALTA) 30 MG capsule TAKE 1 CAPSULE BY MOUTH TWICE A  capsule 2   ? generic lancets (ACCU-CHEK FASTCLIX) Use to check blood sugar twice per day. 102 each 11   ? glipiZIDE (GLUCOTROL XL) 10 MG 24 hr tablet TAKE 1 TABLET BY MOUTH EVERY DAY IN THE MORNING 90 tablet 1   ? hydroCHLOROthiazide (HYDRODIURIL) 25 MG tablet TAKE 1 TABLET BY MOUTH EVERY DAY 90 tablet 2   ? ketoconazole (NIZORAL) 2 % cream Apply topically daily. 60 g 1   ? losartan (COZAAR) 100 MG tablet TAKE 1 TABLET BY MOUTH EVERY DAY 90 tablet 3   ? metFORMIN (GLUCOPHAGE) 1000 MG tablet TAKE 1 TABLET BY MOUTH TWICE A DAY WITH MEALS 180 tablet 3   ? metoprolol tartrate (LOPRESSOR) 100 MG tablet TAKE 1 TABLET BY MOUTH TWICE A DAY. **REPLACES 50MG STRENGTH** 180 tablet 3   ? tamsulosin (FLOMAX) 0.4 mg cap Take 2 capsules (0.8 mg total) by mouth daily. 180 capsule 2   ? warfarin ANTICOAGULANT (COUMADIN/JANTOVEN) 5 MG tablet TAKE 1 TO 2 TABLETS (5 - 10 MG) BY MOUTH DAILY AS DIRECTED. DOSE ADJUSTED PER INR RESULT. 180 tablet 1     No current facility-administered medications for this visit.        Objective:   Wt Readings from Last 3 Encounters:   07/22/20 (!) 292 lb 9.6 oz (132.7 kg)   07/13/20 (!) 303 lb (137.4 kg)   12/20/19 (!) 303 lb (137.4 kg)     Vital signs:  /72 (Patient Site:  Left Arm, Patient Position: Sitting, Cuff Size: Adult Large)   Pulse 81   Resp 16   Wt (!) 292 lb 9.6 oz (132.7 kg)   SpO2 97%   BMI 39.14 kg/m        Physical Exam:    GENERAL APPEARANCE: Alert, cooperative and in no acute distress.  HEENT: Conjunctivae not injected.  No scleral icterus. No Xanthelasma. Oral mucous membranes pink and moist.  NECK: No JVD.  No Hepatojugular reflux.  8 x 10 x 2 cm firm fixed purpleish mass from thyroid cartilage into sternomanubrial area.  Nontender, not fluctuant  CHEST: clear to auscultation  CARDIOVASCULAR: Regular S1, S2 without clicks or rubs.  2/6 harsh systolic  murmur best audible at upper right sternal border radiating throughout the precordium.  Brachial, radial and posterior tibial pulses are intact and symmetric. No carotid bruits noted.  ABDOMEN: Obese.  nontender. BS+. No bruits.  EXTREMITIES: No edema.  SKIN:  No rash, bruising          Lab Results:  LIPIDS:  Lab Results   Component Value Date    CHOL 96 07/13/2020    CHOL 89 05/16/2019    CHOL 98 05/21/2018     Lab Results   Component Value Date    HDL 19 (L) 07/13/2020    HDL 23 (L) 05/16/2019    HDL 26 (L) 05/21/2018     Lab Results   Component Value Date    LDLCALC 36 07/13/2020    LDLCALC 32 05/16/2019    LDLCALC 40 05/21/2018     Lab Results   Component Value Date    TRIG 205 (H) 07/13/2020    TRIG 171 (H) 05/16/2019    TRIG 158 (H) 05/21/2018     Holter monitor 7/2017:  24-hour Holter monitor was applied 7/12/2017 with data analysis/interpretation 7/13/2017  Atrial flutter seen throughout the recording  Which ventricular rate is 104 bpm and ranges between  bpm  No bradycardia or pauses  A single PVC  This activity diary was reviewed-no symptoms or awareness of palpitations/arrhythmias noted  Discussion  Persistent atrial flutter.  This appears to be typical flutter and may be isthmus dependent  Elevated ventricular rate is noted with elevated average heart rate and at times heart rate up to 194  bpm  This rhythm may be amenable to isthmus/flutter ablation    Echocardiogram 7/2020:    Aortic Valve: Aortic valve not well visualized. The valve is tricuspid. There is moderate focal calcification with reduced excursion of the aortic valve present. Moderate aortic stenosis.  Mean gradient 23 mmHg    Left ventricle ejection fraction is normal. The estimated left ventricular ejection fraction is 65%.    When compared to the previous study dated 4/19/2019, no significant change.      HANNAH LIM MD [facility]      [phone]    This note created using Dragon voice recognition software.  Sound alike errors may have escaped editing.

## 2021-06-30 NOTE — PROGRESS NOTES
Progress Notes by Fely Cardozo, RN at 3/25/2021  3:34 PM     Author: Fely Cardozo RN Service: -- Author Type: Registered Nurse    Filed: 3/25/2021  3:37 PM Encounter Date: 3/25/2021 Status: Attested    : Fely Cardozo RN (Registered Nurse) Cosigner: Kamaljit Plata MD at 3/25/2021  4:01 PM    Attestation signed by Kamaljit Plata MD at 3/25/2021  4:01 PM    ok                Anticoagulation Annual Referral Renewal Review    Cam Walden's chart reviewed for annual renewal of referral to anticoagulation monitoring.        Criteria for anticoagulation nurse and/or pharmacist renewal met   Warfarin indication: Atrial Flutter Yes, per indication   Current with INR monitoring/compliant Yes Yes   Date of last office visit 2/9/21 Yes, had office visit within last year   Time in Therapeutic Range (TTR) 65 % Yes, TTR > 60%       Cam Walden met all criteria for anticoagulation management program initiated renewal.  New INR standing orders and anticoagulation referral renewal placed.      Fely Cardozo RN  3:34 PM

## 2021-07-02 NOTE — H&P
H&P by Kaitlin Lopez NP at 8/24/2020  8:00 AM     Author: Katilin Lopez NP Service: Interventional Radiology Author Type: Nurse Practitioner    Filed: 8/24/2020  7:56 AM Date of Service: 8/24/2020  8:00 AM Status: Signed    : Kaitlin Lopez NP (Nurse Practitioner)         Interventional Radiology - History and Physical  8/24/2020    Procedure Requested: Port placement   Requesting Provider:     HPI: Cam Walden is a 70 y.o. 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        NPO Status: Midnight   Anticoagulation/Antiplatelets/Bleeding tendencies: Coumadin   Antibiotics: Ancef dose ordered pre procedure       PMH:  Past Medical History:   Diagnosis Date   ? Aortic stenosis     mild   ? Atrial fibrillation (H)    ? Atrial flutter (H) 8/21/2017   ? Atrial flutter with rapid ventricular response (H)    ? Cancer (H)     lymp   ? Colon polyps 08/08/2016    Per colonoscopy 8/8/16.  Large and small.  Some removed. Others to be removed surgically. (per patient)   ? Diabetes mellitus (H)     DM II   ? Diabetes mellitus, type II (H)    ? GI bleed     Following colon polyp removal   ? Hyperlipidemia    ? Hypertension    ? Morbid obesity (H)    ? JOHNNY (obstructive sleep apnea)     no cpap   ? Rectal bleeding        PSH:  Past Surgical History:   Procedure Laterality Date   ? CARDIOVERSION  08/25/2017       ALLERGIES  Lisinopril and Trulicity [dulaglutide]    MEDICATIONS:  Current Outpatient Medications on File Prior to Encounter   Medication Sig Dispense Refill   ? glipiZIDE (GLUCOTROL XL) 10 MG 24 hr tablet TAKE 1 TABLET BY MOUTH EVERY DAY IN THE MORNING 90 tablet 1   ? hydroCHLOROthiazide (HYDRODIURIL) 25 MG tablet TAKE 1 TABLET BY MOUTH EVERY DAY 90 tablet 2   ? losartan (COZAAR) 100 MG tablet TAKE 1 TABLET BY MOUTH EVERY DAY 90 tablet 3   ? metFORMIN (GLUCOPHAGE) 1000 MG tablet TAKE 1 TABLET BY  MOUTH TWICE A DAY WITH MEALS 180 tablet 3   ? metoprolol tartrate (LOPRESSOR) 100 MG tablet TAKE 1 TABLET BY MOUTH TWICE A DAY. 180 tablet 3   ? tamsulosin (FLOMAX) 0.4 mg cap Take 2 capsules (0.8 mg total) by mouth daily. 180 capsule 2   ? warfarin ANTICOAGULANT (COUMADIN/JANTOVEN) 5 MG tablet TAKE 1 TO 2 TABLETS (5 - 10 MG) BY MOUTH DAILY AS DIRECTED. DOSE ADJUSTED PER INR RESULT. 180 tablet 1   ? atorvastatin (LIPITOR) 40 MG tablet Take 1 tablet (40 mg total) by mouth daily. 90 tablet 3   ? blood glucose test (ACCU-CHEK SMARTVIEW TEST STRIP) strips USE TO CHECK BLOOD SUGARS  DAILY AS DIRECTED 200 strip prn   ? cholecalciferol, vitamin D3, (VITAMIN D3) 1,000 unit capsule Take 1 capsule (1,000 Units total) by mouth daily. 100 capsule 2   ? DULoxetine (CYMBALTA) 30 MG capsule TAKE 1 CAPSULE BY MOUTH TWICE A  capsule 2   ? generic lancets (ACCU-CHEK FASTCLIX) Use to check blood sugar twice per day. 102 each 11   ? ketoconazole (NIZORAL) 2 % cream Apply topically daily. 60 g 1     No current facility-administered medications on file prior to encounter.        LABS:  POC INR (no units)   Date Value   08/25/2017 2.20 (!)     INR (no units)   Date Value   08/24/2020 1.13 (H)     Hemoglobin (g/dL)   Date Value   08/19/2020 12.5 (L)     Platelets (thou/uL)   Date Value   08/19/2020 289         EXAM:  /81   Pulse 62   Temp 98.1  F (36.7  C) (Oral)   Resp 22   Ht 6' (1.829 m)   Wt (!) 290 lb (131.5 kg)   SpO2 93%   BMI 39.33 kg/m    General: Stable. In no acute distress  Neuro: A&O x3.  Moves all extremities equally.  Resp: Lungs CTA bilaterally.  Cardio: S1S2 and reg, without murmur, clicks or rubs.  Neck:  Anterior neck erythematous and a bit raw appearing, currently not draining.  Erythema extends a bit more to the left than the right.    MSK:  No gross motor weakness.  Sensation intact.        Pre-Sedation Assessment:  Mallampati Airway Classification: Class 2: upper half of tonsil fossa  visible  Previous reaction to anesthesia/sedation: no  Sedation plan based on assessment: Moderate  Sleep Apnea: yes  Dentures: no  COPD: no  ASA Classification: ASA 3 - Patient with moderate systemic disease with functional limitations  Comments: no hx of asthma       ASSESSMENT:    70 y.o. 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     Anterior neck erythema present, mostly midline but extends a bit more to the left than the right.        PLAN:    Right chest port placement with sedation.      The procedure, risks and moderate sedation were discussed with patient, all questions answered and patient agrees to proceed with the procedure.      Kaitlin Lopez [facility]  Interventional Radiology  [phone]

## 2021-07-03 NOTE — ADDENDUM NOTE
Addendum Note by Raeann Romero CNP at 7/19/2017  1:05 PM     Author: Raeann Romero CNP Service: -- Author Type: Nurse Practitioner    Filed: 7/19/2017  1:05 PM Encounter Date: 7/18/2017 Status: Signed    : Raeann Romero CNP (Nurse Practitioner)    Addended by: RAEANN ROMERO on: 7/19/2017 01:05 PM        Modules accepted: Orders, SmartSet

## 2021-07-03 NOTE — ADDENDUM NOTE
Addendum Note by Kaitlin Serra RN at 8/10/2020  4:01 PM     Author: Kaitlin Serra RN Service: -- Author Type: Registered Nurse    Filed: 8/10/2020  4:01 PM Encounter Date: 8/10/2020 Status: Signed    : Kaitlin Serra RN (Registered Nurse)    Addended by: KAITLIN SERRA on: 8/10/2020 04:01 PM        Modules accepted: Orders

## 2021-07-03 NOTE — ADDENDUM NOTE
Addendum Note by Raeann Romero CNP at 7/18/2017  3:04 PM     Author: Raeann Romero CNP Service: -- Author Type: Nurse Practitioner    Filed: 7/18/2017  3:04 PM Encounter Date: 7/18/2017 Status: Signed    : Raeann Romero CNP (Nurse Practitioner)    Addended by: RAEANN ROMERO on: 7/18/2017 03:04 PM        Modules accepted: Orders

## 2021-07-04 NOTE — ADDENDUM NOTE
Addendum Note by Angie Russell, PharmEWELINA at 2/15/2021 10:23 AM     Author: Angie Russell, PharmEWELINA Service: -- Author Type: Pharmacist    Filed: 2/15/2021 10:23 AM Encounter Date: 2/12/2021 Status: Signed    : Angie Russell PharmD (Pharmacist)    Addended by: ANGIE RUSSELL on: 2/15/2021 10:23 AM        Modules accepted: Orders

## 2021-07-07 ENCOUNTER — COMMUNICATION - HEALTHEAST (OUTPATIENT)
Dept: FAMILY MEDICINE | Facility: CLINIC | Age: 71
End: 2021-07-07

## 2021-07-07 DIAGNOSIS — R20.8 DECREASED SENSATION OF FOOT: ICD-10-CM

## 2021-07-07 DIAGNOSIS — S82.832D CLOSED FRACTURE OF DISTAL END OF LEFT FIBULA WITH ROUTINE HEALING, UNSPECIFIED FRACTURE MORPHOLOGY, SUBSEQUENT ENCOUNTER: ICD-10-CM

## 2021-07-07 NOTE — TELEPHONE ENCOUNTER
Telephone Encounter by Sin Becerril at 7/7/2021 10:43 AM     Author: Sin Becerril Service: -- Author Type: Patient Access    Filed: 7/7/2021 10:45 AM Encounter Date: 7/7/2021 Status: Signed    : Sin Becerril (Patient Access)       Reason for Call:  Medication or medication refill:    Do you use a [facility]?  Name of the pharmacy and phone number for the current request: No, [facility] on file    Name of the medication requested: DULoxetine (CYMBALTA) 30 MG capsule     Other request: Patient called to request a refill of this medication. Please send refill to pharmacy on file.    Can we leave a detailed message on this number? Yes    Phone number patient can be reached at: Home number on file [phone] (home)    Best Time: any    Call taken on 7/7/2021 at 10:43 AM by Sin Becerril

## 2021-07-07 NOTE — TELEPHONE ENCOUNTER
Telephone Encounter by Cindy Temple RN at 7/7/2021  3:57 PM     Author: Cindy Temple RN Service: -- Author Type: Registered Nurse    Filed: 7/7/2021  4:17 PM Encounter Date: 7/7/2021 Status: Signed    : Cindy Temple RN (Registered Nurse)       RN cannot approve Refill Request    RN can NOT refill this medication medication not on med list. Last office visit: 2/9/2021 Kamaljit Plata MD Last Physical: 10/23/2017 Last MTM visit: Visit date not found Last visit same specialty: 2/9/2021 Kamaljit Plata MD.  Next visit within 3 mo: Visit date not found  Next physical within 3 mo: Visit date not found  Discontinued on 5/17/2021 by Dr Ibanez. Please check with provider.     Cindy Temple, [facility] Triage/Med Refill 7/7/2021    Requested Prescriptions   Pending Prescriptions Disp Refills   ? DULoxetine (CYMBALTA) 30 MG capsule 60 capsule 2     Sig: Take 1 capsule (30 mg total) by mouth 2 (two) times a day.       Tricyclics/Misc Antidepressant/Antianxiety Meds Refill Protocol Passed - 7/7/2021  3:53 PM        Passed - PCP or prescribing provider visit in last year     Last office visit with prescriber/PCP: 2/9/2021 Kamaljit Plata MD OR same dept: 2/9/2021 Kamaljit Plata MD OR same specialty: 2/9/2021 Kamaljit Plata MD  Last physical: 10/23/2017 Last MTM visit: Visit date not found   Next visit within 3 mo: Visit date not found  Next physical within 3 mo: Visit date not found  Prescriber OR PCP: Kamaljit Plata MD  Last diagnosis associated with med order: There are no diagnoses linked to this encounter.  If protocol passes may refill for 12 months if within 3 months of last provider visit (or a total of 15 months).

## 2021-07-14 PROBLEM — C18.6: Status: RESOLVED | Noted: 2017-10-24 | Resolved: 2019-05-16

## 2021-07-15 ENCOUNTER — DOCUMENTATION ONLY (OUTPATIENT)
Dept: FAMILY MEDICINE | Facility: CLINIC | Age: 71
End: 2021-07-15

## 2021-07-15 NOTE — PROGRESS NOTES
Patient is overdue for INR.   Updating check date to today so patient shows on our reminder list.      Patient was due for INR on 6/28/21    Overdue INR reminder updated

## 2021-07-20 ENCOUNTER — TELEPHONE (OUTPATIENT)
Dept: FAMILY MEDICINE | Facility: CLINIC | Age: 71
End: 2021-07-20

## 2021-07-20 NOTE — TELEPHONE ENCOUNTER
ANTICOAGULATION     Cam Walden is overdue for INR check.      Spoke with patient and scheduled lab appointment on 7/22 at 9:00am at the [facility]    Bee Glass, Pharmacy Intern

## 2021-07-22 ENCOUNTER — ANTICOAGULATION THERAPY VISIT (OUTPATIENT)
Dept: ANTICOAGULATION | Facility: CLINIC | Age: 71
End: 2021-07-22

## 2021-07-22 ENCOUNTER — LAB (OUTPATIENT)
Dept: LAB | Facility: CLINIC | Age: 71
End: 2021-07-22
Payer: MEDICARE

## 2021-07-22 DIAGNOSIS — I48.92 ATRIAL FLUTTER (H): ICD-10-CM

## 2021-07-22 DIAGNOSIS — I48.92 ATRIAL FLUTTER (H): Primary | ICD-10-CM

## 2021-07-22 DIAGNOSIS — R31.9 BLOOD IN URINE: Primary | ICD-10-CM

## 2021-07-22 LAB — INR BLD: 5.1 (ref 0.9–1.1)

## 2021-07-22 PROCEDURE — 36416 COLLJ CAPILLARY BLOOD SPEC: CPT

## 2021-07-22 PROCEDURE — 85610 PROTHROMBIN TIME: CPT

## 2021-07-22 NOTE — PROGRESS NOTES
"ANTICOAGULATION  MANAGEMENT    Cam Walden is on warfarin and had a point of care INR result > 5.0 or an \"error message\" on point of care meter today.    No venous draw until >8.    Cam via phone while at the lab and reviewed triage questions for potential signs and symptoms of bleeding.      Patient Response     Have you had any bleeding in the last week?   YES blood in urine.  Patient thinks it started on Sunday.  Has remained the same and looks like \"iced tea\" faint red color   Have you passed any red, black, or tarry stools in last week?   NO   Have you vomited/spit up any red or coffee ground material in last week?   NO   Have you had any new, severe abdominal pain or bloating develop in last week?   NO   Have you fallen or had any injuries in last week?   NO   Do you currently have a severe, sudden onset headache?   NO   Do you currently have any severe sudden changes in your vision?   NO   Do you currently have any new onset numbness, weakness/paralysis?   NO     Assessment/Plan:     Cam's responses positive for potential signs and symptoms of bleeding; provider appointment prior to discharge per AC protocol    Cam instructed to:       Hold warfarin today until receives follow up call from [facility]    Seek medical attention for new signs and symptoms of bleeding or a fall with injury.     Patient states he has been taking ibuprofen and extra strength Tylenol for pain from a fall about 6 weeks ago.      "

## 2021-07-22 NOTE — PROGRESS NOTES
ANTICOAGULATION MANAGEMENT     Cam Walden 71 year old male is on warfarin with supratherapeutic INR result. (Goal INR 2.0-3.0)    Recent labs: (last 7 days)     07/22/21  0855   INR 5.1*       ASSESSMENT     Source(s): Chart Review, Patient/Caregiver Call and Template       Warfarin doses taken: Less warfarin taken than planned which may be affecting INR    Diet: No new diet changes identified    New illness, injury, or hospitalization: No    Medication/supplement changes: ibuprofen taking 3x daily for the past 6 weeks which has potential for interaction; increasing INR--will stop taking today    tylenol taking 3x daily for the past 6 weeks which has potential for interaction; increasing INR-- will stop taking today    oxycodone as needed use (no longer taking as of Sunday) No interaction anticipated    Signs or symptoms of bleeding or clotting: Yes: hematuria, present since Sunday, tea colored, unchanged since Sunday; denied all other s/sx of bleeding as noted below in triage note    Previous INR: Therapeutic last visit; previously outside of goal range    Additional findings: None     PLAN     Recommended plan for temporary change(s) affecting INR     Dosing Instructions: Hold 2 doses then continue your current warfarin dose with next INR in 3 days       Summary  As of 7/22/2021    Full warfarin instructions:  7/22: Hold; 7/23: Hold; Otherwise 10 mg every Sun, Wed; 5 mg all other days   Next INR check:  7/26/2021             Telephone call with Cam who agrees to plan and repeated back plan correctly    Lab visit scheduled    Education provided: Target INR goal and significance of current INR result, Monitoring for bleeding signs and symptoms, When to seek medical attention/emergency care and Contact [phone] with any changes, questions or concerns.     Plan made per [facility] anticoagulation protocol    Tammie Flores RN  Anticoagulation  Clinic  7/22/2021    _______________________________________________________________________     Anticoagulation Episode Summary     Current INR goal:  2.0-3.0   TTR:  50.2 % (1 y)   Target end date:  Indefinite   Send INR reminders to:  [facility]       Comments:           Anticoagulation Care Providers     Provider Role Specialty Phone number    Kamaljit Plata MD Referring Family Medicine [phone]

## 2021-07-26 ENCOUNTER — LAB (OUTPATIENT)
Dept: LAB | Facility: CLINIC | Age: 71
End: 2021-07-26
Payer: MEDICARE

## 2021-07-26 ENCOUNTER — ANTICOAGULATION THERAPY VISIT (OUTPATIENT)
Dept: FAMILY MEDICINE | Facility: CLINIC | Age: 71
End: 2021-07-26

## 2021-07-26 DIAGNOSIS — I48.92 ATRIAL FLUTTER (H): ICD-10-CM

## 2021-07-26 LAB — INR BLD: 1.3 (ref 0.9–1.1)

## 2021-07-26 PROCEDURE — 36416 COLLJ CAPILLARY BLOOD SPEC: CPT

## 2021-07-26 PROCEDURE — 85610 PROTHROMBIN TIME: CPT

## 2021-07-26 NOTE — PROGRESS NOTES
ANTICOAGULATION MANAGEMENT     Cam Walden 71 year old male is on warfarin with subtherapeutic INR result. (Goal INR 2.0-3.0)    Recent labs: (last 7 days)     07/26/21  0848   INR 1.3*       ASSESSMENT     Source(s): Patient/Caregiver Call and Template       Warfarin doses taken: Missed dose(s) may be affecting INR and Warfarin recently held for high INR which may be affecting INR    Diet: No new diet changes identified    New illness, injury, or hospitalization: No    Medication/supplement changes: None noted    Signs or symptoms of bleeding or clotting: No, patient states hematuria from last week is completely resolved    Previous INR: Supratherapeutic, possibly d/t using ibuprofen and warfarin for 6 weeks    Additional findings: None     PLAN     Recommended plan for temporary change(s) affecting INR     Dosing Instructions: Booster dose then continue your current warfarin dose with next INR in 1 week       Summary  As of 7/26/2021    Full warfarin instructions:  7/26: 10 mg; Otherwise 10 mg every Sun, Wed; 5 mg all other days   Next INR check:  8/2/2021             Telephone call with Cam who verbalizes understanding and agrees to plan    Lab visit scheduled    Education provided: Target INR goal and significance of current INR result    Plan made per [facility] anticoagulation protocol    Fely Cardozo, RN  Anticoagulation Clinic  7/26/2021    _______________________________________________________________________     Anticoagulation Episode Summary     Current INR goal:  2.0-3.0   TTR:  49.4 % (1 y)   Target end date:  Indefinite   Send INR reminders to:  [facility]       Comments:           Anticoagulation Care Providers     Provider Role Specialty Phone number    Kamaljit Plata MD Referring Family Medicine [phone]

## 2021-07-29 ENCOUNTER — TELEPHONE (OUTPATIENT)
Dept: CARE COORDINATION | Facility: CLINIC | Age: 71
End: 2021-07-29

## 2021-07-29 NOTE — TELEPHONE ENCOUNTER
SURV care plan mailed for patient to review.   Will follow up with any questions and provide any resources needed.    Alcira CORREA RN 7/29/2021 11:56 AM

## 2021-08-02 ENCOUNTER — ANTICOAGULATION THERAPY VISIT (OUTPATIENT)
Dept: ANTICOAGULATION | Facility: CLINIC | Age: 71
End: 2021-08-02

## 2021-08-02 ENCOUNTER — LAB (OUTPATIENT)
Dept: LAB | Facility: CLINIC | Age: 71
End: 2021-08-02
Payer: MEDICARE

## 2021-08-02 DIAGNOSIS — I48.92 ATRIAL FLUTTER (H): ICD-10-CM

## 2021-08-02 LAB — INR BLD: 2.6 (ref 0.9–1.1)

## 2021-08-02 PROCEDURE — 85610 PROTHROMBIN TIME: CPT

## 2021-08-02 PROCEDURE — 36416 COLLJ CAPILLARY BLOOD SPEC: CPT

## 2021-08-02 NOTE — PROGRESS NOTES
ANTICOAGULATION MANAGEMENT     Cam Walden 71 year old male is on warfarin with therapeutic INR result. (Goal INR 2.0-3.0)    Recent labs: (last 7 days)     08/02/21  0857   INR 2.6*       ASSESSMENT     Source(s): Chart Review and Template       Warfarin doses taken: Warfarin taken as instructed    Diet: No new diet changes identified    New illness, injury, or hospitalization: No    Medication/supplement changes: None noted    Signs or symptoms of bleeding or clotting: No    Previous INR: Subtherapeutic    Additional findings: None     PLAN     Recommended plan for no diet, medication or health factor changes affecting INR     Dosing Instructions: Continue your current warfarin dose with next INR in 2 weeks       Summary  As of 8/2/2021    Full warfarin instructions:  10 mg every Sun, Wed; 5 mg all other days   Next INR check:  8/11/2021             Detailed voice message left for Cam with dosing instructions and follow up date.     Check at provider office visit on 8/11    Education provided: Please call back if any changes to your diet, medications or how you've been taking warfarin and Target INR goal and significance of current INR result    Plan made per [facility] anticoagulation protocol    Tammie Flores RN  Anticoagulation Clinic  8/2/2021    _______________________________________________________________________     Anticoagulation Episode Summary     Current INR goal:  2.0-3.0   TTR:  48.7 % (1 y)   Target end date:  Indefinite   Send INR reminders to:  [facility]       Comments:           Anticoagulation Care Providers     Provider Role Specialty Phone number    Kamaljit Plata MD Referring Family Medicine [phone]

## 2021-08-03 PROBLEM — I48.92 ATRIAL FLUTTER (H): Status: RESOLVED | Noted: 2017-08-21 | Resolved: 2020-07-22

## 2021-08-06 NOTE — TELEPHONE ENCOUNTER
Patient has not received summary in the mail as of yet.  Will f/u with patient next week.   Alcira Montiel RN on 8/6/2021 at 1:28 PM

## 2021-08-11 ENCOUNTER — OFFICE VISIT (OUTPATIENT)
Dept: FAMILY MEDICINE | Facility: CLINIC | Age: 71
End: 2021-08-11
Payer: MEDICARE

## 2021-08-11 VITALS
HEART RATE: 73 BPM | WEIGHT: 246 LBS | SYSTOLIC BLOOD PRESSURE: 127 MMHG | BODY MASS INDEX: 33.36 KG/M2 | DIASTOLIC BLOOD PRESSURE: 82 MMHG | RESPIRATION RATE: 20 BRPM | TEMPERATURE: 97.4 F

## 2021-08-11 DIAGNOSIS — E11.42 DIABETIC POLYNEUROPATHY ASSOCIATED WITH TYPE 2 DIABETES MELLITUS (H): ICD-10-CM

## 2021-08-11 DIAGNOSIS — E11.22 TYPE 2 DIABETES MELLITUS WITH STAGE 3A CHRONIC KIDNEY DISEASE, WITHOUT LONG-TERM CURRENT USE OF INSULIN (H): ICD-10-CM

## 2021-08-11 DIAGNOSIS — I48.92 ATRIAL FLUTTER WITH RAPID VENTRICULAR RESPONSE (H): ICD-10-CM

## 2021-08-11 DIAGNOSIS — L98.9 SCALP LESION: Primary | ICD-10-CM

## 2021-08-11 DIAGNOSIS — N18.31 TYPE 2 DIABETES MELLITUS WITH STAGE 3A CHRONIC KIDNEY DISEASE, WITHOUT LONG-TERM CURRENT USE OF INSULIN (H): ICD-10-CM

## 2021-08-11 PROCEDURE — 99213 OFFICE O/P EST LOW 20 MIN: CPT | Performed by: FAMILY MEDICINE

## 2021-08-11 RX ORDER — WARFARIN SODIUM 5 MG/1
TABLET ORAL
COMMUNITY
End: 2021-10-29

## 2021-08-11 RX ORDER — TAMSULOSIN HYDROCHLORIDE 0.4 MG/1
CAPSULE ORAL
COMMUNITY
Start: 2021-02-12 | End: 2021-11-22

## 2021-08-11 RX ORDER — DULOXETIN HYDROCHLORIDE 30 MG/1
30 CAPSULE, DELAYED RELEASE ORAL 2 TIMES DAILY
COMMUNITY
End: 2021-10-28

## 2021-08-11 RX ORDER — KETOCONAZOLE 20 MG/G
CREAM TOPICAL
COMMUNITY
Start: 2021-04-15 | End: 2021-11-29

## 2021-08-11 RX ORDER — HYDROCORTISONE 25 MG/G
CREAM TOPICAL
COMMUNITY
Start: 2021-04-21 | End: 2021-11-22

## 2021-08-11 RX ORDER — METOPROLOL TARTRATE 100 MG
TABLET ORAL
COMMUNITY
Start: 2021-06-23 | End: 2023-03-16

## 2021-08-11 NOTE — PROGRESS NOTES
OFFICE VISIT - FAMILY MEDICINE     ASSESSMENT AND PLAN       ICD-10-CM    1. Scalp lesion  L98.9 Adult Dermatology Referral   2. Diabetic polyneuropathy associated with type 2 diabetes mellitus (H)  E11.42 **Comprehensive metabolic panel FUTURE 14d     **A1C FUTURE 3mo     Albumin Random Urine Quantitative with Creat Ratio     Lipid panel reflex to direct LDL Fasting    Scalp lesion, differential diagnosis discussed included BCC, refer to dermatology for biopsy and further treatment.  Diabetes with.  Neuropathy, patient will return for blood works, he was encouraged to continue healthy lifestyle changes, regular physical activities.  Right humerus fracture currently wearing a sling, followed by orthopedist.    CHIEF COMPLAINT   [unfilled]    [facility]   Cam Walden is a 71 year old male.  No Patient Care Coordination Note on file.    Scalp lesion for about 9 months, does not seems to be healing and  just recently getting it to be more sensitive.  Can bleed easily.  Diabetes type 2, last A1c was about 5.2% last February.  Was able to lose significant amount of wake, currently only doing Metformin.  Right humerus fracture from a fall at home, currently wearing a sling, followed by orthopedist        Review of Systems As per HPI, otherwise negative.    OBJECTIVE   /82 (BP Location: Left arm, Patient Position: Sitting, Cuff Size: Adult Large)   Pulse 73   Temp 97.4  F (36.3  C) (Temporal)   Resp 20   Wt 111.6 kg (246 lb)   BMI 33.36 kg/m    Physical Exam  Constitutional:       Appearance: Normal appearance.   HENT:      Head: Normocephalic and atraumatic.     Cardiovascular:      Rate and Rhythm: Normal rate and regular rhythm.   Pulmonary:      Effort: Pulmonary effort is normal.      Breath sounds: Normal breath sounds.   Musculoskeletal:      Cervical back: Normal range of motion and neck supple.   Neurological:      General: No focal deficit present.      Mental Status: He is alert and  oriented to person, place, and time.   Psychiatric:         Behavior: Behavior normal.         Thought Content: Thought content normal.         Judgment: Judgment normal.         PFSH     Family History   Problem Relation Age of Onset     Dementia Mother      Asthma Father      Social History     Socioeconomic History     Marital status: Single     Spouse name: Not on file     Number of children: 0     Years of education: Not on file     Highest education level: Not on file   Occupational History     Not on file   Tobacco Use     Smoking status: Never Smoker     Smokeless tobacco: Never Used   Substance and Sexual Activity     Alcohol use: No     Drug use: No     Sexual activity: Not on file   Other Topics Concern     Parent/sibling w/ CABG, MI or angioplasty before 65F 55M? No   Social History Narrative    Patient lives alone.     Social Determinants of Health     Financial Resource Strain:      Difficulty of Paying Living Expenses:    Food Insecurity:      Worried About Running Out of Food in the Last Year:      Ran Out of Food in the Last Year:    Transportation Needs:      Lack of Transportation (Medical):      Lack of Transportation (Non-Medical):    Physical Activity:      Days of Exercise per Week:      Minutes of Exercise per Session:    Stress:      Feeling of Stress :    Social Connections:      Frequency of Communication with Friends and Family:      Frequency of Social Gatherings with Friends and Family:      Attends [employer] Services:      Active Member of Clubs or Organizations:      Attends Club or Organization Meetings:      Marital Status:    Intimate Partner Violence:      Fear of Current or Ex-Partner:      Emotionally Abused:      Physically Abused:      Sexually Abused:        PMS   [unfilled]  Past Surgical History:   Procedure Laterality Date     CARDIOVERSION  08/25/2017     IR CHEST PORT PLACEMENT > 5 YRS OF AGE  8/24/2020     IR PORT PLACEMENT >5 YEARS  8/24/2020       RESULTS/CONSULTS  (Lab/Rad)   No results found for this or any previous visit (from the past 168 hour(s)).  [unfilled]    HEALTH MAINTENANCE / SCREENING   [unfilled], [unfilled],[unfilled]  Immunization History   Administered Date(s) Administered     COVID-19,PF,Pfizer 05/25/2021, 07/01/2021     Flu, Unspecified 09/29/2015     HepA-Adult 09/29/2015     HepB-Adult 09/29/2015, 10/27/2015     Influenza (High Dose) 3 valent vaccine 10/16/2019, 09/20/2020     Influenza Vaccine IM > 6 months Valent IIV4 10/17/2016     Influenza Vaccine, 6+MO IM (QUADRIVALENT W/PRESERVATIVES) 09/29/2015     Influenza, Quad, High Dose, Pf, 65yr + 09/21/2020     Mantoux Tuberculin Skin Test 12/10/2020     Pneumo Conj 13-V (2010&after) 09/29/2015     Tdap (Adacel,Boostrix) 09/29/2015     Zoster vaccine, live 10/02/2015     Health Maintenance   Topic     DIABETIC FOOT EXAM      ANNUAL REVIEW OF  ORDERS      ADVANCE CARE PLANNING      DEPRESSION ACTION PLAN      PHQ-9      HEPATITIS C SCREENING      Pneumococcal Vaccine: 65+ Years (2 of 4 - PPSV23)     ZOSTER IMMUNIZATION (2 of 3)     MEDICARE ANNUAL WELLNESS VISIT      AORTIC ANEURYSM SCREENING (SYSTEM ASSIGNED)      EYE EXAM      LIPID      MICROALBUMIN      A1C      INFLUENZA VACCINE (1)     BMP      FALL RISK ASSESSMENT      DTAP/TDAP/TD IMMUNIZATION (2 - Td or Tdap)     COLORECTAL CANCER SCREENING      COVID-19 Vaccine      IPV IMMUNIZATION      MENINGITIS IMMUNIZATION      Review of external notes as documented elsewhere in note  26 minutes spent on the date of the encounter doing chart review, review of outside records, review of test results, interpretation of tests, patient visit and documentation          Kamaljit Plata MD  Family Medicine, [facility]   This transcription uses voice recognition software, which may contain typographical errors.

## 2021-08-24 NOTE — TELEPHONE ENCOUNTER
Called Cam. Cam did receive tx summary in mail.   At this time Cam does not have a need for any survivorship resources.   He said he is doing just fine.   No other concerns.   Cam to call if any need arise.   Alcira Montiel RN on 8/24/2021 at 1:27 PM

## 2021-09-07 ENCOUNTER — ONCOLOGY VISIT (OUTPATIENT)
Dept: ONCOLOGY | Facility: HOSPITAL | Age: 71
End: 2021-09-07
Attending: NURSE PRACTITIONER
Payer: MEDICARE

## 2021-09-07 ENCOUNTER — ANTICOAGULATION THERAPY VISIT (OUTPATIENT)
Dept: ANTICOAGULATION | Facility: CLINIC | Age: 71
End: 2021-09-07

## 2021-09-07 ENCOUNTER — LAB (OUTPATIENT)
Dept: INFUSION THERAPY | Facility: HOSPITAL | Age: 71
End: 2021-09-07
Attending: INTERNAL MEDICINE
Payer: MEDICARE

## 2021-09-07 VITALS
OXYGEN SATURATION: 98 % | RESPIRATION RATE: 16 BRPM | BODY MASS INDEX: 33.05 KG/M2 | TEMPERATURE: 97.8 F | WEIGHT: 244 LBS | SYSTOLIC BLOOD PRESSURE: 131 MMHG | DIASTOLIC BLOOD PRESSURE: 77 MMHG | HEIGHT: 72 IN | HEART RATE: 69 BPM

## 2021-09-07 DIAGNOSIS — I48.0 PAROXYSMAL ATRIAL FIBRILLATION (H): ICD-10-CM

## 2021-09-07 DIAGNOSIS — C83.398 DIFFUSE LARGE B-CELL LYMPHOMA OF EXTRANODAL SITE: Primary | ICD-10-CM

## 2021-09-07 DIAGNOSIS — E11.22 TYPE 2 DIABETES MELLITUS WITH STAGE 3A CHRONIC KIDNEY DISEASE, WITHOUT LONG-TERM CURRENT USE OF INSULIN (H): Primary | ICD-10-CM

## 2021-09-07 DIAGNOSIS — C83.398 DIFFUSE LARGE B-CELL LYMPHOMA OF EXTRANODAL SITE: ICD-10-CM

## 2021-09-07 DIAGNOSIS — N18.31 TYPE 2 DIABETES MELLITUS WITH STAGE 3A CHRONIC KIDNEY DISEASE, WITHOUT LONG-TERM CURRENT USE OF INSULIN (H): Primary | ICD-10-CM

## 2021-09-07 LAB — INR PPP: 2.69 (ref 0.9–1.15)

## 2021-09-07 PROCEDURE — 83615 LACTATE (LD) (LDH) ENZYME: CPT

## 2021-09-07 PROCEDURE — G0463 HOSPITAL OUTPT CLINIC VISIT: HCPCS

## 2021-09-07 PROCEDURE — 36591 DRAW BLOOD OFF VENOUS DEVICE: CPT

## 2021-09-07 PROCEDURE — 99213 OFFICE O/P EST LOW 20 MIN: CPT | Performed by: NURSE PRACTITIONER

## 2021-09-07 PROCEDURE — 250N000011 HC RX IP 250 OP 636: Performed by: INTERNAL MEDICINE

## 2021-09-07 PROCEDURE — 85610 PROTHROMBIN TIME: CPT

## 2021-09-07 RX ORDER — HEPARIN SODIUM (PORCINE) LOCK FLUSH IV SOLN 100 UNIT/ML 100 UNIT/ML
5 SOLUTION INTRAVENOUS
Status: DISCONTINUED | OUTPATIENT
Start: 2021-09-07 | End: 2021-09-07 | Stop reason: HOSPADM

## 2021-09-07 RX ORDER — HEPARIN SODIUM (PORCINE) LOCK FLUSH IV SOLN 100 UNIT/ML 100 UNIT/ML
5 SOLUTION INTRAVENOUS
Status: CANCELLED | OUTPATIENT
Start: 2021-09-07

## 2021-09-07 RX ADMIN — HEPARIN 5 ML: 100 SYRINGE at 13:49

## 2021-09-07 ASSESSMENT — MIFFLIN-ST. JEOR: SCORE: 1909.91

## 2021-09-07 ASSESSMENT — PAIN SCALES - GENERAL: PAINLEVEL: NO PAIN (0)

## 2021-09-07 NOTE — PROGRESS NOTES
[facility] Hematology and Oncology Progress Note    Patient: Cam Walden  MRN: [id number]  Date of Service: 09/07/2021        Reason for Visit    Chief Complaint   Patient presents with     Oncology Clinic Visit     lymphoma       Assessment and Plan    Cancer Staging  No matching staging information was found for the patient.    1. Diffuse large B cell lymphoma, thyroid with skin infiltration, diagnosed August 2020, GCB TYPE, Stage 1: had chemo with R-CHOP. Completed November 2020. Last CT scan was May and that was normal. We will do scans every 6 months until 2 years out. Will return in November with scans and to see Dr. Ibanez. No constitutional symptoms.     2. Skin lesion on top of head: seeing dermatologist. Hasn't changed over the last 3 months.     ECOG Performance    1 - Can't do physically strenuous work, but fully ambyulatory and can do light sedentary work    Distress Screening (within last 30 days)    No data recorded     Pain  Pain Score: No Pain (0)    Problem List    Patient Active Problem List   Diagnosis     Type 2 diabetes mellitus with stage 3a chronic kidney disease, without long-term current use of insulin (H)        ______________________________________________________________________________    History of Present Illness    Measurable disease: PET scan     Past therapy: R-CHOP for 4 cycles, 9/15/20 until November 16, 2020    Interim History: Patient is here today for a 3-month follow-up visit.  He says he is doing fine and really has no concerns today.  He says he did have a skin lesion pop up at the top of his head about 3 or 4 months ago he tried putting some cream on and it did not really change it.  He says it has not changed over the last 3 to 4 months it just has not gone away.  It is not itchy or painful or bleeding.  He does have an appoint with a dermatologist set up based on recommendation from his primary care doctor.  Other than that no weight loss.  He did have a  "fall a couple of months ago and is still healing from a humerus fracture.  He is doing physical therapy and that is going well.  He denies any fevers or night sweats.  Denies any change in his fatigue level.    Review of Systems    Pertinent items are noted in HPI.    Past History    Past Medical History:   Diagnosis Date     Aortic stenosis     mild     Atrial fibrillation (H)      Atrial flutter (H) 8/21/2017     Atrial flutter with rapid ventricular response (H)      Cancer (H)     lymp     Colon polyps 08/08/2016    Per colonoscopy 8/8/16.  Large and small.  Some removed. Others to be removed surgically. (per patient)     Diabetes mellitus (H)     DM II     Diabetes mellitus, type II (H)      Diabetic polyneuropathy associated with type 2 diabetes mellitus (H) 12/16/2020     GI bleed     Following colon polyp removal     Hyperlipidemia      Hypertension      Morbid obesity (H)      JOHNNY (obstructive sleep apnea)     no cpap     Rectal bleeding        PHYSICAL EXAM  /77   Pulse 69   Temp 97.8  F (36.6  C)   Resp 16   Ht 1.829 m (6' 0.01\")   Wt 110.7 kg (244 lb)   SpO2 98%   BMI 33.08 kg/m      GENERAL: no acute distress. Cooperative in conversation. Here alone. Mask on  RESP: Regular respiratory rate. No expiratory wheezes   MUSCULOSKELETAL: no bilateral leg swelling  NEURO: non focal. Alert and oriented x3.   PSYCH: within normal limits. No depression or anxiety.  SKIN: exposed skin is dry intact. Lesion on the top of his head. Looks like a scab. About 7mm in diameter    Lab Results    No results found for this or any previous visit (from the past 168 hour(s)).  LDH pending    Imaging    No results found.      Signed by: CLAY Garrett CNP  "

## 2021-09-07 NOTE — PROGRESS NOTES
"Oncology Rooming Note    September 7, 2021 1:15 PM   Cam Walden is a 71 year old male who presents for:    Chief Complaint   Patient presents with     Oncology Clinic Visit     lymphoma     Initial Vitals: /77   Pulse 69   Temp 97.8  F (36.6  C)   Resp 16   Ht 1.829 m (6' 0.01\")   Wt 110.7 kg (244 lb)   SpO2 98%   BMI 33.08 kg/m   Estimated body mass index is 33.08 kg/m  as calculated from the following:    Height as of this encounter: 1.829 m (6' 0.01\").    Weight as of this encounter: 110.7 kg (244 lb). Body surface area is 2.37 meters squared.  No Pain (0) Comment: Data Unavailable   No LMP for male patient.  Allergies reviewed: Yes  Medications reviewed: Yes    Medications: Medication refills not needed today.  Pharmacy name entered into [employer]: [id number] - [place], [place] -  [address]    Clinical concerns: no concerns, pt has skin lesion on scalp, states he has a Derm appt next month Annika Diamond NP was notified.      JORGE CAMARENA RN            "

## 2021-09-07 NOTE — PROGRESS NOTES
ANTICOAGULATION MANAGEMENT     Cam Walden 71 year old male is on warfarin with therapeutic INR result. (Goal INR 2.0-3.0)    Recent labs: (last 7 days)     09/07/21  1337   INR 2.69*       ASSESSMENT     Source(s): Chart Review and Patient/Caregiver Call       Warfarin doses taken: Warfarin taken as instructed    Diet: No new diet changes identified    New illness, injury, or hospitalization: No    Medication/supplement changes: None noted    Signs or symptoms of bleeding or clotting: No    Previous INR: Therapeutic last visit; previously outside of goal range    Additional findings: None     PLAN     Recommended plan for no diet, medication or health factor changes affecting INR     Dosing Instructions: Continue your current warfarin dose with next INR in 4 weeks       Summary  As of 9/7/2021    Full warfarin instructions:  10 mg every Sun, Wed; 5 mg all other days   Next INR check:  10/5/2021             Telephone call with Cam who verbalizes understanding and agrees to plan    Lab visit scheduled    Education provided: Importance of therapeutic range    Plan made per [facility] anticoagulation protocol    Jackie Rao RN  Anticoagulation Clinic  9/7/2021    _______________________________________________________________________     Anticoagulation Episode Summary     Current INR goal:  2.0-3.0   TTR:  56.3 % (1 y)   Target end date:  Indefinite   Send INR reminders to:  [facility]       Comments:           Anticoagulation Care Providers     Provider Role Specialty Phone number    Kamaljit Plata MD Referring Family Medicine [phone]

## 2021-09-07 NOTE — LETTER
9/7/2021         RE: Cam Walden  [address] [place] [address]        Dear Colleague,    Thank you for referring your patient, Cam Walden, to the [facility]. Please see a copy of my visit note below.    [facility] Hematology and Oncology Progress Note    Patient: Cam Walden  MRN: [id number]  Date of Service: 09/07/2021        Reason for Visit    Chief Complaint   Patient presents with     Oncology Clinic Visit     lymphoma       Assessment and Plan    Cancer Staging  No matching staging information was found for the patient.    1. Diffuse large B cell lymphoma, thyroid with skin infiltration, diagnosed August 2020, GCB TYPE, Stage 1: had chemo with R-CHOP. Completed November 2020. Last CT scan was May and that was normal. We will do scans every 6 months until 2 years out. Will return in November with scans and to see Dr. Ibanez. No constitutional symptoms.     2. Skin lesion on top of head: seeing dermatologist. Hasn't changed over the last 3 months.     ECOG Performance    1 - Can't do physically strenuous work, but fully ambyulatory and can do light sedentary work    Distress Screening (within last 30 days)    No data recorded     Pain  Pain Score: No Pain (0)    Problem List    Patient Active Problem List   Diagnosis     Type 2 diabetes mellitus with stage 3a chronic kidney disease, without long-term current use of insulin (H)        ______________________________________________________________________________    History of Present Illness    Measurable disease: PET scan     Past therapy: R-CHOP for 4 cycles, 9/15/20 until November 16, 2020    Interim History: Patient is here today for a 3-month follow-up visit.  He says he is doing fine and really has no concerns today.  He says he did have a skin lesion pop up at the top of his head about 3 or 4 months ago he tried putting some cream on and it did not really change it.  He says it has not  "changed over the last 3 to 4 months it just has not gone away.  It is not itchy or painful or bleeding.  He does have an appoint with a dermatologist set up based on recommendation from his primary care doctor.  Other than that no weight loss.  He did have a fall a couple of months ago and is still healing from a humerus fracture.  He is doing physical therapy and that is going well.  He denies any fevers or night sweats.  Denies any change in his fatigue level.    Review of Systems    Pertinent items are noted in HPI.    Past History    Past Medical History:   Diagnosis Date     Aortic stenosis     mild     Atrial fibrillation (H)      Atrial flutter (H) 8/21/2017     Atrial flutter with rapid ventricular response (H)      Cancer (H)     lymp     Colon polyps 08/08/2016    Per colonoscopy 8/8/16.  Large and small.  Some removed. Others to be removed surgically. (per patient)     Diabetes mellitus (H)     DM II     Diabetes mellitus, type II (H)      Diabetic polyneuropathy associated with type 2 diabetes mellitus (H) 12/16/2020     GI bleed     Following colon polyp removal     Hyperlipidemia      Hypertension      Morbid obesity (H)      JOHNNY (obstructive sleep apnea)     no cpap     Rectal bleeding        PHYSICAL EXAM  /77   Pulse 69   Temp 97.8  F (36.6  C)   Resp 16   Ht 1.829 m (6' 0.01\")   Wt 110.7 kg (244 lb)   SpO2 98%   BMI 33.08 kg/m      GENERAL: no acute distress. Cooperative in conversation. Here alone. Mask on  RESP: Regular respiratory rate. No expiratory wheezes   MUSCULOSKELETAL: no bilateral leg swelling  NEURO: non focal. Alert and oriented x3.   PSYCH: within normal limits. No depression or anxiety.  SKIN: exposed skin is dry intact. Lesion on the top of his head. Looks like a scab. About 7mm in diameter    Lab Results    No results found for this or any previous visit (from the past 168 hour(s)).  LDH pending    Imaging    No results found.      Signed by: CLAY Garrett " "[facility]    Oncology Rooming Note    September 7, 2021 1:15 PM   Cam Walden is a 71 year old male who presents for:    Chief Complaint   Patient presents with     Oncology Clinic Visit     lymphoma     Initial Vitals: /77   Pulse 69   Temp 97.8  F (36.6  C)   Resp 16   Ht 1.829 m (6' 0.01\")   Wt 110.7 kg (244 lb)   SpO2 98%   BMI 33.08 kg/m   Estimated body mass index is 33.08 kg/m  as calculated from the following:    Height as of this encounter: 1.829 m (6' 0.01\").    Weight as of this encounter: 110.7 kg (244 lb). Body surface area is 2.37 meters squared.  No Pain (0) Comment: Data Unavailable   No LMP for male patient.  Allergies reviewed: Yes  Medications reviewed: Yes    Medications: Medication refills not needed today.  Pharmacy name entered into [employer]: [id number] - [place], [place] -  [address]    Clinical concerns: no concerns, pt has skin lesion on scalp, states he has a Derm appt next month Annika Diamond NP was notified.      JORGE CAMARENA RN                Again, thank you for allowing me to participate in the care of your patient.        Sincerely,        CLAY Garrett CNP    "

## 2021-09-08 LAB — LDH SERPL L TO P-CCNC: 179 U/L (ref 125–220)

## 2021-10-05 ENCOUNTER — LAB (OUTPATIENT)
Dept: LAB | Facility: CLINIC | Age: 71
End: 2021-10-05
Payer: MEDICARE

## 2021-10-05 ENCOUNTER — ANTICOAGULATION THERAPY VISIT (OUTPATIENT)
Dept: ANTICOAGULATION | Facility: CLINIC | Age: 71
End: 2021-10-05

## 2021-10-05 DIAGNOSIS — E11.42 DIABETIC POLYNEUROPATHY ASSOCIATED WITH TYPE 2 DIABETES MELLITUS (H): ICD-10-CM

## 2021-10-05 DIAGNOSIS — I48.92 ATRIAL FLUTTER (H): ICD-10-CM

## 2021-10-05 LAB
CHOLEST SERPL-MCNC: 131 MG/DL
CREAT UR-MCNC: 159 MG/DL
FASTING STATUS PATIENT QL REPORTED: YES
HBA1C MFR BLD: 6.5 % (ref 0–5.6)
HDLC SERPL-MCNC: 23 MG/DL
INR BLD: 1.7 (ref 0.9–1.1)
LDLC SERPL CALC-MCNC: 69 MG/DL
MICROALBUMIN UR-MCNC: 2.68 MG/DL (ref 0–1.99)
MICROALBUMIN/CREAT UR: 16.9 MG/G CR
TRIGL SERPL-MCNC: 196 MG/DL

## 2021-10-05 PROCEDURE — 36415 COLL VENOUS BLD VENIPUNCTURE: CPT | Performed by: FAMILY MEDICINE

## 2021-10-05 PROCEDURE — 82043 UR ALBUMIN QUANTITATIVE: CPT | Performed by: FAMILY MEDICINE

## 2021-10-05 PROCEDURE — 83036 HEMOGLOBIN GLYCOSYLATED A1C: CPT | Performed by: FAMILY MEDICINE

## 2021-10-05 PROCEDURE — 80061 LIPID PANEL: CPT | Performed by: FAMILY MEDICINE

## 2021-10-05 PROCEDURE — 85610 PROTHROMBIN TIME: CPT | Performed by: FAMILY MEDICINE

## 2021-10-05 NOTE — PROGRESS NOTES
ANTICOAGULATION MANAGEMENT     Cam Walden 71 year old male is on warfarin with subtherapeutic INR result. (Goal INR 2.0-3.0)    Recent labs: (last 7 days)     10/05/21  0842   INR 1.7*       ASSESSMENT     Source(s): Chart Review, Patient/Caregiver Call and Template       Warfarin doses taken: Missed dose(s) may be affecting INR    Diet: Did not feel well yesterday may be affecting diet and INR    New illness, injury, or hospitalization: Yes: He did  Not feel good yesterdays with some loose stools and did not eat well. He is better today.    Medication/supplement changes: None noted    Signs or symptoms of bleeding or clotting: No    Previous INR: Therapeutic last 2(+) visits    Additional findings: None     PLAN     Recommended plan for temporary change(s) affecting INR     Dosing Instructions: Continue your current warfarin dose with next INR in 2 weeks   He did not want to change anything at this time.     Summary  As of 10/5/2021    Full warfarin instructions:  10 mg every Sun, Wed; 5 mg all other days   Next INR check:  10/19/2021             Telephone call with Cam who verbalizes understanding and agrees to plan    Lab visit scheduled    Education provided: Goal range and significance of current result and Importance of therapeutic range    Plan made per [facility] anticoagulation protocol    Jackie Rao, RN  Anticoagulation Clinic  10/5/2021    _______________________________________________________________________     Anticoagulation Episode Summary     Current INR goal:  2.0-3.0   TTR:  59.3 % (1 y)   Target end date:  Indefinite   Send INR reminders to:  [facility] [employer]       Comments:           Anticoagulation Care Providers     Provider Role Specialty Phone number    Kamaljit Plata MD Referring Family Medicine [phone]

## 2021-10-18 ENCOUNTER — TRANSFERRED RECORDS (OUTPATIENT)
Dept: HEALTH INFORMATION MANAGEMENT | Facility: CLINIC | Age: 71
End: 2021-10-18

## 2021-10-29 ENCOUNTER — ANTICOAGULATION THERAPY VISIT (OUTPATIENT)
Dept: ANTICOAGULATION | Facility: CLINIC | Age: 71
End: 2021-10-29

## 2021-10-29 ENCOUNTER — LAB (OUTPATIENT)
Dept: LAB | Facility: CLINIC | Age: 71
End: 2021-10-29
Payer: MEDICARE

## 2021-10-29 DIAGNOSIS — I48.92 ATRIAL FLUTTER (H): ICD-10-CM

## 2021-10-29 DIAGNOSIS — I48.92 ATRIAL FLUTTER (H): Primary | ICD-10-CM

## 2021-10-29 LAB — INR BLD: 4.2 (ref 0.9–1.1)

## 2021-10-29 PROCEDURE — 85610 PROTHROMBIN TIME: CPT | Performed by: FAMILY MEDICINE

## 2021-10-29 RX ORDER — WARFARIN SODIUM 5 MG/1
TABLET ORAL
Qty: 120 TABLET | Refills: 1 | Status: SHIPPED | OUTPATIENT
Start: 2021-10-29 | End: 2021-11-02

## 2021-10-29 NOTE — PROGRESS NOTES
ANTICOAGULATION MANAGEMENT     Cam Walden 71 year old male is on warfarin with supratherapeutic INR result. (Goal INR 2.0-3.0)    Recent labs: (last 7 days)     10/29/21  1000   INR 4.2*       ASSESSMENT     Source(s): Chart Review, Patient/Caregiver Call and Template       Warfarin doses taken: Warfarin taken as instructed    Diet: Decreased greens/vitamin K in diet; plans to resume previous intake    New illness, injury, or hospitalization: No    Medication/supplement changes: Cephalexin 10 day course (dates: 10/18-10/27) maybe part of high INR today    Signs or symptoms of bleeding or clotting: No    Previous INR: Subtherapeutic    Additional findings: None     PLAN     Recommended plan for temporary change(s) affecting INR     Dosing Instructions: Hold dose then continue your current warfarin dose with next INR in 7-10 days       Summary  As of 10/29/2021    Full warfarin instructions:  10/29: Hold; Otherwise 10 mg every Sun, Wed; 5 mg all other days   Next INR check:  11/8/2021             Telephone call with Cam who verbalizes understanding and agrees to plan    Patient elected to schedule next visit 11/11    Education provided: Goal range and significance of current result, Importance of therapeutic range and Monitoring for bleeding signs and symptoms    Plan made per [facility] anticoagulation protocol    Jackie Rao RN  Anticoagulation Clinic  10/29/2021    _______________________________________________________________________     Anticoagulation Episode Summary     Current INR goal:  2.0-3.0   TTR:  58.6 % (1 y)   Target end date:  Indefinite   Send INR reminders to:  [employer] [employer]       Comments:           Anticoagulation Care Providers     Provider Role Specialty Phone number    Kamaljit Plata MD Referring Family Medicine [phone]

## 2021-11-02 DIAGNOSIS — I48.92 ATRIAL FLUTTER (H): ICD-10-CM

## 2021-11-02 RX ORDER — WARFARIN SODIUM 5 MG/1
TABLET ORAL
Qty: 120 TABLET | Refills: 1 | Status: SHIPPED | OUTPATIENT
Start: 2021-11-02 | End: 2022-10-03

## 2021-11-02 NOTE — TELEPHONE ENCOUNTER
Reason for Call:  Medication or medication refill:    Do you use a [facility]?  Name of the pharmacy and phone number for the current request[facility] [address]    Name of the medication requested: warfrin    Other request: patient has been waiting since last week for his refill on his warfrin and now is out of meds,, please refill asap    Can we leave a detailed message on this number? Not Applicable    Phone number patient can be reached at: Home number on file [phone] (home)    Best Time: asap please    Call taken on 11/2/2021 at 11:05 AM by Caprice Boateng

## 2021-11-11 ENCOUNTER — ANTICOAGULATION THERAPY VISIT (OUTPATIENT)
Dept: ANTICOAGULATION | Facility: CLINIC | Age: 71
End: 2021-11-11

## 2021-11-11 ENCOUNTER — LAB (OUTPATIENT)
Dept: LAB | Facility: CLINIC | Age: 71
End: 2021-11-11
Payer: MEDICARE

## 2021-11-11 DIAGNOSIS — I48.92 ATRIAL FLUTTER (H): ICD-10-CM

## 2021-11-11 LAB — INR BLD: 3.5 (ref 0.9–1.1)

## 2021-11-11 PROCEDURE — 85610 PROTHROMBIN TIME: CPT | Performed by: FAMILY MEDICINE

## 2021-11-11 NOTE — PROGRESS NOTES
ANTICOAGULATION MANAGEMENT     Cam Walden 71 year old male is on warfarin with supratherapeutic INR result. (Goal INR 2.0-3.0)    Recent labs: (last 7 days)     11/11/21  0854   INR 3.5*       ASSESSMENT     Source(s): Chart Review, Patient/Caregiver Call and Template       Warfarin doses taken: Warfarin taken as instructed    Diet: No new diet changes identified    New illness, injury, or hospitalization: No    Medication/supplement changes: None noted    Signs or symptoms of bleeding or clotting: No    Previous INR: Supratherapeutic    Additional findings: None     PLAN     Recommended plan for no diet, medication or health factor changes affecting INR     Dosing Instructions: Partial hold then Decrease your warfarin dose (11.1% change) with next INR in 2 weeks       Summary  As of 11/11/2021    Full warfarin instructions:  11/11: 2.5 mg; Otherwise 10 mg every Wed; 5 mg all other days   Next INR check:  11/26/2021             Telephone call with Cam who verbalizes understanding and agrees to plan    Lab visit scheduled    Education provided: Importance of therapeutic range    Plan made per [facility] anticoagulation protocol    Jackie Rao RN  Anticoagulation Clinic  11/11/2021    _______________________________________________________________________     Anticoagulation Episode Summary     Current INR goal:  2.0-3.0   TTR:  55.3 % (1 y)   Target end date:  Indefinite   Send INR reminders to:  [address]       Comments:           Anticoagulation Care Providers     Provider Role Specialty Phone number    Kamaljit Plata MD Referring Family Medicine [phone]

## 2021-11-15 ENCOUNTER — HOSPITAL ENCOUNTER (OUTPATIENT)
Dept: CT IMAGING | Facility: HOSPITAL | Age: 71
End: 2021-11-15
Attending: NURSE PRACTITIONER
Payer: MEDICARE

## 2021-11-15 DIAGNOSIS — C83.398 DIFFUSE LARGE B-CELL LYMPHOMA OF EXTRANODAL SITE: ICD-10-CM

## 2021-11-15 LAB
CREAT BLD-MCNC: 3 MG/DL (ref 0.7–1.3)
GFR SERPL CREATININE-BSD FRML MDRD: 20 ML/MIN/1.73M2

## 2021-11-15 PROCEDURE — 250N000011 HC RX IP 250 OP 636: Performed by: NURSE PRACTITIONER

## 2021-11-15 PROCEDURE — 82565 ASSAY OF CREATININE: CPT

## 2021-11-15 PROCEDURE — 70490 CT SOFT TISSUE NECK W/O DYE: CPT

## 2021-11-15 PROCEDURE — 71250 CT THORAX DX C-: CPT | Mod: MG

## 2021-11-15 RX ORDER — HEPARIN SODIUM (PORCINE) LOCK FLUSH IV SOLN 100 UNIT/ML 100 UNIT/ML
500 SOLUTION INTRAVENOUS ONCE
Status: COMPLETED | OUTPATIENT
Start: 2021-11-15 | End: 2021-11-15

## 2021-11-15 RX ADMIN — HEPARIN 500 UNITS: 100 SYRINGE at 12:34

## 2021-11-18 ENCOUNTER — TELEPHONE (OUTPATIENT)
Dept: UROLOGY | Facility: CLINIC | Age: 71
End: 2021-11-18

## 2021-11-18 ENCOUNTER — ANTICOAGULATION THERAPY VISIT (OUTPATIENT)
Dept: ANTICOAGULATION | Facility: CLINIC | Age: 71
End: 2021-11-18

## 2021-11-18 ENCOUNTER — ONCOLOGY VISIT (OUTPATIENT)
Dept: ONCOLOGY | Facility: HOSPITAL | Age: 71
End: 2021-11-18
Attending: INTERNAL MEDICINE
Payer: MEDICARE

## 2021-11-18 ENCOUNTER — LAB (OUTPATIENT)
Dept: INFUSION THERAPY | Facility: HOSPITAL | Age: 71
End: 2021-11-18
Attending: INTERNAL MEDICINE
Payer: MEDICARE

## 2021-11-18 VITALS
OXYGEN SATURATION: 96 % | WEIGHT: 231 LBS | RESPIRATION RATE: 12 BRPM | DIASTOLIC BLOOD PRESSURE: 74 MMHG | BODY MASS INDEX: 31.32 KG/M2 | HEART RATE: 76 BPM | TEMPERATURE: 98.5 F | SYSTOLIC BLOOD PRESSURE: 123 MMHG

## 2021-11-18 DIAGNOSIS — I48.92 ATRIAL FLUTTER (H): ICD-10-CM

## 2021-11-18 DIAGNOSIS — C83.398 DIFFUSE LARGE B-CELL LYMPHOMA OF EXTRANODAL SITE: Primary | ICD-10-CM

## 2021-11-18 DIAGNOSIS — N18.31 TYPE 2 DIABETES MELLITUS WITH STAGE 3A CHRONIC KIDNEY DISEASE, WITHOUT LONG-TERM CURRENT USE OF INSULIN (H): Primary | ICD-10-CM

## 2021-11-18 DIAGNOSIS — E11.22 TYPE 2 DIABETES MELLITUS WITH STAGE 3A CHRONIC KIDNEY DISEASE, WITHOUT LONG-TERM CURRENT USE OF INSULIN (H): Primary | ICD-10-CM

## 2021-11-18 LAB — INR PPP: 2.79 (ref 0.9–1.15)

## 2021-11-18 PROCEDURE — 36591 DRAW BLOOD OFF VENOUS DEVICE: CPT

## 2021-11-18 PROCEDURE — G0463 HOSPITAL OUTPT CLINIC VISIT: HCPCS

## 2021-11-18 PROCEDURE — 250N000011 HC RX IP 250 OP 636: Performed by: INTERNAL MEDICINE

## 2021-11-18 PROCEDURE — 85610 PROTHROMBIN TIME: CPT

## 2021-11-18 PROCEDURE — 99214 OFFICE O/P EST MOD 30 MIN: CPT | Performed by: INTERNAL MEDICINE

## 2021-11-18 PROCEDURE — 36415 COLL VENOUS BLD VENIPUNCTURE: CPT

## 2021-11-18 RX ORDER — HEPARIN SODIUM (PORCINE) LOCK FLUSH IV SOLN 100 UNIT/ML 100 UNIT/ML
5 SOLUTION INTRAVENOUS
Status: CANCELLED | OUTPATIENT
Start: 2021-11-18

## 2021-11-18 RX ORDER — HEPARIN SODIUM (PORCINE) LOCK FLUSH IV SOLN 100 UNIT/ML 100 UNIT/ML
5 SOLUTION INTRAVENOUS
Status: DISCONTINUED | OUTPATIENT
Start: 2021-11-18 | End: 2021-11-24 | Stop reason: HOSPADM

## 2021-11-18 RX ADMIN — HEPARIN SODIUM (PORCINE) LOCK FLUSH IV SOLN 100 UNIT/ML 5 ML: 100 SOLUTION at 08:06

## 2021-11-18 ASSESSMENT — PAIN SCALES - GENERAL: PAINLEVEL: NO PAIN (0)

## 2021-11-18 NOTE — PROGRESS NOTES
ANTICOAGULATION MANAGEMENT     Cam Walden 71 year old male is on warfarin with therapeutic INR result. (Goal INR 2.0-3.0)    Recent labs: (last 7 days)     11/18/21  0806   INR 2.79*       ASSESSMENT     Source(s): Chart Review and Patient/Caregiver Call       Warfarin doses taken: Warfarin taken as instructed    Diet: No new diet changes identified    New illness, injury, or hospitalization: No    Medication/supplement changes: None noted    Signs or symptoms of bleeding or clotting: No    Previous INR: Supratherapeutic    Additional findings: None     PLAN     Recommended plan for no diet, medication or health factor changes affecting INR     Dosing Instructions: Continue your current warfarin dose with next INR in 2 weeks       Summary  As of 11/18/2021    Full warfarin instructions:  10 mg every Wed; 5 mg all other days   Next INR check:  12/2/2021             Telephone call with Cam who verbalizes understanding and agrees to plan    Lab visit scheduled    Education provided: Importance of therapeutic range    Plan made per [facility] anticoagulation protocol    Jackie Rao RN  Anticoagulation Clinic  11/18/2021    _______________________________________________________________________     Anticoagulation Episode Summary     Current INR goal:  2.0-3.0   TTR:  55.8 % (1 y)   Target end date:  Indefinite   Send INR reminders to:  [facility] [employer]       Comments:           Anticoagulation Care Providers     Provider Role Specialty Phone number    Kamaljit Plata MD Referring Family Medicine [phone]

## 2021-11-18 NOTE — TELEPHONE ENCOUNTER
Message left for patient to call clinic to set up an appointment for kidney stone follow-up for today or tomorrow.  Terra Lopez RN

## 2021-11-18 NOTE — PROGRESS NOTES
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  Patient has recovered from side effects of chemotherapy.  We will continue with observation.  We will see him again in 3 months for reevaluation with lab work.    Note of right kidney stone with some hydronephrosis.  Will refer to the kidney [facility] for further evaluation.    Asked him to call if there is any concern about recurrence.    Plan: Follow-up in 3 months with labs  Referral to kidney is due for right kidney stone    Measurable disease: PET scan    Current therapy: Observation    Treatment history:    R-CHOP, 4 cycles, last,  November 16, 2020   cycle 1 on 9/15    ECOG Performance   ECOG Performance Status: 1    Distress Assessment  Distress Assessment Score: No distress    Pain         Problem List    1. 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 Cam Walden is here for follow-up.  Seen 3 months ago.  Did have a fall in the summer and suffered a right humerus fracture which is healing.  Also had surgery on his scalp for skin cancer.  No fever chills or sweats.  No palpable adenopathy.  No new swelling in the neck.  No infectious problems or bleeding.  No change in activity level.      Pain Status  Currently  in Pain: No/denies    Review of Systems    As per the HPI.       Patient Coping  Patient Coping: Accepting  Distress Assessment  Distress Assessment Score: No distress  Accompanied by  Accompanied by: Alone    Past History  Past Medical History:   Diagnosis Date     Aortic stenosis     mild     Atrial fibrillation (H)      Atrial flutter (H) 8/21/2017     Atrial flutter with rapid ventricular response (H)      Cancer (H)     lymp     Colon polyps 08/08/2016    Per colonoscopy 8/8/16.  Large and small.  Some removed. Others to be removed surgically. (per patient)     Diabetes mellitus (H)     DM II     Diabetes mellitus, type II (H)      Diabetic polyneuropathy associated with type 2 diabetes mellitus (H) 12/16/2020     GI bleed     Following colon polyp removal     Hyperlipidemia      Hypertension      Morbid obesity (H)      JOHNNY (obstructive sleep apnea)     no cpap     Rectal bleeding          Past Surgical History:   Procedure Laterality Date     CARDIOVERSION  08/25/2017     IR PORT PLACEMENT >5 YEARS  8/24/2020       Physical Exam    Recent Vitals 5/17/2021   Height -   Weight 253 lbs 2 oz   BSA (m2) 2.41 m2   /81   Pulse 76   Temp 98.5   Temp src 1   SpO2 98   Some recent data might be hidden       GENERAL: Alert and oriented to time place and person. Seated comfortably. In no distress.    HEAD: Atraumatic and normocephalic.    EYES: BRIDGETTE, EOMI.  No pallor.  No icterus.    Oral cavity: no mucosal lesion or tonsillar enlargement.    NECK: supple. JVP normal.  No thyroid enlargement.    LYMPH NODES: No palpable, cervical, axillary or inguinal lymphadenopathy.    CHEST: clear to auscultation bilaterally.  Resonant to percussion throughout bilaterally.  Symmetrical breath movements bilaterally.    CVS: S1 and S2 are heard. Regular rate and rhythm.  No murmur or gallop or rub heard.  No peripheral edema.    ABDOMEN: Soft. Not tender. Not distended.  No palpable hepatomegaly or splenomegaly.  No other mass  palpable.  Bowel sounds heard.    EXTREMITIES: Warm.    SKIN: no rash, or bruising or purpura.  Has a full head of hair.      Lab Results    Recent Results (from the past 168 hour(s))   Comprehensive Metabolic Panel   Result Value Ref Range    Sodium 140 136 - 145 mmol/L    Potassium 3.8 3.5 - 5.0 mmol/L    Chloride 108 (H) 98 - 107 mmol/L    CO2 22 22 - 31 mmol/L    Anion Gap, Calculation 10 5 - 18 mmol/L    Glucose 151 (H) 70 - 125 mg/dL    BUN 15 8 - 22 mg/dL    Creatinine 1.45 (H) 0.70 - 1.30 mg/dL    GFR MDRD Af Amer 58 (L) >60 mL/min/1.73m2    GFR MDRD Non Af Amer 48 (L) >60 mL/min/1.73m2    Bilirubin, Total 0.7 0.0 - 1.0 mg/dL    Calcium 8.6 8.5 - 10.5 mg/dL    Protein, Total 7.2 6.0 - 8.0 g/dL    Albumin 4.0 3.5 - 5.0 g/dL    Alkaline Phosphatase 128 (H) 45 - 120 U/L    AST 29 0 - 40 U/L    ALT 37 0 - 45 U/L   Lactate Dehydrogenase (LDH)   Result Value Ref Range    LD (LDH) 294 (H) 125 - 220 U/L   INR   Result Value Ref Range    INR 2.68 (H) 0.90 - 1.10   HM1 (CBC with Diff)   Result Value Ref Range    WBC 9.4 4.0 - 11.0 thou/uL    RBC 4.06 (L) 4.40 - 6.20 mill/uL    Hemoglobin 11.5 (L) 14.0 - 18.0 g/dL    Hematocrit 36.6 (L) 40.0 - 54.0 %    MCV 90 80 - 100 fL    MCH 28.3 27.0 - 34.0 pg    MCHC 31.4 (L) 32.0 - 36.0 g/dL    RDW 16.7 (H) 11.0 - 14.5 %    Platelets 271 140 - 440 thou/uL    MPV 8.7 8.5 - 12.5 fL    Neutrophils % 61 50 - 70 %    Lymphocytes % 24 20 - 40 %    Monocytes % 10 2 - 10 %    Eosinophils % 2 0 - 6 %    Basophils % 0 0 - 2 %    Immature Granulocyte % 3 (H) <=0 %    Neutrophils Absolute 5.7 2.0 - 7.7 thou/uL    Lymphocytes Absolute 2.3 0.8 - 4.4 thou/uL    Monocytes Absolute 0.9 0.0 - 0.9 thou/uL    Eosinophils Absolute 0.2 0.0 - 0.4 thou/uL    Basophils Absolute 0.0 0.0 - 0.2 thou/uL    Immature Granulocyte Absolute 0.2 (H) <=0.0 thou/uL       Imaging    Ct Soft Tissue Neck With Contrast    Result Date: 5/14/2021  EXAM: CT SOFT TISSUE NECK W CONTRAST LOCATION: [facility]  HOSPITAL DATE/TIME: 5/14/2021 11:05 AM INDICATION: Diffuse large cell B-cell lymphoma with external bones solid organs status post chemotherapy. Please do involve the thyroid. COMPARISON:11/13/2020. CONTRAST: Iopamidol (Isovue-370) 75mL TECHNIQUE: Routine CT Soft Tissue Neck with IV contrast. Multiplanar reformats. Dose reduction techniques were used. FINDINGS: MUCOSAL SPACES/SOFT TISSUES: Normal mucosal spaces of the upper aerodigestive tract. No mucosal mass or inflammation identified. Normal vocal cords and infraglottic trachea. Normal parapharyngeal space and subcutaneous soft tissues. Normal oral cavity,  spaces, and floor of mouth structures. LYMPH NODES: No pathologic lymph nodes by size or morphology criteria. SALIVARY GLANDS: Normal parotid and submandibular glands. THYROID: Thyroid is normal in size and has homogeneous density. VESSELS: Vascular structures of the neck are patent. The patient has a right-sided central line in place. VISUALIZED INTRACRANIAL/ORBITS/SINUSES: No abnormality of the visualized intracranial compartment or orbits. There is a mucous retention cyst within the right maxillary sinus. OTHER: The lung apices are clear. Mild degenerative changes of the cervical spine are visualized.     1.  No CT finding of a mass, hemorrhage or abnormal 2.  No discrete mass lesion or abnormal enhancement. 3.  No significant adenopathy. 4.  Thyroid normal in size and has homogeneous density.    Ct Chest With Contrast    Result Date: 5/14/2021  EXAM: CT CHEST W CONTRAST LOCATION: [facility] DATE/TIME: 5/14/2021 11:03 AM INDICATION: lymphoma COMPARISON: PET/CT 11/13/2020 TECHNIQUE: CT chest with IV contrast. Multiplanar reformats were obtained. Dose reduction techniques were used. CONTRAST: Iopamidol (Isovue-370) 75mL FINDINGS: LUNGS AND PLEURA: The lungs are symmetrically inflated. 3 mm benign intrapulmonary lymph node in the right middle lobe near the anterior pleura  (series 5, image 207). No actionable lung nodules. No interstitial thickening. Limited bands of atelectasis are present in the left posterior costophrenic sulcus and along the left hemidiaphragm. No airspace opacities. Trachea and central airways are patent and normal caliber. No pleural fluid. MEDIASTINUM: Right jugular approach chest port catheter terminates at the SVC right atrial junction. Cardiac chambers are normal in size. No pericardial effusion. Moderate degenerative thickening/calcification of aortic valve leaflets. Sinotubular junction is preserved. No thoracic aortic aneurysm. Conventional arch anatomy. No enlarged lymph nodes in the chest. Esophagus is decompressed. No actionable thyroid nodule. CORONARY ARTERY CALCIFICATION: Mild-moderate, left coronary distribution. UPPER ABDOMEN: No actionable findings in the imaged upper abdomen. MUSCULOSKELETAL: Multilevel degenerative disc disease. Mild anterior wedging of T11 due to a chronic appearing superior endplate deformity. No aggressive or destructive bone lesions. No enlarged axillary lymph nodes. Thin-walled subdermal cyst right upper back is unchanged measuring 2.5 cm transverse (series 3, image 14).     No findings to suggest active lymphomatous involvement of the chest.        Signed by: Blade Ibanez MD

## 2021-11-18 NOTE — PROGRESS NOTES
"Oncology Rooming Note    November 18, 2021 8:26 AM   Cam Walden is a 71 year old male who presents for:    Chief Complaint   Patient presents with     Oncology Clinic Visit     Diffuse large B-cell lymphoma of extranodal site      Initial Vitals: /74 (BP Location: Left arm, Patient Position: Sitting, Cuff Size: Adult Regular)   Pulse 76   Temp 98.5  F (36.9  C) (Oral)   Resp 12   Wt 104.8 kg (231 lb)   SpO2 96%   BMI 31.32 kg/m   Estimated body mass index is 31.32 kg/m  as calculated from the following:    Height as of 9/7/21: 1.829 m (6' 0.01\").    Weight as of this encounter: 104.8 kg (231 lb). Body surface area is 2.31 meters squared.  No Pain (0) Comment: Data Unavailable   No LMP for male patient.  Allergies reviewed: Yes  Medications reviewed: Yes    Medications: Medication refills not needed today.  Pharmacy name entered into [email or web address]: [id number] - [place], [place] - [address]    Clinical concerns:  Diffuse large B-cell lymphoma of extranodal site         Macarena Miles, BELL            "

## 2021-11-22 ENCOUNTER — VIRTUAL VISIT (OUTPATIENT)
Dept: UROLOGY | Facility: CLINIC | Age: 71
End: 2021-11-22
Payer: MEDICARE

## 2021-11-22 ENCOUNTER — LAB (OUTPATIENT)
Dept: LAB | Facility: HOSPITAL | Age: 71
End: 2021-11-22
Payer: MEDICARE

## 2021-11-22 DIAGNOSIS — C83.398 DIFFUSE LARGE B-CELL LYMPHOMA OF EXTRANODAL SITE: ICD-10-CM

## 2021-11-22 DIAGNOSIS — N20.1 CALCULUS OF URETER: Primary | ICD-10-CM

## 2021-11-22 DIAGNOSIS — Z11.59 ENCOUNTER FOR SCREENING FOR OTHER VIRAL DISEASES: ICD-10-CM

## 2021-11-22 DIAGNOSIS — N17.9 ACUTE RENAL INJURY (H): ICD-10-CM

## 2021-11-22 DIAGNOSIS — N20.1 CALCULUS OF URETER: ICD-10-CM

## 2021-11-22 DIAGNOSIS — N13.2 HYDRONEPHROSIS WITH URINARY OBSTRUCTION DUE TO URETERAL CALCULUS: ICD-10-CM

## 2021-11-22 LAB
ALBUMIN SERPL-MCNC: 3.7 G/DL (ref 3.5–5)
ALBUMIN UR-MCNC: 30 MG/DL
ANION GAP SERPL CALCULATED.3IONS-SCNC: 8 MMOL/L (ref 5–18)
APPEARANCE UR: CLEAR
BILIRUB UR QL STRIP: NEGATIVE
BUN SERPL-MCNC: 27 MG/DL (ref 8–22)
CALCIUM SERPL-MCNC: 9.3 MG/DL (ref 8.5–10.5)
CHLORIDE BLD-SCNC: 107 MMOL/L (ref 98–107)
CO2 SERPL-SCNC: 23 MMOL/L (ref 22–31)
COLOR UR AUTO: ABNORMAL
CREAT SERPL-MCNC: 3.01 MG/DL (ref 0.7–1.3)
GFR SERPL CREATININE-BSD FRML MDRD: 20 ML/MIN/1.73M2
GLUCOSE BLD-MCNC: 111 MG/DL (ref 70–125)
GLUCOSE UR STRIP-MCNC: NEGATIVE MG/DL
HGB UR QL STRIP: NEGATIVE
KETONES UR STRIP-MCNC: NEGATIVE MG/DL
LEUKOCYTE ESTERASE UR QL STRIP: NEGATIVE
MUCOUS THREADS #/AREA URNS LPF: PRESENT /LPF
NITRATE UR QL: NEGATIVE
PH UR STRIP: 5.5 [PH] (ref 5–7)
PHOSPHATE SERPL-MCNC: 2.9 MG/DL (ref 2.5–4.5)
POTASSIUM BLD-SCNC: 3.8 MMOL/L (ref 3.5–5)
RBC URINE: 1 /HPF
SODIUM SERPL-SCNC: 138 MMOL/L (ref 136–145)
SP GR UR STRIP: 1.02 (ref 1–1.03)
UROBILINOGEN UR STRIP-MCNC: <2 MG/DL
WBC URINE: 1 /HPF

## 2021-11-22 PROCEDURE — 81001 URINALYSIS AUTO W/SCOPE: CPT

## 2021-11-22 PROCEDURE — 80069 RENAL FUNCTION PANEL: CPT

## 2021-11-22 PROCEDURE — 99443 PR PHYSICIAN TELEPHONE EVALUATION 21-30 MIN: CPT | Performed by: PHYSICIAN ASSISTANT

## 2021-11-22 PROCEDURE — 36415 COLL VENOUS BLD VENIPUNCTURE: CPT

## 2021-11-22 RX ORDER — ACETAMINOPHEN 325 MG/1
975 TABLET ORAL
Status: CANCELLED | OUTPATIENT
Start: 2021-12-01

## 2021-11-22 RX ORDER — GABAPENTIN 100 MG/1
300 CAPSULE ORAL
Status: CANCELLED | OUTPATIENT
Start: 2021-11-22

## 2021-11-22 RX ORDER — CEFAZOLIN SODIUM 2 G/100ML
2 INJECTION, SOLUTION INTRAVENOUS
Status: CANCELLED | OUTPATIENT
Start: 2021-12-01

## 2021-11-22 RX ORDER — TAMSULOSIN HYDROCHLORIDE 0.4 MG/1
0.4 CAPSULE ORAL DAILY
COMMUNITY
End: 2022-01-03

## 2021-11-22 ASSESSMENT — PAIN SCALES - GENERAL: PAINLEVEL: NO PAIN (0)

## 2021-11-22 NOTE — PROGRESS NOTES
Assessment/Plan:    Assessment & Plan   Cam was seen today for new patient.    Diagnoses and all orders for this visit:    Calculus of ureter  -     UA with Microscopic; Future  -     Renal panel; Future    Acute renal injury (H)    Diffuse large B-cell lymphoma of extranodal site (H)    Hydronephrosis with urinary obstruction due to ureteral calculus    Stone Management Plan  Stone Management 11/22/2021   Urinary Tract Infection No suspicion of infection   Renal Colic Asymptomatic at this time   Renal Failure Acute renal failure   Acute Renal Failure with obstruction   Current CT date 11/15/2021   Right sided stones? Yes   R Number of ureteral stones 1   R GSD of ureteral stones 8   R Location of ureteral stone Mid   R Number of kidney stones  No renal stones   R Hydronephrosis Moderate   R Stone Event New event   Diagnosis date 11/15/2021   Initial location of primary symptomatic stone Mid   Initial GSD of primary symptomatic stone 8   R Current Plan Clear   Clear rationale Poor prognosis     PLAN    70 yo diabetic M with history of lymphoma, atrial fibrillation on coumadin with incidentally diagnosed obstructing right ureteral stone, asymptomatic. Acute on chronic renal injury.    Will obtain a urinalysis and repeat renal panel. Discussed surgical intervention given the size of the stone.     Addendum: updated renal panel shows unchanged elevated creatinine but normal potassium. UA is negative for infection.    Reviewed case with Dr. Suarez. Will proceed with ureterscopic stone clearance next week. Risks and benefits were detailed of ureteroscopic stone clearance including potential issues of urinary or systemic infection, ureteral injury, inaccessible stone, incomplete stone clearance, multiple surgeries, and stent related symptoms of urgency, frequency and hematuria Patient verbalized understanding. Patient agrees with plan as discussed. Preoperative evaluation with primary care has been requested. Patient  okay to remain on  anticoagulant through nick/postoperative period.    Phone call duration: 18 minutes  22 minutes spent on the date of the encounter doing chart review, history and exam, documentation and further activities per the note    Donita Mckeon PA-C  [facility] [place]    Subjective:     HPI  Mr. Cam Walden is a 71 year old  male who is being evaluated via a billable telephone visit by [facility] [place] following     He has no prior history of kidney stones. He has identified modifiable stone risks including:  type II diabetes. He has no identified non-modifiable stone risk factors.    He was seen by his Oncologist 11/18/21 for surveillance of diffuse large B-cell lymphoma with no signs of recurrence. Incidentally found to have an obstructing right ureteral stone. Of note, pre-imaging creatinine was elevated at 3 with baseline ~ 1.5.     He reports being asymptomatic from a stone standpoint. No pain or voiding difficulties.Pertinent negative current symptoms include:  fever, chills, right flank pain, left flank pain, nausea, vomiting, hematuria, urinary frequency and dysuria.     CT scan from 11/15/21 is personally reviewed and demonstrates moderate right hydronephrosis with an 8 mm mid ureteral stone.    Significant labs from presentation include elevated creatinine (3), previously 1.5 (6/17/21).     ROS   A 12 point comprehensive review of systems is negative except for HPI    Past Medical History:   Diagnosis Date     Aortic stenosis     mild     Atrial fibrillation (H)      Atrial flutter (H) 8/21/2017     Atrial flutter with rapid ventricular response (H)      Cancer (H)     lymp     Colon polyps 08/08/2016    Per colonoscopy 8/8/16.  Large and small.  Some removed. Others to be removed surgically. (per patient)     Diabetes mellitus (H)     DM II     Diabetes mellitus, type II (H)      Diabetic polyneuropathy associated with type 2  diabetes mellitus (H) 12/16/2020     GI bleed     Following colon polyp removal     Hyperlipidemia      Hypertension      Morbid obesity (H)      JOHNNY (obstructive sleep apnea)     no cpap     Rectal bleeding      Past Surgical History:   Procedure Laterality Date     CARDIOVERSION  08/25/2017     IR CHEST PORT PLACEMENT > 5 YRS OF AGE  8/24/2020     IR PORT PLACEMENT >5 YEARS  8/24/2020     Current Outpatient Medications   Medication Sig Dispense Refill     cholecalciferol (VITAMIN D3) 25 mcg (1000 units) capsule Take 1 capsule by mouth daily       DULoxetine (CYMBALTA) 30 MG capsule Take 1 capsule (30 mg) by mouth 2 times daily 60 capsule 4     ketoconazole (NIZORAL) 2 % external cream APPLY TO AFFECTED AREA EVERY DAY       metFORMIN (GLUCOPHAGE) 1000 MG tablet TAKE 1 TABLET BY MOUTH TWICE A DAY WITH MEALS       metoprolol tartrate (LOPRESSOR) 100 MG tablet TAKE 1 TABLET BY MOUTH TWICE A DAY       tamsulosin (FLOMAX) 0.4 MG capsule Take 0.4 mg by mouth daily       warfarin ANTICOAGULANT (COUMADIN) 5 MG tablet Take 2 tablets (10 mg) Sundays and Wednesdays and 1 tablet (5 mg) all other days. Adjust dose per INR results. 120 tablet 1     Allergies   Allergen Reactions     Lisinopril Cough     Trulicity [Dulaglutide] Nausea     Social History     Socioeconomic History     Marital status: Single     Spouse name: Not on file     Number of children: 0     Years of education: Not on file     Highest education level: Not on file   Occupational History     Not on file   Tobacco Use     Smoking status: Never Smoker     Smokeless tobacco: Never Used   Substance and Sexual Activity     Alcohol use: No     Drug use: No     Sexual activity: Not on file   Other Topics Concern     Parent/sibling w/ CABG, MI or angioplasty before 65F 55M? No   Social History Narrative    Patient lives alone.     Social Determinants of Health     Financial Resource Strain: Not on file   Food Insecurity: Not on file   Transportation Needs: Not on file    Physical Activity: Not on file   Stress: Not on file   Social Connections: Not on file   Intimate Partner Violence: Not on file   Housing Stability: Not on file       Family History   Problem Relation Age of Onset     Dementia Mother      Asthma Father      Objective:     No vitals or physical exam obtained due to virtual visit    Labs  Most Recent 3 CBC's:  Recent Labs   Lab Test 05/14/21  0956 02/17/21  1234 02/09/21  1135   WBC 9.4 7.3 7.0   HGB 11.5* 11.1* 11.6*   MCV 90 94 96    267 246     Most Recent 3 BMP's:  Recent Labs   Lab Test 11/22/21  0949 11/15/21  1208 05/14/21  0956 02/17/21  1234     --  140 142   POTASSIUM 3.8  --  3.8 4.2   CHLORIDE 107  --  108* 106   CO2 23  --  22 24   BUN 27*  --  15 13   CR 3.01* 3.0* 1.45* 1.22   ANIONGAP 8  --  10 12   ABBI 9.3  --  8.6 8.6     --  151* 136*     Most Recent 3 INR's:  Recent Labs   Lab Test 11/18/21  0806 11/11/21  0854 10/29/21  1000   INR 2.79* 3.5* 4.2*     Most Recent 3 Creatinines:  Recent Labs   Lab Test 11/22/21  0949 11/15/21  1208 05/14/21  0956   CR 3.01* 3.0* 1.45*     Most Recent Hemoglobin A1c:  Recent Labs   Lab Test 10/05/21  0856   A1C 6.5*

## 2021-11-22 NOTE — PROGRESS NOTES
Patient is roomed via telephone for a virtual visit.  Patient confirmed he is in the [facility] at the time of this appointment.  Patient understands that this virtual visit is billable and agree to proceed with appointment.

## 2021-11-28 ENCOUNTER — LAB (OUTPATIENT)
Dept: FAMILY MEDICINE | Facility: CLINIC | Age: 71
End: 2021-11-28
Attending: PHYSICIAN ASSISTANT
Payer: MEDICARE

## 2021-11-28 DIAGNOSIS — Z11.59 ENCOUNTER FOR SCREENING FOR OTHER VIRAL DISEASES: ICD-10-CM

## 2021-11-28 PROCEDURE — U0005 INFEC AGEN DETEC AMPLI PROBE: HCPCS

## 2021-11-28 PROCEDURE — U0003 INFECTIOUS AGENT DETECTION BY NUCLEIC ACID (DNA OR RNA); SEVERE ACUTE RESPIRATORY SYNDROME CORONAVIRUS 2 (SARS-COV-2) (CORONAVIRUS DISEASE [COVID-19]), AMPLIFIED PROBE TECHNIQUE, MAKING USE OF HIGH THROUGHPUT TECHNOLOGIES AS DESCRIBED BY CMS-2020-01-R: HCPCS

## 2021-11-29 ENCOUNTER — OFFICE VISIT (OUTPATIENT)
Dept: FAMILY MEDICINE | Facility: CLINIC | Age: 71
End: 2021-11-29
Payer: MEDICARE

## 2021-11-29 VITALS
WEIGHT: 234 LBS | BODY MASS INDEX: 32.76 KG/M2 | HEART RATE: 66 BPM | DIASTOLIC BLOOD PRESSURE: 74 MMHG | HEIGHT: 71 IN | OXYGEN SATURATION: 98 % | SYSTOLIC BLOOD PRESSURE: 120 MMHG

## 2021-11-29 DIAGNOSIS — E11.22 TYPE 2 DIABETES MELLITUS WITH STAGE 3A CHRONIC KIDNEY DISEASE, WITHOUT LONG-TERM CURRENT USE OF INSULIN (H): ICD-10-CM

## 2021-11-29 DIAGNOSIS — N20.0 NEPHROLITHIASIS: ICD-10-CM

## 2021-11-29 DIAGNOSIS — Z01.818 PREOP GENERAL PHYSICAL EXAM: Primary | ICD-10-CM

## 2021-11-29 DIAGNOSIS — C83.398 DIFFUSE LARGE B-CELL LYMPHOMA OF EXTRANODAL SITE: ICD-10-CM

## 2021-11-29 DIAGNOSIS — Z79.01 CHRONIC ANTICOAGULATION: ICD-10-CM

## 2021-11-29 DIAGNOSIS — I48.92 ATRIAL FLUTTER WITH RAPID VENTRICULAR RESPONSE (H): ICD-10-CM

## 2021-11-29 DIAGNOSIS — N18.31 TYPE 2 DIABETES MELLITUS WITH STAGE 3A CHRONIC KIDNEY DISEASE, WITHOUT LONG-TERM CURRENT USE OF INSULIN (H): ICD-10-CM

## 2021-11-29 DIAGNOSIS — I35.0 MODERATE AORTIC STENOSIS: ICD-10-CM

## 2021-11-29 DIAGNOSIS — E11.42 DIABETIC POLYNEUROPATHY ASSOCIATED WITH TYPE 2 DIABETES MELLITUS (H): ICD-10-CM

## 2021-11-29 LAB
ERYTHROCYTE [DISTWIDTH] IN BLOOD BY AUTOMATED COUNT: 15.8 % (ref 10–15)
HCT VFR BLD AUTO: 35.6 % (ref 40–53)
HGB BLD-MCNC: 11.5 G/DL (ref 13.3–17.7)
MCH RBC QN AUTO: 29.8 PG (ref 26.5–33)
MCHC RBC AUTO-ENTMCNC: 32.3 G/DL (ref 31.5–36.5)
MCV RBC AUTO: 92 FL (ref 78–100)
PLATELET # BLD AUTO: 223 10E3/UL (ref 150–450)
RBC # BLD AUTO: 3.86 10E6/UL (ref 4.4–5.9)
SARS-COV-2 RNA RESP QL NAA+PROBE: NEGATIVE
WBC # BLD AUTO: 6.7 10E3/UL (ref 4–11)

## 2021-11-29 PROCEDURE — 93005 ELECTROCARDIOGRAM TRACING: CPT | Performed by: FAMILY MEDICINE

## 2021-11-29 PROCEDURE — 85027 COMPLETE CBC AUTOMATED: CPT | Performed by: FAMILY MEDICINE

## 2021-11-29 PROCEDURE — 80048 BASIC METABOLIC PNL TOTAL CA: CPT | Performed by: FAMILY MEDICINE

## 2021-11-29 PROCEDURE — 93010 ELECTROCARDIOGRAM REPORT: CPT | Mod: OFF | Performed by: INTERNAL MEDICINE

## 2021-11-29 PROCEDURE — 36415 COLL VENOUS BLD VENIPUNCTURE: CPT | Performed by: FAMILY MEDICINE

## 2021-11-29 PROCEDURE — 99214 OFFICE O/P EST MOD 30 MIN: CPT | Performed by: FAMILY MEDICINE

## 2021-11-29 ASSESSMENT — MIFFLIN-ST. JEOR: SCORE: 1844.58

## 2021-11-29 NOTE — PROGRESS NOTES
[facility]  [address]  [place]  [facility] [address]  Phone: [phone]  Fax: [phone]  Primary Provider: Kamaljit Plata  Pre-op Performing Provider: ALONZO GARCIA        PREOPERATIVE EVALUATION:  Today's date: 11/29/2021    Cam Walden is a 71 year old male who presents for a preoperative evaluation.    Surgical Information:  Surgery/Procedure: CYSTOURETEROSCOPY, RETROGRADE PYELOGRAM, WITH LITHOTRIPSY USING TAMMY 120P LASER AND URETERAL STENT INSERTION  Surgery Location: [facility]  Surgeon: Dr. Darrell Suarez  Surgery Date: 12/1/21  Time of Surgery: 10 am  Where patient plans to recover: At home alone  Fax number for surgical facility: Note does not need to be faxed, will be available electronically in Epic.    Type of Anesthesia Anticipated: General    Assessment & Plan     The proposed surgical procedure is considered INTERMEDIATE risk.    Preop general physical exam     - CBC with platelets, hemoglobin 11.5 which is stable and normal white blood cell count and platelet count    -INR 11/18/2021 2.79    - Basic metabolic panel (pending) recent metabolic panel showed that creatinine was elevated at 3.01 with a GFR of 20 and that was about 8 days ago.  Prior to that in May 2021 GFR was 48 with a creatinine of 1.45.    - EKG 12-lead, tracing only, reviewed and sinus bradycardia with rate of 59 otherwise nonspecific changes.    -COVID-19 nasopharyngeal swab by PCR was negative    The patient's physical health is optimized to proceed with needed anesthesia and procedure to manage the nephrolithiasis.    Nephrolithiasis  Right-sided stone and from the patient's description the stone is in the ureter.  Plan for laser lithotripsy and stenting.    History of atrial flutter with rapid ventricular response (H)  This has been less of an issue since losing 70 pounds.  He has not noticed rapid heartbeat and he is in sinus rhythm at this time.   Heart rate is 59.  He is on metoprolol 100 mg twice a day for rate control.    Type 2 diabetes mellitus with stage 3a chronic kidney disease, without long-term current use of insulin (H)  10/5/2021 A1c was 6.5 and patient is on Metformin 1000 mg twice a day    Diabetic polyneuropathy associated with type 2 diabetes mellitus (H)  The patient has neuropathy in his lower extremity related to his diabetes and he takes duloxetine 30 mg twice daily.    Diffuse large B-cell lymphoma of extranodal site (H)  This is in remission.  With patient's treatment and changes in his eating habits patient has lost about 70 pounds.    Moderate aortic stenosis  Patient's length echo was in November 2020 and ejection fraction was 60% with evidence for moderate aortic stenosis.  This is followed by cardiology.    Chronic anticoagulation  Patient currently on warfarin and was told by urology team that he can stay on his warfarin for this procedure.      Possible Sleep Apnea:  Yes, but not using CPAP since weight loss of 70 pounds.       Risks and Recommendations:  The patient has the following additional risks and recommendations for perioperative complications:  Anemia/Bleeding/Clotting:    -Patient currently is on warfarin with most recent INR 2.79 11/18/2021.  Patient states that neurologist is aware of him being on warfarin and said it was okay to continue with the warfarin.    Medication Instructions:   - Bleeding risk is low for this procedure (e.g. dental, skin, cataract).   - metformin: HOLD day of surgery.    RECOMMENDATION:  APPROVAL GIVEN to proceed with proposed procedure pending review of diagnostic evaluation.    Review of external notes as documented above   Review of the result(s) of each unique test - Tests reviewed include EKG, Covid test, CBC and metabolic panel      30 minutes spent on the date of the encounter doing chart review, history and exam, documentation and further activities per the note      Subjective      HPI related to upcoming procedure:   The patient's functional capacity is 4 METS.  He can walk up 2 flights of stairs without stopping and resting.  Polyneuropathy interferes with his mobility more than shortness of breath or chest pain.    Preop Questions 11/29/2021   1. Have you ever had a heart attack or stroke? No   2. Have you ever had surgery on your heart or blood vessels, such as a stent placement, a coronary artery bypass, or surgery on an artery in your head, neck, heart, or legs? No   3. Do you have chest pain with activity? No   4. Do you have a history of  heart failure? No   5. Do you currently have a cold, bronchitis or symptoms of other infection? No   6. Do you have a cough, shortness of breath, or wheezing? No   7. Do you or anyone in your family have previous history of blood clots? No   8. Do you or does anyone in your family have a serious bleeding problem such as prolonged bleeding following surgeries or cuts? No   9. Have you ever had problems with anemia or been told to take iron pills? No   10. Have you had any abnormal blood loss such as black, tarry or bloody stools? No   11. Have you ever had a blood transfusion? UNKNOWN - but not likely   12. Are you willing to have a blood transfusion if it is medically needed before, during, or after your surgery? Yes   13. Have you or any of your relatives ever had problems with anesthesia? No   14. Do you have sleep apnea, excessive snoring or daytime drowsiness? YES - betterwith weight loss   14a. Do you have a CPAP machine? No   15. Do you have any artifical heart valves or other implanted medical devices like a pacemaker, defibrillator, or continuous glucose monitor? No   16. Do you have artificial joints? No   17. Are you allergic to latex? No       Health Care Directive:  Patient does not have a Health Care Directive or Living Will: Discussed advance care planning with patient; however, patient declined at this time.    Preoperative Review  of :   reviewed - no record of controlled substances prescribed.      Status of Chronic Conditions:  See problem list for active medical problems.  Problems all longstanding and stable, except as noted/documented.  See ROS for pertinent symptoms related to these conditions.      Review of Systems  CONSTITUTIONAL: NEGATIVE for fever, chills, change in weight  INTEGUMENTARY/SKIN: NEGATIVE for worrisome rashes, moles or lesions  INTEGUMENTARY/SKIN: Recent diagnosis of skin cancer on his scalp status post Mohs procedure currently healing.  EYES: NEGATIVE for vision changes or irritation  ENT/MOUTH: NEGATIVE for ear, mouth and throat problems  RESP: NEGATIVE for significant cough or SOB  CV: NEGATIVE for chest pain, palpitations or peripheral edema  GI: NEGATIVE for nausea, abdominal pain, heartburn, or change in bowel habits  : NEGATIVE for frequency, dysuria, or hematuria  MUSCULOSKELETAL: NEGATIVE for significant arthralgias or myalgia  NEURO: dysesthesias, gait disturbance and paresthesias Patient has polyneuropathy that affects his sensation to some degree balance and also causes chronic pain.  ENDOCRINE: NEGATIVE for temperature intolerance, skin/hair changes  HEME: NEGATIVE for bleeding problems  PSYCHIATRIC: NEGATIVE for changes in mood or affect    Patient Active Problem List    Diagnosis Date Noted     Type 2 diabetes mellitus with stage 3a chronic kidney disease, without long-term current use of insulin (H) 06/17/2015     Priority: Medium      Past Medical History:   Diagnosis Date     Aortic stenosis     mild     Atrial fibrillation (H)      Atrial flutter (H) 8/21/2017     Atrial flutter with rapid ventricular response (H)      Cancer (H)     lymp     Colon polyps 08/08/2016    Per colonoscopy 8/8/16.  Large and small.  Some removed. Others to be removed surgically. (per patient)     Diabetes mellitus (H)     DM II     Diabetes mellitus, type II (H)      Diabetic polyneuropathy associated with type 2  "diabetes mellitus (H) 12/16/2020     Hyperlipidemia      Hypertension      Morbid obesity (H)      JOHNNY (obstructive sleep apnea)     no cpap     Past Surgical History:   Procedure Laterality Date     CARDIOVERSION  08/25/2017     IR CHEST PORT PLACEMENT > 5 YRS OF AGE  8/24/2020     IR PORT PLACEMENT >5 YEARS  8/24/2020     Current Outpatient Medications   Medication Sig Dispense Refill     cholecalciferol (VITAMIN D3) 25 mcg (1000 units) capsule Take 1 capsule by mouth daily       DULoxetine (CYMBALTA) 30 MG capsule Take 1 capsule (30 mg) by mouth 2 times daily 60 capsule 4     metFORMIN (GLUCOPHAGE) 1000 MG tablet TAKE 1 TABLET BY MOUTH TWICE A DAY WITH MEALS       metoprolol tartrate (LOPRESSOR) 100 MG tablet TAKE 1 TABLET BY MOUTH TWICE A DAY       tamsulosin (FLOMAX) 0.4 MG capsule Take 0.4 mg by mouth daily       warfarin ANTICOAGULANT (COUMADIN) 5 MG tablet Take 2 tablets (10 mg) Sundays and Wednesdays and 1 tablet (5 mg) all other days. Adjust dose per INR results. 120 tablet 1       Allergies   Allergen Reactions     Lisinopril Cough     Trulicity [Dulaglutide] Nausea        Social History     Tobacco Use     Smoking status: Never Smoker     Smokeless tobacco: Never Used   Substance Use Topics     Alcohol use: No     Family History   Problem Relation Age of Onset     Dementia Mother      Asthma Father      History   Drug Use No         Objective     /74   Pulse 66   Ht 1.797 m (5' 10.75\")   Wt 106.1 kg (234 lb)   SpO2 98%   BMI 32.87 kg/m      Physical Exam    GENERAL APPEARANCE: healthy, alert and no distress     EYES: EOMI,  PERRL     HENT: ear canals and TM's normal and nose and mouth without ulcers or lesions     NECK: no adenopathy, no asymmetry, masses, or scars and thyroid normal to palpation     RESP: lungs clear to auscultation - no rales, rhonchi or wheezes     CV: regular rates and rhythm and grade 3/6  Systolic murmur heard best over the left sternal border      ABDOMEN:  soft, " nontender, no HSM or masses and bowel sounds normal     MS: extremities normal- no gross deformities noted, no evidence of inflammation in joints, FROM in all extremities.     SKIN: no suspicious lesions or rashes     NEURO: Normal strength and tone, sensory exam grossly normal, mentation intact and speech normal     PSYCH: mentation appears normal. and affect normal/bright     LYMPHATICS: No cervical adenopathy    Recent Labs   Lab Test 11/22/21  0949 11/18/21  0806 11/15/21  1208 11/11/21  0854 10/29/21  1000 10/05/21  0856 06/13/21  0000 05/14/21  0956 05/14/21  0956 02/26/21  0849 02/17/21  1234 02/09/21  1143 02/09/21  1135   HGB  --   --   --   --   --   --   --  11.5*  --   --  11.1*  --  11.6*   PLT  --   --   --   --   --   --   --  271  --   --  267  --  246   INR  --  2.79*  --  3.5*   < >  --    < >  --   --    < >  --    < >  --      --   --   --   --   --   --   --  140  --   --    < > 143   POTASSIUM 3.8  --   --   --   --   --   --   --  3.8  --   --    < > 4.1   CR 3.01*  --  3.0*  --   --   --   --   --  1.45*  --   --    < > 1.27   A1C  --   --   --   --   --  6.5*  --   --   --   --   --   --  5.2    < > = values in this interval not displayed.        Diagnostics:  Recent Results (from the past 720 hour(s))   INR point of care    Collection Time: 11/11/21  8:54 AM   Result Value Ref Range    INR 3.5 (H) 0.9 - 1.1   Creatinine POCT    Collection Time: 11/15/21 12:08 PM   Result Value Ref Range    Creatinine POCT 3.0 (H) 0.7 - 1.3 mg/dL    GFR, ESTIMATED POCT 20 (L) >60 mL/min/1.73m2   INR    Collection Time: 11/18/21  8:06 AM   Result Value Ref Range    INR 2.79 (H) 0.90 - 1.15   UA with Microscopic    Collection Time: 11/22/21  9:49 AM   Result Value Ref Range    Color Urine Light Yellow Colorless, Straw, Light Yellow, Yellow    Appearance Urine Clear Clear    Glucose Urine Negative Negative mg/dL    Bilirubin Urine Negative Negative    Ketones Urine Negative Negative mg/dL    Specific  Gravity Urine 1.017 1.001 - 1.030    Blood Urine Negative Negative    pH Urine 5.5 5.0 - 7.0    Protein Albumin Urine 30  (A) Negative mg/dL    Urobilinogen Urine <2.0 <2.0 mg/dL    Nitrite Urine Negative Negative    Leukocyte Esterase Urine Negative Negative    Mucus Urine Present (A) None Seen /LPF    RBC Urine 1 <=2 /HPF    WBC Urine 1 <=5 /HPF   Renal panel    Collection Time: 11/22/21  9:49 AM   Result Value Ref Range    Sodium 138 136 - 145 mmol/L    Potassium 3.8 3.5 - 5.0 mmol/L    Chloride 107 98 - 107 mmol/L    Carbon Dioxide (CO2) 23 22 - 31 mmol/L    Anion Gap 8 5 - 18 mmol/L    Urea Nitrogen 27 (H) 8 - 22 mg/dL    Creatinine 3.01 (H) 0.70 - 1.30 mg/dL    Calcium 9.3 8.5 - 10.5 mg/dL    Glucose 111 70 - 125 mg/dL    Albumin 3.7 3.5 - 5.0 g/dL    Phosphorus 2.9 2.5 - 4.5 mg/dL    GFR Estimate 20 (L) >60 mL/min/1.73m2   Asymptomatic COVID-19 Virus (Coronavirus) by PCR Nose    Collection Time: 11/28/21  9:33 AM    Specimen: Nose; Swab   Result Value Ref Range    SARS CoV2 PCR Negative Negative, Testing sent to reference lab. Results will be returned via unsolicited result   EKG 12-lead, tracing only    Collection Time: 11/29/21  3:48 PM   Result Value Ref Range    Systolic Blood Pressure  mmHg    Diastolic Blood Pressure  mmHg    Ventricular Rate 59 BPM    Atrial Rate 59 BPM    DE Interval 174 ms    QRS Duration 96 ms     ms    QTc 396 ms    P Axis 67 degrees    R AXIS -30 degrees    T Axis -15 degrees    Interpretation ECG       Sinus bradycardia  Left axis deviation  Inferior STT changes   Minimal voltage criteria for LVH, may be normal variant  Abnormal ECG  When compared with ECG of 07-DEC-2020 08:12,  T wave inversion no longer evident in Lateral leads  Confirmed by ALONZO JOHN MD LOC:J[facility] ([id number]) on 11/30/2021 9:49:59 AM     CBC with platelets    Collection Time: 11/29/21  4:12 PM   Result Value Ref Range    WBC Count 6.7 4.0 - 11.0 10e3/uL    RBC Count 3.86 (L) 4.40 - 5.90 10e6/uL     Hemoglobin 11.5 (L) 13.3 - 17.7 g/dL    Hematocrit 35.6 (L) 40.0 - 53.0 %    MCV 92 78 - 100 fL    MCH 29.8 26.5 - 33.0 pg    MCHC 32.3 31.5 - 36.5 g/dL    RDW 15.8 (H) 10.0 - 15.0 %    Platelet Count 223 150 - 450 10e3/uL   Basic metabolic panel    Collection Time: 11/29/21  4:12 PM   Result Value Ref Range    Sodium 140 136 - 145 mmol/L    Potassium 4.3 3.5 - 5.0 mmol/L    Chloride 108 (H) 98 - 107 mmol/L    Carbon Dioxide (CO2) 20 (L) 22 - 31 mmol/L    Anion Gap 12 5 - 18 mmol/L    Urea Nitrogen 31 (H) 8 - 22 mg/dL    Creatinine 3.12 (H) 0.70 - 1.30 mg/dL    Calcium 9.0 8.5 - 10.5 mg/dL    Glucose 121 70 - 125 mg/dL    GFR Estimate 19 (L) >60 mL/min/1.73m2      EKG: sinus bradycardia, Otherwise normal, unchanged from previous tracings    Revised Cardiac Risk Index (RCRI):  The patient has the following serious cardiovascular risks for perioperative complications:   - Serum Creatinine >2.0 mg/dl = 1 point     RCRI Interpretation: 1 point: Class II (low risk - 0.9% complication rate)  Total time same day as encounter 30 minutes  Test results by phone  Follow-up per primary care provider for chronic disease management    Signed Electronically by: Sandip Alvares MD  Copy of this evaluation report is provided to requesting physician.

## 2021-11-29 NOTE — PATIENT INSTRUCTIONS

## 2021-11-29 NOTE — H&P (VIEW-ONLY)
[facility]  [address]  [place]  [facility] [address]  Phone: [phone]  Fax: [phone]  Primary Provider: Kamaljit Plata  Pre-op Performing Provider: ALONZO GARCIA        PREOPERATIVE EVALUATION:  Today's date: 11/29/2021    Cam Wladen is a 71 year old male who presents for a preoperative evaluation.    Surgical Information:  Surgery/Procedure: CYSTOURETEROSCOPY, RETROGRADE PYELOGRAM, WITH LITHOTRIPSY USING TAMMY 120P LASER AND URETERAL STENT INSERTION  Surgery Location: [facility]  Surgeon: Dr. Darrell Suarez  Surgery Date: 12/1/21  Time of Surgery: 10 am  Where patient plans to recover: At home alone  Fax number for surgical facility: Note does not need to be faxed, will be available electronically in Epic.    Type of Anesthesia Anticipated: General    Assessment & Plan     The proposed surgical procedure is considered INTERMEDIATE risk.    Preop general physical exam     - CBC with platelets, hemoglobin 11.5 which is stable and normal white blood cell count and platelet count    -INR 11/18/2021 2.79    - Basic metabolic panel (pending) recent metabolic panel showed that creatinine was elevated at 3.01 with a GFR of 20 and that was about 8 days ago.  Prior to that in May 2021 GFR was 48 with a creatinine of 1.45.    - EKG 12-lead, tracing only, reviewed and sinus bradycardia with rate of 59 otherwise nonspecific changes.    -COVID-19 nasopharyngeal swab by PCR was negative    The patient's physical health is optimized to proceed with needed anesthesia and procedure to manage the nephrolithiasis.    Nephrolithiasis  Right-sided stone and from the patient's description the stone is in the ureter.  Plan for laser lithotripsy and stenting.    History of atrial flutter with rapid ventricular response (H)  This has been less of an issue since losing 70 pounds.  He has not noticed rapid heartbeat and he is in sinus rhythm at this time.   Heart rate is 59.  He is on metoprolol 100 mg twice a day for rate control.    Type 2 diabetes mellitus with stage 3a chronic kidney disease, without long-term current use of insulin (H)  10/5/2021 A1c was 6.5 and patient is on Metformin 1000 mg twice a day    Diabetic polyneuropathy associated with type 2 diabetes mellitus (H)  The patient has neuropathy in his lower extremity related to his diabetes and he takes duloxetine 30 mg twice daily.    Diffuse large B-cell lymphoma of extranodal site (H)  This is in remission.  With patient's treatment and changes in his eating habits patient has lost about 70 pounds.    Moderate aortic stenosis  Patient's length echo was in November 2020 and ejection fraction was 60% with evidence for moderate aortic stenosis.  This is followed by cardiology.    Chronic anticoagulation  Patient currently on warfarin and was told by urology team that he can stay on his warfarin for this procedure.      Possible Sleep Apnea:  Yes, but not using CPAP since weight loss of 70 pounds.       Risks and Recommendations:  The patient has the following additional risks and recommendations for perioperative complications:  Anemia/Bleeding/Clotting:    -Patient currently is on warfarin with most recent INR 2.79 11/18/2021.  Patient states that neurologist is aware of him being on warfarin and said it was okay to continue with the warfarin.    Medication Instructions:   - Bleeding risk is low for this procedure (e.g. dental, skin, cataract).   - metformin: HOLD day of surgery.    RECOMMENDATION:  APPROVAL GIVEN to proceed with proposed procedure pending review of diagnostic evaluation.    Review of external notes as documented above   Review of the result(s) of each unique test - Tests reviewed include EKG, Covid test, CBC and metabolic panel      30 minutes spent on the date of the encounter doing chart review, history and exam, documentation and further activities per the note      Subjective      HPI related to upcoming procedure:   The patient's functional capacity is 4 METS.  He can walk up 2 flights of stairs without stopping and resting.  Polyneuropathy interferes with his mobility more than shortness of breath or chest pain.    Preop Questions 11/29/2021   1. Have you ever had a heart attack or stroke? No   2. Have you ever had surgery on your heart or blood vessels, such as a stent placement, a coronary artery bypass, or surgery on an artery in your head, neck, heart, or legs? No   3. Do you have chest pain with activity? No   4. Do you have a history of  heart failure? No   5. Do you currently have a cold, bronchitis or symptoms of other infection? No   6. Do you have a cough, shortness of breath, or wheezing? No   7. Do you or anyone in your family have previous history of blood clots? No   8. Do you or does anyone in your family have a serious bleeding problem such as prolonged bleeding following surgeries or cuts? No   9. Have you ever had problems with anemia or been told to take iron pills? No   10. Have you had any abnormal blood loss such as black, tarry or bloody stools? No   11. Have you ever had a blood transfusion? UNKNOWN - but not likely   12. Are you willing to have a blood transfusion if it is medically needed before, during, or after your surgery? Yes   13. Have you or any of your relatives ever had problems with anesthesia? No   14. Do you have sleep apnea, excessive snoring or daytime drowsiness? YES - betterwith weight loss   14a. Do you have a CPAP machine? No   15. Do you have any artifical heart valves or other implanted medical devices like a pacemaker, defibrillator, or continuous glucose monitor? No   16. Do you have artificial joints? No   17. Are you allergic to latex? No       Health Care Directive:  Patient does not have a Health Care Directive or Living Will: Discussed advance care planning with patient; however, patient declined at this time.    Preoperative Review  of :   reviewed - no record of controlled substances prescribed.      Status of Chronic Conditions:  See problem list for active medical problems.  Problems all longstanding and stable, except as noted/documented.  See ROS for pertinent symptoms related to these conditions.      Review of Systems  CONSTITUTIONAL: NEGATIVE for fever, chills, change in weight  INTEGUMENTARY/SKIN: NEGATIVE for worrisome rashes, moles or lesions  INTEGUMENTARY/SKIN: Recent diagnosis of skin cancer on his scalp status post Mohs procedure currently healing.  EYES: NEGATIVE for vision changes or irritation  ENT/MOUTH: NEGATIVE for ear, mouth and throat problems  RESP: NEGATIVE for significant cough or SOB  CV: NEGATIVE for chest pain, palpitations or peripheral edema  GI: NEGATIVE for nausea, abdominal pain, heartburn, or change in bowel habits  : NEGATIVE for frequency, dysuria, or hematuria  MUSCULOSKELETAL: NEGATIVE for significant arthralgias or myalgia  NEURO: dysesthesias, gait disturbance and paresthesias Patient has polyneuropathy that affects his sensation to some degree balance and also causes chronic pain.  ENDOCRINE: NEGATIVE for temperature intolerance, skin/hair changes  HEME: NEGATIVE for bleeding problems  PSYCHIATRIC: NEGATIVE for changes in mood or affect    Patient Active Problem List    Diagnosis Date Noted     Type 2 diabetes mellitus with stage 3a chronic kidney disease, without long-term current use of insulin (H) 06/17/2015     Priority: Medium      Past Medical History:   Diagnosis Date     Aortic stenosis     mild     Atrial fibrillation (H)      Atrial flutter (H) 8/21/2017     Atrial flutter with rapid ventricular response (H)      Cancer (H)     lymp     Colon polyps 08/08/2016    Per colonoscopy 8/8/16.  Large and small.  Some removed. Others to be removed surgically. (per patient)     Diabetes mellitus (H)     DM II     Diabetes mellitus, type II (H)      Diabetic polyneuropathy associated with type 2  "diabetes mellitus (H) 12/16/2020     Hyperlipidemia      Hypertension      Morbid obesity (H)      JOHNNY (obstructive sleep apnea)     no cpap     Past Surgical History:   Procedure Laterality Date     CARDIOVERSION  08/25/2017     IR CHEST PORT PLACEMENT > 5 YRS OF AGE  8/24/2020     IR PORT PLACEMENT >5 YEARS  8/24/2020     Current Outpatient Medications   Medication Sig Dispense Refill     cholecalciferol (VITAMIN D3) 25 mcg (1000 units) capsule Take 1 capsule by mouth daily       DULoxetine (CYMBALTA) 30 MG capsule Take 1 capsule (30 mg) by mouth 2 times daily 60 capsule 4     metFORMIN (GLUCOPHAGE) 1000 MG tablet TAKE 1 TABLET BY MOUTH TWICE A DAY WITH MEALS       metoprolol tartrate (LOPRESSOR) 100 MG tablet TAKE 1 TABLET BY MOUTH TWICE A DAY       tamsulosin (FLOMAX) 0.4 MG capsule Take 0.4 mg by mouth daily       warfarin ANTICOAGULANT (COUMADIN) 5 MG tablet Take 2 tablets (10 mg) Sundays and Wednesdays and 1 tablet (5 mg) all other days. Adjust dose per INR results. 120 tablet 1       Allergies   Allergen Reactions     Lisinopril Cough     Trulicity [Dulaglutide] Nausea        Social History     Tobacco Use     Smoking status: Never Smoker     Smokeless tobacco: Never Used   Substance Use Topics     Alcohol use: No     Family History   Problem Relation Age of Onset     Dementia Mother      Asthma Father      History   Drug Use No         Objective     /74   Pulse 66   Ht 1.797 m (5' 10.75\")   Wt 106.1 kg (234 lb)   SpO2 98%   BMI 32.87 kg/m      Physical Exam    GENERAL APPEARANCE: healthy, alert and no distress     EYES: EOMI,  PERRL     HENT: ear canals and TM's normal and nose and mouth without ulcers or lesions     NECK: no adenopathy, no asymmetry, masses, or scars and thyroid normal to palpation     RESP: lungs clear to auscultation - no rales, rhonchi or wheezes     CV: regular rates and rhythm and grade 3/6  Systolic murmur heard best over the left sternal border      ABDOMEN:  soft, " nontender, no HSM or masses and bowel sounds normal     MS: extremities normal- no gross deformities noted, no evidence of inflammation in joints, FROM in all extremities.     SKIN: no suspicious lesions or rashes     NEURO: Normal strength and tone, sensory exam grossly normal, mentation intact and speech normal     PSYCH: mentation appears normal. and affect normal/bright     LYMPHATICS: No cervical adenopathy    Recent Labs   Lab Test 11/22/21  0949 11/18/21  0806 11/15/21  1208 11/11/21  0854 10/29/21  1000 10/05/21  0856 06/13/21  0000 05/14/21  0956 05/14/21  0956 02/26/21  0849 02/17/21  1234 02/09/21  1143 02/09/21  1135   HGB  --   --   --   --   --   --   --  11.5*  --   --  11.1*  --  11.6*   PLT  --   --   --   --   --   --   --  271  --   --  267  --  246   INR  --  2.79*  --  3.5*   < >  --    < >  --   --    < >  --    < >  --      --   --   --   --   --   --   --  140  --   --    < > 143   POTASSIUM 3.8  --   --   --   --   --   --   --  3.8  --   --    < > 4.1   CR 3.01*  --  3.0*  --   --   --   --   --  1.45*  --   --    < > 1.27   A1C  --   --   --   --   --  6.5*  --   --   --   --   --   --  5.2    < > = values in this interval not displayed.        Diagnostics:  Recent Results (from the past 720 hour(s))   INR point of care    Collection Time: 11/11/21  8:54 AM   Result Value Ref Range    INR 3.5 (H) 0.9 - 1.1   Creatinine POCT    Collection Time: 11/15/21 12:08 PM   Result Value Ref Range    Creatinine POCT 3.0 (H) 0.7 - 1.3 mg/dL    GFR, ESTIMATED POCT 20 (L) >60 mL/min/1.73m2   INR    Collection Time: 11/18/21  8:06 AM   Result Value Ref Range    INR 2.79 (H) 0.90 - 1.15   UA with Microscopic    Collection Time: 11/22/21  9:49 AM   Result Value Ref Range    Color Urine Light Yellow Colorless, Straw, Light Yellow, Yellow    Appearance Urine Clear Clear    Glucose Urine Negative Negative mg/dL    Bilirubin Urine Negative Negative    Ketones Urine Negative Negative mg/dL    Specific  Gravity Urine 1.017 1.001 - 1.030    Blood Urine Negative Negative    pH Urine 5.5 5.0 - 7.0    Protein Albumin Urine 30  (A) Negative mg/dL    Urobilinogen Urine <2.0 <2.0 mg/dL    Nitrite Urine Negative Negative    Leukocyte Esterase Urine Negative Negative    Mucus Urine Present (A) None Seen /LPF    RBC Urine 1 <=2 /HPF    WBC Urine 1 <=5 /HPF   Renal panel    Collection Time: 11/22/21  9:49 AM   Result Value Ref Range    Sodium 138 136 - 145 mmol/L    Potassium 3.8 3.5 - 5.0 mmol/L    Chloride 107 98 - 107 mmol/L    Carbon Dioxide (CO2) 23 22 - 31 mmol/L    Anion Gap 8 5 - 18 mmol/L    Urea Nitrogen 27 (H) 8 - 22 mg/dL    Creatinine 3.01 (H) 0.70 - 1.30 mg/dL    Calcium 9.3 8.5 - 10.5 mg/dL    Glucose 111 70 - 125 mg/dL    Albumin 3.7 3.5 - 5.0 g/dL    Phosphorus 2.9 2.5 - 4.5 mg/dL    GFR Estimate 20 (L) >60 mL/min/1.73m2   Asymptomatic COVID-19 Virus (Coronavirus) by PCR Nose    Collection Time: 11/28/21  9:33 AM    Specimen: Nose; Swab   Result Value Ref Range    SARS CoV2 PCR Negative Negative, Testing sent to reference lab. Results will be returned via unsolicited result   EKG 12-lead, tracing only    Collection Time: 11/29/21  3:48 PM   Result Value Ref Range    Systolic Blood Pressure  mmHg    Diastolic Blood Pressure  mmHg    Ventricular Rate 59 BPM    Atrial Rate 59 BPM    CA Interval 174 ms    QRS Duration 96 ms     ms    QTc 396 ms    P Axis 67 degrees    R AXIS -30 degrees    T Axis -15 degrees    Interpretation ECG       Sinus bradycardia  Left axis deviation  Inferior STT changes   Minimal voltage criteria for LVH, may be normal variant  Abnormal ECG  When compared with ECG of 07-DEC-2020 08:12,  T wave inversion no longer evident in Lateral leads  Confirmed by ALONZO JOHN MD LOC:JN ([id number]) on 11/30/2021 9:49:59 AM     CBC with platelets    Collection Time: 11/29/21  4:12 PM   Result Value Ref Range    WBC Count 6.7 4.0 - 11.0 10e3/uL    RBC Count 3.86 (L) 4.40 - 5.90 10e6/uL     Hemoglobin 11.5 (L) 13.3 - 17.7 g/dL    Hematocrit 35.6 (L) 40.0 - 53.0 %    MCV 92 78 - 100 fL    MCH 29.8 26.5 - 33.0 pg    MCHC 32.3 31.5 - 36.5 g/dL    RDW 15.8 (H) 10.0 - 15.0 %    Platelet Count 223 150 - 450 10e3/uL   Basic metabolic panel    Collection Time: 11/29/21  4:12 PM   Result Value Ref Range    Sodium 140 136 - 145 mmol/L    Potassium 4.3 3.5 - 5.0 mmol/L    Chloride 108 (H) 98 - 107 mmol/L    Carbon Dioxide (CO2) 20 (L) 22 - 31 mmol/L    Anion Gap 12 5 - 18 mmol/L    Urea Nitrogen 31 (H) 8 - 22 mg/dL    Creatinine 3.12 (H) 0.70 - 1.30 mg/dL    Calcium 9.0 8.5 - 10.5 mg/dL    Glucose 121 70 - 125 mg/dL    GFR Estimate 19 (L) >60 mL/min/1.73m2      EKG: sinus bradycardia, Otherwise normal, unchanged from previous tracings    Revised Cardiac Risk Index (RCRI):  The patient has the following serious cardiovascular risks for perioperative complications:   - Serum Creatinine >2.0 mg/dl = 1 point     RCRI Interpretation: 1 point: Class II (low risk - 0.9% complication rate)  Total time same day as encounter 30 minutes  Test results by phone  Follow-up per primary care provider for chronic disease management    Signed Electronically by: Sandip Alvares MD  Copy of this evaluation report is provided to requesting physician.

## 2021-11-30 ENCOUNTER — TELEPHONE (OUTPATIENT)
Dept: FAMILY MEDICINE | Facility: CLINIC | Age: 71
End: 2021-11-30
Payer: MEDICARE

## 2021-11-30 ENCOUNTER — ANESTHESIA EVENT (OUTPATIENT)
Dept: SURGERY | Facility: HOSPITAL | Age: 71
End: 2021-11-30
Payer: MEDICARE

## 2021-11-30 LAB
ANION GAP SERPL CALCULATED.3IONS-SCNC: 12 MMOL/L (ref 5–18)
ATRIAL RATE - MUSE: 59 BPM
BUN SERPL-MCNC: 31 MG/DL (ref 8–22)
CALCIUM SERPL-MCNC: 9 MG/DL (ref 8.5–10.5)
CHLORIDE BLD-SCNC: 108 MMOL/L (ref 98–107)
CO2 SERPL-SCNC: 20 MMOL/L (ref 22–31)
CREAT SERPL-MCNC: 3.12 MG/DL (ref 0.7–1.3)
DIASTOLIC BLOOD PRESSURE - MUSE: NORMAL MMHG
GFR SERPL CREATININE-BSD FRML MDRD: 19 ML/MIN/1.73M2
GLUCOSE BLD-MCNC: 121 MG/DL (ref 70–125)
INTERPRETATION ECG - MUSE: NORMAL
P AXIS - MUSE: 67 DEGREES
POTASSIUM BLD-SCNC: 4.3 MMOL/L (ref 3.5–5)
PR INTERVAL - MUSE: 174 MS
QRS DURATION - MUSE: 96 MS
QT - MUSE: 400 MS
QTC - MUSE: 396 MS
R AXIS - MUSE: -30 DEGREES
SODIUM SERPL-SCNC: 140 MMOL/L (ref 136–145)
SYSTOLIC BLOOD PRESSURE - MUSE: NORMAL MMHG
T AXIS - MUSE: -15 DEGREES
VENTRICULAR RATE- MUSE: 59 BPM

## 2021-12-01 ENCOUNTER — DOCUMENTATION ONLY (OUTPATIENT)
Dept: ANTICOAGULATION | Facility: CLINIC | Age: 71
End: 2021-12-01

## 2021-12-01 ENCOUNTER — ANESTHESIA (OUTPATIENT)
Dept: SURGERY | Facility: HOSPITAL | Age: 71
End: 2021-12-01
Payer: MEDICARE

## 2021-12-01 ENCOUNTER — HOSPITAL ENCOUNTER (OUTPATIENT)
Facility: HOSPITAL | Age: 71
Discharge: HOME | End: 2021-12-01
Attending: UROLOGY | Admitting: UROLOGY
Payer: MEDICARE

## 2021-12-01 ENCOUNTER — APPOINTMENT (OUTPATIENT)
Dept: RADIOLOGY | Facility: HOSPITAL | Age: 71
End: 2021-12-01
Attending: UROLOGY
Payer: MEDICARE

## 2021-12-01 VITALS
DIASTOLIC BLOOD PRESSURE: 75 MMHG | SYSTOLIC BLOOD PRESSURE: 144 MMHG | BODY MASS INDEX: 33.08 KG/M2 | OXYGEN SATURATION: 96 % | HEART RATE: 72 BPM | TEMPERATURE: 98.3 F | RESPIRATION RATE: 18 BRPM | WEIGHT: 235.5 LBS

## 2021-12-01 DIAGNOSIS — N20.1 CALCULUS OF URETER: Primary | ICD-10-CM

## 2021-12-01 DIAGNOSIS — E11.22 TYPE 2 DIABETES MELLITUS WITH STAGE 3A CHRONIC KIDNEY DISEASE, WITHOUT LONG-TERM CURRENT USE OF INSULIN (H): ICD-10-CM

## 2021-12-01 DIAGNOSIS — N18.31 TYPE 2 DIABETES MELLITUS WITH STAGE 3A CHRONIC KIDNEY DISEASE, WITHOUT LONG-TERM CURRENT USE OF INSULIN (H): ICD-10-CM

## 2021-12-01 LAB
GLUCOSE BLDC GLUCOMTR-MCNC: 111 MG/DL (ref 70–99)
GLUCOSE BLDC GLUCOMTR-MCNC: 111 MG/DL (ref 70–99)

## 2021-12-01 PROCEDURE — 52356 CYSTO/URETERO W/LITHOTRIPSY: CPT | Mod: RT | Performed by: UROLOGY

## 2021-12-01 PROCEDURE — 82365 CALCULUS SPECTROSCOPY: CPT | Performed by: UROLOGY

## 2021-12-01 PROCEDURE — 250N000011 HC RX IP 250 OP 636: Performed by: NURSE ANESTHETIST, CERTIFIED REGISTERED

## 2021-12-01 PROCEDURE — 360N000082 HC SURGERY LEVEL 2 W/ FLUORO, PER MIN: Performed by: UROLOGY

## 2021-12-01 PROCEDURE — 250N000013 HC RX MED GY IP 250 OP 250 PS 637: Performed by: PHYSICIAN ASSISTANT

## 2021-12-01 PROCEDURE — 710N000012 HC RECOVERY PHASE 2, PER MINUTE: Performed by: UROLOGY

## 2021-12-01 PROCEDURE — 250N000009 HC RX 250: Performed by: NURSE ANESTHETIST, CERTIFIED REGISTERED

## 2021-12-01 PROCEDURE — 272N000001 HC OR GENERAL SUPPLY STERILE: Performed by: UROLOGY

## 2021-12-01 PROCEDURE — 258N000003 HC RX IP 258 OP 636: Performed by: NURSE ANESTHETIST, CERTIFIED REGISTERED

## 2021-12-01 PROCEDURE — 250N000009 HC RX 250: Performed by: UROLOGY

## 2021-12-01 PROCEDURE — 370N000017 HC ANESTHESIA TECHNICAL FEE, PER MIN: Performed by: UROLOGY

## 2021-12-01 PROCEDURE — 710N000009 HC RECOVERY PHASE 1, LEVEL 1, PER MIN: Performed by: UROLOGY

## 2021-12-01 PROCEDURE — 999N000182 XR SURGERY CARM FLUORO GREATER THAN 5 MIN

## 2021-12-01 PROCEDURE — C1769 GUIDE WIRE: HCPCS | Performed by: UROLOGY

## 2021-12-01 PROCEDURE — 82962 GLUCOSE BLOOD TEST: CPT

## 2021-12-01 PROCEDURE — C2617 STENT, NON-COR, TEM W/O DEL: HCPCS | Performed by: UROLOGY

## 2021-12-01 PROCEDURE — 258N000003 HC RX IP 258 OP 636: Performed by: ANESTHESIOLOGY

## 2021-12-01 PROCEDURE — 999N000141 HC STATISTIC PRE-PROCEDURE NURSING ASSESSMENT: Performed by: UROLOGY

## 2021-12-01 PROCEDURE — 250N000011 HC RX IP 250 OP 636: Performed by: PHYSICIAN ASSISTANT

## 2021-12-01 PROCEDURE — 255N000002 HC RX 255 OP 636: Performed by: UROLOGY

## 2021-12-01 PROCEDURE — 250N000025 HC SEVOFLURANE, PER MIN: Performed by: UROLOGY

## 2021-12-01 DEVICE — IMPLANTABLE DEVICE: Type: IMPLANTABLE DEVICE | Site: URETER | Status: FUNCTIONAL

## 2021-12-01 RX ORDER — MEPERIDINE HYDROCHLORIDE 25 MG/ML
12.5 INJECTION INTRAMUSCULAR; INTRAVENOUS; SUBCUTANEOUS
Status: DISCONTINUED | OUTPATIENT
Start: 2021-12-01 | End: 2021-12-01 | Stop reason: HOSPADM

## 2021-12-01 RX ORDER — EPHEDRINE SULFATE 50 MG/ML
INJECTION, SOLUTION INTRAMUSCULAR; INTRAVENOUS; SUBCUTANEOUS PRN
Status: DISCONTINUED | OUTPATIENT
Start: 2021-12-01 | End: 2021-12-01

## 2021-12-01 RX ORDER — NALOXONE HYDROCHLORIDE 0.4 MG/ML
0.2 INJECTION, SOLUTION INTRAMUSCULAR; INTRAVENOUS; SUBCUTANEOUS
Status: DISCONTINUED | OUTPATIENT
Start: 2021-12-01 | End: 2021-12-01 | Stop reason: HOSPADM

## 2021-12-01 RX ORDER — GABAPENTIN 300 MG/1
300 CAPSULE ORAL
Status: COMPLETED | OUTPATIENT
Start: 2021-12-01 | End: 2021-12-01

## 2021-12-01 RX ORDER — SODIUM CHLORIDE, SODIUM LACTATE, POTASSIUM CHLORIDE, CALCIUM CHLORIDE 600; 310; 30; 20 MG/100ML; MG/100ML; MG/100ML; MG/100ML
INJECTION, SOLUTION INTRAVENOUS CONTINUOUS
Status: DISCONTINUED | OUTPATIENT
Start: 2021-12-01 | End: 2021-12-01 | Stop reason: HOSPADM

## 2021-12-01 RX ORDER — ONDANSETRON 2 MG/ML
INJECTION INTRAMUSCULAR; INTRAVENOUS PRN
Status: DISCONTINUED | OUTPATIENT
Start: 2021-12-01 | End: 2021-12-01

## 2021-12-01 RX ORDER — OXYCODONE HYDROCHLORIDE 5 MG/1
5 TABLET ORAL EVERY 4 HOURS PRN
Status: DISCONTINUED | OUTPATIENT
Start: 2021-12-01 | End: 2021-12-01 | Stop reason: HOSPADM

## 2021-12-01 RX ORDER — CEFAZOLIN SODIUM 2 G/100ML
2 INJECTION, SOLUTION INTRAVENOUS
Status: COMPLETED | OUTPATIENT
Start: 2021-12-01 | End: 2021-12-01

## 2021-12-01 RX ORDER — NALOXONE HYDROCHLORIDE 0.4 MG/ML
0.4 INJECTION, SOLUTION INTRAMUSCULAR; INTRAVENOUS; SUBCUTANEOUS
Status: DISCONTINUED | OUTPATIENT
Start: 2021-12-01 | End: 2021-12-01 | Stop reason: HOSPADM

## 2021-12-01 RX ORDER — LIDOCAINE HYDROCHLORIDE 20 MG/ML
INJECTION, SOLUTION INFILTRATION; PERINEURAL PRN
Status: DISCONTINUED | OUTPATIENT
Start: 2021-12-01 | End: 2021-12-01

## 2021-12-01 RX ORDER — GLYCOPYRROLATE 0.2 MG/ML
INJECTION, SOLUTION INTRAMUSCULAR; INTRAVENOUS PRN
Status: DISCONTINUED | OUTPATIENT
Start: 2021-12-01 | End: 2021-12-01

## 2021-12-01 RX ORDER — LIDOCAINE 40 MG/G
CREAM TOPICAL
Status: DISCONTINUED | OUTPATIENT
Start: 2021-12-01 | End: 2021-12-01 | Stop reason: HOSPADM

## 2021-12-01 RX ORDER — OXYCODONE HYDROCHLORIDE 5 MG/1
5-10 TABLET ORAL EVERY 6 HOURS PRN
Qty: 12 TABLET | Refills: 0 | Status: SHIPPED | OUTPATIENT
Start: 2021-12-01 | End: 2021-12-04

## 2021-12-01 RX ORDER — ONDANSETRON 4 MG/1
4 TABLET, ORALLY DISINTEGRATING ORAL EVERY 30 MIN PRN
Status: DISCONTINUED | OUTPATIENT
Start: 2021-12-01 | End: 2021-12-01 | Stop reason: HOSPADM

## 2021-12-01 RX ORDER — DEXMEDETOMIDINE HYDROCHLORIDE 4 UG/ML
INJECTION, SOLUTION INTRAVENOUS
Status: DISCONTINUED
Start: 2021-12-01 | End: 2021-12-01 | Stop reason: HOSPADM

## 2021-12-01 RX ORDER — ACETAMINOPHEN 325 MG/1
975 TABLET ORAL
Status: COMPLETED | OUTPATIENT
Start: 2021-12-01 | End: 2021-12-01

## 2021-12-01 RX ORDER — PROPOFOL 10 MG/ML
INJECTION, EMULSION INTRAVENOUS PRN
Status: DISCONTINUED | OUTPATIENT
Start: 2021-12-01 | End: 2021-12-01

## 2021-12-01 RX ORDER — FENTANYL CITRATE 50 UG/ML
25 INJECTION, SOLUTION INTRAMUSCULAR; INTRAVENOUS EVERY 5 MIN PRN
Status: DISCONTINUED | OUTPATIENT
Start: 2021-12-01 | End: 2021-12-01 | Stop reason: HOSPADM

## 2021-12-01 RX ORDER — HYDROMORPHONE HCL IN WATER/PF 6 MG/30 ML
0.2 PATIENT CONTROLLED ANALGESIA SYRINGE INTRAVENOUS EVERY 5 MIN PRN
Status: DISCONTINUED | OUTPATIENT
Start: 2021-12-01 | End: 2021-12-01 | Stop reason: HOSPADM

## 2021-12-01 RX ORDER — FENTANYL CITRATE 50 UG/ML
INJECTION, SOLUTION INTRAMUSCULAR; INTRAVENOUS PRN
Status: DISCONTINUED | OUTPATIENT
Start: 2021-12-01 | End: 2021-12-01

## 2021-12-01 RX ORDER — DEXAMETHASONE SODIUM PHOSPHATE 10 MG/ML
INJECTION, SOLUTION INTRAMUSCULAR; INTRAVENOUS PRN
Status: DISCONTINUED | OUTPATIENT
Start: 2021-12-01 | End: 2021-12-01

## 2021-12-01 RX ORDER — FENTANYL CITRATE 50 UG/ML
25 INJECTION, SOLUTION INTRAMUSCULAR; INTRAVENOUS
Status: DISCONTINUED | OUTPATIENT
Start: 2021-12-01 | End: 2021-12-01 | Stop reason: HOSPADM

## 2021-12-01 RX ORDER — ONDANSETRON 2 MG/ML
4 INJECTION INTRAMUSCULAR; INTRAVENOUS EVERY 30 MIN PRN
Status: DISCONTINUED | OUTPATIENT
Start: 2021-12-01 | End: 2021-12-01 | Stop reason: HOSPADM

## 2021-12-01 RX ADMIN — PHENYLEPHRINE HYDROCHLORIDE 100 MCG: 10 INJECTION INTRAVENOUS at 07:43

## 2021-12-01 RX ADMIN — GABAPENTIN 300 MG: 300 CAPSULE ORAL at 07:17

## 2021-12-01 RX ADMIN — Medication 5 MG: at 07:37

## 2021-12-01 RX ADMIN — Medication 5 MG: at 07:46

## 2021-12-01 RX ADMIN — PHENYLEPHRINE HYDROCHLORIDE 50 MCG: 10 INJECTION INTRAVENOUS at 07:39

## 2021-12-01 RX ADMIN — MIDAZOLAM HYDROCHLORIDE 1 MG: 1 INJECTION, SOLUTION INTRAMUSCULAR; INTRAVENOUS at 07:27

## 2021-12-01 RX ADMIN — PROPOFOL 160 MG: 10 INJECTION, EMULSION INTRAVENOUS at 07:34

## 2021-12-01 RX ADMIN — DEXAMETHASONE SODIUM PHOSPHATE 4 MG: 10 INJECTION, SOLUTION INTRAMUSCULAR; INTRAVENOUS at 07:34

## 2021-12-01 RX ADMIN — Medication 5 MG: at 07:39

## 2021-12-01 RX ADMIN — PHENYLEPHRINE HYDROCHLORIDE 100 MCG: 10 INJECTION INTRAVENOUS at 07:58

## 2021-12-01 RX ADMIN — Medication 5 MG: at 07:43

## 2021-12-01 RX ADMIN — CEFAZOLIN SODIUM 2 G: 2 INJECTION, SOLUTION INTRAVENOUS at 07:40

## 2021-12-01 RX ADMIN — PHENYLEPHRINE HYDROCHLORIDE 100 MCG: 10 INJECTION INTRAVENOUS at 07:52

## 2021-12-01 RX ADMIN — ONDANSETRON 4 MG: 2 INJECTION INTRAMUSCULAR; INTRAVENOUS at 08:00

## 2021-12-01 RX ADMIN — LIDOCAINE HYDROCHLORIDE 60 MG: 20 INJECTION, SOLUTION INFILTRATION; PERINEURAL at 07:34

## 2021-12-01 RX ADMIN — SODIUM CHLORIDE, POTASSIUM CHLORIDE, SODIUM LACTATE AND CALCIUM CHLORIDE: 600; 310; 30; 20 INJECTION, SOLUTION INTRAVENOUS at 07:08

## 2021-12-01 RX ADMIN — GLYCOPYRROLATE 0.1 MG: 0.2 INJECTION, SOLUTION INTRAMUSCULAR; INTRAVENOUS at 07:39

## 2021-12-01 RX ADMIN — ACETAMINOPHEN 975 MG: 325 TABLET ORAL at 07:16

## 2021-12-01 RX ADMIN — GLYCOPYRROLATE 0.1 MG: 0.2 INJECTION, SOLUTION INTRAMUSCULAR; INTRAVENOUS at 07:58

## 2021-12-01 RX ADMIN — PHENYLEPHRINE HYDROCHLORIDE 150 MCG: 10 INJECTION INTRAVENOUS at 07:46

## 2021-12-01 RX ADMIN — Medication 5 MG: at 07:52

## 2021-12-01 RX ADMIN — FENTANYL CITRATE 100 MCG: 50 INJECTION, SOLUTION INTRAMUSCULAR; INTRAVENOUS at 07:34

## 2021-12-01 ASSESSMENT — ENCOUNTER SYMPTOMS: DYSRHYTHMIAS: 1

## 2021-12-01 NOTE — ANESTHESIA PREPROCEDURE EVALUATION
Anesthesia Pre-Procedure Evaluation    Patient: Cam Walden   MRN: [id number] : 1950        Preoperative Diagnosis: Calculus of ureter [N20.1]  Acute renal injury (H) [N17.9]  Hydronephrosis with urinary obstruction due to ureteral calculus [N13.2]    Procedure : Procedure(s):  CYSTOURETEROSCOPY, RETROGRADE PYELOGRAM, WITH LITHOTRIPSY USING TAMMY 120P LASER AND URETERAL STENT INSERTION          Past Medical History:   Diagnosis Date     Aortic stenosis     mild     Atrial fibrillation (H)      Atrial flutter (H) 2017     Atrial flutter with rapid ventricular response (H)      Cancer (H)     lymp     Colon polyps 2016    Per colonoscopy 16.  Large and small.  Some removed. Others to be removed surgically. (per patient)     Diabetes mellitus (H)     DM II     Diabetes mellitus, type II (H)      Diabetic polyneuropathy associated with type 2 diabetes mellitus (H) 2020     Hyperlipidemia      Hypertension      Morbid obesity (H)      JOHNNY (obstructive sleep apnea)     no cpap      Past Surgical History:   Procedure Laterality Date     CARDIOVERSION  2017     IR CHEST PORT PLACEMENT > 5 YRS OF AGE  2020     IR PORT PLACEMENT >5 YEARS  2020      Allergies   Allergen Reactions     Lisinopril Cough     Trulicity [Dulaglutide] Nausea      Social History     Tobacco Use     Smoking status: Never Smoker     Smokeless tobacco: Never Used   Substance Use Topics     Alcohol use: No      Wt Readings from Last 1 Encounters:   21 106.8 kg (235 lb 8 oz)        Anesthesia Evaluation   Pt has had prior anesthetic. Type: General.    History of anesthetic complications       ROS/MED HX  ENT/Pulmonary:     (+) sleep apnea, mild, uses CPAP,     Neurologic:     (+) peripheral neuropathy,     Cardiovascular:     (+) hypertension-----dysrhythmias, a-fib,     METS/Exercise Tolerance:     Hematologic:       Musculoskeletal:   (+) arthritis,     GI/Hepatic:       Renal/Genitourinary:     (+)  renal disease, type: CRI, Pt does not require dialysis,     Endo:     (+) type II DM, Last HgA1c: 6.5, Not using insulin, - not using insulin pump. Diabetic complications: nephropathy neuropathy. Obesity,     Psychiatric/Substance Use:       Infectious Disease:       Malignancy:   (+) Malignancy, History of Lymphoma/Leukemia.Lymph CA Remission status post.        Other:            Physical Exam    Airway  airway exam normal      Mallampati: I   TM distance: > 3 FB   Neck ROM: full   Mouth opening: > 3 cm    Respiratory Devices and Support         Dental  no notable dental history         Cardiovascular          Rhythm and rate: regular and normal   (+) murmur       Pulmonary   pulmonary exam normal        breath sounds clear to auscultation           OUTSIDE LABS:  CBC:   Lab Results   Component Value Date    WBC 6.7 11/29/2021    WBC 9.4 05/14/2021    HGB 11.5 (L) 11/29/2021    HGB 11.5 (L) 05/14/2021    HCT 35.6 (L) 11/29/2021    HCT 36.6 (L) 05/14/2021     11/29/2021     05/14/2021     BMP:   Lab Results   Component Value Date     11/29/2021     11/22/2021    POTASSIUM 4.3 11/29/2021    POTASSIUM 3.8 11/22/2021    CHLORIDE 108 (H) 11/29/2021    CHLORIDE 107 11/22/2021    CO2 20 (L) 11/29/2021    CO2 23 11/22/2021    BUN 31 (H) 11/29/2021    BUN 27 (H) 11/22/2021    CR 3.12 (H) 11/29/2021    CR 3.01 (H) 11/22/2021     (H) 12/01/2021     11/29/2021     COAGS:   Lab Results   Component Value Date    INR 2.79 (H) 11/18/2021     POC: No results found for: BGM, HCG, HCGS  HEPATIC:   Lab Results   Component Value Date    ALBUMIN 3.7 11/22/2021    PROTTOTAL 7.2 05/14/2021    ALT 37 05/14/2021    AST 29 05/14/2021    ALKPHOS 128 (H) 05/14/2021    BILITOTAL 0.7 05/14/2021     OTHER:   Lab Results   Component Value Date    LACT 1.0 11/26/2020    A1C 6.5 (H) 10/05/2021    ABBI 9.0 11/29/2021    PHOS 2.9 11/22/2021    MAG 1.3 (L) 03/31/2021    TSH 1.67 05/16/2019       Anesthesia  Plan    ASA Status:  3   NPO Status:  NPO Appropriate    Anesthesia Type: General.     - Airway: ETT   Induction: Intravenous, Propofol.   Maintenance: Inhalation.        Consents    Anesthesia Plan(s) and associated risks, benefits, and realistic alternatives discussed. Questions answered and patient/representative(s) expressed understanding.    - Discussed:     - Discussed with:  Patient         Postoperative Care    Pain management: Oral pain medications, IV analgesics.   PONV prophylaxis: Ondansetron (or other 5HT-3), Dexamethasone or Solumedrol     Comments:                Douglas Silveira MD

## 2021-12-01 NOTE — PROGRESS NOTES
ANTICOAGULATION  MANAGEMENT: Discharge Review    Cam Walden chart reviewed for anticoagulation continuity of care    Outpatient surgery/procedure on 12/1/21 for cystoureteroscopy & ureteral stent insertion.     Discharge disposition: Home    Results:    No results for input(s): INR, [id number], F2, ALMWH, AAUFH in the last 168 hours.  Anticoagulation inpatient management:     not applicable     Anticoagulation discharge instructions:     Warfarin dosing: home regimen continued   Bridging: No   INR goal change: No      Medication changes affecting anticoagulation: No    Additional factors affecting anticoagulation: No    Per urology, pt did not need to hold warfarin for procedure.     Plan     No adjustment to anticoagulation plan needed    Patient not contacted, next INR scheduled for 12/2/21    Anticoagulation Calendar updated    Tyler Cota, RN

## 2021-12-01 NOTE — TELEPHONE ENCOUNTER
----- Message from Sandip Alvares MD sent at 11/30/2021  4:12 PM CST -----  Please let the patient know that his EKG, Covid test and blood work from yesterday all look satisfactory.  He should be okay to proceed with his procedure tomorrow.  My preop and the labs should be available for Dr. Suarez to see at [facility].  Thank you,Dr. Alvares

## 2021-12-01 NOTE — TELEPHONE ENCOUNTER
Left message to call back for: Cam  Information to relay to patient: Please relay message below.

## 2021-12-01 NOTE — ANESTHESIA PROCEDURE NOTES
Airway       Patient location during procedure: OR  Staff -        Anesthesiologist:  Douglas Silveira MD       CRNA: Flynn Herrera APRN CRNA       Performed By: CRNA  Consent for Airway        Urgency: elective  Indications and Patient Condition       Indications for airway management: nick-procedural         Mask difficulty assessment: 0 - not attempted    Final Airway Details       Final airway type: supraglottic airway    Supraglottic Airway Details        Type: LMA       Brand: Ambu AuraGain       LMA size: 5    Post intubation assessment        Placement verified by: capnometry and chest rise        Number of attempts at approach: 1       Number of other approaches attempted: 0       Secured with: silk tape       Ease of procedure: easy       Dentition: Intact and Unchanged

## 2021-12-01 NOTE — OP NOTE
[facility]  [facility] [place] Operative Note    Cam Walden   December 1, 2021 12/1/2021   Kamaljit Plata     Procedure Performed  Procedure(s):  CYSTOURETEROSCOPY, RETROGRADE PYELOGRAM, WITH LITHOTRIPSY USING TAMMY 120P LASER AND URETERAL STENT INSERTION  1. Cystoscopy  2. Retrograde Pyelography - Right  3. Ureteroscopic Laser Lithotripsy - Right Ureter Mid  4. Ureteral Stent Insertion - Right      Pre-operative Diagnosis  Calculus of ureter [N20.1]  Acute renal injury (H) [N17.9]  Hydronephrosis with urinary obstruction due to ureteral calculus [N13.2]    Post-operative Diagnosis  1. Stone Right Ureter Mid      Surgeon(s) and Role:     * Darrell Suarez MD - Primary    Anesthesia Type  Not documented     Procedural Summary    Estimation of stone clearance: <2 mm residual  Subjective stone composition: uric acid  Renal papillae assessment: not observed  Unanticipated event/findings: none  Post-operative plan: remove own stent in 6 days with extraction string return to clinic in 1 month without CT scan    Narrative    Successful clearance of right mid ureteral stone.    Procedural Details    Patient is brought to the surgical suite where anesthesia is induced. he is prepped and draped in standard fashion in combined Trendelenberg and lithotomy position.    Cystoscopy: Rigid cystoscopy is performed. Anterior and posterior urethra are normal. Prostate demonstrates moderate adenopathy. Bladder has edema at right ureteric orifice.     Retrograde Pyelography:  imaging fails to demonstrate radio-opaque mid ureteric stone consistent with pre-operative imaging. Right retrograde pyelography demonstrates radiolucent mid ureteric stone with obstruction.     Ureteral Access: Right ureteral access is initiated with Sensor wire. Guide wire access to the kidney is assured. 8F dilator is inserted with minimal resistance. 10F dilator is inserted with moderate resistance.      Rigid  Ureteroscopy: Rigid ureteroscope is inserted in right ureter. Stone is moderately impacted. Distal ureteric stone requires in situ laser lithotripsy and all fragments are cleared with a stone basket.      Ureteral Stent Insertion: Right 7F 26 cm stent is inserted with good coil in kidney and bladder under fluoroscopic guidance. Stent extraction string left dangling from urethra.      The patient was then taken to the recovery room in good condition.     Past Medical History:   Diagnosis Date     Aortic stenosis     mild     Atrial fibrillation (H)      Atrial flutter (H) 8/21/2017     Atrial flutter with rapid ventricular response (H)      Cancer (H)     lymp     Colon polyps 08/08/2016    Per colonoscopy 8/8/16.  Large and small.  Some removed. Others to be removed surgically. (per patient)     Diabetes mellitus (H)     DM II     Diabetes mellitus, type II (H)      Diabetic polyneuropathy associated with type 2 diabetes mellitus (H) 12/16/2020     Hyperlipidemia      Hypertension      Morbid obesity (H)      JOHNNY (obstructive sleep apnea)     no cpap        Patient Active Problem List   Diagnosis     Type 2 diabetes mellitus with stage 3a chronic kidney disease, without long-term current use of insulin (H)        Estimated Blood Loss  .* No values recorded between 12/1/2021  7:41 AM and 12/1/2021  8:05 AM *     ID Type Source Tests Collected by Time Destination   A : Right ureter stone Calculus/Stone Ureter, Right STONE ANALYSIS Darrell Suarez MD 12/1/2021  7:54 AM

## 2021-12-01 NOTE — ANESTHESIA CARE TRANSFER NOTE
Patient: Cam Walden    Procedure: Procedure(s):  CYSTOURETEROSCOPY, RETROGRADE PYELOGRAM, WITH LITHOTRIPSY USING TAMMY 120P LASER AND URETERAL STENT INSERTION       Diagnosis: Calculus of ureter [N20.1]  Acute renal injury (H) [N17.9]  Hydronephrosis with urinary obstruction due to ureteral calculus [N13.2]  Diagnosis Additional Information: No value filed.    Anesthesia Type:   General     Note:    Oropharynx: oropharynx clear of all foreign objects and spontaneously breathing  Level of Consciousness: drowsy  Oxygen Supplementation: face mask  Level of Supplemental Oxygen (L/min / FiO2): 6  Independent Airway: airway patency satisfactory and stable  Dentition: dentition unchanged  Vital Signs Stable: post-procedure vital signs reviewed and stable  Report to RN Given: handoff report given  Patient transferred to: PACU  Comments: Pt denies pain at this time.  Handoff Report: Identifed the Patient, Identified the Reponsible Provider, Reviewed the pertinent medical history, Discussed the surgical course, Reviewed Intra-OP anesthesia mangement and issues during anesthesia, Set expectations for post-procedure period and Allowed opportunity for questions and acknowledgement of understanding      Vitals:  Vitals Value Taken Time   /65 12/01/21 0815   Temp 36.3  C (97.4  F) 12/01/21 0815   Pulse 62 12/01/21 0817   Resp 14 12/01/21 0817   SpO2 100 % 12/01/21 0817   Vitals shown include unvalidated device data.    Electronically Signed By: CLAY Kim CRNA  December 1, 2021  8:18 AM

## 2021-12-04 LAB
APPEARANCE STONE: NORMAL
COMPN STONE: NORMAL
SPECIMEN WT: 92 MG

## 2021-12-07 ENCOUNTER — TELEPHONE (OUTPATIENT)
Dept: UROLOGY | Facility: CLINIC | Age: 71
End: 2021-12-07
Payer: MEDICARE

## 2021-12-07 NOTE — TELEPHONE ENCOUNTER
Spoke with patient who states that he will be removing his stent today.  He has no pain and is feeling well.  He has no questions and will call as needed.  He was set up for his one month post op.  Terra Lopez RN

## 2021-12-13 ENCOUNTER — TELEPHONE (OUTPATIENT)
Dept: ANTICOAGULATION | Facility: CLINIC | Age: 71
End: 2021-12-13
Payer: MEDICARE

## 2021-12-13 NOTE — TELEPHONE ENCOUNTER
ANTICOAGULATION     Cam Walden is overdue for INR check.      Spoke with Cam and scheduled lab appointment on 12/16    Fely Cardozo RN

## 2022-01-03 ENCOUNTER — VIRTUAL VISIT (OUTPATIENT)
Dept: UROLOGY | Facility: CLINIC | Age: 72
End: 2022-01-03
Payer: MEDICARE

## 2022-01-03 DIAGNOSIS — N20.1 CALCULUS OF URETER: Primary | ICD-10-CM

## 2022-01-03 PROCEDURE — 99442 PR PHYSICIAN TELEPHONE EVALUATION 11-20 MIN: CPT | Performed by: UROLOGY

## 2022-01-03 ASSESSMENT — PAIN SCALES - GENERAL: PAINLEVEL: NO PAIN (0)

## 2022-01-03 NOTE — PROGRESS NOTES
Assessment/Plan:    Assessment & Plan   Cam was seen today for post-op visit.    Diagnoses and all orders for this visit:    Calculus of ureter  -     Patient Stated Goal: Prevent further stones  -     Basic metabolic panel  (Ca, Cl, CO2, Creat, Gluc, K, Na, BUN); Future        Stone Management Plan  Stone Management 11/22/2021 1/3/2022   Urinary Tract Infection No suspicion of infection No suspicion of infection   Renal Colic Asymptomatic at this time Asymptomatic at this time   Renal Failure Acute renal failure No suspicion of renal failure   Acute Renal Failure with obstruction -   Current CT date 11/15/2021 -   Right sided stones? Yes -   R Number of ureteral stones 1 -   R GSD of ureteral stones 8 -   R Location of ureteral stone Mid -   R Number of kidney stones  No renal stones -   R Hydronephrosis Moderate -   R Stone Event New event Resolved event   Diagnosis date 11/15/2021 -   Initial location of primary symptomatic stone Mid -   Initial GSD of primary symptomatic stone 8 -   Resolved date - 1/3/2022   R Post-op status - No imaging   R Current Plan Clear -   Clear rationale Poor prognosis -   L Stone Event - No current event           PLAN      Phone call duration: 10 minutes  15 minutes spent on the date of the encounter doing chart review, history and exam, documentation and further activities per the note    BARRY DENG MD  [facility]    Subjective:     HPI  . Cam Walden is a 71 year old  male who is being evaluated via a billable telephone visit by [facility] [place] for late postoperative follow-up.     He returns status post Right ureteroscopic laser lithotripsy for mid ureteral stone. He has had no unanticipated post-operative events.     He is asymptomatic at present. He denies symptoms of fever, chills, flank pain, nausea, vomiting, urinary frequency and dysuria.    Imaging was not performed today because  confident stone clearance.     Stone composition was 80 % calcium oxalate and 20 % uric acid.     He is a low risk first time stone former and was educated on conservative strategies for risk reduction    It is possible that the uric acid element of his stone was associated with chemotherapy of lymphoma which was recently completed.     Plan going forward  1) periodic CT through oncology for lymphoma follow up may  any stone recurrence  2) Push hydration  3) Check current renal function as this was elevated pre-op and has not yet been rechecked that I can see  4) will continue tamsulosin prescribed by PCP         ROS   Review of systems is negative except for HPI.    Past Medical History:   Diagnosis Date     Aortic stenosis     mild     Atrial fibrillation (H)      Atrial flutter (H) 8/21/2017     Atrial flutter with rapid ventricular response (H)      Cancer (H)     lymp     Colon polyps 08/08/2016    Per colonoscopy 8/8/16.  Large and small.  Some removed. Others to be removed surgically. (per patient)     Diabetes mellitus (H)     DM II     Diabetes mellitus, type II (H)      Diabetic polyneuropathy associated with type 2 diabetes mellitus (H) 12/16/2020     Hyperlipidemia      Hypertension      Morbid obesity (H)      JOHNNY (obstructive sleep apnea)     no cpap       Past Surgical History:   Procedure Laterality Date     CARDIOVERSION  08/25/2017     CYSTOURETEROSCOPY, WITH LITHOTRIPSY USING TAMMY 120P LASER AND URETERAL STENT INSERTION Right 12/1/2021    Procedure: CYSTOURETEROSCOPY, RIGHT RETROGRADE PYELOGRAM, WITH LITHOTRIPSY USING TAMMY 120P LASER AND URETERAL STENT INSERTION;  Surgeon: Darrell Suarez MD;  Location: [facility] OR     IR CHEST PORT PLACEMENT > 5 YRS OF AGE  8/24/2020     IR PORT PLACEMENT >5 YEARS  8/24/2020       Current Outpatient Medications   Medication Sig Dispense Refill     cholecalciferol (VITAMIN D3) 25 mcg (1000 units) capsule Take 1 capsule by mouth daily        DULoxetine (CYMBALTA) 30 MG capsule Take 1 capsule (30 mg) by mouth 2 times daily 60 capsule 4     metFORMIN (GLUCOPHAGE) 1000 MG tablet TAKE 1 TABLET BY MOUTH TWICE A DAY WITH MEALS       metoprolol tartrate (LOPRESSOR) 100 MG tablet TAKE 1 TABLET BY MOUTH TWICE A DAY       warfarin ANTICOAGULANT (COUMADIN) 5 MG tablet Take 2 tablets (10 mg) Sundays and Wednesdays and 1 tablet (5 mg) all other days. Adjust dose per INR results. 120 tablet 1       Allergies   Allergen Reactions     Lisinopril Cough     Trulicity [Dulaglutide] Nausea       Social History     Socioeconomic History     Marital status: Single     Spouse name: Not on file     Number of children: 0     Years of education: Not on file     Highest education level: Not on file   Occupational History     Not on file   Tobacco Use     Smoking status: Never Smoker     Smokeless tobacco: Never Used   Substance and Sexual Activity     Alcohol use: No     Drug use: No     Sexual activity: Not on file   Other Topics Concern     Parent/sibling w/ CABG, MI or angioplasty before 65F 55M? No   Social History Narrative    Patient lives alone.     Social Determinants of Health     Financial Resource Strain: Not on file   Food Insecurity: Not on file   Transportation Needs: Not on file   Physical Activity: Not on file   Stress: Not on file   Social Connections: Not on file   Intimate Partner Violence: Not on file   Housing Stability: Not on file       Family History   Problem Relation Age of Onset     Dementia Mother      Asthma Father        Objective:     No vitals or physical exam obtained due to virtual visit  Labs     Most Recent 3 CBC's:Recent Labs   Lab Test 11/29/21  1612 05/14/21  0956 02/17/21  1234   WBC 6.7 9.4 7.3   HGB 11.5* 11.5* 11.1*   MCV 92 90 94    271 267     Most Recent 3 BMP's:Recent Labs   Lab Test 12/01/21  0830 12/01/21  0610 11/29/21  1612 11/22/21  0949 11/15/21  1208 05/14/21  0956   NA  --   --  140 138  --  140   POTASSIUM  --   --   4.3 3.8  --  3.8   CHLORIDE  --   --  108* 107  --  108*   CO2  --   --  20* 23  --  22   BUN  --   --  31* 27*  --  15   CR  --   --  3.12* 3.01* 3.0* 1.45*   ANIONGAP  --   --  12 8  --  10   ABBI  --   --  9.0 9.3  --  8.6   * 111* 121 111  --  151*     Most Recent Urinalysis:Recent Labs   Lab Test 11/22/21  0949 12/08/20  0010   COLOR Light Yellow Yellow   APPEARANCE Clear Cloudy*   URINEGLC Negative Negative   URINEBILI Negative Negative   URINEKETONE Negative Negative   SG 1.017 1.010   UBLD Negative Small*   URINEPH 5.5 5.0   PROTEIN 30 * Trace*   UROBILINOGEN  --  <2.0 E.U./dL   NITRITE Negative Negative   LEUKEST Negative Negative   RBCU 1 0-2   WBCU 1 0-5     Acute Labs   Urine Culture    Culture   Date Value Ref Range Status   11/25/2020   Final    No Salmonella, Shigella, Yersinia, or Campylobacter.

## 2022-01-03 NOTE — PROGRESS NOTES
Patient is roomed via telephone for a virtual visit.  Patient confirmed he is in the [facility] at the time of this appointment.  Patient understands that this virtual visit is billable and agree to proceed with appointment.

## 2022-01-03 NOTE — PATIENT INSTRUCTIONS
Patient Stated Goal: Prevent further stones  Calcium Oxalate Stone Prevention Self Management    Drink more fluids:    Drinking more liquids is the best way you can help prevent future stones. Stones can form when substances in the urine are too concentrated. The more you drink, the more urine you will make. This means all substances in the urine will be less concentrated.    How much urine should I be producing?    The usual recommended daily urine production is about 2 to 3 quarts (0678-4996 ml). If you are producing more than 3 quarts of urine on a regular basis, it is possible to deplete important minerals stored in the body.    To measure the amount of urine you produce in a day, you can either:    Collect all urine in a container and measure at the end of the day     Use a measuring cup each time you urinate and add up the amounts at the end of the day     Observe    Color - Dark byron urine is concentrated. Light straw color or lighter is dilute and desirable     Odor - Concentrated urine tends to smell stronger. Dilute urine is nearly odorless    Ways to increase your fluid intake    Increasing the amount of fluid you drink is effective for all types of kidney stones. While water is commonly recommended, all fluids are effective for increasing the amount of urine your body produces.    Focus on starting a lifelong habit, rather than a short-term solution.     Keep liquids on hand that you like. Crystal Light is a low calorie appropriate choice.    Drink out of larger glasses. You'll tend to drink more with each serving.     Have an additional glass of fluid with each meal.     Keep a water or drink bottle at work and fill it regularly.     *If you are prone to fluid retention, consult your doctor before making changes to your fluid habits.    Low Oxalate Diet:    Avoid excess amounts or daily consumption of these foods:    All nuts and nut products including peanuts, almonds, pecans, peanut butter, almond  milk    Rhubarb    Chocolate    Soybeans and soy products     Spinach    Wheat Germ    Beets    Maintain a normal calcium diet:    Researches have found that people with low calcium intakes tend to have more stones. Foods with high calcium content are acceptable and include:    Dairy products (including milk, cheese and yogurt)    Meat and fish    Enriched cereals    Dark green vegetables    What about calcium supplements?     Many people take calcium supplements, either on their own or as prescribed by a doctor. Research has indicated that calcium supplements do not usually pose a risk for stone formation.  Calcium citrate is a better choice for a supplement.    Avoid excess salt:    Salt (sodium chloride) is found in abundance in many foods. High sodium levels in the urine can interfere with the kidney's handling of calcium.     Tips for reducing the salt in your diet:    Don't use salt at the table    Reduce the salt used in food preparation. Try 1/2 teaspoon when recipes call for 1 teaspoon.    Use herbs and spices for flavoring instead of salt.    Avoid salty foods.    Check the label before you buy or use a product. Note sodium and portion size information.    Try to consume less than 2,000 mg/day. (1 teaspoon = 2,000 mg)    Foods with high sodium content include:    Processed meat (including luncheon meats, sausage)     Crackers     Instant cereal     Processed cheese     Canned soups     Chips and snack foods     Soy sauce    [facility] can respond to your questions or concerns 24 hours a day at [phone].

## 2022-01-12 ENCOUNTER — TELEPHONE (OUTPATIENT)
Dept: ANTICOAGULATION | Facility: CLINIC | Age: 72
End: 2022-01-12
Payer: MEDICARE

## 2022-01-12 NOTE — TELEPHONE ENCOUNTER
ANTICOAGULATION     Cam Walden is overdue for INR check.      Was unable to reach patient and was unable to leave a voicemail. If patient calls, please schedule INR check as soon as possible.     Jaqueline Chua RN

## 2022-01-19 DIAGNOSIS — E11.42 DIABETIC POLYNEUROPATHY ASSOCIATED WITH TYPE 2 DIABETES MELLITUS (H): ICD-10-CM

## 2022-01-21 ENCOUNTER — TELEPHONE (OUTPATIENT)
Dept: ANTICOAGULATION | Facility: CLINIC | Age: 72
End: 2022-01-21
Payer: MEDICARE

## 2022-01-21 RX ORDER — DULOXETIN HYDROCHLORIDE 30 MG/1
CAPSULE, DELAYED RELEASE ORAL
Qty: 180 CAPSULE | Refills: 1 | Status: SHIPPED | OUTPATIENT
Start: 2022-01-21 | End: 2022-07-15

## 2022-01-21 NOTE — TELEPHONE ENCOUNTER
ANTICOAGULATION     Cam Walden is overdue for INR check.      Spoke with Cam and scheduled lab appointment on 1/24    Jaqueline Chua RN

## 2022-01-21 NOTE — TELEPHONE ENCOUNTER
"Routing refill request to provider for review/approval because:  BP not in range.    Last Written Prescription Date:  10/28/21  Last Fill Quantity: 60,  # refills: 4   Last office visit provider:  11/29/21     Requested Prescriptions   Pending Prescriptions Disp Refills     DULoxetine (CYMBALTA) 30 MG capsule [Pharmacy Med Name: DULOXETINE HCL DR 30 MG CAP] 180 capsule 1     Sig: TAKE 1 CAPSULE BY MOUTH 2 TIMES DAILY       Serotonin-Norepinephrine Reuptake Inhibitors  Failed - 1/19/2022 12:21 AM        Failed - Blood pressure under 140/90 in past 12 months     BP Readings from Last 3 Encounters:   12/01/21 (!) 144/75   11/29/21 120/74   11/18/21 123/74                 Passed - Recent (12 mo) or future (30 days) visit within the authorizing provider's specialty     Patient has had an office visit with the authorizing provider or a provider within the authorizing providers department within the previous 12 mos or has a future within next 30 days. See \"Patient Info\" tab in inbasket, or \"Choose Columns\" in Meds & Orders section of the refill encounter.              Passed - Medication is active on med list        Passed - Patient is age 18 or older             Wade Wynn RN 01/21/22 8:14 AM  "

## 2022-01-24 ENCOUNTER — ANTICOAGULATION THERAPY VISIT (OUTPATIENT)
Dept: ANTICOAGULATION | Facility: CLINIC | Age: 72
End: 2022-01-24

## 2022-01-24 ENCOUNTER — LAB (OUTPATIENT)
Dept: LAB | Facility: CLINIC | Age: 72
End: 2022-01-24
Payer: MEDICARE

## 2022-01-24 DIAGNOSIS — I48.92 ATRIAL FLUTTER (H): ICD-10-CM

## 2022-01-24 DIAGNOSIS — N20.1 CALCULUS OF URETER: ICD-10-CM

## 2022-01-24 LAB
ANION GAP SERPL CALCULATED.3IONS-SCNC: 12 MMOL/L (ref 5–18)
BUN SERPL-MCNC: 25 MG/DL (ref 8–22)
CALCIUM SERPL-MCNC: 9.4 MG/DL (ref 8.5–10.5)
CHLORIDE BLD-SCNC: 104 MMOL/L (ref 98–107)
CO2 SERPL-SCNC: 22 MMOL/L (ref 22–31)
CREAT SERPL-MCNC: 1.76 MG/DL (ref 0.7–1.3)
GFR SERPL CREATININE-BSD FRML MDRD: 41 ML/MIN/1.73M2
GLUCOSE BLD-MCNC: 152 MG/DL (ref 70–125)
INR BLD: 1.1 (ref 0.9–1.1)
POTASSIUM BLD-SCNC: 4.4 MMOL/L (ref 3.5–5)
SODIUM SERPL-SCNC: 138 MMOL/L (ref 136–145)

## 2022-01-24 PROCEDURE — 36415 COLL VENOUS BLD VENIPUNCTURE: CPT

## 2022-01-24 PROCEDURE — 80048 BASIC METABOLIC PNL TOTAL CA: CPT

## 2022-01-24 PROCEDURE — 85610 PROTHROMBIN TIME: CPT | Performed by: FAMILY MEDICINE

## 2022-01-24 NOTE — PROGRESS NOTES
ANTICOAGULATION MANAGEMENT     Cam Walden 71 year old male is on warfarin with subtherapeutic INR result. (Goal INR 2.0-3.0)    Recent labs: (last 7 days)     01/24/22  0906   INR 1.1       ASSESSMENT     Source(s): Chart Review, Patient/Caregiver Call and Template       Warfarin doses taken: Less warfarin taken than planned which may be affecting INR. States he has been taking just 5mg daily for the last 3 weeks or so, no particular reason why.     Diet: No new diet changes identified    New illness, injury, or hospitalization: No    Medication/supplement changes: None noted    Signs or symptoms of bleeding or clotting: No    Previous INR: Therapeutic last visit; previously outside of goal range. He is quite overdue for an INR    Additional findings: None     PLAN     Recommended plan for temporary change(s) affecting INR     Dosing Instructions:  Increase your warfarin dose (14% change) with next INR in 1 week       Summary  As of 1/24/2022    Full warfarin instructions:  10 mg every Mon; 5 mg all other days   Next INR check:  1/31/2022             Telephone call with Cam who verbalizes understanding and agrees to plan    Lab visit scheduled    Education provided: Goal range and significance of current result, Importance of therapeutic range, Importance of following up at instructed interval, Importance of taking warfarin as instructed and Monitoring for clotting signs and symptoms    Plan made per [facility] anticoagulation protocol    Fely Cardozo RN  Anticoagulation Clinic  1/24/2022    _______________________________________________________________________     Anticoagulation Episode Summary     Current INR goal:  2.0-3.0   TTR:  55.8 % (1 y)   Target end date:  Indefinite   Send INR reminders to:  [facility]    Indications    Atrial flutter (H) (Resolved) [I48.92]           Comments:           Anticoagulation Care Providers     Provider Role Specialty Phone number    Kamaljit Plata MD  [facility] [phone]

## 2022-01-31 ENCOUNTER — ANTICOAGULATION THERAPY VISIT (OUTPATIENT)
Dept: ANTICOAGULATION | Facility: CLINIC | Age: 72
End: 2022-01-31

## 2022-01-31 ENCOUNTER — LAB (OUTPATIENT)
Dept: LAB | Facility: CLINIC | Age: 72
End: 2022-01-31
Payer: MEDICARE

## 2022-01-31 DIAGNOSIS — I48.92 ATRIAL FLUTTER (H): ICD-10-CM

## 2022-01-31 LAB — INR BLD: 1.1 (ref 0.9–1.1)

## 2022-01-31 PROCEDURE — 85610 PROTHROMBIN TIME: CPT

## 2022-01-31 PROCEDURE — 36416 COLLJ CAPILLARY BLOOD SPEC: CPT

## 2022-01-31 NOTE — PROGRESS NOTES
ANTICOAGULATION MANAGEMENT     Cam Walden 71 year old male is on warfarin with subtherapeutic INR result. (Goal INR 2.0-3.0)    Recent labs: (last 7 days)     01/31/22  0846   INR 1.1       ASSESSMENT     Source(s): Chart Review and Patient/Caregiver Call       Warfarin doses taken: Warfarin taken as instructed confirmed he is taking peach color 5 mg warfarin    Diet: No new diet changes identified    New illness, injury, or hospitalization: No    Medication/supplement changes: None noted    Signs or symptoms of bleeding or clotting: No    Previous INR: Subtherapeutic    Additional findings: None     PLAN     Recommended plan for no diet, medication or health factor changes affecting INR     Dosing Instructions:  Increase your warfarin dose (12.5% change) with next INR in 1 week       Summary  As of 1/31/2022    Full warfarin instructions:  10 mg every Mon, Thu; 5 mg all other days   Next INR check:  2/7/2022             Telephone call with Cam who verbalizes understanding and agrees to plan    Lab visit scheduled    Education provided: None required    Plan made per [facility] anticoagulation protocol    Jaqueline Chua RN  Anticoagulation Clinic  1/31/2022    _______________________________________________________________________     Anticoagulation Episode Summary     Current INR goal:  2.0-3.0   TTR:  55.8 % (1 y)   Target end date:  Indefinite   Send INR reminders to:  [facility]    Indications    Atrial flutter (H) (Resolved) [I48.92]           Comments:           Anticoagulation Care Providers     Provider Role Specialty Phone number    Kamaljit Plata MD Referring Family Medicine [phone]

## 2022-02-07 ENCOUNTER — LAB (OUTPATIENT)
Dept: LAB | Facility: CLINIC | Age: 72
End: 2022-02-07
Payer: MEDICARE

## 2022-02-07 ENCOUNTER — ANTICOAGULATION THERAPY VISIT (OUTPATIENT)
Dept: ANTICOAGULATION | Facility: CLINIC | Age: 72
End: 2022-02-07

## 2022-02-07 DIAGNOSIS — I48.92 ATRIAL FLUTTER (H): ICD-10-CM

## 2022-02-07 LAB — INR BLD: 1.3 (ref 0.9–1.1)

## 2022-02-07 PROCEDURE — 85610 PROTHROMBIN TIME: CPT | Performed by: FAMILY MEDICINE

## 2022-02-07 NOTE — PROGRESS NOTES
ANTICOAGULATION MANAGEMENT     Cam Walden 71 year old male is on warfarin with subtherapeutic INR result. (Goal INR 2.0-3.0)    Recent labs: (last 7 days)     02/07/22  0858   INR 1.3*       ASSESSMENT     Source(s): Chart Review and Patient/Caregiver Call       Warfarin doses taken: Warfarin taken as instructed    Diet: No new diet changes identified    New illness, injury, or hospitalization: No    Medication/supplement changes: None noted    Signs or symptoms of bleeding or clotting: No    Previous INR: Subtherapeutic    Additional findings: None     PLAN     Recommended plan for no diet, medication or health factor changes affecting INR     Dosing Instructions: Booster dose then Increase your warfarin dose (11.1% change) with next INR in 1 week       Summary  As of 2/7/2022    Full warfarin instructions:  2/7: 15 mg; Otherwise 10 mg every Mon, Wed, Fri; 5 mg all other days   Next INR check:  2/14/2022             Telephone call with Cam who verbalizes understanding and agrees to plan    Lab visit scheduled    Education provided: None required    Plan made per [facility] anticoagulation protocol    Jaqueline Chua RN  Anticoagulation Clinic  2/7/2022    _______________________________________________________________________     Anticoagulation Episode Summary     Current INR goal:  2.0-3.0   TTR:  55.8 % (1 y)   Target end date:  Indefinite   Send INR reminders to:  [facility]    Indications    Atrial flutter (H) (Resolved) [I48.92]           Comments:           Anticoagulation Care Providers     Provider Role Specialty Phone number    Kamaljit Plata MD Referring Family Medicine [phone]

## 2022-02-14 ENCOUNTER — ANTICOAGULATION THERAPY VISIT (OUTPATIENT)
Dept: ANTICOAGULATION | Facility: CLINIC | Age: 72
End: 2022-02-14

## 2022-02-14 ENCOUNTER — LAB (OUTPATIENT)
Dept: LAB | Facility: CLINIC | Age: 72
End: 2022-02-14
Payer: MEDICARE

## 2022-02-14 DIAGNOSIS — I48.92 ATRIAL FLUTTER (H): ICD-10-CM

## 2022-02-14 LAB — INR BLD: 1.9 (ref 0.9–1.1)

## 2022-02-14 PROCEDURE — 85610 PROTHROMBIN TIME: CPT | Performed by: FAMILY MEDICINE

## 2022-02-14 NOTE — PROGRESS NOTES
ANTICOAGULATION MANAGEMENT     Cam Walden 71 year old male is on warfarin with subtherapeutic INR result. (Goal INR 2.0-3.0)    Recent labs: (last 7 days)     02/14/22  0841   INR 1.9*       ASSESSMENT     Source(s): Chart Review and Patient/Caregiver Call       Warfarin doses taken: Warfarin taken as instructed    Diet: No new diet changes identified    New illness, injury, or hospitalization: No    Medication/supplement changes: None noted    Signs or symptoms of bleeding or clotting: No    Previous INR: Subtherapeutic    Additional findings: None     PLAN     Recommended plan for no diet, medication or health factor changes affecting INR     Dosing Instructions:  Increase your warfarin dose (10% change) with next INR in 1 week       Summary  As of 2/14/2022    Full warfarin instructions:  5 mg every Sun, Tue, Thu; 10 mg all other days   Next INR check:  2/21/2022             Telephone call with Cam who verbalizes understanding and agrees to plan    Lab visit scheduled    Education provided: None required    Plan made per [facility] anticoagulation protocol    Jaqueline Chua RN  Anticoagulation Clinic  2/14/2022    _______________________________________________________________________     Anticoagulation Episode Summary     Current INR goal:  2.0-3.0   TTR:  55.8 % (1 y)   Target end date:  Indefinite   Send INR reminders to:  [facility]    Indications    Atrial flutter (H) (Resolved) [I48.92]           Comments:           Anticoagulation Care Providers     Provider Role Specialty Phone number    Kamaljit Plata MD Referring Family Medicine [phone]

## 2022-02-15 ENCOUNTER — DOCUMENTATION ONLY (OUTPATIENT)
Dept: ANTICOAGULATION | Facility: CLINIC | Age: 72
End: 2022-02-15
Payer: MEDICARE

## 2022-02-15 DIAGNOSIS — I48.20 CHRONIC ATRIAL FIBRILLATION (H): ICD-10-CM

## 2022-02-15 DIAGNOSIS — I48.92 ATRIAL FLUTTER (H): Primary | ICD-10-CM

## 2022-02-15 NOTE — PROGRESS NOTES
ANTICOAGULATION MANAGEMENT      Cam Walden due for annual renewal of referral to anticoagulation monitoring. Order pended for your review and signature.      ANTICOAGULATION SUMMARY      Warfarin indication(s)     Atrial fibrillation    Heart valve present?  NO       Current goal range   INR: 2.0-3.0     Goal appropriate for indication? Yes, INR 2-3 appropriate for hx of DVT, PE, hypercoagulable state, Afib, LVAD, or bileaflet AVR without risk factors     Current duration of therapy Indefinite/long term therapy   Time in Therapeutic Range (TTR)  (Goal > 60%) 55.8%       Office visit with referring provider's group within last year yes on 11/29/21       Jaqueline Chua RN

## 2022-02-16 PROBLEM — I48.20 CHRONIC ATRIAL FIBRILLATION (H): Status: ACTIVE | Noted: 2022-02-16

## 2022-02-21 ENCOUNTER — LAB (OUTPATIENT)
Dept: LAB | Facility: CLINIC | Age: 72
End: 2022-02-21
Payer: MEDICARE

## 2022-02-21 ENCOUNTER — ANTICOAGULATION THERAPY VISIT (OUTPATIENT)
Dept: ANTICOAGULATION | Facility: CLINIC | Age: 72
End: 2022-02-21

## 2022-02-21 DIAGNOSIS — I48.20 CHRONIC ATRIAL FIBRILLATION (H): Primary | ICD-10-CM

## 2022-02-21 DIAGNOSIS — I48.20 CHRONIC ATRIAL FIBRILLATION (H): ICD-10-CM

## 2022-02-21 LAB — INR BLD: 1.7 (ref 0.9–1.1)

## 2022-02-21 PROCEDURE — 85610 PROTHROMBIN TIME: CPT

## 2022-02-21 PROCEDURE — 36416 COLLJ CAPILLARY BLOOD SPEC: CPT

## 2022-02-21 NOTE — PROGRESS NOTES
ANTICOAGULATION MANAGEMENT     Cam Walden 72 year old male is on warfarin with subtherapeutic INR result. (Goal INR 2.0-3.0)    Recent labs: (last 7 days)     02/21/22  0840   INR 1.7*       ASSESSMENT     Source(s): Chart Review and Patient/Caregiver Call       Warfarin doses taken: Warfarin taken as instructed    Diet: No new diet changes identified    New illness, injury, or hospitalization: No    Medication/supplement changes: None noted    Signs or symptoms of bleeding or clotting: No    Previous INR: Subtherapeutic    Additional findings: None     PLAN     Recommended plan for no diet, medication or health factor changes affecting INR     Dosing Instructions:  Increase your warfarin dose (9.1% change) with next INR in 1 week       Summary  As of 2/21/2022    Full warfarin instructions:  5 mg every Sun, Thu; 10 mg all other days   Next INR check:  2/28/2022             Telephone call with Cam who verbalizes understanding and agrees to plan    Lab visit scheduled    Education provided: None required    Plan made per [facility] anticoagulation protocol    Jaqueline Chua RN  Anticoagulation Clinic  2/21/2022    _______________________________________________________________________     Anticoagulation Episode Summary     Current INR goal:  2.0-3.0   TTR:  55.8 % (1 y)   Target end date:  Indefinite   Send INR reminders to:  [facility]    Indications    Atrial flutter (H) (Resolved) [I48.92]  Chronic atrial fibrillation (H) [I48.20]           Comments:           Anticoagulation Care Providers     Provider Role Specialty Phone number    Kamaljit Plata MD Referring Family Medicine [phone]

## 2022-02-22 ENCOUNTER — ONCOLOGY VISIT (OUTPATIENT)
Dept: ONCOLOGY | Facility: HOSPITAL | Age: 72
End: 2022-02-22
Attending: INTERNAL MEDICINE
Payer: MEDICARE

## 2022-02-22 ENCOUNTER — LAB (OUTPATIENT)
Dept: INFUSION THERAPY | Facility: HOSPITAL | Age: 72
End: 2022-02-22
Attending: INTERNAL MEDICINE
Payer: MEDICARE

## 2022-02-22 VITALS
DIASTOLIC BLOOD PRESSURE: 73 MMHG | OXYGEN SATURATION: 97 % | SYSTOLIC BLOOD PRESSURE: 152 MMHG | WEIGHT: 271.2 LBS | HEIGHT: 72 IN | RESPIRATION RATE: 18 BRPM | BODY MASS INDEX: 36.73 KG/M2 | TEMPERATURE: 98.5 F | HEART RATE: 60 BPM

## 2022-02-22 DIAGNOSIS — E11.22 TYPE 2 DIABETES MELLITUS WITH STAGE 3A CHRONIC KIDNEY DISEASE, WITHOUT LONG-TERM CURRENT USE OF INSULIN (H): Primary | ICD-10-CM

## 2022-02-22 DIAGNOSIS — C83.398 DIFFUSE LARGE B-CELL LYMPHOMA OF EXTRANODAL SITE: ICD-10-CM

## 2022-02-22 DIAGNOSIS — N18.31 TYPE 2 DIABETES MELLITUS WITH STAGE 3A CHRONIC KIDNEY DISEASE, WITHOUT LONG-TERM CURRENT USE OF INSULIN (H): Primary | ICD-10-CM

## 2022-02-22 LAB
ALBUMIN SERPL-MCNC: 3.8 G/DL (ref 3.5–5)
ALP SERPL-CCNC: 97 U/L (ref 45–120)
ALT SERPL W P-5'-P-CCNC: 16 U/L (ref 0–45)
ANION GAP SERPL CALCULATED.3IONS-SCNC: 9 MMOL/L (ref 5–18)
AST SERPL W P-5'-P-CCNC: 18 U/L (ref 0–40)
BASOPHILS # BLD AUTO: 0 10E3/UL (ref 0–0.2)
BASOPHILS NFR BLD AUTO: 1 %
BILIRUB SERPL-MCNC: 0.4 MG/DL (ref 0–1)
BUN SERPL-MCNC: 21 MG/DL (ref 8–28)
CALCIUM SERPL-MCNC: 8.6 MG/DL (ref 8.5–10.5)
CHLORIDE BLD-SCNC: 105 MMOL/L (ref 98–107)
CO2 SERPL-SCNC: 24 MMOL/L (ref 22–31)
CREAT SERPL-MCNC: 1.55 MG/DL (ref 0.7–1.3)
EOSINOPHIL # BLD AUTO: 0.4 10E3/UL (ref 0–0.7)
EOSINOPHIL NFR BLD AUTO: 5 %
ERYTHROCYTE [DISTWIDTH] IN BLOOD BY AUTOMATED COUNT: 14.1 % (ref 10–15)
GFR SERPL CREATININE-BSD FRML MDRD: 48 ML/MIN/1.73M2
GLUCOSE BLD-MCNC: 125 MG/DL (ref 70–125)
HCT VFR BLD AUTO: 35 % (ref 40–53)
HGB BLD-MCNC: 11.3 G/DL (ref 13.3–17.7)
IMM GRANULOCYTES # BLD: 0 10E3/UL
IMM GRANULOCYTES NFR BLD: 0 %
LDH SERPL L TO P-CCNC: 134 U/L (ref 125–220)
LYMPHOCYTES # BLD AUTO: 2.2 10E3/UL (ref 0.8–5.3)
LYMPHOCYTES NFR BLD AUTO: 34 %
MCH RBC QN AUTO: 31.4 PG (ref 26.5–33)
MCHC RBC AUTO-ENTMCNC: 32.3 G/DL (ref 31.5–36.5)
MCV RBC AUTO: 97 FL (ref 78–100)
MONOCYTES # BLD AUTO: 0.7 10E3/UL (ref 0–1.3)
MONOCYTES NFR BLD AUTO: 10 %
NEUTROPHILS # BLD AUTO: 3.3 10E3/UL (ref 1.6–8.3)
NEUTROPHILS NFR BLD AUTO: 50 %
NRBC # BLD AUTO: 0 10E3/UL
NRBC BLD AUTO-RTO: 0 /100
PLATELET # BLD AUTO: 212 10E3/UL (ref 150–450)
POTASSIUM BLD-SCNC: 4.4 MMOL/L (ref 3.5–5)
PROT SERPL-MCNC: 6.7 G/DL (ref 6–8)
RBC # BLD AUTO: 3.6 10E6/UL (ref 4.4–5.9)
SODIUM SERPL-SCNC: 138 MMOL/L (ref 136–145)
WBC # BLD AUTO: 6.5 10E3/UL (ref 4–11)

## 2022-02-22 PROCEDURE — 85025 COMPLETE CBC W/AUTO DIFF WBC: CPT

## 2022-02-22 PROCEDURE — 99213 OFFICE O/P EST LOW 20 MIN: CPT | Performed by: NURSE PRACTITIONER

## 2022-02-22 PROCEDURE — G0463 HOSPITAL OUTPT CLINIC VISIT: HCPCS

## 2022-02-22 PROCEDURE — 83615 LACTATE (LD) (LDH) ENZYME: CPT

## 2022-02-22 PROCEDURE — 36591 DRAW BLOOD OFF VENOUS DEVICE: CPT

## 2022-02-22 PROCEDURE — 250N000011 HC RX IP 250 OP 636: Performed by: INTERNAL MEDICINE

## 2022-02-22 PROCEDURE — 80053 COMPREHEN METABOLIC PANEL: CPT

## 2022-02-22 RX ORDER — TAMSULOSIN HYDROCHLORIDE 0.4 MG/1
CAPSULE ORAL
COMMUNITY
Start: 2022-01-24 | End: 2022-06-14

## 2022-02-22 RX ORDER — HEPARIN SODIUM (PORCINE) LOCK FLUSH IV SOLN 100 UNIT/ML 100 UNIT/ML
5 SOLUTION INTRAVENOUS
Status: CANCELLED | OUTPATIENT
Start: 2022-02-22

## 2022-02-22 RX ORDER — HYDROCORTISONE 25 MG/G
CREAM TOPICAL
COMMUNITY
Start: 2022-01-22 | End: 2024-03-12

## 2022-02-22 RX ORDER — HEPARIN SODIUM (PORCINE) LOCK FLUSH IV SOLN 100 UNIT/ML 100 UNIT/ML
5 SOLUTION INTRAVENOUS
Status: DISCONTINUED | OUTPATIENT
Start: 2022-02-22 | End: 2022-02-22 | Stop reason: HOSPADM

## 2022-02-22 RX ADMIN — HEPARIN SODIUM (PORCINE) LOCK FLUSH IV SOLN 100 UNIT/ML 5 ML: 100 SOLUTION at 09:26

## 2022-02-22 ASSESSMENT — PAIN SCALES - GENERAL: PAINLEVEL: NO PAIN (0)

## 2022-02-22 NOTE — PROGRESS NOTES
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    Weight as of this encounter: 123 kg (271 lb 3.2 oz). Body surface area is 2.5 meters squared.  No Pain (0) Comment: Data Unavailable   No LMP for male patient.  Allergies reviewed: Yes  Medications reviewed: Yes    Medications: Medication refills not needed today.  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       Jany Gray CMA              "

## 2022-02-22 NOTE — PROGRESS NOTES
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    1. Diffuse large B cell lymphoma, thyroid with skin infiltration, diagnosed August 2020, GCB TYPE, Stage 1: had chemo with R-CHOP. Completed November 2020. Last CT scan was November and that was normal. We will do scans every 6 months until 2 years out. Will return in May with scans and to see Dr. Ibanez. No constitutional symptoms.     2. Skin lesion on top of head: dermatologist removed. Was cancer, did not know what kind. Will try to get records. Encouraged him to continue to see a dermatologist every 6 or 12 months for skin checks.    ECOG Performance    1 - Can't do physically strenuous work, but fully ambyulatory and can do light sedentary work    Distress Screening (within last 30 days)    No data recorded     Pain  Pain Score: No Pain (0)    Problem List    Patient Active Problem List   Diagnosis     Type 2 diabetes mellitus with stage 3a chronic kidney disease, without long-term current use of insulin (H)     Chronic atrial fibrillation (H)        ______________________________________________________________________________    History of Present Illness    Measurable disease: PET scan     Past therapy: R-CHOP for 4 cycles, 9/15/20 until November 16, 2020    Interim History: Patient is here today for a 3-month follow-up visit. Overall patient states that he is doing fine and really has no new issues. Denies any new rashes or skin lesions. Denies any constitutional symptoms. Denies any lymphadenopathy that he has noticed. States that overall he has been stable and is happy about that.    Review  "of Systems    Pertinent items are noted in HPI.    Past History    Past Medical History:   Diagnosis Date     Aortic stenosis     mild     Atrial fibrillation (H)      Atrial flutter (H) 8/21/2017     Atrial flutter with rapid ventricular response (H)      Cancer (H)     lymp     Colon polyps 08/08/2016    Per colonoscopy 8/8/16.  Large and small.  Some removed. Others to be removed surgically. (per patient)     Diabetes mellitus (H)     DM II     Diabetes mellitus, type II (H)      Diabetic polyneuropathy associated with type 2 diabetes mellitus (H) 12/16/2020     Hyperlipidemia      Hypertension      Morbid obesity (H)      JOHNNY (obstructive sleep apnea)     no cpap       PHYSICAL EXAM  BP (!) 152/73 (BP Location: Left arm, Patient Position: Sitting, Cuff Size: Adult Large)   Pulse 60   Temp 98.5  F (36.9  C) (Oral)   Resp 18   Ht 1.829 m (6' 0.01\")   Wt 123 kg (271 lb 3.2 oz)   SpO2 97%   BMI 36.77 kg/m      GENERAL: no acute distress. Cooperative in conversation. Here alone. Mask on  RESP: Regular respiratory rate. No expiratory wheezes   MUSCULOSKELETAL: no bilateral leg swelling  NEURO: non focal. Alert and oriented x3.   PSYCH: within normal limits. No depression or anxiety.  SKIN: exposed skin is dry intact. Scar on the top of his head.    Lab Results    Recent Results (from the past 168 hour(s))   INR point of care   Result Value Ref Range    INR 1.7 (H) 0.9 - 1.1   Comprehensive metabolic panel (BMP + Alb, Alk Phos, ALT, AST, Total. Bili, TP)   Result Value Ref Range    Sodium 138 136 - 145 mmol/L    Potassium 4.4 3.5 - 5.0 mmol/L    Chloride 105 98 - 107 mmol/L    Carbon Dioxide (CO2) 24 22 - 31 mmol/L    Anion Gap 9 5 - 18 mmol/L    Urea Nitrogen 21 8 - 28 mg/dL    Creatinine 1.55 (H) 0.70 - 1.30 mg/dL    Calcium 8.6 8.5 - 10.5 mg/dL    Glucose 125 70 - 125 mg/dL    Alkaline Phosphatase 97 45 - 120 U/L    AST 18 0 - 40 U/L    ALT 16 0 - 45 U/L    Protein Total 6.7 6.0 - 8.0 g/dL    Albumin 3.8 3.5 " - 5.0 g/dL    Bilirubin Total 0.4 0.0 - 1.0 mg/dL    GFR Estimate 48 (L) >60 mL/min/1.73m2   Lactate Dehydrogenase   Result Value Ref Range    Lactate Dehydrogenase 134 125 - 220 U/L   CBC with platelets and differential   Result Value Ref Range    WBC Count 6.5 4.0 - 11.0 10e3/uL    RBC Count 3.60 (L) 4.40 - 5.90 10e6/uL    Hemoglobin 11.3 (L) 13.3 - 17.7 g/dL    Hematocrit 35.0 (L) 40.0 - 53.0 %    MCV 97 78 - 100 fL    MCH 31.4 26.5 - 33.0 pg    MCHC 32.3 31.5 - 36.5 g/dL    RDW 14.1 10.0 - 15.0 %    Platelet Count 212 150 - 450 10e3/uL    % Neutrophils 50 %    % Lymphocytes 34 %    % Monocytes 10 %    % Eosinophils 5 %    % Basophils 1 %    % Immature Granulocytes 0 %    NRBCs per 100 WBC 0 <1 /100    Absolute Neutrophils 3.3 1.6 - 8.3 10e3/uL    Absolute Lymphocytes 2.2 0.8 - 5.3 10e3/uL    Absolute Monocytes 0.7 0.0 - 1.3 10e3/uL    Absolute Eosinophils 0.4 0.0 - 0.7 10e3/uL    Absolute Basophils 0.0 0.0 - 0.2 10e3/uL    Absolute Immature Granulocytes 0.0 <=0.4 10e3/uL    Absolute NRBCs 0.0 10e3/uL         Imaging    No results found.      Signed by: Annika Diamond, CLAY CNP

## 2022-02-22 NOTE — LETTER
2/22/2022         RE: Cam Walden  [address] [place] [address]        Dear Colleague,    Thank you for referring your patient, Cam Walden, to the [facility]. Please see a copy of my visit note below.    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    1. Diffuse large B cell lymphoma, thyroid with skin infiltration, diagnosed August 2020, GCB TYPE, Stage 1: had chemo with R-CHOP. Completed November 2020. Last CT scan was November and that was normal. We will do scans every 6 months until 2 years out. Will return in May with scans and to see Dr. Ibanez. No constitutional symptoms.     2. Skin lesion on top of head: dermatologist removed. Was cancer, did not know what kind. Will try to get records. Encouraged him to continue to see a dermatologist every 6 or 12 months for skin checks.    ECOG Performance    1 - Can't do physically strenuous work, but fully ambyulatory and can do light sedentary work    Distress Screening (within last 30 days)    No data recorded     Pain  Pain Score: No Pain (0)    Problem List    Patient Active Problem List   Diagnosis     Type 2 diabetes mellitus with stage 3a chronic kidney disease, without long-term current use of insulin (H)     Chronic atrial fibrillation (H)        ______________________________________________________________________________    History of Present Illness    Measurable disease: PET scan     Past therapy: R-CHOP for 4 cycles, 9/15/20 until November 16, 2020    Interim History: Patient is here today for a 3-month follow-up visit. Overall patient  "states that he is doing fine and really has no new issues. Denies any new rashes or skin lesions. Denies any constitutional symptoms. Denies any lymphadenopathy that he has noticed. States that overall he has been stable and is happy about that.    Review of Systems    Pertinent items are noted in HPI.    Past History    Past Medical History:   Diagnosis Date     Aortic stenosis     mild     Atrial fibrillation (H)      Atrial flutter (H) 8/21/2017     Atrial flutter with rapid ventricular response (H)      Cancer (H)     lymp     Colon polyps 08/08/2016    Per colonoscopy 8/8/16.  Large and small.  Some removed. Others to be removed surgically. (per patient)     Diabetes mellitus (H)     DM II     Diabetes mellitus, type II (H)      Diabetic polyneuropathy associated with type 2 diabetes mellitus (H) 12/16/2020     Hyperlipidemia      Hypertension      Morbid obesity (H)      JOHNNY (obstructive sleep apnea)     no cpap       PHYSICAL EXAM  BP (!) 152/73 (BP Location: Left arm, Patient Position: Sitting, Cuff Size: Adult Large)   Pulse 60   Temp 98.5  F (36.9  C) (Oral)   Resp 18   Ht 1.829 m (6' 0.01\")   Wt 123 kg (271 lb 3.2 oz)   SpO2 97%   BMI 36.77 kg/m      GENERAL: no acute distress. Cooperative in conversation. Here alone. Mask on  RESP: Regular respiratory rate. No expiratory wheezes   MUSCULOSKELETAL: no bilateral leg swelling  NEURO: non focal. Alert and oriented x3.   PSYCH: within normal limits. No depression or anxiety.  SKIN: exposed skin is dry intact. Scar on the top of his head.    Lab Results    Recent Results (from the past 168 hour(s))   INR point of care   Result Value Ref Range    INR 1.7 (H) 0.9 - 1.1   Comprehensive metabolic panel (BMP + Alb, Alk Phos, ALT, AST, Total. Bili, TP)   Result Value Ref Range    Sodium 138 136 - 145 mmol/L    Potassium 4.4 3.5 - 5.0 mmol/L    Chloride 105 98 - 107 mmol/L    Carbon Dioxide (CO2) 24 22 - 31 mmol/L    Anion Gap 9 5 - 18 mmol/L    Urea Nitrogen " "21 8 - 28 mg/dL    Creatinine 1.55 (H) 0.70 - 1.30 mg/dL    Calcium 8.6 8.5 - 10.5 mg/dL    Glucose 125 70 - 125 mg/dL    Alkaline Phosphatase 97 45 - 120 U/L    AST 18 0 - 40 U/L    ALT 16 0 - 45 U/L    Protein Total 6.7 6.0 - 8.0 g/dL    Albumin 3.8 3.5 - 5.0 g/dL    Bilirubin Total 0.4 0.0 - 1.0 mg/dL    GFR Estimate 48 (L) >60 mL/min/1.73m2   Lactate Dehydrogenase   Result Value Ref Range    Lactate Dehydrogenase 134 125 - 220 U/L   CBC with platelets and differential   Result Value Ref Range    WBC Count 6.5 4.0 - 11.0 10e3/uL    RBC Count 3.60 (L) 4.40 - 5.90 10e6/uL    Hemoglobin 11.3 (L) 13.3 - 17.7 g/dL    Hematocrit 35.0 (L) 40.0 - 53.0 %    MCV 97 78 - 100 fL    MCH 31.4 26.5 - 33.0 pg    MCHC 32.3 31.5 - 36.5 g/dL    RDW 14.1 10.0 - 15.0 %    Platelet Count 212 150 - 450 10e3/uL    % Neutrophils 50 %    % Lymphocytes 34 %    % Monocytes 10 %    % Eosinophils 5 %    % Basophils 1 %    % Immature Granulocytes 0 %    NRBCs per 100 WBC 0 <1 /100    Absolute Neutrophils 3.3 1.6 - 8.3 10e3/uL    Absolute Lymphocytes 2.2 0.8 - 5.3 10e3/uL    Absolute Monocytes 0.7 0.0 - 1.3 10e3/uL    Absolute Eosinophils 0.4 0.0 - 0.7 10e3/uL    Absolute Basophils 0.0 0.0 - 0.2 10e3/uL    Absolute Immature Granulocytes 0.0 <=0.4 10e3/uL    Absolute NRBCs 0.0 10e3/uL         Imaging    No results found.      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    Weight as of this encounter: 123 kg (271 lb 3.2 oz). Body surface area is 2.5 meters squared.  No Pain (0) Comment: Data Unavailable   No LMP for male patient.  Allergies reviewed: Yes  Medications reviewed: Yes    Medications: Medication refills not needed today.  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       Jany Gray [facility]                  Again, thank you for allowing me to participate in the care of your patient.        Sincerely,        CLAY Garrett CNP    "

## 2022-02-28 ENCOUNTER — LAB (OUTPATIENT)
Dept: LAB | Facility: CLINIC | Age: 72
End: 2022-02-28
Payer: MEDICARE

## 2022-02-28 ENCOUNTER — ANTICOAGULATION THERAPY VISIT (OUTPATIENT)
Dept: ANTICOAGULATION | Facility: CLINIC | Age: 72
End: 2022-02-28

## 2022-02-28 DIAGNOSIS — I48.20 CHRONIC ATRIAL FIBRILLATION (H): ICD-10-CM

## 2022-02-28 DIAGNOSIS — I48.20 CHRONIC ATRIAL FIBRILLATION (H): Primary | ICD-10-CM

## 2022-02-28 LAB — INR BLD: 2.3 (ref 0.9–1.1)

## 2022-02-28 PROCEDURE — 36416 COLLJ CAPILLARY BLOOD SPEC: CPT

## 2022-02-28 PROCEDURE — 85610 PROTHROMBIN TIME: CPT

## 2022-02-28 NOTE — PROGRESS NOTES
ANTICOAGULATION MANAGEMENT     Cam Walden 72 year old male is on warfarin with therapeutic INR result. (Goal INR 2.0-3.0)    Recent labs: (last 7 days)     02/28/22  0825   INR 2.3*       ASSESSMENT       Source(s): Chart Review and Patient/Caregiver Call       Warfarin doses taken: Warfarin taken as instructed    Diet: No new diet changes identified    New illness, injury, or hospitalization: No    Medication/supplement changes: None noted    Signs or symptoms of bleeding or clotting: No    Previous INR: Therapeutic last visit; previously outside of goal range    Additional findings: None       PLAN     Recommended plan for no diet, medication or health factor changes affecting INR     Dosing Instructions: Continue your current warfarin dose with next INR in 2 weeks       Summary  As of 2/28/2022    Full warfarin instructions:  5 mg every Sun, Thu; 10 mg all other days   Next INR check:  3/14/2022             Telephone call with Cam who verbalizes understanding and agrees to plan    Lab visit scheduled    Education provided: Please call back if any changes to your diet, medications or how you've been taking warfarin    Plan made per [facility] anticoagulation protocol    Jaqueline Chua RN  Anticoagulation Clinic  2/28/2022    _______________________________________________________________________     Anticoagulation Episode Summary     Current INR goal:  2.0-3.0   TTR:  56.8 % (1 y)   Target end date:  Indefinite   Send INR [facility]:  [facility]    Indications    Atrial flutter (H) (Resolved) [I48.92]  Chronic atrial fibrillation (H) [I48.20]           Comments:           Anticoagulation Care Providers     Provider Role Specialty Phone number    Kamaljit Plata MD Referring Family Medicine [phone]

## 2022-03-14 ENCOUNTER — LAB (OUTPATIENT)
Dept: LAB | Facility: CLINIC | Age: 72
End: 2022-03-14
Payer: MEDICARE

## 2022-03-14 ENCOUNTER — ANTICOAGULATION THERAPY VISIT (OUTPATIENT)
Dept: ANTICOAGULATION | Facility: CLINIC | Age: 72
End: 2022-03-14

## 2022-03-14 DIAGNOSIS — I48.20 CHRONIC ATRIAL FIBRILLATION (H): ICD-10-CM

## 2022-03-14 DIAGNOSIS — I48.20 CHRONIC ATRIAL FIBRILLATION (H): Primary | ICD-10-CM

## 2022-03-14 LAB — INR BLD: 2.2 (ref 0.9–1.1)

## 2022-03-14 PROCEDURE — 85610 PROTHROMBIN TIME: CPT

## 2022-03-14 PROCEDURE — 36416 COLLJ CAPILLARY BLOOD SPEC: CPT

## 2022-03-14 NOTE — PROGRESS NOTES
ANTICOAGULATION MANAGEMENT     Cam Walden 72 year old male is on warfarin with therapeutic INR result. (Goal INR 2.0-3.0)    Recent labs: (last 7 days)     03/14/22  0845   INR 2.2*       ASSESSMENT       Source(s): Chart Review and Patient/Caregiver Call       Warfarin doses taken: Warfarin taken as instructed    Diet: No new diet changes identified    New illness, injury, or hospitalization: No    Medication/supplement changes: None noted    Signs or symptoms of bleeding or clotting: No    Previous INR: Therapeutic last visit; previously outside of goal range    Additional findings: None       PLAN     Recommended plan for no diet, medication or health factor changes affecting INR     Dosing Instructions: Continue your current warfarin dose with next INR in 4 weeks       Summary  As of 3/14/2022    Full warfarin instructions:  5 mg every Sun, Thu; 10 mg all other days   Next INR check:  4/11/2022             Telephone call with Cam who verbalizes understanding and agrees to plan    Lab visit scheduled    Education provided: None required    Plan made per [facility] anticoagulation protocol    Jaqueline Chua RN  Anticoagulation Clinic  3/14/2022    _______________________________________________________________________     Anticoagulation Episode Summary     Current INR goal:  2.0-3.0   TTR:  60.2 % (1 y)   Target end date:  Indefinite   Send INR reminders to:  [facility]    Indications    Atrial flutter (H) (Resolved) [I48.92]  Chronic atrial fibrillation (H) [I48.20]           Comments:           Anticoagulation Care Providers     Provider Role Specialty Phone number    Kamaljit Plata MD Referring Family Medicine [phone]

## 2022-03-21 DIAGNOSIS — E11.9 DIABETES MELLITUS, TYPE 2 (H): Primary | ICD-10-CM

## 2022-03-21 NOTE — TELEPHONE ENCOUNTER
Reason for Call:  Medication or medication refill:    Do you use a [facility]?  Name of the [facility] and phone number for the [facility] [address]    Name of the medication requested: metformin    Other request:     Can we leave a detailed message on this number? Not Applicable    Phone number patient can be reached at: Home number on file [phone] (home)    Best Time: asap    Call taken on 3/21/2022 at 10:12 AM by Caprice Boateng

## 2022-03-21 NOTE — TELEPHONE ENCOUNTER
"Last Written Prescription Date:  4/23/21  Last Fill Quantity: 180,  # refills: 2   Last office visit provider: 11/29/21          Requested Prescriptions   Pending Prescriptions Disp Refills     metFORMIN (GLUCOPHAGE) 1000 MG tablet       Sig: TAKE 1 TABLET BY MOUTH TWICE A DAY WITH MEALS       Biguanide Agents Failed - 3/21/2022 12:53 PM        Failed - Recent (6 mo) or future (30 days) visit within the authorizing provider's specialty     Patient had office visit in the last 6 months or has a visit in the next 30 days with authorizing provider or within the authorizing provider's specialty.  See \"Patient Info\" tab in inbasket, or \"Choose Columns\" in Meds & Orders section of the refill encounter.            Passed - Patient is age 10 or older        Passed - Patient has documented A1c within the specified period of time.     If HgbA1C is 8 or greater, it needs to be on file within the past 3 months.  If less than 8, must be on file within the past 6 months.     Recent Labs   Lab Test 10/05/21  0856   A1C 6.5*             Passed - Patient's CR is NOT>1.4 OR Patient's EGFR is NOT<45 within past 12 mos.     Recent Labs   Lab Test 02/22/22  0915 11/15/21  1208 05/14/21  0956   GFRESTIMATED 48*   < > 48*   GFRESTBLACK  --   --  58*    < > = values in this interval not displayed.       Recent Labs   Lab Test 02/22/22  0915   CR 1.55*             Passed - Patient does NOT have a diagnosis of CHF.        Passed - Medication is active on med list             Naomi Hdez RN 03/21/22 12:57 PM  "

## 2022-04-12 ENCOUNTER — TELEPHONE (OUTPATIENT)
Dept: ANTICOAGULATION | Facility: CLINIC | Age: 72
End: 2022-04-12
Payer: MEDICARE

## 2022-04-12 NOTE — TELEPHONE ENCOUNTER
ANTICOAGULATION     Cam Walden is overdue for INR check.      Spoke with Cam and scheduled lab appointment on tomorrow    Hetal Mckeon RN

## 2022-04-13 ENCOUNTER — ANTICOAGULATION THERAPY VISIT (OUTPATIENT)
Dept: ANTICOAGULATION | Facility: CLINIC | Age: 72
End: 2022-04-13

## 2022-04-13 ENCOUNTER — LAB (OUTPATIENT)
Dept: LAB | Facility: CLINIC | Age: 72
End: 2022-04-13
Payer: MEDICARE

## 2022-04-13 DIAGNOSIS — I48.20 CHRONIC ATRIAL FIBRILLATION (H): Primary | ICD-10-CM

## 2022-04-13 DIAGNOSIS — I48.20 CHRONIC ATRIAL FIBRILLATION (H): ICD-10-CM

## 2022-04-13 LAB — INR BLD: 3.2 (ref 0.9–1.1)

## 2022-04-13 PROCEDURE — 85610 PROTHROMBIN TIME: CPT

## 2022-04-13 PROCEDURE — 36416 COLLJ CAPILLARY BLOOD SPEC: CPT

## 2022-04-13 NOTE — PROGRESS NOTES
ANTICOAGULATION MANAGEMENT     Cam Walden 72 year old male is on warfarin with supratherapeutic INR result. (Goal INR 2.0-3.0)    Recent labs: (last 7 days)     04/13/22  0808   INR 3.2*       ASSESSMENT       Source(s): Chart Review, Patient/Caregiver Call and Template       Warfarin doses taken: Warfarin taken as instructed    Diet: Decreased greens/vitamin K in diet; plans to resume previous intake    New illness, injury, or hospitalization: No    Medication/supplement changes: None noted    Signs or symptoms of bleeding or clotting: No    Previous INR: Therapeutic last 2(+) visits    Additional findings: None       PLAN     Recommended plan for temporary change(s) affecting INR     Dosing Instructions: continue your current warfarin dose with next INR in 2 weeks       Summary  As of 4/13/2022    Full warfarin instructions:  5 mg every Sun, Thu; 10 mg all other days   Next INR check:  4/27/2022             Telephone call with Cam who verbalizes understanding and agrees to plan    Lab visit scheduled    Education provided: Importance of consistent vitamin K intake, Impact of vitamin K foods on INR, Goal range and significance of current result and Importance of therapeutic range    Plan made per [facility] anticoagulation protocol    Tyler Cota RN  Anticoagulation Clinic  4/13/2022    _______________________________________________________________________     Anticoagulation Episode Summary     Current INR goal:  2.0-3.0   TTR:  58.5 % (1 y)   Target end date:  Indefinite   Send INR reminders to:  [facility]    Indications    Atrial flutter (H) (Resolved) [I48.92]  Chronic atrial fibrillation (H) [I48.20]           Comments:           Anticoagulation Care Providers     Provider Role Specialty Phone number    Kamaljit Plata MD Referring Family Medicine [phone]

## 2022-04-27 ENCOUNTER — LAB (OUTPATIENT)
Dept: LAB | Facility: CLINIC | Age: 72
End: 2022-04-27
Payer: MEDICARE

## 2022-04-27 ENCOUNTER — ANTICOAGULATION THERAPY VISIT (OUTPATIENT)
Dept: ANTICOAGULATION | Facility: CLINIC | Age: 72
End: 2022-04-27

## 2022-04-27 DIAGNOSIS — I48.20 CHRONIC ATRIAL FIBRILLATION (H): Primary | ICD-10-CM

## 2022-04-27 DIAGNOSIS — I48.20 CHRONIC ATRIAL FIBRILLATION (H): ICD-10-CM

## 2022-04-27 LAB — INR BLD: 2.5 (ref 0.9–1.1)

## 2022-04-27 PROCEDURE — 85610 PROTHROMBIN TIME: CPT

## 2022-04-27 PROCEDURE — 36416 COLLJ CAPILLARY BLOOD SPEC: CPT

## 2022-04-27 NOTE — PROGRESS NOTES
ANTICOAGULATION MANAGEMENT     Cam Walden 72 year old male is on warfarin with therapeutic INR result. (Goal INR 2.0-3.0)    Recent labs: (last 7 days)     04/27/22  0835   INR 2.5*       ASSESSMENT       Source(s): Chart Review and Patient/Caregiver Call       Warfarin doses taken: Warfarin taken as instructed    Diet: No new diet changes identified    New illness, injury, or hospitalization: No    Medication/supplement changes: None noted    Signs or symptoms of bleeding or clotting: No    Previous INR: Supratherapeutic    Additional findings: None       PLAN     Recommended plan for no diet, medication or health factor changes affecting INR     Dosing Instructions: continue your current warfarin dose with next INR in 2 weeks       Summary  As of 4/27/2022    Full warfarin instructions:  5 mg every Sun, Thu; 10 mg all other days   Next INR check:  5/11/2022             Telephone call with Cam who verbalizes understanding and agrees to plan    Lab visit scheduled    Education provided: None required    Plan made per [facility] anticoagulation protocol    Jaqueline Chau RN  Anticoagulation Clinic  4/27/2022    _______________________________________________________________________     Anticoagulation Episode Summary     Current INR goal:  2.0-3.0   TTR:  57.4 % (1 y)   Target end date:  Indefinite   Send INR reminders to:  [facility]    Indications    Atrial flutter (H) (Resolved) [I48.92]  Chronic atrial fibrillation (H) [I48.20]           Comments:           Anticoagulation Care Providers     Provider Role Specialty Phone number    Kamaljit Plata MD Referring Family Medicine [phone]

## 2022-05-04 ENCOUNTER — TRANSFERRED RECORDS (OUTPATIENT)
Dept: HEALTH INFORMATION MANAGEMENT | Facility: CLINIC | Age: 72
End: 2022-05-04
Payer: MEDICARE

## 2022-05-11 ENCOUNTER — LAB (OUTPATIENT)
Dept: LAB | Facility: CLINIC | Age: 72
End: 2022-05-11
Payer: MEDICARE

## 2022-05-11 ENCOUNTER — ANTICOAGULATION THERAPY VISIT (OUTPATIENT)
Dept: ANTICOAGULATION | Facility: CLINIC | Age: 72
End: 2022-05-11

## 2022-05-11 DIAGNOSIS — I48.20 CHRONIC ATRIAL FIBRILLATION (H): Primary | ICD-10-CM

## 2022-05-11 DIAGNOSIS — I48.20 CHRONIC ATRIAL FIBRILLATION (H): ICD-10-CM

## 2022-05-11 LAB — INR BLD: 2.5 (ref 0.9–1.1)

## 2022-05-11 PROCEDURE — 85610 PROTHROMBIN TIME: CPT

## 2022-05-11 PROCEDURE — 36416 COLLJ CAPILLARY BLOOD SPEC: CPT

## 2022-05-11 NOTE — PROGRESS NOTES
ANTICOAGULATION MANAGEMENT     Cam Walden 72 year old male is on warfarin with therapeutic INR result. (Goal INR 2.0-3.0)    Recent labs: (last 7 days)     05/11/22  0825   INR 2.5*       ASSESSMENT       Source(s): Chart Review and Patient/Caregiver Call       Warfarin doses taken: Warfarin taken as instructed and Template incorrect; verbally confirmed home dose with Cam as in calendar     Diet: No new diet changes identified    New illness, injury, or hospitalization: No    Medication/supplement changes: None noted    Signs or symptoms of bleeding or clotting: No    Previous INR: Therapeutic last visit; previously outside of goal range    Additional findings: None       PLAN     Recommended plan for no diet, medication or health factor changes affecting INR     Dosing Instructions: continue your current warfarin dose with next INR in 4 weeks       Summary  As of 5/11/2022    Full warfarin instructions:  5 mg every Sun, Thu; 10 mg all other days   Next INR check:  6/8/2022             Telephone call with Cam who verbalizes understanding and agrees to plan    Lab visit scheduled    Education provided: None required    Plan made per [facility] anticoagulation protocol    Jaqueline Chua RN  Anticoagulation Clinic  5/11/2022    _______________________________________________________________________     Anticoagulation Episode Summary     Current INR goal:  2.0-3.0   TTR:  57.4 % (1 y)   Target end date:  Indefinite   Send INR reminders to:  [facility]    Indications    Atrial flutter (H) (Resolved) [I48.92]  Chronic atrial fibrillation (H) [I48.20]           Comments:           Anticoagulation Care Providers     Provider Role Specialty Phone number    Kamaljit Plata MD Referring Family Medicine [phone]

## 2022-06-01 ENCOUNTER — HOSPITAL ENCOUNTER (OUTPATIENT)
Dept: CT IMAGING | Facility: HOSPITAL | Age: 72
Discharge: HOME | End: 2022-06-01
Attending: NURSE PRACTITIONER | Admitting: NURSE PRACTITIONER
Payer: MEDICARE

## 2022-06-01 DIAGNOSIS — C83.398 DIFFUSE LARGE B-CELL LYMPHOMA OF EXTRANODAL SITE: ICD-10-CM

## 2022-06-01 LAB
CREAT BLD-MCNC: 1.7 MG/DL (ref 0.7–1.3)
GFR SERPL CREATININE-BSD FRML MDRD: 43 ML/MIN/1.73M2

## 2022-06-01 PROCEDURE — 82565 ASSAY OF CREATININE: CPT

## 2022-06-01 PROCEDURE — 250N000011 HC RX IP 250 OP 636: Performed by: NURSE PRACTITIONER

## 2022-06-01 PROCEDURE — 74177 CT ABD & PELVIS W/CONTRAST: CPT | Mod: MG

## 2022-06-01 RX ORDER — IOPAMIDOL 755 MG/ML
100 INJECTION, SOLUTION INTRAVASCULAR ONCE
Status: COMPLETED | OUTPATIENT
Start: 2022-06-01 | End: 2022-06-01

## 2022-06-01 RX ADMIN — IOPAMIDOL 100 ML: 755 INJECTION, SOLUTION INTRAVENOUS at 17:47

## 2022-06-02 ENCOUNTER — ONCOLOGY VISIT (OUTPATIENT)
Dept: ONCOLOGY | Facility: HOSPITAL | Age: 72
End: 2022-06-02
Attending: INTERNAL MEDICINE
Payer: MEDICARE

## 2022-06-02 ENCOUNTER — LAB (OUTPATIENT)
Dept: INFUSION THERAPY | Facility: HOSPITAL | Age: 72
End: 2022-06-02
Attending: INTERNAL MEDICINE
Payer: MEDICARE

## 2022-06-02 VITALS
BODY MASS INDEX: 38.01 KG/M2 | SYSTOLIC BLOOD PRESSURE: 133 MMHG | RESPIRATION RATE: 18 BRPM | DIASTOLIC BLOOD PRESSURE: 70 MMHG | HEART RATE: 68 BPM | TEMPERATURE: 99.1 F | WEIGHT: 280.6 LBS | OXYGEN SATURATION: 96 % | HEIGHT: 72 IN

## 2022-06-02 DIAGNOSIS — E11.22 TYPE 2 DIABETES MELLITUS WITH STAGE 3A CHRONIC KIDNEY DISEASE, WITHOUT LONG-TERM CURRENT USE OF INSULIN (H): Primary | ICD-10-CM

## 2022-06-02 DIAGNOSIS — N18.31 TYPE 2 DIABETES MELLITUS WITH STAGE 3A CHRONIC KIDNEY DISEASE, WITHOUT LONG-TERM CURRENT USE OF INSULIN (H): Primary | ICD-10-CM

## 2022-06-02 DIAGNOSIS — C83.398 DIFFUSE LARGE B-CELL LYMPHOMA OF EXTRANODAL SITE: ICD-10-CM

## 2022-06-02 DIAGNOSIS — E53.8 VITAMIN B12 DEFICIENCY (NON ANEMIC): ICD-10-CM

## 2022-06-02 DIAGNOSIS — C83.398 DIFFUSE LARGE B-CELL LYMPHOMA OF EXTRANODAL SITE: Primary | ICD-10-CM

## 2022-06-02 LAB
IRON SATN MFR SERPL: 15 % (ref 15–46)
IRON SERPL-MCNC: 46 UG/DL (ref 35–180)
RETICS # AUTO: 0.09 10E6/UL (ref 0.01–0.11)
RETICS/RBC NFR AUTO: 2.1 % (ref 0.8–2.7)
TIBC SERPL-MCNC: 306 UG/DL (ref 240–430)
VIT B12 SERPL-MCNC: <146 PG/ML (ref 213–816)

## 2022-06-02 PROCEDURE — 99214 OFFICE O/P EST MOD 30 MIN: CPT | Performed by: INTERNAL MEDICINE

## 2022-06-02 PROCEDURE — 80053 COMPREHEN METABOLIC PANEL: CPT | Performed by: NURSE PRACTITIONER

## 2022-06-02 PROCEDURE — 250N000011 HC RX IP 250 OP 636

## 2022-06-02 PROCEDURE — 85025 COMPLETE CBC W/AUTO DIFF WBC: CPT | Performed by: NURSE PRACTITIONER

## 2022-06-02 PROCEDURE — 85045 AUTOMATED RETICULOCYTE COUNT: CPT | Performed by: NURSE PRACTITIONER

## 2022-06-02 PROCEDURE — 82607 VITAMIN B-12: CPT | Performed by: INTERNAL MEDICINE

## 2022-06-02 PROCEDURE — 83615 LACTATE (LD) (LDH) ENZYME: CPT | Performed by: NURSE PRACTITIONER

## 2022-06-02 PROCEDURE — G0463 HOSPITAL OUTPT CLINIC VISIT: HCPCS

## 2022-06-02 PROCEDURE — 83550 IRON BINDING TEST: CPT | Performed by: INTERNAL MEDICINE

## 2022-06-02 PROCEDURE — 82040 ASSAY OF SERUM ALBUMIN: CPT | Performed by: NURSE PRACTITIONER

## 2022-06-02 RX ORDER — CYANOCOBALAMIN 1000 UG/ML
1000 INJECTION, SOLUTION INTRAMUSCULAR; SUBCUTANEOUS ONCE
Status: CANCELLED
Start: 2022-06-09 | End: 2022-06-09

## 2022-06-02 RX ORDER — EPINEPHRINE 1 MG/ML
0.3 INJECTION, SOLUTION INTRAMUSCULAR; SUBCUTANEOUS EVERY 5 MIN PRN
Status: CANCELLED | OUTPATIENT
Start: 2022-06-09

## 2022-06-02 RX ORDER — DIPHENHYDRAMINE HYDROCHLORIDE 50 MG/ML
50 INJECTION INTRAMUSCULAR; INTRAVENOUS
Status: CANCELLED
Start: 2022-06-09

## 2022-06-02 RX ORDER — MEPERIDINE HYDROCHLORIDE 25 MG/ML
25 INJECTION INTRAMUSCULAR; INTRAVENOUS; SUBCUTANEOUS EVERY 30 MIN PRN
Status: CANCELLED | OUTPATIENT
Start: 2022-06-09

## 2022-06-02 RX ORDER — HEPARIN SODIUM (PORCINE) LOCK FLUSH IV SOLN 100 UNIT/ML 100 UNIT/ML
5 SOLUTION INTRAVENOUS
Status: DISCONTINUED | OUTPATIENT
Start: 2022-06-02 | End: 2022-06-02 | Stop reason: HOSPADM

## 2022-06-02 RX ORDER — HEPARIN SODIUM (PORCINE) LOCK FLUSH IV SOLN 100 UNIT/ML 100 UNIT/ML
SOLUTION INTRAVENOUS
Status: COMPLETED
Start: 2022-06-02 | End: 2022-06-02

## 2022-06-02 RX ORDER — HEPARIN SODIUM (PORCINE) LOCK FLUSH IV SOLN 100 UNIT/ML 100 UNIT/ML
5 SOLUTION INTRAVENOUS
Status: CANCELLED | OUTPATIENT
Start: 2022-06-02

## 2022-06-02 RX ORDER — METHYLPREDNISOLONE SODIUM SUCCINATE 125 MG/2ML
125 INJECTION, POWDER, LYOPHILIZED, FOR SOLUTION INTRAMUSCULAR; INTRAVENOUS
Status: CANCELLED
Start: 2022-06-09

## 2022-06-02 RX ORDER — NALOXONE HYDROCHLORIDE 0.4 MG/ML
0.2 INJECTION, SOLUTION INTRAMUSCULAR; INTRAVENOUS; SUBCUTANEOUS
Status: CANCELLED | OUTPATIENT
Start: 2022-06-09

## 2022-06-02 RX ORDER — ALBUTEROL SULFATE 0.83 MG/ML
2.5 SOLUTION RESPIRATORY (INHALATION)
Status: CANCELLED | OUTPATIENT
Start: 2022-06-09

## 2022-06-02 RX ORDER — ALBUTEROL SULFATE 90 UG/1
1-2 AEROSOL, METERED RESPIRATORY (INHALATION)
Status: CANCELLED
Start: 2022-06-09

## 2022-06-02 RX ADMIN — Medication 500 UNITS: at 08:04

## 2022-06-02 ASSESSMENT — PAIN SCALES - GENERAL: PAINLEVEL: NO PAIN (0)

## 2022-06-02 NOTE — PROGRESS NOTES
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  Examination of the neck is normal as well.  Patient reassured.  We will continue with observation.    We will see back in 3 months with labs a few days prior.  He should have scans including CT of the neck in November.    He has mild anemia.  We will add on B12, retake count and iron studies today.  Additional anemia work-up prior to return in 3 months.  Likely related to his diabetes and renal dysfunction.    He did have follow-up and procedure for kidney stone with resolution of the right hydronephrosis.    Plan: Continue observation  Add-on labs today  Follow-up with labs just prior to visit in 3 months  Repeat CT scans including CT of the neck in November    Addendum: Additional labs today show low vitamin B12 level.  Will start B12 injections next week weekly for 4 weeks and then monthly after that.  Recheck B12 with follow-up.    Measurable disease: PET scan    Current therapy: Observation    Treatment history:    R-CHOP, 4 cycles, last,  November 16, 2020   cycle 1 on 9/15    ECOG Performance   ECOG Performance Status: 1    Distress Assessment  Distress Assessment Score: No distress    Pain         Problem List    1. Diffuse large B-cell lymphoma of extranodal site (H)          CC: Kamaljit Plata,  MD    ______________________________________________________________________________    History of Present Illness    Mr. Cam Walden is here for follow-up.  Seen 3 months ago.  Has been doing fine.  Did see urologist and had procedure for the right kidney stone with resolution of hydronephrosis.  No neck swelling.  No fever chills or sweats.  Stable weight.  No new adenopathy.  No infectious problems or bleeding.  ECOG status 1.    Pain Status  Currently in Pain: No/denies    Review of Systems    As per the HPI.       Patient Coping  Patient Coping: Accepting  Distress Assessment  Distress Assessment Score: No distress  Accompanied by  Accompanied by: Alone    Past History  Past Medical History:   Diagnosis Date     Aortic stenosis     mild     Atrial fibrillation (H)      Atrial flutter (H) 8/21/2017     Atrial flutter with rapid ventricular response (H)      Cancer (H)     lymp     Colon polyps 08/08/2016    Per colonoscopy 8/8/16.  Large and small.  Some removed. Others to be removed surgically. (per patient)     Diabetes mellitus (H)     DM II     Diabetes mellitus, type II (H)      Diabetic polyneuropathy associated with type 2 diabetes mellitus (H) 12/16/2020     GI bleed     Following colon polyp removal     Hyperlipidemia      Hypertension      Morbid obesity (H)      JOHNNY (obstructive sleep apnea)     no cpap     Rectal bleeding          Past Surgical History:   Procedure Laterality Date     CARDIOVERSION  08/25/2017     IR PORT PLACEMENT >5 YEARS  8/24/2020       Physical Exam    Recent Vitals 5/17/2021   Height -   Weight 253 lbs 2 oz   BSA (m2) 2.41 m2   /81   Pulse 76   Temp 98.5   Temp src 1   SpO2 98   Some recent data might be hidden       GENERAL: Alert and oriented to time place and person. Seated comfortably. In no distress.    HEAD: Atraumatic and normocephalic.    EYES: BRIDGETTE, EOMI.  No pallor.  No icterus.    Oral cavity: no mucosal lesion or tonsillar enlargement.    NECK: supple.  JVP normal.  No thyroid enlargement.    LYMPH NODES: No palpable, cervical, axillary or inguinal lymphadenopathy.    CHEST: clear to auscultation bilaterally.  Resonant to percussion throughout bilaterally.  Symmetrical breath movements bilaterally.    CVS: S1 and S2 are heard. Regular rate and rhythm.  No murmur or gallop or rub heard.  No peripheral edema.    ABDOMEN: Soft. Not tender. Not distended.  No palpable hepatomegaly or splenomegaly.  No other mass palpable.  Bowel sounds heard.    EXTREMITIES: Warm.    SKIN: no rash, or bruising or purpura.  Has a full head of hair.      Lab Results    Recent Results (from the past 168 hour(s))   Comprehensive Metabolic Panel   Result Value Ref Range    Sodium 140 136 - 145 mmol/L    Potassium 3.8 3.5 - 5.0 mmol/L    Chloride 108 (H) 98 - 107 mmol/L    CO2 22 22 - 31 mmol/L    Anion Gap, Calculation 10 5 - 18 mmol/L    Glucose 151 (H) 70 - 125 mg/dL    BUN 15 8 - 22 mg/dL    Creatinine 1.45 (H) 0.70 - 1.30 mg/dL    GFR MDRD Af Amer 58 (L) >60 mL/min/1.73m2    GFR MDRD Non Af Amer 48 (L) >60 mL/min/1.73m2    Bilirubin, Total 0.7 0.0 - 1.0 mg/dL    Calcium 8.6 8.5 - 10.5 mg/dL    Protein, Total 7.2 6.0 - 8.0 g/dL    Albumin 4.0 3.5 - 5.0 g/dL    Alkaline Phosphatase 128 (H) 45 - 120 U/L    AST 29 0 - 40 U/L    ALT 37 0 - 45 U/L   Lactate Dehydrogenase (LDH)   Result Value Ref Range    LD (LDH) 294 (H) 125 - 220 U/L   INR   Result Value Ref Range    INR 2.68 (H) 0.90 - 1.10   HM1 (CBC with Diff)   Result Value Ref Range    WBC 9.4 4.0 - 11.0 thou/uL    RBC 4.06 (L) 4.40 - 6.20 mill/uL    Hemoglobin 11.5 (L) 14.0 - 18.0 g/dL    Hematocrit 36.6 (L) 40.0 - 54.0 %    MCV 90 80 - 100 fL    MCH 28.3 27.0 - 34.0 pg    MCHC 31.4 (L) 32.0 - 36.0 g/dL    RDW 16.7 (H) 11.0 - 14.5 %    Platelets 271 140 - 440 thou/uL    MPV 8.7 8.5 - 12.5 fL    Neutrophils % 61 50 - 70 %    Lymphocytes % 24 20 - 40 %    Monocytes % 10 2 - 10 %    Eosinophils % 2 0 - 6 %    Basophils % 0 0 - 2 %     Immature Granulocyte % 3 (H) <=0 %    Neutrophils Absolute 5.7 2.0 - 7.7 thou/uL    Lymphocytes Absolute 2.3 0.8 - 4.4 thou/uL    Monocytes Absolute 0.9 0.0 - 0.9 thou/uL    Eosinophils Absolute 0.2 0.0 - 0.4 thou/uL    Basophils Absolute 0.0 0.0 - 0.2 thou/uL    Immature Granulocyte Absolute 0.2 (H) <=0.0 thou/uL       Imaging    CT chest abdomen and pelvis    IMPRESSION:  1.  No evidence of recurrent tumor in the chest, abdomen or pelvis.  2.  Resolution of the previous obstructive changes in the right kidney.  3.  Noncritical findings as noted above.      Signed by: Blade Ibanez MD

## 2022-06-02 NOTE — PROGRESS NOTES
"Oncology Rooming Note    June 2, 2022 8:21 AM   Cam Walden is a 72 year old male who presents for:    Chief Complaint   Patient presents with     Oncology Clinic Visit     3 month return Diffuse large B-cell lymphoma of extranodal site, review labs      Initial Vitals: /70 (BP Location: Left arm, Patient Position: Sitting, Cuff Size: Adult Large)   Pulse 68   Temp 99.1  F (37.3  C) (Oral)   Resp 18   Ht 1.829 m (6' 0.01\")   Wt 127.3 kg (280 lb 9.6 oz)   SpO2 96%   BMI 38.05 kg/m   Estimated body mass index is 38.05 kg/m  as calculated from the following:    Height as of this encounter: 1.829 m (6' 0.01\").    Weight as of this encounter: 127.3 kg (280 lb 9.6 oz). Body surface area is 2.54 meters squared.  No Pain (0) Comment: Data Unavailable   No LMP for male patient.  Allergies reviewed: Yes  Medications reviewed: Yes    Medications: Medication refills not needed today.  Pharmacy name entered into [employer] [id number] - [place], [place] -  [address]    Clinical concerns: 3 month return Diffuse large B-cell lymphoma of extranodal site, review labs       Jany Gray CMA              "

## 2022-06-02 NOTE — LETTER
"    6/2/2022         RE: Cam Walden  [address] [place] [address]        Dear Colleague,    Thank you for referring your patient, Cam Walden, to the [facility]. Please see a copy of my visit note below.    Oncology Rooming Note    June 2, 2022 8:21 AM   Cam Walden is a 72 year old male who presents for:    Chief Complaint   Patient presents with     Oncology Clinic Visit     3 month return Diffuse large B-cell lymphoma of extranodal site, review labs      Initial Vitals: /70 (BP Location: Left arm, Patient Position: Sitting, Cuff Size: Adult Large)   Pulse 68   Temp 99.1  F (37.3  C) (Oral)   Resp 18   Ht 1.829 m (6' 0.01\")   Wt 127.3 kg (280 lb 9.6 oz)   SpO2 96%   BMI 38.05 kg/m   Estimated body mass index is 38.05 kg/m  as calculated from the following:    Height as of this encounter: 1.829 m (6' 0.01\").    Weight as of this encounter: 127.3 kg (280 lb 9.6 oz). Body surface area is 2.54 meters squared.  No Pain (0) Comment: Data Unavailable   No LMP for male patient.  Allergies reviewed: Yes  Medications reviewed: Yes    Medications: Medication refills not needed today.  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  Examination of the neck is normal as well.  Patient reassured.  We will continue with observation.    We will see back in 3 months with labs a few days prior.  He should have scans including CT of the neck in November.    He has mild anemia.  We will add on B12, retake count and iron studies today.  Additional anemia work-up prior to return in 3 months.  Likely related to his diabetes and renal dysfunction.    He did have follow-up and procedure for kidney stone with resolution of the right hydronephrosis.    Plan: Continue observation  Add-on labs today  Follow-up with labs just prior to visit in 3 months  Repeat CT scans including CT of the neck in November    Addendum: Additional labs today show low vitamin B12 level.  Will start B12 injections next week weekly for 4 weeks and then monthly after that.  Recheck B12 with follow-up.    Measurable disease: PET scan    Current therapy: Observation    Treatment history:    R-CHOP, 4 cycles, last,  November 16, 2020   cycle 1 on 9/15    ECOG Performance   ECOG Performance Status: 1    Distress Assessment  Distress Assessment Score: No distress    Pain         Problem List    1. 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 Cam Walden is here for follow-up.  Seen 3 months ago.  Has been doing fine.  Did see urologist and had procedure for the right kidney stone with resolution of hydronephrosis.  No neck swelling.  No fever chills or sweats.  Stable weight.  No new adenopathy.  No infectious problems or bleeding.  ECOG status 1.    Pain Status  Currently in Pain: No/denies    Review of Systems    As per the HPI.       Patient Coping  Patient Coping: Accepting  Distress Assessment  Distress Assessment Score: No  distress  Accompanied by  Accompanied by: Alone    Past History  Past Medical History:   Diagnosis Date     Aortic stenosis     mild     Atrial fibrillation (H)      Atrial flutter (H) 8/21/2017     Atrial flutter with rapid ventricular response (H)      Cancer (H)     lymp     Colon polyps 08/08/2016    Per colonoscopy 8/8/16.  Large and small.  Some removed. Others to be removed surgically. (per patient)     Diabetes mellitus (H)     DM II     Diabetes mellitus, type II (H)      Diabetic polyneuropathy associated with type 2 diabetes mellitus (H) 12/16/2020     GI bleed     Following colon polyp removal     Hyperlipidemia      Hypertension      Morbid obesity (H)      JOHNNY (obstructive sleep apnea)     no cpap     Rectal bleeding          Past Surgical History:   Procedure Laterality Date     CARDIOVERSION  08/25/2017     IR PORT PLACEMENT >5 YEARS  8/24/2020       Physical Exam    Recent Vitals 5/17/2021   Height -   Weight 253 lbs 2 oz   BSA (m2) 2.41 m2   /81   Pulse 76   Temp 98.5   Temp src 1   SpO2 98   Some recent data might be hidden       GENERAL: Alert and oriented to time place and person. Seated comfortably. In no distress.    HEAD: Atraumatic and normocephalic.    EYES: BRIDGETTE, EOMI.  No pallor.  No icterus.    Oral cavity: no mucosal lesion or tonsillar enlargement.    NECK: supple. JVP normal.  No thyroid enlargement.    LYMPH NODES: No palpable, cervical, axillary or inguinal lymphadenopathy.    CHEST: clear to auscultation bilaterally.  Resonant to percussion throughout bilaterally.  Symmetrical breath movements bilaterally.    CVS: S1 and S2 are heard. Regular rate and rhythm.  No murmur or gallop or rub heard.  No peripheral edema.    ABDOMEN: Soft. Not tender. Not distended.  No palpable hepatomegaly or splenomegaly.  No other mass palpable.  Bowel sounds heard.    EXTREMITIES: Warm.    SKIN: no rash, or bruising or purpura.  Has a full head of hair.      Lab Results    Recent Results (from  the past 168 hour(s))   Comprehensive Metabolic Panel   Result Value Ref Range    Sodium 140 136 - 145 mmol/L    Potassium 3.8 3.5 - 5.0 mmol/L    Chloride 108 (H) 98 - 107 mmol/L    CO2 22 22 - 31 mmol/L    Anion Gap, Calculation 10 5 - 18 mmol/L    Glucose 151 (H) 70 - 125 mg/dL    BUN 15 8 - 22 mg/dL    Creatinine 1.45 (H) 0.70 - 1.30 mg/dL    GFR MDRD Af Amer 58 (L) >60 mL/min/1.73m2    GFR MDRD Non Af Amer 48 (L) >60 mL/min/1.73m2    Bilirubin, Total 0.7 0.0 - 1.0 mg/dL    Calcium 8.6 8.5 - 10.5 mg/dL    Protein, Total 7.2 6.0 - 8.0 g/dL    Albumin 4.0 3.5 - 5.0 g/dL    Alkaline Phosphatase 128 (H) 45 - 120 U/L    AST 29 0 - 40 U/L    ALT 37 0 - 45 U/L   Lactate Dehydrogenase (LDH)   Result Value Ref Range    LD (LDH) 294 (H) 125 - 220 U/L   INR   Result Value Ref Range    INR 2.68 (H) 0.90 - 1.10   HM1 (CBC with Diff)   Result Value Ref Range    WBC 9.4 4.0 - 11.0 thou/uL    RBC 4.06 (L) 4.40 - 6.20 mill/uL    Hemoglobin 11.5 (L) 14.0 - 18.0 g/dL    Hematocrit 36.6 (L) 40.0 - 54.0 %    MCV 90 80 - 100 fL    MCH 28.3 27.0 - 34.0 pg    MCHC 31.4 (L) 32.0 - 36.0 g/dL    RDW 16.7 (H) 11.0 - 14.5 %    Platelets 271 140 - 440 thou/uL    MPV 8.7 8.5 - 12.5 fL    Neutrophils % 61 50 - 70 %    Lymphocytes % 24 20 - 40 %    Monocytes % 10 2 - 10 %    Eosinophils % 2 0 - 6 %    Basophils % 0 0 - 2 %    Immature Granulocyte % 3 (H) <=0 %    Neutrophils Absolute 5.7 2.0 - 7.7 thou/uL    Lymphocytes Absolute 2.3 0.8 - 4.4 thou/uL    Monocytes Absolute 0.9 0.0 - 0.9 thou/uL    Eosinophils Absolute 0.2 0.0 - 0.4 thou/uL    Basophils Absolute 0.0 0.0 - 0.2 thou/uL    Immature Granulocyte Absolute 0.2 (H) <=0.0 thou/uL       Imaging    CT chest abdomen and pelvis    IMPRESSION:  1.  No evidence of recurrent tumor in the chest, abdomen or pelvis.  2.  Resolution of the previous obstructive changes in the right kidney.  3.  Noncritical findings as noted above.      Signed by: Blade Ibanez MD        Again, thank  you for allowing me to participate in the care of your patient.        Sincerely,        Blade Ibanez MD

## 2022-06-09 ENCOUNTER — TELEPHONE (OUTPATIENT)
Dept: ANTICOAGULATION | Facility: CLINIC | Age: 72
End: 2022-06-09

## 2022-06-09 ENCOUNTER — INFUSION THERAPY VISIT (OUTPATIENT)
Dept: INFUSION THERAPY | Facility: HOSPITAL | Age: 72
End: 2022-06-09
Attending: INTERNAL MEDICINE
Payer: MEDICARE

## 2022-06-09 DIAGNOSIS — E53.8 VITAMIN B12 DEFICIENCY (NON ANEMIC): Primary | ICD-10-CM

## 2022-06-09 PROCEDURE — 250N000011 HC RX IP 250 OP 636: Performed by: INTERNAL MEDICINE

## 2022-06-09 PROCEDURE — 96372 THER/PROPH/DIAG INJ SC/IM: CPT | Performed by: INTERNAL MEDICINE

## 2022-06-09 RX ORDER — METHYLPREDNISOLONE SODIUM SUCCINATE 125 MG/2ML
125 INJECTION, POWDER, LYOPHILIZED, FOR SOLUTION INTRAMUSCULAR; INTRAVENOUS
Status: CANCELLED
Start: 2022-06-16

## 2022-06-09 RX ORDER — EPINEPHRINE 1 MG/ML
0.3 INJECTION, SOLUTION INTRAMUSCULAR; SUBCUTANEOUS EVERY 5 MIN PRN
Status: CANCELLED | OUTPATIENT
Start: 2022-06-16

## 2022-06-09 RX ORDER — CYANOCOBALAMIN 1000 UG/ML
1000 INJECTION, SOLUTION INTRAMUSCULAR; SUBCUTANEOUS ONCE
Status: CANCELLED
Start: 2022-06-16 | End: 2022-06-16

## 2022-06-09 RX ORDER — DIPHENHYDRAMINE HYDROCHLORIDE 50 MG/ML
50 INJECTION INTRAMUSCULAR; INTRAVENOUS
Status: CANCELLED
Start: 2022-06-16

## 2022-06-09 RX ORDER — CYANOCOBALAMIN 1000 UG/ML
1000 INJECTION, SOLUTION INTRAMUSCULAR; SUBCUTANEOUS ONCE
Status: COMPLETED | OUTPATIENT
Start: 2022-06-09 | End: 2022-06-09

## 2022-06-09 RX ORDER — MEPERIDINE HYDROCHLORIDE 25 MG/ML
25 INJECTION INTRAMUSCULAR; INTRAVENOUS; SUBCUTANEOUS EVERY 30 MIN PRN
Status: CANCELLED | OUTPATIENT
Start: 2022-06-16

## 2022-06-09 RX ORDER — ALBUTEROL SULFATE 90 UG/1
1-2 AEROSOL, METERED RESPIRATORY (INHALATION)
Status: CANCELLED
Start: 2022-06-16

## 2022-06-09 RX ORDER — NALOXONE HYDROCHLORIDE 0.4 MG/ML
0.2 INJECTION, SOLUTION INTRAMUSCULAR; INTRAVENOUS; SUBCUTANEOUS
Status: CANCELLED | OUTPATIENT
Start: 2022-06-16

## 2022-06-09 RX ORDER — ALBUTEROL SULFATE 0.83 MG/ML
2.5 SOLUTION RESPIRATORY (INHALATION)
Status: CANCELLED | OUTPATIENT
Start: 2022-06-16

## 2022-06-09 RX ADMIN — CYANOCOBALAMIN 1000 MCG: 1000 INJECTION, SOLUTION INTRAMUSCULAR; SUBCUTANEOUS at 08:07

## 2022-06-09 NOTE — PROGRESS NOTES
PT here ambulatory for vitamin b 12 inj. PT states feeling good. Injection administered to right deltoid and bandaid to site. PT tolerated without any problems. PT dc'd steady gait.

## 2022-06-09 NOTE — TELEPHONE ENCOUNTER
ANTICOAGULATION     Nely Walden is overdue for INR check.      Spoke with nely and scheduled lab appointment on 6/10    Jaqueline Chua RN

## 2022-06-10 ENCOUNTER — ANTICOAGULATION THERAPY VISIT (OUTPATIENT)
Dept: ANTICOAGULATION | Facility: CLINIC | Age: 72
End: 2022-06-10

## 2022-06-10 ENCOUNTER — LAB (OUTPATIENT)
Dept: LAB | Facility: CLINIC | Age: 72
End: 2022-06-10
Payer: MEDICARE

## 2022-06-10 DIAGNOSIS — I48.20 CHRONIC ATRIAL FIBRILLATION (H): ICD-10-CM

## 2022-06-10 DIAGNOSIS — I48.20 CHRONIC ATRIAL FIBRILLATION (H): Primary | ICD-10-CM

## 2022-06-10 LAB — INR BLD: 2.3 (ref 0.9–1.1)

## 2022-06-10 PROCEDURE — 36416 COLLJ CAPILLARY BLOOD SPEC: CPT

## 2022-06-10 PROCEDURE — 85610 PROTHROMBIN TIME: CPT

## 2022-06-10 NOTE — PROGRESS NOTES
ANTICOAGULATION MANAGEMENT     Cam Walden 72 year old male is on warfarin with therapeutic INR result. (Goal INR 2.0-3.0)    Recent labs: (last 7 days)     06/10/22  0834   INR 2.3*       ASSESSMENT       Source(s): Chart Review and Patient/Caregiver Call       Warfarin doses taken: Warfarin taken as instructed    Diet: No new diet changes identified    New illness, injury, or hospitalization: No    Medication/supplement changes: None noted    Signs or symptoms of bleeding or clotting: No    Previous INR: Therapeutic last 2(+) visits    Additional findings: None       PLAN     Recommended plan for no diet, medication or health factor changes affecting INR     Dosing Instructions: continue your current warfarin dose with next INR in 4-6 weeks       Summary  As of 6/10/2022    Full warfarin instructions:  5 mg every Sun, Thu; 10 mg all other days   Next INR check:  7/22/2022             Telephone call with Cam who verbalizes understanding and agrees to plan    Lab visit scheduled    Education provided: None required    Plan made per [facility] anticoagulation protocol    Jauqeline Chua RN  Anticoagulation Clinic  6/10/2022    _______________________________________________________________________     Anticoagulation Episode Summary     Current INR goal:  2.0-3.0   TTR:  57.7 % (1 y)   Target end date:  Indefinite   Send INR reminders to:  [facility]    Indications    Atrial flutter (H) (Resolved) [I48.92]  Chronic atrial fibrillation (H) [I48.20]           Comments:           Anticoagulation Care Providers     Provider Role Specialty Phone number    Kamaljit Plata MD Referring Family Medicine [phone]

## 2022-06-14 DIAGNOSIS — R39.14 BENIGN PROSTATIC HYPERPLASIA WITH INCOMPLETE BLADDER EMPTYING: Primary | ICD-10-CM

## 2022-06-14 DIAGNOSIS — N40.1 BENIGN PROSTATIC HYPERPLASIA WITH INCOMPLETE BLADDER EMPTYING: Primary | ICD-10-CM

## 2022-06-14 NOTE — TELEPHONE ENCOUNTER
Reason for Call:  Medication or medication refill:    Do you use a [facility]?  Name of the [facility] and phone number [facility]    Name of the medication requested: tamsulosin (FLOMAX) 0.4 MG capsule    Other request: Pt requesting medication refill on med, please look into request and order med if applicable. Thank you.     Can we leave a detailed message on this number? YES    Phone number patient can be reached at: Home number on file [phone] (home)    Best Time: anytime     Call taken on 6/14/2022 at 11:38 AM by Tan Vivas

## 2022-06-15 RX ORDER — TAMSULOSIN HYDROCHLORIDE 0.4 MG/1
CAPSULE ORAL
Qty: 180 CAPSULE | Refills: 1 | Status: SHIPPED | OUTPATIENT
Start: 2022-06-15 | End: 2022-12-12

## 2022-06-15 NOTE — TELEPHONE ENCOUNTER
"  Outpatient Medication Detail     Disp Refills Start End TIFFANY   tamsulosin (FLOMAX) 0.4 mg cap 180 capsule 3 2/12/2021  No   Sig: TAKE 2 CAPSULES BY MOUTH EVERY DAY   Sent to pharmacy as: tamsulosin 0.4 mg capsule (FLOMAX)   E-Prescribing Status: Receipt confirmed by pharmacy (2/12/2021  1:25 PM CST)       tamsulosin (FLOMAX) 0.4 mg cap [[id number]]    Electronically signed by: Wade Wynn RN on 02/12/21 1325 Status: Active   Ordering user: Wade Wynn RN 02/12/21 1325 Ordering provider: Kamaljit Plata MD   Authorized by: Kamaljit Plata MD   Frequency:  02/12/21 - Until Discontinued Released by: Wade Wynn RN 02/12/21 1325   Diagnoses  Benign prostatic hyperplasia with incomplete bladder emptying [N40.1, R39.14]     Routing refill request to provider for review/approval because:  A break in medication     Last office visit provider:  11/29/21     Requested Prescriptions   Pending Prescriptions Disp Refills     tamsulosin (FLOMAX) 0.4 MG capsule         Alpha Blockers Passed - 6/14/2022 11:39 AM        Passed - Blood pressure under 140/90 in past 12 months     BP Readings from Last 3 Encounters:   06/02/22 133/70   02/22/22 (!) 152/73   12/01/21 (!) 144/75                 Passed - Recent (12 mo) or future (30 days) visit within the authorizing provider's specialty     Patient has had an office visit with the authorizing provider or a provider within the authorizing providers department within the previous 12 mos or has a future within next 30 days. See \"Patient Info\" tab in inbasket, or \"Choose Columns\" in Meds & Orders section of the refill encounter.              Passed - Patient does not have Tadalafil, Vardenafil, or Sildenafil on their medication list        Passed - Medication is active on med list        Passed - Patient is 18 years of age or older             Wade Wynn RN 06/15/22 1:09 PM  "

## 2022-06-16 ENCOUNTER — INFUSION THERAPY VISIT (OUTPATIENT)
Dept: INFUSION THERAPY | Facility: HOSPITAL | Age: 72
End: 2022-06-16
Attending: INTERNAL MEDICINE
Payer: MEDICARE

## 2022-06-16 DIAGNOSIS — E53.8 VITAMIN B12 DEFICIENCY (NON ANEMIC): Primary | ICD-10-CM

## 2022-06-16 DIAGNOSIS — E11.9 DIABETES MELLITUS, TYPE 2 (H): ICD-10-CM

## 2022-06-16 PROCEDURE — 250N000011 HC RX IP 250 OP 636: Performed by: INTERNAL MEDICINE

## 2022-06-16 PROCEDURE — 96372 THER/PROPH/DIAG INJ SC/IM: CPT | Performed by: INTERNAL MEDICINE

## 2022-06-16 RX ORDER — CYANOCOBALAMIN 1000 UG/ML
1000 INJECTION, SOLUTION INTRAMUSCULAR; SUBCUTANEOUS ONCE
Status: CANCELLED
Start: 2022-06-23 | End: 2022-06-23

## 2022-06-16 RX ORDER — EPINEPHRINE 1 MG/ML
0.3 INJECTION, SOLUTION INTRAMUSCULAR; SUBCUTANEOUS EVERY 5 MIN PRN
Status: CANCELLED | OUTPATIENT
Start: 2022-06-23

## 2022-06-16 RX ORDER — NALOXONE HYDROCHLORIDE 0.4 MG/ML
0.2 INJECTION, SOLUTION INTRAMUSCULAR; INTRAVENOUS; SUBCUTANEOUS
Status: CANCELLED | OUTPATIENT
Start: 2022-06-23

## 2022-06-16 RX ORDER — DIPHENHYDRAMINE HYDROCHLORIDE 50 MG/ML
50 INJECTION INTRAMUSCULAR; INTRAVENOUS
Status: CANCELLED
Start: 2022-06-23

## 2022-06-16 RX ORDER — ALBUTEROL SULFATE 0.83 MG/ML
2.5 SOLUTION RESPIRATORY (INHALATION)
Status: CANCELLED | OUTPATIENT
Start: 2022-06-23

## 2022-06-16 RX ORDER — CYANOCOBALAMIN 1000 UG/ML
1000 INJECTION, SOLUTION INTRAMUSCULAR; SUBCUTANEOUS ONCE
Status: COMPLETED | OUTPATIENT
Start: 2022-06-16 | End: 2022-06-16

## 2022-06-16 RX ORDER — MEPERIDINE HYDROCHLORIDE 25 MG/ML
25 INJECTION INTRAMUSCULAR; INTRAVENOUS; SUBCUTANEOUS EVERY 30 MIN PRN
Status: CANCELLED | OUTPATIENT
Start: 2022-06-23

## 2022-06-16 RX ORDER — METHYLPREDNISOLONE SODIUM SUCCINATE 125 MG/2ML
125 INJECTION, POWDER, LYOPHILIZED, FOR SOLUTION INTRAMUSCULAR; INTRAVENOUS
Status: CANCELLED
Start: 2022-06-23

## 2022-06-16 RX ORDER — ALBUTEROL SULFATE 90 UG/1
1-2 AEROSOL, METERED RESPIRATORY (INHALATION)
Status: CANCELLED
Start: 2022-06-23

## 2022-06-16 RX ADMIN — CYANOCOBALAMIN 1000 MCG: 1000 INJECTION, SOLUTION INTRAMUSCULAR at 08:06

## 2022-06-16 NOTE — PROGRESS NOTES
Pt here ambulatory for vitamin B 12 inj. Reviewed inj with pt and injection administered in right deltoid/bandaid to site. Follow up reviewed and pt dc'd steady gait.

## 2022-06-17 NOTE — TELEPHONE ENCOUNTER
"Routing refill request to provider for review/approval because:  Labs not current:  a1c    Last Written Prescription Date:  3/21/22  Last Fill Quantity: 180,  # refills: 0   Last office visit provider:  11/29/21     Requested Prescriptions   Pending Prescriptions Disp Refills     metFORMIN (GLUCOPHAGE) 1000 MG tablet [Pharmacy Med Name: METFORMIN HCL 1,000 MG TABLET] 180 tablet 0     Sig: TAKE 1 TABLET BY MOUTH TWICE A DAY WITH MEALS       Biguanide Agents Failed - 6/16/2022 12:30 AM        Failed - Patient has documented A1c within the specified period of time.     If HgbA1C is 8 or greater, it needs to be on file within the past 3 months.  If less than 8, must be on file within the past 6 months.     Recent Labs   Lab Test 10/05/21  0856   A1C 6.5*             Failed - Recent (6 mo) or future (30 days) visit within the authorizing provider's specialty     Patient had office visit in the last 6 months or has a visit in the next 30 days with authorizing provider or within the authorizing provider's specialty.  See \"Patient Info\" tab in inbasket, or \"Choose Columns\" in Meds & Orders section of the refill encounter.            Passed - Patient is age 10 or older        Passed - Patient's CR is NOT>1.4 OR Patient's EGFR is NOT<45 within past 12 mos.     Recent Labs   Lab Test 06/02/22  0802 11/15/21  1208 05/14/21  0956   GFRESTIMATED 47*   < > 48*   GFRESTBLACK  --   --  58*    < > = values in this interval not displayed.       Recent Labs   Lab Test 06/02/22  0802   CR 1.57*             Passed - Patient does NOT have a diagnosis of CHF.        Passed - Medication is active on med list             Madhavi Stallings, RN 06/16/22 9:14 PM  "

## 2022-06-21 ENCOUNTER — OFFICE VISIT (OUTPATIENT)
Dept: INTERNAL MEDICINE | Facility: CLINIC | Age: 72
End: 2022-06-21
Payer: MEDICARE

## 2022-06-21 VITALS
OXYGEN SATURATION: 94 % | WEIGHT: 279.2 LBS | SYSTOLIC BLOOD PRESSURE: 136 MMHG | BODY MASS INDEX: 37.86 KG/M2 | RESPIRATION RATE: 20 BRPM | HEART RATE: 86 BPM | DIASTOLIC BLOOD PRESSURE: 80 MMHG

## 2022-06-21 DIAGNOSIS — S69.91XA INJURY OF RIGHT THUMB, INITIAL ENCOUNTER: Primary | ICD-10-CM

## 2022-06-21 PROCEDURE — 99213 OFFICE O/P EST LOW 20 MIN: CPT | Performed by: INTERNAL MEDICINE

## 2022-06-21 NOTE — PROGRESS NOTES
Office Visit - Follow Up   Cam Walden   72 year old male    Date of Visit: 6/21/2022    Chief Complaint   Patient presents with     Thumb Discomfort     Thumb injury after stopping self from falling ? Fracture vs sprain        Assessment and Plan   1. Injury of right thumb, initial encounter  Recommend ice, compression, immobilization  - XR Finger Right G/E 2 Views; Future  - Orthopedic  Referral; Future    Return in about 1 week (around 6/28/2022) for Follow up, visit with PCP.     History of Present Illness   This 72 year old man tripped and fell in the dark in the morning of 6/20/2022 grabbed his door and injured his right thumb.  Its been swollen painful and bruised.    Review of Systems: A comprehensive review of systems was negative except as noted.     Medications, Allergies and Problem List   Reviewed, reconciled and updated  Post Discharge Medication Reconciliation Status:      Physical Exam   General Appearance:   No acute distress    /80 (BP Location: Left arm, Patient Position: Sitting, Cuff Size: Adult Large)   Pulse 86   Resp 20   Wt 126.6 kg (279 lb 3.2 oz)   SpO2 94%   BMI 37.86 kg/m      His right thumb is swollen and bruised.  He actually does not have any significant point tenderness.  A little bit of pain with stress on the distal joint ligaments.     Additional Information   Current Outpatient Medications   Medication Sig Dispense Refill     cholecalciferol (VITAMIN D3) 25 mcg (1000 units) capsule Take 1 capsule by mouth daily       DULoxetine (CYMBALTA) 30 MG capsule TAKE 1 CAPSULE BY MOUTH 2 TIMES DAILY 180 capsule 1     hydrocortisone, Perianal, (ANUSOL-HC) 2.5 % cream        metFORMIN (GLUCOPHAGE) 1000 MG tablet TAKE 1 TABLET BY MOUTH TWICE A DAY WITH MEALS 180 tablet 0     metoprolol tartrate (LOPRESSOR) 100 MG tablet TAKE 1 TABLET BY MOUTH TWICE A DAY       tamsulosin (FLOMAX) 0.4 MG capsule TAKE 2 CAPSULES BY MOUTH EVERY  capsule 1     warfarin  ANTICOAGULANT (COUMADIN) 5 MG tablet Take 2 tablets (10 mg) Sundays and Wednesdays and 1 tablet (5 mg) all other days. Adjust dose per INR results. 120 tablet 1     metFORMIN (GLUCOPHAGE) 1000 MG tablet Take 1,000 mg by mouth       Allergies   Allergen Reactions     Lisinopril Cough     Trulicity [Dulaglutide] Nausea     Social History     Tobacco Use     Smoking status: Never Smoker     Smokeless tobacco: Never Used   Substance Use Topics     Alcohol use: No     Drug use: No       Time:      Alton Weber MD  Answers for HPI/ROS submitted by the patient on 6/21/2022  On average, how many sweetened beverages do you drink each day (Examples: soda, juice, sweet tea, etc.  Do NOT count diet or artificially sweetened beverages)?: 1  How many minutes a day do you exercise enough to make your heart beat faster?: 9 or less  How many days a week do you exercise enough to make your heart beat faster?: 3 or less  How many days per week do you miss taking your medication?: 0  What is the reason for your visit today?: Injury thumb  When did your symptoms begin?: 1-3 days ago

## 2022-06-22 ENCOUNTER — TRANSFERRED RECORDS (OUTPATIENT)
Dept: HEALTH INFORMATION MANAGEMENT | Facility: CLINIC | Age: 72
End: 2022-06-22

## 2022-06-23 ENCOUNTER — INFUSION THERAPY VISIT (OUTPATIENT)
Dept: INFUSION THERAPY | Facility: HOSPITAL | Age: 72
End: 2022-06-23
Attending: INTERNAL MEDICINE
Payer: MEDICARE

## 2022-06-23 VITALS
OXYGEN SATURATION: 93 % | HEART RATE: 71 BPM | SYSTOLIC BLOOD PRESSURE: 135 MMHG | DIASTOLIC BLOOD PRESSURE: 72 MMHG | TEMPERATURE: 98.5 F | RESPIRATION RATE: 18 BRPM

## 2022-06-23 DIAGNOSIS — E53.8 VITAMIN B12 DEFICIENCY (NON ANEMIC): Primary | ICD-10-CM

## 2022-06-23 PROCEDURE — 96372 THER/PROPH/DIAG INJ SC/IM: CPT | Performed by: INTERNAL MEDICINE

## 2022-06-23 PROCEDURE — 250N000011 HC RX IP 250 OP 636: Performed by: INTERNAL MEDICINE

## 2022-06-23 RX ORDER — METHYLPREDNISOLONE SODIUM SUCCINATE 125 MG/2ML
125 INJECTION, POWDER, LYOPHILIZED, FOR SOLUTION INTRAMUSCULAR; INTRAVENOUS
Status: CANCELLED
Start: 2022-06-30

## 2022-06-23 RX ORDER — CYANOCOBALAMIN 1000 UG/ML
1000 INJECTION, SOLUTION INTRAMUSCULAR; SUBCUTANEOUS ONCE
Status: CANCELLED
Start: 2022-06-30 | End: 2022-06-30

## 2022-06-23 RX ORDER — ALBUTEROL SULFATE 0.83 MG/ML
2.5 SOLUTION RESPIRATORY (INHALATION)
Status: CANCELLED | OUTPATIENT
Start: 2022-06-30

## 2022-06-23 RX ORDER — ALBUTEROL SULFATE 90 UG/1
1-2 AEROSOL, METERED RESPIRATORY (INHALATION)
Status: CANCELLED
Start: 2022-06-30

## 2022-06-23 RX ORDER — EPINEPHRINE 1 MG/ML
0.3 INJECTION, SOLUTION INTRAMUSCULAR; SUBCUTANEOUS EVERY 5 MIN PRN
Status: CANCELLED | OUTPATIENT
Start: 2022-06-30

## 2022-06-23 RX ORDER — DIPHENHYDRAMINE HYDROCHLORIDE 50 MG/ML
50 INJECTION INTRAMUSCULAR; INTRAVENOUS
Status: CANCELLED
Start: 2022-06-30

## 2022-06-23 RX ORDER — MEPERIDINE HYDROCHLORIDE 25 MG/ML
25 INJECTION INTRAMUSCULAR; INTRAVENOUS; SUBCUTANEOUS EVERY 30 MIN PRN
Status: CANCELLED | OUTPATIENT
Start: 2022-06-30

## 2022-06-23 RX ORDER — NALOXONE HYDROCHLORIDE 0.4 MG/ML
0.2 INJECTION, SOLUTION INTRAMUSCULAR; INTRAVENOUS; SUBCUTANEOUS
Status: CANCELLED | OUTPATIENT
Start: 2022-06-30

## 2022-06-23 RX ORDER — CYANOCOBALAMIN 1000 UG/ML
1000 INJECTION, SOLUTION INTRAMUSCULAR; SUBCUTANEOUS ONCE
Status: COMPLETED | OUTPATIENT
Start: 2022-06-23 | End: 2022-06-23

## 2022-06-23 RX ADMIN — CYANOCOBALAMIN 1000 MCG: 1000 INJECTION, SOLUTION INTRAMUSCULAR at 08:10

## 2022-06-23 NOTE — PROGRESS NOTES
Infusion Nursing Note:  Cam Walden presents today for B12 (currently weekly).      Patient seen by provider today: No   present during visit today: Not Applicable.    Note: Pt arrives ambulatory to [facility]. Pt reports no changes in baseline from last visit.     /72   Pulse 71   Temp 98.5  F (36.9  C) (Oral)   Resp 18   SpO2 93%      Treatment Conditions:  Not Applicable.    Post Infusion Assessment:  Patient tolerated IM B12 injection in right deltoid without incident.     Discharge Plan:   Patient discharged in stable condition accompanied by: self.  Departure Mode: Ambulatory.      Katy Centeno RN

## 2022-06-30 ENCOUNTER — INFUSION THERAPY VISIT (OUTPATIENT)
Dept: INFUSION THERAPY | Facility: HOSPITAL | Age: 72
End: 2022-06-30
Attending: INTERNAL MEDICINE
Payer: MEDICARE

## 2022-06-30 VITALS
TEMPERATURE: 98.6 F | SYSTOLIC BLOOD PRESSURE: 111 MMHG | RESPIRATION RATE: 16 BRPM | DIASTOLIC BLOOD PRESSURE: 64 MMHG | HEART RATE: 78 BPM | OXYGEN SATURATION: 97 %

## 2022-06-30 DIAGNOSIS — E53.8 VITAMIN B12 DEFICIENCY (NON ANEMIC): Primary | ICD-10-CM

## 2022-06-30 PROCEDURE — 96372 THER/PROPH/DIAG INJ SC/IM: CPT | Performed by: INTERNAL MEDICINE

## 2022-06-30 PROCEDURE — 250N000011 HC RX IP 250 OP 636: Performed by: INTERNAL MEDICINE

## 2022-06-30 RX ORDER — ALBUTEROL SULFATE 0.83 MG/ML
2.5 SOLUTION RESPIRATORY (INHALATION)
Status: CANCELLED | OUTPATIENT
Start: 2022-07-28

## 2022-06-30 RX ORDER — EPINEPHRINE 1 MG/ML
0.3 INJECTION, SOLUTION INTRAMUSCULAR; SUBCUTANEOUS EVERY 5 MIN PRN
Status: CANCELLED | OUTPATIENT
Start: 2022-07-28

## 2022-06-30 RX ORDER — CYANOCOBALAMIN 1000 UG/ML
1000 INJECTION, SOLUTION INTRAMUSCULAR; SUBCUTANEOUS ONCE
Status: CANCELLED
Start: 2022-07-28 | End: 2022-07-28

## 2022-06-30 RX ORDER — DIPHENHYDRAMINE HYDROCHLORIDE 50 MG/ML
50 INJECTION INTRAMUSCULAR; INTRAVENOUS
Status: CANCELLED
Start: 2022-07-28

## 2022-06-30 RX ORDER — CYANOCOBALAMIN 1000 UG/ML
1000 INJECTION, SOLUTION INTRAMUSCULAR; SUBCUTANEOUS ONCE
Status: COMPLETED | OUTPATIENT
Start: 2022-06-30 | End: 2022-06-30

## 2022-06-30 RX ORDER — MEPERIDINE HYDROCHLORIDE 25 MG/ML
25 INJECTION INTRAMUSCULAR; INTRAVENOUS; SUBCUTANEOUS EVERY 30 MIN PRN
Status: CANCELLED | OUTPATIENT
Start: 2022-07-28

## 2022-06-30 RX ORDER — NALOXONE HYDROCHLORIDE 0.4 MG/ML
0.2 INJECTION, SOLUTION INTRAMUSCULAR; INTRAVENOUS; SUBCUTANEOUS
Status: CANCELLED | OUTPATIENT
Start: 2022-07-28

## 2022-06-30 RX ORDER — ALBUTEROL SULFATE 90 UG/1
1-2 AEROSOL, METERED RESPIRATORY (INHALATION)
Status: CANCELLED
Start: 2022-07-28

## 2022-06-30 RX ORDER — METHYLPREDNISOLONE SODIUM SUCCINATE 125 MG/2ML
125 INJECTION, POWDER, LYOPHILIZED, FOR SOLUTION INTRAMUSCULAR; INTRAVENOUS
Status: CANCELLED
Start: 2022-07-28

## 2022-06-30 RX ADMIN — CYANOCOBALAMIN 1000 MCG: 1000 INJECTION, SOLUTION INTRAMUSCULAR at 08:07

## 2022-06-30 NOTE — PROGRESS NOTES
Cam came to clinic this morning for a vitamin B12 injection.  He was educated again today on the drug.  VSS.  Pt assessed.  Vitamin B12 was given IM into his right deltoid.  He tolerated the injection well and site was covered with a bandaid.  Cam left clinic ambulatory.

## 2022-07-06 ENCOUNTER — TRANSFERRED RECORDS (OUTPATIENT)
Dept: HEALTH INFORMATION MANAGEMENT | Facility: CLINIC | Age: 72
End: 2022-07-06

## 2022-07-15 DIAGNOSIS — E11.42 DIABETIC POLYNEUROPATHY ASSOCIATED WITH TYPE 2 DIABETES MELLITUS (H): ICD-10-CM

## 2022-07-15 RX ORDER — DULOXETIN HYDROCHLORIDE 30 MG/1
CAPSULE, DELAYED RELEASE ORAL
Qty: 180 CAPSULE | Refills: 3 | Status: SHIPPED | OUTPATIENT
Start: 2022-07-15 | End: 2023-07-18

## 2022-07-15 NOTE — TELEPHONE ENCOUNTER
"Last Written Prescription Date:  1/21/22  Last Fill Quantity: 180,  # refills: 1   Last office visit provider:  6/21/22     Requested Prescriptions   Pending Prescriptions Disp Refills     DULoxetine (CYMBALTA) 30 MG capsule [Pharmacy Med Name: DULOXETINE HCL DR 30 MG CAP] 180 capsule 1     Sig: TAKE 1 CAPSULE BY MOUTH TWICE A DAY       Serotonin-Norepinephrine Reuptake Inhibitors  Passed - 7/15/2022  2:23 PM        Passed - Blood pressure under 140/90 in past 12 months     BP Readings from Last 3 Encounters:   06/30/22 111/64   06/23/22 135/72   06/21/22 136/80                 Passed - Recent (12 mo) or future (30 days) visit within the authorizing provider's specialty     Patient has had an office visit with the authorizing provider or a provider within the authorizing providers department within the previous 12 mos or has a future within next 30 days. See \"Patient Info\" tab in inbasket, or \"Choose Columns\" in Meds & Orders section of the refill encounter.              Passed - Medication is active on med list        Passed - Patient is age 18 or older             Wade Wynn RN 07/15/22 2:24 PM  "

## 2022-07-20 ENCOUNTER — TRANSFERRED RECORDS (OUTPATIENT)
Dept: HEALTH INFORMATION MANAGEMENT | Facility: CLINIC | Age: 72
End: 2022-07-20

## 2022-07-25 ENCOUNTER — TELEPHONE (OUTPATIENT)
Dept: ANTICOAGULATION | Facility: CLINIC | Age: 72
End: 2022-07-25

## 2022-07-25 NOTE — TELEPHONE ENCOUNTER
ANTICOAGULATION     Cam Walden is overdue for INR check.      Spoke with Cam and scheduled lab appointment on 7/26    Jaqueline Chua RN

## 2022-07-26 ENCOUNTER — ANTICOAGULATION THERAPY VISIT (OUTPATIENT)
Dept: ANTICOAGULATION | Facility: CLINIC | Age: 72
End: 2022-07-26

## 2022-07-26 ENCOUNTER — LAB (OUTPATIENT)
Dept: LAB | Facility: CLINIC | Age: 72
End: 2022-07-26
Payer: MEDICARE

## 2022-07-26 DIAGNOSIS — I48.20 CHRONIC ATRIAL FIBRILLATION (H): Primary | ICD-10-CM

## 2022-07-26 DIAGNOSIS — I48.20 CHRONIC ATRIAL FIBRILLATION (H): ICD-10-CM

## 2022-07-26 LAB — INR BLD: 3.2 (ref 0.9–1.1)

## 2022-07-26 PROCEDURE — 36416 COLLJ CAPILLARY BLOOD SPEC: CPT

## 2022-07-26 PROCEDURE — 85610 PROTHROMBIN TIME: CPT

## 2022-07-26 NOTE — PROGRESS NOTES
ANTICOAGULATION MANAGEMENT     Cam Walden 72 year old male is on warfarin with supratherapeutic INR result. (Goal INR 2.0-3.0)    Recent labs: (last 7 days)     07/26/22  0842   INR 3.2*       ASSESSMENT       Source(s): Chart Review and Patient/Caregiver Call       Warfarin doses taken: Warfarin taken as instructed    Diet: No new diet changes identified, has been under some stress and ate less in general this week    New illness, injury, or hospitalization: No    Medication/supplement changes: None noted    Signs or symptoms of bleeding or clotting: No    Previous INR: Therapeutic last 2(+) visits    Additional findings: None       PLAN     Recommended plan for no diet, medication or health factor changes affecting INR     Dosing Instructions: hold dose then continue your current warfarin dose with next INR in 2 weeks       Summary  As of 7/26/2022    Full warfarin instructions:  7/26: Hold; Otherwise 5 mg every Sun, Thu; 10 mg all other days   Next INR check:  8/9/2022             Telephone call with Cam who verbalizes understanding and agrees to plan    Lab visit scheduled    Education provided: None required    Plan made per [facility] anticoagulation protocol    Jaqueline Chua RN  Anticoagulation Clinic  7/26/2022    _______________________________________________________________________     Anticoagulation Episode Summary     Current INR goal:  2.0-3.0   TTR:  64.2 % (1 y)   Target end date:  Indefinite   Send INR reminders to:  [facility]    Indications    Atrial flutter (H) (Resolved) [I48.92]  Chronic atrial fibrillation (H) [I48.20]           Comments:           Anticoagulation Care Providers     Provider Role Specialty Phone number    Kamaljit Plata MD Referring Family Medicine [phone]

## 2022-07-28 ENCOUNTER — INFUSION THERAPY VISIT (OUTPATIENT)
Dept: INFUSION THERAPY | Facility: HOSPITAL | Age: 72
End: 2022-07-28
Attending: INTERNAL MEDICINE
Payer: MEDICARE

## 2022-07-28 VITALS
RESPIRATION RATE: 18 BRPM | DIASTOLIC BLOOD PRESSURE: 82 MMHG | HEART RATE: 83 BPM | SYSTOLIC BLOOD PRESSURE: 159 MMHG | TEMPERATURE: 98.5 F | OXYGEN SATURATION: 96 %

## 2022-07-28 DIAGNOSIS — E53.8 VITAMIN B12 DEFICIENCY (NON ANEMIC): Primary | ICD-10-CM

## 2022-07-28 PROCEDURE — 96372 THER/PROPH/DIAG INJ SC/IM: CPT | Performed by: INTERNAL MEDICINE

## 2022-07-28 PROCEDURE — 250N000011 HC RX IP 250 OP 636: Performed by: INTERNAL MEDICINE

## 2022-07-28 RX ORDER — NALOXONE HYDROCHLORIDE 0.4 MG/ML
0.2 INJECTION, SOLUTION INTRAMUSCULAR; INTRAVENOUS; SUBCUTANEOUS
Status: CANCELLED | OUTPATIENT
Start: 2022-08-25

## 2022-07-28 RX ORDER — METHYLPREDNISOLONE SODIUM SUCCINATE 125 MG/2ML
125 INJECTION, POWDER, LYOPHILIZED, FOR SOLUTION INTRAMUSCULAR; INTRAVENOUS
Status: CANCELLED
Start: 2022-08-25

## 2022-07-28 RX ORDER — ALBUTEROL SULFATE 0.83 MG/ML
2.5 SOLUTION RESPIRATORY (INHALATION)
Status: CANCELLED | OUTPATIENT
Start: 2022-08-25

## 2022-07-28 RX ORDER — CYANOCOBALAMIN 1000 UG/ML
1000 INJECTION, SOLUTION INTRAMUSCULAR; SUBCUTANEOUS ONCE
Status: CANCELLED
Start: 2022-08-25 | End: 2022-08-25

## 2022-07-28 RX ORDER — DIPHENHYDRAMINE HYDROCHLORIDE 50 MG/ML
50 INJECTION INTRAMUSCULAR; INTRAVENOUS
Status: CANCELLED
Start: 2022-08-25

## 2022-07-28 RX ORDER — EPINEPHRINE 1 MG/ML
0.3 INJECTION, SOLUTION INTRAMUSCULAR; SUBCUTANEOUS EVERY 5 MIN PRN
Status: CANCELLED | OUTPATIENT
Start: 2022-08-25

## 2022-07-28 RX ORDER — ALBUTEROL SULFATE 90 UG/1
1-2 AEROSOL, METERED RESPIRATORY (INHALATION)
Status: CANCELLED
Start: 2022-08-25

## 2022-07-28 RX ORDER — CYANOCOBALAMIN 1000 UG/ML
1000 INJECTION, SOLUTION INTRAMUSCULAR; SUBCUTANEOUS ONCE
Status: COMPLETED | OUTPATIENT
Start: 2022-07-28 | End: 2022-07-28

## 2022-07-28 RX ORDER — MEPERIDINE HYDROCHLORIDE 25 MG/ML
25 INJECTION INTRAMUSCULAR; INTRAVENOUS; SUBCUTANEOUS EVERY 30 MIN PRN
Status: CANCELLED | OUTPATIENT
Start: 2022-08-25

## 2022-07-28 RX ADMIN — CYANOCOBALAMIN 1000 MCG: 1000 INJECTION, SOLUTION INTRAMUSCULAR at 09:04

## 2022-08-08 ENCOUNTER — TELEPHONE (OUTPATIENT)
Dept: FAMILY MEDICINE | Facility: CLINIC | Age: 72
End: 2022-08-08

## 2022-08-08 NOTE — LETTER
[facility]  [address] [place] [address]  Phone: [phone]  Fax: [phone]    2022        Cam Walden   [address] [place] [address]          To whom it may concern:    RE: Cam Walden    Patient was seen and treated today at our clinic, He ischeduled for colonoscopy on   RECOMMENDATION        Pre-Procedure:  ? Hold warfarin for 5 days, until after procedure startin2022   ? No Bridge       Post-Procedure:  ? Resume warfarin dose if okay with provider doing procedure on night of procedure, 2022 PM: 15mg  ? Recheck INR ~ 7 days after resuming warfarin         Please contact me for questions or concerns.      Sincerely,        Kamaljit Plata MD

## 2022-08-08 NOTE — TELEPHONE ENCOUNTER
Patient Returning Call    Reason for call:  Pt was instructed to call and talk to member of Dr. lPata's Team before he stops his warfarin for the colonoscopy. Colonoscopy will be on 08/17/22 and was told to stop taking his warfarin on 08/12/22. Please call pt back ASAP.    Information relayed to patient: sending request to PCP    Patient has additional questions:  No    What are your questions/concerns:  Needs a call back    Okay to leave a detailed message?: Yes at Home number on file [phone] (home)

## 2022-08-09 NOTE — TELEPHONE ENCOUNTER
RN called patient to follow up regarding the message below.      Patient will have colonoscopy 8/17 and will stop taking his warfarin on 8/12. Patient needs Dr. Plata to create a note/letter to state that patient has spoke with his PCP regarding stopping the warfarin. When the letter is done, patient will come to the clinic to pick it up.       According to the anticoagulation therapy visit note on 7/26/2022, patient was instructed the following:    Full warfarin instructions:  7/26: Hold; Otherwise 5 mg every Sun, Thu; 10 mg all other days  Next INR check:  8/9/2022           RN will route this encounter to  to review and advise.          Cielo Renteria RN  [facility]

## 2022-08-10 ENCOUNTER — TELEPHONE (OUTPATIENT)
Dept: ANTICOAGULATION | Facility: CLINIC | Age: 72
End: 2022-08-10

## 2022-08-10 NOTE — TELEPHONE ENCOUNTER
ANTICOAGULATION     Cam Walden is overdue for INR check.      Spoke with Cam and scheduled lab appointment on 8/11    Fabiola Rodriguez RN

## 2022-08-11 ENCOUNTER — ANTICOAGULATION THERAPY VISIT (OUTPATIENT)
Dept: ANTICOAGULATION | Facility: CLINIC | Age: 72
End: 2022-08-11

## 2022-08-11 ENCOUNTER — LAB (OUTPATIENT)
Dept: LAB | Facility: CLINIC | Age: 72
End: 2022-08-11
Payer: MEDICARE

## 2022-08-11 ENCOUNTER — TELEPHONE (OUTPATIENT)
Dept: ANTICOAGULATION | Facility: CLINIC | Age: 72
End: 2022-08-11

## 2022-08-11 DIAGNOSIS — I48.20 CHRONIC ATRIAL FIBRILLATION (H): ICD-10-CM

## 2022-08-11 DIAGNOSIS — I48.92 ATRIAL FLUTTER (H): ICD-10-CM

## 2022-08-11 DIAGNOSIS — I48.20 CHRONIC ATRIAL FIBRILLATION (H): Primary | ICD-10-CM

## 2022-08-11 LAB — INR BLD: 1.6 (ref 0.9–1.1)

## 2022-08-11 PROCEDURE — 85610 PROTHROMBIN TIME: CPT

## 2022-08-11 PROCEDURE — 36416 COLLJ CAPILLARY BLOOD SPEC: CPT

## 2022-08-11 NOTE — TELEPHONE ENCOUNTER
Patient had a lab done today.       Anticoagulation RN has written the recommendation for patient's upcoming procedure.       Please see additional information from today's telephone encounter.       RECOMMENDATION        Pre-Procedure:  ? Hold warfarin for 5 days, until after procedure startin2022   ? No Bridge       Post-Procedure:  ? Resume warfarin dose if okay with provider doing procedure on night of procedure, 2022 PM: 15mg  ? Recheck INR ~ 7 days after resuming warfarin           RN will route this encounter to  to review and write the letter.          Cielo Renteria RN  [facility]

## 2022-08-11 NOTE — TELEPHONE ENCOUNTER
Kamaljit Plata MD [facility] Care Team Pool 12 minutes ago (12:15 PM)       Note was written, signed and placed in my out box, please fax.   Thank you

## 2022-08-11 NOTE — TELEPHONE ENCOUNTER
Clear for hold, no bridge.    Denisha Herrera, PharmD BCACP  Anticoagulation Clinical Pharmacist

## 2022-08-11 NOTE — TELEPHONE ENCOUNTER
RN called patient to relay Dr. Plata's message below.       Patient will  the letter today from the clinic.        RN will place the letter with envelope at the .             Cielo Renteria RN  [facility]

## 2022-08-11 NOTE — TELEPHONE ENCOUNTER
"TERESE-PROCEDURAL ANTICOAGULATION  MANAGEMENT    ASSESSMENT     Warfarin interruption plan for colonoscopy on 2022.    Indication for Anticoagulation: Atrial Fibrillation      [id number] = 3 (Hypertension, Age 65-74 and Diabetes)     Diffuse B-cell lymphoma      Terese-Procedure Risk stratification for thromboembolism: low (2017 ACC periprocedure pathway for NVAF Expert Consensus)    NVAF: 2017 ACC periprocedure pathway for NVAF advises NO bridge for low risk stratification ([id number] score <=4 and no prior hx of stroke, TIA or systemic embolism)     RECOMMENDATION       Pre-Procedure:  o Hold warfarin for 5 days, until after procedure startin2022   o No Bridge      Post-Procedure:  o Resume warfarin dose if okay with provider doing procedure on night of procedure, 2022 PM: 15mg  o Recheck INR ~ 7 days after resuming warfarin       Plan routed to referring provider for approval  ?   Denisha Herrera [facility]    SUBJECTIVE/OBJECTIVE     Cam Walden, a 72 year old male    Goal INR Range: 2.0-3.0     Patient bridged in past: Yes: with high risk abdominal surgery in 2017      Wt Readings from Last 3 Encounters:   22 126.6 kg (279 lb 3.2 oz)   22 127.3 kg (280 lb 9.6 oz)   22 123 kg (271 lb 3.2 oz)      Ideal body weight: 77.6 kg (171 lb 1.9 oz)  Adjusted ideal body weight: 97.2 kg (214 lb 5.6 oz)     Estimated body mass index is 37.86 kg/m  as calculated from the following:    Height as of 22: 1.829 m (6' 0.01\").    Weight as of 22: 126.6 kg (279 lb 3.2 oz).    Lab Results   Component Value Date    INR 1.6 (H) 2022    INR 3.2 (H) 2022    INR 2.3 (H) 06/10/2022     Lab Results   Component Value Date    HGB 11.7 2022    HGB 13.8 2014    HCT 37.9 2022    HCT 42.4 2014     2022     2014     Lab Results   Component Value Date    CR 1.57 (H) 2022    CR 1.7 (H) 2022    CR 1.55 (H) 2022     CrCl " cannot be calculated (Patient's most recent lab result is older than the maximum 30 days allowed.).

## 2022-08-11 NOTE — PROGRESS NOTES
ANTICOAGULATION MANAGEMENT     Cam Walden 72 year old male is on warfarin with subtherapeutic INR result. (Goal INR 2.0-3.0)    Recent labs: (last 7 days)     08/11/22  0814   INR 1.6*       ASSESSMENT       Source(s): Chart Review and Patient/Caregiver Call       Warfarin doses taken: Warfarin taken as instructed    Diet: No new diet changes identified    New illness, injury, or hospitalization: No    Medication/supplement changes: None noted    Signs or symptoms of bleeding or clotting: No    Previous INR: Supratherapeutic    Additional findings: Upcoming surgery/procedure 8/18, colonoscopy, will hold warfarin 5 days prior,no bridge       PLAN     Recommended plan for no diet, medication or health factor changes affecting INR     Dosing Instructions: Continue your current warfarin dose until warfarin hold starting tomorrow. Will resume with surgeon's ok 8/17 with next INR 1week after resumption, scheduled with onc labs 8/25      Summary  As of 8/11/2022    Full warfarin instructions:  8/12: Hold; 8/13: Hold; 8/14: Hold; 8/15: Hold; 8/16: Hold; 8/17: 15 mg; Otherwise 5 mg every Sun, Thu; 10 mg all other days   Next INR check:  8/25/2022             Telephone call with Cam who verbalizes understanding and agrees to plan    Lab visit scheduled    Education provided: None required    Plan made with [facility] Pharmacist Denisha Chua, RN  Anticoagulation Clinic  8/11/2022    _______________________________________________________________________     Anticoagulation Episode Summary     Current INR goal:     TTR:  63.3 % (1 y)   Target end date:  Indefinite   Send INR reminders to:  [facility]    Indications    Atrial flutter (H) (Resolved) [I48.92]  Chronic atrial fibrillation (H) [I48.20]           Comments:           Anticoagulation Care Providers     Provider Role Specialty Phone number    Kamaljit Plata MD Referring Family Medicine [phone]

## 2022-08-26 ENCOUNTER — TELEPHONE (OUTPATIENT)
Dept: ANTICOAGULATION | Facility: CLINIC | Age: 72
End: 2022-08-26

## 2022-08-26 NOTE — TELEPHONE ENCOUNTER
ANTICOAGULATION     Cam Walden is overdue for INR check.      Was unable to reach patient and was unable to leave a voicemail. If patient calls, please schedule INR check as soon as possible.     Fabiola Rodriguez RN

## 2022-08-29 ENCOUNTER — TELEPHONE (OUTPATIENT)
Dept: ANTICOAGULATION | Facility: CLINIC | Age: 72
End: 2022-08-29

## 2022-08-29 NOTE — TELEPHONE ENCOUNTER
ANTICOAGULATION     Cam Walden is overdue for INR check.      Spoke with Cam and scheduled lab appointment on 8/30    Jaqueline Chua RN

## 2022-08-30 ENCOUNTER — ANTICOAGULATION THERAPY VISIT (OUTPATIENT)
Dept: ANTICOAGULATION | Facility: CLINIC | Age: 72
End: 2022-08-30

## 2022-08-30 ENCOUNTER — LAB (OUTPATIENT)
Dept: LAB | Facility: CLINIC | Age: 72
End: 2022-08-30
Payer: MEDICARE

## 2022-08-30 DIAGNOSIS — I48.20 CHRONIC ATRIAL FIBRILLATION (H): ICD-10-CM

## 2022-08-30 DIAGNOSIS — I48.20 CHRONIC ATRIAL FIBRILLATION (H): Primary | ICD-10-CM

## 2022-08-30 LAB — INR BLD: 2.5 (ref 0.9–1.1)

## 2022-08-30 PROCEDURE — 36416 COLLJ CAPILLARY BLOOD SPEC: CPT

## 2022-08-30 PROCEDURE — 85610 PROTHROMBIN TIME: CPT

## 2022-08-30 NOTE — PROGRESS NOTES
ANTICOAGULATION MANAGEMENT     Cam Walden 72 year old male is on warfarin with therapeutic INR result. (Goal INR 2.0-3.0)    Recent labs: (last 7 days)     08/30/22  0843   INR 2.5*       ASSESSMENT       Source(s): Chart Review and Template       Warfarin doses taken: Warfarin taken as instructed    Diet: No new diet changes identified    New illness, injury, or hospitalization: yes, held for urology procedure resumed 8/18    Medication/supplement changes: None noted    Signs or symptoms of bleeding or clotting: No    Previous INR: Subtherapeutic    Additional findings: None       PLAN     Recommended plan for temporary change(s) affecting INR     Dosing Instructions: Continue your current warfarin dose with next INR in 2 weeks       Summary  As of 8/30/2022    Full warfarin instructions:  5 mg every Sun, Thu; 10 mg all other days   Next INR check:  9/13/2022             Detailed voice message left for Cam with dosing instructions and follow up date.     Lab visit scheduled    Education provided: None required    Plan made per [facility] anticoagulation protocol    Jaqueline Chua RN  Anticoagulation Clinic  8/30/2022    _______________________________________________________________________     Anticoagulation Episode Summary     Current INR goal:  2.0-3.0   TTR:  61.0 % (1 y)   Target end date:  Indefinite   Send INR reminders to:  [facility]    Indications    Atrial flutter (H) (Resolved) [I48.92]  Chronic atrial fibrillation (H) [I48.20]           Comments:           Anticoagulation Care Providers     Provider Role Specialty Phone number    Kamaljit Plata MD Referring Family Medicine [phone]

## 2022-09-01 ENCOUNTER — ONCOLOGY VISIT (OUTPATIENT)
Dept: ONCOLOGY | Facility: HOSPITAL | Age: 72
End: 2022-09-01
Attending: INTERNAL MEDICINE
Payer: MEDICARE

## 2022-09-01 ENCOUNTER — INFUSION THERAPY VISIT (OUTPATIENT)
Dept: INFUSION THERAPY | Facility: HOSPITAL | Age: 72
End: 2022-09-01
Attending: NURSE PRACTITIONER
Payer: MEDICARE

## 2022-09-01 ENCOUNTER — LAB (OUTPATIENT)
Dept: INFUSION THERAPY | Facility: HOSPITAL | Age: 72
End: 2022-09-01
Attending: INTERNAL MEDICINE
Payer: MEDICARE

## 2022-09-01 VITALS
RESPIRATION RATE: 16 BRPM | HEART RATE: 80 BPM | SYSTOLIC BLOOD PRESSURE: 159 MMHG | HEIGHT: 72 IN | DIASTOLIC BLOOD PRESSURE: 86 MMHG | OXYGEN SATURATION: 93 % | BODY MASS INDEX: 37.73 KG/M2 | WEIGHT: 278.6 LBS | TEMPERATURE: 98.4 F

## 2022-09-01 DIAGNOSIS — E53.8 VITAMIN B12 DEFICIENCY (NON ANEMIC): Primary | ICD-10-CM

## 2022-09-01 DIAGNOSIS — C83.398 DIFFUSE LARGE B-CELL LYMPHOMA OF EXTRANODAL SITE: Primary | ICD-10-CM

## 2022-09-01 DIAGNOSIS — E11.22 TYPE 2 DIABETES MELLITUS WITH STAGE 3A CHRONIC KIDNEY DISEASE, WITHOUT LONG-TERM CURRENT USE OF INSULIN (H): ICD-10-CM

## 2022-09-01 DIAGNOSIS — E53.8 VITAMIN B12 DEFICIENCY (NON ANEMIC): ICD-10-CM

## 2022-09-01 DIAGNOSIS — N18.31 TYPE 2 DIABETES MELLITUS WITH STAGE 3A CHRONIC KIDNEY DISEASE, WITHOUT LONG-TERM CURRENT USE OF INSULIN (H): ICD-10-CM

## 2022-09-01 LAB
ALBUMIN SERPL-MCNC: 3.7 G/DL (ref 3.5–5)
ALP SERPL-CCNC: 62 U/L (ref 45–120)
ALT SERPL W P-5'-P-CCNC: 26 U/L (ref 0–45)
ANION GAP SERPL CALCULATED.3IONS-SCNC: 9 MMOL/L (ref 5–18)
AST SERPL W P-5'-P-CCNC: 23 U/L (ref 0–40)
BASOPHILS # BLD AUTO: 0.1 10E3/UL (ref 0–0.2)
BASOPHILS NFR BLD AUTO: 1 %
BILIRUB SERPL-MCNC: 0.6 MG/DL (ref 0–1)
BUN SERPL-MCNC: 20 MG/DL (ref 8–28)
CALCIUM SERPL-MCNC: 9.2 MG/DL (ref 8.5–10.5)
CHLORIDE BLD-SCNC: 104 MMOL/L (ref 98–107)
CO2 SERPL-SCNC: 25 MMOL/L (ref 22–31)
CREAT SERPL-MCNC: 1.74 MG/DL (ref 0.7–1.3)
EOSINOPHIL # BLD AUTO: 0.4 10E3/UL (ref 0–0.7)
EOSINOPHIL NFR BLD AUTO: 5 %
ERYTHROCYTE [DISTWIDTH] IN BLOOD BY AUTOMATED COUNT: 14.6 % (ref 10–15)
FERRITIN SERPL-MCNC: 47 NG/ML (ref 31–409)
FOLATE SERPL-MCNC: 9.1 NG/ML (ref 4.6–34.8)
GFR SERPL CREATININE-BSD FRML MDRD: 42 ML/MIN/1.73M2
GLUCOSE BLD-MCNC: 147 MG/DL (ref 70–125)
HCT VFR BLD AUTO: 40.3 % (ref 40–53)
HGB BLD-MCNC: 12.8 G/DL (ref 13.3–17.7)
IMM GRANULOCYTES # BLD: 0 10E3/UL
IMM GRANULOCYTES NFR BLD: 0 %
LDH SERPL L TO P-CCNC: 130 U/L (ref 125–220)
LYMPHOCYTES # BLD AUTO: 2.8 10E3/UL (ref 0.8–5.3)
LYMPHOCYTES NFR BLD AUTO: 37 %
MCH RBC QN AUTO: 28.6 PG (ref 26.5–33)
MCHC RBC AUTO-ENTMCNC: 31.8 G/DL (ref 31.5–36.5)
MCV RBC AUTO: 90 FL (ref 78–100)
MONOCYTES # BLD AUTO: 0.8 10E3/UL (ref 0–1.3)
MONOCYTES NFR BLD AUTO: 10 %
NEUTROPHILS # BLD AUTO: 3.6 10E3/UL (ref 1.6–8.3)
NEUTROPHILS NFR BLD AUTO: 47 %
NRBC # BLD AUTO: 0 10E3/UL
NRBC BLD AUTO-RTO: 0 /100
PLATELET # BLD AUTO: 214 10E3/UL (ref 150–450)
POTASSIUM BLD-SCNC: 3.9 MMOL/L (ref 3.5–5)
PROT SERPL-MCNC: 7 G/DL (ref 6–8)
RBC # BLD AUTO: 4.47 10E6/UL (ref 4.4–5.9)
SODIUM SERPL-SCNC: 138 MMOL/L (ref 136–145)
VIT B12 SERPL-MCNC: 230 PG/ML (ref 232–1245)
WBC # BLD AUTO: 7.6 10E3/UL (ref 4–11)

## 2022-09-01 PROCEDURE — 82607 VITAMIN B-12: CPT

## 2022-09-01 PROCEDURE — 82728 ASSAY OF FERRITIN: CPT

## 2022-09-01 PROCEDURE — 85025 COMPLETE CBC W/AUTO DIFF WBC: CPT

## 2022-09-01 PROCEDURE — 80053 COMPREHEN METABOLIC PANEL: CPT

## 2022-09-01 PROCEDURE — G0463 HOSPITAL OUTPT CLINIC VISIT: HCPCS | Mod: 25

## 2022-09-01 PROCEDURE — 250N000011 HC RX IP 250 OP 636: Performed by: INTERNAL MEDICINE

## 2022-09-01 PROCEDURE — G0463 HOSPITAL OUTPT CLINIC VISIT: HCPCS

## 2022-09-01 PROCEDURE — 96372 THER/PROPH/DIAG INJ SC/IM: CPT | Performed by: INTERNAL MEDICINE

## 2022-09-01 PROCEDURE — 83615 LACTATE (LD) (LDH) ENZYME: CPT

## 2022-09-01 PROCEDURE — 99213 OFFICE O/P EST LOW 20 MIN: CPT | Performed by: NURSE PRACTITIONER

## 2022-09-01 PROCEDURE — 82746 ASSAY OF FOLIC ACID SERUM: CPT

## 2022-09-01 PROCEDURE — 36591 DRAW BLOOD OFF VENOUS DEVICE: CPT

## 2022-09-01 RX ORDER — HEPARIN SODIUM (PORCINE) LOCK FLUSH IV SOLN 100 UNIT/ML 100 UNIT/ML
5 SOLUTION INTRAVENOUS
Status: DISCONTINUED | OUTPATIENT
Start: 2022-09-01 | End: 2022-09-01 | Stop reason: HOSPADM

## 2022-09-01 RX ORDER — HYDROCORTISONE 2.5 %
CREAM (GRAM) TOPICAL
COMMUNITY
Start: 2022-07-18

## 2022-09-01 RX ORDER — METHYLPREDNISOLONE SODIUM SUCCINATE 125 MG/2ML
125 INJECTION, POWDER, LYOPHILIZED, FOR SOLUTION INTRAMUSCULAR; INTRAVENOUS
Status: CANCELLED
Start: 2022-09-22

## 2022-09-01 RX ORDER — DIPHENHYDRAMINE HYDROCHLORIDE 50 MG/ML
50 INJECTION INTRAMUSCULAR; INTRAVENOUS
Status: DISCONTINUED | OUTPATIENT
Start: 2022-09-01 | End: 2022-09-01 | Stop reason: HOSPADM

## 2022-09-01 RX ORDER — CYANOCOBALAMIN 1000 UG/ML
1000 INJECTION, SOLUTION INTRAMUSCULAR; SUBCUTANEOUS ONCE
Status: CANCELLED
Start: 2022-09-22 | End: 2022-09-22

## 2022-09-01 RX ORDER — ALBUTEROL SULFATE 90 UG/1
1-2 AEROSOL, METERED RESPIRATORY (INHALATION)
Status: DISCONTINUED | OUTPATIENT
Start: 2022-09-01 | End: 2022-09-01 | Stop reason: HOSPADM

## 2022-09-01 RX ORDER — CYANOCOBALAMIN 1000 UG/ML
1000 INJECTION, SOLUTION INTRAMUSCULAR; SUBCUTANEOUS ONCE
Status: CANCELLED
Start: 2022-09-29 | End: 2022-09-29

## 2022-09-01 RX ORDER — NALOXONE HYDROCHLORIDE 0.4 MG/ML
0.2 INJECTION, SOLUTION INTRAMUSCULAR; INTRAVENOUS; SUBCUTANEOUS
Status: DISCONTINUED | OUTPATIENT
Start: 2022-09-01 | End: 2022-09-01 | Stop reason: HOSPADM

## 2022-09-01 RX ORDER — EPINEPHRINE 1 MG/ML
0.3 INJECTION, SOLUTION INTRAMUSCULAR; SUBCUTANEOUS EVERY 5 MIN PRN
Status: DISCONTINUED | OUTPATIENT
Start: 2022-09-01 | End: 2022-09-01 | Stop reason: HOSPADM

## 2022-09-01 RX ORDER — KETOCONAZOLE 20 MG/G
CREAM TOPICAL
COMMUNITY
Start: 2022-08-10 | End: 2023-12-06

## 2022-09-01 RX ORDER — ALBUTEROL SULFATE 0.83 MG/ML
2.5 SOLUTION RESPIRATORY (INHALATION)
Status: DISCONTINUED | OUTPATIENT
Start: 2022-09-01 | End: 2022-09-01 | Stop reason: HOSPADM

## 2022-09-01 RX ORDER — DIPHENHYDRAMINE HYDROCHLORIDE 50 MG/ML
50 INJECTION INTRAMUSCULAR; INTRAVENOUS
Status: CANCELLED
Start: 2022-09-22

## 2022-09-01 RX ORDER — METHYLPREDNISOLONE SODIUM SUCCINATE 125 MG/2ML
125 INJECTION, POWDER, LYOPHILIZED, FOR SOLUTION INTRAMUSCULAR; INTRAVENOUS
Status: DISCONTINUED | OUTPATIENT
Start: 2022-09-01 | End: 2022-09-01 | Stop reason: HOSPADM

## 2022-09-01 RX ORDER — ALBUTEROL SULFATE 90 UG/1
1-2 AEROSOL, METERED RESPIRATORY (INHALATION)
Status: CANCELLED
Start: 2022-09-22

## 2022-09-01 RX ORDER — HEPARIN SODIUM (PORCINE) LOCK FLUSH IV SOLN 100 UNIT/ML 100 UNIT/ML
5 SOLUTION INTRAVENOUS
Status: CANCELLED | OUTPATIENT
Start: 2022-09-01

## 2022-09-01 RX ORDER — ALBUTEROL SULFATE 0.83 MG/ML
2.5 SOLUTION RESPIRATORY (INHALATION)
Status: CANCELLED | OUTPATIENT
Start: 2022-09-22

## 2022-09-01 RX ORDER — MEPERIDINE HYDROCHLORIDE 25 MG/ML
25 INJECTION INTRAMUSCULAR; INTRAVENOUS; SUBCUTANEOUS EVERY 30 MIN PRN
Status: DISCONTINUED | OUTPATIENT
Start: 2022-09-01 | End: 2022-09-01 | Stop reason: HOSPADM

## 2022-09-01 RX ORDER — CYANOCOBALAMIN 1000 UG/ML
1000 INJECTION, SOLUTION INTRAMUSCULAR; SUBCUTANEOUS ONCE
Status: COMPLETED | OUTPATIENT
Start: 2022-09-01 | End: 2022-09-01

## 2022-09-01 RX ORDER — MEPERIDINE HYDROCHLORIDE 25 MG/ML
25 INJECTION INTRAMUSCULAR; INTRAVENOUS; SUBCUTANEOUS EVERY 30 MIN PRN
Status: CANCELLED | OUTPATIENT
Start: 2022-09-22

## 2022-09-01 RX ORDER — EPINEPHRINE 1 MG/ML
0.3 INJECTION, SOLUTION INTRAMUSCULAR; SUBCUTANEOUS EVERY 5 MIN PRN
Status: CANCELLED | OUTPATIENT
Start: 2022-09-22

## 2022-09-01 RX ORDER — NALOXONE HYDROCHLORIDE 0.4 MG/ML
0.2 INJECTION, SOLUTION INTRAMUSCULAR; INTRAVENOUS; SUBCUTANEOUS
Status: CANCELLED | OUTPATIENT
Start: 2022-09-22

## 2022-09-01 RX ADMIN — Medication 5 ML: at 08:42

## 2022-09-01 RX ADMIN — CYANOCOBALAMIN 1000 MCG: 1000 INJECTION, SOLUTION INTRAMUSCULAR at 08:51

## 2022-09-01 ASSESSMENT — PAIN SCALES - GENERAL: PAINLEVEL: NO PAIN (0)

## 2022-09-01 NOTE — PROGRESS NOTES
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    Weight as of this encounter: 126.4 kg (278 lb 9.6 oz). Body surface area is 2.53 meters squared.  No Pain (0) Comment: Data Unavailable   No LMP for male patient.  Allergies reviewed: Yes  Medications reviewed: Yes    Medications: Medication refills not needed today.  Pharmacy name entered into [employer]: [id number] - [place], [place] - [address]    Clinical concerns: Follow up.       Binu Dillon LPN

## 2022-09-01 NOTE — PROGRESS NOTES
[facility] Hematology and Oncology Progress Note    Patient: Cam Walden  MRN: [id number]  Date of Service: Sep 1, 2022          Reason for Visit    Chief Complaint   Patient presents with     Oncology Clinic Visit     Diffuse large B-cell lymphoma of extranodal site        Assessment and Plan    Cancer Staging  No matching staging information was found for the patient.    1. Diffuse large B cell lymphoma, thyroid with skin infiltration, diagnosed August 2020, GCB TYPE, Stage 1: had chemo with R-CHOP. Completed November 2020. Last CT scan was June and that was normal. We will do scans every 6 months so will get another one in December. No constitutional symptoms, clinical evidence of recurrence.     2. Vitamin B12 deficiency with anemia: has started vitamin B12 supplement with shots every month.  His hemoglobin is improving.  Awaiting all of his other anemia labs today.  Continue to monitor and give his shots once a month.    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 (H)     Vitamin B12 deficiency (non anemic)        ______________________________________________________________________________    History of Present Illness    Measurable disease: PET scan     Past therapy: R-CHOP for 4 cycles, 9/15/20 until November 16, 2020    Interim History: Patient is here today for a 3-month follow-up visit. Overall patient states that he is doing fine and really has no new issues.  He has started on B12 injections.  States that they are going fine.  He denies any constitutional symptoms.   States that overall has not had any changes to his health over the last 3 to 6 months.    Review of Systems    Pertinent items are noted in HPI.    Past History    Past Medical History:   Diagnosis Date     Aortic stenosis     mild     Atrial fibrillation (H)      Atrial flutter (H) 8/21/2017     Atrial flutter with rapid ventricular response (H)      Cancer (H)     lymp     Colon polyps 08/08/2016    Per colonoscopy 8/8/16.  Large and small.  Some removed. Others to be removed surgically. (per patient)     Diabetes mellitus (H)     DM II     Diabetes mellitus, type II (H)      Diabetic polyneuropathy associated with type 2 diabetes mellitus (H) 12/16/2020     Hyperlipidemia      Hypertension      Morbid obesity (H)      JOHNNY (obstructive sleep apnea)     no cpap       PHYSICAL EXAM  BP (!) 159/86   Pulse 80   Temp 98.4  F (36.9  C) (Oral)   Resp 16   Ht 1.829 m (6')   Wt 126.4 kg (278 lb 9.6 oz)   SpO2 93%   BMI 37.78 kg/m      GENERAL: no acute distress. Cooperative in conversation. Here alone. Mask on  RESP: Regular respiratory rate. No expiratory wheezes   MUSCULOSKELETAL: no bilateral leg swelling  NEURO: non focal. Alert and oriented x3.   PSYCH: within normal limits. No depression or anxiety.  SKIN: exposed skin is dry intact.     Lab Results    Recent Results (from the past 168 hour(s))   INR point of care   Result Value Ref Range    INR 2.5 (H) 0.9 - 1.1   CBC with platelets and differential   Result Value Ref Range    WBC Count 7.6 4.0 - 11.0 10e3/uL    RBC Count 4.47 4.40 - 5.90 10e6/uL    Hemoglobin 12.8 (L) 13.3 - 17.7 g/dL    Hematocrit 40.3 40.0 - 53.0 %    MCV 90 78 - 100 fL    MCH 28.6 26.5 - 33.0 pg    MCHC 31.8 31.5 - 36.5 g/dL    RDW 14.6 10.0 - 15.0 %    Platelet Count 214 150 - 450 10e3/uL    % Neutrophils 47 %    % Lymphocytes 37 %    % Monocytes 10 %    % Eosinophils 5 %    % Basophils 1 %    % Immature Granulocytes 0 %    NRBCs per 100 WBC 0 <1 /100    Absolute Neutrophils 3.6 1.6 -  8.3 10e3/uL    Absolute Lymphocytes 2.8 0.8 - 5.3 10e3/uL    Absolute Monocytes 0.8 0.0 - 1.3 10e3/uL    Absolute Eosinophils 0.4 0.0 - 0.7 10e3/uL    Absolute Basophils 0.1 0.0 - 0.2 10e3/uL    Absolute Immature Granulocytes 0.0 <=0.4 10e3/uL    Absolute NRBCs 0.0 10e3/uL         Imaging    No results found.      Signed by: CLAY Garrett CNP

## 2022-09-01 NOTE — PROGRESS NOTES
Infusion Nursing Note:  Cam Walden presents today for Vitamin B12.    Patient seen by provider today: Yes: Annika Diamond CNP   present during visit today: Not Applicable.    Note: Cam received vitamin B12 injection into his right deltoid.    Intravenous Access:  n/a.    Treatment Conditions:  Not Applicable.    Post Infusion Assessment:  Patient tolerated injection without incident.     Discharge Plan:   Patient discharged in stable condition accompanied by: self.      Amber Go RN

## 2022-09-01 NOTE — LETTER
9/1/2022         RE: Cam Walden  [address] [place] [address]        Dear Colleague,    Thank you for referring your patient, Cam Walden, to the [facility] [place]. Please see a copy of my visit note below.    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    Weight as of this encounter: 126.4 kg (278 lb 9.6 oz). Body surface area is 2.53 meters squared.  No Pain (0) Comment: Data Unavailable   No LMP for male patient.  Allergies reviewed: Yes  Medications reviewed: Yes    Medications: Medication refills not needed today.  Pharmacy name entered into [employer]: [id number] - [place], [place] - [address]    Clinical concerns: Follow up.       Binu Dillon LPN              [facility] Hematology and Oncology Progress Note    Patient: Cam Walden  MRN: [id number]  Date of Service: Sep 1, 2022          Reason for Visit    Chief Complaint   Patient presents with     Oncology Clinic Visit     Diffuse large B-cell lymphoma of extranodal site        Assessment and Plan    Cancer Staging  No matching staging information was found for the patient.    1. Diffuse large B cell lymphoma, thyroid with skin infiltration, diagnosed August 2020, GCB TYPE, Stage 1: had chemo with R-CHOP. Completed November 2020. Last CT scan was June and that was normal. We will do scans every 6 months so will get another one in December. No constitutional symptoms, clinical evidence of recurrence.     2. Vitamin B12  deficiency with anemia: has started vitamin B12 supplement with shots every month.  His hemoglobin is improving.  Awaiting all of his other anemia labs today.  Continue to monitor and give his shots once a month.    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 (H)     Vitamin B12 deficiency (non anemic)        ______________________________________________________________________________    History of Present Illness    Measurable disease: PET scan     Past therapy: R-CHOP for 4 cycles, 9/15/20 until November 16, 2020    Interim History: Patient is here today for a 3-month follow-up visit. Overall patient states that he is doing fine and really has no new issues.  He has started on B12 injections.  States that they are going fine.  He denies any constitutional symptoms.  States that overall has not had any changes to his health over the last 3 to 6 months.    Review of Systems    Pertinent items are noted in HPI.    Past History    Past Medical History:   Diagnosis Date     Aortic stenosis     mild     Atrial fibrillation (H)      Atrial flutter (H) 8/21/2017     Atrial flutter with rapid ventricular response (H)      Cancer (H)     lymp     Colon polyps 08/08/2016    Per colonoscopy 8/8/16.  Large and small.  Some removed. Others to be removed surgically. (per patient)     Diabetes mellitus (H)     DM II     Diabetes mellitus, type II (H)      Diabetic polyneuropathy associated with type 2 diabetes mellitus (H) 12/16/2020     Hyperlipidemia      Hypertension      Morbid obesity (H)      JOHNNY  (obstructive sleep apnea)     no cpap       PHYSICAL EXAM  BP (!) 159/86   Pulse 80   Temp 98.4  F (36.9  C) (Oral)   Resp 16   Ht 1.829 m (6')   Wt 126.4 kg (278 lb 9.6 oz)   SpO2 93%   BMI 37.78 kg/m      GENERAL: no acute distress. Cooperative in conversation. Here alone. Mask on  RESP: Regular respiratory rate. No expiratory wheezes   MUSCULOSKELETAL: no bilateral leg swelling  NEURO: non focal. Alert and oriented x3.   PSYCH: within normal limits. No depression or anxiety.  SKIN: exposed skin is dry intact.     Lab Results    Recent Results (from the past 168 hour(s))   INR point of care   Result Value Ref Range    INR 2.5 (H) 0.9 - 1.1   CBC with platelets and differential   Result Value Ref Range    WBC Count 7.6 4.0 - 11.0 10e3/uL    RBC Count 4.47 4.40 - 5.90 10e6/uL    Hemoglobin 12.8 (L) 13.3 - 17.7 g/dL    Hematocrit 40.3 40.0 - 53.0 %    MCV 90 78 - 100 fL    MCH 28.6 26.5 - 33.0 pg    MCHC 31.8 31.5 - 36.5 g/dL    RDW 14.6 10.0 - 15.0 %    Platelet Count 214 150 - 450 10e3/uL    % Neutrophils 47 %    % Lymphocytes 37 %    % Monocytes 10 %    % Eosinophils 5 %    % Basophils 1 %    % Immature Granulocytes 0 %    NRBCs per 100 WBC 0 <1 /100    Absolute Neutrophils 3.6 1.6 - 8.3 10e3/uL    Absolute Lymphocytes 2.8 0.8 - 5.3 10e3/uL    Absolute Monocytes 0.8 0.0 - 1.3 10e3/uL    Absolute Eosinophils 0.4 0.0 - 0.7 10e3/uL    Absolute Basophils 0.1 0.0 - 0.2 10e3/uL    Absolute Immature Granulocytes 0.0 <=0.4 10e3/uL    Absolute NRBCs 0.0 10e3/uL         Imaging    No results found.      Signed by: CLAY Garrett CNP      Again, thank you for allowing me to participate in the care of your patient.        Sincerely,        CLAY Garrett CNP

## 2022-09-07 DIAGNOSIS — C83.398 DIFFUSE LARGE B-CELL LYMPHOMA OF EXTRANODAL SITE: ICD-10-CM

## 2022-09-07 DIAGNOSIS — E53.8 VITAMIN B12 DEFICIENCY (NON ANEMIC): Primary | ICD-10-CM

## 2022-09-15 DIAGNOSIS — E11.9 DIABETES MELLITUS, TYPE 2 (H): ICD-10-CM

## 2022-09-15 NOTE — TELEPHONE ENCOUNTER
"Routing refill request to provider for review/approval because:  Labs not current:  A1c, creatinine    Last Written Prescription Date:  6/16/22  Last Fill Quantity: 180,  # refills: 0   Last office visit provider:  6/21/22     Requested Prescriptions   Pending Prescriptions Disp Refills     metFORMIN (GLUCOPHAGE) 1000 MG tablet [Pharmacy Med Name: METFORMIN HCL 1,000 MG TABLET] 180 tablet 0     Sig: TAKE 1 TABLET BY MOUTH TWICE A DAY WITH MEALS       Biguanide Agents Failed - 9/15/2022 12:32 AM        Failed - Patient has documented A1c within the specified period of time.     If HgbA1C is 8 or greater, it needs to be on file within the past 3 months.  If less than 8, must be on file within the past 6 months.     Recent Labs   Lab Test 10/05/21  0856   A1C 6.5*             Failed - Patient's CR is NOT>1.4 OR Patient's EGFR is NOT<45 within past 12 mos.     Recent Labs   Lab Test 09/01/22  0806 11/15/21  1208 05/14/21  0956   GFRESTIMATED 42*   < > 48*   GFRESTBLACK  --   --  58*    < > = values in this interval not displayed.       Recent Labs   Lab Test 09/01/22  0806   CR 1.74*             Failed - Recent (6 mo) or future (30 days) visit within the authorizing provider's specialty     Patient had office visit in the last 6 months or has a visit in the next 30 days with authorizing provider or within the authorizing provider's specialty.  See \"Patient Info\" tab in inbasket, or \"Choose Columns\" in Meds & Orders section of the refill encounter.            Passed - Patient is age 10 or older        Passed - Patient does NOT have a diagnosis of CHF.        Passed - Medication is active on med list             Nesha Bryan RN 09/15/22 1:42 PM  "

## 2022-09-27 ENCOUNTER — TELEPHONE (OUTPATIENT)
Dept: ANTICOAGULATION | Facility: CLINIC | Age: 72
End: 2022-09-27

## 2022-09-27 NOTE — TELEPHONE ENCOUNTER
ANTICOAGULATION     Cam Walden is overdue for INR check.      Spoke with Cam and scheduled lab appointment on 9/28    Fabiola Rodriguez RN

## 2022-09-28 ENCOUNTER — ANTICOAGULATION THERAPY VISIT (OUTPATIENT)
Dept: ANTICOAGULATION | Facility: CLINIC | Age: 72
End: 2022-09-28

## 2022-09-28 ENCOUNTER — LAB (OUTPATIENT)
Dept: LAB | Facility: CLINIC | Age: 72
End: 2022-09-28
Payer: MEDICARE

## 2022-09-28 DIAGNOSIS — I48.20 CHRONIC ATRIAL FIBRILLATION (H): Primary | ICD-10-CM

## 2022-09-28 DIAGNOSIS — I48.20 CHRONIC ATRIAL FIBRILLATION (H): ICD-10-CM

## 2022-09-28 LAB — INR BLD: 2.6 (ref 0.9–1.1)

## 2022-09-28 PROCEDURE — 36416 COLLJ CAPILLARY BLOOD SPEC: CPT

## 2022-09-28 PROCEDURE — 85610 PROTHROMBIN TIME: CPT

## 2022-09-28 NOTE — PROGRESS NOTES
ANTICOAGULATION MANAGEMENT     Cam Walden 72 year old male is on warfarin with therapeutic INR result. (Goal INR 2.0-3.0)    Recent labs: (last 7 days)     09/28/22  0848   INR 2.6*       ASSESSMENT       Source(s): Chart Review and Patient/Caregiver Call       Warfarin doses taken: Warfarin taken as instructed    Diet: No new diet changes identified    New illness, injury, or hospitalization: No    Medication/supplement changes: None noted    Signs or symptoms of bleeding or clotting: No    Previous INR: Therapeutic last 2(+) visits    Additional findings: None       PLAN     Recommended plan for no diet, medication or health factor changes affecting INR     Dosing Instructions: Continue your current warfarin dose with next INR in 4 weeks       Summary  As of 9/28/2022    Full warfarin instructions:  5 mg every Sun, Thu; 10 mg all other days   Next INR check:  10/26/2022             Telephone call with Cam who verbalizes understanding and agrees to plan    Lab visit scheduled    Education provided: None required    Plan made per [facility] anticoagulation protocol    Jaqueline Chua RN  Anticoagulation Clinic  9/28/2022    _______________________________________________________________________     Anticoagulation Episode Summary     Current INR goal:  2.0-3.0   TTR:  61.3 % (1 y)   Target end date:  Indefinite   Send INR reminders to:  [facility]    Indications    Atrial flutter (H) (Resolved) [I48.92]  Chronic atrial fibrillation (H) [I48.20]           Comments:           Anticoagulation Care Providers     Provider Role Specialty Phone number    Kamaljit Plata MD Referring Family Medicine [phone]

## 2022-09-29 ENCOUNTER — INFUSION THERAPY VISIT (OUTPATIENT)
Dept: INFUSION THERAPY | Facility: HOSPITAL | Age: 72
End: 2022-09-29
Attending: INTERNAL MEDICINE
Payer: MEDICARE

## 2022-09-29 VITALS
DIASTOLIC BLOOD PRESSURE: 66 MMHG | OXYGEN SATURATION: 95 % | SYSTOLIC BLOOD PRESSURE: 131 MMHG | RESPIRATION RATE: 18 BRPM | HEART RATE: 71 BPM | TEMPERATURE: 98.8 F

## 2022-09-29 DIAGNOSIS — E53.8 VITAMIN B12 DEFICIENCY (NON ANEMIC): Primary | ICD-10-CM

## 2022-09-29 PROCEDURE — 96372 THER/PROPH/DIAG INJ SC/IM: CPT | Performed by: NURSE PRACTITIONER

## 2022-09-29 PROCEDURE — 250N000011 HC RX IP 250 OP 636: Performed by: NURSE PRACTITIONER

## 2022-09-29 RX ORDER — MEPERIDINE HYDROCHLORIDE 25 MG/ML
25 INJECTION INTRAMUSCULAR; INTRAVENOUS; SUBCUTANEOUS EVERY 30 MIN PRN
Status: CANCELLED | OUTPATIENT
Start: 2022-10-27

## 2022-09-29 RX ORDER — ALBUTEROL SULFATE 0.83 MG/ML
2.5 SOLUTION RESPIRATORY (INHALATION)
Status: CANCELLED | OUTPATIENT
Start: 2022-10-27

## 2022-09-29 RX ORDER — METHYLPREDNISOLONE SODIUM SUCCINATE 125 MG/2ML
125 INJECTION, POWDER, LYOPHILIZED, FOR SOLUTION INTRAMUSCULAR; INTRAVENOUS
Status: CANCELLED
Start: 2022-10-27

## 2022-09-29 RX ORDER — EPINEPHRINE 1 MG/ML
0.3 INJECTION, SOLUTION INTRAMUSCULAR; SUBCUTANEOUS EVERY 5 MIN PRN
Status: CANCELLED | OUTPATIENT
Start: 2022-10-27

## 2022-09-29 RX ORDER — NALOXONE HYDROCHLORIDE 0.4 MG/ML
0.2 INJECTION, SOLUTION INTRAMUSCULAR; INTRAVENOUS; SUBCUTANEOUS
Status: CANCELLED | OUTPATIENT
Start: 2022-10-27

## 2022-09-29 RX ORDER — ALBUTEROL SULFATE 90 UG/1
1-2 AEROSOL, METERED RESPIRATORY (INHALATION)
Status: CANCELLED
Start: 2022-10-27

## 2022-09-29 RX ORDER — CYANOCOBALAMIN 1000 UG/ML
1000 INJECTION, SOLUTION INTRAMUSCULAR; SUBCUTANEOUS ONCE
Status: CANCELLED
Start: 2022-10-27 | End: 2022-10-27

## 2022-09-29 RX ORDER — DIPHENHYDRAMINE HYDROCHLORIDE 50 MG/ML
50 INJECTION INTRAMUSCULAR; INTRAVENOUS
Status: CANCELLED
Start: 2022-10-27

## 2022-09-29 RX ORDER — CYANOCOBALAMIN 1000 UG/ML
1000 INJECTION, SOLUTION INTRAMUSCULAR; SUBCUTANEOUS ONCE
Status: COMPLETED | OUTPATIENT
Start: 2022-09-29 | End: 2022-09-29

## 2022-09-29 RX ADMIN — CYANOCOBALAMIN 1000 MCG: 1000 INJECTION, SOLUTION INTRAMUSCULAR at 08:14

## 2022-09-29 NOTE — PROGRESS NOTES
Infusion Nursing Note:  Cam Walden presents today for Vitamin 12.    Patient seen by provider today: No   present during visit today: Not Applicable.    Note: VSS.  Pt assessed.      Intravenous Access:  No Intravenous access/labs at this visit.    Treatment Conditions:  Not Applicable.    Post Infusion Assessment:  Patient tolerated injection without incident.     Discharge Plan:   Patient discharged in stable condition accompanied by: self.      Amber Go RN

## 2022-10-03 DIAGNOSIS — I48.92 ATRIAL FLUTTER (H): ICD-10-CM

## 2022-10-03 RX ORDER — WARFARIN SODIUM 5 MG/1
TABLET ORAL
Qty: 180 TABLET | Refills: 1 | Status: SHIPPED | OUTPATIENT
Start: 2022-10-03 | End: 2023-04-03

## 2022-10-03 NOTE — TELEPHONE ENCOUNTER
Medication Question or Refill        What medication are you calling about (include dose and sig)?:     warfarin ANTICOAGULANT (COUMADIN) 5 MG tablet    Controlled Substance Agreement on file:   CSA -- Patient Level:    CSA: None found at the patient level.       Who prescribed the medication?: Dr. Plata    Do you need a refill? Yes:     Patient called to request a refill of this medication. Please send refill to pharmacy.    When did you use the medication last? today    Patient offered an appointment? No    Do you have any questions or concerns?  No    Preferred Pharmacy:   [id number] - [place], [place] - [address]  [address] [place] [address]  Phone: [phone] Fax: [phone]      Okay to leave a detailed message?: Yes at Home number on file [phone] (home)

## 2022-10-03 NOTE — TELEPHONE ENCOUNTER
ANTICOAGULATION MANAGEMENT:  Medication Refill    Anticoagulation Summary  As of 9/28/2022    Warfarin maintenance plan:  5 mg (5 mg x 1) every Sun, Thu; 10 mg (5 mg x 2) all other days   Next INR check:  10/26/2022   Target end date:  Indefinite    Indications    Atrial flutter (H) (Resolved) [I48.92]  Chronic atrial fibrillation (H) [I48.20]             Anticoagulation Care Providers     Provider Role Specialty Phone number    Kamaljit Plata MD Referring Family Medicine [phone]          Visit with referring provider/group within last year: Yes    [facility] referral signed within last year: Yes    Cam meets all criteria for refill (current [facility] referral, office visit with referring provider/group in last year, lab monitoring up to date or not exceeding 2 weeks overdue). Rx instructions and quantity supplied updated to match patient's current dosing plan. Warfarin 90 day supply with 1 refill granted per [facility] protocol     Jaqueline Chua RN  Anticoagulation Clinic

## 2022-10-27 ENCOUNTER — INFUSION THERAPY VISIT (OUTPATIENT)
Dept: INFUSION THERAPY | Facility: HOSPITAL | Age: 72
End: 2022-10-27
Attending: INTERNAL MEDICINE
Payer: MEDICARE

## 2022-10-27 DIAGNOSIS — E53.8 VITAMIN B12 DEFICIENCY (NON ANEMIC): Primary | ICD-10-CM

## 2022-10-27 DIAGNOSIS — N18.31 TYPE 2 DIABETES MELLITUS WITH STAGE 3A CHRONIC KIDNEY DISEASE, WITHOUT LONG-TERM CURRENT USE OF INSULIN (H): ICD-10-CM

## 2022-10-27 DIAGNOSIS — E11.22 TYPE 2 DIABETES MELLITUS WITH STAGE 3A CHRONIC KIDNEY DISEASE, WITHOUT LONG-TERM CURRENT USE OF INSULIN (H): ICD-10-CM

## 2022-10-27 PROCEDURE — 250N000011 HC RX IP 250 OP 636: Performed by: INTERNAL MEDICINE

## 2022-10-27 PROCEDURE — 250N000011 HC RX IP 250 OP 636: Performed by: NURSE PRACTITIONER

## 2022-10-27 PROCEDURE — 96372 THER/PROPH/DIAG INJ SC/IM: CPT | Performed by: NURSE PRACTITIONER

## 2022-10-27 RX ORDER — HEPARIN SODIUM (PORCINE) LOCK FLUSH IV SOLN 100 UNIT/ML 100 UNIT/ML
5 SOLUTION INTRAVENOUS
Status: CANCELLED | OUTPATIENT
Start: 2022-10-27

## 2022-10-27 RX ORDER — NALOXONE HYDROCHLORIDE 0.4 MG/ML
0.2 INJECTION, SOLUTION INTRAMUSCULAR; INTRAVENOUS; SUBCUTANEOUS
Status: CANCELLED | OUTPATIENT
Start: 2022-11-24

## 2022-10-27 RX ORDER — MEPERIDINE HYDROCHLORIDE 25 MG/ML
25 INJECTION INTRAMUSCULAR; INTRAVENOUS; SUBCUTANEOUS EVERY 30 MIN PRN
Status: CANCELLED | OUTPATIENT
Start: 2022-11-24

## 2022-10-27 RX ORDER — CYANOCOBALAMIN 1000 UG/ML
1000 INJECTION, SOLUTION INTRAMUSCULAR; SUBCUTANEOUS ONCE
Status: CANCELLED
Start: 2022-11-24 | End: 2022-11-24

## 2022-10-27 RX ORDER — CYANOCOBALAMIN 1000 UG/ML
1000 INJECTION, SOLUTION INTRAMUSCULAR; SUBCUTANEOUS ONCE
Status: COMPLETED | OUTPATIENT
Start: 2022-10-27 | End: 2022-10-27

## 2022-10-27 RX ORDER — ALBUTEROL SULFATE 0.83 MG/ML
2.5 SOLUTION RESPIRATORY (INHALATION)
Status: CANCELLED | OUTPATIENT
Start: 2022-11-24

## 2022-10-27 RX ORDER — METHYLPREDNISOLONE SODIUM SUCCINATE 125 MG/2ML
125 INJECTION, POWDER, LYOPHILIZED, FOR SOLUTION INTRAMUSCULAR; INTRAVENOUS
Status: CANCELLED
Start: 2022-11-24

## 2022-10-27 RX ORDER — DIPHENHYDRAMINE HYDROCHLORIDE 50 MG/ML
50 INJECTION INTRAMUSCULAR; INTRAVENOUS
Status: CANCELLED
Start: 2022-11-24

## 2022-10-27 RX ORDER — EPINEPHRINE 1 MG/ML
0.3 INJECTION, SOLUTION INTRAMUSCULAR; SUBCUTANEOUS EVERY 5 MIN PRN
Status: CANCELLED | OUTPATIENT
Start: 2022-11-24

## 2022-10-27 RX ORDER — HEPARIN SODIUM (PORCINE) LOCK FLUSH IV SOLN 100 UNIT/ML 100 UNIT/ML
5 SOLUTION INTRAVENOUS
Status: DISCONTINUED | OUTPATIENT
Start: 2022-10-27 | End: 2022-10-27 | Stop reason: HOSPADM

## 2022-10-27 RX ORDER — ALBUTEROL SULFATE 90 UG/1
1-2 AEROSOL, METERED RESPIRATORY (INHALATION)
Status: CANCELLED
Start: 2022-11-24

## 2022-10-27 RX ADMIN — Medication 5 ML: at 08:19

## 2022-10-27 RX ADMIN — CYANOCOBALAMIN 1000 MCG: 1000 INJECTION, SOLUTION INTRAMUSCULAR at 08:19

## 2022-10-27 NOTE — PROGRESS NOTES
Pt arrived ambulatory to clinic for IM B-12 Injection and Post Flush.  Pt states that he had Flu Shot yesterday.  Port was accessed using aseptic technique without difficulties with excellent blood return.  Administered IM B-12 injection into Left Deltoid per MD order.  Pt has cyst on Left upper arm, made sure to avoid cyst when giving the Im injection.  Pt tolerated procedure well, no s/s of bleeding or swelling at site.  Port was flushed with NS and Heparin then de-accessed using 2x2 and papertape.  Instructed pt to call clinic with any further questions or concerns.  Pt verbalized understanding of plan of care and return to clinic.

## 2022-11-01 ENCOUNTER — TELEPHONE (OUTPATIENT)
Dept: ANTICOAGULATION | Facility: CLINIC | Age: 72
End: 2022-11-01

## 2022-11-03 ENCOUNTER — ANTICOAGULATION THERAPY VISIT (OUTPATIENT)
Dept: ANTICOAGULATION | Facility: CLINIC | Age: 72
End: 2022-11-03

## 2022-11-03 ENCOUNTER — LAB (OUTPATIENT)
Dept: LAB | Facility: CLINIC | Age: 72
End: 2022-11-03
Payer: MEDICARE

## 2022-11-03 DIAGNOSIS — I48.20 CHRONIC ATRIAL FIBRILLATION (H): ICD-10-CM

## 2022-11-03 DIAGNOSIS — I48.20 CHRONIC ATRIAL FIBRILLATION (H): Primary | ICD-10-CM

## 2022-11-03 LAB — INR BLD: 1.7 (ref 0.9–1.1)

## 2022-11-03 PROCEDURE — 36416 COLLJ CAPILLARY BLOOD SPEC: CPT

## 2022-11-03 PROCEDURE — 85610 PROTHROMBIN TIME: CPT

## 2022-11-03 NOTE — PROGRESS NOTES
ANTICOAGULATION MANAGEMENT     Cam Walden 72 year old male is on warfarin with subtherapeutic INR result. (Goal INR 2.0-3.0)    Recent labs: (last 7 days)     11/03/22  0936   INR 1.7*       ASSESSMENT       Source(s): Chart Review and Template       Warfarin doses taken: Missed dose(s) may be affecting INR    Diet: No new diet changes identified    New illness, injury, or hospitalization: No    Medication/supplement changes: None noted    Signs or symptoms of bleeding or clotting: Yes: hemorrhoid bleeding    Previous INR: Therapeutic last 2(+) visits    Additional findings: None       PLAN     Unable to reach Cam today.    No instructions provided. Unable to leave voicemail.    Follow up required to confirm warfarin dose taken and assess for changes, discuss out of range result  and discuss dosing instructions and confirm understanding of instructions    Jaqueline Chua RN  Anticoagulation Clinic  11/3/2022

## 2022-11-07 NOTE — PROGRESS NOTES
Did reach Cam who self-held warfarin 2 days for hemorrhoid bleeding which did stop. He has since resumed his usual warfarin dose and inr is scheduled for 11/21

## 2022-11-21 ENCOUNTER — ANTICOAGULATION THERAPY VISIT (OUTPATIENT)
Dept: ANTICOAGULATION | Facility: CLINIC | Age: 72
End: 2022-11-21

## 2022-11-21 ENCOUNTER — LAB (OUTPATIENT)
Dept: LAB | Facility: CLINIC | Age: 72
End: 2022-11-21
Payer: MEDICARE

## 2022-11-21 DIAGNOSIS — I48.20 CHRONIC ATRIAL FIBRILLATION (H): ICD-10-CM

## 2022-11-21 DIAGNOSIS — I48.20 CHRONIC ATRIAL FIBRILLATION (H): Primary | ICD-10-CM

## 2022-11-21 LAB — INR BLD: 2.3 (ref 0.9–1.1)

## 2022-11-21 PROCEDURE — 36416 COLLJ CAPILLARY BLOOD SPEC: CPT

## 2022-11-21 PROCEDURE — 85610 PROTHROMBIN TIME: CPT

## 2022-11-21 NOTE — PROGRESS NOTES
ANTICOAGULATION MANAGEMENT     Cam Walden 72 year old male is on warfarin with therapeutic INR result. (Goal INR 2.0-3.0)    Recent labs: (last 7 days)     11/21/22  0854   INR 2.3*       ASSESSMENT       Source(s): Chart Review and Patient/Caregiver Call       Warfarin doses taken: Warfarin taken as instructed    Diet: No new diet changes identified    New illness, injury, or hospitalization: No    Medication/supplement changes: None noted    Signs or symptoms of bleeding or clotting: No    Previous INR: Subtherapeutic    Additional findings: None       PLAN     Recommended plan for no diet, medication or health factor changes affecting INR     Dosing Instructions: Continue your current warfarin dose with next INR in 2 weeks       Summary  As of 11/21/2022    Full warfarin instructions:  5 mg every Sun, Thu; 10 mg all other days; Starting 11/21/2022   Next INR check:  12/5/2022             Telephone call with Cam who verbalizes understanding and agrees to plan    Lab visit scheduled    Education provided:     None required    Plan made per [facility] anticoagulation protocol    Jaqueline Chua RN  Anticoagulation Clinic  11/21/2022    _______________________________________________________________________     Anticoagulation Episode Summary     Current INR goal:  2.0-3.0   TTR:  66.4 % (1 y)   Target end date:  Indefinite   Send INR reminders to:  [facility]    Indications    Atrial flutter (H) (Resolved) [I48.92]  Chronic atrial fibrillation (H) [I48.20]           Comments:           Anticoagulation Care Providers     Provider Role Specialty Phone number    Kamaljit Plata MD Referring Family Medicine [phone]

## 2022-11-25 ENCOUNTER — INFUSION THERAPY VISIT (OUTPATIENT)
Dept: INFUSION THERAPY | Facility: HOSPITAL | Age: 72
End: 2022-11-25
Attending: INTERNAL MEDICINE
Payer: MEDICARE

## 2022-11-25 VITALS
TEMPERATURE: 99 F | RESPIRATION RATE: 20 BRPM | OXYGEN SATURATION: 94 % | SYSTOLIC BLOOD PRESSURE: 154 MMHG | DIASTOLIC BLOOD PRESSURE: 80 MMHG | HEART RATE: 76 BPM

## 2022-11-25 DIAGNOSIS — E53.8 VITAMIN B12 DEFICIENCY (NON ANEMIC): Primary | ICD-10-CM

## 2022-11-25 PROCEDURE — 96372 THER/PROPH/DIAG INJ SC/IM: CPT | Performed by: NURSE PRACTITIONER

## 2022-11-25 PROCEDURE — 250N000011 HC RX IP 250 OP 636: Performed by: NURSE PRACTITIONER

## 2022-11-25 RX ORDER — MEPERIDINE HYDROCHLORIDE 25 MG/ML
25 INJECTION INTRAMUSCULAR; INTRAVENOUS; SUBCUTANEOUS EVERY 30 MIN PRN
Status: CANCELLED | OUTPATIENT
Start: 2022-12-22

## 2022-11-25 RX ORDER — CYANOCOBALAMIN 1000 UG/ML
1000 INJECTION, SOLUTION INTRAMUSCULAR; SUBCUTANEOUS ONCE
Status: COMPLETED | OUTPATIENT
Start: 2022-11-25 | End: 2022-11-25

## 2022-11-25 RX ORDER — NALOXONE HYDROCHLORIDE 0.4 MG/ML
0.2 INJECTION, SOLUTION INTRAMUSCULAR; INTRAVENOUS; SUBCUTANEOUS
Status: CANCELLED | OUTPATIENT
Start: 2022-12-22

## 2022-11-25 RX ORDER — EPINEPHRINE 1 MG/ML
0.3 INJECTION, SOLUTION INTRAMUSCULAR; SUBCUTANEOUS EVERY 5 MIN PRN
Status: CANCELLED | OUTPATIENT
Start: 2022-12-22

## 2022-11-25 RX ORDER — METHYLPREDNISOLONE SODIUM SUCCINATE 125 MG/2ML
125 INJECTION, POWDER, LYOPHILIZED, FOR SOLUTION INTRAMUSCULAR; INTRAVENOUS
Status: CANCELLED
Start: 2022-12-22

## 2022-11-25 RX ORDER — CYANOCOBALAMIN 1000 UG/ML
1000 INJECTION, SOLUTION INTRAMUSCULAR; SUBCUTANEOUS ONCE
Status: CANCELLED
Start: 2022-12-22 | End: 2022-12-22

## 2022-11-25 RX ORDER — DIPHENHYDRAMINE HYDROCHLORIDE 50 MG/ML
50 INJECTION INTRAMUSCULAR; INTRAVENOUS
Status: CANCELLED
Start: 2022-12-22

## 2022-11-25 RX ORDER — ALBUTEROL SULFATE 90 UG/1
1-2 AEROSOL, METERED RESPIRATORY (INHALATION)
Status: CANCELLED
Start: 2022-12-22

## 2022-11-25 RX ORDER — ALBUTEROL SULFATE 0.83 MG/ML
2.5 SOLUTION RESPIRATORY (INHALATION)
Status: CANCELLED | OUTPATIENT
Start: 2022-12-22

## 2022-11-25 RX ADMIN — CYANOCOBALAMIN 1000 MCG: 1000 INJECTION, SOLUTION INTRAMUSCULAR at 09:18

## 2022-11-25 NOTE — PROGRESS NOTES
Infusion Nursing Note:  Cam Walden presents today for Vitamin B12.    Patient seen by provider today: No   present during visit today: Not Applicable.    Note: VSS.  Pt assessed.  Vitamin B12 give IM into his right deltoid.    Intravenous Access:  No Intravenous access/labs at this visit.    Treatment Conditions:  Not Applicable.    Post Infusion Assessment:  Patient tolerated injection without incident.     Discharge Plan:   Patient discharged in stable condition accompanied by: self.  Departure Mode: Ambulatory.      Amber Go RN

## 2022-11-29 ENCOUNTER — TELEPHONE (OUTPATIENT)
Dept: FAMILY MEDICINE | Facility: CLINIC | Age: 72
End: 2022-11-29

## 2022-11-29 DIAGNOSIS — E78.5 HYPERLIPIDEMIA LDL GOAL <130: Primary | ICD-10-CM

## 2022-11-29 NOTE — TELEPHONE ENCOUNTER
General Call      Reason for Call:  Medical question    What are your questions or concerns:  Denisha with [facility] health plan called in regarding pt to put a message in for . She is requesting if  would consider prescribing a statin for diabetes. Please look into this request and follow up with pt at your earliest convenient. Thanks!    Date of last appointment with provider: N/A    Okay to leave a detailed message?: Yes at Home number on file [phone] (home)

## 2022-12-02 RX ORDER — ROSUVASTATIN CALCIUM 5 MG/1
5 TABLET, COATED ORAL DAILY
Qty: 30 TABLET | Refills: 4 | Status: SHIPPED | OUTPATIENT
Start: 2022-12-02 | End: 2023-01-17 | Stop reason: SINTOL

## 2022-12-02 NOTE — TELEPHONE ENCOUNTER
Kamaljit Plata MD [facility] Care Team Pool 1 hour ago (12:41 PM)       Crestor 5 mg prescription sent to patient pharmacy, follow-up in a couple of months for a recheck.      Writer attempt to call Denisha back, but no call back number on file.     If Denisha calls back, please relay Dr. Plata's message above to her. Thanks.    STORMY MazariegosN, RN   [facility]

## 2022-12-05 ENCOUNTER — ANTICOAGULATION THERAPY VISIT (OUTPATIENT)
Dept: ANTICOAGULATION | Facility: CLINIC | Age: 72
End: 2022-12-05

## 2022-12-05 ENCOUNTER — LAB (OUTPATIENT)
Dept: LAB | Facility: CLINIC | Age: 72
End: 2022-12-05
Payer: MEDICARE

## 2022-12-05 DIAGNOSIS — I48.20 CHRONIC ATRIAL FIBRILLATION (H): Primary | ICD-10-CM

## 2022-12-05 DIAGNOSIS — I48.20 CHRONIC ATRIAL FIBRILLATION (H): ICD-10-CM

## 2022-12-05 LAB — INR BLD: 1.9 (ref 0.9–1.1)

## 2022-12-05 PROCEDURE — 85610 PROTHROMBIN TIME: CPT

## 2022-12-05 PROCEDURE — 36416 COLLJ CAPILLARY BLOOD SPEC: CPT

## 2022-12-05 NOTE — PROGRESS NOTES
ANTICOAGULATION MANAGEMENT     Cam Walden 72 year old male is on warfarin with subtherapeutic INR result. (Goal INR 2.0-3.0)    Recent labs: (last 7 days)     12/05/22  0927   INR 1.9*       ASSESSMENT       Source(s): Chart Review and Patient/Caregiver Call       Warfarin doses taken: Warfarin taken as instructed    Diet: No new diet changes identified    New illness, injury, or hospitalization: No    Medication/supplement changes: None noted    Signs or symptoms of bleeding or clotting: No    Previous INR: Therapeutic last visit; previously outside of goal range    Additional findings: None       PLAN     Recommended plan for no diet, medication or health factor changes affecting INR     Dosing Instructions: Increase your warfarin dose (8.3% change) with next INR in 2 weeks       Summary  As of 12/5/2022    Full warfarin instructions:  5 mg every Sun, Thu; 10 mg all other days; Starting 12/5/2022   Next INR check:  12/19/2022             Telephone call with Cam who verbalizes understanding and agrees to plan    Lab visit scheduled    Education provided:     Contact [phone] with any changes, questions or concerns.     Plan made per [facility] anticoagulation protocol    Jaqueline Chua RN  Anticoagulation Clinic  12/5/2022    _______________________________________________________________________     Anticoagulation Episode Summary     Current INR goal:  2.0-3.0   TTR:  65.4 % (1 y)   Target end date:  Indefinite   Send INR reminders to:  [facility]    Indications    Atrial flutter (H) (Resolved) [I48.92]  Chronic atrial fibrillation (H) [I48.20]           Comments:           Anticoagulation Care Providers     Provider Role Specialty Phone number    Kamaljit Plata MD Referring Family Medicine [phone]

## 2022-12-12 DIAGNOSIS — N40.1 BENIGN PROSTATIC HYPERPLASIA WITH INCOMPLETE BLADDER EMPTYING: ICD-10-CM

## 2022-12-12 DIAGNOSIS — R39.14 BENIGN PROSTATIC HYPERPLASIA WITH INCOMPLETE BLADDER EMPTYING: ICD-10-CM

## 2022-12-12 DIAGNOSIS — E11.9 DIABETES MELLITUS, TYPE 2 (H): ICD-10-CM

## 2022-12-12 RX ORDER — TAMSULOSIN HYDROCHLORIDE 0.4 MG/1
CAPSULE ORAL
Qty: 180 CAPSULE | Refills: 1 | Status: SHIPPED | OUTPATIENT
Start: 2022-12-12 | End: 2023-06-12

## 2022-12-12 NOTE — TELEPHONE ENCOUNTER
"Routing refill request to provider for review/approval because:  Labs out of range:  cr  Labs not current:  a1c    Last Written Prescription Date:  9/16/22  Last Fill Quantity: 180,  # refills: 0   Last office visit provider:  11/29/21     Requested Prescriptions   Pending Prescriptions Disp Refills     metFORMIN (GLUCOPHAGE) 1000 MG tablet [Pharmacy Med Name: METFORMIN HCL 1,000 MG TABLET] 180 tablet 0     Sig: TAKE 1 TABLET BY MOUTH TWICE A DAY WITH MEALS       Biguanide Agents Failed - 12/12/2022 12:43 AM        Failed - Patient has documented A1c within the specified period of time.     If HgbA1C is 8 or greater, it needs to be on file within the past 3 months.  If less than 8, must be on file within the past 6 months.     Recent Labs   Lab Test 10/05/21  0856   A1C 6.5*             Failed - Patient's CR is NOT>1.4 OR Patient's EGFR is NOT<45 within past 12 mos.     Recent Labs   Lab Test 09/01/22  0806 11/15/21  1208 05/14/21  0956   GFRESTIMATED 42*   < > 48*   GFRESTBLACK  --   --  58*    < > = values in this interval not displayed.       Recent Labs   Lab Test 09/01/22  0806   CR 1.74*             Failed - Recent (6 mo) or future (30 days) visit within the authorizing provider's specialty     Patient had office visit in the last 6 months or has a visit in the next 30 days with authorizing provider or within the authorizing provider's specialty.  See \"Patient Info\" tab in inbasket, or \"Choose Columns\" in Meds & Orders section of the refill encounter.            Passed - Patient is age 10 or older        Passed - Patient does NOT have a diagnosis of CHF.        Passed - Medication is active on med Madhavi Jiang RN 12/12/22 11:18 AM  "

## 2022-12-12 NOTE — TELEPHONE ENCOUNTER
"Routing refill request to provider for review/approval because:  Patient needs to be seen because it has been more than 1 year since last office visit.    Last Written Prescription Date:  6/15/22  Last Fill Quantity: 180,  # refills: 1   Last office visit provider:  11/29/21    Requested Prescriptions   Pending Prescriptions Disp Refills     tamsulosin (FLOMAX) 0.4 MG capsule [Pharmacy Med Name: TAMSULOSIN HCL 0.4 MG CAPSULE] 180 capsule 1     Sig: TAKE 2 CAPSULES BY MOUTH EVERY DAY       Alpha Blockers Failed - 12/12/2022 12:42 AM        Failed - Blood pressure under 140/90 in past 12 months     BP Readings from Last 3 Encounters:   11/25/22 (!) 154/80   09/29/22 131/66   09/01/22 (!) 159/86                 Failed - Recent (12 mo) or future (30 days) visit within the authorizing provider's specialty     Patient has had an office visit with the authorizing provider or a provider within the authorizing providers department within the previous 12 mos or has a future within next 30 days. See \"Patient Info\" tab in inbasket, or \"Choose Columns\" in Meds & Orders section of the refill encounter.              Passed - Patient does not have Tadalafil, Vardenafil, or Sildenafil on their medication list        Passed - Medication is active on med list        Passed - Patient is 18 years of age or older             Madhavi Stallings RN 12/12/22 11:16 AM  "

## 2022-12-19 ENCOUNTER — LAB (OUTPATIENT)
Dept: LAB | Facility: CLINIC | Age: 72
End: 2022-12-19
Payer: MEDICARE

## 2022-12-19 ENCOUNTER — ANTICOAGULATION THERAPY VISIT (OUTPATIENT)
Dept: ANTICOAGULATION | Facility: CLINIC | Age: 72
End: 2022-12-19

## 2022-12-19 DIAGNOSIS — I48.20 CHRONIC ATRIAL FIBRILLATION (H): Primary | ICD-10-CM

## 2022-12-19 DIAGNOSIS — I48.20 CHRONIC ATRIAL FIBRILLATION (H): ICD-10-CM

## 2022-12-19 LAB — INR BLD: 2.4 (ref 0.9–1.1)

## 2022-12-19 PROCEDURE — 36416 COLLJ CAPILLARY BLOOD SPEC: CPT

## 2022-12-19 PROCEDURE — 85610 PROTHROMBIN TIME: CPT

## 2022-12-19 NOTE — PROGRESS NOTES
ANTICOAGULATION MANAGEMENT     Cam Walden 72 year old male is on warfarin with therapeutic INR result. (Goal INR 2.0-3.0)    Recent labs: (last 7 days)     12/19/22  0822   INR 2.4*       ASSESSMENT       Source(s): Chart Review and Patient/Caregiver Call       Warfarin doses taken: Warfarin taken as instructed    Diet: No new diet changes identified    New illness, injury, or hospitalization: No    Medication/supplement changes: None noted    Signs or symptoms of bleeding or clotting: No    Previous INR: Subtherapeutic    Additional findings: None       PLAN     Recommended plan for no diet, medication or health factor changes affecting INR     Dosing Instructions: Continue your current warfarin dose with next INR in 2 weeks       Summary  As of 12/19/2022    Full warfarin instructions:  5 mg every Sun; 10 mg all other days; Starting 12/19/2022   Next INR check:  1/2/2023             Telephone call with Cam who verbalizes understanding and agrees to plan    Lab visit scheduled    Education provided:     None required    Plan made per [facility] anticoagulation protocol    Jaqueline Chua RN  Anticoagulation Clinic  12/19/2022    _______________________________________________________________________     Anticoagulation Episode Summary     Current INR goal:  2.0-3.0   TTR:  64.6 % (1 y)   Target end date:  Indefinite   Send INR reminders to:  [facility]    Indications    Atrial flutter (H) (Resolved) [I48.92]  Chronic atrial fibrillation (H) [I48.20]           Comments:           Anticoagulation Care Providers     Provider Role Specialty Phone number    Kamaljit Plata MD Referring Family Medicine [phone]

## 2023-01-04 DIAGNOSIS — E78.5 HYPERLIPIDEMIA LDL GOAL <130: ICD-10-CM

## 2023-01-05 RX ORDER — ROSUVASTATIN CALCIUM 5 MG/1
TABLET, COATED ORAL
Qty: 30 TABLET | Refills: 4 | OUTPATIENT
Start: 2023-01-05

## 2023-01-09 ENCOUNTER — TELEPHONE (OUTPATIENT)
Dept: ANTICOAGULATION | Facility: CLINIC | Age: 73
End: 2023-01-09

## 2023-01-09 NOTE — TELEPHONE ENCOUNTER
ANTICOAGULATION     Cam Walden is overdue for INR check.      Spoke with Cam and scheduled lab appointment on 1/17    Jaqueline Chua RN

## 2023-01-17 ENCOUNTER — ANTICOAGULATION THERAPY VISIT (OUTPATIENT)
Dept: ANTICOAGULATION | Facility: CLINIC | Age: 73
End: 2023-01-17

## 2023-01-17 ENCOUNTER — ONCOLOGY VISIT (OUTPATIENT)
Dept: ONCOLOGY | Facility: HOSPITAL | Age: 73
End: 2023-01-17
Attending: NURSE PRACTITIONER
Payer: MEDICARE

## 2023-01-17 ENCOUNTER — INFUSION THERAPY VISIT (OUTPATIENT)
Dept: INFUSION THERAPY | Facility: HOSPITAL | Age: 73
End: 2023-01-17
Attending: NURSE PRACTITIONER
Payer: MEDICARE

## 2023-01-17 ENCOUNTER — LAB (OUTPATIENT)
Dept: INFUSION THERAPY | Facility: HOSPITAL | Age: 73
End: 2023-01-17
Attending: INTERNAL MEDICINE
Payer: MEDICARE

## 2023-01-17 VITALS
TEMPERATURE: 99.3 F | RESPIRATION RATE: 20 BRPM | HEIGHT: 72 IN | DIASTOLIC BLOOD PRESSURE: 84 MMHG | SYSTOLIC BLOOD PRESSURE: 146 MMHG | HEART RATE: 85 BPM | OXYGEN SATURATION: 95 % | WEIGHT: 291 LBS | BODY MASS INDEX: 39.42 KG/M2

## 2023-01-17 DIAGNOSIS — I48.20 CHRONIC ATRIAL FIBRILLATION (H): Primary | ICD-10-CM

## 2023-01-17 DIAGNOSIS — R53.0 NEOPLASTIC (MALIGNANT) RELATED FATIGUE: ICD-10-CM

## 2023-01-17 DIAGNOSIS — E11.22 TYPE 2 DIABETES MELLITUS WITH STAGE 3A CHRONIC KIDNEY DISEASE, WITHOUT LONG-TERM CURRENT USE OF INSULIN (H): ICD-10-CM

## 2023-01-17 DIAGNOSIS — C83.398 DIFFUSE LARGE B-CELL LYMPHOMA OF EXTRANODAL SITE: Primary | ICD-10-CM

## 2023-01-17 DIAGNOSIS — E53.8 VITAMIN B12 DEFICIENCY (NON ANEMIC): ICD-10-CM

## 2023-01-17 DIAGNOSIS — C83.398 DIFFUSE LARGE B-CELL LYMPHOMA OF EXTRANODAL SITE: ICD-10-CM

## 2023-01-17 DIAGNOSIS — E53.8 VITAMIN B12 DEFICIENCY (NON ANEMIC): Primary | ICD-10-CM

## 2023-01-17 DIAGNOSIS — I48.20 CHRONIC ATRIAL FIBRILLATION (H): ICD-10-CM

## 2023-01-17 DIAGNOSIS — N18.31 TYPE 2 DIABETES MELLITUS WITH STAGE 3A CHRONIC KIDNEY DISEASE, WITHOUT LONG-TERM CURRENT USE OF INSULIN (H): ICD-10-CM

## 2023-01-17 LAB
ALBUMIN SERPL BCG-MCNC: 3.8 G/DL (ref 3.5–5.2)
ALP SERPL-CCNC: 76 U/L (ref 40–129)
ALT SERPL W P-5'-P-CCNC: 43 U/L (ref 10–50)
ANION GAP SERPL CALCULATED.3IONS-SCNC: 12 MMOL/L (ref 7–15)
AST SERPL W P-5'-P-CCNC: 42 U/L (ref 10–50)
BASOPHILS # BLD AUTO: 0 10E3/UL (ref 0–0.2)
BASOPHILS NFR BLD AUTO: 1 %
BILIRUB SERPL-MCNC: 0.5 MG/DL
BUN SERPL-MCNC: 21.7 MG/DL (ref 8–23)
CALCIUM SERPL-MCNC: 9.3 MG/DL (ref 8.8–10.2)
CHLORIDE SERPL-SCNC: 101 MMOL/L (ref 98–107)
CREAT SERPL-MCNC: 1.63 MG/DL (ref 0.67–1.17)
DEPRECATED HCO3 PLAS-SCNC: 24 MMOL/L (ref 22–29)
EOSINOPHIL # BLD AUTO: 0.4 10E3/UL (ref 0–0.7)
EOSINOPHIL NFR BLD AUTO: 5 %
ERYTHROCYTE [DISTWIDTH] IN BLOOD BY AUTOMATED COUNT: 15 % (ref 10–15)
GFR SERPL CREATININE-BSD FRML MDRD: 45 ML/MIN/1.73M2
GLUCOSE SERPL-MCNC: 230 MG/DL (ref 70–99)
HCT VFR BLD AUTO: 39.5 % (ref 40–53)
HGB BLD-MCNC: 12.6 G/DL (ref 13.3–17.7)
IMM GRANULOCYTES # BLD: 0 10E3/UL
IMM GRANULOCYTES NFR BLD: 0 %
INR PPP: 1.07 (ref 0.85–1.15)
LDH SERPL L TO P-CCNC: 227 U/L (ref 0–250)
LYMPHOCYTES # BLD AUTO: 2 10E3/UL (ref 0.8–5.3)
LYMPHOCYTES NFR BLD AUTO: 28 %
MCH RBC QN AUTO: 28.7 PG (ref 26.5–33)
MCHC RBC AUTO-ENTMCNC: 31.9 G/DL (ref 31.5–36.5)
MCV RBC AUTO: 90 FL (ref 78–100)
MONOCYTES # BLD AUTO: 0.6 10E3/UL (ref 0–1.3)
MONOCYTES NFR BLD AUTO: 8 %
NEUTROPHILS # BLD AUTO: 4.2 10E3/UL (ref 1.6–8.3)
NEUTROPHILS NFR BLD AUTO: 58 %
NRBC # BLD AUTO: 0 10E3/UL
NRBC BLD AUTO-RTO: 0 /100
PLATELET # BLD AUTO: 199 10E3/UL (ref 150–450)
POTASSIUM SERPL-SCNC: 3.8 MMOL/L (ref 3.4–5.3)
PROT SERPL-MCNC: 7.1 G/DL (ref 6.4–8.3)
RBC # BLD AUTO: 4.39 10E6/UL (ref 4.4–5.9)
SODIUM SERPL-SCNC: 137 MMOL/L (ref 136–145)
TSH SERPL DL<=0.005 MIU/L-ACNC: 2.17 UIU/ML (ref 0.3–4.2)
VIT B12 SERPL-MCNC: 299 PG/ML (ref 232–1245)
WBC # BLD AUTO: 7.2 10E3/UL (ref 4–11)

## 2023-01-17 PROCEDURE — 83615 LACTATE (LD) (LDH) ENZYME: CPT

## 2023-01-17 PROCEDURE — 36591 DRAW BLOOD OFF VENOUS DEVICE: CPT

## 2023-01-17 PROCEDURE — 84443 ASSAY THYROID STIM HORMONE: CPT

## 2023-01-17 PROCEDURE — 250N000011 HC RX IP 250 OP 636: Performed by: NURSE PRACTITIONER

## 2023-01-17 PROCEDURE — 82607 VITAMIN B-12: CPT

## 2023-01-17 PROCEDURE — 80053 COMPREHEN METABOLIC PANEL: CPT

## 2023-01-17 PROCEDURE — G0463 HOSPITAL OUTPT CLINIC VISIT: HCPCS | Mod: 25 | Performed by: INTERNAL MEDICINE

## 2023-01-17 PROCEDURE — 85025 COMPLETE CBC W/AUTO DIFF WBC: CPT

## 2023-01-17 PROCEDURE — 99214 OFFICE O/P EST MOD 30 MIN: CPT | Performed by: INTERNAL MEDICINE

## 2023-01-17 PROCEDURE — 85610 PROTHROMBIN TIME: CPT

## 2023-01-17 PROCEDURE — 96372 THER/PROPH/DIAG INJ SC/IM: CPT | Performed by: NURSE PRACTITIONER

## 2023-01-17 RX ORDER — HEPARIN SODIUM (PORCINE) LOCK FLUSH IV SOLN 100 UNIT/ML 100 UNIT/ML
5 SOLUTION INTRAVENOUS
Status: DISCONTINUED | OUTPATIENT
Start: 2023-01-17 | End: 2023-01-17 | Stop reason: HOSPADM

## 2023-01-17 RX ORDER — NALOXONE HYDROCHLORIDE 0.4 MG/ML
0.2 INJECTION, SOLUTION INTRAMUSCULAR; INTRAVENOUS; SUBCUTANEOUS
Status: CANCELLED | OUTPATIENT
Start: 2023-01-20

## 2023-01-17 RX ORDER — ALBUTEROL SULFATE 90 UG/1
1-2 AEROSOL, METERED RESPIRATORY (INHALATION)
Status: CANCELLED
Start: 2023-01-20

## 2023-01-17 RX ORDER — DIPHENHYDRAMINE HYDROCHLORIDE 50 MG/ML
50 INJECTION INTRAMUSCULAR; INTRAVENOUS
Status: CANCELLED
Start: 2023-01-20

## 2023-01-17 RX ORDER — ALBUTEROL SULFATE 0.83 MG/ML
2.5 SOLUTION RESPIRATORY (INHALATION)
Status: CANCELLED | OUTPATIENT
Start: 2023-01-20

## 2023-01-17 RX ORDER — MEPERIDINE HYDROCHLORIDE 25 MG/ML
25 INJECTION INTRAMUSCULAR; INTRAVENOUS; SUBCUTANEOUS EVERY 30 MIN PRN
Status: CANCELLED | OUTPATIENT
Start: 2023-01-20

## 2023-01-17 RX ORDER — CYANOCOBALAMIN 1000 UG/ML
1000 INJECTION, SOLUTION INTRAMUSCULAR; SUBCUTANEOUS ONCE
Status: CANCELLED
Start: 2023-01-20 | End: 2023-01-20

## 2023-01-17 RX ORDER — EPINEPHRINE 1 MG/ML
0.3 INJECTION, SOLUTION INTRAMUSCULAR; SUBCUTANEOUS EVERY 5 MIN PRN
Status: CANCELLED | OUTPATIENT
Start: 2023-01-20

## 2023-01-17 RX ORDER — HEPARIN SODIUM (PORCINE) LOCK FLUSH IV SOLN 100 UNIT/ML 100 UNIT/ML
5 SOLUTION INTRAVENOUS
Status: CANCELLED | OUTPATIENT
Start: 2023-01-17

## 2023-01-17 RX ORDER — CYANOCOBALAMIN 1000 UG/ML
1000 INJECTION, SOLUTION INTRAMUSCULAR; SUBCUTANEOUS ONCE
Status: COMPLETED | OUTPATIENT
Start: 2023-01-17 | End: 2023-01-17

## 2023-01-17 RX ORDER — METHYLPREDNISOLONE SODIUM SUCCINATE 125 MG/2ML
125 INJECTION, POWDER, LYOPHILIZED, FOR SOLUTION INTRAMUSCULAR; INTRAVENOUS
Status: CANCELLED
Start: 2023-01-20

## 2023-01-17 RX ADMIN — CYANOCOBALAMIN 1000 MCG: 1000 INJECTION, SOLUTION INTRAMUSCULAR at 09:44

## 2023-01-17 RX ADMIN — Medication 5 ML: at 09:46

## 2023-01-17 ASSESSMENT — PAIN SCALES - GENERAL: PAINLEVEL: NO PAIN (0)

## 2023-01-17 NOTE — PROGRESS NOTES
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    Weight as of this encounter: 132 kg (291 lb). Body surface area is 2.59 meters squared.  No Pain (0) Comment: Data Unavailable   No LMP for male patient.  Allergies reviewed: Yes  Medications reviewed: Yes    Medications: Medication refills not needed today.  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

## 2023-01-17 NOTE — PROGRESS NOTES
ANTICOAGULATION MANAGEMENT     Cam Walden 72 year old male is on warfarin with subtherapeutic INR result. (Goal INR 2.0-3.0)    Recent labs: (last 7 days)     01/17/23  0852   INR 1.07       ASSESSMENT       Source(s): Chart Review and Patient/Caregiver Call       Warfarin doses taken: Warfarin taken as instructed had spilled pills on the table they got wet and rendered ineffective. Has since replaced the bottle    Diet: No new diet changes identified    New illness, injury, or hospitalization: No    Medication/supplement changes: None noted    Signs or symptoms of bleeding or clotting: No    Previous INR: Therapeutic last 2(+) visits    Additional findings: None       PLAN     Recommended plan for no diet, medication or health factor changes affecting INR     Dosing Instructions: Continue your current warfarin dose with next INR in 1 week       Summary  As of 1/17/2023    Full warfarin instructions:  5 mg every Sun; 10 mg all other days   Next INR check:  1/24/2023             Telephone call with Cam who verbalizes understanding and agrees to plan    Patient offered & declined to schedule next visit will use hospital lab 1/24    Education provided:     Contact [phone] with any changes, questions or concerns.     Plan made per [facility] anticoagulation protocol    Jaqueline Chua RN  Anticoagulation Clinic  1/17/2023    _______________________________________________________________________     Anticoagulation Episode Summary     Current INR goal:  2.0-3.0   TTR:  66.9 % (1 y)   Target end date:  Indefinite   Send INR reminders to:  [facility]    Indications    Atrial flutter (H) (Resolved) [I48.92]  Chronic atrial fibrillation (H) [I48.20]           Comments:           Anticoagulation Care Providers     Provider Role Specialty Phone number    Kamaljit Plata MD Referring Family Medicine [phone]

## 2023-01-17 NOTE — LETTER
"    1/17/2023         RE: Cam Walden  [address] [place] [address]        Dear Colleague,    Thank you for referring your patient, Cam Walden, to the [facility]. Please see a copy of my visit note below.    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    Weight as of this encounter: 132 kg (291 lb). Body surface area is 2.59 meters squared.  No Pain (0) Comment: Data Unavailable   No LMP for male patient.  Allergies reviewed: Yes  Medications reviewed: Yes    Medications: Medication refills not needed today.  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  Will get restaging scans in the next week.  He is more than 2 years out from end of treatment.  Next follow-up with lab check in 6 months.  No further CT scans.    Diagnosed and started on B12 injections.  We will continue this monthly.  Await recheck of labs today.    Plan: Schedule restaging scans in the next week  Continue B12 monthly  Follow-up with LDH check in 6 months      Measurable disease: PET scan    Current therapy: B12 injections monthly started June 2022    Treatment history:    R-CHOP, 4 cycles, last,  November 16, 2020   cycle 1 on 9/15    ECOG Performance   ECOG Performance Status: 1    Distress Assessment  Distress Assessment Score: No distress    Pain         Problem List    1. 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 Cam Walden is here for follow-up.  Seen 3 months ago.  Diagnosed with B12 deficiency in June and has been on monthly B12 shots.  Feels well.  No fever chills or sweats.  Good appetite and stable weight.  No skin or neck changes.  No other adenopathy.  No infectious problems or bleeding.  ECOG status 0.      Pain Status  Currently in Pain: No/denies    Review of Systems    As per the HPI.       Patient Coping  Patient Coping: Accepting  Distress Assessment  Distress Assessment Score: No distress  Accompanied by  Accompanied by: Alone    Past History  Past Medical History:   Diagnosis Date     Aortic stenosis     mild     Atrial fibrillation (H)      Atrial flutter (H) 8/21/2017     Atrial flutter with rapid ventricular response (H)      Cancer (H)     lymp     Colon polyps 08/08/2016    Per colonoscopy 8/8/16.  Large and small.  Some removed. Others to be removed  surgically. (per patient)     Diabetes mellitus (H)     DM II     Diabetes mellitus, type II (H)      Diabetic polyneuropathy associated with type 2 diabetes mellitus (H) 12/16/2020     GI bleed     Following colon polyp removal     Hyperlipidemia      Hypertension      Morbid obesity (H)      JOHNNY (obstructive sleep apnea)     no cpap     Rectal bleeding          Past Surgical History:   Procedure Laterality Date     CARDIOVERSION  08/25/2017     IR PORT PLACEMENT >5 YEARS  8/24/2020       Physical Exam    Recent Vitals 5/17/2021   Height -   Weight 253 lbs 2 oz   BSA (m2) 2.41 m2   /81   Pulse 76   Temp 98.5   Temp src 1   SpO2 98   Some recent data might be hidden       GENERAL: Alert and oriented to time place and person. Seated comfortably. In no distress.    HEAD: Atraumatic and normocephalic.    EYES: BRIDGETTE, EOMI.  No pallor.  No icterus.    Oral cavity: no mucosal lesion or tonsillar enlargement.    NECK: supple. JVP normal.  No thyroid enlargement.    LYMPH NODES: No palpable, cervical, axillary or inguinal lymphadenopathy.    CHEST: clear to auscultation bilaterally.  Resonant to percussion throughout bilaterally.  Symmetrical breath movements bilaterally.    CVS: S1 and S2 are heard. Regular rate and rhythm.  No murmur or gallop or rub heard.  No peripheral edema.    ABDOMEN: Soft. Not tender. Not distended.  No palpable hepatomegaly or splenomegaly.  No other mass palpable.  Bowel sounds heard.    EXTREMITIES: Warm.    SKIN: no rash, or bruising or purpura.  Has a full head of hair.      Lab Results    Recent Results (from the past 168 hour(s))   Comprehensive Metabolic Panel   Result Value Ref Range    Sodium 140 136 - 145 mmol/L    Potassium 3.8 3.5 - 5.0 mmol/L    Chloride 108 (H) 98 - 107 mmol/L    CO2 22 22 - 31 mmol/L    Anion Gap, Calculation 10 5 - 18 mmol/L    Glucose 151 (H) 70 - 125 mg/dL    BUN 15 8 - 22 mg/dL    Creatinine 1.45 (H) 0.70 - 1.30 mg/dL    GFR MDRD Af Amer 58 (L) >60  mL/min/1.73m2    GFR MDRD Non Af Amer 48 (L) >60 mL/min/1.73m2    Bilirubin, Total 0.7 0.0 - 1.0 mg/dL    Calcium 8.6 8.5 - 10.5 mg/dL    Protein, Total 7.2 6.0 - 8.0 g/dL    Albumin 4.0 3.5 - 5.0 g/dL    Alkaline Phosphatase 128 (H) 45 - 120 U/L    AST 29 0 - 40 U/L    ALT 37 0 - 45 U/L   Lactate Dehydrogenase (LDH)   Result Value Ref Range    LD (LDH) 294 (H) 125 - 220 U/L   INR   Result Value Ref Range    INR 2.68 (H) 0.90 - 1.10   HM1 (CBC with Diff)   Result Value Ref Range    WBC 9.4 4.0 - 11.0 thou/uL    RBC 4.06 (L) 4.40 - 6.20 mill/uL    Hemoglobin 11.5 (L) 14.0 - 18.0 g/dL    Hematocrit 36.6 (L) 40.0 - 54.0 %    MCV 90 80 - 100 fL    MCH 28.3 27.0 - 34.0 pg    MCHC 31.4 (L) 32.0 - 36.0 g/dL    RDW 16.7 (H) 11.0 - 14.5 %    Platelets 271 140 - 440 thou/uL    MPV 8.7 8.5 - 12.5 fL    Neutrophils % 61 50 - 70 %    Lymphocytes % 24 20 - 40 %    Monocytes % 10 2 - 10 %    Eosinophils % 2 0 - 6 %    Basophils % 0 0 - 2 %    Immature Granulocyte % 3 (H) <=0 %    Neutrophils Absolute 5.7 2.0 - 7.7 thou/uL    Lymphocytes Absolute 2.3 0.8 - 4.4 thou/uL    Monocytes Absolute 0.9 0.0 - 0.9 thou/uL    Eosinophils Absolute 0.2 0.0 - 0.4 thou/uL    Basophils Absolute 0.0 0.0 - 0.2 thou/uL    Immature Granulocyte Absolute 0.2 (H) <=0.0 thou/uL       Imaging    CT chest abdomen and pelvis    IMPRESSION:  1.  No evidence of recurrent tumor in the chest, abdomen or pelvis.  2.  Resolution of the previous obstructive changes in the right kidney.  3.  Noncritical findings as noted above.      Signed by: Blade Ibanez MD        Again, thank you for allowing me to participate in the care of your patient.        Sincerely,        Blade Ibanez MD

## 2023-01-17 NOTE — PROGRESS NOTES
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  Will get restaging scans in the next week.  He is more than 2 years out from end of treatment.  Next follow-up with lab check in 6 months.  No further CT scans.    Diagnosed and started on B12 injections.  We will continue this monthly.  Await recheck of labs today.    Plan: Schedule restaging scans in the next week  Continue B12 monthly  Follow-up with LDH check in 6 months      Measurable disease: PET scan    Current therapy: B12 injections monthly started June 2022    Treatment history:    R-CHOP, 4 cycles, last,  November 16, 2020   cycle 1 on 9/15    ECOG Performance   ECOG Performance Status: 1    Distress Assessment  Distress Assessment Score: No distress    Pain         Problem List    1. 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 Cam Walden is here for follow-up.  Seen 3 months ago.  Diagnosed with B12 deficiency in June and has been on monthly B12 shots.  Feels well.  No fever chills or sweats.  Good appetite and stable weight.  No skin or neck changes.  No other adenopathy.  No infectious problems or bleeding.  ECOG status 0.      Pain Status  Currently in  Pain: No/denies    Review of Systems    As per the HPI.       Patient Coping  Patient Coping: Accepting  Distress Assessment  Distress Assessment Score: No distress  Accompanied by  Accompanied by: Alone    Past History  Past Medical History:   Diagnosis Date     Aortic stenosis     mild     Atrial fibrillation (H)      Atrial flutter (H) 8/21/2017     Atrial flutter with rapid ventricular response (H)      Cancer (H)     lymp     Colon polyps 08/08/2016    Per colonoscopy 8/8/16.  Large and small.  Some removed. Others to be removed surgically. (per patient)     Diabetes mellitus (H)     DM II     Diabetes mellitus, type II (H)      Diabetic polyneuropathy associated with type 2 diabetes mellitus (H) 12/16/2020     GI bleed     Following colon polyp removal     Hyperlipidemia      Hypertension      Morbid obesity (H)      JOHNNY (obstructive sleep apnea)     no cpap     Rectal bleeding          Past Surgical History:   Procedure Laterality Date     CARDIOVERSION  08/25/2017     IR PORT PLACEMENT >5 YEARS  8/24/2020       Physical Exam    Recent Vitals 5/17/2021   Height -   Weight 253 lbs 2 oz   BSA (m2) 2.41 m2   /81   Pulse 76   Temp 98.5   Temp src 1   SpO2 98   Some recent data might be hidden       GENERAL: Alert and oriented to time place and person. Seated comfortably. In no distress.    HEAD: Atraumatic and normocephalic.    EYES: BRIDGETTE, EOMI.  No pallor.  No icterus.    Oral cavity: no mucosal lesion or tonsillar enlargement.    NECK: supple. JVP normal.  No thyroid enlargement.    LYMPH NODES: No palpable, cervical, axillary or inguinal lymphadenopathy.    CHEST: clear to auscultation bilaterally.  Resonant to percussion throughout bilaterally.  Symmetrical breath movements bilaterally.    CVS: S1 and S2 are heard. Regular rate and rhythm.  No murmur or gallop or rub heard.  No peripheral edema.    ABDOMEN: Soft. Not tender. Not distended.  No palpable hepatomegaly or splenomegaly.  No other mass  palpable.  Bowel sounds heard.    EXTREMITIES: Warm.    SKIN: no rash, or bruising or purpura.  Has a full head of hair.      Lab Results    Recent Results (from the past 168 hour(s))   Comprehensive Metabolic Panel   Result Value Ref Range    Sodium 140 136 - 145 mmol/L    Potassium 3.8 3.5 - 5.0 mmol/L    Chloride 108 (H) 98 - 107 mmol/L    CO2 22 22 - 31 mmol/L    Anion Gap, Calculation 10 5 - 18 mmol/L    Glucose 151 (H) 70 - 125 mg/dL    BUN 15 8 - 22 mg/dL    Creatinine 1.45 (H) 0.70 - 1.30 mg/dL    GFR MDRD Af Amer 58 (L) >60 mL/min/1.73m2    GFR MDRD Non Af Amer 48 (L) >60 mL/min/1.73m2    Bilirubin, Total 0.7 0.0 - 1.0 mg/dL    Calcium 8.6 8.5 - 10.5 mg/dL    Protein, Total 7.2 6.0 - 8.0 g/dL    Albumin 4.0 3.5 - 5.0 g/dL    Alkaline Phosphatase 128 (H) 45 - 120 U/L    AST 29 0 - 40 U/L    ALT 37 0 - 45 U/L   Lactate Dehydrogenase (LDH)   Result Value Ref Range    LD (LDH) 294 (H) 125 - 220 U/L   INR   Result Value Ref Range    INR 2.68 (H) 0.90 - 1.10   HM1 (CBC with Diff)   Result Value Ref Range    WBC 9.4 4.0 - 11.0 thou/uL    RBC 4.06 (L) 4.40 - 6.20 mill/uL    Hemoglobin 11.5 (L) 14.0 - 18.0 g/dL    Hematocrit 36.6 (L) 40.0 - 54.0 %    MCV 90 80 - 100 fL    MCH 28.3 27.0 - 34.0 pg    MCHC 31.4 (L) 32.0 - 36.0 g/dL    RDW 16.7 (H) 11.0 - 14.5 %    Platelets 271 140 - 440 thou/uL    MPV 8.7 8.5 - 12.5 fL    Neutrophils % 61 50 - 70 %    Lymphocytes % 24 20 - 40 %    Monocytes % 10 2 - 10 %    Eosinophils % 2 0 - 6 %    Basophils % 0 0 - 2 %    Immature Granulocyte % 3 (H) <=0 %    Neutrophils Absolute 5.7 2.0 - 7.7 thou/uL    Lymphocytes Absolute 2.3 0.8 - 4.4 thou/uL    Monocytes Absolute 0.9 0.0 - 0.9 thou/uL    Eosinophils Absolute 0.2 0.0 - 0.4 thou/uL    Basophils Absolute 0.0 0.0 - 0.2 thou/uL    Immature Granulocyte Absolute 0.2 (H) <=0.0 thou/uL       Imaging    CT chest abdomen and pelvis    IMPRESSION:  1.  No evidence of recurrent tumor in the chest, abdomen or pelvis.  2.  Resolution of the  previous obstructive changes in the right kidney.  3.  Noncritical findings as noted above.      Signed by: Blade Ibanez MD

## 2023-01-17 NOTE — PROGRESS NOTES
Infusion Nursing Note:  Cam Walden presents today for labs and vitamin B12 injection.    Patient seen by provider today: Yes: Dr. Ibanez   present during visit today: Not Applicable.    Note: Cam arrived ambulatory and in stable condition. Port was accessed using sterile technique, good blood return noted, and labs were collected. Vitamin B12 injection was given into the right arm and covered with a band-aid. Port was de-accessed and covered with a band-aid. Does not have any future appointments scheduled currently but will need to return in a month for next vitamin B12 injection.    Intravenous Access:  Labs drawn without difficulty.  Implanted Port.    Treatment Conditions:  Not Applicable.    Post Infusion Assessment:  Patient tolerated injection without incident.  Site patent and intact, free from redness, edema or discomfort.   Access discontinued per protocol.    Discharge Plan:   Patient and/or family verbalized understanding of discharge instructions and all questions answered.  Patient discharged in stable condition accompanied by: self.  Departure Mode: Ambulatory.      Agustina Staples RN

## 2023-01-24 ENCOUNTER — HOSPITAL ENCOUNTER (OUTPATIENT)
Dept: CT IMAGING | Facility: HOSPITAL | Age: 73
Discharge: HOME | End: 2023-01-24
Attending: NURSE PRACTITIONER
Payer: MEDICARE

## 2023-01-24 DIAGNOSIS — C83.398 DIFFUSE LARGE B-CELL LYMPHOMA OF EXTRANODAL SITE: ICD-10-CM

## 2023-01-24 LAB
CREAT BLD-MCNC: 1.6 MG/DL (ref 0.7–1.3)
GFR SERPL CREATININE-BSD FRML MDRD: 46 ML/MIN/1.73M2

## 2023-01-24 PROCEDURE — 70491 CT SOFT TISSUE NECK W/DYE: CPT | Mod: MG

## 2023-01-24 PROCEDURE — 250N000011 HC RX IP 250 OP 636: Performed by: NURSE PRACTITIONER

## 2023-01-24 PROCEDURE — 74177 CT ABD & PELVIS W/CONTRAST: CPT | Mod: MG

## 2023-01-24 PROCEDURE — 82565 ASSAY OF CREATININE: CPT

## 2023-01-24 RX ORDER — HEPARIN SODIUM (PORCINE) LOCK FLUSH IV SOLN 100 UNIT/ML 100 UNIT/ML
500 SOLUTION INTRAVENOUS ONCE
Status: COMPLETED | OUTPATIENT
Start: 2023-01-24 | End: 2023-01-24

## 2023-01-24 RX ORDER — IOPAMIDOL 755 MG/ML
75 INJECTION, SOLUTION INTRAVASCULAR ONCE
Status: COMPLETED | OUTPATIENT
Start: 2023-01-24 | End: 2023-01-24

## 2023-01-24 RX ADMIN — HEPARIN 500 UNITS: 100 SYRINGE at 07:29

## 2023-01-24 RX ADMIN — IOPAMIDOL 75 ML: 755 INJECTION, SOLUTION INTRAVENOUS at 07:21

## 2023-01-26 ENCOUNTER — TELEPHONE (OUTPATIENT)
Dept: ONCOLOGY | Facility: HOSPITAL | Age: 73
End: 2023-01-26
Payer: MEDICARE

## 2023-02-01 ENCOUNTER — TELEPHONE (OUTPATIENT)
Dept: ANTICOAGULATION | Facility: CLINIC | Age: 73
End: 2023-02-01
Payer: MEDICARE

## 2023-02-01 NOTE — TELEPHONE ENCOUNTER
ANTICOAGULATION     Cam Walden is overdue for INR check.      Spoke with Cam and scheduled lab appointment on 2/3    Jaqueline Chua RN

## 2023-02-09 ENCOUNTER — TELEPHONE (OUTPATIENT)
Dept: ANTICOAGULATION | Facility: CLINIC | Age: 73
End: 2023-02-09
Payer: MEDICARE

## 2023-02-09 NOTE — TELEPHONE ENCOUNTER
ANTICOAGULATION     Cam Walden is overdue for INR check.      Left message for patient to call and schedule lab appointment as soon as possible. If returning call, please schedule.     Jaqueline Chua RN

## 2023-02-14 ENCOUNTER — INFUSION THERAPY VISIT (OUTPATIENT)
Dept: INFUSION THERAPY | Facility: HOSPITAL | Age: 73
End: 2023-02-14
Attending: INTERNAL MEDICINE
Payer: MEDICARE

## 2023-02-14 ENCOUNTER — ANTICOAGULATION THERAPY VISIT (OUTPATIENT)
Dept: ANTICOAGULATION | Facility: CLINIC | Age: 73
End: 2023-02-14

## 2023-02-14 ENCOUNTER — DOCUMENTATION ONLY (OUTPATIENT)
Dept: ANTICOAGULATION | Facility: CLINIC | Age: 73
End: 2023-02-14

## 2023-02-14 VITALS
TEMPERATURE: 98.3 F | OXYGEN SATURATION: 96 % | HEART RATE: 85 BPM | SYSTOLIC BLOOD PRESSURE: 148 MMHG | DIASTOLIC BLOOD PRESSURE: 83 MMHG | RESPIRATION RATE: 16 BRPM

## 2023-02-14 DIAGNOSIS — I48.20 CHRONIC ATRIAL FIBRILLATION (H): Primary | ICD-10-CM

## 2023-02-14 DIAGNOSIS — I48.20 CHRONIC ATRIAL FIBRILLATION (H): ICD-10-CM

## 2023-02-14 DIAGNOSIS — N18.31 TYPE 2 DIABETES MELLITUS WITH STAGE 3A CHRONIC KIDNEY DISEASE, WITHOUT LONG-TERM CURRENT USE OF INSULIN (H): ICD-10-CM

## 2023-02-14 DIAGNOSIS — E11.22 TYPE 2 DIABETES MELLITUS WITH STAGE 3A CHRONIC KIDNEY DISEASE, WITHOUT LONG-TERM CURRENT USE OF INSULIN (H): ICD-10-CM

## 2023-02-14 DIAGNOSIS — E53.8 VITAMIN B12 DEFICIENCY (NON ANEMIC): Primary | ICD-10-CM

## 2023-02-14 LAB — INR PPP: 2.43 (ref 0.85–1.15)

## 2023-02-14 PROCEDURE — 36591 DRAW BLOOD OFF VENOUS DEVICE: CPT

## 2023-02-14 PROCEDURE — 85610 PROTHROMBIN TIME: CPT

## 2023-02-14 PROCEDURE — 96372 THER/PROPH/DIAG INJ SC/IM: CPT | Performed by: NURSE PRACTITIONER

## 2023-02-14 PROCEDURE — 250N000011 HC RX IP 250 OP 636: Performed by: NURSE PRACTITIONER

## 2023-02-14 RX ORDER — DIPHENHYDRAMINE HYDROCHLORIDE 50 MG/ML
50 INJECTION INTRAMUSCULAR; INTRAVENOUS
Status: CANCELLED
Start: 2023-03-14

## 2023-02-14 RX ORDER — HEPARIN SODIUM (PORCINE) LOCK FLUSH IV SOLN 100 UNIT/ML 100 UNIT/ML
5 SOLUTION INTRAVENOUS
Status: CANCELLED | OUTPATIENT
Start: 2023-02-14

## 2023-02-14 RX ORDER — NALOXONE HYDROCHLORIDE 0.4 MG/ML
0.2 INJECTION, SOLUTION INTRAMUSCULAR; INTRAVENOUS; SUBCUTANEOUS
Status: CANCELLED | OUTPATIENT
Start: 2023-03-14

## 2023-02-14 RX ORDER — EPINEPHRINE 1 MG/ML
0.3 INJECTION, SOLUTION INTRAMUSCULAR; SUBCUTANEOUS EVERY 5 MIN PRN
Status: CANCELLED | OUTPATIENT
Start: 2023-03-14

## 2023-02-14 RX ORDER — HEPARIN SODIUM (PORCINE) LOCK FLUSH IV SOLN 100 UNIT/ML 100 UNIT/ML
5 SOLUTION INTRAVENOUS
Status: DISCONTINUED | OUTPATIENT
Start: 2023-02-14 | End: 2023-02-14 | Stop reason: HOSPADM

## 2023-02-14 RX ORDER — ALBUTEROL SULFATE 90 UG/1
1-2 AEROSOL, METERED RESPIRATORY (INHALATION)
Status: CANCELLED
Start: 2023-03-14

## 2023-02-14 RX ORDER — METHYLPREDNISOLONE SODIUM SUCCINATE 125 MG/2ML
125 INJECTION, POWDER, LYOPHILIZED, FOR SOLUTION INTRAMUSCULAR; INTRAVENOUS
Status: CANCELLED
Start: 2023-03-14

## 2023-02-14 RX ORDER — CYANOCOBALAMIN 1000 UG/ML
1000 INJECTION, SOLUTION INTRAMUSCULAR; SUBCUTANEOUS ONCE
Status: CANCELLED
Start: 2023-03-14 | End: 2023-03-14

## 2023-02-14 RX ORDER — ALBUTEROL SULFATE 0.83 MG/ML
2.5 SOLUTION RESPIRATORY (INHALATION)
Status: CANCELLED | OUTPATIENT
Start: 2023-03-14

## 2023-02-14 RX ORDER — CYANOCOBALAMIN 1000 UG/ML
1000 INJECTION, SOLUTION INTRAMUSCULAR; SUBCUTANEOUS ONCE
Status: COMPLETED | OUTPATIENT
Start: 2023-02-14 | End: 2023-02-14

## 2023-02-14 RX ORDER — MEPERIDINE HYDROCHLORIDE 25 MG/ML
25 INJECTION INTRAMUSCULAR; INTRAVENOUS; SUBCUTANEOUS EVERY 30 MIN PRN
Status: CANCELLED | OUTPATIENT
Start: 2023-03-14

## 2023-02-14 RX ADMIN — CYANOCOBALAMIN 1000 MCG: 1000 INJECTION, SOLUTION INTRAMUSCULAR; SUBCUTANEOUS at 10:46

## 2023-02-14 RX ADMIN — Medication 5 ML: at 11:02

## 2023-02-14 NOTE — PROGRESS NOTES
ANTICOAGULATION MANAGEMENT     Cam Walden 72 year old male is on warfarin with therapeutic INR result. (Goal INR 2.0-3.0)    Recent labs: (last 7 days)     02/14/23  1054   INR 2.43*       ASSESSMENT       Source(s): Chart Review and Patient/Caregiver Call       Warfarin doses taken: Warfarin taken as instructed    Diet: No new diet changes identified    New illness, injury, or hospitalization: No    Medication/supplement changes: None noted    Signs or symptoms of bleeding or clotting: No    Previous INR: Subtherapeutic 4 wks ago    Additional findings: None       PLAN     Recommended plan for no diet, medication or health factor changes affecting INR     Dosing Instructions: Continue your current warfarin dose with next INR in 4 weeks       Summary  As of 2/14/2023    Full warfarin instructions:  5 mg every Sun; 10 mg all other days   Next INR check:  3/14/2023             Telephone call with Cam who verbalizes understanding and agrees to plan    Lab visit scheduled    Education provided:     Contact [phone] with any changes, questions or concerns.     Plan made per [facility] anticoagulation protocol    Jaqueline Chua RN  Anticoagulation Clinic  2/14/2023    _______________________________________________________________________     Anticoagulation Episode Summary     Current INR goal:  2.0-3.0   TTR:  69.3 % (1 y)   Target end date:  Indefinite   Send INR reminders to:  [facility]    Indications    Atrial flutter (H) (Resolved) [I48.92]  Chronic atrial fibrillation (H) [I48.20]           Comments:           Anticoagulation Care Providers     Provider Role Specialty Phone number    Kamaljit Plata MD Referring Family Medicine [phone]          
No complaints

## 2023-02-14 NOTE — PROGRESS NOTES
ANTICOAGULATION CLINIC REFERRAL RENEWAL REQUEST       An annual renewal order is required for all patients referred to [facility].?  Please review and sign the pended referral order for Cam Walden.       ANTICOAGULATION SUMMARY      Warfarin indication(s)   Atrial Fibrillation    Mechanical heart valve present?  NO       Current goal range   INR: 2.0-3.0     Goal appropriate for indication? Goal INR 2-3, standard for indication(s) above     Time in Therapeutic Range (TTR)  (Goal > 60%) 69.3%       Office visit with referring provider's group within last year yes on 6/21/22       Jaqueline Chua RN  [facility]

## 2023-02-14 NOTE — PROGRESS NOTES
Pt here for B12 and states he is due for an INR port accessed and INR drawn. Port flushed and deaccessed and pt d.c ambulatory to lobby alone and is aware of treatment plan.

## 2023-03-14 ENCOUNTER — INFUSION THERAPY VISIT (OUTPATIENT)
Dept: INFUSION THERAPY | Facility: HOSPITAL | Age: 73
End: 2023-03-14
Attending: INTERNAL MEDICINE
Payer: MEDICARE

## 2023-03-14 ENCOUNTER — ANTICOAGULATION THERAPY VISIT (OUTPATIENT)
Dept: ANTICOAGULATION | Facility: CLINIC | Age: 73
End: 2023-03-14

## 2023-03-14 DIAGNOSIS — I48.20 CHRONIC ATRIAL FIBRILLATION (H): Primary | ICD-10-CM

## 2023-03-14 DIAGNOSIS — I48.20 CHRONIC ATRIAL FIBRILLATION (H): ICD-10-CM

## 2023-03-14 DIAGNOSIS — N18.31 TYPE 2 DIABETES MELLITUS WITH STAGE 3A CHRONIC KIDNEY DISEASE, WITHOUT LONG-TERM CURRENT USE OF INSULIN (H): ICD-10-CM

## 2023-03-14 DIAGNOSIS — E53.8 VITAMIN B12 DEFICIENCY (NON ANEMIC): Primary | ICD-10-CM

## 2023-03-14 DIAGNOSIS — E11.22 TYPE 2 DIABETES MELLITUS WITH STAGE 3A CHRONIC KIDNEY DISEASE, WITHOUT LONG-TERM CURRENT USE OF INSULIN (H): ICD-10-CM

## 2023-03-14 LAB — INR PPP: 2.75 (ref 0.85–1.15)

## 2023-03-14 PROCEDURE — 96372 THER/PROPH/DIAG INJ SC/IM: CPT | Performed by: NURSE PRACTITIONER

## 2023-03-14 PROCEDURE — 250N000011 HC RX IP 250 OP 636: Performed by: NURSE PRACTITIONER

## 2023-03-14 PROCEDURE — 36591 DRAW BLOOD OFF VENOUS DEVICE: CPT

## 2023-03-14 PROCEDURE — 85610 PROTHROMBIN TIME: CPT

## 2023-03-14 RX ORDER — CYANOCOBALAMIN 1000 UG/ML
1000 INJECTION, SOLUTION INTRAMUSCULAR; SUBCUTANEOUS ONCE
Status: CANCELLED
Start: 2023-04-11 | End: 2023-04-11

## 2023-03-14 RX ORDER — HEPARIN SODIUM (PORCINE) LOCK FLUSH IV SOLN 100 UNIT/ML 100 UNIT/ML
5 SOLUTION INTRAVENOUS
Status: DISCONTINUED | OUTPATIENT
Start: 2023-03-14 | End: 2023-03-14 | Stop reason: HOSPADM

## 2023-03-14 RX ORDER — ALBUTEROL SULFATE 90 UG/1
1-2 AEROSOL, METERED RESPIRATORY (INHALATION)
Status: CANCELLED
Start: 2023-04-11

## 2023-03-14 RX ORDER — METHYLPREDNISOLONE SODIUM SUCCINATE 125 MG/2ML
125 INJECTION, POWDER, LYOPHILIZED, FOR SOLUTION INTRAMUSCULAR; INTRAVENOUS
Status: CANCELLED
Start: 2023-04-11

## 2023-03-14 RX ORDER — MEPERIDINE HYDROCHLORIDE 25 MG/ML
25 INJECTION INTRAMUSCULAR; INTRAVENOUS; SUBCUTANEOUS EVERY 30 MIN PRN
Status: CANCELLED | OUTPATIENT
Start: 2023-04-11

## 2023-03-14 RX ORDER — ALBUTEROL SULFATE 0.83 MG/ML
2.5 SOLUTION RESPIRATORY (INHALATION)
Status: CANCELLED | OUTPATIENT
Start: 2023-04-11

## 2023-03-14 RX ORDER — HEPARIN SODIUM (PORCINE) LOCK FLUSH IV SOLN 100 UNIT/ML 100 UNIT/ML
5 SOLUTION INTRAVENOUS
Status: CANCELLED | OUTPATIENT
Start: 2023-03-14

## 2023-03-14 RX ORDER — NALOXONE HYDROCHLORIDE 0.4 MG/ML
0.2 INJECTION, SOLUTION INTRAMUSCULAR; INTRAVENOUS; SUBCUTANEOUS
Status: CANCELLED | OUTPATIENT
Start: 2023-04-11

## 2023-03-14 RX ORDER — EPINEPHRINE 1 MG/ML
0.3 INJECTION, SOLUTION INTRAMUSCULAR; SUBCUTANEOUS EVERY 5 MIN PRN
Status: CANCELLED | OUTPATIENT
Start: 2023-04-11

## 2023-03-14 RX ORDER — DIPHENHYDRAMINE HYDROCHLORIDE 50 MG/ML
50 INJECTION INTRAMUSCULAR; INTRAVENOUS
Status: CANCELLED
Start: 2023-04-11

## 2023-03-14 RX ORDER — CYANOCOBALAMIN 1000 UG/ML
1000 INJECTION, SOLUTION INTRAMUSCULAR; SUBCUTANEOUS ONCE
Status: COMPLETED | OUTPATIENT
Start: 2023-03-14 | End: 2023-03-14

## 2023-03-14 RX ADMIN — Medication 5 ML: at 09:05

## 2023-03-14 RX ADMIN — CYANOCOBALAMIN 1000 MCG: 1000 INJECTION, SOLUTION INTRAMUSCULAR; SUBCUTANEOUS at 09:06

## 2023-03-14 NOTE — PROGRESS NOTES
ANTICOAGULATION MANAGEMENT     Cam Walden 73 year old male is on warfarin with therapeutic INR result. (Goal INR 2.0-3.0)    Recent labs: (last 7 days)     03/14/23  0904   INR 2.75*       ASSESSMENT       Source(s): Chart Review and Patient/Caregiver Call       Warfarin doses taken: Warfarin taken as instructed    Diet: No new diet changes identified    New illness, injury, or hospitalization: No    Medication/supplement changes: None noted    Signs or symptoms of bleeding or clotting: No    Previous INR: Therapeutic last visit; previously outside of goal range    Additional findings: None         PLAN     Recommended plan for no diet, medication or health factor changes affecting INR     Dosing Instructions: Continue your current warfarin dose with next INR in 4 weeks       Summary  As of 3/14/2023    Full warfarin instructions:  5 mg every Sun; 10 mg all other days   Next INR check:  4/11/2023             Telephone call with Cam who verbalizes understanding and agrees to plan    Lab visit scheduled    Education provided:     Contact [phone] with any changes, questions or concerns.     Plan made per [facility] anticoagulation protocol    Jaqueline Chua RN  Anticoagulation Clinic  3/14/2023    _______________________________________________________________________     Anticoagulation Episode Summary     Current INR goal:  2.0-3.0   TTR:  72.2 % (1 y)   Target end date:  Indefinite   Send INR reminders to:  [facility]    Indications    Atrial flutter (H) (Resolved) [I48.92]  Chronic atrial fibrillation (H) [I48.20]           Comments:           Anticoagulation Care Providers     Provider Role Specialty Phone number    Kamaljit Plata MD Referring Family Medicine [phone]

## 2023-03-14 NOTE — PROGRESS NOTES
Pt here for port lab INR draw and B12 which was given R arm without incident. Pt d.c ambulatory to lobby alone and is aware of treatment plan.

## 2023-03-16 DIAGNOSIS — I48.92 ATRIAL FLUTTER, UNSPECIFIED TYPE (H): Primary | ICD-10-CM

## 2023-03-17 RX ORDER — METOPROLOL TARTRATE 100 MG
100 TABLET ORAL 2 TIMES DAILY
Qty: 60 TABLET | Refills: 0 | Status: SHIPPED | OUTPATIENT
Start: 2023-03-17 | End: 2023-04-11

## 2023-03-17 NOTE — TELEPHONE ENCOUNTER
"Routing refill request to provider for review/approval because:  BP outside parameters.  Medication also reported/historical.    Last recorded into med list 6/23/2021.  Last office visit provider:  6/21/2022     Requested Prescriptions   Pending Prescriptions Disp Refills     metoprolol tartrate (LOPRESSOR) 100 MG tablet       Sig: Take 1 tablet (100 mg) by mouth 2 times daily       Beta-Blockers Protocol Failed - 3/17/2023 11:30 AM        Failed - Blood pressure under 140/90 in past 12 months     BP Readings from Last 3 Encounters:   02/14/23 (!) 148/83   01/17/23 (!) 146/84   11/25/22 (!) 154/80                 Failed - Recent (12 mo) or future (30 days) visit within the authorizing provider's specialty     Patient has had an office visit with the authorizing provider or a provider within the authorizing providers department within the previous 12 mos or has a future within next 30 days. See \"Patient Info\" tab in inbasket, or \"Choose Columns\" in Meds & Orders section of the refill encounter.              Passed - Patient is age 6 or older        Passed - Medication is active on med list             Dolores Almanzar RN 03/17/23 11:30 AM  "

## 2023-03-20 ENCOUNTER — OFFICE VISIT (OUTPATIENT)
Dept: FAMILY MEDICINE | Facility: CLINIC | Age: 73
End: 2023-03-20
Payer: MEDICARE

## 2023-03-20 VITALS
WEIGHT: 290 LBS | SYSTOLIC BLOOD PRESSURE: 133 MMHG | DIASTOLIC BLOOD PRESSURE: 83 MMHG | RESPIRATION RATE: 18 BRPM | OXYGEN SATURATION: 94 % | HEART RATE: 69 BPM | TEMPERATURE: 97.4 F | BODY MASS INDEX: 39.28 KG/M2 | HEIGHT: 72 IN

## 2023-03-20 DIAGNOSIS — Z11.59 NEED FOR HEPATITIS C SCREENING TEST: ICD-10-CM

## 2023-03-20 DIAGNOSIS — Z13.220 SCREENING FOR HYPERLIPIDEMIA: ICD-10-CM

## 2023-03-20 DIAGNOSIS — N18.31 STAGE 3A CHRONIC KIDNEY DISEASE (H): ICD-10-CM

## 2023-03-20 DIAGNOSIS — E78.5 HYPERLIPIDEMIA LDL GOAL <130: ICD-10-CM

## 2023-03-20 DIAGNOSIS — I48.92 ATRIAL FLUTTER WITH RAPID VENTRICULAR RESPONSE (H): ICD-10-CM

## 2023-03-20 DIAGNOSIS — Z79.4 TYPE 2 DIABETES MELLITUS WITHOUT COMPLICATION, WITH LONG-TERM CURRENT USE OF INSULIN (H): ICD-10-CM

## 2023-03-20 DIAGNOSIS — K51.40 INFLAMMATORY PSEUDOTUMOR OF COLON (H): ICD-10-CM

## 2023-03-20 DIAGNOSIS — N18.31 TYPE 2 DIABETES MELLITUS WITH STAGE 3A CHRONIC KIDNEY DISEASE, WITHOUT LONG-TERM CURRENT USE OF INSULIN (H): Primary | ICD-10-CM

## 2023-03-20 DIAGNOSIS — D70.2 OTHER DRUG-INDUCED AGRANULOCYTOSIS (H): ICD-10-CM

## 2023-03-20 DIAGNOSIS — E11.22 TYPE 2 DIABETES MELLITUS WITH STAGE 3A CHRONIC KIDNEY DISEASE, WITHOUT LONG-TERM CURRENT USE OF INSULIN (H): ICD-10-CM

## 2023-03-20 DIAGNOSIS — N18.31 TYPE 2 DIABETES MELLITUS WITH STAGE 3A CHRONIC KIDNEY DISEASE, WITHOUT LONG-TERM CURRENT USE OF INSULIN (H): ICD-10-CM

## 2023-03-20 DIAGNOSIS — E66.01 MORBID OBESITY (H): ICD-10-CM

## 2023-03-20 DIAGNOSIS — E11.9 TYPE 2 DIABETES MELLITUS WITHOUT COMPLICATION, WITH LONG-TERM CURRENT USE OF INSULIN (H): ICD-10-CM

## 2023-03-20 DIAGNOSIS — E11.22 TYPE 2 DIABETES MELLITUS WITH STAGE 3A CHRONIC KIDNEY DISEASE, WITHOUT LONG-TERM CURRENT USE OF INSULIN (H): Primary | ICD-10-CM

## 2023-03-20 PROBLEM — N40.1 BENIGN PROSTATIC HYPERPLASIA WITH LOWER URINARY TRACT SYMPTOMS: Status: ACTIVE | Noted: 2020-12-10

## 2023-03-20 LAB
ALBUMIN SERPL BCG-MCNC: 4.1 G/DL (ref 3.5–5.2)
ALBUMIN SERPL BCG-MCNC: 4.1 G/DL (ref 3.5–5.2)
ALP SERPL-CCNC: 78 U/L (ref 40–129)
ALT SERPL W P-5'-P-CCNC: 49 U/L (ref 10–50)
ANION GAP SERPL CALCULATED.3IONS-SCNC: 14 MMOL/L (ref 7–15)
AST SERPL W P-5'-P-CCNC: 50 U/L (ref 10–50)
BILIRUB DIRECT SERPL-MCNC: <0.2 MG/DL (ref 0–0.3)
BILIRUB SERPL-MCNC: 0.4 MG/DL
BUN SERPL-MCNC: 21.9 MG/DL (ref 8–23)
CALCIUM SERPL-MCNC: 9.8 MG/DL (ref 8.8–10.2)
CHLORIDE SERPL-SCNC: 100 MMOL/L (ref 98–107)
CHOLEST SERPL-MCNC: 192 MG/DL
CREAT SERPL-MCNC: 1.51 MG/DL (ref 0.67–1.17)
CREAT UR-MCNC: 125 MG/DL
DEPRECATED HCO3 PLAS-SCNC: 20 MMOL/L (ref 22–29)
GFR SERPL CREATININE-BSD FRML MDRD: 49 ML/MIN/1.73M2
GLUCOSE SERPL-MCNC: 245 MG/DL (ref 70–99)
HBA1C MFR BLD: 9.4 % (ref 0–5.6)
HCV AB SERPL QL IA: NONREACTIVE
HDLC SERPL-MCNC: 28 MG/DL
LDLC SERPL CALC-MCNC: ABNORMAL MG/DL
LDLC SERPL DIRECT ASSAY-MCNC: 107 MG/DL
MICROALBUMIN UR-MCNC: 107 MG/L
MICROALBUMIN/CREAT UR: 85.6 MG/G CR (ref 0–17)
NONHDLC SERPL-MCNC: 164 MG/DL
PHOSPHATE SERPL-MCNC: 3.1 MG/DL (ref 2.5–4.5)
POTASSIUM SERPL-SCNC: 4.2 MMOL/L (ref 3.4–5.3)
PROT SERPL-MCNC: 7.2 G/DL (ref 6.4–8.3)
SODIUM SERPL-SCNC: 134 MMOL/L (ref 136–145)
TRIGL SERPL-MCNC: 434 MG/DL

## 2023-03-20 PROCEDURE — 84100 ASSAY OF PHOSPHORUS: CPT | Performed by: FAMILY MEDICINE

## 2023-03-20 PROCEDURE — 82043 UR ALBUMIN QUANTITATIVE: CPT | Performed by: FAMILY MEDICINE

## 2023-03-20 PROCEDURE — 83721 ASSAY OF BLOOD LIPOPROTEIN: CPT | Mod: 59 | Performed by: FAMILY MEDICINE

## 2023-03-20 PROCEDURE — 82248 BILIRUBIN DIRECT: CPT | Performed by: FAMILY MEDICINE

## 2023-03-20 PROCEDURE — 80061 LIPID PANEL: CPT | Performed by: FAMILY MEDICINE

## 2023-03-20 PROCEDURE — 36415 COLL VENOUS BLD VENIPUNCTURE: CPT | Performed by: FAMILY MEDICINE

## 2023-03-20 PROCEDURE — 83036 HEMOGLOBIN GLYCOSYLATED A1C: CPT | Performed by: FAMILY MEDICINE

## 2023-03-20 PROCEDURE — 80053 COMPREHEN METABOLIC PANEL: CPT | Performed by: FAMILY MEDICINE

## 2023-03-20 PROCEDURE — 99215 OFFICE O/P EST HI 40 MIN: CPT | Performed by: FAMILY MEDICINE

## 2023-03-20 PROCEDURE — 86803 HEPATITIS C AB TEST: CPT | Performed by: FAMILY MEDICINE

## 2023-03-20 PROCEDURE — 82570 ASSAY OF URINE CREATININE: CPT | Performed by: FAMILY MEDICINE

## 2023-03-20 RX ORDER — ATORVASTATIN CALCIUM 20 MG/1
20 TABLET, FILM COATED ORAL DAILY
Qty: 90 TABLET | Refills: 3 | Status: SHIPPED | OUTPATIENT
Start: 2023-03-20 | End: 2024-03-12

## 2023-03-20 RX ORDER — LANCETS
EACH MISCELLANEOUS
Qty: 60 EACH | Refills: 11 | Status: SHIPPED | OUTPATIENT
Start: 2023-03-20 | End: 2024-03-12

## 2023-03-20 RX ORDER — GLUCOSAMINE HCL/CHONDROITIN SU 500-400 MG
CAPSULE ORAL
Qty: 100 EACH | Refills: 3 | Status: SHIPPED | OUTPATIENT
Start: 2023-03-20

## 2023-03-20 ASSESSMENT — PATIENT HEALTH QUESTIONNAIRE - PHQ9
SUM OF ALL RESPONSES TO PHQ QUESTIONS 1-9: 2
SUM OF ALL RESPONSES TO PHQ QUESTIONS 1-9: 2
10. IF YOU CHECKED OFF ANY PROBLEMS, HOW DIFFICULT HAVE THESE PROBLEMS MADE IT FOR YOU TO DO YOUR WORK, TAKE CARE OF THINGS AT HOME, OR GET ALONG WITH OTHER PEOPLE: NOT DIFFICULT AT ALL

## 2023-03-20 ASSESSMENT — ENCOUNTER SYMPTOMS: COUGH: 1

## 2023-03-20 NOTE — PROGRESS NOTES
Assessment & Plan     Type 2 diabetes mellitus with stage 3a chronic kidney disease, without long-term current use of insulin (H)    - Adult Eye  Referral; Future  - Lipid panel reflex to direct LDL Non-fasting; Future  - Albumin Random Urine Quantitative with Creat Ratio; Future  - Hemoglobin A1c; Future  - Albumin Random Urine Quantitative with Creat Ratio; Future  - Lipid panel reflex to direct LDL Non-fasting  - Hemoglobin A1c  - Albumin Random Urine Quantitative with Creat Ratio  - sitagliptin (JANUVIA) 50 MG tablet; Take 1 tablet (50 mg) by mouth daily  - blood glucose monitoring (NO BRAND SPECIFIED) meter device kit; Use to test blood sugar 2 times daily or as directed. Preferred blood glucose meter OR supplies to accompany: Blood Glucose Monitor Brands: per insurance.  - blood glucose (NO BRAND SPECIFIED) test strip; Use to test blood sugar 2 times daily or as directed. To accompany: Blood Glucose Monitor Brands: per insurance.  - blood glucose calibration (NO BRAND SPECIFIED) solution; To accompany: Blood Glucose Monitor Brands: per insurance.  - thin (NO BRAND SPECIFIED) lancets; Use with lanceting device. To accompany: Blood Glucose Monitor Brands: per insurance.  - alcohol swab prep pads; Use to swab area of injection/richelle as directed.    Need for hepatitis C screening test    - Hepatitis C Screen Reflex to HCV RNA Quant and Genotype; Future  - Hepatitis C Screen Reflex to HCV RNA Quant and Genotype    Screening for hyperlipidemia    - Lipid panel reflex to direct LDL Non-fasting; Future  - Lipid panel reflex to direct LDL Fasting; Future  - Lipid panel reflex to direct LDL Non-fasting      Morbid obesity (H)    - Hepatic panel (Albumin, ALT, AST, Bili, Alk Phos, TP); Future  - Hepatic panel (Albumin, ALT, AST, Bili, Alk Phos, TP)    Stage 3a chronic kidney disease (H)    - Renal panel (Alb, BUN, Ca, Cl, CO2, Creat, Gluc, Phos, K, Na); Future  - Renal panel (Alb, BUN, Ca, Cl, CO2, Creat,  Gluc, Phos, K, Na)    Other drug-induced agranulocytosis (H)      Inflammatory pseudotumor of colon (H)      Atrial flutter with rapid ventricular response (H)      Hyperlipidemia LDL goal <130    - atorvastatin (LIPITOR) 20 MG tablet; Take 1 tablet (20 mg) by mouth daily    Uncontrolled diabetes type 2, discussed importance of lifestyle changes, monitoring blood sugar at home, continue metformin, monitor kidney function, start Januvia 50 mg daily recheck here in about 3 to 4 months.  Obesity, discussed weight loss program.  Hyperlipidemia intolerant to Crestor, will switch to Lipitor, if unable to tolerate could try Zocor.  CKD stage III, avoid nephrotoxic substance, tight control risk factor included blood pressure diabetes etc.  History of colon cancer, discussed importance of following up with GI.  Atrial fibrillation further on metoprolol and Coumadin.        Review of external notes as documented elsewhere in note  45 minutes spent on the date of the encounter doing chart review, review of outside records, review of test results, interpretation of tests, patient visit and documentation        BMI:   Estimated body mass index is 39.33 kg/m  as calculated from the following:    Height as of this encounter: 1.829 m (6').    Weight as of this encounter: 131.5 kg (290 lb).   Weight management plan: Discussed healthy diet and exercise guidelines    Work on weight loss  Regular exercise    No follow-ups on file.    Kamaljit Plata MD  [facility]    Coreen Levy is a 73 year old accompanied by his self, presenting for the following health issues:  Recheck Medication and Cough (X 1 month.)      Cough    History of Present Illness       Diabetes:   He presents for follow up of diabetes.  He is not checking blood glucose. He has no concerns regarding his diabetes at this time.  He is having numbness in feet. The patient has not had a diabetic eye exam in the last 12 months.         He eats  2-3 servings of fruits and vegetables daily.He consumes 3 sweetened beverage(s) daily.He exercises with enough effort to increase his heart rate 9 or less minutes per day.  He exercises with enough effort to increase his heart rate 3 or less days per week.   He is taking medications regularly.    Today's PHQ-9         PHQ-9 Total Score: 2    PHQ-9 Q9 Thoughts of better off dead/self-harm past 2 weeks :   Not at all    How difficult have these problems made it for you to do your work, take care of things at home, or get along with other people: Not difficult at all       Diabetes type 2, has not been seen for a couple of years, not checking blood sugar at home, still taking metformin once a day, last A1c 6.5% in October 21, returning at 9.4% today.  Has not seen an eye doctor.  Was on Crestor but is causing numbness to feet and hands stopped taking it.  Getting B12 injection and regular follow-up with the oncology clinic.  History of diffuse large B-cell lymphoma status post chemotherapy.  History of colon cancer status post partial hemicolectomy.    Review of Systems   Respiratory: Positive for cough.       Constitutional, HEENT, cardiovascular, pulmonary, gi and gu systems are negative, except as otherwise noted.      Objective    /83 (BP Location: Left arm, Patient Position: Sitting, Cuff Size: Adult Large)   Pulse 69   Temp 97.4  F (36.3  C) (Temporal)   Resp 18   Ht 1.829 m (6')   Wt 131.5 kg (290 lb)   SpO2 94%   BMI 39.33 kg/m    Body mass index is 39.33 kg/m .  Physical Exam   GENERAL: healthy, alert and no distress  NECK: no adenopathy, no asymmetry, masses, or scars and thyroid normal to palpation  RESP: lungs clear to auscultation - no rales, rhonchi or wheezes  CV: regular rate and rhythm, normal S1 S2, no S3 or S4, no murmur, click or rub, no peripheral edema and peripheral pulses strong  ABDOMEN: soft, nontender, no hepatosplenomegaly, no masses and bowel sounds normal  MS: no gross  musculoskeletal defects noted, no edema  Diabetic foot exam: trophic changes left foot, dry cracking heels.    Results for orders placed or performed in visit on 03/20/23   Hemoglobin A1c     Status: Abnormal   Result Value Ref Range    Hemoglobin A1C 9.4 (H) 0.0 - 5.6 %       This note was dictated using a voice recognition software.  Any grammatical or context distortion are unintentional and inherent to the software.

## 2023-03-21 RX ORDER — BLOOD SUGAR DIAGNOSTIC
STRIP MISCELLANEOUS
Qty: 100 STRIP | Refills: 6 | Status: SHIPPED | OUTPATIENT
Start: 2023-03-21 | End: 2024-03-12

## 2023-03-21 NOTE — TELEPHONE ENCOUNTER
"Routing refill request to provider for review/approval because:  Please see note from pharmacy    Last Written Prescription Date:  3/20/2023  Last Fill Quantity: 100,  # refills: 6   Last office visit provider:  3/20/2023     Requested Prescriptions   Pending Prescriptions Disp Refills     ACCU-CHEK GUIDE test strip [Pharmacy Med Name: ACCU-CHEK GUIDE TEST STRIP] 100 strip 6     Sig: USE TO TEST BLOOD SUGAR 2 TIMES DAILY OR AS DIRECTED.       Diabetic Supplies Protocol Failed - 3/20/2023  2:00 PM        Failed - Recent (6 mo) or future (30 days) visit within the authorizing provider's specialty     Patient had office visit in the last 6 months or has a visit in the next 30 days with authorizing provider.  See \"Patient Info\" tab in inbasket, or \"Choose Columns\" in Meds & Orders section of the refill encounter.            Passed - Medication is active on med list        Passed - Patient is 18 years of age or older             Yvonne Jarrett RN 03/20/23 10:50 PM  "

## 2023-03-24 ENCOUNTER — TELEPHONE (OUTPATIENT)
Dept: FAMILY MEDICINE | Facility: CLINIC | Age: 73
End: 2023-03-24
Payer: MEDICARE

## 2023-03-24 NOTE — TELEPHONE ENCOUNTER
----- Message from Kamaljit Plata MD sent at 3/24/2023  1:05 PM CDT -----  Cholesterol was moderately elevated, hopefully the Lipitor will help, you can also take over-the-counter omega-3 EPA/DHA daily.  Recheck in 4 months

## 2023-04-02 DIAGNOSIS — I48.92 ATRIAL FLUTTER (H): ICD-10-CM

## 2023-04-02 NOTE — TELEPHONE ENCOUNTER
"Routing refill request to provider for review/approval because:  Labs out of range:  INR    Last Written Prescription Date:  10/3/2022  Last Fill Quantity: 180,  # refills: 1   Last office visit provider:  3/20/2023     Requested Prescriptions   Pending Prescriptions Disp Refills     warfarin ANTICOAGULANT (COUMADIN) 5 MG tablet [Pharmacy Med Name: WARFARIN SODIUM 5 MG TABLET] 180 tablet 1     Sig: TAKE 1 TO 2 TABLETS BY MOUTH DAILY AS DIRECTED BASED ON INR RESULT       Vitamin K Antagonists Failed - 4/2/2023  7:51 AM        Failed - INR is within goal in the past 6 weeks     Confirm INR is within goal in the past 6 weeks.     Recent Labs   Lab Test 03/14/23  0904   INR 2.75*                       Passed - Recent (12 mo) or future (30 days) visit within the authorizing provider's specialty     Patient has had an office visit with the authorizing provider or a provider within the authorizing providers department within the previous 12 mos or has a future within next 30 days. See \"Patient Info\" tab in inbasket, or \"Choose Columns\" in Meds & Orders section of the refill encounter.              Passed - Medication is active on med list        Passed - Patient is 18 years of age or older             Yvonne Jarrett RN 04/02/23 5:36 PM  "

## 2023-04-03 RX ORDER — WARFARIN SODIUM 5 MG/1
TABLET ORAL
Qty: 180 TABLET | Refills: 1 | Status: SHIPPED | OUTPATIENT
Start: 2023-04-03 | End: 2023-10-02

## 2023-04-10 DIAGNOSIS — I48.92 ATRIAL FLUTTER, UNSPECIFIED TYPE (H): ICD-10-CM

## 2023-04-11 ENCOUNTER — INFUSION THERAPY VISIT (OUTPATIENT)
Dept: INFUSION THERAPY | Facility: HOSPITAL | Age: 73
End: 2023-04-11
Attending: INTERNAL MEDICINE
Payer: MEDICARE

## 2023-04-11 ENCOUNTER — ANTICOAGULATION THERAPY VISIT (OUTPATIENT)
Dept: ANTICOAGULATION | Facility: CLINIC | Age: 73
End: 2023-04-11

## 2023-04-11 VITALS
TEMPERATURE: 97.5 F | OXYGEN SATURATION: 93 % | RESPIRATION RATE: 18 BRPM | SYSTOLIC BLOOD PRESSURE: 145 MMHG | HEART RATE: 91 BPM | DIASTOLIC BLOOD PRESSURE: 92 MMHG

## 2023-04-11 DIAGNOSIS — I48.20 CHRONIC ATRIAL FIBRILLATION (H): ICD-10-CM

## 2023-04-11 DIAGNOSIS — E53.8 VITAMIN B12 DEFICIENCY (NON ANEMIC): Primary | ICD-10-CM

## 2023-04-11 DIAGNOSIS — I48.20 CHRONIC ATRIAL FIBRILLATION (H): Primary | ICD-10-CM

## 2023-04-11 DIAGNOSIS — E11.22 TYPE 2 DIABETES MELLITUS WITH STAGE 3A CHRONIC KIDNEY DISEASE, WITHOUT LONG-TERM CURRENT USE OF INSULIN (H): ICD-10-CM

## 2023-04-11 DIAGNOSIS — N18.31 TYPE 2 DIABETES MELLITUS WITH STAGE 3A CHRONIC KIDNEY DISEASE, WITHOUT LONG-TERM CURRENT USE OF INSULIN (H): ICD-10-CM

## 2023-04-11 LAB — INR PPP: 2.5 (ref 0.85–1.15)

## 2023-04-11 PROCEDURE — 85610 PROTHROMBIN TIME: CPT

## 2023-04-11 PROCEDURE — 36591 DRAW BLOOD OFF VENOUS DEVICE: CPT

## 2023-04-11 PROCEDURE — 250N000011 HC RX IP 250 OP 636: Performed by: NURSE PRACTITIONER

## 2023-04-11 PROCEDURE — 96372 THER/PROPH/DIAG INJ SC/IM: CPT | Performed by: NURSE PRACTITIONER

## 2023-04-11 RX ORDER — METOPROLOL TARTRATE 100 MG
TABLET ORAL
Qty: 60 TABLET | Refills: 0 | Status: SHIPPED | OUTPATIENT
Start: 2023-04-11 | End: 2023-05-15

## 2023-04-11 RX ORDER — CYANOCOBALAMIN 1000 UG/ML
1000 INJECTION, SOLUTION INTRAMUSCULAR; SUBCUTANEOUS ONCE
Status: CANCELLED
Start: 2023-05-09 | End: 2023-05-09

## 2023-04-11 RX ORDER — DIPHENHYDRAMINE HYDROCHLORIDE 50 MG/ML
50 INJECTION INTRAMUSCULAR; INTRAVENOUS
Status: CANCELLED
Start: 2023-05-09

## 2023-04-11 RX ORDER — HEPARIN SODIUM (PORCINE) LOCK FLUSH IV SOLN 100 UNIT/ML 100 UNIT/ML
5 SOLUTION INTRAVENOUS
Status: DISCONTINUED | OUTPATIENT
Start: 2023-04-11 | End: 2023-04-11 | Stop reason: HOSPADM

## 2023-04-11 RX ORDER — CYANOCOBALAMIN 1000 UG/ML
1000 INJECTION, SOLUTION INTRAMUSCULAR; SUBCUTANEOUS ONCE
Status: COMPLETED | OUTPATIENT
Start: 2023-04-11 | End: 2023-04-11

## 2023-04-11 RX ORDER — ALBUTEROL SULFATE 90 UG/1
1-2 AEROSOL, METERED RESPIRATORY (INHALATION)
Status: CANCELLED
Start: 2023-05-09

## 2023-04-11 RX ORDER — HEPARIN SODIUM (PORCINE) LOCK FLUSH IV SOLN 100 UNIT/ML 100 UNIT/ML
5 SOLUTION INTRAVENOUS
Status: CANCELLED | OUTPATIENT
Start: 2023-04-11

## 2023-04-11 RX ORDER — NALOXONE HYDROCHLORIDE 0.4 MG/ML
0.2 INJECTION, SOLUTION INTRAMUSCULAR; INTRAVENOUS; SUBCUTANEOUS
Status: CANCELLED | OUTPATIENT
Start: 2023-05-09

## 2023-04-11 RX ORDER — METHYLPREDNISOLONE SODIUM SUCCINATE 125 MG/2ML
125 INJECTION, POWDER, LYOPHILIZED, FOR SOLUTION INTRAMUSCULAR; INTRAVENOUS
Status: CANCELLED
Start: 2023-05-09

## 2023-04-11 RX ORDER — MEPERIDINE HYDROCHLORIDE 25 MG/ML
25 INJECTION INTRAMUSCULAR; INTRAVENOUS; SUBCUTANEOUS EVERY 30 MIN PRN
Status: CANCELLED | OUTPATIENT
Start: 2023-05-09

## 2023-04-11 RX ORDER — EPINEPHRINE 1 MG/ML
0.3 INJECTION, SOLUTION INTRAMUSCULAR; SUBCUTANEOUS EVERY 5 MIN PRN
Status: CANCELLED | OUTPATIENT
Start: 2023-05-09

## 2023-04-11 RX ORDER — ALBUTEROL SULFATE 0.83 MG/ML
2.5 SOLUTION RESPIRATORY (INHALATION)
Status: CANCELLED | OUTPATIENT
Start: 2023-05-09

## 2023-04-11 RX ADMIN — Medication 5 ML: at 11:48

## 2023-04-11 RX ADMIN — CYANOCOBALAMIN 1000 MCG: 1000 INJECTION, SOLUTION INTRAMUSCULAR; SUBCUTANEOUS at 11:58

## 2023-04-11 ASSESSMENT — PAIN SCALES - GENERAL: PAINLEVEL: NO PAIN (0)

## 2023-04-11 NOTE — PROGRESS NOTES
Infusion Nursing Note:  Cam Walden presents today for lab draw/port flush and Vitamin B12 injection.    Patient seen by provider today: No   present during visit today: Not Applicable.    Note: Patient assessed and vital signs stable. Administered Vitamin B12 injection IM (right deltoid) as ordered per provider. No issues.     Intravenous Access:  Labs drawn without difficulty.  Implanted Port.    Treatment Conditions:  Not Applicable.    Post Infusion Assessment:  Patient tolerated injection without incident.  Site patent and intact, free from redness, edema or discomfort.  Access discontinued per protocol.     Discharge Plan:   Patient discharged in stable condition accompanied by: self.  Departure Mode: Ambulatory.      LEN HERRERA RN

## 2023-04-11 NOTE — TELEPHONE ENCOUNTER
"Last Written Prescription Date: 3/17/2023   Last Fill Quantity: 60,  # refills: 0   Last office visit provider:  3/20/2023     Requested Prescriptions   Pending Prescriptions Disp Refills     metoprolol tartrate (LOPRESSOR) 100 MG tablet [Pharmacy Med Name: METOPROLOL TARTRATE 100 MG TAB] 60 tablet 0     Sig: TAKE 1 TABLET BY MOUTH TWICE A DAY       Beta-Blockers Protocol Passed - 4/10/2023  9:32 AM        Passed - Blood pressure under 140/90 in past 12 months     BP Readings from Last 3 Encounters:   03/20/23 133/83   02/14/23 (!) 148/83   01/17/23 (!) 146/84                 Passed - Patient is age 6 or older        Passed - Recent (12 mo) or future (30 days) visit within the authorizing provider's specialty     Patient has had an office visit with the authorizing provider or a provider within the authorizing providers department within the previous 12 mos or has a future within next 30 days. See \"Patient Info\" tab in inbasket, or \"Choose Columns\" in Meds & Orders section of the refill encounter.              Passed - Medication is active on med list             Virgie Johnson RN 04/11/23 4:27 AM  "

## 2023-04-11 NOTE — PROGRESS NOTES
ANTICOAGULATION MANAGEMENT     Cam Walden 73 year old male is on warfarin with therapeutic INR result. (Goal INR 2.0-3.0)    Recent labs: (last 7 days)     04/11/23  1147   INR 2.50*       ASSESSMENT       Source(s): Chart Review and Patient/Caregiver Call       Warfarin doses taken: Warfarin taken as instructed    Diet: No new diet changes identified    New illness, injury, or hospitalization: No    Medication/supplement changes: None noted    Signs or symptoms of bleeding or clotting: No    Previous INR: Therapeutic last 2(+) visits    Additional findings: None         PLAN     Recommended plan for no diet, medication or health factor changes affecting INR     Dosing Instructions: Continue your current warfarin dose with next INR in 4 weeks       Summary  As of 4/11/2023    Full warfarin instructions:  5 mg every Sun; 10 mg all other days   Next INR check:  5/9/2023             Telephone call with Cam who verbalizes understanding and agrees to plan    Lab visit scheduled    Education provided:     Contact [phone] with any changes, questions or concerns.     Plan made per [facility] anticoagulation protocol    Jaqueline Chua RN  Anticoagulation Clinic  4/11/2023    _______________________________________________________________________     Anticoagulation Episode Summary     Current INR goal:  2.0-3.0   TTR:  73.3 % (1 y)   Target end date:  Indefinite   Send INR reminders to:  [facility]    Indications    Atrial flutter (H) (Resolved) [I48.92]  Chronic atrial fibrillation (H) [I48.20]           Comments:           Anticoagulation Care Providers     Provider Role Specialty Phone number    Kamaljit Plata MD Referring Family Medicine [phone]

## 2023-05-09 ENCOUNTER — INFUSION THERAPY VISIT (OUTPATIENT)
Dept: INFUSION THERAPY | Facility: HOSPITAL | Age: 73
End: 2023-05-09
Attending: INTERNAL MEDICINE
Payer: MEDICARE

## 2023-05-09 ENCOUNTER — ANTICOAGULATION THERAPY VISIT (OUTPATIENT)
Dept: ANTICOAGULATION | Facility: CLINIC | Age: 73
End: 2023-05-09

## 2023-05-09 DIAGNOSIS — E53.8 VITAMIN B12 DEFICIENCY (NON ANEMIC): ICD-10-CM

## 2023-05-09 DIAGNOSIS — N18.31 TYPE 2 DIABETES MELLITUS WITH STAGE 3A CHRONIC KIDNEY DISEASE, WITHOUT LONG-TERM CURRENT USE OF INSULIN (H): ICD-10-CM

## 2023-05-09 DIAGNOSIS — I48.20 CHRONIC ATRIAL FIBRILLATION (H): ICD-10-CM

## 2023-05-09 DIAGNOSIS — E53.8 VITAMIN B12 DEFICIENCY (NON ANEMIC): Primary | ICD-10-CM

## 2023-05-09 DIAGNOSIS — E11.22 TYPE 2 DIABETES MELLITUS WITH STAGE 3A CHRONIC KIDNEY DISEASE, WITHOUT LONG-TERM CURRENT USE OF INSULIN (H): ICD-10-CM

## 2023-05-09 DIAGNOSIS — I48.20 CHRONIC ATRIAL FIBRILLATION (H): Primary | ICD-10-CM

## 2023-05-09 LAB — INR PPP: 2.75 (ref 0.85–1.15)

## 2023-05-09 PROCEDURE — 250N000011 HC RX IP 250 OP 636: Performed by: NURSE PRACTITIONER

## 2023-05-09 PROCEDURE — 85610 PROTHROMBIN TIME: CPT

## 2023-05-09 PROCEDURE — 36591 DRAW BLOOD OFF VENOUS DEVICE: CPT

## 2023-05-09 PROCEDURE — 96372 THER/PROPH/DIAG INJ SC/IM: CPT | Performed by: NURSE PRACTITIONER

## 2023-05-09 RX ORDER — MEPERIDINE HYDROCHLORIDE 25 MG/ML
25 INJECTION INTRAMUSCULAR; INTRAVENOUS; SUBCUTANEOUS EVERY 30 MIN PRN
Status: CANCELLED | OUTPATIENT
Start: 2023-06-06

## 2023-05-09 RX ORDER — HEPARIN SODIUM (PORCINE) LOCK FLUSH IV SOLN 100 UNIT/ML 100 UNIT/ML
5 SOLUTION INTRAVENOUS
Status: DISCONTINUED | OUTPATIENT
Start: 2023-05-09 | End: 2023-05-09 | Stop reason: HOSPADM

## 2023-05-09 RX ORDER — NALOXONE HYDROCHLORIDE 0.4 MG/ML
0.2 INJECTION, SOLUTION INTRAMUSCULAR; INTRAVENOUS; SUBCUTANEOUS
Status: CANCELLED | OUTPATIENT
Start: 2023-06-06

## 2023-05-09 RX ORDER — HEPARIN SODIUM (PORCINE) LOCK FLUSH IV SOLN 100 UNIT/ML 100 UNIT/ML
5 SOLUTION INTRAVENOUS
Status: CANCELLED | OUTPATIENT
Start: 2023-05-09

## 2023-05-09 RX ORDER — EPINEPHRINE 1 MG/ML
0.3 INJECTION, SOLUTION INTRAMUSCULAR; SUBCUTANEOUS EVERY 5 MIN PRN
Status: CANCELLED | OUTPATIENT
Start: 2023-06-06

## 2023-05-09 RX ORDER — ALBUTEROL SULFATE 0.83 MG/ML
2.5 SOLUTION RESPIRATORY (INHALATION)
Status: CANCELLED | OUTPATIENT
Start: 2023-06-06

## 2023-05-09 RX ORDER — DIPHENHYDRAMINE HYDROCHLORIDE 50 MG/ML
50 INJECTION INTRAMUSCULAR; INTRAVENOUS
Status: CANCELLED
Start: 2023-06-06

## 2023-05-09 RX ORDER — CYANOCOBALAMIN 1000 UG/ML
1000 INJECTION, SOLUTION INTRAMUSCULAR; SUBCUTANEOUS ONCE
Status: CANCELLED
Start: 2023-06-06 | End: 2023-06-06

## 2023-05-09 RX ORDER — ALBUTEROL SULFATE 90 UG/1
1-2 AEROSOL, METERED RESPIRATORY (INHALATION)
Status: CANCELLED
Start: 2023-06-06

## 2023-05-09 RX ORDER — CYANOCOBALAMIN 1000 UG/ML
1000 INJECTION, SOLUTION INTRAMUSCULAR; SUBCUTANEOUS ONCE
Status: COMPLETED | OUTPATIENT
Start: 2023-05-09 | End: 2023-05-09

## 2023-05-09 RX ORDER — METHYLPREDNISOLONE SODIUM SUCCINATE 125 MG/2ML
125 INJECTION, POWDER, LYOPHILIZED, FOR SOLUTION INTRAMUSCULAR; INTRAVENOUS
Status: CANCELLED
Start: 2023-06-06

## 2023-05-09 RX ADMIN — Medication 5 ML: at 08:26

## 2023-05-09 RX ADMIN — CYANOCOBALAMIN 1000 MCG: 1000 INJECTION, SOLUTION INTRAMUSCULAR; SUBCUTANEOUS at 08:17

## 2023-05-09 NOTE — PROGRESS NOTES
PT here for inr from port and vitamin b 12 inj. Vitamin b 12 inj reviewed and administered in right deltoid / bandaid to site. Port accessed and labs drawn (INR). Port flushed/deaccessed with 2x2 to site. Follow up reviewed and pt dc'd steady gait.

## 2023-05-09 NOTE — PROGRESS NOTES
ANTICOAGULATION MANAGEMENT     Cam Walden 73 year old male is on warfarin with therapeutic INR result. (Goal INR 2.0-3.0)    Recent labs: (last 7 days)     05/09/23  0825   INR 2.75*       ASSESSMENT       Source(s): Chart Review    Previous INR was Therapeutic last 2(+) visits    Medication, diet, health changes since last INR chart reviewed; none identified             PLAN     Recommended plan for no diet, medication or health factor changes affecting INR     Dosing Instructions: Continue your current warfarin dose with next INR in 4 weeks       Summary  As of 5/9/2023    Full warfarin instructions:  5 mg every Sun; 10 mg all other days   Next INR check:               Detailed voice message left for Cam with dosing instructions and follow up date.     Lab visit scheduled with b12 injection    Education provided:     Contact [phone] with any changes, questions or concerns.     Plan made per [facility] anticoagulation protocol    Jaqueline Chua RN  Anticoagulation Clinic  5/9/2023    _______________________________________________________________________     Anticoagulation Episode Summary     Current INR goal:  2.0-3.0   TTR:  75.0 % (1 y)   Target end date:  Indefinite   Send INR reminders to:  [facility]    Indications    Atrial flutter (H) (Resolved) [I48.92]  Chronic atrial fibrillation (H) [I48.20]           Comments:           Anticoagulation Care Providers     Provider Role Specialty Phone number    Kamaljit Plata MD Referring Family Medicine [phone]

## 2023-05-17 NOTE — TELEPHONE ENCOUNTER
Patient was in this morning for a lab draw to recheck his potassium level as it was low at 2.7 on Monday, 11/16/2020.  Dr Ibanez has reviewed the blood work from today and his potassium is 3.1.  Dr Ibanez states that if he is taking his potassium 20 mEq twice daily, he should increase this to 20 mEq 3 times daily.  He also wants to double check and see how his diarrhea is and if he is using the Imodium.  He states that the diarrhea is better and he did use the Imodium when he needed it.  He states that he has not had a bowel movement yet today.  Patient states that he will increase his potassium to 3 times a day and Dr Ibanez recommends coming back in 1 week for a recheck.  Patient would like to come in on Wednesday, 11/25/2020 at 8:30 AM.    Rylee Stratton RN   [facility]

## 2023-06-10 DIAGNOSIS — R39.14 BENIGN PROSTATIC HYPERPLASIA WITH INCOMPLETE BLADDER EMPTYING: ICD-10-CM

## 2023-06-10 DIAGNOSIS — E11.9 DIABETES MELLITUS, TYPE 2 (H): ICD-10-CM

## 2023-06-10 DIAGNOSIS — N40.1 BENIGN PROSTATIC HYPERPLASIA WITH INCOMPLETE BLADDER EMPTYING: ICD-10-CM

## 2023-06-10 NOTE — TELEPHONE ENCOUNTER
"Routing refill request to provider for review/approval because:  bp out of range    Last Written Prescription Date:  12/12/22  Last Fill Quantity: 180,  # refills: 1   Last office visit provider:  3/20/23     Requested Prescriptions   Pending Prescriptions Disp Refills     tamsulosin (FLOMAX) 0.4 MG capsule [Pharmacy Med Name: TAMSULOSIN HCL 0.4 MG CAPSULE] 180 capsule 1     Sig: TAKE 2 CAPSULES BY MOUTH EVERY DAY       Alpha Blockers Failed - 6/10/2023  9:16 AM        Failed - Blood pressure under 140/90 in past 12 months     BP Readings from Last 3 Encounters:   04/11/23 (!) 145/92   03/20/23 133/83   02/14/23 (!) 148/83                 Passed - Recent (12 mo) or future (30 days) visit within the authorizing provider's specialty     Patient has had an office visit with the authorizing provider or a provider within the authorizing providers department within the previous 12 mos or has a future within next 30 days. See \"Patient Info\" tab in inbasket, or \"Choose Columns\" in Meds & Orders section of the refill encounter.              Passed - Patient does not have Tadalafil, Vardenafil, or Sildenafil on their medication list        Passed - Medication is active on med list        Passed - Patient is 18 years of age or older           metFORMIN (GLUCOPHAGE) 1000 MG tablet [Pharmacy Med Name: METFORMIN HCL 1,000 MG TABLET] 180 tablet 0     Sig: TAKE 1 TABLET BY MOUTH TWICE A DAY WITH MEALS       Biguanide Agents Passed - 6/10/2023  9:16 AM        Passed - Patient is age 10 or older        Passed - Patient has documented A1c within the specified period of time.     If HgbA1C is 8 or greater, it needs to be on file within the past 3 months.  If less than 8, must be on file within the past 6 months.     Recent Labs   Lab Test 03/20/23  1144   A1C 9.4*             Passed - Patient's CR is NOT>1.4 OR Patient's EGFR is NOT<45 within past 12 mos.     Recent Labs   Lab Test 03/20/23  1144 11/15/21  1208 05/14/21  0956 " "  GFRESTIMATED 49*   < > 48*   GFRESTBLACK  --   --  58*    < > = values in this interval not displayed.       Recent Labs   Lab Test 03/20/23  1144   CR 1.51*             Passed - Patient does NOT have a diagnosis of CHF.        Passed - Medication is active on med list        Passed - Recent (6 mo) or future (30 days) visit within the authorizing provider's specialty     Patient had office visit in the last 6 months or has a visit in the next 30 days with authorizing provider or within the authorizing provider's specialty.  See \"Patient Info\" tab in inbasket, or \"Choose Columns\" in Meds & Orders section of the refill encounter.                 Madhavi Stallings RN 06/10/23 6:35 PM  "

## 2023-06-12 RX ORDER — TAMSULOSIN HYDROCHLORIDE 0.4 MG/1
CAPSULE ORAL
Qty: 180 CAPSULE | Refills: 1 | Status: SHIPPED | OUTPATIENT
Start: 2023-06-12 | End: 2023-12-18

## 2023-06-30 ENCOUNTER — TELEPHONE (OUTPATIENT)
Dept: ONCOLOGY | Facility: HOSPITAL | Age: 73
End: 2023-06-30
Payer: MEDICARE

## 2023-07-03 ENCOUNTER — TELEPHONE (OUTPATIENT)
Dept: ONCOLOGY | Facility: HOSPITAL | Age: 73
End: 2023-07-03
Payer: MEDICARE

## 2023-07-05 ENCOUNTER — TELEPHONE (OUTPATIENT)
Dept: ANTICOAGULATION | Facility: CLINIC | Age: 73
End: 2023-07-05
Payer: MEDICARE

## 2023-07-07 ENCOUNTER — TELEPHONE (OUTPATIENT)
Dept: ANTICOAGULATION | Facility: CLINIC | Age: 73
End: 2023-07-07
Payer: MEDICARE

## 2023-07-07 NOTE — TELEPHONE ENCOUNTER
ANTICOAGULATION     Cam Walden is overdue for INR check.      Spoke with Cam who declined to schedule a lab appointment at this time. If calling back please schedule as soon as possible. Wants to sched b12 first, inr with that visit    Jauqeline Chua RN

## 2023-07-11 ENCOUNTER — INFUSION THERAPY VISIT (OUTPATIENT)
Dept: INFUSION THERAPY | Facility: HOSPITAL | Age: 73
End: 2023-07-11
Attending: INTERNAL MEDICINE
Payer: MEDICARE

## 2023-07-11 ENCOUNTER — ANTICOAGULATION THERAPY VISIT (OUTPATIENT)
Dept: ANTICOAGULATION | Facility: CLINIC | Age: 73
End: 2023-07-11

## 2023-07-11 DIAGNOSIS — N18.31 TYPE 2 DIABETES MELLITUS WITH STAGE 3A CHRONIC KIDNEY DISEASE, WITHOUT LONG-TERM CURRENT USE OF INSULIN (H): ICD-10-CM

## 2023-07-11 DIAGNOSIS — E11.22 TYPE 2 DIABETES MELLITUS WITH STAGE 3A CHRONIC KIDNEY DISEASE, WITHOUT LONG-TERM CURRENT USE OF INSULIN (H): ICD-10-CM

## 2023-07-11 DIAGNOSIS — I48.20 CHRONIC ATRIAL FIBRILLATION (H): Primary | ICD-10-CM

## 2023-07-11 DIAGNOSIS — C83.398 DIFFUSE LARGE B-CELL LYMPHOMA OF EXTRANODAL SITE: ICD-10-CM

## 2023-07-11 DIAGNOSIS — E53.8 VITAMIN B12 DEFICIENCY (NON ANEMIC): ICD-10-CM

## 2023-07-11 DIAGNOSIS — E53.8 VITAMIN B12 DEFICIENCY (NON ANEMIC): Primary | ICD-10-CM

## 2023-07-11 DIAGNOSIS — I48.20 CHRONIC ATRIAL FIBRILLATION (H): ICD-10-CM

## 2023-07-11 LAB
INR PPP: 3.15 (ref 0.85–1.15)
LDH SERPL L TO P-CCNC: 136 U/L (ref 0–250)

## 2023-07-11 PROCEDURE — 96372 THER/PROPH/DIAG INJ SC/IM: CPT | Performed by: NURSE PRACTITIONER

## 2023-07-11 PROCEDURE — 250N000011 HC RX IP 250 OP 636: Mod: JZ

## 2023-07-11 PROCEDURE — 250N000011 HC RX IP 250 OP 636: Performed by: NURSE PRACTITIONER

## 2023-07-11 PROCEDURE — 83615 LACTATE (LD) (LDH) ENZYME: CPT

## 2023-07-11 PROCEDURE — 85610 PROTHROMBIN TIME: CPT

## 2023-07-11 PROCEDURE — 36591 DRAW BLOOD OFF VENOUS DEVICE: CPT

## 2023-07-11 RX ORDER — CYANOCOBALAMIN 1000 UG/ML
1000 INJECTION, SOLUTION INTRAMUSCULAR; SUBCUTANEOUS ONCE
Status: CANCELLED
Start: 2023-08-01 | End: 2023-08-01

## 2023-07-11 RX ORDER — HEPARIN SODIUM (PORCINE) LOCK FLUSH IV SOLN 100 UNIT/ML 100 UNIT/ML
5 SOLUTION INTRAVENOUS
Status: CANCELLED | OUTPATIENT
Start: 2023-07-11

## 2023-07-11 RX ORDER — CYANOCOBALAMIN 1000 UG/ML
1000 INJECTION, SOLUTION INTRAMUSCULAR; SUBCUTANEOUS ONCE
Status: COMPLETED | OUTPATIENT
Start: 2023-07-11 | End: 2023-07-11

## 2023-07-11 RX ORDER — HEPARIN SODIUM (PORCINE) LOCK FLUSH IV SOLN 100 UNIT/ML 100 UNIT/ML
SOLUTION INTRAVENOUS
Status: COMPLETED
Start: 2023-07-11 | End: 2023-07-11

## 2023-07-11 RX ORDER — HEPARIN SODIUM (PORCINE) LOCK FLUSH IV SOLN 100 UNIT/ML 100 UNIT/ML
5 SOLUTION INTRAVENOUS
Status: DISCONTINUED | OUTPATIENT
Start: 2023-07-11 | End: 2023-07-11 | Stop reason: HOSPADM

## 2023-07-11 RX ADMIN — CYANOCOBALAMIN 1000 MCG: 1000 INJECTION, SOLUTION INTRAMUSCULAR; SUBCUTANEOUS at 09:24

## 2023-07-11 RX ADMIN — Medication 500 UNITS: at 09:31

## 2023-07-11 NOTE — PROGRESS NOTES
PT here ambulatory for vitamin b 12 inj. Reviewed inj and administered in right upper arm/bandaid to site. Port accessed and inr drawn/port flushed/deaccessed with 2x2 to site. Follow up reviewed and pt dc'd steady gait.

## 2023-07-11 NOTE — PROGRESS NOTES
ANTICOAGULATION MANAGEMENT     Cam Walden 73 year old male is on warfarin with supratherapeutic INR result. (Goal INR 2.0-3.0)    Recent labs: (last 7 days)     07/11/23  0929   INR 3.15*       ASSESSMENT     Source(s): Chart Review and Patient/Caregiver Call     Warfarin doses taken: Warfarin taken as instructed  Diet: Decreased greens/vitamin K in diet; plans to resume previous intake  Medication/supplement changes: None noted  New illness, injury, or hospitalization: No  Signs or symptoms of bleeding or clotting: No  Previous result: Therapeutic last 2(+) visits  Additional findings: None       PLAN     Recommended plan for temporary change(s) affecting INR     Dosing Instructions: Continue your current warfarin dose with next INR in 2-3 weeks       Summary  As of 7/11/2023      Full warfarin instructions:  5 mg every Sun; 10 mg all other days   Next INR check:  8/1/2023               Telephone call with Cam who verbalizes understanding and agrees to plan    Contact [phone] to schedule and with any changes, questions or concerns.     Education provided:   Please call back if any changes to your diet, medications or how you've been taking warfarin    Plan made per [facility] anticoagulation protocol    Jaqueline Chua RN  Anticoagulation Clinic  7/11/2023    _______________________________________________________________________     Anticoagulation Episode Summary       Current INR goal:  2.0-3.0   TTR:  68.5 % (1 y)   Target end date:  Indefinite   Send INR reminders to:  [facility]    Indications    Atrial flutter (H) (Resolved) [I48.92]  Chronic atrial fibrillation (H) [I48.20]             Comments:               Anticoagulation Care Providers       Provider Role Specialty Phone number    Kamaljit Plata MD Referring Family Medicine [phone]

## 2023-07-17 ENCOUNTER — ONCOLOGY VISIT (OUTPATIENT)
Dept: ONCOLOGY | Facility: HOSPITAL | Age: 73
End: 2023-07-17
Attending: INTERNAL MEDICINE
Payer: MEDICARE

## 2023-07-17 ENCOUNTER — TELEPHONE (OUTPATIENT)
Dept: FAMILY MEDICINE | Facility: CLINIC | Age: 73
End: 2023-07-17
Payer: MEDICARE

## 2023-07-17 VITALS
DIASTOLIC BLOOD PRESSURE: 94 MMHG | RESPIRATION RATE: 20 BRPM | BODY MASS INDEX: 38.33 KG/M2 | TEMPERATURE: 99.6 F | HEART RATE: 104 BPM | OXYGEN SATURATION: 96 % | SYSTOLIC BLOOD PRESSURE: 120 MMHG | WEIGHT: 283 LBS | HEIGHT: 72 IN

## 2023-07-17 DIAGNOSIS — E53.8 VITAMIN B12 DEFICIENCY (NON ANEMIC): Primary | ICD-10-CM

## 2023-07-17 DIAGNOSIS — C83.398 DIFFUSE LARGE B-CELL LYMPHOMA OF EXTRANODAL SITE: ICD-10-CM

## 2023-07-17 PROCEDURE — 99214 OFFICE O/P EST MOD 30 MIN: CPT | Performed by: INTERNAL MEDICINE

## 2023-07-17 PROCEDURE — G0463 HOSPITAL OUTPT CLINIC VISIT: HCPCS | Performed by: INTERNAL MEDICINE

## 2023-07-17 RX ORDER — DIPHENHYDRAMINE HYDROCHLORIDE 50 MG/ML
50 INJECTION INTRAMUSCULAR; INTRAVENOUS
Status: CANCELLED
Start: 2023-07-17

## 2023-07-17 RX ORDER — NALOXONE HYDROCHLORIDE 0.4 MG/ML
0.2 INJECTION, SOLUTION INTRAMUSCULAR; INTRAVENOUS; SUBCUTANEOUS
Status: CANCELLED | OUTPATIENT
Start: 2023-08-08

## 2023-07-17 RX ORDER — ALBUTEROL SULFATE 90 UG/1
1-2 AEROSOL, METERED RESPIRATORY (INHALATION)
Status: CANCELLED
Start: 2023-07-17

## 2023-07-17 RX ORDER — ALBUTEROL SULFATE 0.83 MG/ML
2.5 SOLUTION RESPIRATORY (INHALATION)
Status: CANCELLED | OUTPATIENT
Start: 2023-07-17

## 2023-07-17 RX ORDER — MEPERIDINE HYDROCHLORIDE 25 MG/ML
25 INJECTION INTRAMUSCULAR; INTRAVENOUS; SUBCUTANEOUS EVERY 30 MIN PRN
Status: CANCELLED | OUTPATIENT
Start: 2023-07-17

## 2023-07-17 RX ORDER — DIPHENHYDRAMINE HYDROCHLORIDE 50 MG/ML
50 INJECTION INTRAMUSCULAR; INTRAVENOUS
Status: CANCELLED
Start: 2023-08-08

## 2023-07-17 RX ORDER — EPINEPHRINE 1 MG/ML
0.3 INJECTION, SOLUTION INTRAMUSCULAR; SUBCUTANEOUS EVERY 5 MIN PRN
Status: CANCELLED | OUTPATIENT
Start: 2023-08-08

## 2023-07-17 RX ORDER — ALBUTEROL SULFATE 90 UG/1
1-2 AEROSOL, METERED RESPIRATORY (INHALATION)
Status: CANCELLED
Start: 2023-08-08

## 2023-07-17 RX ORDER — METHYLPREDNISOLONE SODIUM SUCCINATE 125 MG/2ML
125 INJECTION, POWDER, LYOPHILIZED, FOR SOLUTION INTRAMUSCULAR; INTRAVENOUS
Status: CANCELLED
Start: 2023-08-08

## 2023-07-17 RX ORDER — METHYLPREDNISOLONE SODIUM SUCCINATE 125 MG/2ML
125 INJECTION, POWDER, LYOPHILIZED, FOR SOLUTION INTRAMUSCULAR; INTRAVENOUS
Status: CANCELLED
Start: 2023-07-17

## 2023-07-17 RX ORDER — ALBUTEROL SULFATE 0.83 MG/ML
2.5 SOLUTION RESPIRATORY (INHALATION)
Status: CANCELLED | OUTPATIENT
Start: 2023-08-08

## 2023-07-17 RX ORDER — EPINEPHRINE 1 MG/ML
0.3 INJECTION, SOLUTION INTRAMUSCULAR; SUBCUTANEOUS EVERY 5 MIN PRN
Status: CANCELLED | OUTPATIENT
Start: 2023-07-17

## 2023-07-17 RX ORDER — MEPERIDINE HYDROCHLORIDE 25 MG/ML
25 INJECTION INTRAMUSCULAR; INTRAVENOUS; SUBCUTANEOUS EVERY 30 MIN PRN
Status: CANCELLED | OUTPATIENT
Start: 2023-08-08

## 2023-07-17 RX ORDER — NALOXONE HYDROCHLORIDE 0.4 MG/ML
0.2 INJECTION, SOLUTION INTRAMUSCULAR; INTRAVENOUS; SUBCUTANEOUS
Status: CANCELLED | OUTPATIENT
Start: 2023-07-17

## 2023-07-17 ASSESSMENT — PAIN SCALES - GENERAL: PAINLEVEL: NO PAIN (0)

## 2023-07-17 NOTE — LETTER
"    7/17/2023         RE: Cam Walden  [address] [place] [address]        Dear Colleague,    Thank you for referring your patient, Cam Walden, to the [facility]. Please see a copy of my visit note below.    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    Weight as of this encounter: 128.4 kg (283 lb). Body surface area is 2.55 meters squared.  No Pain (0) Comment: Data Unavailable   No LMP for male patient.  Allergies reviewed: Yes  Medications reviewed: Yes    Medications: Medication refills not needed today.  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  No signs or symptoms to suggest recurrence.  Patient reassured.  We will continue observation.  We will do follow-up with lab in 6 months.    Continue monthly B12 injections.  We will recheck B12 level with return.    Plan: As above    Measurable disease: PET scan    Current therapy: B12 injections monthly started June 2022    Treatment history:    R-CHOP, 4 cycles, last,  November 16, 2020   cycle 1 on 9/15    ECOG Performance   ECOG Performance Status: 1    Distress Assessment  Distress Assessment Score: No distress    Pain         Problem List    1. 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 Cam Waldne is here for follow-up.  Seen 6 months ago.  Continues B12 injections monthly.  Feeling fine.  No fever chills or sweats.  No new adenopathy or swelling in the neck.  ECOG status 0.    Pain Status  Currently in Pain: No/denies    Review of Systems    As per the HPI.       Patient Coping  Patient Coping: Accepting  Distress Assessment  Distress Assessment Score: No distress  Accompanied by  Accompanied by: Alone    Past History  Past Medical History:   Diagnosis Date     Aortic stenosis     mild     Atrial fibrillation (H)      Atrial flutter (H) 8/21/2017     Atrial flutter with rapid ventricular response (H)      Cancer (H)     lymp     Colon polyps 08/08/2016    Per colonoscopy 8/8/16.  Large and small.  Some removed. Others to be removed surgically. (per patient)     Diabetes mellitus (H)     DM II     Diabetes mellitus, type II (H)      Diabetic polyneuropathy associated with type 2 diabetes mellitus (H) 12/16/2020     GI bleed     Following colon polyp removal     Hyperlipidemia      Hypertension      Morbid obesity (H)      JOHNNY (obstructive sleep apnea)     no cpap     Rectal bleeding          Past Surgical  History:   Procedure Laterality Date     CARDIOVERSION  08/25/2017     IR PORT PLACEMENT >5 YEARS  8/24/2020       Physical Exam    Recent Vitals 5/17/2021   Height -   Weight 253 lbs 2 oz   BSA (m2) 2.41 m2   /81   Pulse 76   Temp 98.5   Temp src 1   SpO2 98   Some recent data might be hidden       GENERAL: Alert and oriented to time place and person. Seated comfortably. In no distress.    HEAD: Atraumatic and normocephalic.    EYES: BRIDGETTE, EOMI.  No pallor.  No icterus.    Oral cavity: no mucosal lesion or tonsillar enlargement.    NECK: supple. JVP normal.  No thyroid enlargement.    LYMPH NODES: No palpable, cervical, axillary or inguinal lymphadenopathy.    CHEST: clear to auscultation bilaterally.  Resonant to percussion throughout bilaterally.  Symmetrical breath movements bilaterally.    CVS: S1 and S2 are heard. Regular rate and rhythm.  No murmur or gallop or rub heard.  No peripheral edema.    ABDOMEN: Soft. Not tender. Not distended.  No palpable hepatomegaly or splenomegaly.  No other mass palpable.  Bowel sounds heard.    EXTREMITIES: Warm.    SKIN: no rash, or bruising or purpura.  Has a full head of hair.      Lab Results    Recent Results (from the past 168 hour(s))   Comprehensive Metabolic Panel   Result Value Ref Range    Sodium 140 136 - 145 mmol/L    Potassium 3.8 3.5 - 5.0 mmol/L    Chloride 108 (H) 98 - 107 mmol/L    CO2 22 22 - 31 mmol/L    Anion Gap, Calculation 10 5 - 18 mmol/L    Glucose 151 (H) 70 - 125 mg/dL    BUN 15 8 - 22 mg/dL    Creatinine 1.45 (H) 0.70 - 1.30 mg/dL    GFR MDRD Af Amer 58 (L) >60 mL/min/1.73m2    GFR MDRD Non Af Amer 48 (L) >60 mL/min/1.73m2    Bilirubin, Total 0.7 0.0 - 1.0 mg/dL    Calcium 8.6 8.5 - 10.5 mg/dL    Protein, Total 7.2 6.0 - 8.0 g/dL    Albumin 4.0 3.5 - 5.0 g/dL    Alkaline Phosphatase 128 (H) 45 - 120 U/L    AST 29 0 - 40 U/L    ALT 37 0 - 45 U/L   Lactate Dehydrogenase (LDH)   Result Value Ref Range    LD (LDH) 294 (H) 125 - 220 U/L   INR    Result Value Ref Range    INR 2.68 (H) 0.90 - 1.10   HM1 (CBC with Diff)   Result Value Ref Range    WBC 9.4 4.0 - 11.0 thou/uL    RBC 4.06 (L) 4.40 - 6.20 mill/uL    Hemoglobin 11.5 (L) 14.0 - 18.0 g/dL    Hematocrit 36.6 (L) 40.0 - 54.0 %    MCV 90 80 - 100 fL    MCH 28.3 27.0 - 34.0 pg    MCHC 31.4 (L) 32.0 - 36.0 g/dL    RDW 16.7 (H) 11.0 - 14.5 %    Platelets 271 140 - 440 thou/uL    MPV 8.7 8.5 - 12.5 fL    Neutrophils % 61 50 - 70 %    Lymphocytes % 24 20 - 40 %    Monocytes % 10 2 - 10 %    Eosinophils % 2 0 - 6 %    Basophils % 0 0 - 2 %    Immature Granulocyte % 3 (H) <=0 %    Neutrophils Absolute 5.7 2.0 - 7.7 thou/uL    Lymphocytes Absolute 2.3 0.8 - 4.4 thou/uL    Monocytes Absolute 0.9 0.0 - 0.9 thou/uL    Eosinophils Absolute 0.2 0.0 - 0.4 thou/uL    Basophils Absolute 0.0 0.0 - 0.2 thou/uL    Immature Granulocyte Absolute 0.2 (H) <=0.0 thou/uL       Imaging    CT chest abdomen and pelvis    IMPRESSION:  1.  No evidence of recurrent tumor in the chest, abdomen or pelvis.  2.  Resolution of the previous obstructive changes in the right kidney.  3.  Noncritical findings as noted above.      Signed by: Blade Ibanez MD        Again, thank you for allowing me to participate in the care of your patient.        Sincerely,        Blade Ibanez MD

## 2023-07-17 NOTE — PROGRESS NOTES
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  No signs or symptoms to suggest recurrence.  Patient reassured.  We will continue observation.  We will do follow-up with lab in 6 months.    Continue monthly B12 injections.  We will recheck B12 level with return.    Plan: As above    Measurable disease: PET scan    Current therapy: B12 injections monthly started June 2022    Treatment history:    R-CHOP, 4 cycles, last,  November 16, 2020   cycle 1 on 9/15    ECOG Performance   ECOG Performance Status: 1    Distress Assessment  Distress Assessment Score: No distress    Pain         Problem List    1. 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 Cam Walden is here for follow-up.  Seen 6 months ago.  Continues B12 injections monthly.  Feeling fine.  No fever chills or sweats.  No new adenopathy or swelling in the neck.  ECOG status 0.    Pain Status  Currently in Pain: No/denies    Review of Systems    As per the HPI.       Patient Coping  Patient Coping: Accepting  Distress Assessment  Distress Assessment Score: No distress  Accompanied by  Accompanied by: Alone    Past History  Past Medical History:   Diagnosis Date     Aortic stenosis     mild     Atrial fibrillation (H)       Atrial flutter (H) 8/21/2017     Atrial flutter with rapid ventricular response (H)      Cancer (H)     lymp     Colon polyps 08/08/2016    Per colonoscopy 8/8/16.  Large and small.  Some removed. Others to be removed surgically. (per patient)     Diabetes mellitus (H)     DM II     Diabetes mellitus, type II (H)      Diabetic polyneuropathy associated with type 2 diabetes mellitus (H) 12/16/2020     GI bleed     Following colon polyp removal     Hyperlipidemia      Hypertension      Morbid obesity (H)      JOHNNY (obstructive sleep apnea)     no cpap     Rectal bleeding          Past Surgical History:   Procedure Laterality Date     CARDIOVERSION  08/25/2017     IR PORT PLACEMENT >5 YEARS  8/24/2020       Physical Exam    Recent Vitals 5/17/2021   Height -   Weight 253 lbs 2 oz   BSA (m2) 2.41 m2   /81   Pulse 76   Temp 98.5   Temp src 1   SpO2 98   Some recent data might be hidden       GENERAL: Alert and oriented to time place and person. Seated comfortably. In no distress.    HEAD: Atraumatic and normocephalic.    EYES: BRIDGETTE, EOMI.  No pallor.  No icterus.    Oral cavity: no mucosal lesion or tonsillar enlargement.    NECK: supple. JVP normal.  No thyroid enlargement.    LYMPH NODES: No palpable, cervical, axillary or inguinal lymphadenopathy.    CHEST: clear to auscultation bilaterally.  Resonant to percussion throughout bilaterally.  Symmetrical breath movements bilaterally.    CVS: S1 and S2 are heard. Regular rate and rhythm.  No murmur or gallop or rub heard.  No peripheral edema.    ABDOMEN: Soft. Not tender. Not distended.  No palpable hepatomegaly or splenomegaly.  No other mass palpable.  Bowel sounds heard.    EXTREMITIES: Warm.    SKIN: no rash, or bruising or purpura.  Has a full head of hair.      Lab Results    Recent Results (from the past 168 hour(s))   Comprehensive Metabolic Panel   Result Value Ref Range    Sodium 140 136 - 145 mmol/L    Potassium 3.8 3.5 - 5.0 mmol/L    Chloride 108  (H) 98 - 107 mmol/L    CO2 22 22 - 31 mmol/L    Anion Gap, Calculation 10 5 - 18 mmol/L    Glucose 151 (H) 70 - 125 mg/dL    BUN 15 8 - 22 mg/dL    Creatinine 1.45 (H) 0.70 - 1.30 mg/dL    GFR MDRD Af Amer 58 (L) >60 mL/min/1.73m2    GFR MDRD Non Af Amer 48 (L) >60 mL/min/1.73m2    Bilirubin, Total 0.7 0.0 - 1.0 mg/dL    Calcium 8.6 8.5 - 10.5 mg/dL    Protein, Total 7.2 6.0 - 8.0 g/dL    Albumin 4.0 3.5 - 5.0 g/dL    Alkaline Phosphatase 128 (H) 45 - 120 U/L    AST 29 0 - 40 U/L    ALT 37 0 - 45 U/L   Lactate Dehydrogenase (LDH)   Result Value Ref Range    LD (LDH) 294 (H) 125 - 220 U/L   INR   Result Value Ref Range    INR 2.68 (H) 0.90 - 1.10   HM1 (CBC with Diff)   Result Value Ref Range    WBC 9.4 4.0 - 11.0 thou/uL    RBC 4.06 (L) 4.40 - 6.20 mill/uL    Hemoglobin 11.5 (L) 14.0 - 18.0 g/dL    Hematocrit 36.6 (L) 40.0 - 54.0 %    MCV 90 80 - 100 fL    MCH 28.3 27.0 - 34.0 pg    MCHC 31.4 (L) 32.0 - 36.0 g/dL    RDW 16.7 (H) 11.0 - 14.5 %    Platelets 271 140 - 440 thou/uL    MPV 8.7 8.5 - 12.5 fL    Neutrophils % 61 50 - 70 %    Lymphocytes % 24 20 - 40 %    Monocytes % 10 2 - 10 %    Eosinophils % 2 0 - 6 %    Basophils % 0 0 - 2 %    Immature Granulocyte % 3 (H) <=0 %    Neutrophils Absolute 5.7 2.0 - 7.7 thou/uL    Lymphocytes Absolute 2.3 0.8 - 4.4 thou/uL    Monocytes Absolute 0.9 0.0 - 0.9 thou/uL    Eosinophils Absolute 0.2 0.0 - 0.4 thou/uL    Basophils Absolute 0.0 0.0 - 0.2 thou/uL    Immature Granulocyte Absolute 0.2 (H) <=0.0 thou/uL       Imaging    CT chest abdomen and pelvis    IMPRESSION:  1.  No evidence of recurrent tumor in the chest, abdomen or pelvis.  2.  Resolution of the previous obstructive changes in the right kidney.  3.  Noncritical findings as noted above.      Signed by: Blade Ibanez MD

## 2023-07-17 NOTE — TELEPHONE ENCOUNTER
Patient Returning Call    Reason for call:  Fill [facility]     Information relayed to patient:  Pt has refill left as TC called pharmacy.     Patient has additional questions:  No      Okay to leave a detailed message?: No task completed. Thanks    Call taken on 7/17/23 at 228 pm by BELL Perez

## 2023-07-17 NOTE — PROGRESS NOTES
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    Weight as of this encounter: 128.4 kg (283 lb). Body surface area is 2.55 meters squared.  No Pain (0) Comment: Data Unavailable   No LMP for male patient.  Allergies reviewed: Yes  Medications reviewed: Yes    Medications: Medication refills not needed today.  Pharmacy name entered into [facility]: [id number] - [place], [place] -  [address]    Clinical concerns:  None.      Jany Gray CMA            "

## 2023-07-18 DIAGNOSIS — E11.42 DIABETIC POLYNEUROPATHY ASSOCIATED WITH TYPE 2 DIABETES MELLITUS (H): ICD-10-CM

## 2023-07-18 RX ORDER — DULOXETIN HYDROCHLORIDE 30 MG/1
30 CAPSULE, DELAYED RELEASE ORAL 2 TIMES DAILY
Qty: 180 CAPSULE | Refills: 2 | Status: SHIPPED | OUTPATIENT
Start: 2023-07-18 | End: 2024-04-15

## 2023-07-18 NOTE — TELEPHONE ENCOUNTER
"Routing refill request to provider for review/approval because:  BP not in range.    Last Written Prescription Date:  7/15/22  Last Fill Quantity: 180,  # refills: 3   Last office visit provider:  3/20/23     Requested Prescriptions   Pending Prescriptions Disp Refills     DULoxetine (CYMBALTA) 30 MG capsule [Pharmacy Med Name: DULOXETINE HCL DR 30 MG CAP] 180 capsule 3     Sig: TAKE 1 CAPSULE BY MOUTH TWICE A DAY       Serotonin-Norepinephrine Reuptake Inhibitors  Failed - 7/18/2023  2:33 PM        Failed - Blood pressure under 140/90 in past 12 months     BP Readings from Last 3 Encounters:   07/17/23 (!) 120/94   04/11/23 (!) 145/92   03/20/23 133/83                 Passed - Recent (12 mo) or future (30 days) visit within the authorizing provider's specialty     Patient has had an office visit with the authorizing provider or a provider within the authorizing providers department within the previous 12 mos or has a future within next 30 days. See \"Patient Info\" tab in inbasket, or \"Choose Columns\" in Meds & Orders section of the refill encounter.              Passed - Medication is active on med list        Passed - Patient is age 18 or older             Wade Wynn RN 07/18/23 2:33 PM  "

## 2023-08-14 ENCOUNTER — INFUSION THERAPY VISIT (OUTPATIENT)
Dept: INFUSION THERAPY | Facility: HOSPITAL | Age: 73
End: 2023-08-14
Attending: INTERNAL MEDICINE
Payer: MEDICARE

## 2023-08-14 ENCOUNTER — ANTICOAGULATION THERAPY VISIT (OUTPATIENT)
Dept: ANTICOAGULATION | Facility: CLINIC | Age: 73
End: 2023-08-14

## 2023-08-14 VITALS
SYSTOLIC BLOOD PRESSURE: 140 MMHG | TEMPERATURE: 98.3 F | OXYGEN SATURATION: 94 % | DIASTOLIC BLOOD PRESSURE: 83 MMHG | RESPIRATION RATE: 16 BRPM | HEART RATE: 87 BPM

## 2023-08-14 DIAGNOSIS — E53.8 VITAMIN B12 DEFICIENCY (NON ANEMIC): Primary | ICD-10-CM

## 2023-08-14 DIAGNOSIS — I48.20 CHRONIC ATRIAL FIBRILLATION (H): Primary | ICD-10-CM

## 2023-08-14 DIAGNOSIS — E11.22 TYPE 2 DIABETES MELLITUS WITH STAGE 3A CHRONIC KIDNEY DISEASE, WITHOUT LONG-TERM CURRENT USE OF INSULIN (H): ICD-10-CM

## 2023-08-14 DIAGNOSIS — N18.31 TYPE 2 DIABETES MELLITUS WITH STAGE 3A CHRONIC KIDNEY DISEASE, WITHOUT LONG-TERM CURRENT USE OF INSULIN (H): ICD-10-CM

## 2023-08-14 LAB — INR PPP: 2.56 (ref 0.85–1.15)

## 2023-08-14 PROCEDURE — 250N000011 HC RX IP 250 OP 636: Performed by: NURSE PRACTITIONER

## 2023-08-14 PROCEDURE — 85610 PROTHROMBIN TIME: CPT | Performed by: FAMILY MEDICINE

## 2023-08-14 PROCEDURE — 96372 THER/PROPH/DIAG INJ SC/IM: CPT | Performed by: NURSE PRACTITIONER

## 2023-08-14 RX ORDER — HEPARIN SODIUM (PORCINE) LOCK FLUSH IV SOLN 100 UNIT/ML 100 UNIT/ML
5 SOLUTION INTRAVENOUS
Status: CANCELLED | OUTPATIENT
Start: 2023-08-14

## 2023-08-14 RX ORDER — ALBUTEROL SULFATE 0.83 MG/ML
2.5 SOLUTION RESPIRATORY (INHALATION)
Status: CANCELLED | OUTPATIENT
Start: 2023-09-05

## 2023-08-14 RX ORDER — EPINEPHRINE 1 MG/ML
0.3 INJECTION, SOLUTION INTRAMUSCULAR; SUBCUTANEOUS EVERY 5 MIN PRN
Status: CANCELLED | OUTPATIENT
Start: 2023-09-05

## 2023-08-14 RX ORDER — MEPERIDINE HYDROCHLORIDE 25 MG/ML
25 INJECTION INTRAMUSCULAR; INTRAVENOUS; SUBCUTANEOUS EVERY 30 MIN PRN
Status: CANCELLED | OUTPATIENT
Start: 2023-09-05

## 2023-08-14 RX ORDER — CYANOCOBALAMIN 1000 UG/ML
1000 INJECTION, SOLUTION INTRAMUSCULAR; SUBCUTANEOUS ONCE
Status: COMPLETED | OUTPATIENT
Start: 2023-08-14 | End: 2023-08-14

## 2023-08-14 RX ORDER — METHYLPREDNISOLONE SODIUM SUCCINATE 125 MG/2ML
125 INJECTION, POWDER, LYOPHILIZED, FOR SOLUTION INTRAMUSCULAR; INTRAVENOUS
Status: CANCELLED
Start: 2023-09-05

## 2023-08-14 RX ORDER — HEPARIN SODIUM (PORCINE) LOCK FLUSH IV SOLN 100 UNIT/ML 100 UNIT/ML
5 SOLUTION INTRAVENOUS
Status: DISCONTINUED | OUTPATIENT
Start: 2023-08-14 | End: 2023-08-14 | Stop reason: HOSPADM

## 2023-08-14 RX ORDER — ALBUTEROL SULFATE 90 UG/1
1-2 AEROSOL, METERED RESPIRATORY (INHALATION)
Status: CANCELLED
Start: 2023-09-05

## 2023-08-14 RX ORDER — NALOXONE HYDROCHLORIDE 0.4 MG/ML
0.2 INJECTION, SOLUTION INTRAMUSCULAR; INTRAVENOUS; SUBCUTANEOUS
Status: CANCELLED | OUTPATIENT
Start: 2023-09-05

## 2023-08-14 RX ORDER — DIPHENHYDRAMINE HYDROCHLORIDE 50 MG/ML
50 INJECTION INTRAMUSCULAR; INTRAVENOUS
Status: CANCELLED
Start: 2023-09-05

## 2023-08-14 RX ORDER — CYANOCOBALAMIN 1000 UG/ML
1000 INJECTION, SOLUTION INTRAMUSCULAR; SUBCUTANEOUS ONCE
Status: CANCELLED
Start: 2023-09-05 | End: 2023-09-05

## 2023-08-14 RX ADMIN — Medication 5 ML: at 08:51

## 2023-08-14 RX ADMIN — CYANOCOBALAMIN 1000 MCG: 1000 INJECTION, SOLUTION INTRAMUSCULAR; SUBCUTANEOUS at 08:51

## 2023-08-14 NOTE — PROGRESS NOTES
Pt here for port INR draw without incident and B12 given in R delt. Pt denies new complaint and d.c ambulatory to lobby alone and is aware of treatment plan.

## 2023-08-14 NOTE — PROGRESS NOTES
ANTICOAGULATION MANAGEMENT     Cam Walden 73 year old male is on warfarin with therapeutic INR result. (Goal INR 2.0-3.0)    Recent labs: (last 7 days)     08/14/23  0844   INR 2.56*       ASSESSMENT     Source(s): Chart Review and Patient/Caregiver Call     Warfarin doses taken: Warfarin taken as instructed  Diet: No new diet changes identified  Medication/supplement changes: None noted  New illness, injury, or hospitalization: No  Signs or symptoms of bleeding or clotting: No  Previous result: Therapeutic last 2(+) visits  Additional findings: None       PLAN     Recommended plan for no diet, medication or health factor changes affecting INR     Dosing Instructions: Continue your current warfarin dose with next INR in 4 weeks       Summary  As of 8/14/2023      Full warfarin instructions:  5 mg every Sun; 10 mg all other days   Next INR check:  9/11/2023               Telephone call with Cam who verbalizes understanding and agrees to plan    Lab visit scheduled    Education provided:   Please call back if any changes to your diet, medications or how you've been taking warfarin  Contact [phone] with any changes, questions or concerns.     Plan made per [facility] anticoagulation protocol    Jaqueline Chua RN  Anticoagulation Clinic  8/14/2023    _______________________________________________________________________     Anticoagulation Episode Summary       Current INR goal:  2.0-3.0   TTR:  71.3 % (1 y)   Target end date:  Indefinite   Send INR reminders to:  [facility]    Indications    Atrial flutter (H) (Resolved) [I48.92]  Chronic atrial fibrillation (H) [I48.20]             Comments:               Anticoagulation Care Providers       Provider Role Specialty Phone number    Kamaljit Plata MD Referring Family Medicine [phone]

## 2023-08-17 NOTE — LETTER
"    11/18/2021         RE: Cam Walden  [address] [place] [address]        Dear Colleague,    Thank you for referring your patient, Cam Walden, to the [facility]. Please see a copy of my visit note below.    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    Weight as of this encounter: 104.8 kg (231 lb). Body surface area is 2.31 meters squared.  No Pain (0) Comment: Data Unavailable   No LMP for male patient.  Allergies reviewed: Yes  Medications reviewed: Yes    Medications: Medication refills not needed today.  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  Patient has " recovered from side effects of chemotherapy.  We will continue with observation.  We will see him again in 3 months for reevaluation with lab work.    Note of right kidney stone with some hydronephrosis.  Will refer to the kidney [place] for further evaluation.    Asked him to call if there is any concern about recurrence.    Plan: Follow-up in 3 months with labs  Referral to kidney is due for right kidney stone    Measurable disease: PET scan    Current therapy: Observation    Treatment history:    R-CHOP, 4 cycles, last,  November 16, 2020   cycle 1 on 9/15    ECOG Performance   ECOG Performance Status: 1    Distress Assessment  Distress Assessment Score: No distress    Pain         Problem List    1. 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 Cam Walden is here for follow-up.  Seen 3 months ago.  Did have a fall in the summer and suffered a right humerus fracture which is healing.  Also had surgery on his scalp for skin cancer.  No fever chills or sweats.  No palpable adenopathy.  No new swelling in the neck.  No infectious problems or bleeding.  No change in activity level.      Pain Status  Currently in Pain: No/denies    Review of Systems    As per the HPI.       Patient Coping  Patient Coping: Accepting  Distress Assessment  Distress Assessment Score: No distress  Accompanied by  Accompanied by: Alone    Past History  Past Medical History:   Diagnosis Date     Aortic stenosis     mild     Atrial fibrillation (H)      Atrial flutter (H) 8/21/2017     Atrial flutter with rapid ventricular response (H)      Cancer (H)     lymp     Colon polyps 08/08/2016    Per colonoscopy 8/8/16.  Large and small.  Some removed. Others to be removed surgically. (per patient)     Diabetes mellitus (H)     DM II     Diabetes mellitus, type II (H)       Diabetic polyneuropathy associated with type 2 diabetes mellitus (H) 12/16/2020     GI bleed     Following colon polyp removal     Hyperlipidemia      Hypertension      Morbid obesity (H)      JOHNNY (obstructive sleep apnea)     no cpap     Rectal bleeding          Past Surgical History:   Procedure Laterality Date     CARDIOVERSION  08/25/2017     IR PORT PLACEMENT >5 YEARS  8/24/2020       Physical Exam    Recent Vitals 5/17/2021   Height -   Weight 253 lbs 2 oz   BSA (m2) 2.41 m2   /81   Pulse 76   Temp 98.5   Temp src 1   SpO2 98   Some recent data might be hidden       GENERAL: Alert and oriented to time place and person. Seated comfortably. In no distress.    HEAD: Atraumatic and normocephalic.    EYES: BRIDGETTE, EOMI.  No pallor.  No icterus.    Oral cavity: no mucosal lesion or tonsillar enlargement.    NECK: supple. JVP normal.  No thyroid enlargement.    LYMPH NODES: No palpable, cervical, axillary or inguinal lymphadenopathy.    CHEST: clear to auscultation bilaterally.  Resonant to percussion throughout bilaterally.  Symmetrical breath movements bilaterally.    CVS: S1 and S2 are heard. Regular rate and rhythm.  No murmur or gallop or rub heard.  No peripheral edema.    ABDOMEN: Soft. Not tender. Not distended.  No palpable hepatomegaly or splenomegaly.  No other mass palpable.  Bowel sounds heard.    EXTREMITIES: Warm.    SKIN: no rash, or bruising or purpura.  Has a full head of hair.      Lab Results    Recent Results (from the past 168 hour(s))   Comprehensive Metabolic Panel   Result Value Ref Range    Sodium 140 136 - 145 mmol/L    Potassium 3.8 3.5 - 5.0 mmol/L    Chloride 108 (H) 98 - 107 mmol/L    CO2 22 22 - 31 mmol/L    Anion Gap, Calculation 10 5 - 18 mmol/L    Glucose 151 (H) 70 - 125 mg/dL    BUN 15 8 - 22 mg/dL    Creatinine 1.45 (H) 0.70 - 1.30 mg/dL    GFR MDRD Af Amer 58 (L) >60 mL/min/1.73m2    GFR MDRD Non Af Amer 48 (L) >60 mL/min/1.73m2    Bilirubin, Total 0.7 0.0 - 1.0 mg/dL     Calcium 8.6 8.5 - 10.5 mg/dL    Protein, Total 7.2 6.0 - 8.0 g/dL    Albumin 4.0 3.5 - 5.0 g/dL    Alkaline Phosphatase 128 (H) 45 - 120 U/L    AST 29 0 - 40 U/L    ALT 37 0 - 45 U/L   Lactate Dehydrogenase (LDH)   Result Value Ref Range    LD (LDH) 294 (H) 125 - 220 U/L   INR   Result Value Ref Range    INR 2.68 (H) 0.90 - 1.10   HM1 (CBC with Diff)   Result Value Ref Range    WBC 9.4 4.0 - 11.0 thou/uL    RBC 4.06 (L) 4.40 - 6.20 mill/uL    Hemoglobin 11.5 (L) 14.0 - 18.0 g/dL    Hematocrit 36.6 (L) 40.0 - 54.0 %    MCV 90 80 - 100 fL    MCH 28.3 27.0 - 34.0 pg    MCHC 31.4 (L) 32.0 - 36.0 g/dL    RDW 16.7 (H) 11.0 - 14.5 %    Platelets 271 140 - 440 thou/uL    MPV 8.7 8.5 - 12.5 fL    Neutrophils % 61 50 - 70 %    Lymphocytes % 24 20 - 40 %    Monocytes % 10 2 - 10 %    Eosinophils % 2 0 - 6 %    Basophils % 0 0 - 2 %    Immature Granulocyte % 3 (H) <=0 %    Neutrophils Absolute 5.7 2.0 - 7.7 thou/uL    Lymphocytes Absolute 2.3 0.8 - 4.4 thou/uL    Monocytes Absolute 0.9 0.0 - 0.9 thou/uL    Eosinophils Absolute 0.2 0.0 - 0.4 thou/uL    Basophils Absolute 0.0 0.0 - 0.2 thou/uL    Immature Granulocyte Absolute 0.2 (H) <=0.0 thou/uL       Imaging    Ct Soft Tissue Neck With Contrast    Result Date: 5/14/2021  EXAM: CT SOFT TISSUE NECK W CONTRAST LOCATION: [facility] DATE/TIME: 5/14/2021 11:05 AM INDICATION: Diffuse large cell B-cell lymphoma with external bones solid organs status post chemotherapy. Please do involve the thyroid. COMPARISON:11/13/2020. CONTRAST: Iopamidol (Isovue-370) 75mL TECHNIQUE: Routine CT Soft Tissue Neck with IV contrast. Multiplanar reformats. Dose reduction techniques were used. FINDINGS: MUCOSAL SPACES/SOFT TISSUES: Normal mucosal spaces of the upper aerodigestive tract. No mucosal mass or inflammation identified. Normal vocal cords and infraglottic trachea. Normal parapharyngeal space and subcutaneous soft tissues. Normal oral cavity,  spaces, and floor  of mouth structures. LYMPH NODES: No pathologic lymph nodes by size or morphology criteria. SALIVARY GLANDS: Normal parotid and submandibular glands. THYROID: Thyroid is normal in size and has homogeneous density. VESSELS: Vascular structures of the neck are patent. The patient has a right-sided central line in place. VISUALIZED INTRACRANIAL/ORBITS/SINUSES: No abnormality of the visualized intracranial compartment or orbits. There is a mucous retention cyst within the right maxillary sinus. OTHER: The lung apices are clear. Mild degenerative changes of the cervical spine are visualized.     1.  No CT finding of a mass, hemorrhage or abnormal 2.  No discrete mass lesion or abnormal enhancement. 3.  No significant adenopathy. 4.  Thyroid normal in size and has homogeneous density.    Ct Chest With Contrast    Result Date: 5/14/2021  EXAM: CT CHEST W CONTRAST LOCATION: [facility] DATE/TIME: 5/14/2021 11:03 AM INDICATION: lymphoma COMPARISON: PET/CT 11/13/2020 TECHNIQUE: CT chest with IV contrast. Multiplanar reformats were obtained. Dose reduction techniques were used. CONTRAST: Iopamidol (Isovue-370) 75mL FINDINGS: LUNGS AND PLEURA: The lungs are symmetrically inflated. 3 mm benign intrapulmonary lymph node in the right middle lobe near the anterior pleura (series 5, image 207). No actionable lung nodules. No interstitial thickening. Limited bands of atelectasis are present in the left posterior costophrenic sulcus and along the left hemidiaphragm. No airspace opacities. Trachea and central airways are patent and normal caliber. No pleural fluid. MEDIASTINUM: Right jugular approach chest port catheter terminates at the SVC right atrial junction. Cardiac chambers are normal in size. No pericardial effusion. Moderate degenerative thickening/calcification of aortic valve leaflets. Sinotubular junction is preserved. No thoracic aortic aneurysm. Conventional arch anatomy. No enlarged lymph nodes in  15 the chest. Esophagus is decompressed. No actionable thyroid nodule. CORONARY ARTERY CALCIFICATION: Mild-moderate, left coronary distribution. UPPER ABDOMEN: No actionable findings in the imaged upper abdomen. MUSCULOSKELETAL: Multilevel degenerative disc disease. Mild anterior wedging of T11 due to a chronic appearing superior endplate deformity. No aggressive or destructive bone lesions. No enlarged axillary lymph nodes. Thin-walled subdermal cyst right upper back is unchanged measuring 2.5 cm transverse (series 3, image 14).     No findings to suggest active lymphomatous involvement of the chest.        Signed by: Blade Ibanez MD        Again, thank you for allowing me to participate in the care of your patient.        Sincerely,        Blade Ibanez MD

## 2023-09-11 ENCOUNTER — INFUSION THERAPY VISIT (OUTPATIENT)
Dept: INFUSION THERAPY | Facility: HOSPITAL | Age: 73
End: 2023-09-11
Attending: INTERNAL MEDICINE
Payer: MEDICARE

## 2023-09-11 ENCOUNTER — ANTICOAGULATION THERAPY VISIT (OUTPATIENT)
Dept: ANTICOAGULATION | Facility: CLINIC | Age: 73
End: 2023-09-11

## 2023-09-11 VITALS
TEMPERATURE: 98 F | SYSTOLIC BLOOD PRESSURE: 140 MMHG | RESPIRATION RATE: 18 BRPM | HEART RATE: 87 BPM | DIASTOLIC BLOOD PRESSURE: 82 MMHG | OXYGEN SATURATION: 95 %

## 2023-09-11 DIAGNOSIS — E11.22 TYPE 2 DIABETES MELLITUS WITH STAGE 3A CHRONIC KIDNEY DISEASE, WITHOUT LONG-TERM CURRENT USE OF INSULIN (H): Primary | ICD-10-CM

## 2023-09-11 DIAGNOSIS — I48.20 CHRONIC ATRIAL FIBRILLATION (H): Primary | ICD-10-CM

## 2023-09-11 DIAGNOSIS — E53.8 VITAMIN B12 DEFICIENCY (NON ANEMIC): Primary | ICD-10-CM

## 2023-09-11 DIAGNOSIS — N18.31 TYPE 2 DIABETES MELLITUS WITH STAGE 3A CHRONIC KIDNEY DISEASE, WITHOUT LONG-TERM CURRENT USE OF INSULIN (H): Primary | ICD-10-CM

## 2023-09-11 DIAGNOSIS — I48.20 CHRONIC ATRIAL FIBRILLATION (H): ICD-10-CM

## 2023-09-11 LAB — INR PPP: 3.6 (ref 0.85–1.15)

## 2023-09-11 PROCEDURE — 36415 COLL VENOUS BLD VENIPUNCTURE: CPT

## 2023-09-11 PROCEDURE — 96372 THER/PROPH/DIAG INJ SC/IM: CPT | Performed by: INTERNAL MEDICINE

## 2023-09-11 PROCEDURE — 250N000011 HC RX IP 250 OP 636: Performed by: INTERNAL MEDICINE

## 2023-09-11 PROCEDURE — 85610 PROTHROMBIN TIME: CPT

## 2023-09-11 RX ORDER — METHYLPREDNISOLONE SODIUM SUCCINATE 125 MG/2ML
125 INJECTION, POWDER, LYOPHILIZED, FOR SOLUTION INTRAMUSCULAR; INTRAVENOUS
Status: CANCELLED
Start: 2023-10-09

## 2023-09-11 RX ORDER — ALBUTEROL SULFATE 90 UG/1
1-2 AEROSOL, METERED RESPIRATORY (INHALATION)
Status: CANCELLED
Start: 2023-10-09

## 2023-09-11 RX ORDER — ALBUTEROL SULFATE 0.83 MG/ML
2.5 SOLUTION RESPIRATORY (INHALATION)
Status: CANCELLED | OUTPATIENT
Start: 2023-10-09

## 2023-09-11 RX ORDER — HEPARIN SODIUM (PORCINE) LOCK FLUSH IV SOLN 100 UNIT/ML 100 UNIT/ML
5 SOLUTION INTRAVENOUS
OUTPATIENT
Start: 2023-09-11

## 2023-09-11 RX ORDER — MEPERIDINE HYDROCHLORIDE 25 MG/ML
25 INJECTION INTRAMUSCULAR; INTRAVENOUS; SUBCUTANEOUS EVERY 30 MIN PRN
Status: CANCELLED | OUTPATIENT
Start: 2023-10-09

## 2023-09-11 RX ORDER — NALOXONE HYDROCHLORIDE 0.4 MG/ML
0.2 INJECTION, SOLUTION INTRAMUSCULAR; INTRAVENOUS; SUBCUTANEOUS
Status: CANCELLED | OUTPATIENT
Start: 2023-10-09

## 2023-09-11 RX ORDER — CYANOCOBALAMIN 1000 UG/ML
1000 INJECTION, SOLUTION INTRAMUSCULAR; SUBCUTANEOUS ONCE
Status: COMPLETED | OUTPATIENT
Start: 2023-09-11 | End: 2023-09-11

## 2023-09-11 RX ORDER — EPINEPHRINE 1 MG/ML
0.3 INJECTION, SOLUTION INTRAMUSCULAR; SUBCUTANEOUS EVERY 5 MIN PRN
Status: CANCELLED | OUTPATIENT
Start: 2023-10-09

## 2023-09-11 RX ORDER — DIPHENHYDRAMINE HYDROCHLORIDE 50 MG/ML
50 INJECTION INTRAMUSCULAR; INTRAVENOUS
Status: CANCELLED
Start: 2023-10-09

## 2023-09-11 RX ORDER — CYANOCOBALAMIN 1000 UG/ML
1000 INJECTION, SOLUTION INTRAMUSCULAR; SUBCUTANEOUS ONCE
Status: CANCELLED
Start: 2023-10-09 | End: 2023-10-09

## 2023-09-11 RX ORDER — HEPARIN SODIUM (PORCINE) LOCK FLUSH IV SOLN 100 UNIT/ML 100 UNIT/ML
5 SOLUTION INTRAVENOUS
Status: DISCONTINUED | OUTPATIENT
Start: 2023-09-11 | End: 2023-09-11 | Stop reason: HOSPADM

## 2023-09-11 RX ADMIN — CYANOCOBALAMIN 1000 MCG: 1000 INJECTION, SOLUTION INTRAMUSCULAR; SUBCUTANEOUS at 08:42

## 2023-09-11 RX ADMIN — Medication 5 ML: at 08:49

## 2023-09-11 NOTE — PROGRESS NOTES
PT here for labs and vitamin b 12 inj. Port accessed and inr drawn/port flushed and deaccessed with 2x2 to site. Vitamin b 12 inj administered in right deltoid/bandaid to site. Follow up reviewed and pt dc'd steady gait.

## 2023-09-11 NOTE — PROGRESS NOTES
ANTICOAGULATION MANAGEMENT     Cam Walden 73 year old male is on warfarin with supratherapeutic INR result. (Goal INR 2.0-3.0)    Recent labs: (last 7 days)     09/11/23  0847   INR 3.60*       ASSESSMENT     Source(s): Chart Review and Patient/Caregiver Call     Warfarin doses taken: Warfarin taken as instructed  Diet: No new diet changes identified  Medication/supplement changes: None noted  New illness, injury, or hospitalization: No  Signs or symptoms of bleeding or clotting: No  Previous result: Therapeutic last visit; previously outside of goal range  Additional findings: None       PLAN     Recommended plan for no diet, medication or health factor changes affecting INR     Dosing Instructions: partial hold then continue your current warfarin dose with next INR in 1-2 weeks       Summary  As of 9/11/2023      Full warfarin instructions:  9/11: 5 mg; Otherwise 5 mg every Sun; 10 mg all other days   Next INR check:  9/25/2023               Telephone call with Cam who verbalizes understanding and agrees to plan    Lab visit scheduled    Education provided:   Goal range and lab monitoring: goal range and significance of current result and Importance of therapeutic range  Symptom monitoring: monitoring for bleeding signs and symptoms    Plan made per [facility] anticoagulation protocol    Viviane Chang RN  Anticoagulation Clinic  9/11/2023    _______________________________________________________________________     Anticoagulation Episode Summary       Current INR goal:  2.0-3.0   TTR:  68.5 % (1 y)   Target end date:  Indefinite   Send INR reminders to:  [facility]    Indications    Atrial flutter (H) (Resolved) [I48.92]  Chronic atrial fibrillation (H) [I48.20]             Comments:               Anticoagulation Care Providers       Provider Role Specialty Phone number    Kamaljit Plata MD Referring Family Medicine [phone]

## 2023-09-13 DIAGNOSIS — E11.9 DIABETES MELLITUS, TYPE 2 (H): ICD-10-CM

## 2023-09-14 NOTE — PROCEDURES
[facility]    Procedure: IR Procedure Note    Date/Time: 8/24/2020 8:50 AM  Performed by: Darrell Alves MD  Authorized by: Darrell Alves MD       Universal Protocol    Site marked: NA    Prior images obtained and reviewed: Yes    Required items: required blood products, implants, devices, and special equipment available    Patient identity confirmed: verbally with patient, arm band, provided demographic data and hospital-assigned identification number    Reevaluation: Patient was reevaluated immediately before administering moderate or deep sedation or anesthesia    Confirmation checklist: patient's identity using two indicators and relevant allergies    Time out: Immediately prior to procedure a time out was called to verify the correct patient, procedure, equipment, support staff and site/side marked as required    Universal Protocol: Joint Commission Universal Protocol was followed    Preparation: Patient was prepped and draped in the usual sterile fashion    Anesthesia    Local anesthesia used?: Yes    Sedation    Patient sedation: Yes    Vital signs: Vital signs monitored during sedation  Specimens: none  Complications: None  Condition: Stable    Post-procedure   Length of time physician present for 1:1 monitoring during sedation: 30               Frequency: 2x  Plan weeks: 4-6 weeks  Current Treatment Recommendations: Strengthening, ROM, Manual, Modalities, Equipment evaluation, education, & procurement, Patient/Caregiver education & training, Safety education & training, Pain management   Requires PT Follow-Up: Yes       Therapy Time  Individual Time In: 4422       Individual Time Out: 6640  Minutes: 40  Timed Code Treatment Minutes: 40 Minutes     Electronically signed by Alex Ramirez PT  on 9/14/2023 at 12:52 PM   POC NOTE

## 2023-09-20 NOTE — PATIENT INSTRUCTIONS - HE
Place patient instructions, either created by your organization or obtained from a 3rd party, here.   Assessment/Plan:    No problem-specific Assessment & Plan notes found for this encounter. Diagnoses and all orders for this visit:    Cellulitis of toe of left foot  -     Sedimentation rate, automated; Future  -     C-reactive protein; Future  -     MRI foot/forefoot toes left wo contrast; Future    Skin ulcer of toe of left foot with fat layer exposed ([address])  -     Sedimentation rate, automated; Future  -     C-reactive protein; Future  -     MRI foot/forefoot toes left wo contrast; Future    Other orders  -     MAGnesium-Oxide 400 (240 Mg) MG TABS; Take 400 mg by mouth every morning      -X-rays 3 views nonweightbearing of the left foot and do show stable hallux amputated with diffuse osteopenia but no signs of cortical lysis to indicate underlying osteomyelitis at this time  -Rx given for doxycycline and advised on use  - Unfortunately he has just been scheduled for his  appointment and was in the process of getting/for diabetic shoes, his hammertoe deformities substantially worsened and we will likely not be able to get in the diabetic shoes at this time  - I am very fearful for underlying infection  - We will obtain an MRI to rule this area out, along with an ESR and CRP    Debridement   Universal Protocol:  Consent: Verbal consent obtained.   Risks and benefits: risks, benefits and alternatives were discussed  Consent given by: patient  Patient understanding: patient states understanding of the procedure being performed  Patient consent: the patient's understanding of the procedure matches consent given  Patient identity confirmed: verbally with patient      Performed by: physician  Debridement type: surgical  Level of debridement: subcutaneous tissue  Pain control: none  Post-debridement measurements  Length (cm): 0.8  Width (cm): 0.8  Depth (cm): 0.2  Percent debrided: 100%  Surface Area (cm^2): 0.64  Area debrided (cm^2): 0.64  Volume (cm^3): 0.13  Tissue and other material debrided: subcutaneous tissue  Devitalized tissue debrided: biofilm, necrotic debris and slough  Instrument(s) utilized: blade  Bleeding: small  Hemostasis obtained with: pressure  Procedural pain (0-10): insensate  Post-procedural pain: insensate   Response to treatment: procedure was tolerated well          Subjective:      Patient ID: Yuliet Castellanos is a 74 y.o. male. Patient presents evaluation management of left second toe, notes an ulceration has been present for approximate 3 weeks and also after the left second digit. He has been keeping this covered and to me ambulating in open toed shoe, no other pedal complaints      The following portions of the patient's history were reviewed and updated as appropriate: allergies, current medications, past family history, past medical history, past social history, past surgical history and problem list.    Review of Systems   Constitutional: Negative for chills and fever. HENT: Negative for ear pain and sore throat. Eyes: Negative for pain and visual disturbance. Respiratory: Negative for cough and shortness of breath. Cardiovascular: Negative for chest pain and palpitations. Gastrointestinal: Negative for abdominal pain and vomiting. Genitourinary: Negative for dysuria and hematuria. Musculoskeletal: Negative for arthralgias and back pain. Skin: Negative for color change and rash. Neurological: Negative for seizures and syncope. All other systems reviewed and are negative. Objective:      /68   Pulse 80   Ht 5' 8" (1.727 m)   Wt 77.1 kg (170 lb)   BMI 25.85 kg/m²          Physical Exam  Vitals reviewed. Constitutional:       Appearance: Normal appearance. He is obese. HENT:      Head: Normocephalic and atraumatic. Nose: Nose normal.      Mouth/Throat:      Mouth: Mucous membranes are moist.   Eyes:      Pupils: Pupils are equal, round, and reactive to light.    Pulmonary:      Effort: Pulmonary effort is normal.   Musculoskeletal: General: Swelling and deformity present. Comments: 0.8x0.8x0.2cm with probe to bone overlying the PIPJ, rigid hammertoe. There is some soft tissue swelling of the surrounding digit with some cellulitis surrounding this area, moderate drainage, 100% fibrotic base predebridement, postdebridement 50% fibrotic base   Skin:     Capillary Refill: Capillary refill takes 2 to 3 seconds. Neurological:      General: No focal deficit present. Mental Status: He is alert and oriented to person, place, and time. Mental status is at baseline.

## 2023-09-25 ENCOUNTER — ANTICOAGULATION THERAPY VISIT (OUTPATIENT)
Dept: ANTICOAGULATION | Facility: CLINIC | Age: 73
End: 2023-09-25

## 2023-09-25 ENCOUNTER — LAB (OUTPATIENT)
Dept: LAB | Facility: CLINIC | Age: 73
End: 2023-09-25
Payer: MEDICARE

## 2023-09-25 DIAGNOSIS — E11.22 TYPE 2 DIABETES MELLITUS WITH STAGE 3A CHRONIC KIDNEY DISEASE, WITHOUT LONG-TERM CURRENT USE OF INSULIN (H): Primary | ICD-10-CM

## 2023-09-25 DIAGNOSIS — I48.20 CHRONIC ATRIAL FIBRILLATION (H): ICD-10-CM

## 2023-09-25 DIAGNOSIS — I48.20 CHRONIC ATRIAL FIBRILLATION (H): Primary | ICD-10-CM

## 2023-09-25 DIAGNOSIS — N18.31 TYPE 2 DIABETES MELLITUS WITH STAGE 3A CHRONIC KIDNEY DISEASE, WITHOUT LONG-TERM CURRENT USE OF INSULIN (H): Primary | ICD-10-CM

## 2023-09-25 LAB — INR BLD: 3.6 (ref 0.9–1.1)

## 2023-09-25 PROCEDURE — 36416 COLLJ CAPILLARY BLOOD SPEC: CPT

## 2023-09-25 PROCEDURE — 85610 PROTHROMBIN TIME: CPT

## 2023-09-25 NOTE — PROGRESS NOTES
ANTICOAGULATION MANAGEMENT     Cam Walden 73 year old male is on warfarin with supratherapeutic INR result. (Goal INR 2.0-3.0)    Recent labs: (last 7 days)     09/25/23  0852   INR 3.6*       ASSESSMENT     Warfarin Lab Questionnaire    Warfarin Doses Last 7 Days      9/25/2023     8:46 AM   Dose in Tablet or Mg   TAB or MG? milligram (mg)     Pt Rptd Dose SUNDAY MONDAY TUESDAY WED THURS FRIDAY SATURDAY 9/25/2023   8:46 AM 5 10 10 10 10 10 10         9/25/2023   Warfarin Lab Questionnaire   Missed doses within past 14 days? No   Changes in diet or alcohol within past 14 days? No   Medication changes since last result? No   Injuries or illness since last result? No   New shortness of breath, severe headaches or sudden changes in vision since last result? No   Abnormal bleeding since last result? No   Upcoming surgery, procedure? No   Best number to call with results? [phone]     Previous result: Supratherapeutic  Additional findings: None       PLAN     Recommended plan for no diet, medication or health factor changes affecting INR     Dosing Instructions: hold dose then decrease your warfarin dose (7.7% change) with next INR in 2 weeks       Summary  As of 9/25/2023      Full warfarin instructions:  9/25: Hold; Otherwise 5 mg every Sun, Thu; 10 mg all other days   Next INR check:  10/9/2023               Telephone call with Cam who verbalizes understanding and agrees to plan    Lab visit scheduled    Education provided:   Contact [phone] with any changes, questions or concerns.     Plan made per [facility] anticoagulation protocol    Jaqueline Chua RN  Anticoagulation Clinic  9/25/2023    _______________________________________________________________________     Anticoagulation Episode Summary       Current INR goal:  2.0-3.0   TTR:  64.6 % (1 y)   Target end date:  Indefinite   Send INR reminders to:  [facility]    Indications    Atrial flutter (H) (Resolved) [I48.92]  Chronic atrial  fibrillation (H) [I48.20]             Comments:               Anticoagulation Care Providers       Provider Role Specialty Phone number    Kamaljit Plata MD Referring Family Medicine [phone]

## 2023-09-30 DIAGNOSIS — I48.92 ATRIAL FLUTTER (H): ICD-10-CM

## 2023-10-02 RX ORDER — WARFARIN SODIUM 5 MG/1
TABLET ORAL
Qty: 150 TABLET | Refills: 1 | Status: SHIPPED | OUTPATIENT
Start: 2023-10-02 | End: 2024-03-29

## 2023-10-02 NOTE — TELEPHONE ENCOUNTER
ANTICOAGULATION MANAGEMENT:  Medication Refill    Anticoagulation Summary  As of 9/25/2023      Warfarin maintenance plan:  5 mg (5 mg x 1) every Sun, Thu; 10 mg (5 mg x 2) all other days   Next INR check:  10/9/2023   Target end date:  Indefinite    Indications    Atrial flutter (H) (Resolved) [I48.92]  Chronic atrial fibrillation (H) [I48.20]                 Anticoagulation Care Providers       Provider Role Specialty Phone number    Kamaljit Plata MD Referring Family Medicine [phone]            Refill Criteria    Visit with referring provider/group: Meets criteria: office visit within referring provider group in the last 1 year on 3/20/23    [facility] referral signed last signed: 02/15/2023; within last year: Yes    Lab monitoring not exceeding 2 weeks overdue: Yes    Cam meets all criteria for refill. Rx instructions and quantity supplied updated to match patient's current dosing plan. Warfarin 90 day supply with 1 refill granted per [facility] protocol     Sheeba Rachel RN  Anticoagulation Clinic

## 2023-10-09 ENCOUNTER — INFUSION THERAPY VISIT (OUTPATIENT)
Dept: INFUSION THERAPY | Facility: HOSPITAL | Age: 73
End: 2023-10-09
Attending: INTERNAL MEDICINE
Payer: MEDICARE

## 2023-10-09 ENCOUNTER — ANTICOAGULATION THERAPY VISIT (OUTPATIENT)
Dept: ANTICOAGULATION | Facility: CLINIC | Age: 73
End: 2023-10-09

## 2023-10-09 VITALS
TEMPERATURE: 97.8 F | RESPIRATION RATE: 18 BRPM | DIASTOLIC BLOOD PRESSURE: 72 MMHG | SYSTOLIC BLOOD PRESSURE: 141 MMHG | HEART RATE: 87 BPM | OXYGEN SATURATION: 97 %

## 2023-10-09 DIAGNOSIS — E53.8 VITAMIN B12 DEFICIENCY (NON ANEMIC): Primary | ICD-10-CM

## 2023-10-09 DIAGNOSIS — E11.22 TYPE 2 DIABETES MELLITUS WITH STAGE 3A CHRONIC KIDNEY DISEASE, WITHOUT LONG-TERM CURRENT USE OF INSULIN (H): Primary | ICD-10-CM

## 2023-10-09 DIAGNOSIS — N18.31 TYPE 2 DIABETES MELLITUS WITH STAGE 3A CHRONIC KIDNEY DISEASE, WITHOUT LONG-TERM CURRENT USE OF INSULIN (H): Primary | ICD-10-CM

## 2023-10-09 DIAGNOSIS — I48.20 CHRONIC ATRIAL FIBRILLATION (H): Primary | ICD-10-CM

## 2023-10-09 DIAGNOSIS — E53.8 VITAMIN B12 DEFICIENCY (NON ANEMIC): ICD-10-CM

## 2023-10-09 LAB — INR PPP: 2.81 (ref 0.85–1.15)

## 2023-10-09 PROCEDURE — 250N000011 HC RX IP 250 OP 636: Performed by: INTERNAL MEDICINE

## 2023-10-09 PROCEDURE — 36591 DRAW BLOOD OFF VENOUS DEVICE: CPT

## 2023-10-09 PROCEDURE — 96372 THER/PROPH/DIAG INJ SC/IM: CPT | Performed by: INTERNAL MEDICINE

## 2023-10-09 PROCEDURE — 85610 PROTHROMBIN TIME: CPT | Performed by: FAMILY MEDICINE

## 2023-10-09 RX ORDER — ALBUTEROL SULFATE 0.83 MG/ML
2.5 SOLUTION RESPIRATORY (INHALATION)
Status: CANCELLED | OUTPATIENT
Start: 2023-11-06

## 2023-10-09 RX ORDER — CYANOCOBALAMIN 1000 UG/ML
1000 INJECTION, SOLUTION INTRAMUSCULAR; SUBCUTANEOUS ONCE
Status: CANCELLED
Start: 2023-11-06 | End: 2023-11-06

## 2023-10-09 RX ORDER — EPINEPHRINE 1 MG/ML
0.3 INJECTION, SOLUTION INTRAMUSCULAR; SUBCUTANEOUS EVERY 5 MIN PRN
Status: CANCELLED | OUTPATIENT
Start: 2023-11-06

## 2023-10-09 RX ORDER — MEPERIDINE HYDROCHLORIDE 25 MG/ML
25 INJECTION INTRAMUSCULAR; INTRAVENOUS; SUBCUTANEOUS EVERY 30 MIN PRN
Status: DISCONTINUED | OUTPATIENT
Start: 2023-10-09 | End: 2023-10-09 | Stop reason: HOSPADM

## 2023-10-09 RX ORDER — ALBUTEROL SULFATE 90 UG/1
1-2 AEROSOL, METERED RESPIRATORY (INHALATION)
Status: CANCELLED
Start: 2023-11-06

## 2023-10-09 RX ORDER — METHYLPREDNISOLONE SODIUM SUCCINATE 125 MG/2ML
125 INJECTION, POWDER, LYOPHILIZED, FOR SOLUTION INTRAMUSCULAR; INTRAVENOUS
Status: CANCELLED
Start: 2023-11-06

## 2023-10-09 RX ORDER — DIPHENHYDRAMINE HYDROCHLORIDE 50 MG/ML
50 INJECTION INTRAMUSCULAR; INTRAVENOUS
Status: DISCONTINUED | OUTPATIENT
Start: 2023-10-09 | End: 2023-10-09 | Stop reason: HOSPADM

## 2023-10-09 RX ORDER — NALOXONE HYDROCHLORIDE 0.4 MG/ML
0.2 INJECTION, SOLUTION INTRAMUSCULAR; INTRAVENOUS; SUBCUTANEOUS
Status: CANCELLED | OUTPATIENT
Start: 2023-11-06

## 2023-10-09 RX ORDER — DIPHENHYDRAMINE HYDROCHLORIDE 50 MG/ML
50 INJECTION INTRAMUSCULAR; INTRAVENOUS
Status: CANCELLED
Start: 2023-11-06

## 2023-10-09 RX ORDER — CYANOCOBALAMIN 1000 UG/ML
1000 INJECTION, SOLUTION INTRAMUSCULAR; SUBCUTANEOUS ONCE
Status: COMPLETED | OUTPATIENT
Start: 2023-10-09 | End: 2023-10-09

## 2023-10-09 RX ORDER — ALBUTEROL SULFATE 0.83 MG/ML
2.5 SOLUTION RESPIRATORY (INHALATION)
Status: DISCONTINUED | OUTPATIENT
Start: 2023-10-09 | End: 2023-10-09 | Stop reason: HOSPADM

## 2023-10-09 RX ORDER — MEPERIDINE HYDROCHLORIDE 25 MG/ML
25 INJECTION INTRAMUSCULAR; INTRAVENOUS; SUBCUTANEOUS EVERY 30 MIN PRN
Status: CANCELLED | OUTPATIENT
Start: 2023-11-06

## 2023-10-09 RX ORDER — HEPARIN SODIUM (PORCINE) LOCK FLUSH IV SOLN 100 UNIT/ML 100 UNIT/ML
5 SOLUTION INTRAVENOUS
Status: DISCONTINUED | OUTPATIENT
Start: 2023-10-09 | End: 2023-10-09 | Stop reason: HOSPADM

## 2023-10-09 RX ORDER — ALBUTEROL SULFATE 90 UG/1
1-2 AEROSOL, METERED RESPIRATORY (INHALATION)
Status: DISCONTINUED | OUTPATIENT
Start: 2023-10-09 | End: 2023-10-09 | Stop reason: HOSPADM

## 2023-10-09 RX ORDER — NALOXONE HYDROCHLORIDE 0.4 MG/ML
0.2 INJECTION, SOLUTION INTRAMUSCULAR; INTRAVENOUS; SUBCUTANEOUS
Status: DISCONTINUED | OUTPATIENT
Start: 2023-10-09 | End: 2023-10-09 | Stop reason: HOSPADM

## 2023-10-09 RX ORDER — METHYLPREDNISOLONE SODIUM SUCCINATE 125 MG/2ML
125 INJECTION, POWDER, LYOPHILIZED, FOR SOLUTION INTRAMUSCULAR; INTRAVENOUS
Status: DISCONTINUED | OUTPATIENT
Start: 2023-10-09 | End: 2023-10-09 | Stop reason: HOSPADM

## 2023-10-09 RX ORDER — EPINEPHRINE 1 MG/ML
0.3 INJECTION, SOLUTION INTRAMUSCULAR; SUBCUTANEOUS EVERY 5 MIN PRN
Status: DISCONTINUED | OUTPATIENT
Start: 2023-10-09 | End: 2023-10-09 | Stop reason: HOSPADM

## 2023-10-09 RX ADMIN — CYANOCOBALAMIN 1000 MCG: 1000 INJECTION, SOLUTION INTRAMUSCULAR; SUBCUTANEOUS at 08:49

## 2023-10-09 ASSESSMENT — PAIN SCALES - GENERAL: PAINLEVEL: NO PAIN (0)

## 2023-10-09 NOTE — PROGRESS NOTES
ANTICOAGULATION MANAGEMENT     Cam Walden 73 year old male is on warfarin with therapeutic INR result. (Goal INR 2.0-3.0)    Recent labs: (last 7 days)     10/09/23  0835   INR 2.81*       ASSESSMENT     Source(s): Chart Review and Patient/Caregiver Call     Warfarin doses taken: Warfarin taken as instructed  Diet: No new diet changes identified  Medication/supplement changes: None noted  New illness, injury, or hospitalization: No  Signs or symptoms of bleeding or clotting: No  Previous result: Supratherapeutic  Additional findings: None       PLAN     Recommended plan for no diet, medication or health factor changes affecting INR     Dosing Instructions: Continue your current warfarin dose with next INR in 2-3 weeks       Summary  As of 10/9/2023      Full warfarin instructions:  5 mg every Sun, Thu; 10 mg all other days   Next INR check:  10/30/2023               Telephone call with Cam who verbalizes understanding and agrees to plan    Lab visit scheduled    Education provided:   Please call back if any changes to your diet, medications or how you've been taking warfarin    Plan made per [facility] anticoagulation protocol    Jaqueline Chua RN  Anticoagulation Clinic  10/9/2023    _______________________________________________________________________     Anticoagulation Episode Summary       Current INR goal:  2.0-3.0   TTR:  61.7 % (1 y)   Target end date:  Indefinite   Send INR reminders to:  [facility]    Indications    Atrial flutter (H) (Resolved) [I48.92]  Chronic atrial fibrillation (H) [I48.20]             Comments:               Anticoagulation Care Providers       Provider Role Specialty Phone number    Kamaljit Plata MD Referring Family Medicine [phone]

## 2023-10-09 NOTE — PROGRESS NOTES
Pt arrived to [facility] for labs via his port and B-12 injection.  Port was accessed using sterile procedure,  INR drawn and B-12 injection was given in right arm.pt discharged to [facility] in stable condition.

## 2023-11-06 ENCOUNTER — INFUSION THERAPY VISIT (OUTPATIENT)
Dept: INFUSION THERAPY | Facility: HOSPITAL | Age: 73
End: 2023-11-06
Attending: INTERNAL MEDICINE
Payer: MEDICARE

## 2023-11-06 ENCOUNTER — ANTICOAGULATION THERAPY VISIT (OUTPATIENT)
Dept: ANTICOAGULATION | Facility: CLINIC | Age: 73
End: 2023-11-06

## 2023-11-06 ENCOUNTER — ANTICOAGULATION THERAPY VISIT (OUTPATIENT)
Dept: ANTICOAGULATION | Facility: CLINIC | Age: 73
End: 2023-11-06
Payer: MEDICARE

## 2023-11-06 VITALS
TEMPERATURE: 98.1 F | SYSTOLIC BLOOD PRESSURE: 141 MMHG | DIASTOLIC BLOOD PRESSURE: 82 MMHG | HEART RATE: 87 BPM | RESPIRATION RATE: 18 BRPM | OXYGEN SATURATION: 97 %

## 2023-11-06 DIAGNOSIS — N18.31 TYPE 2 DIABETES MELLITUS WITH STAGE 3A CHRONIC KIDNEY DISEASE, WITHOUT LONG-TERM CURRENT USE OF INSULIN (H): ICD-10-CM

## 2023-11-06 DIAGNOSIS — I48.20 CHRONIC ATRIAL FIBRILLATION (H): ICD-10-CM

## 2023-11-06 DIAGNOSIS — Z53.9 ERRONEOUS ENCOUNTER--DISREGARD: ICD-10-CM

## 2023-11-06 DIAGNOSIS — I48.20 CHRONIC ATRIAL FIBRILLATION (H): Primary | ICD-10-CM

## 2023-11-06 DIAGNOSIS — E11.22 TYPE 2 DIABETES MELLITUS WITH STAGE 3A CHRONIC KIDNEY DISEASE, WITHOUT LONG-TERM CURRENT USE OF INSULIN (H): ICD-10-CM

## 2023-11-06 DIAGNOSIS — E53.8 VITAMIN B12 DEFICIENCY (NON ANEMIC): Primary | ICD-10-CM

## 2023-11-06 LAB — INR PPP: 3.31 (ref 0.85–1.15)

## 2023-11-06 PROCEDURE — 250N000011 HC RX IP 250 OP 636: Performed by: INTERNAL MEDICINE

## 2023-11-06 PROCEDURE — 250N000011 HC RX IP 250 OP 636: Mod: JZ | Performed by: INTERNAL MEDICINE

## 2023-11-06 PROCEDURE — 96372 THER/PROPH/DIAG INJ SC/IM: CPT | Performed by: INTERNAL MEDICINE

## 2023-11-06 PROCEDURE — 36591 DRAW BLOOD OFF VENOUS DEVICE: CPT

## 2023-11-06 PROCEDURE — 85610 PROTHROMBIN TIME: CPT

## 2023-11-06 RX ORDER — NALOXONE HYDROCHLORIDE 0.4 MG/ML
0.2 INJECTION, SOLUTION INTRAMUSCULAR; INTRAVENOUS; SUBCUTANEOUS
Status: DISCONTINUED | OUTPATIENT
Start: 2023-11-06 | End: 2023-11-06 | Stop reason: HOSPADM

## 2023-11-06 RX ORDER — EPINEPHRINE 1 MG/ML
0.3 INJECTION, SOLUTION INTRAMUSCULAR; SUBCUTANEOUS EVERY 5 MIN PRN
Status: CANCELLED | OUTPATIENT
Start: 2023-12-04

## 2023-11-06 RX ORDER — NALOXONE HYDROCHLORIDE 0.4 MG/ML
0.2 INJECTION, SOLUTION INTRAMUSCULAR; INTRAVENOUS; SUBCUTANEOUS
Status: CANCELLED | OUTPATIENT
Start: 2023-12-04

## 2023-11-06 RX ORDER — HEPARIN SODIUM (PORCINE) LOCK FLUSH IV SOLN 100 UNIT/ML 100 UNIT/ML
5 SOLUTION INTRAVENOUS
Status: DISCONTINUED | OUTPATIENT
Start: 2023-11-06 | End: 2023-11-06 | Stop reason: HOSPADM

## 2023-11-06 RX ORDER — CYANOCOBALAMIN 1000 UG/ML
1000 INJECTION, SOLUTION INTRAMUSCULAR; SUBCUTANEOUS ONCE
Status: CANCELLED
Start: 2023-12-04 | End: 2023-12-04

## 2023-11-06 RX ORDER — EPINEPHRINE 1 MG/ML
0.3 INJECTION, SOLUTION INTRAMUSCULAR; SUBCUTANEOUS EVERY 5 MIN PRN
Status: DISCONTINUED | OUTPATIENT
Start: 2023-11-06 | End: 2023-11-06 | Stop reason: HOSPADM

## 2023-11-06 RX ORDER — ALBUTEROL SULFATE 0.83 MG/ML
2.5 SOLUTION RESPIRATORY (INHALATION)
Status: CANCELLED | OUTPATIENT
Start: 2023-12-04

## 2023-11-06 RX ORDER — DIPHENHYDRAMINE HYDROCHLORIDE 50 MG/ML
50 INJECTION INTRAMUSCULAR; INTRAVENOUS
Status: CANCELLED
Start: 2023-12-04

## 2023-11-06 RX ORDER — DIPHENHYDRAMINE HYDROCHLORIDE 50 MG/ML
50 INJECTION INTRAMUSCULAR; INTRAVENOUS
Status: DISCONTINUED | OUTPATIENT
Start: 2023-11-06 | End: 2023-11-06 | Stop reason: HOSPADM

## 2023-11-06 RX ORDER — ALBUTEROL SULFATE 90 UG/1
1-2 AEROSOL, METERED RESPIRATORY (INHALATION)
Status: DISCONTINUED | OUTPATIENT
Start: 2023-11-06 | End: 2023-11-06 | Stop reason: HOSPADM

## 2023-11-06 RX ORDER — CYANOCOBALAMIN 1000 UG/ML
1000 INJECTION, SOLUTION INTRAMUSCULAR; SUBCUTANEOUS ONCE
Status: COMPLETED | OUTPATIENT
Start: 2023-11-06 | End: 2023-11-06

## 2023-11-06 RX ORDER — MEPERIDINE HYDROCHLORIDE 25 MG/ML
25 INJECTION INTRAMUSCULAR; INTRAVENOUS; SUBCUTANEOUS EVERY 30 MIN PRN
Status: CANCELLED | OUTPATIENT
Start: 2023-12-04

## 2023-11-06 RX ORDER — METHYLPREDNISOLONE SODIUM SUCCINATE 125 MG/2ML
125 INJECTION, POWDER, LYOPHILIZED, FOR SOLUTION INTRAMUSCULAR; INTRAVENOUS
Status: DISCONTINUED | OUTPATIENT
Start: 2023-11-06 | End: 2023-11-06 | Stop reason: HOSPADM

## 2023-11-06 RX ORDER — MEPERIDINE HYDROCHLORIDE 25 MG/ML
25 INJECTION INTRAMUSCULAR; INTRAVENOUS; SUBCUTANEOUS EVERY 30 MIN PRN
Status: DISCONTINUED | OUTPATIENT
Start: 2023-11-06 | End: 2023-11-06 | Stop reason: HOSPADM

## 2023-11-06 RX ORDER — ALBUTEROL SULFATE 90 UG/1
1-2 AEROSOL, METERED RESPIRATORY (INHALATION)
Status: CANCELLED
Start: 2023-12-04

## 2023-11-06 RX ORDER — ALBUTEROL SULFATE 0.83 MG/ML
2.5 SOLUTION RESPIRATORY (INHALATION)
Status: DISCONTINUED | OUTPATIENT
Start: 2023-11-06 | End: 2023-11-06 | Stop reason: HOSPADM

## 2023-11-06 RX ORDER — METHYLPREDNISOLONE SODIUM SUCCINATE 125 MG/2ML
125 INJECTION, POWDER, LYOPHILIZED, FOR SOLUTION INTRAMUSCULAR; INTRAVENOUS
Status: CANCELLED
Start: 2023-12-04

## 2023-11-06 RX ADMIN — Medication 5 ML: at 09:48

## 2023-11-06 RX ADMIN — CYANOCOBALAMIN 1000 MCG: 1000 INJECTION, SOLUTION INTRAMUSCULAR; SUBCUTANEOUS at 09:46

## 2023-11-06 NOTE — PROGRESS NOTES
Infusion Nursing Note:  Cam Walden presents today for lab/Vitamin B12..    Patient seen by provider today: No   present during visit today: Not Applicable.    Note: Cam comes to infusion ambulatory and in stable condition. Here for lab and injection. His port was accessed under sterile technique. Excellent blood return noted after flushing the port with 20 ml of NS. INR collected and then the port was de accessed per protocol. Site covered with 2x2 gauze and secured in place with paper tape. Vitamin B 12 injected into the right deltoid per patient's request. Site covered with a band aide. Left infusion ambulatory and in stable condition. Will return next month.     Intravenous Access:  Labs drawn without difficulty.  Implanted Port.    Treatment Conditions:  BP (!) 141/82 (BP Location: Left arm, Patient Position: Sitting, Cuff Size: Adult Regular)   Pulse 87   Temp 98.1  F (36.7  C) (Oral)   Resp 18   SpO2 97%     Post Infusion Assessment:  Patient tolerated injection without incident.     Discharge Plan:   Discharge instructions reviewed with: Patient.  Patient and/or family verbalized understanding of discharge instructions and all questions answered.  AVS to patient via [employer].  Patient will return on 12/11 for next appointment.   Patient discharged in stable condition accompanied by: self.  Departure Mode: Ambulatory.    Alix Orantes RN

## 2023-11-06 NOTE — PROGRESS NOTES
ANTICOAGULATION MANAGEMENT     Cam Walden 73 year old male is on warfarin with supratherapeutic INR result. (Goal INR 2.0-3.0)    Recent labs: (last 7 days)     11/06/23  0930   INR 3.31*       ASSESSMENT     Source(s): Chart Review and Patient/Caregiver Call     Warfarin doses taken: Warfarin taken as instructed  Diet: No new diet changes identified  Medication/supplement changes: None noted  New illness, injury, or hospitalization: No  Signs or symptoms of bleeding or clotting: No  Previous result: Therapeutic last visit; previously outside of goal range  Additional findings: None       PLAN     Recommended plan for no diet, medication or health factor changes affecting INR     Dosing Instructions: partial hold then continue your current warfarin dose with next INR in 2 weeks       Summary  As of 11/6/2023      Full warfarin instructions:  11/6: 5 mg; Otherwise 5 mg every Sun, Thu; 10 mg all other days   Next INR check:  11/20/2023               Telephone call with Cam who verbalizes understanding and agrees to plan    Contact [phone] to schedule and with any changes, questions or concerns.     Education provided:   Please call back if any changes to your diet, medications or how you've been taking warfarin    Plan made per [facility] anticoagulation protocol    Jaqueline Chua RN  Anticoagulation Clinic  11/6/2023    _______________________________________________________________________     Anticoagulation Episode Summary       Current INR goal:  2.0-3.0   TTR:  61.0% (1 y)   Target end date:  Indefinite   [facility]R reminders [facility]    Indications    Atrial flutter (H) (Resolved) [I48.92]  Chronic atrial fibrillation (H) [I48.20]             Comments:               Anticoagulation Care Providers       Provider Role Specialty Phone number    Kamaljit Plata MD Referring Family Medicine [phone]

## 2023-11-06 NOTE — PROGRESS NOTES
"ANTICOAGULATION MANAGEMENT     Cam Walden 73 year old male is on warfarin with supratherapeutic INR result. (Goal INR 2.0-3.0)    Recent labs: (last 7 days)     11/06/23  0930   INR 3.31*       ASSESSMENT     Source(s): Chart Review and Patient/Caregiver Call     Warfarin doses taken: {Warfarin dose taken:908096::\"Warfarin taken as instructed\"}  Diet: {Diet changes:321130::\"No new diet changes identified\"}  Medication/supplement changes: {Medication changes/interactions:613732::\"None noted\"}  New illness, injury, or hospitalization: {No/Yes:470236}  Signs or symptoms of bleeding or clotting: {No/Yes:018551}  Previous result: Therapeutic last visit at 2.81; previously outside of goal range at 3.6  Additional findings: {additional findings:575180::\"None\"}       PLAN     {INRPLAN:458669}    "

## 2023-12-06 DIAGNOSIS — B37.2 YEAST DERMATITIS: Primary | ICD-10-CM

## 2023-12-06 NOTE — TELEPHONE ENCOUNTER
Medication Question or Refill        What medication are you calling about (include dose and sig)?: ketoconazole (NIZORAL) 2 % external cream     Preferred Pharmacy:   [facility]/pharmacy #[id number] - [place], [place] - [address]  [address] [place] [address]  Phone: [phone] Fax: [phone]      Controlled Substance Agreement on file:   CSA -- Patient Level:    CSA: None found at the patient level.       Who prescribed the medication?: PCP    Do you need a refill? Yes    When did you use the medication last? N/A    Patient offered an appointment? No    Do you have any questions or concerns?  No      Okay to leave a detailed message?: Yes at Home number on file [phone] (home)

## 2023-12-07 RX ORDER — KETOCONAZOLE 20 MG/G
CREAM TOPICAL DAILY PRN
Qty: 30 G | Refills: 0 | Status: SHIPPED | OUTPATIENT
Start: 2023-12-07 | End: 2024-07-05

## 2023-12-11 ENCOUNTER — ANTICOAGULATION THERAPY VISIT (OUTPATIENT)
Dept: ANTICOAGULATION | Facility: CLINIC | Age: 73
End: 2023-12-11

## 2023-12-11 ENCOUNTER — LAB (OUTPATIENT)
Dept: INFUSION THERAPY | Facility: HOSPITAL | Age: 73
End: 2023-12-11
Attending: INTERNAL MEDICINE
Payer: MEDICARE

## 2023-12-11 VITALS
RESPIRATION RATE: 18 BRPM | HEART RATE: 61 BPM | TEMPERATURE: 97.9 F | SYSTOLIC BLOOD PRESSURE: 143 MMHG | DIASTOLIC BLOOD PRESSURE: 50 MMHG | OXYGEN SATURATION: 97 %

## 2023-12-11 DIAGNOSIS — E53.8 VITAMIN B12 DEFICIENCY (NON ANEMIC): Primary | ICD-10-CM

## 2023-12-11 DIAGNOSIS — I48.20 CHRONIC ATRIAL FIBRILLATION (H): Primary | ICD-10-CM

## 2023-12-11 DIAGNOSIS — E11.22 TYPE 2 DIABETES MELLITUS WITH STAGE 3A CHRONIC KIDNEY DISEASE, WITHOUT LONG-TERM CURRENT USE OF INSULIN (H): ICD-10-CM

## 2023-12-11 DIAGNOSIS — N18.31 TYPE 2 DIABETES MELLITUS WITH STAGE 3A CHRONIC KIDNEY DISEASE, WITHOUT LONG-TERM CURRENT USE OF INSULIN (H): ICD-10-CM

## 2023-12-11 DIAGNOSIS — I48.20 CHRONIC ATRIAL FIBRILLATION (H): ICD-10-CM

## 2023-12-11 DIAGNOSIS — E53.8 VITAMIN B12 DEFICIENCY (NON ANEMIC): ICD-10-CM

## 2023-12-11 LAB — INR PPP: 3.8 (ref 0.85–1.15)

## 2023-12-11 PROCEDURE — 85610 PROTHROMBIN TIME: CPT

## 2023-12-11 PROCEDURE — 36591 DRAW BLOOD OFF VENOUS DEVICE: CPT

## 2023-12-11 PROCEDURE — 96372 THER/PROPH/DIAG INJ SC/IM: CPT | Performed by: INTERNAL MEDICINE

## 2023-12-11 PROCEDURE — 250N000011 HC RX IP 250 OP 636: Performed by: INTERNAL MEDICINE

## 2023-12-11 RX ORDER — NALOXONE HYDROCHLORIDE 0.4 MG/ML
0.2 INJECTION, SOLUTION INTRAMUSCULAR; INTRAVENOUS; SUBCUTANEOUS
Status: CANCELLED | OUTPATIENT
Start: 2024-01-01

## 2023-12-11 RX ORDER — DIPHENHYDRAMINE HYDROCHLORIDE 50 MG/ML
50 INJECTION INTRAMUSCULAR; INTRAVENOUS
Status: CANCELLED
Start: 2024-01-01

## 2023-12-11 RX ORDER — ALBUTEROL SULFATE 90 UG/1
1-2 AEROSOL, METERED RESPIRATORY (INHALATION)
Status: CANCELLED
Start: 2024-01-01

## 2023-12-11 RX ORDER — HEPARIN SODIUM (PORCINE) LOCK FLUSH IV SOLN 100 UNIT/ML 100 UNIT/ML
5 SOLUTION INTRAVENOUS
Status: CANCELLED | OUTPATIENT
Start: 2023-12-11

## 2023-12-11 RX ORDER — EPINEPHRINE 1 MG/ML
0.3 INJECTION, SOLUTION INTRAMUSCULAR; SUBCUTANEOUS EVERY 5 MIN PRN
Status: CANCELLED | OUTPATIENT
Start: 2024-01-01

## 2023-12-11 RX ORDER — CYANOCOBALAMIN 1000 UG/ML
1000 INJECTION, SOLUTION INTRAMUSCULAR; SUBCUTANEOUS ONCE
Status: CANCELLED
Start: 2024-01-01 | End: 2024-01-01

## 2023-12-11 RX ORDER — CYANOCOBALAMIN 1000 UG/ML
1000 INJECTION, SOLUTION INTRAMUSCULAR; SUBCUTANEOUS ONCE
Status: COMPLETED | OUTPATIENT
Start: 2023-12-11 | End: 2023-12-11

## 2023-12-11 RX ORDER — ALBUTEROL SULFATE 0.83 MG/ML
2.5 SOLUTION RESPIRATORY (INHALATION)
Status: CANCELLED | OUTPATIENT
Start: 2024-01-01

## 2023-12-11 RX ORDER — MEPERIDINE HYDROCHLORIDE 25 MG/ML
25 INJECTION INTRAMUSCULAR; INTRAVENOUS; SUBCUTANEOUS EVERY 30 MIN PRN
Status: CANCELLED | OUTPATIENT
Start: 2024-01-01

## 2023-12-11 RX ORDER — HEPARIN SODIUM (PORCINE) LOCK FLUSH IV SOLN 100 UNIT/ML 100 UNIT/ML
5 SOLUTION INTRAVENOUS
Status: DISCONTINUED | OUTPATIENT
Start: 2023-12-11 | End: 2023-12-11 | Stop reason: HOSPADM

## 2023-12-11 RX ORDER — METHYLPREDNISOLONE SODIUM SUCCINATE 125 MG/2ML
125 INJECTION, POWDER, LYOPHILIZED, FOR SOLUTION INTRAMUSCULAR; INTRAVENOUS
Status: CANCELLED
Start: 2024-01-01

## 2023-12-11 RX ADMIN — Medication 5 ML: at 09:34

## 2023-12-11 RX ADMIN — CYANOCOBALAMIN 1000 MCG: 1000 INJECTION, SOLUTION INTRAMUSCULAR; SUBCUTANEOUS at 09:34

## 2023-12-11 NOTE — PROGRESS NOTES
Infusion Nursing Note:  Cam Walden presents today for lab/Vitamin B12..    Patient seen by provider today: No   present during visit today: Not Applicable.    Note: Cam comes to infusion ambulatory and in stable condition. Here for lab and injection. His port was accessed under sterile technique. Excellent blood return noted after flushing the port with 20 ml of NS. INR collected and then the port was de accessed per protocol. Site covered with 2x2 gauze and secured in place with paper tape. Vitamin B 12 injected into the right deltoid per patient's request. Site covered with a band aide. Left infusion ambulatory and in stable condition. Will return next 01/15/2024. Charting was done under the lab visit.    Intravenous Access:  Labs drawn without difficulty.  Implanted Port.    Treatment Conditions:  There were no vitals taken for this visit.    Post Infusion Assessment:  Patient tolerated injection without incident.     Discharge Plan:   Discharge instructions reviewed with: Patient.  Patient and/or family verbalized understanding of discharge instructions and all questions answered.  AVS to patient via [employer].  Patient will return on 01/15/2024 for next appointment.   Patient discharged in stable condition accompanied by: self.  Departure Mode: Ambulatory.    Florecita Chakraborty RN

## 2023-12-11 NOTE — PROGRESS NOTES
ANTICOAGULATION MANAGEMENT     Cam Walden 73 year old male is on warfarin with supratherapeutic INR result. (Goal INR 2.0-3.0)    Recent labs: (last 7 days)     12/11/23  0913   INR 3.80*       ASSESSMENT     Source(s): Chart Review and Patient/Caregiver Call     Warfarin doses taken: Warfarin taken as instructed  Diet: No new diet changes identified  Medication/supplement changes: None noted  New illness, injury, or hospitalization: No  Signs or symptoms of bleeding or clotting: No  Previous result: Supratherapeutic  Additional findings: None       PLAN     Recommended plan for no diet, medication or health factor changes affecting INR     Dosing Instructions: hold dose then decrease your warfarin dose (8.3% change) with next INR in 2 weeks       Summary  As of 12/11/2023      Full warfarin instructions:  12/11: Hold; 12/13: 5 mg; Otherwise 5 mg every Sun, Tue, Thu; 10 mg all other days   Next INR check:  12/26/2023               Telephone call with Cam who verbalizes understanding and agrees to plan    Lab visit scheduled    Education provided:   Contact [phone] with any changes, questions or concerns.     Plan made per [facility] anticoagulation protocol    Jaqueline Chua RN  Anticoagulation Clinic  12/11/2023    _______________________________________________________________________     Anticoagulation Episode Summary       Current INR goal:  2.0-3.0   TTR:  54.9% (1 y)   Target end date:  Indefinite   Send INR reminders to:  [facility]    Indications    Atrial flutter (H) (Resolved) [I48.92]  Chronic atrial fibrillation (H) [I48.20]             Comments:               Anticoagulation Care Providers       Provider Role Specialty Phone number    Kamaljit Plata MD Referring Family Medicine [phone]

## 2023-12-16 DIAGNOSIS — E11.9 DIABETES MELLITUS, TYPE 2 (H): ICD-10-CM

## 2023-12-16 DIAGNOSIS — N40.1 BENIGN PROSTATIC HYPERPLASIA WITH INCOMPLETE BLADDER EMPTYING: ICD-10-CM

## 2023-12-16 DIAGNOSIS — R39.14 BENIGN PROSTATIC HYPERPLASIA WITH INCOMPLETE BLADDER EMPTYING: ICD-10-CM

## 2023-12-18 RX ORDER — TAMSULOSIN HYDROCHLORIDE 0.4 MG/1
CAPSULE ORAL
Qty: 180 CAPSULE | Refills: 1 | Status: SHIPPED | OUTPATIENT
Start: 2023-12-18 | End: 2024-06-11

## 2023-12-27 ENCOUNTER — LAB (OUTPATIENT)
Dept: LAB | Facility: CLINIC | Age: 73
End: 2023-12-27
Payer: MEDICARE

## 2023-12-27 ENCOUNTER — ANTICOAGULATION THERAPY VISIT (OUTPATIENT)
Dept: ANTICOAGULATION | Facility: CLINIC | Age: 73
End: 2023-12-27

## 2023-12-27 DIAGNOSIS — I48.20 CHRONIC ATRIAL FIBRILLATION (H): ICD-10-CM

## 2023-12-27 DIAGNOSIS — I48.20 CHRONIC ATRIAL FIBRILLATION (H): Primary | ICD-10-CM

## 2023-12-27 LAB — INR PPP: 2.37 (ref 0.85–1.15)

## 2023-12-27 PROCEDURE — 85610 PROTHROMBIN TIME: CPT

## 2023-12-27 PROCEDURE — 36415 COLL VENOUS BLD VENIPUNCTURE: CPT

## 2023-12-27 NOTE — PROGRESS NOTES
ANTICOAGULATION MANAGEMENT     Cam Walden 73 year old male is on warfarin with therapeutic INR result. (Goal INR 2.0-3.0)    Recent labs: (last 7 days)     12/27/23  0905   INR 2.37*       ASSESSMENT     Warfarin Lab Questionnaire    Warfarin Doses Last 7 Days      12/27/2023     9:01 AM   Dose in Tablet or Mg   TAB or MG? tablet (tab)     Pt Rptd Dose SUNDAY MONDAY TUESDAY WED THURS FRIDAY SATURDAY 12/27/2023   9:01 AM 1 2 1 2 1 2 2         12/27/2023   Warfarin Lab Questionnaire   Missed doses within past 14 days? No   Changes in diet or alcohol within past 14 days? No   Medication changes since last result? No   Injuries or illness since last result? No   New shortness of breath, severe headaches or sudden changes in vision since last result? No   Abnormal bleeding since last result? No   Upcoming surgery, procedure? No     Previous result: Supratherapeutic  Additional findings: None       PLAN     Unable to reach Cam today.    Spoke with Cam who will do inr with labs and b12  on 1/15    Follow up required to confirm warfarin dose taken and assess for changes    Jaqueline Chua RN  Anticoagulation Clinic  12/27/2023

## 2024-01-15 ENCOUNTER — INFUSION THERAPY VISIT (OUTPATIENT)
Dept: INFUSION THERAPY | Facility: HOSPITAL | Age: 74
End: 2024-01-15
Attending: INTERNAL MEDICINE
Payer: COMMERCIAL

## 2024-01-15 ENCOUNTER — ONCOLOGY VISIT (OUTPATIENT)
Dept: ONCOLOGY | Facility: HOSPITAL | Age: 74
End: 2024-01-15
Attending: INTERNAL MEDICINE
Payer: COMMERCIAL

## 2024-01-15 ENCOUNTER — ANTICOAGULATION THERAPY VISIT (OUTPATIENT)
Dept: ANTICOAGULATION | Facility: CLINIC | Age: 74
End: 2024-01-15

## 2024-01-15 VITALS
TEMPERATURE: 97.8 F | WEIGHT: 287.5 LBS | BODY MASS INDEX: 38.94 KG/M2 | SYSTOLIC BLOOD PRESSURE: 146 MMHG | DIASTOLIC BLOOD PRESSURE: 72 MMHG | OXYGEN SATURATION: 94 % | HEIGHT: 72 IN | HEART RATE: 71 BPM

## 2024-01-15 DIAGNOSIS — C83.398 DIFFUSE LARGE B-CELL LYMPHOMA OF EXTRANODAL SITE: ICD-10-CM

## 2024-01-15 DIAGNOSIS — E53.8 VITAMIN B12 DEFICIENCY (NON ANEMIC): Primary | ICD-10-CM

## 2024-01-15 DIAGNOSIS — I48.20 CHRONIC ATRIAL FIBRILLATION (H): ICD-10-CM

## 2024-01-15 DIAGNOSIS — E53.8 VITAMIN B12 DEFICIENCY (NON ANEMIC): ICD-10-CM

## 2024-01-15 DIAGNOSIS — N18.31 TYPE 2 DIABETES MELLITUS WITH STAGE 3A CHRONIC KIDNEY DISEASE, WITHOUT LONG-TERM CURRENT USE OF INSULIN (H): ICD-10-CM

## 2024-01-15 DIAGNOSIS — I48.20 CHRONIC ATRIAL FIBRILLATION (H): Primary | ICD-10-CM

## 2024-01-15 DIAGNOSIS — E11.22 TYPE 2 DIABETES MELLITUS WITH STAGE 3A CHRONIC KIDNEY DISEASE, WITHOUT LONG-TERM CURRENT USE OF INSULIN (H): ICD-10-CM

## 2024-01-15 LAB
INR PPP: 2.33 (ref 0.85–1.15)
LDH SERPL L TO P-CCNC: 138 U/L (ref 0–250)
VIT B12 SERPL-MCNC: 543 PG/ML (ref 232–1245)

## 2024-01-15 PROCEDURE — G0463 HOSPITAL OUTPT CLINIC VISIT: HCPCS | Performed by: NURSE PRACTITIONER

## 2024-01-15 PROCEDURE — 83615 LACTATE (LD) (LDH) ENZYME: CPT

## 2024-01-15 PROCEDURE — 36591 DRAW BLOOD OFF VENOUS DEVICE: CPT

## 2024-01-15 PROCEDURE — 85610 PROTHROMBIN TIME: CPT

## 2024-01-15 PROCEDURE — 82607 VITAMIN B-12: CPT

## 2024-01-15 PROCEDURE — 99213 OFFICE O/P EST LOW 20 MIN: CPT | Performed by: NURSE PRACTITIONER

## 2024-01-15 PROCEDURE — 250N000011 HC RX IP 250 OP 636: Performed by: INTERNAL MEDICINE

## 2024-01-15 PROCEDURE — 96372 THER/PROPH/DIAG INJ SC/IM: CPT | Performed by: INTERNAL MEDICINE

## 2024-01-15 PROCEDURE — G0463 HOSPITAL OUTPT CLINIC VISIT: HCPCS | Mod: 25 | Performed by: NURSE PRACTITIONER

## 2024-01-15 RX ORDER — CYANOCOBALAMIN 1000 UG/ML
1000 INJECTION, SOLUTION INTRAMUSCULAR; SUBCUTANEOUS ONCE
Status: COMPLETED | OUTPATIENT
Start: 2024-01-15 | End: 2024-01-15

## 2024-01-15 RX ORDER — CYANOCOBALAMIN 1000 UG/ML
1000 INJECTION, SOLUTION INTRAMUSCULAR; SUBCUTANEOUS ONCE
Status: CANCELLED
Start: 2024-02-05 | End: 2024-02-05

## 2024-01-15 RX ORDER — METHYLPREDNISOLONE SODIUM SUCCINATE 125 MG/2ML
125 INJECTION, POWDER, LYOPHILIZED, FOR SOLUTION INTRAMUSCULAR; INTRAVENOUS
Status: CANCELLED
Start: 2024-02-05

## 2024-01-15 RX ORDER — NALOXONE HYDROCHLORIDE 0.4 MG/ML
0.2 INJECTION, SOLUTION INTRAMUSCULAR; INTRAVENOUS; SUBCUTANEOUS
Status: CANCELLED | OUTPATIENT
Start: 2024-02-05

## 2024-01-15 RX ORDER — ALBUTEROL SULFATE 90 UG/1
1-2 AEROSOL, METERED RESPIRATORY (INHALATION)
Status: CANCELLED
Start: 2024-02-05

## 2024-01-15 RX ORDER — DIPHENHYDRAMINE HYDROCHLORIDE 50 MG/ML
50 INJECTION INTRAMUSCULAR; INTRAVENOUS
Status: CANCELLED
Start: 2024-02-05

## 2024-01-15 RX ORDER — ALBUTEROL SULFATE 0.83 MG/ML
2.5 SOLUTION RESPIRATORY (INHALATION)
Status: CANCELLED | OUTPATIENT
Start: 2024-02-05

## 2024-01-15 RX ORDER — HEPARIN SODIUM (PORCINE) LOCK FLUSH IV SOLN 100 UNIT/ML 100 UNIT/ML
5 SOLUTION INTRAVENOUS
Status: DISCONTINUED | OUTPATIENT
Start: 2024-01-15 | End: 2024-01-15 | Stop reason: HOSPADM

## 2024-01-15 RX ORDER — MEPERIDINE HYDROCHLORIDE 25 MG/ML
25 INJECTION INTRAMUSCULAR; INTRAVENOUS; SUBCUTANEOUS EVERY 30 MIN PRN
Status: CANCELLED | OUTPATIENT
Start: 2024-02-05

## 2024-01-15 RX ORDER — EPINEPHRINE 1 MG/ML
0.3 INJECTION, SOLUTION INTRAMUSCULAR; SUBCUTANEOUS EVERY 5 MIN PRN
Status: CANCELLED | OUTPATIENT
Start: 2024-02-05

## 2024-01-15 RX ORDER — HEPARIN SODIUM (PORCINE) LOCK FLUSH IV SOLN 100 UNIT/ML 100 UNIT/ML
5 SOLUTION INTRAVENOUS
Status: CANCELLED | OUTPATIENT
Start: 2024-01-15

## 2024-01-15 RX ADMIN — Medication 5 ML: at 10:10

## 2024-01-15 RX ADMIN — CYANOCOBALAMIN 1000 MCG: 1000 INJECTION, SOLUTION INTRAMUSCULAR; SUBCUTANEOUS at 10:09

## 2024-01-15 ASSESSMENT — PAIN SCALES - GENERAL: PAINLEVEL: NO PAIN (0)

## 2024-01-15 NOTE — PROGRESS NOTES
ANTICOAGULATION MANAGEMENT     Cam Walden 73 year old male is on warfarin with therapeutic INR result. (Goal INR 2.0-3.0)    Recent labs: (last 7 days)     01/15/24  0902   INR 2.33*       ASSESSMENT     Source(s): Chart Review and Patient/Caregiver Call     Warfarin doses taken: Warfarin taken as instructed  Diet: No new diet changes identified  Medication/supplement changes: None noted  New illness, injury, or hospitalization: No  Signs or symptoms of bleeding or clotting: No  Previous result: Therapeutic last 2(+) visits  Additional findings: None       PLAN     Recommended plan for no diet, medication or health factor changes affecting INR     Dosing Instructions: Continue your current warfarin dose with next INR in 4 weeks       Summary  As of 1/15/2024      Full warfarin instructions:  5 mg every Sun, Tue, Thu; 10 mg all other days   Next INR check:  2/12/2024               Telephone call with Cam who verbalizes understanding and agrees to plan    Lab visit scheduled    Education provided:   Contact [phone] with any changes, questions or concerns.     Plan made per [facility] anticoagulation protocol    Jaqueline Chua RN  Anticoagulation Clinic  1/15/2024    _______________________________________________________________________     Anticoagulation Episode Summary       Current INR goal:  2.0-3.0   TTR:  57.3% (1 y)   Target end date:  Indefinite   Send INR reminders to:  [facility]    Indications    Atrial flutter (H) (Resolved) [I48.92]  Chronic atrial fibrillation (H) [I48.20]             Comments:               Anticoagulation Care Providers       Provider Role Specialty Phone number    Kamaljit Plata MD Referring Family Medicine [phone]

## 2024-01-15 NOTE — LETTER
1/15/2024         RE: Cam Walden  [address]  [place] [address]        Dear Colleague,    Thank you for referring your patient, Cam Walden, to the [facility]. Please see a copy of my visit note below.    Oncology Rooming Note    January 15, 2024 9:20 AM   Cam Walden is a 73 year old male who presents for:    Chief Complaint   Patient presents with     Oncology Clinic Visit     Return visit 6 months with lab and injection-B12. 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     Initial Vitals: BP (!) 146/72 (BP Location: Left arm, Patient Position: Sitting, Cuff Size: Adult Large)   Pulse 71   Temp 97.8  F (36.6  C) (Oral)   Ht 1.829 m (6')   Wt 130.4 kg (287 lb 8 oz)   SpO2 94%   BMI 38.99 kg/m   Estimated body mass index is 38.99 kg/m  as calculated from the following:    Height as of this encounter: 1.829 m (6').    Weight as of this encounter: 130.4 kg (287 lb 8 oz). Body surface area is 2.57 meters squared.  No Pain (0) Comment: Data Unavailable   No LMP for male patient.  Allergies reviewed: Yes  Medications reviewed: Yes    Medications: Medication refills not needed today.  Pharmacy name entered into Med[employer]: [id number] - [place], [place] - [address]    Frailty Screening:   Is the patient here for a new oncology consult visit in cancer care? 2. No      Clinical concerns: Return visit 6 months with lab and injection-B12. 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      Dolores Peralta, [facility] Hematology and Oncology Progress Note    Patient: Cam Walden  MRN: [id number]  Date of Service: Mihir 15, 2024          Reason for Visit    Chief Complaint   Patient presents with     Oncology Clinic Visit     Return visit 6 months with lab and injection-B12. 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       Assessment and Plan     Cancer Staging   No matching staging information was found for  the patient.      1. Diffuse large B cell lymphoma, thyroid with skin infiltration, diagnosed August 2020, GCB TYPE, Stage 1: had chemo with R-CHOP. Completed November 2020. Last CT scan was January 2023 and that was normal.  We are no longer doing any routine imaging.  Clinically he is doing well.  LDH is normal.  No constitutional symptoms, no clinical evidence of recurrence.     2. Vitamin B12 deficiency with anemia: has started vitamin B12 supplement with shots every month.  His B12 level is pending from today.  It was improved the last time it was checked about a year ago.  Continue to monitor and give his shots once a month.    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 (H)     Vitamin B12 deficiency (non anemic)     Morbid obesity (H)     Other drug-induced agranulocytosis (H24)     Inflammatory pseudotumor of colon (H)     Aortic valve stenosis, moderate     Benign prostatic hyperplasia with lower urinary tract symptoms        ______________________________________________________________________________    History of Present Illness    Measurable disease: PET scan     Past therapy: R-CHOP for 4 cycles, 9/15/20 until November 16, 2020    Therapy: Vitamin B12 supplementation once a month    Interim History: Patient is here today for a 6-month follow-up visit. Overall patient states that he is doing fine and really has no new issues.  He has continued on B12 injections.  States that they are going fine.  He denies any constitutional symptoms.  States that overall has not had any changes to  his health.     Review of Systems    Pertinent items are noted in HPI.    Past History    Past Medical History:   Diagnosis Date     Aortic stenosis     mild     Atrial fibrillation (H)      Atrial flutter (H) 8/21/2017     Atrial flutter with rapid ventricular response (H)      Cancer (H)     lymp     Colon polyps 08/08/2016    Per colonoscopy 8/8/16.  Large and small.  Some removed. Others to be removed surgically. (per patient)     Diabetes mellitus (H)     DM II     Diabetes mellitus, type II (H)      Diabetic polyneuropathy associated with type 2 diabetes mellitus (H) 12/16/2020     Hyperlipidemia      Hypertension      Morbid obesity (H)      JOHNNY (obstructive sleep apnea)     no cpap       PHYSICAL EXAM  BP (!) 146/72 (BP Location: Left arm, Patient Position: Sitting, Cuff Size: Adult Large)   Pulse 71   Temp 97.8  F (36.6  C) (Oral)   Ht 1.829 m (6')   Wt 130.4 kg (287 lb 8 oz)   SpO2 94%   BMI 38.99 kg/m      GENERAL: no acute distress. Cooperative in conversation. Here alone. Mask on  RESP: Regular respiratory rate. No expiratory wheezes   MUSCULOSKELETAL: no bilateral leg swelling  NEURO: non focal. Alert and oriented x3.   PSYCH: within normal limits. No depression or anxiety.  SKIN: exposed skin is dry intact.     Lab Results    Recent Results (from the past 168 hour(s))   INR   Result Value Ref Range    INR 2.33 (H) 0.85 - 1.15   Lactate Dehydrogenase   Result Value Ref Range    Lactate Dehydrogenase 138 0 - 250 U/L         Imaging    No results found.      Signed by: CLAY Garrett CNP      Again, thank you for allowing me to participate in the care of your patient.        Sincerely,        CLAY Garrett CNP

## 2024-01-15 NOTE — PROGRESS NOTES
Oncology Rooming Note    January 15, 2024 9:20 AM   Cam Walden is a 73 year old male who presents for:    Chief Complaint   Patient presents with    Oncology Clinic Visit     Return visit 6 months with lab and injection-B12. 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     Initial Vitals: BP (!) 146/72 (BP Location: Left arm, Patient Position: Sitting, Cuff Size: Adult Large)   Pulse 71   Temp 97.8  F (36.6  C) (Oral)   Ht 1.829 m (6')   Wt 130.4 kg (287 lb 8 oz)   SpO2 94%   BMI 38.99 kg/m   Estimated body mass index is 38.99 kg/m  as calculated from the following:    Height as of this encounter: 1.829 m (6').    Weight as of this encounter: 130.4 kg (287 lb 8 oz). Body surface area is 2.57 meters squared.  No Pain (0) Comment: Data Unavailable   No LMP for male patient.  Allergies reviewed: Yes  Medications reviewed: Yes    Medications: Medication refills not needed today.  Pharmacy name entered into [employer]: [id number] - [place], [place] -  [address]    Frailty Screening:   Is the patient here for a new oncology consult visit in cancer care? 2. No      Clinical concerns: Return visit 6 months with lab and injection-B12. 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      Dolores Peralta, [facility]

## 2024-01-15 NOTE — PROGRESS NOTES
Infusion Nursing Note:  Cam Walden presents today for B12 injection.    Patient seen by provider today: Yes:    present during visit today: Not Applicable.    Note: N/A.      Intravenous Access:  Implanted Port.    Treatment Conditions:  Not Applicable.      Post Infusion Assessment:  Patient tolerated injection without incident.       Discharge Plan:   Patient discharged in stable condition accompanied by: self.  Departure Mode: Ambulatory.      Libra Angel RN

## 2024-01-15 NOTE — PROGRESS NOTES
[facility] Hematology and Oncology Progress Note    Patient: Cam Walden  MRN: [id number]  Date of Service: Mihir 15, 2024          Reason for Visit    Chief Complaint   Patient presents with    Oncology Clinic Visit     Return visit 6 months with lab and injection-B12. 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       Assessment and Plan     Cancer Staging   No matching staging information was found for the patient.      1. Diffuse large B cell lymphoma, thyroid with skin infiltration, diagnosed August 2020, GCB TYPE, Stage 1: had chemo with R-CHOP. Completed November 2020. Last CT scan was January 2023 and that was normal.  We are no longer doing any routine imaging.  Clinically he is doing well.  LDH is normal.  No constitutional symptoms, no clinical evidence of recurrence.     2. Vitamin B12 deficiency with anemia: has started vitamin B12 supplement with shots every month.  His B12 level is pending from today.  It was improved the last time it was checked about a year ago.  Continue to monitor and give his shots once a month.    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 Overall patient states that he is doing fine and really has no new issues.  He has continued on B12 injections.  States that they are going fine.  He denies any constitutional symptoms.  States that overall has not had any changes to his health.     Review of Systems    Pertinent items are noted in HPI.    Past History    Past Medical History:   Diagnosis Date    Aortic stenosis     mild    Atrial fibrillation (H)     Atrial flutter (H) 8/21/2017    Atrial flutter with rapid ventricular response (H)     Cancer (H)     lymp    Colon polyps 08/08/2016    Per colonoscopy 8/8/16.  Large and small.  Some removed. Others to be removed surgically. (per patient)    Diabetes mellitus (H)     DM II    Diabetes mellitus, type II (H)     Diabetic polyneuropathy associated with type 2 diabetes mellitus (H) 12/16/2020    Hyperlipidemia     Hypertension     Morbid obesity (H)     JOHNNY (obstructive sleep apnea)     no cpap       PHYSICAL EXAM  BP (!) 146/72 (BP Location: Left arm, Patient Position: Sitting, Cuff Size: Adult Large)   Pulse 71   Temp 97.8  F (36.6  C) (Oral)   Ht 1.829 m (6')   Wt 130.4 kg (287 lb 8 oz)   SpO2 94%   BMI 38.99 kg/m      GENERAL: no acute distress. Cooperative in conversation. Here alone. Mask on  RESP: Regular respiratory rate. No expiratory wheezes   MUSCULOSKELETAL: no bilateral leg swelling  NEURO: non focal. Alert and oriented x3.   PSYCH: within normal limits. No depression or anxiety.  SKIN: exposed skin is dry intact.     Lab Results    Recent Results (from the past 168 hour(s))   INR   Result Value Ref Range    INR 2.33 (H) 0.85 - 1.15   Lactate Dehydrogenase   Result Value Ref Range    Lactate Dehydrogenase 138 0 - 250 U/L         Imaging    No results found.      Signed by: CLAY Garrett  CNP

## 2024-01-16 ENCOUNTER — DOCUMENTATION ONLY (OUTPATIENT)
Dept: ANTICOAGULATION | Facility: CLINIC | Age: 74
End: 2024-01-16
Payer: COMMERCIAL

## 2024-01-16 DIAGNOSIS — I48.20 CHRONIC ATRIAL FIBRILLATION (H): Primary | ICD-10-CM

## 2024-01-16 NOTE — PROGRESS NOTES
ANTICOAGULATION CLINIC REFERRAL RENEWAL REQUEST       An annual renewal order is required for all patients referred to [facility].?  Please review and sign the pended referral order for Cam Walden.       ANTICOAGULATION SUMMARY      Warfarin indication(s)   Atrial Fibrillation    Mechanical heart valve present?  NO       Current goal range   INR: 2.0-3.0     Goal appropriate for indication? Goal INR 2-3, standard for indication(s) above     Time in Therapeutic Range (TTR)  (Goal > 60%) 57.3%       Office visit with referring provider's group within last year yes on 3/20/23       Jaqueline Chua RN  [facility]

## 2024-02-08 DIAGNOSIS — I48.92 ATRIAL FLUTTER, UNSPECIFIED TYPE (H): ICD-10-CM

## 2024-02-08 RX ORDER — METOPROLOL TARTRATE 100 MG
TABLET ORAL
Qty: 180 TABLET | Refills: 0 | Status: SHIPPED | OUTPATIENT
Start: 2024-02-08 | End: 2024-05-06

## 2024-02-14 ENCOUNTER — ANTICOAGULATION THERAPY VISIT (OUTPATIENT)
Dept: ANTICOAGULATION | Facility: CLINIC | Age: 74
End: 2024-02-14

## 2024-02-14 ENCOUNTER — INFUSION THERAPY VISIT (OUTPATIENT)
Dept: INFUSION THERAPY | Facility: HOSPITAL | Age: 74
End: 2024-02-14
Payer: COMMERCIAL

## 2024-02-14 ENCOUNTER — LAB (OUTPATIENT)
Dept: INFUSION THERAPY | Facility: HOSPITAL | Age: 74
End: 2024-02-14
Payer: COMMERCIAL

## 2024-02-14 VITALS
HEART RATE: 65 BPM | OXYGEN SATURATION: 96 % | RESPIRATION RATE: 18 BRPM | TEMPERATURE: 98.4 F | DIASTOLIC BLOOD PRESSURE: 88 MMHG | SYSTOLIC BLOOD PRESSURE: 139 MMHG

## 2024-02-14 DIAGNOSIS — E53.8 VITAMIN B12 DEFICIENCY (NON ANEMIC): ICD-10-CM

## 2024-02-14 DIAGNOSIS — E11.22 TYPE 2 DIABETES MELLITUS WITH STAGE 3A CHRONIC KIDNEY DISEASE, WITHOUT LONG-TERM CURRENT USE OF INSULIN (H): Primary | ICD-10-CM

## 2024-02-14 DIAGNOSIS — I48.20 CHRONIC ATRIAL FIBRILLATION (H): ICD-10-CM

## 2024-02-14 DIAGNOSIS — N18.31 TYPE 2 DIABETES MELLITUS WITH STAGE 3A CHRONIC KIDNEY DISEASE, WITHOUT LONG-TERM CURRENT USE OF INSULIN (H): Primary | ICD-10-CM

## 2024-02-14 DIAGNOSIS — I48.20 CHRONIC ATRIAL FIBRILLATION (H): Primary | ICD-10-CM

## 2024-02-14 LAB — INR PPP: 2.85 (ref 0.85–1.15)

## 2024-02-14 PROCEDURE — 36591 DRAW BLOOD OFF VENOUS DEVICE: CPT

## 2024-02-14 PROCEDURE — 85610 PROTHROMBIN TIME: CPT

## 2024-02-14 PROCEDURE — 250N000011 HC RX IP 250 OP 636: Performed by: INTERNAL MEDICINE

## 2024-02-14 PROCEDURE — 96372 THER/PROPH/DIAG INJ SC/IM: CPT | Performed by: INTERNAL MEDICINE

## 2024-02-14 RX ORDER — NALOXONE HYDROCHLORIDE 0.4 MG/ML
0.2 INJECTION, SOLUTION INTRAMUSCULAR; INTRAVENOUS; SUBCUTANEOUS
Status: CANCELLED | OUTPATIENT
Start: 2024-03-11

## 2024-02-14 RX ORDER — METHYLPREDNISOLONE SODIUM SUCCINATE 125 MG/2ML
125 INJECTION, POWDER, LYOPHILIZED, FOR SOLUTION INTRAMUSCULAR; INTRAVENOUS
Status: CANCELLED
Start: 2024-03-11

## 2024-02-14 RX ORDER — HEPARIN SODIUM (PORCINE) LOCK FLUSH IV SOLN 100 UNIT/ML 100 UNIT/ML
5 SOLUTION INTRAVENOUS
Status: CANCELLED | OUTPATIENT
Start: 2024-02-14

## 2024-02-14 RX ORDER — CYANOCOBALAMIN 1000 UG/ML
1000 INJECTION, SOLUTION INTRAMUSCULAR; SUBCUTANEOUS ONCE
Status: CANCELLED
Start: 2024-03-11 | End: 2024-03-11

## 2024-02-14 RX ORDER — HEPARIN SODIUM (PORCINE) LOCK FLUSH IV SOLN 100 UNIT/ML 100 UNIT/ML
5 SOLUTION INTRAVENOUS
Status: DISCONTINUED | OUTPATIENT
Start: 2024-02-14 | End: 2024-02-14 | Stop reason: HOSPADM

## 2024-02-14 RX ORDER — ALBUTEROL SULFATE 90 UG/1
1-2 AEROSOL, METERED RESPIRATORY (INHALATION)
Status: CANCELLED
Start: 2024-03-11

## 2024-02-14 RX ORDER — EPINEPHRINE 1 MG/ML
0.3 INJECTION, SOLUTION INTRAMUSCULAR; SUBCUTANEOUS EVERY 5 MIN PRN
Status: CANCELLED | OUTPATIENT
Start: 2024-03-11

## 2024-02-14 RX ORDER — CYANOCOBALAMIN 1000 UG/ML
1000 INJECTION, SOLUTION INTRAMUSCULAR; SUBCUTANEOUS ONCE
Status: COMPLETED | OUTPATIENT
Start: 2024-02-14 | End: 2024-02-14

## 2024-02-14 RX ORDER — MEPERIDINE HYDROCHLORIDE 25 MG/ML
25 INJECTION INTRAMUSCULAR; INTRAVENOUS; SUBCUTANEOUS EVERY 30 MIN PRN
Status: CANCELLED | OUTPATIENT
Start: 2024-03-11

## 2024-02-14 RX ORDER — DIPHENHYDRAMINE HYDROCHLORIDE 50 MG/ML
50 INJECTION INTRAMUSCULAR; INTRAVENOUS
Status: CANCELLED
Start: 2024-03-11

## 2024-02-14 RX ORDER — ALBUTEROL SULFATE 0.83 MG/ML
2.5 SOLUTION RESPIRATORY (INHALATION)
Status: CANCELLED | OUTPATIENT
Start: 2024-03-11

## 2024-02-14 RX ADMIN — Medication 5 ML: at 08:36

## 2024-02-14 RX ADMIN — CYANOCOBALAMIN 1000 MCG: 1000 INJECTION, SOLUTION INTRAMUSCULAR; SUBCUTANEOUS at 08:36

## 2024-02-14 NOTE — PROGRESS NOTES
Infusion Nursing Note:  Cam Walden presents today for B12 injection.    Patient seen by provider today: No   present during visit today: Not Applicable.    Note: N/A.      Intravenous Access:  Implanted Port.    Treatment Conditions:  Not Applicable.      Post Infusion Assessment:  Patient tolerated injection without incident.       Discharge Plan:   Patient discharged in stable condition accompanied by: self.  Departure Mode: Ambulatory.      Libra Angel RN

## 2024-02-14 NOTE — PROGRESS NOTES
ANTICOAGULATION MANAGEMENT     Cam Walden 73 year old male is on warfarin with therapeutic INR result. (Goal INR 2.0-3.0)    Recent labs: (last 7 days)     02/14/24  0830   INR 2.85*       ASSESSMENT     Source(s): Chart Review and Patient/Caregiver Call     Warfarin doses taken: Warfarin taken as instructed  Diet: No new diet changes identified  Medication/supplement changes: None noted  New illness, injury, or hospitalization: No  Signs or symptoms of bleeding or clotting: No  Previous result: Therapeutic last 2(+) visits  Additional findings: None       PLAN     Recommended plan for no diet, medication or health factor changes affecting INR     Dosing Instructions: Continue your current warfarin dose with next INR in 4 weeks       Summary  As of 2/14/2024      Full warfarin instructions:  5 mg every Sun, Tue, Thu; 10 mg all other days   Next INR check:  3/11/2024               Telephone call with Cam who verbalizes understanding and agrees to plan    Lab visit scheduled with b12 on 3/11    Education provided:   Please call back if any changes to your diet, medications or how you've been taking warfarin    Plan made per [facility] anticoagulation protocol    Jaqueline Chua RN  Anticoagulation Clinic  2/14/2024    _______________________________________________________________________     Anticoagulation Episode Summary       Current INR goal:  2.0-3.0   TTR:  63.2% (1 y)   Target end date:  Indefinite   Send INR reminders to:  [facility]    Indications    Atrial flutter (H) (Resolved) [I48.92]  Chronic atrial fibrillation (H) [I48.20]             Comments:               Anticoagulation Care Providers       Provider Role Specialty Phone number    Kamaljit Plata MD Referring Family Medicine [phone]             Yes

## 2024-03-10 DIAGNOSIS — E11.9 DIABETES MELLITUS, TYPE 2 (H): ICD-10-CM

## 2024-03-11 ENCOUNTER — LAB (OUTPATIENT)
Dept: INFUSION THERAPY | Facility: HOSPITAL | Age: 74
End: 2024-03-11
Attending: INTERNAL MEDICINE
Payer: COMMERCIAL

## 2024-03-11 ENCOUNTER — ANTICOAGULATION THERAPY VISIT (OUTPATIENT)
Dept: ANTICOAGULATION | Facility: CLINIC | Age: 74
End: 2024-03-11

## 2024-03-11 VITALS
SYSTOLIC BLOOD PRESSURE: 134 MMHG | RESPIRATION RATE: 18 BRPM | OXYGEN SATURATION: 95 % | HEART RATE: 67 BPM | TEMPERATURE: 98.3 F | DIASTOLIC BLOOD PRESSURE: 83 MMHG

## 2024-03-11 DIAGNOSIS — I48.20 CHRONIC ATRIAL FIBRILLATION (H): Primary | ICD-10-CM

## 2024-03-11 DIAGNOSIS — I48.20 CHRONIC ATRIAL FIBRILLATION (H): ICD-10-CM

## 2024-03-11 DIAGNOSIS — E11.22 TYPE 2 DIABETES MELLITUS WITH STAGE 3A CHRONIC KIDNEY DISEASE, WITHOUT LONG-TERM CURRENT USE OF INSULIN (H): ICD-10-CM

## 2024-03-11 DIAGNOSIS — E53.8 VITAMIN B12 DEFICIENCY (NON ANEMIC): Primary | ICD-10-CM

## 2024-03-11 DIAGNOSIS — N18.31 TYPE 2 DIABETES MELLITUS WITH STAGE 3A CHRONIC KIDNEY DISEASE, WITHOUT LONG-TERM CURRENT USE OF INSULIN (H): ICD-10-CM

## 2024-03-11 LAB — INR PPP: 3.16 (ref 0.85–1.15)

## 2024-03-11 PROCEDURE — 36591 DRAW BLOOD OFF VENOUS DEVICE: CPT

## 2024-03-11 PROCEDURE — 85610 PROTHROMBIN TIME: CPT

## 2024-03-11 PROCEDURE — 250N000011 HC RX IP 250 OP 636: Performed by: INTERNAL MEDICINE

## 2024-03-11 PROCEDURE — 96372 THER/PROPH/DIAG INJ SC/IM: CPT | Performed by: INTERNAL MEDICINE

## 2024-03-11 RX ORDER — METHYLPREDNISOLONE SODIUM SUCCINATE 125 MG/2ML
125 INJECTION, POWDER, LYOPHILIZED, FOR SOLUTION INTRAMUSCULAR; INTRAVENOUS
Status: CANCELLED
Start: 2024-03-13

## 2024-03-11 RX ORDER — NALOXONE HYDROCHLORIDE 0.4 MG/ML
0.2 INJECTION, SOLUTION INTRAMUSCULAR; INTRAVENOUS; SUBCUTANEOUS
Status: CANCELLED | OUTPATIENT
Start: 2024-03-13

## 2024-03-11 RX ORDER — HEPARIN SODIUM (PORCINE) LOCK FLUSH IV SOLN 100 UNIT/ML 100 UNIT/ML
5 SOLUTION INTRAVENOUS
Status: DISCONTINUED | OUTPATIENT
Start: 2024-03-11 | End: 2024-03-11 | Stop reason: HOSPADM

## 2024-03-11 RX ORDER — MEPERIDINE HYDROCHLORIDE 25 MG/ML
25 INJECTION INTRAMUSCULAR; INTRAVENOUS; SUBCUTANEOUS EVERY 30 MIN PRN
Status: CANCELLED | OUTPATIENT
Start: 2024-03-13

## 2024-03-11 RX ORDER — ALBUTEROL SULFATE 0.83 MG/ML
2.5 SOLUTION RESPIRATORY (INHALATION)
Status: CANCELLED | OUTPATIENT
Start: 2024-03-13

## 2024-03-11 RX ORDER — CYANOCOBALAMIN 1000 UG/ML
1000 INJECTION, SOLUTION INTRAMUSCULAR; SUBCUTANEOUS ONCE
Status: COMPLETED | OUTPATIENT
Start: 2024-03-11 | End: 2024-03-11

## 2024-03-11 RX ORDER — HEPARIN SODIUM (PORCINE) LOCK FLUSH IV SOLN 100 UNIT/ML 100 UNIT/ML
5 SOLUTION INTRAVENOUS
Status: CANCELLED | OUTPATIENT
Start: 2024-03-11

## 2024-03-11 RX ORDER — EPINEPHRINE 1 MG/ML
0.3 INJECTION, SOLUTION INTRAMUSCULAR; SUBCUTANEOUS EVERY 5 MIN PRN
Status: CANCELLED | OUTPATIENT
Start: 2024-03-13

## 2024-03-11 RX ORDER — CYANOCOBALAMIN 1000 UG/ML
1000 INJECTION, SOLUTION INTRAMUSCULAR; SUBCUTANEOUS ONCE
Status: CANCELLED
Start: 2024-03-13 | End: 2024-03-13

## 2024-03-11 RX ORDER — ALBUTEROL SULFATE 90 UG/1
1-2 AEROSOL, METERED RESPIRATORY (INHALATION)
Status: CANCELLED
Start: 2024-03-13

## 2024-03-11 RX ORDER — DIPHENHYDRAMINE HYDROCHLORIDE 50 MG/ML
50 INJECTION INTRAMUSCULAR; INTRAVENOUS
Status: CANCELLED
Start: 2024-03-13

## 2024-03-11 RX ADMIN — Medication 5 ML: at 13:23

## 2024-03-11 RX ADMIN — CYANOCOBALAMIN 1000 MCG: 1000 INJECTION, SOLUTION INTRAMUSCULAR; SUBCUTANEOUS at 13:41

## 2024-03-11 NOTE — PROGRESS NOTES
Infusion Nursing Note:  Cam Walden presents today for vitamin B12 and INR check.    Patient seen by provider today: No   present during visit today: Not Applicable.    Note: VSS.  Pt assessed and feeling well today.  Vitmain b12 given IM into his right deltoid.      Intravenous Access:  Implanted Port- used for lab INR check.    Treatment Conditions:  Not Applicable.      Post Infusion Assessment:  Patient tolerated injection without incident.  Site patent and intact, free from redness, edema or discomfort.  No evidence of extravasations.  Access discontinued per protocol.       Discharge Plan:   Patient discharged in stable condition accompanied by: self.  Departure Mode: Ambulatory.      Amber Go RN

## 2024-03-11 NOTE — PROGRESS NOTES
ANTICOAGULATION MANAGEMENT     Cam Walden 74 year old male is on warfarin with supratherapeutic INR result. (Goal INR 2.0-3.0)    Recent labs: (last 7 days)     03/11/24  1322   INR 3.16*       ASSESSMENT     Source(s): Chart Review and Patient/Caregiver Call     Warfarin doses taken: Warfarin taken as instructed  Diet: No new diet changes identified  Medication/supplement changes: None noted  New illness, injury, or hospitalization: No  Signs or symptoms of bleeding or clotting: No  Previous result: Therapeutic last 2(+) visits  Additional findings: None       PLAN     Recommended plan for no diet, medication or health factor changes affecting INR     Dosing Instructions: Continue your current warfarin dose with next INR in 2 weeks       Summary  As of 3/11/2024      Full warfarin instructions:  5 mg every Sun, Tue, Thu; 10 mg all other days   Next INR check:  3/25/2024               Telephone call with Cam who verbalizes understanding and agrees to plan    Lab visit scheduled    Education provided:   Please call back if any changes to your diet, medications or how you've been taking warfarin  Goal range and lab monitoring: goal range and significance of current result and Importance of therapeutic range    Plan made per [facility] anticoagulation protocol    Jackie Rao RN  Anticoagulation Clinic  3/11/2024    _______________________________________________________________________     Anticoagulation Episode Summary       Current INR goal:  2.0-3.0   TTR:  59.5% (1 y)   Target end date:  Indefinite   Send INR reminders to:  [facility]    Indications    Atrial flutter (H) (Resolved) [I48.92]  Chronic atrial fibrillation (H) [I48.20]             Comments:               Anticoagulation Care Providers       Provider Role Specialty Phone number    Kamaljit Plata MD Referring Family Medicine [phone]

## 2024-03-12 ENCOUNTER — OFFICE VISIT (OUTPATIENT)
Dept: FAMILY MEDICINE | Facility: CLINIC | Age: 74
End: 2024-03-12
Payer: COMMERCIAL

## 2024-03-12 VITALS
HEIGHT: 72 IN | DIASTOLIC BLOOD PRESSURE: 82 MMHG | RESPIRATION RATE: 20 BRPM | TEMPERATURE: 97.3 F | HEART RATE: 78 BPM | BODY MASS INDEX: 39.45 KG/M2 | SYSTOLIC BLOOD PRESSURE: 136 MMHG | OXYGEN SATURATION: 95 % | WEIGHT: 291.25 LBS

## 2024-03-12 DIAGNOSIS — Z29.11 NEED FOR VACCINATION AGAINST RESPIRATORY SYNCYTIAL VIRUS: ICD-10-CM

## 2024-03-12 DIAGNOSIS — B35.6 TINEA CRURIS: ICD-10-CM

## 2024-03-12 DIAGNOSIS — E78.5 HYPERLIPIDEMIA LDL GOAL <130: ICD-10-CM

## 2024-03-12 DIAGNOSIS — E11.22 TYPE 2 DIABETES MELLITUS WITH STAGE 3A CHRONIC KIDNEY DISEASE, WITHOUT LONG-TERM CURRENT USE OF INSULIN (H): Primary | ICD-10-CM

## 2024-03-12 DIAGNOSIS — I48.20 CHRONIC ATRIAL FIBRILLATION (H): ICD-10-CM

## 2024-03-12 DIAGNOSIS — N18.31 TYPE 2 DIABETES MELLITUS WITH STAGE 3A CHRONIC KIDNEY DISEASE, WITHOUT LONG-TERM CURRENT USE OF INSULIN (H): Primary | ICD-10-CM

## 2024-03-12 DIAGNOSIS — Z13.220 SCREENING FOR HYPERLIPIDEMIA: ICD-10-CM

## 2024-03-12 DIAGNOSIS — E66.01 MORBID OBESITY (H): ICD-10-CM

## 2024-03-12 DIAGNOSIS — Z23 NEED FOR SHINGLES VACCINE: ICD-10-CM

## 2024-03-12 DIAGNOSIS — K51.40 INFLAMMATORY PSEUDOTUMOR OF COLON (H): ICD-10-CM

## 2024-03-12 PROBLEM — D70.2: Status: RESOLVED | Noted: 2023-03-20 | Resolved: 2024-03-12

## 2024-03-12 LAB
ALBUMIN SERPL BCG-MCNC: 4.2 G/DL (ref 3.5–5.2)
ALP SERPL-CCNC: 73 U/L (ref 40–150)
ALT SERPL W P-5'-P-CCNC: 43 U/L (ref 0–70)
ANION GAP SERPL CALCULATED.3IONS-SCNC: 12 MMOL/L (ref 7–15)
AST SERPL W P-5'-P-CCNC: 46 U/L (ref 0–45)
BILIRUB SERPL-MCNC: 0.5 MG/DL
BUN SERPL-MCNC: 17.6 MG/DL (ref 8–23)
CALCIUM SERPL-MCNC: 9.5 MG/DL (ref 8.8–10.2)
CHLORIDE SERPL-SCNC: 101 MMOL/L (ref 98–107)
CHOLEST SERPL-MCNC: 190 MG/DL
CREAT SERPL-MCNC: 1.59 MG/DL (ref 0.67–1.17)
CREAT UR-MCNC: 134 MG/DL
DEPRECATED HCO3 PLAS-SCNC: 23 MMOL/L (ref 22–29)
EGFRCR SERPLBLD CKD-EPI 2021: 46 ML/MIN/1.73M2
ERYTHROCYTE [DISTWIDTH] IN BLOOD BY AUTOMATED COUNT: 14.9 % (ref 10–15)
FASTING STATUS PATIENT QL REPORTED: NO
GLUCOSE SERPL-MCNC: 315 MG/DL (ref 70–99)
HBA1C MFR BLD: 10.8 % (ref 0–5.6)
HCT VFR BLD AUTO: 39.7 % (ref 40–53)
HDLC SERPL-MCNC: 31 MG/DL
HGB BLD-MCNC: 12.5 G/DL (ref 13.3–17.7)
LDLC SERPL CALC-MCNC: 80 MG/DL
MCH RBC QN AUTO: 28.3 PG (ref 26.5–33)
MCHC RBC AUTO-ENTMCNC: 31.5 G/DL (ref 31.5–36.5)
MCV RBC AUTO: 90 FL (ref 78–100)
MICROALBUMIN UR-MCNC: 118 MG/L
MICROALBUMIN/CREAT UR: 88.06 MG/G CR (ref 0–17)
NONHDLC SERPL-MCNC: 159 MG/DL
PLATELET # BLD AUTO: 252 10E3/UL (ref 150–450)
POTASSIUM SERPL-SCNC: 4 MMOL/L (ref 3.4–5.3)
PROT SERPL-MCNC: 7.5 G/DL (ref 6.4–8.3)
RBC # BLD AUTO: 4.42 10E6/UL (ref 4.4–5.9)
SODIUM SERPL-SCNC: 136 MMOL/L (ref 135–145)
TRIGL SERPL-MCNC: 393 MG/DL
WBC # BLD AUTO: 9.1 10E3/UL (ref 4–11)

## 2024-03-12 PROCEDURE — 99207 PR FOOT EXAM NO CHARGE: CPT | Performed by: FAMILY MEDICINE

## 2024-03-12 PROCEDURE — 90480 ADMN SARSCOV2 VAC 1/ONLY CMP: CPT | Performed by: FAMILY MEDICINE

## 2024-03-12 PROCEDURE — G0009 ADMIN PNEUMOCOCCAL VACCINE: HCPCS | Performed by: FAMILY MEDICINE

## 2024-03-12 PROCEDURE — 85027 COMPLETE CBC AUTOMATED: CPT | Performed by: FAMILY MEDICINE

## 2024-03-12 PROCEDURE — 36415 COLL VENOUS BLD VENIPUNCTURE: CPT | Performed by: FAMILY MEDICINE

## 2024-03-12 PROCEDURE — 82570 ASSAY OF URINE CREATININE: CPT | Performed by: FAMILY MEDICINE

## 2024-03-12 PROCEDURE — 99215 OFFICE O/P EST HI 40 MIN: CPT | Mod: 25 | Performed by: FAMILY MEDICINE

## 2024-03-12 PROCEDURE — 90677 PCV20 VACCINE IM: CPT | Performed by: FAMILY MEDICINE

## 2024-03-12 PROCEDURE — 83036 HEMOGLOBIN GLYCOSYLATED A1C: CPT | Performed by: FAMILY MEDICINE

## 2024-03-12 PROCEDURE — 80053 COMPREHEN METABOLIC PANEL: CPT | Performed by: FAMILY MEDICINE

## 2024-03-12 PROCEDURE — 82043 UR ALBUMIN QUANTITATIVE: CPT | Performed by: FAMILY MEDICINE

## 2024-03-12 PROCEDURE — 91320 SARSCV2 VAC 30MCG TRS-SUC IM: CPT | Performed by: FAMILY MEDICINE

## 2024-03-12 PROCEDURE — 80061 LIPID PANEL: CPT | Performed by: FAMILY MEDICINE

## 2024-03-12 RX ORDER — RESPIRATORY SYNCYTIAL VIRUS VACCINE 120MCG/0.5
0.5 KIT INTRAMUSCULAR ONCE
Qty: 1 EACH | Refills: 0 | Status: SHIPPED | OUTPATIENT
Start: 2024-03-12 | End: 2024-03-12

## 2024-03-12 RX ORDER — ATORVASTATIN CALCIUM 20 MG/1
20 TABLET, FILM COATED ORAL DAILY
Qty: 90 TABLET | Refills: 3 | Status: SHIPPED | OUTPATIENT
Start: 2024-03-12

## 2024-03-12 NOTE — LETTER
March 15, 2024      Cam LUIS MIGUEL Walden  [address]  [place] [address]        Dear ,    We are writing to inform you of your test results.    LDL cholesterol was within goal, continue with atorvastatin, liver transaminases were just minimally elevated, blood sugar was elevated, but hopefully the new medication will help.    Resulted Orders   Comprehensive metabolic panel   Result Value Ref Range    Sodium 136 135 - 145 mmol/L      Comment:      Reference intervals for this test were updated on 09/26/2023 to more accurately reflect our healthy population. There may be differences in the flagging of prior results with similar values performed with this method. Interpretation of those prior results can be made in the context of the updated reference intervals.     Potassium 4.0 3.4 - 5.3 mmol/L    Carbon Dioxide (CO2) 23 22 - 29 mmol/L    Anion Gap 12 7 - 15 mmol/L    Urea Nitrogen 17.6 8.0 - 23.0 mg/dL    Creatinine 1.59 (H) 0.67 - 1.17 mg/dL    GFR Estimate 46 (L) >60 mL/min/1.73m2    Calcium 9.5 8.8 - 10.2 mg/dL    Chloride 101 98 - 107 mmol/L    Glucose 315 (H) 70 - 99 mg/dL    Alkaline Phosphatase 73 40 - 150 U/L      Comment:      Reference intervals for this test were updated on 11/14/2023 to more accurately reflect our healthy population. There may be differences in the flagging of prior results with similar values performed with this method. Interpretation of those prior results can be made in the context of the updated reference intervals.    AST 46 (H) 0 - 45 U/L      Comment:      Reference intervals for this test were updated on 6/12/2023 to more accurately reflect our healthy population. There may be differences in the flagging of prior results with similar values performed with this method. Interpretation of those prior results can be made in the context of the updated reference intervals.    ALT 43 0 - 70 U/L      Comment:      Reference intervals for this test were updated on 6/12/2023  to more accurately reflect our healthy population. There may be differences in the flagging of prior results with similar values performed with this method. Interpretation of those prior results can be made in the context of the updated reference intervals.      Protein Total 7.5 6.4 - 8.3 g/dL    Albumin 4.2 3.5 - 5.2 g/dL    Bilirubin Total 0.5 <=1.2 mg/dL   Hemoglobin A1c   Result Value Ref Range    Hemoglobin A1C 10.8 (H) 0.0 - 5.6 %      Comment:      Normal <5.7%   Prediabetes 5.7-6.4%    Diabetes 6.5% or higher     Note: Adopted from ADA consensus guidelines.   Lipid panel reflex to direct LDL Fasting   Result Value Ref Range    Cholesterol 190 <200 mg/dL    Triglycerides 393 (H) <150 mg/dL    Direct Measure HDL 31 (L) >=40 mg/dL    LDL Cholesterol Calculated 80 <=100 mg/dL    Non HDL Cholesterol 159 (H) <130 mg/dL    Patient Fasting > 8hrs? No     Narrative    Cholesterol  Desirable:  <200 mg/dL    Triglycerides  Normal:  Less than 150 mg/dL  Borderline High:  150-199 mg/dL  High:  200-499 mg/dL  Very High:  Greater than or equal to 500 mg/dL    Direct Measure HDL  Female:  Greater than or equal to 50 mg/dL   Male:  Greater than or equal to 40 mg/dL    LDL Cholesterol  Desirable:  <100mg/dL  Above Desirable:  100-129 mg/dL   Borderline High:  130-159 mg/dL   High:  160-189 mg/dL   Very High:  >= 190 mg/dL    Non HDL Cholesterol  Desirable:  130 mg/dL  Above Desirable:  130-159 mg/dL  Borderline High:  160-189 mg/dL  High:  190-219 mg/dL  Very High:  Greater than or equal to 220 mg/dL   CBC with platelets   Result Value Ref Range    WBC Count 9.1 4.0 - 11.0 10e3/uL    RBC Count 4.42 4.40 - 5.90 10e6/uL    Hemoglobin 12.5 (L) 13.3 - 17.7 g/dL    Hematocrit 39.7 (L) 40.0 - 53.0 %    MCV 90 78 - 100 fL    MCH 28.3 26.5 - 33.0 pg    MCHC 31.5 31.5 - 36.5 g/dL    RDW 14.9 10.0 - 15.0 %    Platelet Count 252 150 - 450 10e3/uL   Albumin Random Urine Quantitative with Creat Ratio   Result Value Ref Range     Creatinine Urine mg/dL 134.0 mg/dL      Comment:      The reference ranges have not been established in urine creatinine. The results should be integrated into the clinical context for interpretation.    Albumin Urine mg/L 118.0 mg/L      Comment:      The reference ranges have not been established in urine albumin. The results should be integrated into the clinical context for interpretation.    Albumin Urine mg/g Cr 88.06 (H) 0.00 - 17.00 mg/g Cr      Comment:      Microalbuminuria is defined as an albumin:creatinine ratio of 17 to 299 for males and 25 to 299 for females. A ratio of albumin:creatinine of 300 or higher is indicative of overt proteinuria.  Due to biologic variability, positive results should be confirmed by a second, first-morning random or 24-hour timed urine specimen. If there is discrepancy, a third specimen is recommended. When 2 out of 3 results are in the microalbuminuria range, this is evidence for incipient nephropathy and warrants increased efforts at glucose control, blood pressure control, and institution of therapy with an angiotensin-converting-enzyme (ACE) inhibitor (if the patient can tolerate it).         If you have any questions or concerns, please call the clinic at the number listed above.       Sincerely,      Kamaljit Plata MD

## 2024-03-12 NOTE — PROGRESS NOTES
Assessment & Plan     Type 2 diabetes mellitus with stage 3a chronic kidney disease, without long-term current use of insulin (H)  Inadequately controlled with metformin only, discussed adding semaglutide, mild nausea with dulaglutide in the past, will start with low-dose 0.25 mg weekly.  Monitor for side effects.  Continue to work on healthy lifestyle changes.Consider SGLT2 inhibitor as another option.  - Adult Eye  Referral; Future  - Hemoglobin; Future  - Lipid panel reflex to direct LDL Non-fasting; Future  - Albumin Random Urine Quantitative with Creat Ratio; Future  - Lipid panel reflex to direct LDL Fasting; Future  - FOOT EXAM  - Comprehensive metabolic panel; Future  - Hemoglobin A1c; Future  - Albumin Random Urine Quantitative with Creat Ratio; Future  - Lipid panel reflex to direct LDL Fasting; Future  - CBC with platelets; Future  - Comprehensive metabolic panel  - Hemoglobin A1c  - Lipid panel reflex to direct LDL Fasting  - CBC with platelets  - semaglutide (OZEMPIC) 2 MG/3ML pen; Inject 0.25 mg Subcutaneous every 7 days  - Albumin Random Urine Quantitative with Creat Ratio    Morbid obesity (H)  Discussed healthy lifestyle changes, weight loss program.    Need for shingles vaccine      Need for vaccination against respiratory syncytial virus    - zoster vaccine recombinant adjuvanted (SHINGRIX) injection; Inject 0.5 mLs into the muscle once for 1 dose Pharmacist administered  - respiratory syncytial virus vaccine, bivalent (ABRYSVO) injection; Inject 0.5 mLs into the muscle once for 1 dose    Screening for hyperlipidemia  Discussed resuming Lipitor at a low dose.    Tinea cruris  Discussed adding a topical provider, keep area dry.  Consider referral to dermatologist.  - miconazole (MICATIN) 2 % external powder; Apply topically daily as needed for itching or other (groin area)    Chronic atrial fibrillation (H)  Rate control with metoprolol, anticoagulation with Coumadin.    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  Review of external notes as documented elsewhere in note  40 minutes spent by me on the date of the encounter doing chart review, review of outside records, review of test results, interpretation of tests, patient visit, and documentation       BMI  Estimated body mass index is 39.11 kg/m  as calculated from the following:    Height as of this encounter: 1.838 m (6' 0.36\").    Weight as of this encounter: 132.1 kg (291 lb 4 oz).   Weight management plan: Discussed healthy diet and exercise guidelines      Work on weight loss  Regular exercise    Subjective   Cam is a 74 year old, presenting for the following health issues:  Follow Up, Medication Refill, and Diabetes      3/12/2024     9:55 AM   Additional Questions   Roomed by Esther CHAKRABORTY     Medication Refill    History of Present Illness       Diabetes:   He presents for follow up of diabetes.    He is not checking blood glucose.         He has no concerns regarding his diabetes at this time.  He is having numbness in feet.  The patient has not had a diabetic eye exam in the last 12 months.          He eats 2-3 servings of fruits and vegetables daily.He consumes 0 sweetened beverage(s) daily.He exercises with enough effort to increase his heart rate 9 or less minutes per day.  He exercises with enough effort to increase his heart rate 3 or less days per week.   He is taking medications regularly.     Following up on chronic medical issues, including diabetes type 2, not currently checking his blood sugar, A1c is coming back at 10%, currently taking metformin 1000 mg twice daily, has tried Trulicity in the past with some mild nausea.  Interested on doing Ozempic starting at a low dose.Has a history of large B-cell lymphoma treated with chemotherapy.Crestor causes some numbness to his extremity, he does have peripheral neuropathy.  Still having rash in the groin area, has tried HC 2.5% cream and ketoconazole " "2% cream with intermittent improvement.        Review of Systems  Constitutional, HEENT, cardiovascular, pulmonary, gi and gu systems are negative, except as otherwise noted.      Objective    /82 (BP Location: Left arm, Patient Position: Sitting, Cuff Size: Adult Large)   Pulse 78   Temp 97.3  F (36.3  C)   Resp 20   Ht 1.838 m (6' 0.36\")   Wt 132.1 kg (291 lb 4 oz)   SpO2 95%   BMI 39.11 kg/m    Body mass index is 39.11 kg/m .  Physical Exam   GENERAL: alert and no distress  NECK: no adenopathy, no asymmetry, masses, or scars  RESP: lungs clear to auscultation - no rales, rhonchi or wheezes  CV: regular rate and rhythm, normal S1 S2, no S3 or S4, no murmur, click or rub, no peripheral edema  ABDOMEN: soft, nontender, no hepatosplenomegaly, no masses and bowel sounds normal  MS: no gross musculoskeletal defects noted, no edema  Diabetic foot exam: reduced sensation at Distal plantar feet  Bilateral.    Results for orders placed or performed in visit on 03/12/24   Hemoglobin A1c     Status: Abnormal   Result Value Ref Range    Hemoglobin A1C 10.8 (H) 0.0 - 5.6 %   CBC with platelets     Status: Abnormal   Result Value Ref Range    WBC Count 9.1 4.0 - 11.0 10e3/uL    RBC Count 4.42 4.40 - 5.90 10e6/uL    Hemoglobin 12.5 (L) 13.3 - 17.7 g/dL    Hematocrit 39.7 (L) 40.0 - 53.0 %    MCV 90 78 - 100 fL    MCH 28.3 26.5 - 33.0 pg    MCHC 31.5 31.5 - 36.5 g/dL    RDW 14.9 10.0 - 15.0 %    Platelet Count 252 150 - 450 10e3/uL           Signed Electronically by: Kamaljit Plata MD    "

## 2024-03-21 ENCOUNTER — PATIENT OUTREACH (OUTPATIENT)
Dept: ONCOLOGY | Facility: HOSPITAL | Age: 74
End: 2024-03-21
Payer: COMMERCIAL

## 2024-03-28 ENCOUNTER — ANTICOAGULATION THERAPY VISIT (OUTPATIENT)
Dept: ANTICOAGULATION | Facility: CLINIC | Age: 74
End: 2024-03-28

## 2024-03-28 ENCOUNTER — LAB (OUTPATIENT)
Dept: LAB | Facility: CLINIC | Age: 74
End: 2024-03-28
Payer: COMMERCIAL

## 2024-03-28 DIAGNOSIS — I48.20 CHRONIC ATRIAL FIBRILLATION (H): Primary | ICD-10-CM

## 2024-03-28 DIAGNOSIS — I48.20 CHRONIC ATRIAL FIBRILLATION (H): ICD-10-CM

## 2024-03-28 LAB — INR BLD: 4.3 (ref 0.9–1.1)

## 2024-03-28 PROCEDURE — 85610 PROTHROMBIN TIME: CPT

## 2024-03-28 PROCEDURE — 36416 COLLJ CAPILLARY BLOOD SPEC: CPT

## 2024-03-28 NOTE — PROGRESS NOTES
ANTICOAGULATION MANAGEMENT     Cam Walden 74 year old male is on warfarin with supratherapeutic INR result. (Goal INR 2.0-3.0)    Recent labs: (last 7 days)     03/28/24  0906   INR 4.3*       ASSESSMENT     Warfarin Lab Questionnaire    Warfarin Doses Last 7 Days      3/28/2024     9:03 AM   Dose in Tablet or Mg   TAB or MG? milligram (mg)     Pt Rptd Dose NIA MONDAY TUESDAY WED THURS FRIDAY SATURDAY   3/28/2024   9:03 AM 0.1 0.05 0.1 0.05 0.1 0.05 0.1         3/28/2024   Warfarin Lab Questionnaire   Missed doses within past 14 days? No   Changes in diet or alcohol within past 14 days? No   Medication changes since last result? No   Injuries or illness since last result? No   New shortness of breath, severe headaches or sudden changes in vision since last result? No   Abnormal bleeding since last result? No   Upcoming surgery, procedure? No   Best number to call with results? [phone]     Previous result: Therapeutic last 2(+) visits  Additional findings:  does use cbd gummies which he will try to keep track of along with green veg which he plans to increase by one serving weekly       PLAN     Recommended plan for temporary change(s) affecting INR     Dosing Instructions: hold dose then continue your current warfarin dose with next INR in 10 days       Summary  As of 3/28/2024      Full warfarin instructions:  3/28: Hold; Otherwise 5 mg every Sun, Tue, Thu; 10 mg all other days   Next INR check:  4/8/2024               Telephone call with Cam who verbalizes understanding and agrees to plan    Lab visit scheduled    Education provided:   Please call back if any changes to your diet, medications or how you've been taking warfarin    Plan made per [facility] anticoagulation protocol    Jaqueline Chua RN  Anticoagulation Clinic  3/28/2024    _______________________________________________________________________     Anticoagulation Episode Summary       Current INR goal:  2.0-3.0   TTR:  54.9% (1 y)   Target end  date:  Indefinite   Send INR reminders to:  [facility]    Indications    Atrial flutter (H) (Resolved) [I48.92]  Chronic atrial fibrillation (H) [I48.20]             Comments:               Anticoagulation Care Providers       Provider Role Specialty Phone number    Kamaljit Plata MD Referring Family Medicine [phone]

## 2024-03-29 DIAGNOSIS — I48.92 ATRIAL FLUTTER (H): ICD-10-CM

## 2024-03-29 RX ORDER — WARFARIN SODIUM 5 MG/1
5-10 TABLET ORAL DAILY
Qty: 150 TABLET | Refills: 1 | Status: SHIPPED | OUTPATIENT
Start: 2024-03-29

## 2024-03-29 NOTE — TELEPHONE ENCOUNTER
ANTICOAGULATION MANAGEMENT:  Medication Refill    Anticoagulation Summary  As of 3/28/2024      Warfarin maintenance plan:  5 mg (5 mg x 1) every Sun, Tue, Thu; 10 mg (5 mg x 2) all other days   Next INR check:  4/8/2024   Target end date:  Indefinite    Indications    Atrial flutter (H) (Resolved) [I48.92]  Chronic atrial fibrillation (H) [I48.20]                 Anticoagulation Care Providers       Provider Role Specialty Phone number    Kamaljit Plata MD Referring Family Medicine [phone]            Refill Criteria    Visit with referring provider/group: Meets criteria: office visit within referring provider group in the last 1 year on 3/12/24    [facility] referral last signed: 01/16/2024; within last year: Yes    Lab monitoring not exceeding 2 weeks overdue: Yes    Cam meets all criteria for refill. Rx instructions and quantity supplied updated to match patient's current dosing plan. Warfarin 90 day supply with 1 refill granted per [facility] protocol     Pratima Siddiqi RN  Anticoagulation Clinic

## 2024-04-08 ENCOUNTER — ANTICOAGULATION THERAPY VISIT (OUTPATIENT)
Dept: ANTICOAGULATION | Facility: CLINIC | Age: 74
End: 2024-04-08

## 2024-04-08 ENCOUNTER — INFUSION THERAPY VISIT (OUTPATIENT)
Dept: INFUSION THERAPY | Facility: HOSPITAL | Age: 74
End: 2024-04-08
Attending: INTERNAL MEDICINE
Payer: COMMERCIAL

## 2024-04-08 VITALS
OXYGEN SATURATION: 92 % | TEMPERATURE: 99.1 F | DIASTOLIC BLOOD PRESSURE: 76 MMHG | SYSTOLIC BLOOD PRESSURE: 116 MMHG | HEART RATE: 84 BPM | RESPIRATION RATE: 16 BRPM

## 2024-04-08 DIAGNOSIS — I48.20 CHRONIC ATRIAL FIBRILLATION (H): Primary | ICD-10-CM

## 2024-04-08 DIAGNOSIS — E11.22 TYPE 2 DIABETES MELLITUS WITH STAGE 3A CHRONIC KIDNEY DISEASE, WITHOUT LONG-TERM CURRENT USE OF INSULIN (H): Primary | ICD-10-CM

## 2024-04-08 DIAGNOSIS — I48.20 CHRONIC ATRIAL FIBRILLATION (H): ICD-10-CM

## 2024-04-08 DIAGNOSIS — N18.31 TYPE 2 DIABETES MELLITUS WITH STAGE 3A CHRONIC KIDNEY DISEASE, WITHOUT LONG-TERM CURRENT USE OF INSULIN (H): Primary | ICD-10-CM

## 2024-04-08 DIAGNOSIS — E53.8 VITAMIN B12 DEFICIENCY (NON ANEMIC): ICD-10-CM

## 2024-04-08 LAB — INR PPP: 1.91 (ref 0.85–1.15)

## 2024-04-08 PROCEDURE — 96372 THER/PROPH/DIAG INJ SC/IM: CPT | Performed by: INTERNAL MEDICINE

## 2024-04-08 PROCEDURE — 85610 PROTHROMBIN TIME: CPT

## 2024-04-08 PROCEDURE — 250N000011 HC RX IP 250 OP 636: Performed by: INTERNAL MEDICINE

## 2024-04-08 PROCEDURE — 36591 DRAW BLOOD OFF VENOUS DEVICE: CPT

## 2024-04-08 RX ORDER — CYANOCOBALAMIN 1000 UG/ML
1000 INJECTION, SOLUTION INTRAMUSCULAR; SUBCUTANEOUS ONCE
Status: CANCELLED
Start: 2024-04-10 | End: 2024-04-10

## 2024-04-08 RX ORDER — CYANOCOBALAMIN 1000 UG/ML
1000 INJECTION, SOLUTION INTRAMUSCULAR; SUBCUTANEOUS ONCE
Status: COMPLETED | OUTPATIENT
Start: 2024-04-08 | End: 2024-04-08

## 2024-04-08 RX ORDER — NALOXONE HYDROCHLORIDE 0.4 MG/ML
0.2 INJECTION, SOLUTION INTRAMUSCULAR; INTRAVENOUS; SUBCUTANEOUS
Status: CANCELLED | OUTPATIENT
Start: 2024-04-10

## 2024-04-08 RX ORDER — ALBUTEROL SULFATE 90 UG/1
1-2 AEROSOL, METERED RESPIRATORY (INHALATION)
Status: CANCELLED
Start: 2024-04-10

## 2024-04-08 RX ORDER — ALBUTEROL SULFATE 0.83 MG/ML
2.5 SOLUTION RESPIRATORY (INHALATION)
Status: CANCELLED | OUTPATIENT
Start: 2024-04-10

## 2024-04-08 RX ORDER — METHYLPREDNISOLONE SODIUM SUCCINATE 125 MG/2ML
125 INJECTION, POWDER, LYOPHILIZED, FOR SOLUTION INTRAMUSCULAR; INTRAVENOUS
Status: CANCELLED
Start: 2024-04-10

## 2024-04-08 RX ORDER — HEPARIN SODIUM (PORCINE) LOCK FLUSH IV SOLN 100 UNIT/ML 100 UNIT/ML
5 SOLUTION INTRAVENOUS
Status: CANCELLED | OUTPATIENT
Start: 2024-04-08

## 2024-04-08 RX ORDER — DIPHENHYDRAMINE HYDROCHLORIDE 50 MG/ML
50 INJECTION INTRAMUSCULAR; INTRAVENOUS
Status: CANCELLED
Start: 2024-04-10

## 2024-04-08 RX ORDER — EPINEPHRINE 1 MG/ML
0.3 INJECTION, SOLUTION INTRAMUSCULAR; SUBCUTANEOUS EVERY 5 MIN PRN
Status: CANCELLED | OUTPATIENT
Start: 2024-04-10

## 2024-04-08 RX ORDER — MEPERIDINE HYDROCHLORIDE 25 MG/ML
25 INJECTION INTRAMUSCULAR; INTRAVENOUS; SUBCUTANEOUS EVERY 30 MIN PRN
Status: CANCELLED | OUTPATIENT
Start: 2024-04-10

## 2024-04-08 RX ORDER — HEPARIN SODIUM (PORCINE) LOCK FLUSH IV SOLN 100 UNIT/ML 100 UNIT/ML
5 SOLUTION INTRAVENOUS
Status: DISCONTINUED | OUTPATIENT
Start: 2024-04-08 | End: 2024-04-08 | Stop reason: HOSPADM

## 2024-04-08 RX ADMIN — Medication 5 ML: at 09:10

## 2024-04-08 RX ADMIN — CYANOCOBALAMIN 1000 MCG: 1000 INJECTION, SOLUTION INTRAMUSCULAR; SUBCUTANEOUS at 09:26

## 2024-04-08 NOTE — PROGRESS NOTES
ANTICOAGULATION MANAGEMENT     Cam Walden 74 year old male is on warfarin with subtherapeutic INR result. (Goal INR 2.0-3.0)    Recent labs: (last 7 days)     04/08/24  0909   INR 1.91*       ASSESSMENT     Source(s): Chart Review and Patient/Caregiver Call     Warfarin doses taken: Missed dose(s) may be affecting INR  Diet: No new diet changes identified  Medication/supplement changes: None noted  New illness, injury, or hospitalization: No  Signs or symptoms of bleeding or clotting: No  Previous result: Supratherapeutic  Additional findings: None       PLAN     Recommended plan for no diet, medication or health factor changes affecting INR     Dosing Instructions: Continue your current warfarin dose with next INR in 2 weeks       Summary  As of 4/8/2024      Full warfarin instructions:  5 mg every Sun, Tue, Thu; 10 mg all other days   Next INR check:  4/22/2024               Telephone call with Cam who verbalizes understanding and agrees to plan    Lab visit scheduled    Education provided:   Please call back if any changes to your diet, medications or how you've been taking warfarin    Plan made per [facility] anticoagulation protocol    Jaqueline Chua RN  Anticoagulation Clinic  4/8/2024    _______________________________________________________________________     Anticoagulation Episode Summary       Current INR goal:  2.0-3.0   TTR:  53.2% (1 y)   Target end date:  Indefinite   Send INR reminders to:  [facility]    Indications    Atrial flutter (H) (Resolved) [I48.92]  Chronic atrial fibrillation (H) [I48.20]             Comments:               Anticoagulation Care Providers       Provider Role Specialty Phone number    Kamaljit Plata MD Referring Family Medicine [phone]

## 2024-04-08 NOTE — PROGRESS NOTES
Infusion Nursing Note:  Cam LUIS MIGUEL Walden presents today for Labs and B12 injection.    Patient seen by provider today: No   present during visit today: Not Applicable.    Note: N/A.      Intravenous Access:  Implanted Port.    Treatment Conditions:  Not Applicable.      Post Infusion Assessment:  Patient tolerated injection without incident.       Discharge Plan:   Patient discharged in stable condition accompanied by: self.  Departure Mode: Ambulatory.      Libra Angel RN

## 2024-04-13 DIAGNOSIS — E11.42 DIABETIC POLYNEUROPATHY ASSOCIATED WITH TYPE 2 DIABETES MELLITUS (H): ICD-10-CM

## 2024-04-15 RX ORDER — DULOXETIN HYDROCHLORIDE 30 MG/1
30 CAPSULE, DELAYED RELEASE ORAL 2 TIMES DAILY
Qty: 180 CAPSULE | Refills: 2 | Status: SHIPPED | OUTPATIENT
Start: 2024-04-15

## 2024-04-22 ENCOUNTER — LAB (OUTPATIENT)
Dept: LAB | Facility: CLINIC | Age: 74
End: 2024-04-22
Payer: COMMERCIAL

## 2024-04-22 ENCOUNTER — ANTICOAGULATION THERAPY VISIT (OUTPATIENT)
Dept: ANTICOAGULATION | Facility: CLINIC | Age: 74
End: 2024-04-22

## 2024-04-22 DIAGNOSIS — I48.20 CHRONIC ATRIAL FIBRILLATION (H): ICD-10-CM

## 2024-04-22 DIAGNOSIS — I48.20 CHRONIC ATRIAL FIBRILLATION (H): Primary | ICD-10-CM

## 2024-04-22 LAB — INR BLD: 3.1 (ref 0.9–1.1)

## 2024-04-22 PROCEDURE — 85610 PROTHROMBIN TIME: CPT

## 2024-04-22 PROCEDURE — 36416 COLLJ CAPILLARY BLOOD SPEC: CPT

## 2024-04-22 NOTE — PROGRESS NOTES
ANTICOAGULATION MANAGEMENT     Cam Walden 74 year old male is on warfarin with therapeutic INR result. (Goal INR 2.0-3.0)    Recent labs: (last 7 days)     04/22/24  0924   INR 3.1*       ASSESSMENT     Warfarin Lab Questionnaire    Warfarin Doses Last 7 Days      4/22/2024     9:25 AM   Dose in Tablet or Mg   TAB or MG? tablet (tab)     Pt Rptd Dose SUNDAY MONDAY TUESDAY WED THURS FRIDAY SATURDAY 4/22/2024   9:25 AM 2 1 2 1 2 1 2         4/22/2024   Warfarin Lab Questionnaire   Missed doses within past 14 days? No   Changes in diet or alcohol within past 14 days? No   Medication changes since last result? No   Injuries or illness since last result? No   New shortness of breath, severe headaches or sudden changes in vision since last result? No   Abnormal bleeding since last result? No   Upcoming surgery, procedure? No   Best number to call with results? [phone]     Previous result: Therapeutic last visit; previously outside of goal range  Additional findings: None       PLAN     Recommended plan for no diet, medication or health factor changes affecting INR     Dosing Instructions: Continue your current warfarin dose with next INR in 2 weeks       Summary  As of 4/22/2024      Full warfarin instructions:  5 mg every Sun, Tue, Thu; 10 mg all other days   Next INR check:  5/6/2024               Telephone call with Cam who verbalizes understanding and agrees to plan    Lab visit scheduled    Education provided:   Please call back if any changes to your diet, medications or how you've been taking warfarin    Plan made per [facility] anticoagulation protocol    Jaqueline Chua RN  Anticoagulation Clinic  4/22/2024    _______________________________________________________________________     Anticoagulation Episode Summary       Current INR goal:  2.0-3.0   TTR:  52.6% (1 y)   Target end date:  Indefinite   Send INR reminders to:  [facility] [place]    Indications    Atrial flutter (H) (Resolved) [I48.92]  Chronic  atrial fibrillation (H) [I48.20]             Comments:               Anticoagulation Care Providers       Provider Role Specialty Phone number    Kamaljit Plata MD Referring Family Medicine [phone]

## 2024-04-29 ENCOUNTER — TELEPHONE (OUTPATIENT)
Dept: OPHTHALMOLOGY | Facility: CLINIC | Age: 74
End: 2024-04-29
Payer: COMMERCIAL

## 2024-05-02 ENCOUNTER — OFFICE VISIT (OUTPATIENT)
Dept: OPHTHALMOLOGY | Facility: CLINIC | Age: 74
End: 2024-05-02
Payer: COMMERCIAL

## 2024-05-02 ENCOUNTER — TELEPHONE (OUTPATIENT)
Dept: OPHTHALMOLOGY | Facility: CLINIC | Age: 74
End: 2024-05-02

## 2024-05-02 DIAGNOSIS — E11.22 TYPE 2 DIABETES MELLITUS WITH STAGE 3A CHRONIC KIDNEY DISEASE, WITHOUT LONG-TERM CURRENT USE OF INSULIN (H): ICD-10-CM

## 2024-05-02 DIAGNOSIS — H47.391 MYELINATED NERVE FIBERS OF OPTIC DISC OF RIGHT EYE: ICD-10-CM

## 2024-05-02 DIAGNOSIS — H52.03 HYPEROPIA WITH PRESBYOPIA OF BOTH EYES: ICD-10-CM

## 2024-05-02 DIAGNOSIS — N18.31 TYPE 2 DIABETES MELLITUS WITH STAGE 3A CHRONIC KIDNEY DISEASE, WITHOUT LONG-TERM CURRENT USE OF INSULIN (H): ICD-10-CM

## 2024-05-02 DIAGNOSIS — E11.9 DIABETES MELLITUS TYPE 2 WITHOUT RETINOPATHY (H): Primary | ICD-10-CM

## 2024-05-02 DIAGNOSIS — H52.4 HYPEROPIA WITH PRESBYOPIA OF BOTH EYES: ICD-10-CM

## 2024-05-02 PROCEDURE — 92015 DETERMINE REFRACTIVE STATE: CPT | Performed by: OPTOMETRIST

## 2024-05-02 PROCEDURE — 92004 COMPRE OPH EXAM NEW PT 1/>: CPT | Performed by: OPTOMETRIST

## 2024-05-02 ASSESSMENT — CONF VISUAL FIELD
OS_INFERIOR_NASAL_RESTRICTION: 0
OD_SUPERIOR_TEMPORAL_RESTRICTION: 0
OS_NORMAL: 1
METHOD: COUNTING FINGERS
OD_SUPERIOR_NASAL_RESTRICTION: 0
OD_INFERIOR_TEMPORAL_RESTRICTION: 0
OS_SUPERIOR_TEMPORAL_RESTRICTION: 0
OS_INFERIOR_TEMPORAL_RESTRICTION: 0
OD_NORMAL: 1
OS_SUPERIOR_NASAL_RESTRICTION: 0
OD_INFERIOR_NASAL_RESTRICTION: 0

## 2024-05-02 ASSESSMENT — VISUAL ACUITY
OS_PH_SC: 20/25
OS_SC: J10-1
OS_SC: 20/25
OS_PH_SC+: +2
OD_PH_SC: 20/20
METHOD: SNELLEN - LINEAR
OS_SC+: -2
OD_PH_SC+: -2
OD_SC+: -2
OD_SC: J16
OD_SC: 20/30

## 2024-05-02 ASSESSMENT — REFRACTION_MANIFEST
OD_CYLINDER: SPHERE
OD_SPHERE: +1.00
OS_SPHERE: +0.75
OD_ADD: +2.50
OS_CYLINDER: SPHERE
OS_ADD: +2.50

## 2024-05-02 ASSESSMENT — TONOMETRY
OD_IOP_MMHG: 11
OS_IOP_MMHG: 10
IOP_METHOD: ICARE

## 2024-05-02 ASSESSMENT — CUP TO DISC RATIO
OS_RATIO: 0.3
OD_RATIO: 0.3

## 2024-05-02 ASSESSMENT — SLIT LAMP EXAM - LIDS
COMMENTS: COLLARETTES
COMMENTS: COLLARETTES

## 2024-05-02 ASSESSMENT — EXTERNAL EXAM - RIGHT EYE: OD_EXAM: NORMAL

## 2024-05-02 ASSESSMENT — EXTERNAL EXAM - LEFT EYE: OS_EXAM: NORMAL

## 2024-05-02 NOTE — Clinical Note
Thank you for referring Cam LUIS MIGUEL Walden for his annual eye exam. No diabetic retinopathy noted on examination today. Recommended repeat evaluation in 1 year. Please contact me with any questions. Darrell Desai, OD on 5/2/2024 at 9:55 AM

## 2024-05-02 NOTE — TELEPHONE ENCOUNTER
Spoke with patient regarding scheduling for a TECH Only Appointment at [facility] Location.  Scheduled  patient accordingly for : TECH ONLY Appointment for: Fundus photos in 1 month -Per . Provided appointment details for patient. Patient requested a Recall Letter for Returning in one year with . -Per Patient     1. 1 year with Dr. Desai -made recall  2. Fundus photos in 1 month -scheduled appointment

## 2024-05-02 NOTE — NURSING NOTE
Chief Complaints and History of Present Illnesses   Patient presents with    COMPREHENSIVE EYE EXAM     Cam Walden is being seen for a consult today by the request of Dr. Plata  for a Comprehensive exam/ Diabetic     Chief Complaint(s) and History of Present Illness(es)       COMPREHENSIVE EYE EXAM              Laterality: both eyes    Associated symptoms: Negative for eye pain    Treatments tried: no treatments    Pain scale: 0/10    Comments: Cam Walden is being seen for a consult today by the request of Dr. Plata  for a Comprehensive exam/ Diabetic              Comments    Lab Results       Component                Value               Date                       A1C                      10.8                03/12/2024                 A1C                      9.4                 03/20/2023                 A1C                      6.5                 10/05/2021                 A1C                      5.2                 02/09/2021                 A1C                      8.6                 10/13/2020     Patient reports not having any vision issues. Distance fine uncorrected. OTC readers near working fine.   Denies any issues with floater each eye.     PAZ Stock COT 8:44 AM May 2, 2024

## 2024-05-02 NOTE — PROGRESS NOTES
Spoke with patient regarding scheduling for a TECH Only Appointment at [facility] Location.  Scheduled  patient accordingly for : TECH ONLY Appointment for: Fundus photos in 1 month -Per . Provided appointment details for patient. Patient requested a Recall Letter for Returning in one year with . -Per Patient     1. 1 year with Dr. Desai -made recall  2. Fundus photos in 1 month -scheduled appointment

## 2024-05-02 NOTE — PROGRESS NOTES
History  HPI       COMPREHENSIVE EYE EXAM    In both eyes.  Associated symptoms include Negative for eye pain.  Treatments tried include no treatments.  Pain was noted as 0/10. Additional comments: Cam Walden is being seen for a consult today by the request of Dr. Plata  for a Comprehensive exam/ Diabetic             Comments    Lab Results       Component                Value               Date                       A1C                      10.8                03/12/2024                 A1C                      9.4                 03/20/2023                 A1C                      6.5                 10/05/2021                 A1C                      5.2                 02/09/2021                 A1C                      8.6                 10/13/2020     Patient reports not having any vision issues. Distance fine uncorrected. OTC readers near working fine.   Denies any issues with floater each eye.     PAZ Stock COT 8:44 AM May 2, 2024                            Last edited by Mary Holloway COT on 5/2/2024  8:44 AM.          Assessment/Plan  (E11.9) Diabetes mellitus type 2 without retinopathy (H)  (primary encounter diagnosis)  (E11.22,  N18.31) Type 2 diabetes mellitus with stage 3a chronic kidney disease, without long-term current use of insulin (H)  Comment: No retinopathy  Plan:  Educated patient on clinical findings and the importance of continued management with primary care physician. Continue management as directed and return to clinic in 1 year for dilated exam, or sooner, as needed. Copy of chart sent to Dr. Plata.    (H52.03,  H52.4) Hyperopia with presbyopia of both eyes  Comment: Hyperopia with presbyopia both eyes   Plan: REFRACTION         Dispensed spectacle prescription for as-needed wear or continue use of reading glasses as needed. Monitor annually.    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 Monitor annually.    Return to clinic in 1 year for comprehensive eye exam.    Complete documentation of historical and exam elements from today's encounter can  be found in the full encounter summary report (not reduplicated in this progress  note). I personally obtained the chief complaint(s) and history of present illness. I  confirmed and edited as necessary the review of systems, past medical/surgical  history, family history, social history, and examination findings as documented by  others; and I examined the patient myself. I personally reviewed the relevant tests,  images, and reports as documented above. I formulated and edited as necessary the  assessment and plan and discussed the findings and management plan with the  patient and family.    Darrell Desai, KULDIP, FAAO

## 2024-05-04 DIAGNOSIS — I48.92 ATRIAL FLUTTER, UNSPECIFIED TYPE (H): ICD-10-CM

## 2024-05-06 ENCOUNTER — LAB (OUTPATIENT)
Dept: INFUSION THERAPY | Facility: HOSPITAL | Age: 74
End: 2024-05-06
Attending: INTERNAL MEDICINE
Payer: COMMERCIAL

## 2024-05-06 VITALS
SYSTOLIC BLOOD PRESSURE: 117 MMHG | HEART RATE: 78 BPM | DIASTOLIC BLOOD PRESSURE: 71 MMHG | OXYGEN SATURATION: 98 % | RESPIRATION RATE: 18 BRPM | TEMPERATURE: 98.4 F

## 2024-05-06 DIAGNOSIS — N18.31 TYPE 2 DIABETES MELLITUS WITH STAGE 3A CHRONIC KIDNEY DISEASE, WITHOUT LONG-TERM CURRENT USE OF INSULIN (H): ICD-10-CM

## 2024-05-06 DIAGNOSIS — E11.22 TYPE 2 DIABETES MELLITUS WITH STAGE 3A CHRONIC KIDNEY DISEASE, WITHOUT LONG-TERM CURRENT USE OF INSULIN (H): ICD-10-CM

## 2024-05-06 DIAGNOSIS — C83.398 DIFFUSE LARGE B-CELL LYMPHOMA OF EXTRANODAL SITE: ICD-10-CM

## 2024-05-06 DIAGNOSIS — E53.8 VITAMIN B12 DEFICIENCY (NON ANEMIC): Primary | ICD-10-CM

## 2024-05-06 LAB
ALBUMIN SERPL BCG-MCNC: 4 G/DL (ref 3.5–5.2)
ALP SERPL-CCNC: 105 U/L (ref 40–150)
ALT SERPL W P-5'-P-CCNC: 22 U/L (ref 0–70)
ANION GAP SERPL CALCULATED.3IONS-SCNC: 13 MMOL/L (ref 7–15)
AST SERPL W P-5'-P-CCNC: 25 U/L (ref 0–45)
BASOPHILS # BLD AUTO: 0 10E3/UL (ref 0–0.2)
BASOPHILS NFR BLD AUTO: 0 %
BILIRUB SERPL-MCNC: 0.5 MG/DL
BUN SERPL-MCNC: 19.4 MG/DL (ref 8–23)
CALCIUM SERPL-MCNC: 9.2 MG/DL (ref 8.8–10.2)
CHLORIDE SERPL-SCNC: 96 MMOL/L (ref 98–107)
CREAT SERPL-MCNC: 1.52 MG/DL (ref 0.67–1.17)
DEPRECATED HCO3 PLAS-SCNC: 25 MMOL/L (ref 22–29)
EGFRCR SERPLBLD CKD-EPI 2021: 48 ML/MIN/1.73M2
EOSINOPHIL # BLD AUTO: 0.5 10E3/UL (ref 0–0.7)
EOSINOPHIL NFR BLD AUTO: 5 %
ERYTHROCYTE [DISTWIDTH] IN BLOOD BY AUTOMATED COUNT: 14.3 % (ref 10–15)
GLUCOSE SERPL-MCNC: 362 MG/DL (ref 70–99)
HCT VFR BLD AUTO: 37.3 % (ref 40–53)
HGB BLD-MCNC: 11.9 G/DL (ref 13.3–17.7)
IMM GRANULOCYTES # BLD: 0 10E3/UL
IMM GRANULOCYTES NFR BLD: 0 %
LDH SERPL L TO P-CCNC: 175 U/L (ref 0–250)
LYMPHOCYTES # BLD AUTO: 2.8 10E3/UL (ref 0.8–5.3)
LYMPHOCYTES NFR BLD AUTO: 31 %
MCH RBC QN AUTO: 28.9 PG (ref 26.5–33)
MCHC RBC AUTO-ENTMCNC: 31.9 G/DL (ref 31.5–36.5)
MCV RBC AUTO: 91 FL (ref 78–100)
MONOCYTES # BLD AUTO: 0.7 10E3/UL (ref 0–1.3)
MONOCYTES NFR BLD AUTO: 8 %
NEUTROPHILS # BLD AUTO: 5 10E3/UL (ref 1.6–8.3)
NEUTROPHILS NFR BLD AUTO: 56 %
NRBC # BLD AUTO: 0 10E3/UL
NRBC BLD AUTO-RTO: 0 /100
PLATELET # BLD AUTO: 219 10E3/UL (ref 150–450)
POTASSIUM SERPL-SCNC: 4.1 MMOL/L (ref 3.4–5.3)
PROT SERPL-MCNC: 7.1 G/DL (ref 6.4–8.3)
RBC # BLD AUTO: 4.12 10E6/UL (ref 4.4–5.9)
SODIUM SERPL-SCNC: 134 MMOL/L (ref 135–145)
WBC # BLD AUTO: 9 10E3/UL (ref 4–11)

## 2024-05-06 PROCEDURE — 250N000011 HC RX IP 250 OP 636: Performed by: INTERNAL MEDICINE

## 2024-05-06 PROCEDURE — 85025 COMPLETE CBC W/AUTO DIFF WBC: CPT

## 2024-05-06 PROCEDURE — 83615 LACTATE (LD) (LDH) ENZYME: CPT

## 2024-05-06 PROCEDURE — 96372 THER/PROPH/DIAG INJ SC/IM: CPT | Performed by: INTERNAL MEDICINE

## 2024-05-06 PROCEDURE — 36591 DRAW BLOOD OFF VENOUS DEVICE: CPT

## 2024-05-06 PROCEDURE — 80053 COMPREHEN METABOLIC PANEL: CPT

## 2024-05-06 RX ORDER — METOPROLOL TARTRATE 100 MG
TABLET ORAL
Qty: 180 TABLET | Refills: 2 | Status: SHIPPED | OUTPATIENT
Start: 2024-05-06

## 2024-05-06 RX ORDER — MEPERIDINE HYDROCHLORIDE 25 MG/ML
25 INJECTION INTRAMUSCULAR; INTRAVENOUS; SUBCUTANEOUS EVERY 30 MIN PRN
Status: CANCELLED | OUTPATIENT
Start: 2024-05-08

## 2024-05-06 RX ORDER — ALBUTEROL SULFATE 90 UG/1
1-2 AEROSOL, METERED RESPIRATORY (INHALATION)
Status: DISCONTINUED | OUTPATIENT
Start: 2024-05-06 | End: 2024-05-06 | Stop reason: HOSPADM

## 2024-05-06 RX ORDER — DIPHENHYDRAMINE HYDROCHLORIDE 50 MG/ML
50 INJECTION INTRAMUSCULAR; INTRAVENOUS
Status: CANCELLED
Start: 2024-05-08

## 2024-05-06 RX ORDER — EPINEPHRINE 1 MG/ML
0.3 INJECTION, SOLUTION INTRAMUSCULAR; SUBCUTANEOUS EVERY 5 MIN PRN
Status: CANCELLED | OUTPATIENT
Start: 2024-05-08

## 2024-05-06 RX ORDER — HEPARIN SODIUM (PORCINE) LOCK FLUSH IV SOLN 100 UNIT/ML 100 UNIT/ML
5 SOLUTION INTRAVENOUS
Status: DISCONTINUED | OUTPATIENT
Start: 2024-05-06 | End: 2024-05-06 | Stop reason: HOSPADM

## 2024-05-06 RX ORDER — ALBUTEROL SULFATE 0.83 MG/ML
2.5 SOLUTION RESPIRATORY (INHALATION)
Status: DISCONTINUED | OUTPATIENT
Start: 2024-05-06 | End: 2024-05-06 | Stop reason: HOSPADM

## 2024-05-06 RX ORDER — HEPARIN SODIUM (PORCINE) LOCK FLUSH IV SOLN 100 UNIT/ML 100 UNIT/ML
5 SOLUTION INTRAVENOUS
Status: CANCELLED | OUTPATIENT
Start: 2024-05-06

## 2024-05-06 RX ORDER — METHYLPREDNISOLONE SODIUM SUCCINATE 125 MG/2ML
125 INJECTION, POWDER, LYOPHILIZED, FOR SOLUTION INTRAMUSCULAR; INTRAVENOUS
Status: DISCONTINUED | OUTPATIENT
Start: 2024-05-06 | End: 2024-05-06 | Stop reason: HOSPADM

## 2024-05-06 RX ORDER — ALBUTEROL SULFATE 90 UG/1
1-2 AEROSOL, METERED RESPIRATORY (INHALATION)
Status: CANCELLED
Start: 2024-05-08

## 2024-05-06 RX ORDER — NALOXONE HYDROCHLORIDE 0.4 MG/ML
0.2 INJECTION, SOLUTION INTRAMUSCULAR; INTRAVENOUS; SUBCUTANEOUS
Status: DISCONTINUED | OUTPATIENT
Start: 2024-05-06 | End: 2024-05-06 | Stop reason: HOSPADM

## 2024-05-06 RX ORDER — NALOXONE HYDROCHLORIDE 0.4 MG/ML
0.2 INJECTION, SOLUTION INTRAMUSCULAR; INTRAVENOUS; SUBCUTANEOUS
Status: CANCELLED | OUTPATIENT
Start: 2024-05-08

## 2024-05-06 RX ORDER — METHYLPREDNISOLONE SODIUM SUCCINATE 125 MG/2ML
125 INJECTION, POWDER, LYOPHILIZED, FOR SOLUTION INTRAMUSCULAR; INTRAVENOUS
Status: CANCELLED
Start: 2024-05-08

## 2024-05-06 RX ORDER — MEPERIDINE HYDROCHLORIDE 25 MG/ML
25 INJECTION INTRAMUSCULAR; INTRAVENOUS; SUBCUTANEOUS EVERY 30 MIN PRN
Status: DISCONTINUED | OUTPATIENT
Start: 2024-05-06 | End: 2024-05-06 | Stop reason: HOSPADM

## 2024-05-06 RX ORDER — EPINEPHRINE 1 MG/ML
0.3 INJECTION, SOLUTION INTRAMUSCULAR; SUBCUTANEOUS EVERY 5 MIN PRN
Status: DISCONTINUED | OUTPATIENT
Start: 2024-05-06 | End: 2024-05-06 | Stop reason: HOSPADM

## 2024-05-06 RX ORDER — DIPHENHYDRAMINE HYDROCHLORIDE 50 MG/ML
50 INJECTION INTRAMUSCULAR; INTRAVENOUS
Status: DISCONTINUED | OUTPATIENT
Start: 2024-05-06 | End: 2024-05-06 | Stop reason: HOSPADM

## 2024-05-06 RX ORDER — ALBUTEROL SULFATE 0.83 MG/ML
2.5 SOLUTION RESPIRATORY (INHALATION)
Status: CANCELLED | OUTPATIENT
Start: 2024-05-08

## 2024-05-06 RX ORDER — CYANOCOBALAMIN 1000 UG/ML
1000 INJECTION, SOLUTION INTRAMUSCULAR; SUBCUTANEOUS ONCE
Status: CANCELLED
Start: 2024-05-08 | End: 2024-05-08

## 2024-05-06 RX ORDER — CYANOCOBALAMIN 1000 UG/ML
1000 INJECTION, SOLUTION INTRAMUSCULAR; SUBCUTANEOUS ONCE
Status: COMPLETED | OUTPATIENT
Start: 2024-05-06 | End: 2024-05-06

## 2024-05-06 RX ADMIN — CYANOCOBALAMIN 1000 MCG: 1000 INJECTION, SOLUTION INTRAMUSCULAR; SUBCUTANEOUS at 09:39

## 2024-05-06 RX ADMIN — Medication 5 ML: at 09:21

## 2024-05-06 ASSESSMENT — PAIN SCALES - GENERAL: PAINLEVEL: NO PAIN (0)

## 2024-05-06 NOTE — PROGRESS NOTES
Infusion Nursing Note:  Cam Walden presents today for Labs and B12 injection.    Patient seen by provider today: No   present during visit today: Not Applicable.    Note: VSS.  Pt assessed.  Vitmain b12 given IM into his right deltoid.      Intravenous Access:  Implanted Port.    Treatment Conditions:  Not Applicable.      Post Infusion Assessment:  Patient tolerated injection in right arm without incident.       Discharge Plan:   Discharge instructions reviewed with: Patient.  Patient discharged in stable condition accompanied by: self.  Departure Mode: Ambulatory.      Yvonne Meneses RN

## 2024-05-14 ENCOUNTER — TELEPHONE (OUTPATIENT)
Dept: ANTICOAGULATION | Facility: CLINIC | Age: 74
End: 2024-05-14
Payer: COMMERCIAL

## 2024-05-14 NOTE — TELEPHONE ENCOUNTER
ANTICOAGULATION     Cam Walden is overdue for an INR check.     Spoke with Cam and scheduled lab appointment on 5/15    Jaqueline Chua RN

## 2024-05-15 ENCOUNTER — LAB (OUTPATIENT)
Dept: LAB | Facility: CLINIC | Age: 74
End: 2024-05-15
Payer: COMMERCIAL

## 2024-05-15 ENCOUNTER — ANTICOAGULATION THERAPY VISIT (OUTPATIENT)
Dept: ANTICOAGULATION | Facility: CLINIC | Age: 74
End: 2024-05-15

## 2024-05-15 DIAGNOSIS — I48.20 CHRONIC ATRIAL FIBRILLATION (H): ICD-10-CM

## 2024-05-15 DIAGNOSIS — I48.20 CHRONIC ATRIAL FIBRILLATION (H): Primary | ICD-10-CM

## 2024-05-15 LAB — INR BLD: 2.3 (ref 0.9–1.1)

## 2024-05-15 PROCEDURE — 85610 PROTHROMBIN TIME: CPT

## 2024-05-15 PROCEDURE — 36416 COLLJ CAPILLARY BLOOD SPEC: CPT

## 2024-05-15 NOTE — PROGRESS NOTES
ANTICOAGULATION MANAGEMENT     Cam Walden 74 year old male is on warfarin with therapeutic INR result. (Goal INR 2.0-3.0)    Recent labs: (last 7 days)     05/15/24  0845   INR 2.3*       ASSESSMENT     Warfarin Lab Questionnaire    Warfarin Doses Last 7 Days      5/15/2024     8:48 AM   Dose in Tablet or Mg   TAB or MG? milligram (mg)     Pt Rptd Dose NIA MONDAY TUESDAY WED THURS FRIDAY SATURDAY   5/15/2024   8:48 AM 10 5 10 5 10 5 10         5/15/2024   Warfarin Lab Questionnaire   Missed doses within past 14 days? No   Changes in diet or alcohol within past 14 days? No   Medication changes since last result? No   Injuries or illness since last result? No   New shortness of breath, severe headaches or sudden changes in vision since last result? No   Abnormal bleeding since last result? No   Upcoming surgery, procedure? No   Best number to call with results? [phone]     Previous result: Supratherapeutic  Additional findings: None       PLAN     Recommended plan for no diet, medication or health factor changes affecting INR     Dosing Instructions: Continue your current warfarin dose with next INR in 3 weeks       Summary  As of 5/15/2024      Full warfarin instructions:  5 mg every Sun, Tue, Thu; 10 mg all other days   Next INR check:  6/3/2024               Telephone call with Cam who verbalizes understanding and agrees to plan    Lab visit scheduled    Education provided:   Please call back if any changes to your diet, medications or how you've been taking warfarin    Plan made per [facility] anticoagulation protocol    Jaqueline Chua RN  Anticoagulation Clinic  5/15/2024    _______________________________________________________________________     Anticoagulation Episode Summary       Current INR goal:  2.0-3.0   TTR:  51.8% (1 y)   Target end date:  Indefinite   Send INR reminders to:  [facility]    Indications    Atrial flutter (H) (Resolved) [I48.92]  Chronic atrial fibrillation (H)  [I48.20]             Comments:               Anticoagulation Care Providers       Provider Role Specialty Phone number    Kamaljit Plata MD Referring Family Medicine [phone]

## 2024-05-30 ENCOUNTER — TELEPHONE (OUTPATIENT)
Dept: OPHTHALMOLOGY | Facility: CLINIC | Age: 74
End: 2024-05-30
Payer: COMMERCIAL

## 2024-05-30 NOTE — TELEPHONE ENCOUNTER
Unable to reach or LVM for patient regarding rescheduling for a PWB TECH ONLY Appointment for: Fundus photos in 1 month -Per  -Per  .  Patient arrived and then Clinic Staff were unable to locate patient for testing.

## 2024-05-31 ENCOUNTER — TELEPHONE (OUTPATIENT)
Dept: OPHTHALMOLOGY | Facility: CLINIC | Age: 74
End: 2024-05-31
Payer: COMMERCIAL

## 2024-05-31 NOTE — TELEPHONE ENCOUNTER
Unable to reach or LVM for patient regarding rescheduling for a PWB TECH ONLY Appointment for: Fundus photos in 1 month -Per  -Per  .  Patient arrived and then Clinic Staff were unable to locate patient for testing.  Patient's number remains busy.

## 2024-06-01 DIAGNOSIS — E11.22 TYPE 2 DIABETES MELLITUS WITH STAGE 3A CHRONIC KIDNEY DISEASE, WITHOUT LONG-TERM CURRENT USE OF INSULIN (H): ICD-10-CM

## 2024-06-01 DIAGNOSIS — N18.31 TYPE 2 DIABETES MELLITUS WITH STAGE 3A CHRONIC KIDNEY DISEASE, WITHOUT LONG-TERM CURRENT USE OF INSULIN (H): ICD-10-CM

## 2024-06-02 DIAGNOSIS — E11.9 DIABETES MELLITUS, TYPE 2 (H): ICD-10-CM

## 2024-06-03 RX ORDER — SEMAGLUTIDE 0.68 MG/ML
INJECTION, SOLUTION SUBCUTANEOUS
Refills: 1 | OUTPATIENT
Start: 2024-06-03

## 2024-06-04 ENCOUNTER — TELEPHONE (OUTPATIENT)
Dept: OPHTHALMOLOGY | Facility: CLINIC | Age: 74
End: 2024-06-04
Payer: COMMERCIAL

## 2024-06-04 NOTE — TELEPHONE ENCOUNTER
Spoke with patient regarding rescheduling for a PWB TECH ONLY Appointment for: Fundus photos in 1 month -Per  -Per  .  Patient arrived and then Clinic Staff were unable to locate patient for testing.  Patient did not offer any reasons for leaving appointment but declined rescheduling for a TECH Visit.-Per Patient

## 2024-06-11 ENCOUNTER — LAB (OUTPATIENT)
Dept: INFUSION THERAPY | Facility: HOSPITAL | Age: 74
End: 2024-06-11
Attending: INTERNAL MEDICINE
Payer: COMMERCIAL

## 2024-06-11 ENCOUNTER — ANTICOAGULATION THERAPY VISIT (OUTPATIENT)
Dept: ANTICOAGULATION | Facility: CLINIC | Age: 74
End: 2024-06-11

## 2024-06-11 DIAGNOSIS — I48.20 CHRONIC ATRIAL FIBRILLATION (H): ICD-10-CM

## 2024-06-11 DIAGNOSIS — R39.14 BENIGN PROSTATIC HYPERPLASIA WITH INCOMPLETE BLADDER EMPTYING: ICD-10-CM

## 2024-06-11 DIAGNOSIS — E11.22 TYPE 2 DIABETES MELLITUS WITH STAGE 3A CHRONIC KIDNEY DISEASE, WITHOUT LONG-TERM CURRENT USE OF INSULIN (H): ICD-10-CM

## 2024-06-11 DIAGNOSIS — I48.20 CHRONIC ATRIAL FIBRILLATION (H): Primary | ICD-10-CM

## 2024-06-11 DIAGNOSIS — N40.1 BENIGN PROSTATIC HYPERPLASIA WITH INCOMPLETE BLADDER EMPTYING: ICD-10-CM

## 2024-06-11 DIAGNOSIS — N18.31 TYPE 2 DIABETES MELLITUS WITH STAGE 3A CHRONIC KIDNEY DISEASE, WITHOUT LONG-TERM CURRENT USE OF INSULIN (H): ICD-10-CM

## 2024-06-11 DIAGNOSIS — E53.8 VITAMIN B12 DEFICIENCY (NON ANEMIC): Primary | ICD-10-CM

## 2024-06-11 DIAGNOSIS — E53.8 VITAMIN B12 DEFICIENCY (NON ANEMIC): ICD-10-CM

## 2024-06-11 LAB — INR PPP: 3.22 (ref 0.85–1.15)

## 2024-06-11 PROCEDURE — 36591 DRAW BLOOD OFF VENOUS DEVICE: CPT

## 2024-06-11 PROCEDURE — 96372 THER/PROPH/DIAG INJ SC/IM: CPT | Performed by: INTERNAL MEDICINE

## 2024-06-11 PROCEDURE — 85610 PROTHROMBIN TIME: CPT

## 2024-06-11 PROCEDURE — 250N000011 HC RX IP 250 OP 636: Mod: JZ | Performed by: INTERNAL MEDICINE

## 2024-06-11 RX ORDER — METHYLPREDNISOLONE SODIUM SUCCINATE 125 MG/2ML
125 INJECTION, POWDER, LYOPHILIZED, FOR SOLUTION INTRAMUSCULAR; INTRAVENOUS
Status: CANCELLED
Start: 2024-07-03

## 2024-06-11 RX ORDER — DIPHENHYDRAMINE HYDROCHLORIDE 50 MG/ML
50 INJECTION INTRAMUSCULAR; INTRAVENOUS
Status: CANCELLED
Start: 2024-07-03

## 2024-06-11 RX ORDER — EPINEPHRINE 1 MG/ML
0.3 INJECTION, SOLUTION INTRAMUSCULAR; SUBCUTANEOUS EVERY 5 MIN PRN
Status: CANCELLED | OUTPATIENT
Start: 2024-07-03

## 2024-06-11 RX ORDER — CYANOCOBALAMIN 1000 UG/ML
1000 INJECTION, SOLUTION INTRAMUSCULAR; SUBCUTANEOUS ONCE
Status: COMPLETED | OUTPATIENT
Start: 2024-06-11 | End: 2024-06-11

## 2024-06-11 RX ORDER — CYANOCOBALAMIN 1000 UG/ML
1000 INJECTION, SOLUTION INTRAMUSCULAR; SUBCUTANEOUS ONCE
Status: CANCELLED
Start: 2024-07-03 | End: 2024-07-03

## 2024-06-11 RX ORDER — ALBUTEROL SULFATE 0.83 MG/ML
2.5 SOLUTION RESPIRATORY (INHALATION)
Status: CANCELLED | OUTPATIENT
Start: 2024-07-03

## 2024-06-11 RX ORDER — MEPERIDINE HYDROCHLORIDE 25 MG/ML
25 INJECTION INTRAMUSCULAR; INTRAVENOUS; SUBCUTANEOUS EVERY 30 MIN PRN
Status: CANCELLED | OUTPATIENT
Start: 2024-07-03

## 2024-06-11 RX ORDER — HEPARIN SODIUM (PORCINE) LOCK FLUSH IV SOLN 100 UNIT/ML 100 UNIT/ML
5 SOLUTION INTRAVENOUS
Status: DISCONTINUED | OUTPATIENT
Start: 2024-06-11 | End: 2024-06-11 | Stop reason: HOSPADM

## 2024-06-11 RX ORDER — NALOXONE HYDROCHLORIDE 0.4 MG/ML
0.2 INJECTION, SOLUTION INTRAMUSCULAR; INTRAVENOUS; SUBCUTANEOUS
Status: CANCELLED | OUTPATIENT
Start: 2024-07-03

## 2024-06-11 RX ORDER — TAMSULOSIN HYDROCHLORIDE 0.4 MG/1
CAPSULE ORAL
Qty: 180 CAPSULE | Refills: 1 | Status: SHIPPED | OUTPATIENT
Start: 2024-06-11

## 2024-06-11 RX ORDER — HEPARIN SODIUM (PORCINE) LOCK FLUSH IV SOLN 100 UNIT/ML 100 UNIT/ML
5 SOLUTION INTRAVENOUS
OUTPATIENT
Start: 2024-06-11

## 2024-06-11 RX ORDER — ALBUTEROL SULFATE 90 UG/1
1-2 AEROSOL, METERED RESPIRATORY (INHALATION)
Status: CANCELLED
Start: 2024-07-03

## 2024-06-11 RX ADMIN — CYANOCOBALAMIN 1000 MCG: 1000 INJECTION, SOLUTION INTRAMUSCULAR; SUBCUTANEOUS at 13:40

## 2024-06-11 RX ADMIN — Medication 5 ML: at 13:34

## 2024-06-11 NOTE — PROGRESS NOTES
PT here for port draw and vitamin b 12 inj. PT states doing good and no concerns. Port accessed and inr drawn/port flushed and deaccessed with 2x2 to sites. Vitamin b 12 inj reviewed and administered in right deltoid/bandaid to site. PT tolerated txt today without any problems. Follow up reviewed and pt dc'd steady gait

## 2024-06-11 NOTE — PROGRESS NOTES
ANTICOAGULATION MANAGEMENT     Cam Walden 74 year old male is on warfarin with supratherapeutic INR result. (Goal INR 2.0-3.0)    Recent labs: (last 7 days)     06/11/24  1334   INR 3.22*       ASSESSMENT     Source(s): Chart Review  Previous INR was Therapeutic last visit; previously outside of goal range  Medication, diet, health changes since last INR had b12 today  Phone goes to busy signal x 3         PLAN     Unable to reach Cam today.    No instructions provided. Unable to leave voicemail.    Follow up required to confirm warfarin dose taken and assess for changes and discuss out of range result    Does have AWV 6/25, requested inr to be done with visit  Jaqueline Chua RN  Anticoagulation Clinic  6/11/2024

## 2024-06-25 ENCOUNTER — OFFICE VISIT (OUTPATIENT)
Dept: FAMILY MEDICINE | Facility: CLINIC | Age: 74
End: 2024-06-25
Payer: COMMERCIAL

## 2024-06-25 VITALS
SYSTOLIC BLOOD PRESSURE: 114 MMHG | DIASTOLIC BLOOD PRESSURE: 76 MMHG | OXYGEN SATURATION: 94 % | HEIGHT: 73 IN | WEIGHT: 284 LBS | RESPIRATION RATE: 20 BRPM | TEMPERATURE: 97.8 F | BODY MASS INDEX: 37.64 KG/M2 | HEART RATE: 77 BPM

## 2024-06-25 DIAGNOSIS — I48.20 CHRONIC ATRIAL FIBRILLATION (H): ICD-10-CM

## 2024-06-25 DIAGNOSIS — E11.22 TYPE 2 DIABETES MELLITUS WITH STAGE 3A CHRONIC KIDNEY DISEASE, WITHOUT LONG-TERM CURRENT USE OF INSULIN (H): ICD-10-CM

## 2024-06-25 DIAGNOSIS — N18.31 TYPE 2 DIABETES MELLITUS WITH STAGE 3A CHRONIC KIDNEY DISEASE, WITHOUT LONG-TERM CURRENT USE OF INSULIN (H): ICD-10-CM

## 2024-06-25 DIAGNOSIS — Z00.00 ENCOUNTER FOR MEDICARE ANNUAL WELLNESS EXAM: Primary | ICD-10-CM

## 2024-06-25 DIAGNOSIS — N18.31 STAGE 3A CHRONIC KIDNEY DISEASE (H): ICD-10-CM

## 2024-06-25 LAB
ALBUMIN SERPL BCG-MCNC: 4.2 G/DL (ref 3.5–5.2)
ANION GAP SERPL CALCULATED.3IONS-SCNC: 13 MMOL/L (ref 7–15)
BUN SERPL-MCNC: 20.7 MG/DL (ref 8–23)
CALCIUM SERPL-MCNC: 9.3 MG/DL (ref 8.8–10.2)
CHLORIDE SERPL-SCNC: 101 MMOL/L (ref 98–107)
CREAT SERPL-MCNC: 1.53 MG/DL (ref 0.67–1.17)
DEPRECATED HCO3 PLAS-SCNC: 23 MMOL/L (ref 22–29)
EGFRCR SERPLBLD CKD-EPI 2021: 48 ML/MIN/1.73M2
GLUCOSE SERPL-MCNC: 265 MG/DL (ref 70–99)
HBA1C MFR BLD: 10.6 % (ref 0–5.6)
PHOSPHATE SERPL-MCNC: 2.9 MG/DL (ref 2.5–4.5)
POTASSIUM SERPL-SCNC: 4.1 MMOL/L (ref 3.4–5.3)
SODIUM SERPL-SCNC: 137 MMOL/L (ref 135–145)

## 2024-06-25 PROCEDURE — 36415 COLL VENOUS BLD VENIPUNCTURE: CPT | Performed by: FAMILY MEDICINE

## 2024-06-25 PROCEDURE — 83036 HEMOGLOBIN GLYCOSYLATED A1C: CPT | Performed by: FAMILY MEDICINE

## 2024-06-25 PROCEDURE — 99214 OFFICE O/P EST MOD 30 MIN: CPT | Mod: 25 | Performed by: FAMILY MEDICINE

## 2024-06-25 PROCEDURE — 80069 RENAL FUNCTION PANEL: CPT | Performed by: FAMILY MEDICINE

## 2024-06-25 PROCEDURE — G0438 PPPS, INITIAL VISIT: HCPCS | Performed by: FAMILY MEDICINE

## 2024-06-25 RX ORDER — ACYCLOVIR 400 MG/1
1 TABLET ORAL
Qty: 9 EACH | Refills: 5 | Status: SHIPPED | OUTPATIENT
Start: 2024-06-25

## 2024-06-25 RX ORDER — ACYCLOVIR 400 MG/1
1 TABLET ORAL ONCE
Qty: 1 EACH | Refills: 0 | Status: SHIPPED | OUTPATIENT
Start: 2024-06-25 | End: 2024-06-25

## 2024-06-25 SDOH — HEALTH STABILITY: PHYSICAL HEALTH: ON AVERAGE, HOW MANY DAYS PER WEEK DO YOU ENGAGE IN MODERATE TO STRENUOUS EXERCISE (LIKE A BRISK WALK)?: 0 DAYS

## 2024-06-25 ASSESSMENT — SOCIAL DETERMINANTS OF HEALTH (SDOH): HOW OFTEN DO YOU GET TOGETHER WITH FRIENDS OR RELATIVES?: ONCE A WEEK

## 2024-06-25 NOTE — PATIENT INSTRUCTIONS
"Patient Education   Eating Healthy Foods: Care Instructions  With every meal, you can make healthy food choices. Try to eat a variety of fruits, vegetables, whole grains, lean proteins, and low-fat dairy products. This can help you get the right balance of nutrients, including vitamins and minerals. Small changes add up over time. You can start by adding one healthy food to your meals each day.    Try to make half your plate fruits and vegetables, one-fourth whole grains, and one-fourth lean proteins. Try including dairy with your meals.   Eat more fruits and vegetables. Try to have them with most meals and snacks.   Foods for healthy eating    Fruits    These can be fresh, frozen, canned, or dried.  Try to choose whole fruit rather than fruit juice.  Eat a variety of colors.    Vegetables    These can be fresh, frozen, canned, or dried.  Beans, peas, and lentils count too.    Whole grains    Choose whole-grain breads, cereals, and noodles.  Try brown rice.    Lean proteins    These can include lean meat, poultry, fish, and eggs.  You can also have tofu, beans, peas, lentils, nuts, and seeds.    Dairy    Try milk, yogurt, and cheese.  Choose low-fat or fat-free when you can.  If you need to, use lactose-free milk or fortified plant-based milk products, such as soy milk.    Water    Drink water when you're thirsty.  Limit sugar-sweetened drinks, including soda, fruit drinks, and sports drinks.  Where can you learn more?  Go to https://www.[employer].net/patiented  Enter T756 in the search box to learn more about \"Eating Healthy Foods: Care Instructions.\"  Current as of: September 20, 2023               Content Version: 14.0    6040-4357 [employer].   Care instructions adapted under license by your healthcare professional. If you have questions about a medical condition or this instruction, always ask your healthcare professional. [employer] disclaims any warranty or liability for your use " "of this information.      Preventive Care Advice   This is general advice we often give to help people stay healthy. Your care team may have specific advice just for you. Please talk to your care team about your own preventive care needs.  Lifestyle  Exercise at least 150 minutes each week (30 minutes a day, 5 days a week).  Do muscle strengthening activities 2 days a week. These help control your weight and prevent disease.  No smoking.  Wear sunscreen to prevent skin cancer.  Have your home tested for radon every 2 to 5 years. Radon is a colorless, odorless gas that can harm your lungs. To learn more, go to www.health.[facility].mn.us and search for \"Radon in Homes.\"  Keep guns unloaded and locked up in a safe place like a safe or gun vault, or, use a gun lock and hide the keys. Always lock away bullets separately. To learn more, visit [employer].mn.gov and search for \"safe gun storage.\"  Nutrition  Eat 5 or more servings of fruits and vegetables each day.  Try wheat bread, brown rice and whole grain pasta (instead of white bread, rice, and pasta).  Get enough calcium and vitamin D. Check the label on foods and aim for 100% of the RDA (recommended daily allowance).  Regular exams  Have a dental exam and cleaning every 6 months.  See your health care team every year to talk about:  Any changes in your health.  Any medicines your care team has prescribed.  Preventive care, family planning, and ways to prevent chronic diseases.  Shots (vaccines)   HPV shots (up to age 26), if you've never had them before.  Hepatitis B shots (up to age 59), if you've never had them before.  COVID-19 shot: Get this shot when it's due.  Flu shot: Get a flu shot every year.  Tetanus shot: Get a tetanus shot every 10 years.  Pneumococcal, hepatitis A, and RSV shots: Ask your care team if you need these based on your risk.  Shingles shot (for age 50 and up).  General health tests  Diabetes screening:  Starting at age 35, Get screened for diabetes at " least every 3 years.  If you are younger than age 35, ask your care team if you should be screened for diabetes.  Cholesterol test: At age 39, start having a cholesterol test every 5 years, or more often if advised.  Bone density scan (DEXA): At age 50, ask your care team if you should have this scan for osteoporosis (brittle bones).  Hepatitis C: Get tested at least once in your life.  Abdominal aortic aneurysm screening: Talk to your doctor about having this screening if you:  Have ever smoked; and  Are biologically male; and  Are between the ages of 65 and 75.  STIs (sexually transmitted infections)  Before age 24: Ask your care team if you should be screened for STIs.  After age 24: Get screened for STIs if you're at risk. You are at risk for STIs (including HIV) if:  You are sexually active with more than one person.  You don't use condoms every time.  You or a partner was diagnosed with a sexually transmitted infection.  If you are at risk for HIV, ask about PrEP medicine to prevent HIV.  Get tested for HIV at least once in your life, whether you are at risk for HIV or not.  Cancer screening tests  Cervical cancer screening: If you have a cervix, begin getting regular cervical cancer screening tests at age 21. Most people who have regular screenings with normal results can stop after age 65. Talk about this with your provider.  Breast cancer scan (mammogram): If you've ever had breasts, begin having regular mammograms starting at age 40. This is a scan to check for breast cancer.  Colon cancer screening: It is important to start screening for colon cancer at age 45.  Have a colonoscopy test every 10 years (or more often if you're at risk) Or, ask your provider about stool tests like a FIT test every year or Cologuard test every 3 years.  To learn more about your testing options, visit: www.[employer]/212317.pdf.  For help making a decision, visit: vinay/rs33401.  Prostate cancer screening test: If you have a  prostate and are age 55 to 69, ask your provider if you would benefit from a yearly prostate cancer screening test.  Lung cancer screening: If you are a current or former smoker age 50 to 80, ask your care team if ongoing lung cancer screenings are right for you.  For informational purposes only. Not to replace the advice of your health care provider. Copyright   2023 [facility]. All rights reserved. Clinically reviewed by the [facility] Transitions Program. [employer] [id number] - REV 04/24.  Learning About Activities of Daily Living  What are activities of daily living?     Activities of daily living (ADLs) are the basic self-care tasks you do every day. These include eating, bathing, dressing, and moving around.  As you age, and if you have health problems, you may find that it's harder to do some of these tasks. If so, your doctor can suggest ideas that may help.  To measure what kind of help you may need, your doctor will ask how well you are able to do ADLs. Let your doctor know if there are any tasks that you are having trouble doing. This is an important first step to getting help. And when you have the help you need, you can stay as independent as possible.  How will a doctor assess your ADLs?  Asking about ADLs is part of a routine health checkup your doctor will likely do as you age. Your health check might be done in a doctor's office, in your home, or at a hospital. The goal is to find out if you are having any problems that could make it hard to care for yourself or that make it unsafe for you to be on your own.  To measure your ADLs, your doctor will ask how hard it is for you to do routine tasks. Your doctor may also want to know if you have changed the way you do a task because of a health problem. Your doctor may watch how you:  Walk back and forth.  Keep your balance while you stand or walk.  Move from sitting to standing or from a bed to a chair.  Button or unbutton a shirt or  sweater.  Remove and put on your shoes.  It's common to feel a little worried or anxious if you find you can't do all the things you used to be able to do. Talking with your doctor about ADLs is a way to make sure you're as safe as possible and able to care for yourself as well as you can. You may want to bring a caregiver, friend, or family member to your checkup. They can help you talk to your doctor.  Follow-up care is a key part of your treatment and safety. Be sure to make and go to all appointments, and call your doctor if you are having problems. It's also a good idea to know your test results and keep a list of the medicines you take.  Current as of: October 24, 2023               Content Version: 14.0    1128-2469 [employer].   Care instructions adapted under license by your healthcare professional. If you have questions about a medical condition or this instruction, always ask your healthcare professional. [employer] disclaims any warranty or liability for your use of this information.      Preventing Falls: Care Instructions  Injuries and health problems such as trouble walking or poor eyesight can increase your risk of falling. So can some medicines. But there are things you can do to help prevent falls. You can exercise to get stronger. You can also arrange your home to make it safer.    Talk to your doctor about the medicines you take. Ask if any of them increase the risk of falls and whether they can be changed or stopped.   Try to exercise regularly. It can help improve your strength and balance. This can help lower your risk of falling.     Practice fall safety and prevention.    Wear low-heeled shoes that fit well and give your feet good support. Talk to your doctor if you have foot problems that make this hard.  Carry a cellphone or wear a medical alert device that you can use to call for help.  Use stepladders instead of chairs to reach high objects. Don't climb if  "you're at risk for falls. Ask for help, if needed.  Wear the correct eyeglasses, if you need them.    Make your home safer.    Remove rugs, cords, clutter, and furniture from walkways.  Keep your house well lit. Use night-lights in hallways and bathrooms.  Install and use sturdy handrails on stairways.  Wear nonskid footwear, even inside. Don't walk barefoot or in socks without shoes.    Be safe outside.    Use handrails, curb cuts, and ramps whenever possible.  Keep your hands free by using a shoulder bag or backpack.  Try to walk in well-lit areas. Watch out for uneven ground, changes in pavement, and debris.  Be careful in the winter. Walk on the grass or gravel when sidewalks are slippery. Use de-icer on steps and walkways. Add non-slip devices to shoes.    Put grab bars and nonskid mats in your shower or tub and near the toilet. Try to use a shower chair or bath bench when bathing.   Get into a tub or shower by putting in your weaker leg first. Get out with your strong side first. Have a phone or medical alert device in the bathroom with you.   Where can you learn more?  Go to https://www.[employer].net/patiented  Enter G117 in the search box to learn more about \"Preventing Falls: Care Instructions.\"  Current as of: July 17, 2023               Content Version: 14.0    8611-4698 [employer].   Care instructions adapted under license by your healthcare professional. If you have questions about a medical condition or this instruction, always ask your healthcare professional. [employer] disclaims any warranty or liability for your use of this information.      Eating Healthy Foods: Care Instructions  With every meal, you can make healthy food choices. Try to eat a variety of fruits, vegetables, whole grains, lean proteins, and low-fat dairy products. This can help you get the right balance of nutrients, including vitamins and minerals. Small changes add up over time. You can start by " "adding one healthy food to your meals each day.    Try to make half your plate fruits and vegetables, one-fourth whole grains, and one-fourth lean proteins. Try including dairy with your meals.   Eat more fruits and vegetables. Try to have them with most meals and snacks.   Foods for healthy eating    Fruits    These can be fresh, frozen, canned, or dried.  Try to choose whole fruit rather than fruit juice.  Eat a variety of colors.    Vegetables    These can be fresh, frozen, canned, or dried.  Beans, peas, and lentils count too.    Whole grains    Choose whole-grain breads, cereals, and noodles.  Try brown rice.    Lean proteins    These can include lean meat, poultry, fish, and eggs.  You can also have tofu, beans, peas, lentils, nuts, and seeds.    Dairy    Try milk, yogurt, and cheese.  Choose low-fat or fat-free when you can.  If you need to, use lactose-free milk or fortified plant-based milk products, such as soy milk.    Water    Drink water when you're thirsty.  Limit sugar-sweetened drinks, including soda, fruit drinks, and sports drinks.  Where can you learn more?  Go to https://www.[employer].net/patiented  Enter T756 in the search box to learn more about \"Eating Healthy Foods: Care Instructions.\"  Current as of: September 20, 2023               Content Version: 14.0    0813-3907 [employer].   Care instructions adapted under license by your healthcare professional. If you have questions about a medical condition or this instruction, always ask your healthcare professional. [employer] disclaims any warranty or liability for your use of this information.         "

## 2024-06-25 NOTE — PROGRESS NOTES
Preventive Care Visit  [facility]  Kamaljit Plata MD, Family Medicine  Jun 25, 2024  {Provider  Link to SmartSet :243677}    {PROVIDER CHARTING PREFERENCE:832393}    Coreen Levy is a 74 year old, presenting for the following:  Wellness Visit        6/25/2024    10:25 AM   Additional Questions   Roomed by Rena     {ROOMER if patient is in their first year of Medicare a vision screen is required click here to document the Vison screen and then refresh the note to pull in results  :587384}    Health Care Directive  Patient does not have a Health Care Directive or Living Will: {ADVANCE_DIRECTIVE_STATUS:806863}    HPI  ***  {MA/LPN/RN Pre-Provider Visit Orders- hCG/UA/Strep (Optional):417619}  {SUPERLIST (Optional):302469}  {additonal problems for provider to add (Optional):701163}      6/25/2024   General Health   How would you rate your overall physical health? Good   Feel stress (tense, anxious, or unable to sleep) Not at all            6/25/2024   Nutrition   Diet: Diabetic            6/25/2024   Exercise   Days per week of moderate/strenous exercise 0 days      (!) EXERCISE CONCERN      6/25/2024   Social Factors   Frequency of gathering with friends or relatives Once a week   Worry food won't last until get money to buy more No   Food not last or not have enough money for food? No   Do you have housing? (Housing is defined as stable permanent housing and does not include staying ouside in a car, in a tent, in an abandoned building, in an overnight shelter, or couch-surfing.) Yes   Are you worried about losing your housing? No   Lack of transportation? No   Unable to get utilities (heat,electricity)? No            6/25/2024   Fall Risk   Fallen 2 or more times in the past year? Yes    Yes   Trouble with walking or balance? Yes    Yes   Gait Speed Test (Document in seconds) 5   Gait Speed Test Interpretation Less than or equal to 5.00 seconds - PASS       Multiple values from one  day are sorted in reverse-chronological order          6/25/2024   Activities of Daily Living- Home Safety   Needs help with the following daily activites None of the above   Safety concerns in the home Throw rugs in the hallway            6/25/2024   Dental   Dentist two times every year? Yes            6/25/2024   Hearing Screening   Hearing concerns? None of the above            6/25/2024   Driving Risk Screening   Patient/family members have concerns about driving No            6/25/2024   General Alertness/Fatigue Screening   Have you been more tired than usual lately? No            6/25/2024   Urinary Incontinence Screening   Bothered by leaking urine in past 6 months No            6/25/2024   TB Screening   Were you born outside of the [place]? No            Today's PHQ-2 Score:       6/25/2024    10:19 AM   PHQ-2 ( 1999 Pfizer)   Q1: Little interest or pleasure in doing things 0   Q2: Feeling down, depressed or hopeless 0   PHQ-2 Score 0   Q1: Little interest or pleasure in doing things Not at all   Q2: Feeling down, depressed or hopeless Not at all   PHQ-2 Score 0           6/25/2024   Substance Use   Alcohol more than 3/day or more than 7/wk Not Applicable   Do you have a current opioid prescription? No   How severe/bad is pain from 1 to 10? 1/10   Do you use any other substances recreationally? (!) DECLINE        Social History     Tobacco Use    Smoking status: Never     Passive exposure: Past    Smokeless tobacco: Never   Vaping Use    Vaping status: Never Used   Substance Use Topics    Alcohol use: No    Drug use: No     {Provider  If there are gaps in the social history shown above, please follow the link to update and then refresh the note Link to Social and Substance History :655333}      6/25/2024   AAA Screening   Family history of Abdominal Aortic Aneurysm (AAA)? No      ASCVD Risk   The 10-year ASCVD risk score (Waylon STALEY, et al., 2019) is: 35.2%    Values used to calculate the score:       Age: 74 years      Sex: Male      Is Non- : No      Diabetic: Yes      Tobacco smoker: No      Systolic Blood Pressure: 114 mmHg      Is BP treated: No      HDL Cholesterol: 31 mg/dL      Total Cholesterol: 190 mg/dL    {Link to Fracture Risk Assessment Tool (Optional):128077}    {Provider  REQUIRED FOR AWV Use the storyboard to review patient history, after sections have been marked as reviewed, refresh note to capture documentation:962433}  {Provider   REQUIRED AWV use this link to review and update sexual activity history  after section has been marked as reviewed, refresh note to capture documentation:535022}  Reviewed and updated as needed this visit by Provider                    {HISTORY OPTIONS (Optional):785314}  Current providers sharing in care for this patient include:  Patient Care Team:  Kamaljit Plata MD as PCP - General (Family Medicine)  Angie Richards, PharmD as Pharmacist (Pharmacist)  Blade Ibanez MD as MD (Hematology & Oncology)  Felisa Boudreaux, RN as Specialty Care Coordinator (Hematology & Oncology)  Teresa Curry MD as MD (Ophthalmology)  Kamaljit Plata MD as Referring Physician (Family Medicine)  Kamaljit Plata MD as Assigned PCP  Darrell Desai OD as MD (Optometry)  Blade Ibanez MD as Assigned Cancer Care Provider  Darrell Desai OD as Assigned Surgical Provider    The following health maintenance items are reviewed in Epic and correct as of today:  Health Maintenance   Topic Date Due    RSV VACCINE (Pregnancy & 60+) (1 - 1-dose 60+ series) Never done    ZOSTER IMMUNIZATION (1 of 2) 11/27/2015    MEDICARE ANNUAL WELLNESS VISIT  Never done    COVID-19 Vaccine (5 - 2023-24 season) 05/07/2024    A1C  09/12/2024    LIPID  03/12/2025    MICROALBUMIN  03/12/2025    DIABETIC FOOT EXAM  03/12/2025    ANNUAL REVIEW OF HM ORDERS  03/12/2025    EYE EXAM  05/02/2025    BMP  05/06/2025    HEMOGLOBIN  05/06/2025    FALL RISK ASSESSMENT   "06/25/2025    DTAP/TDAP/TD IMMUNIZATION (2 - Td or Tdap) 09/29/2025    ADVANCE CARE PLANNING  11/29/2026    COLORECTAL CANCER SCREENING  10/31/2028    HEPATITIS C SCREENING  Completed    PHQ-2 (once per calendar year)  Completed    INFLUENZA VACCINE  Completed    Pneumococcal Vaccine: 65+ Years  Completed    URINALYSIS  Completed    IPV IMMUNIZATION  Aged Out    HPV IMMUNIZATION  Aged Out    MENINGITIS IMMUNIZATION  Aged Out    RSV MONOCLONAL ANTIBODY  Aged Out       {ROS Picklists (Optional):589720}     Objective    Exam  /76 (BP Location: Left arm, Patient Position: Sitting, Cuff Size: Adult Large)   Pulse 77   Temp 97.8  F (36.6  C) (Temporal)   Resp 20   Ht 1.845 m (6' 0.64\")   Wt 128.8 kg (284 lb)   SpO2 94%   BMI 37.84 kg/m     Estimated body mass index is 37.84 kg/m  as calculated from the following:    Height as of this encounter: 1.845 m (6' 0.64\").    Weight as of this encounter: 128.8 kg (284 lb).    Physical Exam  {Exam Choices (Optional):359738}         6/25/2024   Mini Cog   Clock Draw Score 2 Normal   3 Item Recall 1 object recalled   Mini Cog Total Score 3        {A Mini-Cog total score of 0-2 suggests the possibility of dementia, score of 3-5 suggests no dementia:416616}         Signed Electronically by: Kamaljit Plata MD  {Email feedback regarding this note to [email or web address]   :617697}    Prior to immunization administration, verified patients identity using patient s name and date of birth. Please see Immunization Activity for additional information.     Screening Questionnaire for Adult Immunization    Are you sick today?   No   Do you have allergies to medications, food, a vaccine component or latex?   Yes   Have you ever had a serious reaction after receiving a vaccination?   No   Do you have a long-term health problem with heart, lung, kidney, or metabolic disease (e.g., diabetes), asthma, a blood disorder, no spleen, complement component " deficiency, a cochlear implant, or a spinal fluid leak?  Are you on long-term aspirin therapy?   Yes   Do you have cancer, leukemia, HIV/AIDS, or any other immune system problem?   No   Do you have a parent, brother, or sister with an immune system problem?   No   In the past 3 months, have you taken medications that affect  your immune system, such as prednisone, other steroids, or anticancer drugs; drugs for the treatment of rheumatoid arthritis, Crohn s disease, or psoriasis; or have you had radiation treatments?   No   Have you had a seizure, or a brain or other nervous system problem?   No   During the past year, have you received a transfusion of blood or blood    products, or been given immune (gamma) globulin or antiviral drug?   No   For women: Are you pregnant or is there a chance you could become       pregnant during the next month?   No   Have you received any vaccinations in the past 4 weeks?   No     Immunization questionnaire was positive for at least one answer.  Notified Dr. Plata.      Patient instructed to remain in clinic for 15 minutes afterwards, and to report any adverse reactions.     Screening performed by Rena Gill CMA on 6/25/2024 at 10:27 AM.

## 2024-06-26 ENCOUNTER — TELEPHONE (OUTPATIENT)
Dept: FAMILY MEDICINE | Facility: CLINIC | Age: 74
End: 2024-06-26
Payer: COMMERCIAL

## 2024-06-26 NOTE — TELEPHONE ENCOUNTER
----- Message from Kamaljit Plata sent at 6/26/2024  3:39 PM CDT -----  Please let Cam know that blood sugar still running high, I sent a new prescription called Jardiance to start taking 1 daily, he might notice a bit more urination while taking the medication but that could be expected, continue Ozempic and metformin (he may need to lower the dose based on kidney function down the road.)

## 2024-06-26 NOTE — LETTER
June 26, 2024      Cam BOLANOS Win  [address] [place] [address]        Dear ,    We are unable to reach you by phone, as we do not have a valid phone number on file for you. We are writing to inform you of your test results.    Your blood sugar is still running high. A new medication called Jardiance has been sent to your pharmacy. Start taking one tablet daily. You may noticed a bit more urination while taking this medication, but that is expected. Continue taking Ozempic and metformin--we may decrease your dose of metformin in the future.    Resulted Orders   Hemoglobin A1c   Result Value Ref Range    Hemoglobin A1C 10.6 (H) 0.0 - 5.6 %      Comment:      Normal <5.7%   Prediabetes 5.7-6.4%    Diabetes 6.5% or higher     Note: Adopted from ADA consensus guidelines.   Renal panel (Alb, BUN, Ca, Cl, CO2, Creat, Gluc, Phos, K, Na)   Result Value Ref Range    Sodium 137 135 - 145 mmol/L      Comment:      Reference intervals for this test were updated on 09/26/2023 to more accurately reflect our healthy population. There may be differences in the flagging of prior results with similar values performed with this method. Interpretation of those prior results can be made in the context of the updated reference intervals.     Potassium 4.1 3.4 - 5.3 mmol/L    Chloride 101 98 - 107 mmol/L    Carbon Dioxide (CO2) 23 22 - 29 mmol/L    Anion Gap 13 7 - 15 mmol/L    Glucose 265 (H) 70 - 99 mg/dL    Urea Nitrogen 20.7 8.0 - 23.0 mg/dL    Creatinine 1.53 (H) 0.67 - 1.17 mg/dL    GFR Estimate 48 (L) >60 mL/min/1.73m2      Comment:      eGFR calculated using 2021 CKD-EPI equation.    Calcium 9.3 8.8 - 10.2 mg/dL    Albumin 4.2 3.5 - 5.2 g/dL    Phosphorus 2.9 2.5 - 4.5 mg/dL       If you have any questions or concerns, please call the clinic at the number listed above.       Sincerely,      Primary Care RN for Kamaljit Plata MD

## 2024-06-26 NOTE — TELEPHONE ENCOUNTER
Attempted calling patient--phone number listed is no longer in service. Mailed letter with clinician's message to address listed in chart, closing encounter.    Sheeba Snell RN  [facility]

## 2024-06-26 NOTE — PROGRESS NOTES
Preventive Care Visit  [facility]  Kamaljit Plata MD, Family Medicine  Jun 25, 2024      SUBJECTIVE:   Cam is a 74 year old, presenting for the following:  Wellness Visit        6/25/2024    10:25 AM   Additional Questions   Roomed by Rena     Are you in the first 12 months of your Medicare coverage?  No    HPI    Today's PHQ-2 Score:       6/25/2024    10:19 AM   PHQ-2 ( 1999 Pfizer)   Q1: Little interest or pleasure in doing things 0   Q2: Feeling down, depressed or hopeless 0   PHQ-2 Score 0   Q1: Little interest or pleasure in doing things Not at all   Q2: Feeling down, depressed or hopeless Not at all   PHQ-2 Score 0           Have you ever done Advance Care Planning? (For example, a Health Directive, POLST, or a discussion with a medical provider or your loved ones about your wishes): No, advance care planning information given to patient to review.  Patient plans to discuss their wishes with loved ones or provider.         Fall risk  Fallen 2 or more times in the past year?: (!) Yes    Cognitive Screening   1) Repeat 3 items (Leader, Season, Table)    2) Clock draw: NORMAL  3) 3 item recall: Recalls 1 object   Results: ABNORMAL clock, 1-2 items recalled: PROBABLE COGNITIVE IMPAIRMENT, **INFORM PROVIDER**    Mini-CogTM Copyright VIOLETTE Guzman. Licensed by the author for use in [facility]; reprinted with permission (juan@.[facility]). All rights reserved.      Do you have sleep apnea, excessive snoring or daytime drowsiness? : no    Reviewed and updated as needed this visit by clinical staff   Tobacco  Allergies  Meds              Reviewed and updated as needed this visit by Provider                  Social History     Tobacco Use    Smoking status: Never     Passive exposure: Past    Smokeless tobacco: Never   Substance Use Topics    Alcohol use: No             6/25/2024    10:18 AM   Alcohol Use   Prescreen: >3 drinks/day or >7 drinks/week? Not Applicable     Do you have a  current opioid prescription? No  Do you use any other controlled substances or medications that are not prescribed by a provider? None          PROBLEMS TO ADD ON...  Occasional dizziness when standing from a lying down to his standing up position.  But no syncope.  Diabetes type 2, still not adequately controlled, but tolerating Ozempic well.  New partner born in  Brenda will moved to his home  Current providers sharing in care for this patient include:   Patient Care Team:  Kamaljit Plata MD as PCP - General (Family Medicine)  Angie Richards, PharmD as Pharmacist (Pharmacist)  Blade Ibanez MD as MD (Hematology & Oncology)  Felisa Boudreaux, RN as Specialty Care Coordinator (Hematology & Oncology)  Teresa Curry MD as MD (Ophthalmology)  Kamaljit Plata MD as Referring Physician (Family Medicine)  Kamaljit Plata MD as Assigned PCP  Darrell Desai OD as MD (Optometry)  Blade Ibanez MD as Assigned Cancer Care Provider  Darrell Desai OD as Assigned Surgical Provider    The following health maintenance items are reviewed in Epic and correct as of today:  Health Maintenance   Topic Date Due    RSV VACCINE (Pregnancy & 60+) (1 - 1-dose 60+ series) Never done    ZOSTER IMMUNIZATION (1 of 2) 11/27/2015    COVID-19 Vaccine (5 - 2023-24 season) 05/07/2024    A1C  12/25/2024    LIPID  03/12/2025    MICROALBUMIN  03/12/2025    DIABETIC FOOT EXAM  03/12/2025    ANNUAL REVIEW OF HM ORDERS  03/12/2025    EYE EXAM  05/02/2025    HEMOGLOBIN  05/06/2025    MEDICARE ANNUAL WELLNESS VISIT  06/25/2025    BMP  06/25/2025    FALL RISK ASSESSMENT  06/25/2025    DTAP/TDAP/TD IMMUNIZATION (2 - Td or Tdap) 09/29/2025    ADVANCE CARE PLANNING  11/29/2026    COLORECTAL CANCER SCREENING  10/31/2028    HEPATITIS C SCREENING  Completed    PHQ-2 (once per calendar year)  Completed    INFLUENZA VACCINE  Completed    Pneumococcal Vaccine: 65+ Years  Completed    URINALYSIS  Completed    IPV IMMUNIZATION  Aged Out  "   HPV IMMUNIZATION  Aged Out    MENINGITIS IMMUNIZATION  Aged Out    RSV MONOCLONAL ANTIBODY  Aged Out     Lab work is in process  Labs reviewed in [facility]  Recent Labs   Lab Test 06/25/24  1105 05/06/24  0921 03/12/24  1044 03/20/23  1144 01/24/23  0711 01/17/23  0851 11/15/21  1208 10/05/21  0856 05/14/21  0956 02/17/21  1234 09/18/19  0817 05/16/19  0913   A1C 10.6*  --  10.8* 9.4*  --   --   --  6.5*  --   --    < > 8.8*   LDL  --   --  80 107*  --   --   --  69  --   --    < > 32   HDL  --   --  31* 28*  --   --   --  23*  --   --    < > 23*   TRIG  --   --  393* 434*  --   --   --  196*  --   --    < > 171*   ALT  --  22 43 49  --  43   < >  --  37 39   < > 58*   CR 1.53* 1.52* 1.59* 1.51*   < > 1.63*   < >  --  1.45* 1.22   < > 1.03   GFRESTIMATED 48* 48* 46* 49*   < > 45*   < >  --  48* 59*   < > >60   GFRESTBLACK  --   --   --   --   --   --   --   --  58* >60   < > >60   POTASSIUM 4.1 4.1 4.0 4.2  --  3.8   < >  --  3.8 4.2   < > 4.3   TSH  --   --   --   --   --  2.17  --   --   --   --   --  1.67    < > = values in this interval not displayed.            Review of Systems     Review of Systems  Constitutional, HEENT, cardiovascular, pulmonary, gi and gu systems are negative, except as otherwise noted.    OBJECTIVE:   /76 (BP Location: Left arm, Patient Position: Sitting, Cuff Size: Adult Large)   Pulse 77   Temp 97.8  F (36.6  C) (Temporal)   Resp 20   Ht 1.845 m (6' 0.64\")   Wt 128.8 kg (284 lb)   SpO2 94%   BMI 37.84 kg/m     Estimated body mass index is 37.84 kg/m  as calculated from the following:    Height as of this encounter: 1.845 m (6' 0.64\").    Weight as of this encounter: 128.8 kg (284 lb).  Physical Exam  GENERAL: alert and no distress  NECK: no adenopathy, no asymmetry, masses, or scars  RESP: lungs clear to auscultation - no rales, rhonchi or wheezes  CV: regular rate and rhythm, normal S1 S2, no S3 or S4, no murmur, click or rub, no peripheral edema  ABDOMEN: soft, nontender, " no hepatosplenomegaly, no masses and bowel sounds normal  MS: no gross musculoskeletal defects noted, no edema    Diagnostic Test Results:  Labs reviewed in Epic    ASSESSMENT / PLAN:   Encounter for Medicare annual wellness exam      Type 2 diabetes mellitus with stage 3a chronic kidney disease, without long-term current use of insulin (H)  Still not adequately controlled, no checking his blood sugar, very sensitive finger, discussed Dexcom, new prescription sent.  Continue Ozempic, add Jardiance 25 mg daily,  - Continuous Glucose  (DEXCOM G7 ) TIFFANY; 1 each once for 1 dose Use to read blood sugars as per manufacturers instructions  - Continuous Glucose Sensor (DEXCOM G7 SENSOR) MISC; 1 each every 10 days Change sensor every 10 days  - Hemoglobin A1c; Future  - Hemoglobin A1c  - empagliflozin (JARDIANCE) 25 MG TABS tablet; Take 1 tablet (25 mg) by mouth daily    Stage 3a chronic kidney disease (H)  Jardiance with additional kidney protection.  - Renal panel (Alb, BUN, Ca, Cl, CO2, Creat, Gluc, Phos, K, Na); Future  - Renal panel (Alb, BUN, Ca, Cl, CO2, Creat, Gluc, Phos, K, Na)    Chronic atrial fibrillation (H)  On chronic anticoagulation, could consider DOAC if weight goal is achieved And if covered by insurance.    Patient has been advised of split billing requirements and indicates understanding: Yes      Counseling  Reviewed preventive health counseling, as reflected in patient instructions       Regular exercise       Healthy diet/nutrition       Vision screening       Hearing screening       Dental care        He reports that he has never smoked. He has been exposed to tobacco smoke. He has never used smokeless tobacco.      Appropriate preventive services were discussed with this patient, including applicable screening as appropriate for fall prevention, nutrition, physical activity, Tobacco-use cessation, weight loss and cognition.  Checklist reviewing preventive services available has been  given to the patient.    Reviewed patients plan of care and provided an AVS. The Complex Care Plan (for patients with higher acuity and needing more deliberate coordination of services) for Cam meets the Care Plan requirement. This Care Plan has been established and reviewed with the Patient.          Signed Electronically by: Kamaljit Plata MD    Identified Health Risks  I have reviewed Opioid Use Disorder and Substance Use Disorder risk factors and made any needed referrals.

## 2024-07-03 ENCOUNTER — TELEPHONE (OUTPATIENT)
Dept: ANTICOAGULATION | Facility: CLINIC | Age: 74
End: 2024-07-03
Payer: COMMERCIAL

## 2024-07-03 NOTE — TELEPHONE ENCOUNTER
ANTICOAGULATION     Cam Walden is overdue for an INR check.     Was unable to reach patient and was unable to leave a voicemail. If patient calls, please schedule INR check as soon as possible.     Fabiola Rodriguez RN

## 2024-07-05 DIAGNOSIS — B35.6 TINEA CRURIS: Primary | ICD-10-CM

## 2024-07-05 DIAGNOSIS — B37.2 YEAST DERMATITIS: ICD-10-CM

## 2024-07-05 RX ORDER — KETOCONAZOLE 20 MG/G
CREAM TOPICAL DAILY PRN
Qty: 60 G | Refills: 1 | Status: SHIPPED | OUTPATIENT
Start: 2024-07-05

## 2024-07-05 NOTE — TELEPHONE ENCOUNTER
Future Appointments   Date Time Provider Department Center   7/9/2024  8:30 AM [facility] CHEMO CHAIR [facility]   8/6/2024  9:00 AM [facility] CHEMO LAB DRAW 2 [facility]   8/6/2024  9:30 AM Friedell, Peter E, MD [facility]   8/6/2024 10:00 AM [facility] CHEMO CHAIR [facility]      Health Maintenance Due   Topic Date Due    RSV VACCINE (Pregnancy & 60+) (1 - 1-dose 60+ series) Never done    ZOSTER IMMUNIZATION (1 of 2) 11/27/2015    COVID-19 Vaccine (5 - 2023-24 season) 05/07/2024     BP Readings from Last 3 Encounters:   06/25/24 114/76   05/06/24 117/71   04/08/24 116/76     Cam was seen today for refill request.    Diagnoses and all orders for this visit:    Tinea cruris  -     ketoconazole (NIZORAL) 2 % external cream; Apply topically daily as needed for itching To rash/itching in groin    Yeast dermatitis

## 2024-07-05 NOTE — TELEPHONE ENCOUNTER
Medication Question or Refill        What medication are you calling about (include dose and sig)?: ketoconazole (NIZORAL) 2 % external cream     Preferred Pharmacy:  [facility]/pharmacy #[id number] - [place], [place] - [address]  [address] [place] [address]  Phone: [phone] Fax: [phone]      Controlled Substance Agreement on file:   CSA -- Patient Level:    CSA: None found at the patient level.       Who prescribed the medication?: PCP    Do you need a refill? Yes    When did you use the medication last? N/A    Patient offered an appointment? No    Do you have any questions or concerns?  No      Okay to leave a detailed message?: Yes at Home number on file [phone] (home)

## 2024-07-10 ENCOUNTER — TELEPHONE (OUTPATIENT)
Dept: ANTICOAGULATION | Facility: CLINIC | Age: 74
End: 2024-07-10
Payer: COMMERCIAL

## 2024-07-10 DIAGNOSIS — E11.9 DIABETES MELLITUS, TYPE 2 (H): ICD-10-CM

## 2024-07-10 DIAGNOSIS — N18.31 TYPE 2 DIABETES MELLITUS WITH STAGE 3A CHRONIC KIDNEY DISEASE, WITHOUT LONG-TERM CURRENT USE OF INSULIN (H): ICD-10-CM

## 2024-07-10 DIAGNOSIS — E11.22 TYPE 2 DIABETES MELLITUS WITH STAGE 3A CHRONIC KIDNEY DISEASE, WITHOUT LONG-TERM CURRENT USE OF INSULIN (H): ICD-10-CM

## 2024-07-10 NOTE — TELEPHONE ENCOUNTER
Patient is overdue to fill metformin and Ozempic. Of note, patient has been on low, starting dose Ozempic 0.25 mg weekly for the past few months. Tolerating well with no reported side effects so would recommend increasing to therapeutic dose of 0.5 mg weekly. Prescription updated and pended for PCP.       Thank you!    Sera Vail, PharmD, [facility]  Population Health Pharmacist  [phone]

## 2024-07-10 NOTE — TELEPHONE ENCOUNTER
ANTICOAGULATION     Cam Walden is overdue for an INR check.     Reminder letter sent    Jaqueline Chua RN

## 2024-07-10 NOTE — TELEPHONE ENCOUNTER
ANTICOAGULATION     Cam Walden is overdue for an INR check.     Was unable to reach patient and was unable to leave a voicemail. If patient calls, please schedule INR check as soon as possible.    - busy signals    Fabiola Rodriguez RN

## 2024-07-10 NOTE — LETTER
[facility]  [address] [facility] [address]  Phone: [phone]  Fax: [phone]   July 10, 2024        Cam Walden  [address]  [place] [address]            Dear Cam,    You are currently under the care of [facility] for your warfarin (Coumadin , Jantoven ) therapy.  We are contacting you because our records show you were due for an INR on 6/25/24.    There are potentially serious risks when taking warfarin without careful monitoring and we want to make sure you are safely managed.  Routine lab monitoring is required for warfarin refills.     Please call [phone] as soon as possible to schedule a lab appointment. If it is difficult for you to get to lab, please call us to discuss options.  If there has been a change in your care or other concerns, please let us know so we can help and/or update our records.         Sincerely,       [facility]

## 2024-07-17 ENCOUNTER — TELEPHONE (OUTPATIENT)
Dept: ANTICOAGULATION | Facility: CLINIC | Age: 74
End: 2024-07-17
Payer: COMMERCIAL

## 2024-07-17 NOTE — TELEPHONE ENCOUNTER
ANTICOAGULATION     Cam Walden is overdue for an INR check.     Spoke with Cam and scheduled lab appointment on 7/19    Jaqueline Chua RN

## 2024-07-19 ENCOUNTER — LAB (OUTPATIENT)
Dept: LAB | Facility: CLINIC | Age: 74
End: 2024-07-19
Payer: COMMERCIAL

## 2024-07-19 ENCOUNTER — ANTICOAGULATION THERAPY VISIT (OUTPATIENT)
Dept: ANTICOAGULATION | Facility: CLINIC | Age: 74
End: 2024-07-19

## 2024-07-19 DIAGNOSIS — I48.20 CHRONIC ATRIAL FIBRILLATION (H): ICD-10-CM

## 2024-07-19 DIAGNOSIS — I48.20 CHRONIC ATRIAL FIBRILLATION (H): Primary | ICD-10-CM

## 2024-07-19 LAB — INR BLD: 4.2 (ref 0.9–1.1)

## 2024-07-19 PROCEDURE — 36416 COLLJ CAPILLARY BLOOD SPEC: CPT

## 2024-07-19 PROCEDURE — 85610 PROTHROMBIN TIME: CPT

## 2024-07-19 NOTE — PROGRESS NOTES
ANTICOAGULATION MANAGEMENT     Cam Walden 74 year old male is on warfarin with supratherapeutic INR result. (Goal INR 2.0-3.0)    Recent labs: (last 7 days)     07/19/24  0935   INR 4.2*       ASSESSMENT     Warfarin Lab Questionnaire    Warfarin Doses Last 7 Days      7/19/2024     9:30 AM   Dose in Tablet or Mg   TAB or MG? tablet (tab)     Pt Rptd Dose SUNDAY MONDAY TUESDAY WED THURS FRIDAY SATURDAY 7/19/2024   9:30 AM 2 1 2 1 2 1 1         7/19/2024   Warfarin Lab Questionnaire   Missed doses within past 14 days? No   Changes in diet or alcohol within past 14 days? No   Medication changes since last result? No   Injuries or illness since last result? No   New shortness of breath, severe headaches or sudden changes in vision since last result? No   Abnormal bleeding since last result? No   Upcoming surgery, procedure? No   Best number to call with results? [phone]        Previous result: Supratherapeutic  Additional findings: None       PLAN     Recommended plan for no diet, medication or health factor changes affecting INR     Dosing Instructions: hold dose then decrease your warfarin dose (9.1% change) with next INR in 10 days       Summary  As of 7/19/2024      Full warfarin instructions:  5 mg every Sun, Tue, Thu; 10 mg all other days   Next INR check:                 Telephone call with Cam who verbalizes understanding and agrees to plan    Lab visit scheduled    Education provided: Please call back if any changes to your diet, medications or how you've been taking warfarin    Plan made per [facility] anticoagulation protocol    Jaqueline Chua RN  Anticoagulation Clinic  7/19/2024    _______________________________________________________________________     Anticoagulation Episode Summary       Current INR goal:  2.0-3.0   TTR:  48.5% (1 y)   Target end date:  Indefinite   Send INR reminders to:  [facility]    Indications    Atrial flutter (H) (Resolved) [I48.92]  Chronic atrial fibrillation  (H) [I48.20]             Comments:               Anticoagulation Care Providers       Provider Role Specialty Phone number    Kamaljit Plata MD Referring Family Medicine [phone]

## 2024-07-22 ENCOUNTER — ALLIED HEALTH/NURSE VISIT (OUTPATIENT)
Dept: FAMILY MEDICINE | Facility: CLINIC | Age: 74
End: 2024-07-22
Payer: COMMERCIAL

## 2024-07-22 DIAGNOSIS — E53.8 VITAMIN B12 DEFICIENCY (NON ANEMIC): Primary | ICD-10-CM

## 2024-07-22 PROCEDURE — 99207 PR NO CHARGE NURSE ONLY: CPT

## 2024-07-22 PROCEDURE — 96372 THER/PROPH/DIAG INJ SC/IM: CPT | Performed by: FAMILY MEDICINE

## 2024-07-22 RX ORDER — CYANOCOBALAMIN 1000 UG/ML
1000 INJECTION, SOLUTION INTRAMUSCULAR; SUBCUTANEOUS
Status: ACTIVE | OUTPATIENT
Start: 2024-07-22 | End: 2025-07-17

## 2024-07-22 RX ADMIN — CYANOCOBALAMIN 1000 MCG: 1000 INJECTION, SOLUTION INTRAMUSCULAR; SUBCUTANEOUS at 10:36

## 2024-07-29 ENCOUNTER — LAB (OUTPATIENT)
Dept: LAB | Facility: CLINIC | Age: 74
End: 2024-07-29
Payer: COMMERCIAL

## 2024-07-29 ENCOUNTER — ANTICOAGULATION THERAPY VISIT (OUTPATIENT)
Dept: ANTICOAGULATION | Facility: CLINIC | Age: 74
End: 2024-07-29

## 2024-07-29 DIAGNOSIS — I48.20 CHRONIC ATRIAL FIBRILLATION (H): ICD-10-CM

## 2024-07-29 DIAGNOSIS — I48.20 CHRONIC ATRIAL FIBRILLATION (H): Primary | ICD-10-CM

## 2024-07-29 LAB — INR BLD: 2.5 (ref 0.9–1.1)

## 2024-07-29 PROCEDURE — 36416 COLLJ CAPILLARY BLOOD SPEC: CPT

## 2024-07-29 PROCEDURE — 85610 PROTHROMBIN TIME: CPT

## 2024-07-29 NOTE — PROGRESS NOTES
ANTICOAGULATION MANAGEMENT     Cam Walden 74 year old male is on warfarin with therapeutic INR result. (Goal INR 2.0-3.0)    Recent labs: (last 7 days)     07/29/24  0932   INR 2.5*       ASSESSMENT     Warfarin Lab Questionnaire    Warfarin Doses Last 7 Days      7/29/2024     9:29 AM   Dose in Tablet or Mg   TAB or MG? tablet (tab)     Pt Rptd Dose SUNDAY MONDAY TUESDAY WED 7/29/2024   9:29 AM 2 1 1 2         7/29/2024   Warfarin Lab Questionnaire   Missed doses within past 14 days? No   Changes in diet or alcohol within past 14 days? No   Medication changes since last result? No   Injuries or illness since last result? No   New shortness of breath, severe headaches or sudden changes in vision since last result? No   Abnormal bleeding since last result? No   Upcoming surgery, procedure? No   Best number to call with results? [phone]        Previous result: Supratherapeutic  Additional findings: None       PLAN     Recommended plan for no diet, medication or health factor changes affecting INR     Dosing Instructions: Continue your current warfarin dose with next INR in 2 weeks       Summary  As of 7/29/2024      Full warfarin instructions:  10 mg every Mon, Wed, Fri; 5 mg all other days   Next INR check:  8/12/2024               Detailed voice message left for Cam with dosing instructions and follow up date.     Contact [phone] to schedule and with any changes, questions or concerns.     Education provided: Please call back if any changes to your diet, medications or how you've been taking warfarin    Plan made per [facility] anticoagulation protocol    Jaqueline Chua RN  Anticoagulation Clinic  7/29/2024    _______________________________________________________________________     Anticoagulation Episode Summary       Current INR goal:  2.0-3.0   TTR:  46.5% (1 y)   Target end date:  Indefinite   Send INR reminders to:  [facility]    Indications    Atrial flutter (H) (Resolved)  [I48.92]  Chronic atrial fibrillation (H) [I48.20]             Comments:               Anticoagulation Care Providers       Provider Role Specialty Phone number    Kamaljit Plata MD Referring Family Medicine [phone]

## 2024-08-22 ENCOUNTER — TELEPHONE (OUTPATIENT)
Dept: ANTICOAGULATION | Facility: CLINIC | Age: 74
End: 2024-08-22
Payer: COMMERCIAL

## 2024-08-22 NOTE — TELEPHONE ENCOUNTER
ANTICOAGULATION     Cam Walden is overdue for an INR check.     Was unable to reach patient and was unable to leave a voicemail. If patient calls, please schedule INR check as soon as possible.    - phone line busy 2x    Fabiola Rodriguez RN

## 2024-08-23 ENCOUNTER — TELEPHONE (OUTPATIENT)
Dept: ANTICOAGULATION | Facility: CLINIC | Age: 74
End: 2024-08-23
Payer: COMMERCIAL

## 2024-08-23 NOTE — TELEPHONE ENCOUNTER
ANTICOAGULATION     Cam Walden is overdue for an INR check.     Spoke with Cam and scheduled lab appointment on 9/27    Jaqueline Chua RN

## 2024-08-28 ENCOUNTER — LAB (OUTPATIENT)
Dept: LAB | Facility: CLINIC | Age: 74
End: 2024-08-28
Payer: COMMERCIAL

## 2024-08-28 ENCOUNTER — ANTICOAGULATION THERAPY VISIT (OUTPATIENT)
Dept: ANTICOAGULATION | Facility: CLINIC | Age: 74
End: 2024-08-28

## 2024-08-28 DIAGNOSIS — I48.20 CHRONIC ATRIAL FIBRILLATION (H): ICD-10-CM

## 2024-08-28 DIAGNOSIS — I48.20 CHRONIC ATRIAL FIBRILLATION (H): Primary | ICD-10-CM

## 2024-08-28 LAB — INR BLD: 2.6 (ref 0.9–1.1)

## 2024-08-28 PROCEDURE — 36416 COLLJ CAPILLARY BLOOD SPEC: CPT

## 2024-08-28 PROCEDURE — 85610 PROTHROMBIN TIME: CPT

## 2024-08-28 NOTE — PROGRESS NOTES
ANTICOAGULATION MANAGEMENT     Cam Walden 74 year old male is on warfarin with therapeutic INR result. (Goal INR 2.0-3.0)    Recent labs: (last 7 days)     08/28/24  1015   INR 2.6*       ASSESSMENT     Warfarin Lab Questionnaire    Warfarin Doses Last 7 Days      8/28/2024    10:10 AM   Dose in Tablet or Mg   TAB or MG? tablet (tab)     Pt Rptd Dose SUNDAY MONDAY TUESDAY WED THURS FRIDAY SATURDAY 8/28/2024  10:10 AM 1 2 1 2 1 2 1         8/28/2024   Warfarin Lab Questionnaire   Missed doses within past 14 days? No   Changes in diet or alcohol within past 14 days? No   Medication changes since last result? No   Injuries or illness since last result? No   New shortness of breath, severe headaches or sudden changes in vision since last result? No   Abnormal bleeding since last result? No   Upcoming surgery, procedure? No   Best number to call with results? [phone]        Previous result: Therapeutic last visit; previously outside of goal range  Additional findings: None       PLAN     Recommended plan for no diet, medication or health factor changes affecting INR     Dosing Instructions: Continue your current warfarin dose with next INR in 4 weeks       Summary  As of 8/28/2024      Full warfarin instructions:  10 mg every Mon, Wed, Fri; 5 mg all other days   Next INR check:  9/25/2024               Telephone call with Cam who verbalizes understanding and agrees to plan    Lab visit scheduled    Education provided: Please call back if any changes to your diet, medications or how you've been taking warfarin    Plan made per [facility] anticoagulation protocol    Jaqueline Chua RN  Anticoagulation Clinic  8/28/2024    _______________________________________________________________________     Anticoagulation Episode Summary       Current INR goal:  2.0-3.0   TTR:  47.3% (1 y)   Target end date:  Indefinite   Send INR reminders to:  [facility]    Indications    Atrial flutter (H) (Resolved)  [I48.92]  Chronic atrial fibrillation (H) [I48.20]             Comments:               Anticoagulation Care Providers       Provider Role Specialty Phone number    Kamaljit Plata MD Referring Family Medicine [phone]

## 2024-09-09 ENCOUNTER — PATIENT OUTREACH (OUTPATIENT)
Dept: ONCOLOGY | Facility: HOSPITAL | Age: 74
End: 2024-09-09
Payer: COMMERCIAL

## 2024-09-23 ENCOUNTER — PATIENT OUTREACH (OUTPATIENT)
Dept: ONCOLOGY | Facility: HOSPITAL | Age: 74
End: 2024-09-23
Payer: COMMERCIAL

## 2024-09-25 ENCOUNTER — ANTICOAGULATION THERAPY VISIT (OUTPATIENT)
Dept: ANTICOAGULATION | Facility: CLINIC | Age: 74
End: 2024-09-25

## 2024-09-25 ENCOUNTER — LAB (OUTPATIENT)
Dept: LAB | Facility: CLINIC | Age: 74
End: 2024-09-25
Payer: COMMERCIAL

## 2024-09-25 DIAGNOSIS — I48.20 CHRONIC ATRIAL FIBRILLATION (H): Primary | ICD-10-CM

## 2024-09-25 DIAGNOSIS — I48.20 CHRONIC ATRIAL FIBRILLATION (H): ICD-10-CM

## 2024-09-25 LAB — INR BLD: 2.5 (ref 0.9–1.1)

## 2024-09-25 PROCEDURE — 85610 PROTHROMBIN TIME: CPT

## 2024-09-25 PROCEDURE — 36416 COLLJ CAPILLARY BLOOD SPEC: CPT

## 2024-09-25 NOTE — PROGRESS NOTES
ANTICOAGULATION MANAGEMENT     Cam Walden 74 year old male is on warfarin with therapeutic INR result. (Goal INR 2.0-3.0)    Recent labs: (last 7 days)     09/25/24  1031   INR 2.5*       ASSESSMENT     Warfarin Lab Questionnaire    Warfarin Doses Last 7 Days      9/25/2024    10:34 AM   Dose in Tablet or Mg   TAB or MG? tablet (tab)     Pt Rptd Dose SUNDAY MONDAY TUESDAY WED THURS FRIDAY SATURDAY 9/25/2024  10:34 AM 1 2 1 2 1 2 1         9/25/2024   Warfarin Lab Questionnaire   Missed doses within past 14 days? No   Changes in diet or alcohol within past 14 days? No   Medication changes since last result? No   Injuries or illness since last result? No   New shortness of breath, severe headaches or sudden changes in vision since last result? No   Abnormal bleeding since last result? No   Upcoming surgery, procedure? No   Best number to call with results? [phone]        Previous result: Therapeutic last 2(+) visits  Additional findings: None       PLAN     Recommended plan for no diet, medication or health factor changes affecting INR     Dosing Instructions: Continue your current warfarin dose with next INR in 2 weeks       Summary  As of 9/25/2024      Full warfarin instructions:  10 mg every Mon, Wed, Fri; 5 mg all other days   Next INR check:  10/23/2024               Telephone call with Cam who verbalizes understanding and agrees to plan    Lab visit scheduled    Education provided: None required    Plan made per [facility] anticoagulation protocol    Jaqueline Chua RN  9/25/2024  Anticoagulation Clinic  [facility] for routing messages: [employer]  [facility] patient phone line: [phone]        _______________________________________________________________________     Anticoagulation Episode Summary       Current INR goal:  2.0-3.0   TTR:  55.0% (1 y)   Target end date:  Indefinite   Send INR reminders to:  [facility]    Indications    Atrial flutter (H) (Resolved) [I48.92]  Chronic  atrial fibrillation (H) [I48.20]             Comments:               Anticoagulation Care Providers       Provider Role Specialty Phone number    Kamaljit Plata MD Referring Family Medicine [phone]

## 2024-09-26 DIAGNOSIS — E53.8 VITAMIN B12 DEFICIENCY (NON ANEMIC): Primary | ICD-10-CM

## 2024-09-26 RX ORDER — ALBUTEROL SULFATE 0.83 MG/ML
2.5 SOLUTION RESPIRATORY (INHALATION)
Status: CANCELLED | OUTPATIENT
Start: 2024-09-26

## 2024-09-26 RX ORDER — MEPERIDINE HYDROCHLORIDE 25 MG/ML
25 INJECTION INTRAMUSCULAR; INTRAVENOUS; SUBCUTANEOUS EVERY 30 MIN PRN
Status: CANCELLED | OUTPATIENT
Start: 2024-09-26

## 2024-09-26 RX ORDER — CYANOCOBALAMIN 1000 UG/ML
1000 INJECTION, SOLUTION INTRAMUSCULAR; SUBCUTANEOUS ONCE
Status: CANCELLED
Start: 2024-09-26 | End: 2024-09-26

## 2024-09-26 RX ORDER — METHYLPREDNISOLONE SODIUM SUCCINATE 125 MG/2ML
125 INJECTION, POWDER, LYOPHILIZED, FOR SOLUTION INTRAMUSCULAR; INTRAVENOUS
Status: CANCELLED
Start: 2024-09-26

## 2024-09-26 RX ORDER — EPINEPHRINE 1 MG/ML
0.3 INJECTION, SOLUTION INTRAMUSCULAR; SUBCUTANEOUS EVERY 5 MIN PRN
Status: CANCELLED | OUTPATIENT
Start: 2024-09-26

## 2024-09-26 RX ORDER — DIPHENHYDRAMINE HYDROCHLORIDE 50 MG/ML
50 INJECTION INTRAMUSCULAR; INTRAVENOUS
Status: CANCELLED
Start: 2024-09-26

## 2024-09-26 RX ORDER — NALOXONE HYDROCHLORIDE 0.4 MG/ML
0.2 INJECTION, SOLUTION INTRAMUSCULAR; INTRAVENOUS; SUBCUTANEOUS
Status: CANCELLED | OUTPATIENT
Start: 2024-09-26

## 2024-09-26 RX ORDER — ALBUTEROL SULFATE 90 UG/1
1-2 AEROSOL, METERED RESPIRATORY (INHALATION)
Status: CANCELLED
Start: 2024-09-26

## 2024-09-27 DIAGNOSIS — E53.8 VITAMIN B12 DEFICIENCY (NON ANEMIC): Primary | ICD-10-CM

## 2024-09-27 RX ORDER — EPINEPHRINE 1 MG/ML
0.3 INJECTION, SOLUTION INTRAMUSCULAR; SUBCUTANEOUS EVERY 5 MIN PRN
OUTPATIENT
Start: 2024-09-27

## 2024-09-27 RX ORDER — MEPERIDINE HYDROCHLORIDE 25 MG/ML
25 INJECTION INTRAMUSCULAR; INTRAVENOUS; SUBCUTANEOUS EVERY 30 MIN PRN
OUTPATIENT
Start: 2024-09-27

## 2024-09-27 RX ORDER — METHYLPREDNISOLONE SODIUM SUCCINATE 125 MG/2ML
125 INJECTION, POWDER, LYOPHILIZED, FOR SOLUTION INTRAMUSCULAR; INTRAVENOUS
Start: 2024-09-27

## 2024-09-27 RX ORDER — ALBUTEROL SULFATE 90 UG/1
1-2 AEROSOL, METERED RESPIRATORY (INHALATION)
Start: 2024-09-27

## 2024-09-27 RX ORDER — DIPHENHYDRAMINE HYDROCHLORIDE 50 MG/ML
50 INJECTION INTRAMUSCULAR; INTRAVENOUS
Start: 2024-09-27

## 2024-09-27 RX ORDER — ALBUTEROL SULFATE 0.83 MG/ML
2.5 SOLUTION RESPIRATORY (INHALATION)
OUTPATIENT
Start: 2024-09-27

## 2024-09-27 RX ORDER — CYANOCOBALAMIN 1000 UG/ML
1000 INJECTION, SOLUTION INTRAMUSCULAR; SUBCUTANEOUS ONCE
Start: 2024-09-27 | End: 2024-09-27

## 2024-10-15 ENCOUNTER — PATIENT OUTREACH (OUTPATIENT)
Dept: ONCOLOGY | Facility: HOSPITAL | Age: 74
End: 2024-10-15
Payer: COMMERCIAL

## 2024-10-15 NOTE — PROGRESS NOTES
[facility]: Cancer Care                                                                                          Unable To Contact    Clinical Data: RN Care Coordinator Outreach    Outreach attempted x 1.  RNCC unable to leave a voice message. Busy signal.     Plan: RN Care Coordinator will try to reach patient again in 10 business days.      Signature:  Felisa Boudreaux  RN Care Coordinator  [facility]

## 2024-10-29 ENCOUNTER — TELEPHONE (OUTPATIENT)
Dept: FAMILY MEDICINE | Facility: CLINIC | Age: 74
End: 2024-10-29
Payer: COMMERCIAL

## 2024-10-29 DIAGNOSIS — E11.9 DIABETES MELLITUS, TYPE 2 (H): ICD-10-CM

## 2024-10-29 DIAGNOSIS — E11.22 TYPE 2 DIABETES MELLITUS WITH STAGE 3A CHRONIC KIDNEY DISEASE, WITHOUT LONG-TERM CURRENT USE OF INSULIN (H): ICD-10-CM

## 2024-10-29 DIAGNOSIS — N18.31 TYPE 2 DIABETES MELLITUS WITH STAGE 3A CHRONIC KIDNEY DISEASE, WITHOUT LONG-TERM CURRENT USE OF INSULIN (H): ICD-10-CM

## 2024-10-29 NOTE — TELEPHONE ENCOUNTER
Patient is overdue to fill metformin and Ozempic. Per our records, last filled metformin 7/10/24 for 100 day supply and is 11 days late to fill. Last filled Ozempic 7/10/24 for 28 day supply. Spoke with patient and he confirms taking both medications and needing refill. Sent to pharmacy per protocol.       Sera Vail, PharmD, [facility]  Population Health Pharmacist  [phone]

## 2024-10-30 ENCOUNTER — TELEPHONE (OUTPATIENT)
Dept: ANTICOAGULATION | Facility: CLINIC | Age: 74
End: 2024-10-30
Payer: COMMERCIAL

## 2024-10-30 NOTE — TELEPHONE ENCOUNTER
ANTICOAGULATION     Cam Walden is overdue for an INR check.     Was unable to reach patient and was unable to leave a voicemail. If patient calls, please schedule INR check as soon as possible.     Jaqueline Chua RN  10/30/2024  Anticoagulation Clinic  [facility] for routing messages: p ANTICOAG [address]  [facility] patient phone line: [phone]

## 2024-11-01 ENCOUNTER — TELEPHONE (OUTPATIENT)
Dept: ONCOLOGY | Facility: HOSPITAL | Age: 74
End: 2024-11-01
Payer: COMMERCIAL

## 2024-11-08 ENCOUNTER — PATIENT OUTREACH (OUTPATIENT)
Dept: ONCOLOGY | Facility: HOSPITAL | Age: 74
End: 2024-11-08
Payer: COMMERCIAL

## 2024-11-08 NOTE — PROGRESS NOTES
[facility]: Cancer Care                                                                                          Multiple attempts to reach Cam to help schedule follow-up appointments have been made.    I called him one last time today, left him a voice message.  He is to call us back when he is ready to schedule.  No further out reach will be done.      Signature:  Felisa Boudreaux RN

## 2024-11-19 ENCOUNTER — ANTICOAGULATION THERAPY VISIT (OUTPATIENT)
Dept: ANTICOAGULATION | Facility: CLINIC | Age: 74
End: 2024-11-19

## 2024-11-19 ENCOUNTER — LAB (OUTPATIENT)
Dept: LAB | Facility: CLINIC | Age: 74
End: 2024-11-19
Payer: COMMERCIAL

## 2024-11-19 ENCOUNTER — OFFICE VISIT (OUTPATIENT)
Dept: FAMILY MEDICINE | Facility: CLINIC | Age: 74
End: 2024-11-19
Payer: COMMERCIAL

## 2024-11-19 VITALS
WEIGHT: 286 LBS | RESPIRATION RATE: 18 BRPM | HEART RATE: 90 BPM | TEMPERATURE: 97.4 F | OXYGEN SATURATION: 96 % | BODY MASS INDEX: 38.74 KG/M2 | DIASTOLIC BLOOD PRESSURE: 82 MMHG | HEIGHT: 72 IN | SYSTOLIC BLOOD PRESSURE: 120 MMHG

## 2024-11-19 DIAGNOSIS — N18.31 TYPE 2 DIABETES MELLITUS WITH STAGE 3A CHRONIC KIDNEY DISEASE, WITHOUT LONG-TERM CURRENT USE OF INSULIN (H): ICD-10-CM

## 2024-11-19 DIAGNOSIS — R42 DIZZINESS: Primary | ICD-10-CM

## 2024-11-19 DIAGNOSIS — E11.42 DIABETIC PERIPHERAL NEUROPATHY (H): ICD-10-CM

## 2024-11-19 DIAGNOSIS — E11.22 TYPE 2 DIABETES MELLITUS WITH STAGE 3A CHRONIC KIDNEY DISEASE, WITHOUT LONG-TERM CURRENT USE OF INSULIN (H): Primary | ICD-10-CM

## 2024-11-19 DIAGNOSIS — R35.0 FREQUENCY OF MICTURITION: ICD-10-CM

## 2024-11-19 DIAGNOSIS — E11.22 TYPE 2 DIABETES MELLITUS WITH STAGE 3A CHRONIC KIDNEY DISEASE, WITHOUT LONG-TERM CURRENT USE OF INSULIN (H): ICD-10-CM

## 2024-11-19 DIAGNOSIS — I48.20 CHRONIC ATRIAL FIBRILLATION (H): Primary | ICD-10-CM

## 2024-11-19 DIAGNOSIS — N18.31 TYPE 2 DIABETES MELLITUS WITH STAGE 3A CHRONIC KIDNEY DISEASE, WITHOUT LONG-TERM CURRENT USE OF INSULIN (H): Primary | ICD-10-CM

## 2024-11-19 DIAGNOSIS — Z12.5 SCREENING FOR PROSTATE CANCER: ICD-10-CM

## 2024-11-19 DIAGNOSIS — I48.20 CHRONIC ATRIAL FIBRILLATION (H): ICD-10-CM

## 2024-11-19 LAB
ALBUMIN SERPL BCG-MCNC: 3.7 G/DL (ref 3.5–5.2)
ALP SERPL-CCNC: 80 U/L (ref 40–150)
ALT SERPL W P-5'-P-CCNC: 41 U/L (ref 0–70)
ANION GAP SERPL CALCULATED.3IONS-SCNC: 13 MMOL/L (ref 7–15)
AST SERPL W P-5'-P-CCNC: 37 U/L (ref 0–45)
BILIRUB SERPL-MCNC: 0.4 MG/DL
BUN SERPL-MCNC: 19.6 MG/DL (ref 8–23)
CALCIUM SERPL-MCNC: 8.8 MG/DL (ref 8.8–10.4)
CHLORIDE SERPL-SCNC: 104 MMOL/L (ref 98–107)
CREAT SERPL-MCNC: 1.77 MG/DL (ref 0.67–1.17)
CREAT UR-MCNC: 99 MG/DL
EGFRCR SERPLBLD CKD-EPI 2021: 40 ML/MIN/1.73M2
EST. AVERAGE GLUCOSE BLD GHB EST-MCNC: 278 MG/DL
GLUCOSE SERPL-MCNC: 249 MG/DL (ref 70–99)
HBA1C MFR BLD: 11.3 % (ref 0–5.6)
HCO3 SERPL-SCNC: 19 MMOL/L (ref 22–29)
INR BLD: 4.3 (ref 0.9–1.1)
MICROALBUMIN UR-MCNC: 40.8 MG/L
MICROALBUMIN/CREAT UR: 41.21 MG/G CR (ref 0–17)
POTASSIUM SERPL-SCNC: 4.2 MMOL/L (ref 3.4–5.3)
PROT SERPL-MCNC: 7.3 G/DL (ref 6.4–8.3)
PSA SERPL DL<=0.01 NG/ML-MCNC: 2.16 NG/ML (ref 0–6.5)
SODIUM SERPL-SCNC: 136 MMOL/L (ref 135–145)

## 2024-11-19 PROCEDURE — 80053 COMPREHEN METABOLIC PANEL: CPT | Performed by: FAMILY MEDICINE

## 2024-11-19 PROCEDURE — 82570 ASSAY OF URINE CREATININE: CPT | Performed by: FAMILY MEDICINE

## 2024-11-19 PROCEDURE — 83036 HEMOGLOBIN GLYCOSYLATED A1C: CPT | Performed by: FAMILY MEDICINE

## 2024-11-19 PROCEDURE — G0103 PSA SCREENING: HCPCS | Performed by: FAMILY MEDICINE

## 2024-11-19 PROCEDURE — 36415 COLL VENOUS BLD VENIPUNCTURE: CPT | Performed by: FAMILY MEDICINE

## 2024-11-19 PROCEDURE — 82043 UR ALBUMIN QUANTITATIVE: CPT | Performed by: FAMILY MEDICINE

## 2024-11-19 PROCEDURE — 85610 PROTHROMBIN TIME: CPT

## 2024-11-19 NOTE — PROGRESS NOTES
ANTICOAGULATION MANAGEMENT     Cam Walden 74 year old male is on warfarin with supratherapeutic INR result. (Goal INR 2.0-3.0)    Recent labs: (last 7 days)     11/19/24  0955   INR 4.3*       ASSESSMENT     Warfarin Lab Questionnaire    Warfarin Doses Last 7 Days      11/19/2024     9:57 AM   Dose in Tablet or Mg   TAB or MG? tablet (tab)     Pt Rptd Dose SUNDAY MONDAY TUESDAY WED THURS FRIDAY SATURDAY 11/19/2024   9:57 AM 1 2 1 2 1 2 1         11/19/2024   Warfarin Lab Questionnaire   Missed doses within past 14 days? No   Changes in diet or alcohol within past 14 days? No   Medication changes since last result? No   Injuries or illness since last result? No   New shortness of breath, severe headaches or sudden changes in vision since last result? No   Abnormal bleeding since last result? No   Upcoming surgery, procedure? No   Best number to call with results? [phone]        Previous result: Therapeutic last 2(+) visits  Additional findings: None       PLAN     Recommended plan for no diet, medication or health factor changes affecting INR     Dosing Instructions: hold 1.5 doses then continue your current warfarin dose with next INR in 10 days       Summary  As of 11/19/2024      Full warfarin instructions:  11/19: Hold; 11/20: 5 mg; Otherwise 10 mg every Mon, Wed, Fri; 5 mg all other days   Next INR check:  11/29/2024               Sent [employer] message with dosing and follow up instructions    Sent message through [employer]    Education provided: Please call back if any changes to your diet, medications or how you've been taking warfarin    Plan made per [facility] anticoagulation protocol    Jaqueline Chua RN  11/19/2024  Anticoagulation Clinic  [employer] [place] for routing messages: [employer] [facility] patient phone line: [phone]        _______________________________________________________________________     Anticoagulation Episode Summary       Current INR goal:  2.0-3.0   TTR:  55.5% (1 y)   Target  end date:  Indefinite   Send INR reminders to:  [facility]    Indications    Atrial flutter (H) (Resolved) [I48.92]  Chronic atrial fibrillation (H) [I48.20]             Comments:  --             Anticoagulation Care Providers       Provider Role Specialty Phone number    Kamaljit Plata MD Referring Family Medicine [phone]

## 2024-11-19 NOTE — PROGRESS NOTES
Assessment & Plan     Dizziness  History of B-cell lymphoma treatment, history of colon cancer treatment, will rule out organic manifestation of dizziness, if head MRI negative, could benefit by doing vestibular rehab.  - MR Brain w/o Contrast; Future    Type 2 diabetes mellitus with stage 3a chronic kidney disease, without long-term current use of insulin (H)  Poorly controlled, Ozempic discussed and prescribed at last visit but has not started yet, patient urged to start taking, monitor blood sugar, keep a log, call with blood sugar reading about 2 to 3 weeks.  - Comprehensive metabolic panel  - Hemoglobin A1c  - Albumin Random Urine Quantitative with Creat Ratio  - Primary Care - Care Coordination Referral; Future    Diabetic peripheral neuropathy (H)  Symptom control with duloxetine, discussed tight blood sugar control to minimize worsening  - Primary Care - Care Coordination Referral; Future    Screening for prostate cancer    - PSA, tumor marker; Future  - PSA, tumor marker    Frequency of micturition    - PSA, tumor marker; Future  - PSA, tumor marker    Referral to care coordination discussed PCA.  Handicap parking form completed today.    Review of external notes as documented elsewhere in note  40 minutes spent by me on the date of the encounter doing chart review, review of outside records, review of test results, interpretation of tests, patient visit, and documentation The longitudinal plan of care for the diagnosis(es)/condition(s) as documented were addressed during this visit. Due to the added complexity in care, I will continue to support Cam in the subsequent management and with ongoing continuity of care.    BMI  Estimated body mass index is 38.79 kg/m  as calculated from the following:    Height as of this encounter: 1.829 m (6').    Weight as of this encounter: 129.7 kg (286 lb).   Weight management plan: Discussed healthy diet and exercise guidelines      Work on weight loss  Regular  exercise    Subjective   Cam is a 74 year old, presenting for the following health issues:  Mobility Issues, Dizziness (Bad dizziness for 1 weeks or so, pt stated that he fallen due to dizziness ), and Letter Out (Requesting handicapped sticker and want to apply for PCA )      11/19/2024     9:37 AM   Additional Questions   Roomed by hser   Accompanied by staff     History of Present Illness       Reason for visit:  Mobility issues and set up health care directive  Symptom onset:  3-7 days ago  Symptom intensity:  Moderate  Symptom progression:  Staying the same  Had these symptoms before:  Yes  Has tried/received treatment for these symptoms:  No  What makes it worse:  When i stand  What makes it better:  Standing still before moving 30seconds   He is taking medications regularly.       Dizziness for the past 3 to 4 weeks, exacerbated with activities, couple of fall, but no head injury.  Getting out of the car, standing up seems to make symptoms worse, following ER 3 weeks ago with no head injury.  Has limited mobility because of neuropathy involving his lower extremities, previous fracture, status post partial colectomy for colon cancer, large B cell lymphoma treatment.  Diabetes type 2, unfortunately patient not using the Ozempic discussed and prescribed at last visit.He is here today with his designated power of .      Review of Systems  Constitutional, HEENT, cardiovascular, pulmonary, gi and gu systems are negative, except as otherwise noted.      Objective    /82 (BP Location: Left arm, Patient Position: Sitting, Cuff Size: Adult Large)   Pulse 90   Temp 97.4  F (36.3  C) (Temporal)   Resp 18   Ht 1.829 m (6')   Wt 129.7 kg (286 lb)   SpO2 96%   BMI 38.79 kg/m    Body mass index is 38.79 kg/m .  Physical Exam   GENERAL: alert and no distress  NECK: no adenopathy, no asymmetry, masses, or scars  RESP: lungs clear to auscultation - no rales, rhonchi or wheezes  CV: regular rate and rhythm,  normal S1 S2, no S3 or S4, no murmur, click or rub, no peripheral edema  ABDOMEN: soft, nontender, no hepatosplenomegaly, no masses and bowel sounds normal  MS: no gross musculoskeletal defects noted, no edema  NEURO: Normal strength and tone, sensory exam grossly normal, and mentation intact    Results for orders placed or performed in visit on 11/19/24   INR point of care     Status: Abnormal   Result Value Ref Range    INR 4.3 (H) 0.9 - 1.1    Narrative    This test is intended for monitoring Coumadin therapy. Results are not accurate in patients with prolonged INR due to factor deficiency.   Results for orders placed or performed in visit on 11/19/24   Comprehensive metabolic panel     Status: Abnormal   Result Value Ref Range    Sodium 136 135 - 145 mmol/L    Potassium 4.2 3.4 - 5.3 mmol/L    Carbon Dioxide (CO2) 19 (L) 22 - 29 mmol/L    Anion Gap 13 7 - 15 mmol/L    Urea Nitrogen 19.6 8.0 - 23.0 mg/dL    Creatinine 1.77 (H) 0.67 - 1.17 mg/dL    GFR Estimate 40 (L) >60 mL/min/1.73m2    Calcium 8.8 8.8 - 10.4 mg/dL    Chloride 104 98 - 107 mmol/L    Glucose 249 (H) 70 - 99 mg/dL    Alkaline Phosphatase 80 40 - 150 U/L    AST 37 0 - 45 U/L    ALT 41 0 - 70 U/L    Protein Total 7.3 6.4 - 8.3 g/dL    Albumin 3.7 3.5 - 5.2 g/dL    Bilirubin Total 0.4 <=1.2 mg/dL   Hemoglobin A1c     Status: Abnormal   Result Value Ref Range    Estimated Average Glucose 278 (H) <117 mg/dL    Hemoglobin A1C 11.3 (H) 0.0 - 5.6 %    Narrative    Results consistent with previous, repeat testing unnecessary    Albumin Random Urine Quantitative with Creat Ratio     Status: Abnormal   Result Value Ref Range    Creatinine Urine mg/dL 99.0 mg/dL    Albumin Urine mg/L 40.8 mg/L    Albumin Urine mg/g Cr 41.21 (H) 0.00 - 17.00 mg/g Cr   PSA, tumor marker     Status: Normal   Result Value Ref Range    PSA Tumor Marker 2.16 0.00 - 6.50 ng/mL    Narrative    This result is obtained using the Roche Elecsys total PSA method on the jayme [id number]  immunoassay analyzer, which is an ultrasensitive method. Results obtained with different assay methods or kits cannot be used interchangeably.  An undetectable (<0.01 ng/mL) ultrasensitive prostate-specific antigen (USPSA) concentration after radical prostatectomy is reassuring and may aid in postoperative risk stratification of patients.     A detectable USPSA concentration (> or =0.01 ng/mL) after radical prostatectomy (RP) does not necessarily translate into disease progression or recurrence. Interpretation of a detectable USPSA needs to be made in conjunction with other clinicopathologic risk factors. The cutpoint for interpretation of USPSA assays remains controversial and has ranged from 0.01 to 0.05 ng/mL. For example, in a study that included 754 men after RP, a cutpoint of 0.01 ng/mL was an independent predictor of biochemical recurrence (BCR). BCR-free survival at 5 years was 92.4% for patients with an USPSA post-RP of less than 0.01 ng/mL and 56.8% for patients with an USPSA post-RP of 0.01 ng/mL or higher.(1) In the same study a cutoff of 0.03 ng/mL also predicted BCR independent of clinicopathological factors and BCR-free survival at 5 yrs was 90.8% for patients with an USPSA post-RP of less than 0.03 ng/mL and 26.9% for patients with a PSA post-RP of greater or equal to 0.03 ng/mL. (1)    1. Michael RUSSELL, Gaurav Z, Lefty MACIAS, et al. Do ultrasensitive prostate specific antigen measurements have a role in predicting long-term biochemical recurrence-free survival in men after radical prostatectomy? J Urol. 2016;195(2):330-336. doi:10.1016/j.juro.2015.08.080           Signed Electronically by: Kamaljit Plata MD      Prior to immunization administration, verified patients identity using patient s name and date of birth. Please see Immunization Activity for additional information.     Screening Questionnaire for Adult Immunization    Are you sick today?   No   Do you have allergies to medications, food, a vaccine  component or latex?   No   Have you ever had a serious reaction after receiving a vaccination?   No   Do you have a long-term health problem with heart, lung, kidney, or metabolic disease (e.g., diabetes), asthma, a blood disorder, no spleen, complement component deficiency, a cochlear implant, or a spinal fluid leak?  Are you on long-term aspirin therapy?   Yes   Do you have cancer, leukemia, HIV/AIDS, or any other immune system problem?   No   Do you have a parent, brother, or sister with an immune system problem?   No   In the past 3 months, have you taken medications that affect  your immune system, such as prednisone, other steroids, or anticancer drugs; drugs for the treatment of rheumatoid arthritis, Crohn s disease, or psoriasis; or have you had radiation treatments?   No   Have you had a seizure, or a brain or other nervous system problem?   No   During the past year, have you received a transfusion of blood or blood    products, or been given immune (gamma) globulin or antiviral drug?   No   For women: Are you pregnant or is there a chance you could become       pregnant during the next month?   No   Have you received any vaccinations in the past 4 weeks?   No     Immunization questionnaire was positive for at least one answer.  Notified PROVIDER.    This note was completed in part using a voice recognition software, any grammatical or context distortion are unintentional and inherent to the software.    Patient instructed to remain in clinic for 15 minutes afterwards, and to report any adverse reactions.     Screening performed by Leslie Hunt MA on 11/19/2024 at 9:41 AM.

## 2024-11-20 ENCOUNTER — PATIENT OUTREACH (OUTPATIENT)
Dept: CARE COORDINATION | Facility: CLINIC | Age: 74
End: 2024-11-20
Payer: COMMERCIAL

## 2024-11-20 NOTE — Clinical Note
Assessment with SEFERINO SAMSON on 12-4-24 at 10am.  Patient noted desire to discuss resources for PCA services.           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 Has someone that will be living him to be the care give/PCA

## 2024-11-20 NOTE — PROGRESS NOTES
11/20/2024  Clinic Care Coordination Contact  Community Health Worker Initial Outreach    CHW Initial Information Gathering:  Referral Source: PCP  Preferred Hospital: [facility]  [phone]  Preferred Urgent Care: [facility], [phone]  Current living arrangement:: I live alone (will have someone to live with as a care giver)  Type of residence:: Private home - stairs  Community Resources: None  Supplies Currently Used at Home: None  Equipment Currently Used at Home: none  Informal Support system:: Friends  No PCP office visit in Past Year: No  Transportation means:: Regular car  CHW Additional Questions  If ED/Hospital discharge, follow-up appointment scheduled as recommended?: N/A  Medication changes made following ED/Hospital discharge?: N/A  MyChart active?: No  Patient agreeable to assistance with activating MyChart?: No    Patient accepts CC: Yes. Patient scheduled for assessment with CC CHAPIN on 12-4-24 at 10am. Patient noted desire to discuss resources for PCA services.   PCP referral: PERSONAL CARE ASSISTANT  Reason for Referral:Complex Medical Concerns/EducationComplex Medical Concerns:Chronic Diagnosis - Use CommentsClinical Staff have discussed the Care Coordination Referral with the patient and/or caregiver:Yes  Additional Information:PERSONAL CARE ASSISTANT    The Clinic Community Health Worker called and spoke with the patient today at the request of the PCP to discuss possible Clinic Care Coordination enrollment.    The service was described to the patient and immediate needs were discussed.    The patient agreed to enroll in CCC for support with getting PCA services.    Patient background info  -lives a lone in a house. Plan to a friend that will live with him to be the PCA.  -able to drive to [place] on his own.  Okay to email resources ( email Alternative Care Services Program)    Routed to CC CHAPIN to review note    Kosta Michele  Community Health Worker  [facility]  [facility] Care Coordination  [email or web address]   Office: [phone]  Fax: [phone]

## 2024-11-20 NOTE — PROGRESS NOTES
11/20/2024  Clinic Care Coordination Contact  Email patient resources for PCA    Here's the [facility] programs for PCA service for seniors who don't have MA insurance or income over [place] guideline.  Review [place]: [place]://[place].gov/dhs/people-we-serve/seniors/services/home-community/programs-and-services/alternative-care.jsp  You can call also call to [facility] Choices Line at [phone] about PCA services  Another place to call is [facility] at [facility] Linkage Line [phone]    Kosta Michele  Community Health Worker  [facility]  Clinic Care Coordination  [email or web address]   Office: [phone]  Fax: [phone]

## 2024-11-21 ENCOUNTER — TELEPHONE (OUTPATIENT)
Dept: FAMILY MEDICINE | Facility: CLINIC | Age: 74
End: 2024-11-21
Payer: COMMERCIAL

## 2024-11-21 NOTE — TELEPHONE ENCOUNTER
RN attempted to contact patient, but no answer. Phone was busy and unable to leave a message. Will try patient later.    If patient calls back, please relay message below. Thanks    Alcira An RN  [facility]    ----- Message from Kamaljit Plata sent at 11/21/2024  9:25 AM CST -----  Blood sugar still elevated, average is around 250, I will place a referral to meet with our in-house pharmacist to adjust your medication.  Kidney function is trending up likely because of the elevated blood sugar, continue to monitor; again follow with in-house pharmacist for meds adjustment.

## 2024-11-25 NOTE — TELEPHONE ENCOUNTER
Attempt #2: No answer. Invalid, not able to LVM. Please relay clinician's message if patient returns call.    David MARX RN  [facility]

## 2024-11-26 NOTE — TELEPHONE ENCOUNTER
Called and relayed clinician's message below. Pt verbalized understanding and in agreement with plan. Scheduled MTM appt on 12/24/24.    David MARX RN  [facility]

## 2024-12-05 ENCOUNTER — DOCUMENTATION ONLY (OUTPATIENT)
Dept: OTHER | Facility: CLINIC | Age: 74
End: 2024-12-05
Payer: COMMERCIAL

## 2024-12-09 ENCOUNTER — TELEPHONE (OUTPATIENT)
Dept: FAMILY MEDICINE | Facility: CLINIC | Age: 74
End: 2024-12-09
Payer: COMMERCIAL

## 2024-12-09 DIAGNOSIS — C83.398 DIFFUSE LARGE B-CELL LYMPHOMA OF EXTRANODAL SITE: ICD-10-CM

## 2024-12-09 DIAGNOSIS — E11.59 TYPE 2 DIABETES MELLITUS WITH OTHER CIRCULATORY COMPLICATION, WITH LONG-TERM CURRENT USE OF INSULIN (H): Primary | ICD-10-CM

## 2024-12-09 DIAGNOSIS — Z79.4 TYPE 2 DIABETES MELLITUS WITH OTHER CIRCULATORY COMPLICATION, WITH LONG-TERM CURRENT USE OF INSULIN (H): Primary | ICD-10-CM

## 2024-12-09 NOTE — TELEPHONE ENCOUNTER
Forms/Letter Request    Type of form/letter: Pt is requesting a long term care and PCA referral. Adv met w/ last week discussing this. Pt has been paying for the last 3 yrs for these services only and is now looking for the ins to cover this. [employer]. Filling out a application and they are requesting this note from      Do we have the form/letter: Pt is looking for Dr to print a paragraph with this recommendation/referral  Who is the form from? From Dr, write paragraph      When is form/letter needed by: today - if able to do today, will pickup. If 5-7 days then mail to Pt's address    How would you like the form/letter returned: /mail (see above)    Patient Notified form requests are processed in 5-7 business days:Yes    Could we send this information to you in [employer] or would you prefer to receive a phone call?:   Patient would like to be contacted via [employer]

## 2024-12-09 NOTE — LETTER
December 10, 2024      Cam BOLANOS Win  [address]  [place] [address]        To Whom It May Concern,     Cam BOLANOS Walden is an established patient at the [facility], he has multiple medical issues, he will benefit by having  long-term care and PCA to help with those medical issues.    Sincerely,        Kamaljit Plata MD

## 2024-12-19 ENCOUNTER — DOCUMENTATION ONLY (OUTPATIENT)
Dept: ANTICOAGULATION | Facility: CLINIC | Age: 74
End: 2024-12-19
Payer: COMMERCIAL

## 2024-12-19 DIAGNOSIS — I48.20 CHRONIC ATRIAL FIBRILLATION (H): Primary | ICD-10-CM

## 2024-12-19 NOTE — PROGRESS NOTES
ANTICOAGULATION CLINIC REFERRAL RENEWAL REQUEST       An annual renewal order is required for all patients referred to [facility].?  Please review and sign the pended referral order for Cam Walden.       ANTICOAGULATION SUMMARY      Warfarin indication(s)   Atrial Fibrillation, chronic and Atrial flutter    Mechanical heart valve present?  NO       Current goal range   INR: 2.0-3.0     Goal appropriate for indication? Goal INR 2-3, standard for indication(s) above     Time in Therapeutic Range (TTR)  (Goal > 60%) 59.4 %       Office visit with referring provider's group within last year yes on 11/19/2024 - Dr. Kamaljit Rodriguez, RN  [facility]

## 2024-12-23 ENCOUNTER — PATIENT OUTREACH (OUTPATIENT)
Dept: CARE COORDINATION | Facility: CLINIC | Age: 74
End: 2024-12-23
Payer: COMMERCIAL

## 2024-12-23 RX ORDER — ASPIRIN 81 MG/1
81 TABLET, CHEWABLE ORAL DAILY
COMMUNITY
Start: 2024-12-23

## 2024-12-23 NOTE — PROGRESS NOTES
Clinic Care Coordination Contact  Transitions of Care Outreach  Chief Complaint   Patient presents with    Clinic Care Coordination - Initial    Clinic Care Coordination - Post Hospital       Most Recent Admission Date: 12/13/24  Most Recent Admission Diagnosis: syncope    Most Recent Discharge Date: 12/22/24  Most Recent Discharge Diagnosis: aortic stenosis, severe; syncope, sleep apnea, HTN, Diabetes type 2, controlled; atrial flutter, hyperlipidemia, syncope, atrial fibrillation    Transitions of Care Assessment    Discharge Assessment  How are you doing now that you are home?: Patient reports he has been doing well since hospital discharge, denies dizziness, chest pain, shortness of breath, swelling or chills  How are your symptoms? (Red Flag symptoms escalate to triage hotline per guidelines): Improved  Do you know how to contact your clinic care team if you have future questions or changes to your health status? : Yes  Does the patient have their discharge instructions? : Yes  Does the patient have questions regarding their discharge instructions? : No  Were you started on any new medications or were there changes to any of your previous medications? : Yes  Does the patient have all of their medications?: Yes  Do you have questions regarding any of your medications? : No  Do you have all of your needed medical supplies or equipment (DME)?  (i.e. oxygen tank, CPAP, cane, etc.): Yes         Post-op (Clinicians Only)  Did the patient have surgery or a procedure: Yes  Incision: closed  Drainage: No  Bleeding: none  Fever: No  Chills: No  Redness: No  Warmth: No  Swelling: No  Incision site pain: No  Closure: none  Eating & Drinking: eating and drinking without complaints/concerns  PO Intake: regular diet  Bowel Function: normal  Urinary Status: voiding without complaint/concerns    Care Management       Patient states he has been doing great since hospital discharge.  Patient plans to schedule a hospital follow-up  with his PCP during his MTM visit scheduled tomorrow, 12/24/24.  Patient states he is unable to schedule at time of writer's call due to his phone running low on battery.  Patient informed writer he had a  out to his home and was informed it needed to be cleaned; patient states the following day he had 3 people out who cleaned his room and was greatly appreciative.    Patient states he has not checked his blood glucose since hospital discharge due to forgetting and eating prior to checking.  Patient will begin checking his blood glucose tomorrow morning.    Care Mgmt General Assessment  Referral  Referral Source: Health Plan  Health Care Home/Utilization  Preferred Hospital: [facility]  [phone]  Preferred Urgent Care: [facility], [phone]  Living Situation  Current living arrangement:: I live alone (will have someone to live with as a care giver)  [facility]  Resources  Patient receiving home care services:: No  Community Resources: None  Supplies Currently Used at Home: None  Equipment Currently Used at Home: none  Referrals Placed: None  Employment Status: retired  Psychosocial  Anabaptism or spiritual beliefs that impact treatment:: No  Mental health DX:: No  Mental health management concern (GOAL):: No  Informal Support system:: Friends  Functional Status  Dependent ADLs:: Bathing, Grooming  Dependent IADLs:: Cooking, Laundry, Meal Preparation, Cleaning, Medication Management  Bed or wheelchair confined:: No  Mobility Status: Independent w/Device  Advance Care Plan/Directive  Advanced Care Plans/Directives on file:: No and     Care Mgmt Encounter Assessment  Preventative Care  Routine Health maintenance Reviewed: Up to date  Clinic Utilization  Difficulty keeping appointments:: No  Compliance Concerns: No  No-Show Concerns: No  No PCP office visit in Past Year: No  Transportation  Transportation means:: Regular car      Primary Diagnosis  Primary Diagnosis: Cardiovascular - other  Barriers in Communication  Other concerns:: Glasses  Pain  Pain (GOAL):: No  Medication Review  Medication adherence problem (GOAL):: No  Knowledgeable about how to use meds:: No  Medication side effects suspected:: No  Diet/Exercise/Sleep  Diet:: Diabetic diet  Inadequate nutrition (GOAL):: No  Tube Feeding: No  Inadequate activity/exercise (GOAL):: No  Significant changes in sleep pattern (GOAL): No    Follow up Plan     Discharge Follow-Up  Discharge follow up appointment scheduled in alignment with recommended follow up timeframe or Transitions of Risk Category? (Low = within 30 days; Moderate= within 14 days; High= within 7 days): Yes  Discharge Follow Up Appointment Date: 01/08/25  Discharge Follow Up Appointment Scheduled with?: Specialty Care Provider (scheduled with cardioloy, will schedule a PCP visit tomorrow during MTM visit)    Future Appointments   Date Time Provider Department Center   12/24/2024 10:30 AM Abdifatah Gilliland PharmD [id number]   12/27/2024  1:30 PM JERILYN IQBAL [facility]   7/2/2025  9:00 AM Kamaljit Plata MD [facility]       Outpatient Plan as outlined on AVS reviewed with patient.    For any urgent concerns, please contact our 24 hour nurse triage line: [phone] ([phone])         RN Clinical Product Navigator provided update to Lead Care Coordinator.    Melissa Behl BSN, RN, PHN, [facility]  RN Clinical Product Navigator  [phone]

## 2024-12-24 ENCOUNTER — VIRTUAL VISIT (OUTPATIENT)
Dept: PHARMACY | Facility: CLINIC | Age: 74
End: 2024-12-24
Payer: COMMERCIAL

## 2024-12-24 DIAGNOSIS — I48.92 ATRIAL FLUTTER, UNSPECIFIED TYPE (H): ICD-10-CM

## 2024-12-24 DIAGNOSIS — E11.42 DIABETIC POLYNEUROPATHY ASSOCIATED WITH TYPE 2 DIABETES MELLITUS (H): ICD-10-CM

## 2024-12-24 DIAGNOSIS — E78.5 HYPERLIPIDEMIA LDL GOAL <130: ICD-10-CM

## 2024-12-24 DIAGNOSIS — E11.22 TYPE 2 DIABETES MELLITUS WITH STAGE 3A CHRONIC KIDNEY DISEASE, WITHOUT LONG-TERM CURRENT USE OF INSULIN (H): Primary | ICD-10-CM

## 2024-12-24 DIAGNOSIS — I48.20 CHRONIC ATRIAL FIBRILLATION (H): ICD-10-CM

## 2024-12-24 DIAGNOSIS — E55.9 VITAMIN D DEFICIENCY: ICD-10-CM

## 2024-12-24 DIAGNOSIS — I50.9 CONGESTIVE HEART FAILURE, UNSPECIFIED HF CHRONICITY, UNSPECIFIED HEART FAILURE TYPE (H): ICD-10-CM

## 2024-12-24 DIAGNOSIS — N18.31 TYPE 2 DIABETES MELLITUS WITH STAGE 3A CHRONIC KIDNEY DISEASE, WITHOUT LONG-TERM CURRENT USE OF INSULIN (H): Primary | ICD-10-CM

## 2024-12-24 RX ORDER — DULOXETIN HYDROCHLORIDE 60 MG/1
60 CAPSULE, DELAYED RELEASE ORAL 2 TIMES DAILY
Qty: 180 CAPSULE | Refills: 1 | Status: SHIPPED | OUTPATIENT
Start: 2024-12-24

## 2024-12-24 NOTE — PROGRESS NOTES
Medication Therapy Management (MTM) Encounter    ASSESSMENT:                            Medication Adherence/Access: Denies adherence concerns. Duloxetine not refilled since July, but otherwise history seems appropriate. Continue to monitor.       Diabetes   Last A1C above goal <8%. Hasn't been able to check blood glucose due to difficulty with using Glucometer. Education provided to PCATootie. Did also discuss CGM that was previously prescribed. Now that patient is using insulin, it should be covered. Patient/PCA will reach out if they would like to set up in person visit for CGM education.       Hyperlipidemia /Aortic Stenosis.   Now with stenosis and DM, likely would benefit from tighter LDL goal <55. Discussed with patient today, but will defer the actual change to next PCP visit as patient just discharged from hospital and had multiple changes recently.          CHF:   Atrial flutter S/P Ablation   Last clinic blood pressure at goal <130/80. Pulse at goal . Unable to use additional GDMT due to low blood pressure, which did improve with lower dose Beta Blocker. Continue to monitor.          Neuropathy:   Hasn't felt duloxetine has been particularly helpful for neuropathy. Discussed dose titration and blood glucose control.       Low Vitamin D:   No levels on file. Plan for check at next PCP visit. Target levels 40-50 for chronic pain.       PLAN:                            When checking blood glucose, insert test strip into the glucometer first. Then apply the blood sample to the test strip.   You do have a prescription for Dexcom continuous blood sugar monitor that you can use instead of poking your fingers. It's already at [facility]. If you need help with getting started with this reader, contact the clinic and we can set up an appointment with myself or Ruthy, our diabetes educator.   Increase Duloxetine to 60 mg twice daily   Future order vitamin D level     Future Considerations:   Increase Atorvastatin to  40 mg daily (discuss at PCP visit)     Follow-up: 1/2 with PCP   Return in about 6 weeks (around 2/4/2025) for Medication Management Pharmacist, in person.    SUBJECTIVE/OBJECTIVE:                          Cam Walden is a 74 year old male seen for an initial visit. He was referred to me from Kamaljit Plata MD.      Since referral, hospitalized at [facility] from 12/13-12/22 for Aortic Stenosis, Severe.     Reason for visit: Referral for diabetes follow-up.    Allergies/ADRs: Reviewed in chart  Past Medical History: Reviewed in chart  Tobacco: He reports that he has never smoked. He has been exposed to tobacco smoke. He has never used smokeless tobacco.  Alcohol:  reports no history of alcohol use.     Medication Adherence/Access:     Patient keeps his meds in a container. Tootie, friend/CPA, helps with medications.       Diabetes   Metformin 1000 mg twice daily  Ozempic 0.5 mg weekly   Jardiance was cost prohibitive so discontinued at hospitalization and discharged on Lantus 10 units daily.     Having trouble with glucometer. Tried to check blood glucose this morning. Was putting blood sample on the strip first and inserting that into the machine.     Hasn't been using Ozempic - forgot about.          Lab Results   Component Value Date    A1C 11.3 11/19/2024    A1C 10.6 06/25/2024    A1C 10.8 03/12/2024    A1C 9.4 03/20/2023    A1C 6.5 10/05/2021         Hyperlipidemia /Aortic Stenosis.     Undewent left heart cath 12/16 which showed Non-obstructive disease. Started on Aspirin 81 mg daily.     Atorvastatin 20 mg daily       Recent Labs   Lab Test 03/12/24  1044 03/20/23  1144   CHOL 190 192   HDL 31* 28*   LDL 80 107*   TRIG 393* 434*            CHF:   MARGIE in hospital showed mildly reduced EF of 40-45%.       Atrial flutter S/P Ablation   Metoprolol tartrate 50 mg twice daily - reduced at discharge.   Transitioned to Eliquis 5 mg twice daily in the hospital instead of warfarin    Was having issues with  "dizziness/lightheadedness. This has improved.     Lab Results   Component Value Date    INR 2.4 (H) 11/29/2024    INR 4.3 (H) 11/19/2024    INR 2.5 (H) 09/25/2024     BP Readings from Last 3 Encounters:   11/19/24 120/82   06/25/24 114/76   05/06/24 117/71      Pulse Readings from Last 3 Encounters:   11/19/24 90   06/25/24 77   05/06/24 78     Wt Readings from Last 3 Encounters:   11/19/24 286 lb (129.7 kg)   06/25/24 284 lb (128.8 kg)   03/12/24 291 lb 4 oz (132.1 kg)            BPH:  Tamsulosin 0.4 mg 2 caps daily    Prostate Specific Antigen Screen   Date Value Ref Range Status   07/13/2020 1.7 0.0 - 4.5 ng/mL Final     PSA Tumor Marker   Date Value Ref Range Status   11/19/2024 2.16 0.00 - 6.50 ng/mL Final       Neuropathy:   Duloxetine 30 mg twice daily    Not filled since July - patient states he has been consistent with this.   Doesn't feel this has been very helpful.       Low Vitamin D:   Vitamin D3 1000 units daily . OTC.      Vitamin D Deficiency Screening Results:  No results found for: \"VITDT\"         Today's Vitals: There were no vitals taken for this visit.  ----------------  Post Discharge Medication Reconciliation Status: discharge medications reconciled and changed, per note/orders.      I spent 28 minutes with this patient today. All changes were made via collaborative practice agreement with Kamaljit Plata MD.     A summary of these recommendations was sent via [employer].    Abdifatah Gilliland PharmD  Medication Therapy Management (MTM) Pharmacist  [facility]      Telemedicine Visit Details  The patient's medications can be safely assessed via a telemedicine encounter.  Type of service:  Telephone visit  Originating Location (pt. Location): Home    Distant Location (provider location):  On-site  Start Time: 10:34 AM  End Time: 11:02 AM     Medication Therapy Recommendations  Diabetic polyneuropathy associated with type 2 diabetes mellitus (H)   1 Current Medication: DULoxetine " (CYMBALTA) 30 MG capsule   Current Medication Sig: TAKE 1 CAPSULE BY MOUTH 2 TIMES DAILY   Rationale: Dose too low - Dosage too low - Effectiveness   Recommendation: Increase Dose - DULoxetine 60 MG capsule   Status: Accepted per CPA   Identified Date: 12/24/2024 Completed Date: 12/24/2024

## 2024-12-24 NOTE — PATIENT INSTRUCTIONS
"Recommendations from today's MTM visit:                                                    MTM (medication therapy management) is a service provided by a clinical pharmacist designed to help you get the most of out of your medicines.   Today we reviewed what your medicines are for, how to know if they are working, that your medicines are safe and how to make your medicine regimen as easy as possible.      When checking blood glucose, insert test strip into the glucometer first. Then apply the blood sample to the test strip.   You do have a prescription for Dexcom continuous blood sugar monitor that you can use instead of poking your fingers. It's already at [facility]. If you need help with getting started with this reader, contact the clinic and we can set up an appointment with myself or Ruthy, our diabetes educator.   Increase Duloxetine to 60 mg twice daily   Future order vitamin D level     Follow-up: Return in about 6 weeks (around 2/4/2025) for Medication Management Pharmacist, in person.    It was great speaking with you today.  I value your experience and would be very thankful for your time in providing feedback in our clinic survey. In the next few days, you may receive an email or text message from [employer] with a link to a survey related to your  clinical pharmacist.\"     To schedule another MTM appointment, please call the clinic directly or you may call the MTM scheduling line at [phone].    My Clinical Pharmacist's contact information:                                                      Please feel free to contact me with any questions or concerns you have.      Abdifatah Gilliland, PharmD  Medication Therapy Management (MTM) Pharmacist  [facility]      "

## 2025-01-06 ENCOUNTER — PATIENT OUTREACH (OUTPATIENT)
Dept: CARE COORDINATION | Facility: CLINIC | Age: 75
End: 2025-01-06
Payer: COMMERCIAL

## 2025-01-06 NOTE — PROGRESS NOTES
"1/6/2025  Clinic Care Coordination Contact  Community Health Worker Follow Up    Care Gaps:     Health Maintenance Due   Topic Date Due    HF ACTION PLAN  Never done    RSV VACCINE (1 - Risk 60-74 years 1-dose series) Never done    ZOSTER IMMUNIZATION (1 of 2) 11/27/2015    COVID-19 Vaccine (5 - 2024-25 season) 09/01/2024    PHQ-2 (once per calendar year)  01/01/2025       Postponed to patient is in the hospital.     Care Plan:   Care Plan: Housing Instability           Care Plan: Help At Home       Problem: Insufficient In-home support       Goal: Establish adequate home support       Start Date: 12/4/2024    This Visit's Progress: 40%    Priority: Medium    Note:     Barriers: Knowledge,access, income   Strengths: Supportive   Patient expressed understanding of goal: Yes  Action steps to achieve this goal:  1. I will call Long term care insurance and inquire about coverage in next month  2. I will engage with [facility] for MA in next month  2. I will engage with CC monthly and request additional support as needed.                             Care Plan: DME       Problem: HP GENERAL PROBLEM       Goal: DME       Start Date: 12/13/2024    This Visit's Progress: 20%    Priority: Medium    Note:     Barriers: Knowledge   Strengths: Motivated   Patient expressed understanding of goal: Yes  Action steps to achieve this goal:  1. I will discuss with pcp request for hospital bed in next month.   2. I will engage with CC monthly and request additional support as needed.                                 Intervention and Education during outreach:     Called and spoke with Patient's power of nito Bowens to relay message to POA from CC SW Faisa.  \"I will have you follow up with patient and his caregiver. They need to connect with their LTC insurance and Caregiver want to apply for MA. I have provider information on how to apply for MA.   Thanks, Faisa \"    All of nito Bowens reported:  -Cam is " "currently in the hospital.  -he would like to [employer] insurance plan   group home income is $1500   He did apply for MA and need information   Was told he is over income for [facility] worker ask for medical expenses.   Does not own his property   Had helped from Caridad zuñiga? to apply for MA but not sure what happened  He is not clear if patient has disability benefit or regular intermediate benefit.    Currently house is not livable because the bathroom upstairs. It is not accommodating to his physical and medical conditions.  Need hospital bed   need recline chair   Need Med Alert  His rent is $1200   House under is Power of   He signed the house to Power of  [place]  He has other people helping him like PCA services but they have not got pay yet.  Stated he is both health care agent and Power of .  He will get copies of the medical expenses and document to fax to the Financial worker at [facility]   Yvonne JOSE, [phone], fax# [phone]  \"He is in dying needs of services due to open heart surgery might be in TCU but he is not able to pay for TCU.\"  He has been paying oui of pocket for his care.    Requested CCC team help with [facility] and that he wants to be [facility] insurance plan.    CHW suggested and provided Tootie Bowens Power of  to call to [facility] Line [phone] and request for support and services.    CHW schedule with SEFERINO SAMSON 1-8-25 at 9am for support regarding what to do with getting services and support.    CHW will consult with CC CHAPIN and CC RN to re assess patient's needs and goals.    CHW Follow up: Monthly  CHW Plan: Follow up on goals  CHW Next Follow Up: 2-10-25    Kosta Michele  Community Health Worker  [facility] Care Coordination  [email or web address]   Office: [phone]  Fax: [phone]            "

## 2025-01-06 NOTE — Clinical Note
CHW schedule with CC SW 1-8-25 at 9am for support regarding what to do with getting services and support. Appt with CC SW 1-8-25 at 9am with POA and health care agent CHW would like to consult with CC SW/CC RN to re assess patient's needs and goals. Patient currently in the hospital Requesting hospital bed, recliner chair, Med Alert

## 2025-01-07 ENCOUNTER — TELEPHONE (OUTPATIENT)
Dept: FAMILY MEDICINE | Facility: CLINIC | Age: 75
End: 2025-01-07
Payer: COMMERCIAL

## 2025-01-07 ENCOUNTER — PATIENT OUTREACH (OUTPATIENT)
Dept: CARE COORDINATION | Facility: CLINIC | Age: 75
End: 2025-01-07
Payer: COMMERCIAL

## 2025-01-07 NOTE — TELEPHONE ENCOUNTER
General Call    Contacts       Contact Date/Time Type Contact Phone/Fax    01/07/2025 01:21 PM CST Fax (Incoming) [facility] (Other) [phone]          Reason for Call:  Fax received from [facility]    What are your questions or concerns:  Handi faxed back order sent 12/31 for hosp bed citing they are not in network with patients insurance so cannot provide requested items. Patient will need order sent to [facility] medical equip store-thanks!

## 2025-01-07 NOTE — TELEPHONE ENCOUNTER
Called patient. He already new about this. He will check with his insurance and find out what company he can use , he will luz marina us with the information or we will get a message form the company he choses

## 2025-01-07 NOTE — PROGRESS NOTES
"1/7/2025  Clinic Care Coordination Contact  Care Team Conversations  Message from CC SW    \"Libra Colorado, LSW  Aun - can you please let this family know to speak to IP SW? This appt needs to be canceled. The IP SW should address referrals for DME and more services at home before discharge to make sure it is a safe discharge, especially given it is a repeat admission within 30 days\"    Called patient's Power of  Tootie Bowens and left voicemail with call back information. Relay CC SW's message to speak with   In-Patient  at [facility] for support for referrals for hospital bed, recliner chair and more services at home.    Clinical Data: Care Coordinator Outreach    Outreach Documentation Number of Outreach Attempt   1/7/2025  10:01 AM 1     PAULA Gutierrez  [facility] Care Coordination  [email or web address]   Office: [phone]  Fax: [phone]    "

## 2025-01-13 ENCOUNTER — PATIENT OUTREACH (OUTPATIENT)
Dept: CARE COORDINATION | Facility: CLINIC | Age: 75
End: 2025-01-13
Payer: COMMERCIAL

## 2025-01-13 NOTE — PROGRESS NOTES
Clinic Care Coordination Contact  Care Coordination Transition Communication    Clinical Data: Patient was hospitalized at [facility] from 1/3/25 to 1/11/25 with diagnosis of fall, weakness.     Assessment: Patient has transitioned to TCU for short term rehabilitation:    Facility Name: [facility]  Transition Communication:  Notified facility of Primary Care- Care Coordination support via Epic fax.    Plan: Care Coordinator will await notification from facility staff informing of patient's discharge plans/needs. Care Coordinator will review chart and outreach to facility staff every 2-4 weeks and as needed.     Libra Colorado RAUL   Social Work Primary Care Clinic Care Coordinator   [phone]  [email or web address]

## 2025-01-13 NOTE — LETTER
[facility]   To:   [facility]          Please give to facility    From:   Libra Colorado [facility] Care Coordinator with [facility]     Patient Name:  Cam Walden YOB: 1950   Admit date: 1/11/25      *Information Needed:  Please contact me when the patient will discharge (or if they will move to long term care)- include the discharge date, disposition, and main diagnosis. We work with patients after discharge from their primary care clinic setting.   If the patient is discharged with home care services, please provide the name of the agency    Ok to Phone or Email with information  Hospital notes DME needs requested for discharge        Thank You,   DIVYA Monteiro   Social Work Primary Care Clinic Care Coordinator   [phone]  [email or web address]

## 2025-01-15 ENCOUNTER — LAB REQUISITION (OUTPATIENT)
Dept: LAB | Facility: CLINIC | Age: 75
End: 2025-01-15
Payer: COMMERCIAL

## 2025-01-15 DIAGNOSIS — I48.91 UNSPECIFIED ATRIAL FIBRILLATION (H): ICD-10-CM

## 2025-01-16 ENCOUNTER — LAB REQUISITION (OUTPATIENT)
Dept: LAB | Facility: CLINIC | Age: 75
End: 2025-01-16
Payer: COMMERCIAL

## 2025-01-16 DIAGNOSIS — I48.91 UNSPECIFIED ATRIAL FIBRILLATION (H): ICD-10-CM

## 2025-01-16 LAB — INR PPP: 2.07 (ref 0.85–1.15)

## 2025-01-16 PROCEDURE — 85610 PROTHROMBIN TIME: CPT | Performed by: FAMILY MEDICINE

## 2025-01-16 PROCEDURE — 36415 COLL VENOUS BLD VENIPUNCTURE: CPT | Performed by: FAMILY MEDICINE

## 2025-01-16 PROCEDURE — P9604 ONE-WAY ALLOW PRORATED TRIP: HCPCS | Mod: ORL | Performed by: FAMILY MEDICINE

## 2025-01-16 PROCEDURE — P9604 ONE-WAY ALLOW PRORATED TRIP: HCPCS | Performed by: FAMILY MEDICINE

## 2025-01-17 ENCOUNTER — LAB REQUISITION (OUTPATIENT)
Dept: LAB | Facility: CLINIC | Age: 75
End: 2025-01-17
Payer: COMMERCIAL

## 2025-01-17 DIAGNOSIS — E83.42 HYPOMAGNESEMIA: ICD-10-CM

## 2025-01-17 DIAGNOSIS — D64.9 ANEMIA, UNSPECIFIED: ICD-10-CM

## 2025-01-20 LAB
HGB BLD-MCNC: 9.3 G/DL (ref 13.3–17.7)
INR PPP: 2.79 (ref 0.85–1.15)
MAGNESIUM SERPL-MCNC: 1.7 MG/DL (ref 1.7–2.3)

## 2025-01-20 PROCEDURE — 83735 ASSAY OF MAGNESIUM: CPT | Performed by: NURSE PRACTITIONER

## 2025-01-20 PROCEDURE — 85018 HEMOGLOBIN: CPT | Performed by: NURSE PRACTITIONER

## 2025-01-20 PROCEDURE — 36415 COLL VENOUS BLD VENIPUNCTURE: CPT | Performed by: NURSE PRACTITIONER

## 2025-01-20 PROCEDURE — P9604 ONE-WAY ALLOW PRORATED TRIP: HCPCS | Performed by: FAMILY MEDICINE

## 2025-01-20 PROCEDURE — 85610 PROTHROMBIN TIME: CPT | Performed by: FAMILY MEDICINE

## 2025-01-20 PROCEDURE — P9604 ONE-WAY ALLOW PRORATED TRIP: HCPCS | Mod: ORL | Performed by: FAMILY MEDICINE

## 2025-01-20 RX ORDER — HEPARIN SODIUM (PORCINE) LOCK FLUSH IV SOLN 100 UNIT/ML 100 UNIT/ML
5 SOLUTION INTRAVENOUS
OUTPATIENT
Start: 2025-01-20

## 2025-01-21 ENCOUNTER — PATIENT OUTREACH (OUTPATIENT)
Dept: CARE COORDINATION | Facility: CLINIC | Age: 75
End: 2025-01-21
Payer: COMMERCIAL

## 2025-01-21 ENCOUNTER — LAB REQUISITION (OUTPATIENT)
Dept: LAB | Facility: CLINIC | Age: 75
End: 2025-01-21
Payer: COMMERCIAL

## 2025-01-21 DIAGNOSIS — Z79.01 LONG TERM (CURRENT) USE OF ANTICOAGULANTS: ICD-10-CM

## 2025-01-21 NOTE — PROGRESS NOTES
Clinic Care Coordination Contact  Care Coordination Transition Communication    Clinical Data: Patient was hospitalized at [facility] from 1/3/25 to 1/11/25 with diagnosis of fall, weakness.     Assessment: CareEverywhere refreshed. Found note that said pt requested transfer to [facility].     Facility Name: [facility]  Transition Communication:  Notified facility of Primary Care- Care Coordination support via Epic fax.    Plan: Care Coordinator will await notification from facility staff informing of patient's discharge plans/needs. Care Coordinator will review chart and outreach to facility staff every 2-4 weeks and as needed.     DIVYA Monteiro   Social Work Primary Care Clinic Care Coordinator [phone]  [email or web address]

## 2025-01-22 ENCOUNTER — LAB REQUISITION (OUTPATIENT)
Dept: LAB | Facility: CLINIC | Age: 75
End: 2025-01-22
Payer: COMMERCIAL

## 2025-01-22 DIAGNOSIS — Z95.2 PRESENCE OF PROSTHETIC HEART VALVE: ICD-10-CM

## 2025-01-23 LAB
ANION GAP SERPL CALCULATED.3IONS-SCNC: 14 MMOL/L (ref 7–15)
BUN SERPL-MCNC: 23.2 MG/DL (ref 8–23)
CALCIUM SERPL-MCNC: 9.7 MG/DL (ref 8.8–10.4)
CHLORIDE SERPL-SCNC: 99 MMOL/L (ref 98–107)
CREAT SERPL-MCNC: 1.5 MG/DL (ref 0.67–1.17)
EGFRCR SERPLBLD CKD-EPI 2021: 49 ML/MIN/1.73M2
GLUCOSE SERPL-MCNC: 137 MG/DL (ref 70–99)
HCO3 SERPL-SCNC: 25 MMOL/L (ref 22–29)
HGB BLD-MCNC: 9.4 G/DL (ref 13.3–17.7)
POTASSIUM SERPL-SCNC: 4.5 MMOL/L (ref 3.4–5.3)
SODIUM SERPL-SCNC: 138 MMOL/L (ref 135–145)

## 2025-01-23 PROCEDURE — 85018 HEMOGLOBIN: CPT | Mod: ORL | Performed by: FAMILY MEDICINE

## 2025-01-23 PROCEDURE — 80048 BASIC METABOLIC PNL TOTAL CA: CPT | Mod: ORL | Performed by: FAMILY MEDICINE

## 2025-01-23 PROCEDURE — 36415 COLL VENOUS BLD VENIPUNCTURE: CPT | Mod: ORL | Performed by: FAMILY MEDICINE

## 2025-01-23 PROCEDURE — P9604 ONE-WAY ALLOW PRORATED TRIP: HCPCS | Mod: ORL | Performed by: FAMILY MEDICINE

## 2025-01-27 ENCOUNTER — ANTICOAGULATION THERAPY VISIT (OUTPATIENT)
Dept: ANTICOAGULATION | Facility: CLINIC | Age: 75
End: 2025-01-27

## 2025-01-27 DIAGNOSIS — I48.20 CHRONIC ATRIAL FIBRILLATION (H): Primary | ICD-10-CM

## 2025-01-27 LAB — INR PPP: 2.85 (ref 0.85–1.15)

## 2025-01-27 PROCEDURE — 85610 PROTHROMBIN TIME: CPT | Mod: ORL | Performed by: NURSE PRACTITIONER

## 2025-01-27 PROCEDURE — 36415 COLL VENOUS BLD VENIPUNCTURE: CPT | Mod: ORL | Performed by: NURSE PRACTITIONER

## 2025-01-27 PROCEDURE — P9604 ONE-WAY ALLOW PRORATED TRIP: HCPCS | Mod: ORL | Performed by: NURSE PRACTITIONER

## 2025-01-31 ENCOUNTER — MEDICAL CORRESPONDENCE (OUTPATIENT)
Dept: HEALTH INFORMATION MANAGEMENT | Facility: CLINIC | Age: 75
End: 2025-01-31

## 2025-01-31 ENCOUNTER — APPOINTMENT (OUTPATIENT)
Dept: CT IMAGING | Facility: CLINIC | Age: 75
End: 2025-01-31
Attending: STUDENT IN AN ORGANIZED HEALTH CARE EDUCATION/TRAINING PROGRAM
Payer: COMMERCIAL

## 2025-01-31 ENCOUNTER — HOSPITAL ENCOUNTER (OUTPATIENT)
Facility: CLINIC | Age: 75
Setting detail: OBSERVATION
Discharge: HOME | End: 2025-02-02
Attending: STUDENT IN AN ORGANIZED HEALTH CARE EDUCATION/TRAINING PROGRAM | Admitting: STUDENT IN AN ORGANIZED HEALTH CARE EDUCATION/TRAINING PROGRAM
Payer: COMMERCIAL

## 2025-01-31 DIAGNOSIS — N18.31 TYPE 2 DIABETES MELLITUS WITH STAGE 3A CHRONIC KIDNEY DISEASE, WITHOUT LONG-TERM CURRENT USE OF INSULIN (H): Primary | ICD-10-CM

## 2025-01-31 DIAGNOSIS — Z86.79 PERSONAL HISTORY OF CARDIOVASCULAR DISORDER: ICD-10-CM

## 2025-01-31 DIAGNOSIS — E11.22 TYPE 2 DIABETES MELLITUS WITH STAGE 3A CHRONIC KIDNEY DISEASE, WITHOUT LONG-TERM CURRENT USE OF INSULIN (H): Primary | ICD-10-CM

## 2025-01-31 DIAGNOSIS — I48.0 PAROXYSMAL ATRIAL FIBRILLATION (H): ICD-10-CM

## 2025-01-31 DIAGNOSIS — M25.511 RIGHT SHOULDER PAIN, UNSPECIFIED CHRONICITY: ICD-10-CM

## 2025-01-31 DIAGNOSIS — Z95.2 S/P TAVR (TRANSCATHETER AORTIC VALVE REPLACEMENT): ICD-10-CM

## 2025-01-31 DIAGNOSIS — R52 PAIN: ICD-10-CM

## 2025-01-31 DIAGNOSIS — Z79.01 LONG TERM (CURRENT) USE OF ANTICOAGULANTS: ICD-10-CM

## 2025-01-31 DIAGNOSIS — R55 SYNCOPE AND COLLAPSE: ICD-10-CM

## 2025-01-31 DIAGNOSIS — H11.32 SUBCONJUNCTIVAL HEMORRHAGE, LEFT: ICD-10-CM

## 2025-01-31 LAB
ALBUMIN SERPL BCG-MCNC: 3.9 G/DL (ref 3.5–5.2)
ALBUMIN UR-MCNC: 20 MG/DL
ALP SERPL-CCNC: 85 U/L (ref 40–150)
ALT SERPL W P-5'-P-CCNC: 28 U/L (ref 0–70)
ANION GAP SERPL CALCULATED.3IONS-SCNC: 15 MMOL/L (ref 7–15)
APPEARANCE UR: CLEAR
AST SERPL W P-5'-P-CCNC: 26 U/L (ref 0–45)
ATRIAL RATE - MUSE: 94 BPM
BASOPHILS # BLD AUTO: 0 10E3/UL (ref 0–0.2)
BASOPHILS NFR BLD AUTO: 0 %
BILIRUB SERPL-MCNC: 0.7 MG/DL
BILIRUB UR QL STRIP: NEGATIVE
BUN SERPL-MCNC: 20.2 MG/DL (ref 8–23)
CALCIUM SERPL-MCNC: 10.2 MG/DL (ref 8.8–10.4)
CHLORIDE SERPL-SCNC: 101 MMOL/L (ref 98–107)
COLOR UR AUTO: ABNORMAL
CREAT SERPL-MCNC: 1.42 MG/DL (ref 0.67–1.17)
DIASTOLIC BLOOD PRESSURE - MUSE: NORMAL MMHG
EGFRCR SERPLBLD CKD-EPI 2021: 53 ML/MIN/1.73M2
EOSINOPHIL # BLD AUTO: 0.3 10E3/UL (ref 0–0.7)
EOSINOPHIL NFR BLD AUTO: 4 %
ERYTHROCYTE [DISTWIDTH] IN BLOOD BY AUTOMATED COUNT: 17.2 % (ref 10–15)
GLUCOSE BLDC GLUCOMTR-MCNC: 152 MG/DL (ref 70–99)
GLUCOSE SERPL-MCNC: 142 MG/DL (ref 70–99)
GLUCOSE UR STRIP-MCNC: NEGATIVE MG/DL
HCO3 SERPL-SCNC: 21 MMOL/L (ref 22–29)
HCT VFR BLD AUTO: 33.9 % (ref 40–53)
HGB BLD-MCNC: 10.4 G/DL (ref 13.3–17.7)
HGB UR QL STRIP: ABNORMAL
HOLD SPECIMEN: NORMAL
HOLD SPECIMEN: NORMAL
IMM GRANULOCYTES # BLD: 0 10E3/UL
IMM GRANULOCYTES NFR BLD: 1 %
INR PPP: 2.77 (ref 0.85–1.15)
INR PPP: 2.99 (ref 0.85–1.15)
INTERPRETATION ECG - MUSE: NORMAL
KETONES UR STRIP-MCNC: NEGATIVE MG/DL
LACTATE SERPL-SCNC: 2.2 MMOL/L (ref 0.7–2)
LACTATE SERPL-SCNC: 2.3 MMOL/L (ref 0.7–2)
LEUKOCYTE ESTERASE UR QL STRIP: NEGATIVE
LYMPHOCYTES # BLD AUTO: 1.7 10E3/UL (ref 0.8–5.3)
LYMPHOCYTES NFR BLD AUTO: 21 %
MCH RBC QN AUTO: 28.2 PG (ref 26.5–33)
MCHC RBC AUTO-ENTMCNC: 30.7 G/DL (ref 31.5–36.5)
MCV RBC AUTO: 92 FL (ref 78–100)
MONOCYTES # BLD AUTO: 0.7 10E3/UL (ref 0–1.3)
MONOCYTES NFR BLD AUTO: 9 %
MUCOUS THREADS #/AREA URNS LPF: PRESENT /LPF
NEUTROPHILS # BLD AUTO: 5.1 10E3/UL (ref 1.6–8.3)
NEUTROPHILS NFR BLD AUTO: 65 %
NITRATE UR QL: NEGATIVE
NRBC # BLD AUTO: 0 10E3/UL
NRBC BLD AUTO-RTO: 0 /100
NT-PROBNP SERPL-MCNC: 243 PG/ML (ref 0–900)
P AXIS - MUSE: 59 DEGREES
PH UR STRIP: 5.5 [PH] (ref 5–7)
PLATELET # BLD AUTO: 280 10E3/UL (ref 150–450)
POTASSIUM SERPL-SCNC: 4.3 MMOL/L (ref 3.4–5.3)
PR INTERVAL - MUSE: 176 MS
PROT SERPL-MCNC: 7.5 G/DL (ref 6.4–8.3)
QRS DURATION - MUSE: 94 MS
QT - MUSE: 348 MS
QTC - MUSE: 435 MS
R AXIS - MUSE: -31 DEGREES
RBC # BLD AUTO: 3.69 10E6/UL (ref 4.4–5.9)
RBC URINE: 46 /HPF
SODIUM SERPL-SCNC: 137 MMOL/L (ref 135–145)
SP GR UR STRIP: 1.02 (ref 1–1.03)
SYSTOLIC BLOOD PRESSURE - MUSE: NORMAL MMHG
T AXIS - MUSE: 2 DEGREES
TROPONIN T SERPL HS-MCNC: 28 NG/L
TROPONIN T SERPL HS-MCNC: 29 NG/L
TSH SERPL DL<=0.005 MIU/L-ACNC: 1.52 UIU/ML (ref 0.3–4.2)
UROBILINOGEN UR STRIP-MCNC: NORMAL MG/DL
VENTRICULAR RATE- MUSE: 94 BPM
WBC # BLD AUTO: 7.8 10E3/UL (ref 4–11)
WBC URINE: 2 /HPF

## 2025-01-31 PROCEDURE — 82962 GLUCOSE BLOOD TEST: CPT

## 2025-01-31 PROCEDURE — 85610 PROTHROMBIN TIME: CPT | Performed by: STUDENT IN AN ORGANIZED HEALTH CARE EDUCATION/TRAINING PROGRAM

## 2025-01-31 PROCEDURE — 99222 1ST HOSP IP/OBS MODERATE 55: CPT | Mod: GC | Performed by: PSYCHIATRY & NEUROLOGY

## 2025-01-31 PROCEDURE — G0378 HOSPITAL OBSERVATION PER HR: HCPCS

## 2025-01-31 PROCEDURE — 84443 ASSAY THYROID STIM HORMONE: CPT | Performed by: STUDENT IN AN ORGANIZED HEALTH CARE EDUCATION/TRAINING PROGRAM

## 2025-01-31 PROCEDURE — 80048 BASIC METABOLIC PNL TOTAL CA: CPT | Performed by: STUDENT IN AN ORGANIZED HEALTH CARE EDUCATION/TRAINING PROGRAM

## 2025-01-31 PROCEDURE — 83605 ASSAY OF LACTIC ACID: CPT | Performed by: STUDENT IN AN ORGANIZED HEALTH CARE EDUCATION/TRAINING PROGRAM

## 2025-01-31 PROCEDURE — 85014 HEMATOCRIT: CPT | Performed by: STUDENT IN AN ORGANIZED HEALTH CARE EDUCATION/TRAINING PROGRAM

## 2025-01-31 PROCEDURE — 250N000011 HC RX IP 250 OP 636: Performed by: PHYSICIAN ASSISTANT

## 2025-01-31 PROCEDURE — 99222 1ST HOSP IP/OBS MODERATE 55: CPT | Performed by: STUDENT IN AN ORGANIZED HEALTH CARE EDUCATION/TRAINING PROGRAM

## 2025-01-31 PROCEDURE — 258N000003 HC RX IP 258 OP 636: Performed by: STUDENT IN AN ORGANIZED HEALTH CARE EDUCATION/TRAINING PROGRAM

## 2025-01-31 PROCEDURE — 250N000013 HC RX MED GY IP 250 OP 250 PS 637: Performed by: PHYSICIAN ASSISTANT

## 2025-01-31 PROCEDURE — 250N000013 HC RX MED GY IP 250 OP 250 PS 637: Performed by: STUDENT IN AN ORGANIZED HEALTH CARE EDUCATION/TRAINING PROGRAM

## 2025-01-31 PROCEDURE — 70450 CT HEAD/BRAIN W/O DYE: CPT | Mod: 26 | Performed by: RADIOLOGY

## 2025-01-31 PROCEDURE — 85025 COMPLETE CBC W/AUTO DIFF WBC: CPT | Performed by: STUDENT IN AN ORGANIZED HEALTH CARE EDUCATION/TRAINING PROGRAM

## 2025-01-31 PROCEDURE — 93010 ELECTROCARDIOGRAM REPORT: CPT | Performed by: STUDENT IN AN ORGANIZED HEALTH CARE EDUCATION/TRAINING PROGRAM

## 2025-01-31 PROCEDURE — 81003 URINALYSIS AUTO W/O SCOPE: CPT | Performed by: STUDENT IN AN ORGANIZED HEALTH CARE EDUCATION/TRAINING PROGRAM

## 2025-01-31 PROCEDURE — 70450 CT HEAD/BRAIN W/O DYE: CPT

## 2025-01-31 PROCEDURE — 83735 ASSAY OF MAGNESIUM: CPT | Performed by: STUDENT IN AN ORGANIZED HEALTH CARE EDUCATION/TRAINING PROGRAM

## 2025-01-31 PROCEDURE — 85041 AUTOMATED RBC COUNT: CPT | Performed by: STUDENT IN AN ORGANIZED HEALTH CARE EDUCATION/TRAINING PROGRAM

## 2025-01-31 PROCEDURE — 99285 EMERGENCY DEPT VISIT HI MDM: CPT | Mod: 25 | Performed by: STUDENT IN AN ORGANIZED HEALTH CARE EDUCATION/TRAINING PROGRAM

## 2025-01-31 PROCEDURE — 93005 ELECTROCARDIOGRAM TRACING: CPT | Performed by: STUDENT IN AN ORGANIZED HEALTH CARE EDUCATION/TRAINING PROGRAM

## 2025-01-31 PROCEDURE — 96374 THER/PROPH/DIAG INJ IV PUSH: CPT

## 2025-01-31 PROCEDURE — 80053 COMPREHEN METABOLIC PANEL: CPT | Performed by: STUDENT IN AN ORGANIZED HEALTH CARE EDUCATION/TRAINING PROGRAM

## 2025-01-31 PROCEDURE — 36415 COLL VENOUS BLD VENIPUNCTURE: CPT | Performed by: STUDENT IN AN ORGANIZED HEALTH CARE EDUCATION/TRAINING PROGRAM

## 2025-01-31 PROCEDURE — 83880 ASSAY OF NATRIURETIC PEPTIDE: CPT | Performed by: STUDENT IN AN ORGANIZED HEALTH CARE EDUCATION/TRAINING PROGRAM

## 2025-01-31 PROCEDURE — 96361 HYDRATE IV INFUSION ADD-ON: CPT

## 2025-01-31 PROCEDURE — 84484 ASSAY OF TROPONIN QUANT: CPT | Performed by: STUDENT IN AN ORGANIZED HEALTH CARE EDUCATION/TRAINING PROGRAM

## 2025-01-31 PROCEDURE — 99285 EMERGENCY DEPT VISIT HI MDM: CPT | Performed by: STUDENT IN AN ORGANIZED HEALTH CARE EDUCATION/TRAINING PROGRAM

## 2025-01-31 RX ORDER — OXYCODONE HYDROCHLORIDE 5 MG/1
5 TABLET ORAL EVERY 4 HOURS PRN
Status: DISCONTINUED | OUTPATIENT
Start: 2025-01-31 | End: 2025-02-02 | Stop reason: HOSPADM

## 2025-01-31 RX ORDER — POLYETHYLENE GLYCOL 3350 17 G/17G
1 POWDER, FOR SOLUTION ORAL PRN
COMMUNITY

## 2025-01-31 RX ORDER — HYDROMORPHONE HCL IN WATER/PF 6 MG/30 ML
0.2 PATIENT CONTROLLED ANALGESIA SYRINGE INTRAVENOUS ONCE
Status: COMPLETED | OUTPATIENT
Start: 2025-01-31 | End: 2025-01-31

## 2025-01-31 RX ORDER — AMOXICILLIN 250 MG
1 CAPSULE ORAL 2 TIMES DAILY PRN
Status: DISCONTINUED | OUTPATIENT
Start: 2025-01-31 | End: 2025-02-02 | Stop reason: HOSPADM

## 2025-01-31 RX ORDER — LIDOCAINE 4 G/G
3 PATCH TOPICAL
Status: DISCONTINUED | OUTPATIENT
Start: 2025-01-31 | End: 2025-02-02 | Stop reason: HOSPADM

## 2025-01-31 RX ORDER — AMOXICILLIN 250 MG
2 CAPSULE ORAL 2 TIMES DAILY PRN
Status: DISCONTINUED | OUTPATIENT
Start: 2025-01-31 | End: 2025-02-02 | Stop reason: HOSPADM

## 2025-01-31 RX ORDER — LIDOCAINE 40 MG/G
CREAM TOPICAL
Status: DISCONTINUED | OUTPATIENT
Start: 2025-01-31 | End: 2025-02-02 | Stop reason: HOSPADM

## 2025-01-31 RX ORDER — ACETAMINOPHEN 325 MG/1
650 TABLET ORAL EVERY 4 HOURS PRN
Status: DISCONTINUED | OUTPATIENT
Start: 2025-01-31 | End: 2025-02-02 | Stop reason: HOSPADM

## 2025-01-31 RX ORDER — HYDROMORPHONE HYDROCHLORIDE 1 MG/ML
0.5 INJECTION, SOLUTION INTRAMUSCULAR; INTRAVENOUS; SUBCUTANEOUS
Status: DISCONTINUED | OUTPATIENT
Start: 2025-01-31 | End: 2025-01-31

## 2025-01-31 RX ORDER — LIRAGLUTIDE 6 MG/ML
0.6 INJECTION SUBCUTANEOUS DAILY
COMMUNITY

## 2025-01-31 RX ORDER — CALCIUM CARBONATE 500 MG/1
1000 TABLET, CHEWABLE ORAL 4 TIMES DAILY PRN
Status: DISCONTINUED | OUTPATIENT
Start: 2025-01-31 | End: 2025-02-02 | Stop reason: HOSPADM

## 2025-01-31 RX ORDER — POLYETHYLENE GLYCOL 3350 17 G/17G
17 POWDER, FOR SOLUTION ORAL 2 TIMES DAILY PRN
Status: DISCONTINUED | OUTPATIENT
Start: 2025-01-31 | End: 2025-02-02 | Stop reason: HOSPADM

## 2025-01-31 RX ORDER — ACETAMINOPHEN 650 MG/1
650 SUPPOSITORY RECTAL EVERY 4 HOURS PRN
Status: DISCONTINUED | OUTPATIENT
Start: 2025-01-31 | End: 2025-02-02 | Stop reason: HOSPADM

## 2025-01-31 RX ADMIN — WARFARIN SODIUM 7.5 MG: 5 TABLET ORAL at 23:23

## 2025-01-31 RX ADMIN — SODIUM CHLORIDE 500 ML: 9 INJECTION, SOLUTION INTRAVENOUS at 23:24

## 2025-01-31 RX ADMIN — LIDOCAINE 4% 3 PATCH: 40 PATCH TOPICAL at 21:57

## 2025-01-31 RX ADMIN — HYDROMORPHONE HYDROCHLORIDE 0.2 MG: 0.2 INJECTION, SOLUTION INTRAMUSCULAR; INTRAVENOUS; SUBCUTANEOUS at 19:56

## 2025-01-31 RX ADMIN — OXYCODONE HYDROCHLORIDE 5 MG: 5 TABLET ORAL at 20:41

## 2025-01-31 RX ADMIN — SODIUM CHLORIDE 500 ML: 9 INJECTION, SOLUTION INTRAVENOUS at 17:51

## 2025-01-31 ASSESSMENT — ACTIVITIES OF DAILY LIVING (ADL)
ADLS_ACUITY_SCORE: 42

## 2025-01-31 NOTE — H&P
[facility]    History and Physical - Hospitalist Service       Date of Admission:  1/31/2025    Assessment & Plan      Cam Walden is a 74 year old male admitted on 1/31/2025. Patient presenting to the ER following episode of syncope and fall. Admitted for further workup for his syncope.    #Presyncope  #Lightheadedness  # Witnessed seizure like activity at assisted living  --- Etiology: Cause of his lightheaded is unclear at the moment. Causes considered include first time seizure, new onset ischemic stroke/ TIA, acute exacerbation of HFrEF, orthostatic vitals, Patient. CT head w/o contrast showing no acute bleed, but did show mild to moderate global cerebral loss with mild leukoaraiosis. No acute intracranial pathology  --- Neurology consulted, appreciate recs-- would discuss with neurology about pretest probability for seizure to determine if EEG would be helpful.  --- MRA Head and Neck  --- Echo ordered  --- carotid doppler ordered to eval for carotid artery stenosis  --- probnp to eval for heart strain  --- orthostatic vitals  --- avoid sedating medications  --- will give IVF bolus then discontinue, encourage po intake    #History of Aortic Stenosis s/p TAVR  Afib on Warfarin s/p flutter ablation  --- continue home warfarin    #HFrEF (EF 45% on last echo done at [facility] 1/8/2025  --- Unclear dose of home metoprolol. Per medication history, appears that he was prescribed 50 mg metoprolol tartrate BID. Patient states that he was taking 25 mg metoprolol tartrate BID.   --- continue home aspirin    #T2DM  --- Hold metformin in setting of metabolic acidosis  --- continue on LDSSI  --- continue home Lantus 10U at bedtime    #Generalized Deconditioning:  --- OT/ PT consult          Diet:  normal diet  DVT Prophylaxis: therapeutic warfarin  Gutierrez Catheter: Not present  Lines: PRESENT             Cardiac Monitoring: None  Code Status:  full code    Clinically Significant  Risk Factors Present on Admission                # Drug Induced Coagulation Defect: home medication list includes an anticoagulant medication  # Drug Induced Platelet Defect: home medication list includes an antiplatelet medication        # Anemia: based on hgb <11      # DMII: A1C = 11.3 % (Ref range: 0.0 - 5.6 %) within past 6 months               Disposition Plan     Medically Ready for Discharge: Anticipated Tomorrow           JUANJO GA MD  Hospitalist Service  [facility]  Securely message with [employer] (more info)  Text page via [facility] Paging/Directory     ______________________________________________________________________    Chief Complaint   Syncopal episode    History is obtained from the patient    History of Present Illness   Cam Walden is a 74 year old male who has a history of severe Aortic Stenosis s.o TAVR, HFrEF 45%, Afib on warfarin, s/p flutter ablation, CKD stage 3B, CAD, JOHNNY, T2DM. He also has a history of large B cell lymphoma s/p chemotherapy 2018. Patient was at his assisted living facility after recent TAVR on 1/8. This morning patient stood up quickly and was using his cane when he felt lightheadedness and fell. Fall was apparently witnessed by 2 nurses and patient was lowered to the ground by a nurse and did not hit her head on the floor. Per report, and chart review, patient apparently had an episode of LOC lasting for around 30 seconds. No chest pain, palpitations, sob, coughing, kalyan, headaches, neck stiffness. No abd pain, nausea, emesis, diarrhea, bloody stools, emesis, dysuria, flank pain, skin rashes. Patient has been tolerating po without issues. No fevers or recent chills. Patient denies any new headaches, neck stiffness, blurry vision, double vision. No FND. No history of seizures.    ER Course: Patient was hypertensive on arrival to the ER at 160s/90s, sating well on RA, normal HR and RR. Bps improved during his ER  course. On labs, patient has Cr:1.42 which is at his baseline. Mildly elevated lactic acidosis which remained stable during his hospitalization, INR:2.7 at goal, trop elevated in 20s with negative delta trop, hgb: 10.4 which is close to hims baseline of 11. Normal WBC and normal platelets. UA not concerning for UTI.     Past Medical History    Past Medical History:   Diagnosis Date    Aortic stenosis     mild    Atrial fibrillation (H)     Atrial flutter (H) 2017    Atrial flutter with rapid ventricular response (H)     Cancer (H)     lymp    Colon polyps 2016    Per colonoscopy 16.  Large and small.  Some removed. Others to be removed surgically. (per patient)    Diabetes mellitus (H)     DM II    Diabetes mellitus, type II (H)     Diabetic polyneuropathy associated with type 2 diabetes mellitus (H) 2020    Hyperlipidemia     Hypertension     Morbid obesity (H)     JOHNNY (obstructive sleep apnea)     no cpap       Past Surgical History   Past Surgical History:   Procedure Laterality Date    CARDIOVERSION  2017    CYSTOURETEROSCOPY, WITH LITHOTRIPSY USING TAMMY 120P LASER AND URETERAL STENT INSERTION Right 2021    Procedure: CYSTOURETEROSCOPY, RIGHT RETROGRADE PYELOGRAM, WITH LITHOTRIPSY USING TAMMY 120P LASER AND URETERAL STENT INSERTION;  Surgeon: Darrell Suarez MD;  Location: [facility]    IR CHEST PORT PLACEMENT > 5 YRS OF AGE  2020    IR PORT PLACEMENT >5 YEARS  2020       Prior to Admission Medications   Prior to Admission Medications   Prescriptions Last Dose Informant Patient Reported? Taking?   Continuous Glucose Sensor (DEXCOM G7 SENSOR) MISC   No No   Si each every 10 days Change sensor every 10 days   Patient not taking: Reported on 2024   DULoxetine (CYMBALTA) 60 MG capsule   No No   Sig: Take 1 capsule (60 mg) by mouth 2 times daily.   alcohol swab prep pads   No No   Sig: Use to swab area of injection/richelle as directed.   apixaban  ANTICOAGULANT (ELIQUIS) 5 MG tablet   Yes No   Sig: Take 5 mg by mouth 2 times daily.   aspirin (ASA) 81 MG chewable tablet   Yes No   Sig: Take 81 mg by mouth daily.   atorvastatin (LIPITOR) 20 MG tablet   No No   Sig: Take 1 tablet (20 mg) by mouth daily   blood glucose calibration (NO BRAND SPECIFIED) solution   No No   Sig: To accompany: Blood Glucose Monitor Brands: per insurance.   blood glucose monitoring (NO BRAND SPECIFIED) meter device kit   No No   Sig: Use to test blood sugar 2 times daily or as directed. Preferred blood glucose meter OR supplies to accompany: Blood Glucose Monitor Brands: per insurance.   cholecalciferol (VITAMIN D3) 25 mcg (1000 units) capsule   Yes No   Sig: Take 1 capsule by mouth daily   hydrocortisone 2.5 % cream   Yes No   Sig: APPLY TWICE A DAY AS NEEDED TOPICALLY MIX 1:1 WITH KETOCONAZOLE   insulin glargine (LANTUS PEN) 100 UNIT/ML pen   Yes No   Sig: Inject 10 Units subcutaneously at bedtime.   ketoconazole (NIZORAL) 2 % external cream   No No   Sig: Apply topically daily as needed for itching To rash/itching in groin   metFORMIN (GLUCOPHAGE) 1000 MG tablet   No No   Sig: TAKE 1 TABLET BY MOUTH TWICE A DAY WITH FOOD   metoprolol tartrate (LOPRESSOR) 100 MG tablet   No No   Sig: TAKE 1 TABLET BY MOUTH TWICE A DAY   Patient taking differently: Take 50 mg by mouth 2 times daily.   miconazole (MICATIN) 2 % external powder   No No   Sig: Apply topically daily as needed for itching or other (groin area)   semaglutide (OZEMPIC) 2 MG/3ML pen   No No   Sig: Inject 0.5 mg subcutaneously every 7 days.   tamsulosin (FLOMAX) 0.4 MG capsule   No No   Sig: TAKE 2 CAPSULES BY MOUTH EVERY DAY      Facility-Administered Medications Last Administration Doses Remaining   cyanocobalamin injection 1,000 mcg 7/22/2024 10:36 AM 11           Review of Systems    The 10 point Review of Systems is negative other than noted in the HPI or here.     Social History   I have reviewed this patient's social  history and updated it with pertinent information if needed.  Social History     Tobacco Use    Smoking status: Never     Passive exposure: Past    Smokeless tobacco: Never   Vaping Use    Vaping status: Never Used   Substance Use Topics    Alcohol use: No    Drug use: No         Family History   I have reviewed this patient's family history and updated it with pertinent information if needed.  Family History   Problem Relation Age of Onset    Dementia Mother     Asthma Father     Glaucoma No family hx of          Allergies   Allergies   Allergen Reactions    Lisinopril Cough    Trulicity [Dulaglutide] Nausea        Physical Exam   Vital Signs: Temp: 98.4  F (36.9  C) Temp src: Oral BP: (!) 144/92 Pulse: 97   Resp: 15 SpO2: 100 % O2 Device: None (Room air)    Weight: 0 lbs 0 oz    General Appearance: Awake, Alert, oriented x3.  Respiratory: CTAB  Cardiovascular: Normal S1, S2. Systolic murmur  GI: Abd soft nontender. NBS  Skin: No rashes.  Neuro: CN 2-12, sensation intact in all 4 extremities. Moving all 4 extremities equally bilaterally. No focal neurological deficits.    Medical Decision Making       65 MINUTES SPENT BY ME on the date of service doing chart review, history, exam, documentation & further activities per the note.      Data     I have personally reviewed the following data over the past 24 hrs:    7.8  \   10.4 (L)   / 280     137 101 20.2 /  142 (H)   4.3 21 (L) 1.42 (H) \     ALT: 28 AST: 26 AP: 85 TBILI: 0.7   ALB: 3.9 TOT PROTEIN: 7.5 LIPASE: N/A     Trop: 28 (H) BNP: 243     TSH: 1.52 T4: N/A A1C: N/A     Procal: N/A CRP: N/A Lactic Acid: 2.2 (H)       INR:  2.77 (H) PTT:  N/A   D-dimer:  N/A Fibrinogen:  N/A       Imaging results reviewed over the past 24 hrs:   Recent Results (from the past 24 hours)   CT Head w/o Contrast    Narrative    EXAM: CT HEAD W/O CONTRAST  1/31/2025 2:29 PM     HISTORY:  syncope, sz?       COMPARISON:  11/13/2020 PET/CT and 1/24/2023 CT neck    TECHNIQUE: Using  multidetector thin collimation helical acquisition  technique, axial, coronal and sagittal CT images from the skull base  to the vertex were obtained without intravenous contrast.   (topogram) image(s) also obtained and reviewed.    FINDINGS:    No acute intracranial hemorrhage, mass effect, or midline shift. No  acute loss of gray-white matter differentiation in the cerebral  hemispheres. The ventricles are proportionate to the cerebral sulci.  Clear basal cisterns. Prominent arachnoid cyst abutting left medial  cerebellar hemisphere directly lateral to the internal occipital  crest. Mild-to-moderate global cerebral volume atrophy. Patchy  periventricular hypoattenuation suggestive of chronic small vessel  ischemic disease.    The bony calvaria in the bones of the skull base are normal. The  visualized portions of the paranasal sinuses and mastoid air cells are  clear. Orbits are unremarkable.       Impression    IMPRESSION:   1. No acute intracranial pathology.  2. Mild to moderate global cerebral loss with mild leukoaraiosis.  3. Incidental arachnoid cyst in the posterior fossa.    I have personally reviewed the examination and initial interpretation  and I agree with the findings.    ROSE YU MD         SYSTEM ID:  [id number]

## 2025-01-31 NOTE — ED TRIAGE NOTES
Kailey from assisted living at 1230 staff noticed he was shaking then had a syncope episode. No history of epilepsy. Pt felt dizzy before event. . VSS. 160s sbp. 98 % 104 HR.

## 2025-01-31 NOTE — CONSULTS
[facility]  Neurology Consultation    Patient Name:  Cam Walden  MRN:  [id number]    :  1950  Date of Service:  2025  Primary care provider:  Kamaljit Plata          Chief Complaint: Syncope    History of Present Illness:   Cam Walden is a 74 year old male with a history of history of severe aortic stenosis, systolic heart failure with EF ~ 45%, s/p TAVR with 26 mm S3 via RFA on 2025 , JOHNNY, hypertension, atrial fibrillation on warfarin and atrial flutter s/p flutter ablation 24, KEVIN, CKD3b, moderate coronary artery disease, diffuse large B-cell lymphoma s/p chemotherapy , and DM 2 who presents to the emergency department via EMS from assisted living facility for evaluation after syncopal episode with shaking. Neurology is consulted for further evaluation.     Patient is coming in from assisted living, that he had an episode of syncope after standing up from sitting position.  He initially felt dizzy, followed by tremors in his bilateral lower extremities, and then passed out.  He woke up and was surrounded by the staff of the assisted living, who told him that he was out for 30 seconds.  They also noticed the shaking episodes in his lower extremities.  He denied having any upper extremity shaking, tongue bite and bladder or bowel continence.  Patient was immediately back to baseline after he woke up.    He denied any history of CNS infections, brain trauma, birth related complications, prior strokes.  He admitted that he previously used to have a lot of passing out episodes before his aortic valve surgery but has since declined.  He never had convulsions or tremors with his episodes of though.     After I noticed the weakness in his left upper extremity, patient admitted that he has been feeling weak in his left upper extremity since he was in the rehab after his aortic valve surgery.  He had x-rays that were normal and he does not  have the explanation of why he has been feeling weak on the left side.    ROS  A comprehensive ROS was performed and pertinent findings were included in HPI.     PMH  Past Medical History:   Diagnosis Date    Aortic stenosis     mild    Atrial fibrillation (H)     Atrial flutter (H) 8/21/2017    Atrial flutter with rapid ventricular response (H)     Cancer (H)     lymp    Colon polyps 08/08/2016    Per colonoscopy 8/8/16.  Large and small.  Some removed. Others to be removed surgically. (per patient)    Diabetes mellitus (H)     DM II    Diabetes mellitus, type II (H)     Diabetic polyneuropathy associated with type 2 diabetes mellitus (H) 12/16/2020    Hyperlipidemia     Hypertension     Morbid obesity (H)     JOHNNY (obstructive sleep apnea)     no cpap     Past Surgical History:   Procedure Laterality Date    CARDIOVERSION  08/25/2017    CYSTOURETEROSCOPY, WITH LITHOTRIPSY USING TAMMY 120P LASER AND URETERAL STENT INSERTION Right 12/1/2021    Procedure: CYSTOURETEROSCOPY, RIGHT RETROGRADE PYELOGRAM, WITH LITHOTRIPSY USING TAMMY 120P LASER AND URETERAL STENT INSERTION;  Surgeon: Darrell Suarez MD;  Location: [facility] OR    IR CHEST PORT PLACEMENT > 5 YRS OF AGE  8/24/2020    IR PORT PLACEMENT >5 YEARS  8/24/2020       Medications   I have personally reviewed the patient's medication list.     Allergies  I have personally reviewed the patient's allergy list.         Physical Examination   Vitals: BP (!) 144/92 (BP Location: Left arm)   Pulse 97   Temp 98.4  F (36.9  C) (Oral)   Resp 15   SpO2 100%   General: Lying in bed, NAD  Head: NC/AT  Eyes: no icterus, op pink and moist  Cardiac: RRR. Extremities warm, no edema.   Respiratory: non-labored on RA  GI: S/NT/ND  Skin: No rash or lesion on exposed skin  Psych: Mood pleasant, affect congruent  Neuro:  Mental status: Awake, alert, attentive, oriented to self, time, place, and circumstance. Language is fluent and coherent with intact comprehension of complex  commands, naming and repetition.  Cranial nerves: VFF, PERRL, conjugate gaze, EOMI, facial sensation intact, face symmetric, shoulder shrug strong, tongue/uvula midline, no dysarthria.   Motor: Normal bulk and tone. No abnormal movements. 5/5 strength bilaterally in deltoids, biceps,  hand , hip flexors, hip extensors, knee flexion, knee extension, plantarflexion, dorsiflexion.  Except 4/5 on elbow extension on the right upper extremity and slow tapping of fingers on the right as compared to the left side, 4+/5 hip flexion  Reflexes: Normo-reflexic and symmetric biceps, brachioradialis, triceps, patellae, and achilles. Negative Martinez, no clonus, toes down-going.  Sensory: Intact to light touch, pin, vibration, and proprioception in proximal and distal aspects of all 4 extremities   Coordination: FNF and HS without ataxia or dysmetria. Rapid alternating movements intact.   Gait: Normal width, stride length, turn, and arm swing. Station normal. Heel, toe, and tandem walk intact.    Investigations   I have personally reviewed pertinent labs, tests, and radiological imaging. Discussion of notable findings is included under Impression.         Impression    #Concern for convulsive syncope  #Left upper extremity, elbow extension weakness  Cam Walden is a 74 year old male with a history of history of severe aortic stenosis, systolic heart failure with EF ~ 45%, s/p TAVR with 26 mm S3 via RFA on 1/8/2025 , JOHNNY, hypertension, atrial fibrillation on warfarin and atrial flutter s/p flutter ablation 12/18/24, KEVIN, CKD3b, moderate coronary artery disease, diffuse large B-cell lymphoma s/p chemotherapy 2018, and DM 2 who presents to the emergency department via EMS from assisted living facility for evaluation after syncopal episode with shaking. Neurology is consulted for further evaluation.    Patient is presenting with 30s episodes of dizziness and convulsions in the lower extremities followed by passing out  episodes on standing up.  He was back to baseline after that with no tongue bite or urinary incontinence.  The episode is concerning for convulsive syncope in the setting of known aortic valve issues (though he had repair most recently).  Low concerns for seizures based on the description and no other seizure risk factors, so we would not obtain EEG at this point.  However, his exam did reveal left upper extremity weakness, prominent with elbow extension and finger tapping that patient has been having since he was in the rehab after his TAVR .  There is no other known explanation of his weakness and I wonder if he had any underlying stroke during that time that would explain his weakness . I recommend MRI brain with and without contrast for further evaluation.   Recommendations  -MRI brain with and without contrast   -MRI brain should not prevent his discharge as the weakness is more subacute/chronic and could be done outpatient    Further recommendations pending MRI brain.     Thank you for involving Neurology in the care of Cam Walden.  Please do not hesitate to call with questions/concerns (consult pager [phone]).      Patient was seen and discussed with Dr. Dr. Deshpande.      Tamera Christina MD  Neurology PGY3

## 2025-01-31 NOTE — ED TRIAGE NOTES
PT arrives from home c/o 2 weeks of N/V/D and black melena. Recent fistula procedure last week.     Triage Assessment (Adult)       Row Name 01/31/25 [id number]          Triage Assessment    Airway WDL WDL        Respiratory WDL    Respiratory WDL WDL        Skin Circulation/Temperature WDL    Skin Circulation/Temperature WDL WDL        Cardiac WDL    Cardiac WDL WDL        Peripheral/Neurovascular WDL    Peripheral Neurovascular WDL WDL        Cognitive/Neuro/Behavioral WDL    Cognitive/Neuro/Behavioral WDL WDL

## 2025-01-31 NOTE — LETTER
Recipient:    Sherita [phone]      Sender:    DIVYA Peacock [phone]      Date: February 2, 2025  Patient Name:  Cam Walden  Patient YOB: 1950  Routing Message:    Please see attached for orders for J.S.      The documents accompanying this notice contain confidential information belonging to the sender.  This information is intended only for the use of the individual or entity named above.  The authorized recipient of this information is prohibited from disclosing this information to any other party and is required to destroy the information after its stated need has been fulfilled, unless otherwise required by state law.    If you are not the intended recipient, you are hereby notified that any disclosure, copy, distribution or action taken in reliance on the contents of these documents is strictly prohibited.  If you have received this document in error, please return it by fax to [phone] with a note on the cover sheet explaining why you are returning it (e.g. not your patient, not your provider, etc.).  If you need further assistance, please call .  Documents may also be returned by mail to [employer], , [address], [place], [place] [address].

## 2025-01-31 NOTE — ED PROVIDER NOTES
[facility] EMERGENCY DEPARTMENT ([facility])    1/31/25       ED PROVIDER NOTE       History     Chief Complaint   Patient presents with    Syncope     HPI  Cam Walden is a 74 year old male with a history of history of severe aortic stenosis, systolic heart failure with EF ~ 45%, s/p TAVR with 26 mm S3 via RFA on 1/8/2025 , JOHNNY, hypertension, atrial fibrillation on warfarin and atrial flutter s/p flutter ablation 12/18/24, KEVIN, CKD3b, moderate coronary artery disease, diffuse large B-cell lymphoma s/p chemotherapy 2018, and DM 2 who presents to the emergency department via EMS from assisted living facility for evaluation after syncopal episode.    Patient reports he is in a assisted living facility due to being status post TAVR on 1/8/2025.  Patient states he felt fine this morning and got up to use his walker for the first time since the surgery and became very lightheaded and had a syncopal episode.  This syncopal episode was witnessed by 2 nurses, they reported tremors and loss of consciousness for about 30 seconds.  Nursing reported seizure-like activity.  Patient states he remembers waking up on the ground.  Patient states he did not have head strike and was lowered to the ground by a nurse.  He denies any chest pain or shortness of breath.    Past Medical History  Past Medical History:   Diagnosis Date    Aortic stenosis     mild    Atrial fibrillation (H)     Atrial flutter (H) 8/21/2017    Atrial flutter with rapid ventricular response (H)     Cancer (H)     lymp    Colon polyps 08/08/2016    Per colonoscopy 8/8/16.  Large and small.  Some removed. Others to be removed surgically. (per patient)    Diabetes mellitus (H)     DM II    Diabetes mellitus, type II (H)     Diabetic polyneuropathy associated with type 2 diabetes mellitus (H) 12/16/2020    Hyperlipidemia     Hypertension     Morbid obesity (H)     JOHNNY (obstructive sleep apnea)     no cpap     Past Surgical History:   Procedure  Laterality Date    CARDIOVERSION  08/25/2017    CYSTOURETEROSCOPY, WITH LITHOTRIPSY USING TAMMY 120P LASER AND URETERAL STENT INSERTION Right 12/1/2021    Procedure: CYSTOURETEROSCOPY, RIGHT RETROGRADE PYELOGRAM, WITH LITHOTRIPSY USING TAMMY 120P LASER AND URETERAL STENT INSERTION;  Surgeon: Darrell Suarez MD;  Location: [facility] OR    IR CHEST PORT PLACEMENT > 5 YRS OF AGE  8/24/2020    IR PORT PLACEMENT >5 YEARS  8/24/2020     alcohol swab prep pads  apixaban ANTICOAGULANT (ELIQUIS) 5 MG tablet  aspirin (ASA) 81 MG chewable tablet  atorvastatin (LIPITOR) 20 MG tablet  blood glucose calibration (NO BRAND SPECIFIED) solution  blood glucose monitoring (NO BRAND SPECIFIED) meter device kit  cholecalciferol (VITAMIN D3) 25 mcg (1000 units) capsule  Continuous Glucose Sensor (DEXCOM G7 SENSOR) MISC  DULoxetine (CYMBALTA) 60 MG capsule  hydrocortisone 2.5 % cream  insulin glargine (LANTUS PEN) 100 UNIT/ML pen  ketoconazole (NIZORAL) 2 % external cream  metFORMIN (GLUCOPHAGE) 1000 MG tablet  metoprolol tartrate (LOPRESSOR) 100 MG tablet  miconazole (MICATIN) 2 % external powder  semaglutide (OZEMPIC) 2 MG/3ML pen  tamsulosin (FLOMAX) 0.4 MG capsule      Allergies   Allergen Reactions    Lisinopril Cough    Trulicity [Dulaglutide] Nausea     Family History  Family History   Problem Relation Age of Onset    Dementia Mother     Asthma Father     Glaucoma No family hx of      Social History   Social History     Tobacco Use    Smoking status: Never     Passive exposure: Past    Smokeless tobacco: Never   Vaping Use    Vaping status: Never Used   Substance Use Topics    Alcohol use: No    Drug use: No      A complete review of systems was performed with pertinent positives and negatives noted in the HPI, and all other systems negative.    Physical Exam      Physical Exam  Nursing note reviewed.   Constitutional:       General: He is not in acute distress.     Appearance: Normal appearance. He is not toxic-appearing.    HENT:      Head: Normocephalic and atraumatic.   Eyes:      General: No scleral icterus.     Conjunctiva/sclera: Conjunctivae normal.      Pupils: Pupils are equal, round, and reactive to light.      Comments: Subconjunctival hemorrhage of left eye   Cardiovascular:      Rate and Rhythm: Normal rate.      Heart sounds: Normal heart sounds.   Pulmonary:      Effort: Pulmonary effort is normal. No respiratory distress.      Breath sounds: Normal breath sounds. No stridor. No wheezing, rhonchi or rales.   Abdominal:      Palpations: Abdomen is soft.      Tenderness: There is no abdominal tenderness.   Musculoskeletal:         General: No deformity.      Cervical back: Neck supple.   Skin:     General: Skin is warm.   Neurological:      General: No focal deficit present.      Mental Status: He is alert. He is disoriented.      Cranial Nerves: No cranial nerve deficit.      Sensory: No sensory deficit.      Motor: No weakness.      Coordination: Coordination normal.      Deep Tendon Reflexes: Reflexes normal.           ED Course, Procedures, & Data      Procedures            EKG Interpretation:      Interpreted by Jag Pisano MD  Time reviewed: 13:57:16   Symptoms at time of EKG: Syncope  Rhythm: Normal sinus   Rate: 94 bpm  Axis: Left Axis Deviation  Ectopy: None  Conduction: Normal  ST Segments/ T Waves: No ST-T wave changes and No acute ischemic changes  Q Waves: None  Comparison to prior: Similar to 11/29/21    Clinical Impression: Similar to prior             No results found for any visits on 01/31/25.  Medications - No data to display  Labs Ordered and Resulted from Time of ED Arrival to Time of ED Departure - No data to display  No orders to display          Critical care was not performed.     Medical Decision Making  The patient's presentation was of high complexity (an acute health issue posing potential threat to life or bodily function).    The patient's evaluation involved:  review of external note(s)  from 3+ sources (see separate area of note for details)  review of 3+ test result(s) ordered prior to this encounter (see separate area of note for details)  ordering and/or review of 3+ test(s) in this encounter (see separate area of note for details)  discussion of management or test interpretation with another health professional (neurology, hospitalist)    The patient's management necessitated high risk (a decision regarding hospitalization).    Assessment & Plan    Patient's workup today significant for an elevated lactate of 2.3 and then 2.2, otherwise stable high-sensitivity troponin of 25 pending repeat  CT head read is reassuring  Consulted neurology for recommendations and they said they will come down to see the patient  Will plan to admit patient to medicine for high risk syncope given complex cardiac history, recent TAVR, event today that is not clear whether it was a first-time seizure, or possible cardiac arrhythmia leading to syncope  Discussed case with internal medicine physician Dr. Cantu who accepted patient for admission.    I have reviewed the nursing notes. I have reviewed the findings, diagnosis, plan and need for follow up with the patient.    New Prescriptions    No medications on file       Final diagnoses:   None   IKassy, am serving as a trained medical scribe to document services personally performed by Jag Pisano MD based on the provider's statements to me on January 31, 2025.  This document has been checked and approved by the attending provider.    Jag GRIFFITH MD, was physically present and have reviewed and verified the accuracy of this note documented by Kassy Corbin, medical scribe.      Jag Pisano MD  [facility] EMERGENCY DEPARTMENT  1/31/2025     Jag Pisano MD  01/31/25 [id number]

## 2025-01-31 NOTE — ED NOTES
"Pt stated he had an episode of dizziness before the syncopal episode which lasted 30sec.Pt added that \" the care giver really padded the fall and I did not hit my head.\"   "

## 2025-01-31 NOTE — LETTER
Recipient:    Sherita        Sender:    DIVYA Peacock [phone]      Date: February 2, 2025  Patient Name:  Cam Walden  Patient YOB: 1950  Routing Message:    Physician orders for J.S.      The documents accompanying this notice contain confidential information belonging to the sender.  This information is intended only for the use of the individual or entity named above.  The authorized recipient of this information is prohibited from disclosing this information to any other party and is required to destroy the information after its stated need has been fulfilled, unless otherwise required by state law.    If you are not the intended recipient, you are hereby notified that any disclosure, copy, distribution or action taken in reliance on the contents of these documents is strictly prohibited.  If you have received this document in error, please return it by fax to [phone] with a note on the cover sheet explaining why you are returning it (e.g. not your patient, not your provider, etc.).  If you need further assistance, please call .  Documents may also be returned by mail to [employer], , [facility] [address], [place], [place] [address].

## 2025-02-01 ENCOUNTER — APPOINTMENT (OUTPATIENT)
Dept: MRI IMAGING | Facility: CLINIC | Age: 75
End: 2025-02-01
Attending: INTERNAL MEDICINE
Payer: COMMERCIAL

## 2025-02-01 ENCOUNTER — APPOINTMENT (OUTPATIENT)
Dept: PHYSICAL THERAPY | Facility: CLINIC | Age: 75
End: 2025-02-01
Attending: STUDENT IN AN ORGANIZED HEALTH CARE EDUCATION/TRAINING PROGRAM
Payer: COMMERCIAL

## 2025-02-01 ENCOUNTER — APPOINTMENT (OUTPATIENT)
Dept: CARDIOLOGY | Facility: CLINIC | Age: 75
End: 2025-02-01
Attending: STUDENT IN AN ORGANIZED HEALTH CARE EDUCATION/TRAINING PROGRAM
Payer: COMMERCIAL

## 2025-02-01 LAB
ALBUMIN SERPL BCG-MCNC: 3.7 G/DL (ref 3.5–5.2)
ALP SERPL-CCNC: 74 U/L (ref 40–150)
ALT SERPL W P-5'-P-CCNC: 26 U/L (ref 0–70)
ANION GAP SERPL CALCULATED.3IONS-SCNC: 10 MMOL/L (ref 7–15)
AST SERPL W P-5'-P-CCNC: 19 U/L (ref 0–45)
BILIRUB SERPL-MCNC: 0.8 MG/DL
BUN SERPL-MCNC: 18 MG/DL (ref 8–23)
CALCIUM SERPL-MCNC: 9.3 MG/DL (ref 8.8–10.4)
CHLORIDE SERPL-SCNC: 102 MMOL/L (ref 98–107)
CREAT SERPL-MCNC: 1.45 MG/DL (ref 0.67–1.17)
EGFRCR SERPLBLD CKD-EPI 2021: 51 ML/MIN/1.73M2
ERYTHROCYTE [DISTWIDTH] IN BLOOD BY AUTOMATED COUNT: 17.2 % (ref 10–15)
GLUCOSE BLDC GLUCOMTR-MCNC: 164 MG/DL (ref 70–99)
GLUCOSE BLDC GLUCOMTR-MCNC: 175 MG/DL (ref 70–99)
GLUCOSE BLDC GLUCOMTR-MCNC: 177 MG/DL (ref 70–99)
GLUCOSE SERPL-MCNC: 144 MG/DL (ref 70–99)
HCO3 SERPL-SCNC: 26 MMOL/L (ref 22–29)
HCT VFR BLD AUTO: 31.4 % (ref 40–53)
HGB BLD-MCNC: 10 G/DL (ref 13.3–17.7)
INR PPP: 2.96 (ref 0.85–1.15)
LIPASE SERPL-CCNC: 28 U/L (ref 13–60)
LVEF ECHO: NORMAL
MAGNESIUM SERPL-MCNC: 1.8 MG/DL (ref 1.7–2.3)
MAGNESIUM SERPL-MCNC: 1.8 MG/DL (ref 1.7–2.3)
MCH RBC QN AUTO: 29 PG (ref 26.5–33)
MCHC RBC AUTO-ENTMCNC: 31.8 G/DL (ref 31.5–36.5)
MCV RBC AUTO: 91 FL (ref 78–100)
PLATELET # BLD AUTO: 234 10E3/UL (ref 150–450)
POTASSIUM SERPL-SCNC: 4.3 MMOL/L (ref 3.4–5.3)
PROT SERPL-MCNC: 7 G/DL (ref 6.4–8.3)
RBC # BLD AUTO: 3.45 10E6/UL (ref 4.4–5.9)
SODIUM SERPL-SCNC: 138 MMOL/L (ref 135–145)
WBC # BLD AUTO: 7.7 10E3/UL (ref 4–11)

## 2025-02-01 PROCEDURE — 82962 GLUCOSE BLOOD TEST: CPT

## 2025-02-01 PROCEDURE — 93306 TTE W/DOPPLER COMPLETE: CPT | Mod: 26 | Performed by: INTERNAL MEDICINE

## 2025-02-01 PROCEDURE — 36415 COLL VENOUS BLD VENIPUNCTURE: CPT | Performed by: STUDENT IN AN ORGANIZED HEALTH CARE EDUCATION/TRAINING PROGRAM

## 2025-02-01 PROCEDURE — 84155 ASSAY OF PROTEIN SERUM: CPT | Performed by: STUDENT IN AN ORGANIZED HEALTH CARE EDUCATION/TRAINING PROGRAM

## 2025-02-01 PROCEDURE — 85041 AUTOMATED RBC COUNT: CPT | Performed by: STUDENT IN AN ORGANIZED HEALTH CARE EDUCATION/TRAINING PROGRAM

## 2025-02-01 PROCEDURE — 97161 PT EVAL LOW COMPLEX 20 MIN: CPT | Mod: GP | Performed by: PHYSICAL THERAPIST

## 2025-02-01 PROCEDURE — 70549 MR ANGIOGRAPH NECK W/O&W/DYE: CPT

## 2025-02-01 PROCEDURE — 83690 ASSAY OF LIPASE: CPT | Performed by: STUDENT IN AN ORGANIZED HEALTH CARE EDUCATION/TRAINING PROGRAM

## 2025-02-01 PROCEDURE — G0378 HOSPITAL OBSERVATION PER HR: HCPCS

## 2025-02-01 PROCEDURE — 250N000012 HC RX MED GY IP 250 OP 636 PS 637: Performed by: INTERNAL MEDICINE

## 2025-02-01 PROCEDURE — 97530 THERAPEUTIC ACTIVITIES: CPT | Mod: GP | Performed by: PHYSICAL THERAPIST

## 2025-02-01 PROCEDURE — 250N000013 HC RX MED GY IP 250 OP 250 PS 637: Performed by: STUDENT IN AN ORGANIZED HEALTH CARE EDUCATION/TRAINING PROGRAM

## 2025-02-01 PROCEDURE — A9585 GADOBUTROL INJECTION: HCPCS | Performed by: INTERNAL MEDICINE

## 2025-02-01 PROCEDURE — 70551 MRI BRAIN STEM W/O DYE: CPT

## 2025-02-01 PROCEDURE — 84075 ASSAY ALKALINE PHOSPHATASE: CPT | Performed by: STUDENT IN AN ORGANIZED HEALTH CARE EDUCATION/TRAINING PROGRAM

## 2025-02-01 PROCEDURE — 250N000013 HC RX MED GY IP 250 OP 250 PS 637: Performed by: PHYSICIAN ASSISTANT

## 2025-02-01 PROCEDURE — 83735 ASSAY OF MAGNESIUM: CPT | Performed by: STUDENT IN AN ORGANIZED HEALTH CARE EDUCATION/TRAINING PROGRAM

## 2025-02-01 PROCEDURE — 99232 SBSQ HOSP IP/OBS MODERATE 35: CPT | Performed by: INTERNAL MEDICINE

## 2025-02-01 PROCEDURE — 93306 TTE W/DOPPLER COMPLETE: CPT

## 2025-02-01 PROCEDURE — 85610 PROTHROMBIN TIME: CPT | Performed by: STUDENT IN AN ORGANIZED HEALTH CARE EDUCATION/TRAINING PROGRAM

## 2025-02-01 PROCEDURE — 96361 HYDRATE IV INFUSION ADD-ON: CPT

## 2025-02-01 PROCEDURE — 250N000013 HC RX MED GY IP 250 OP 250 PS 637: Performed by: INTERNAL MEDICINE

## 2025-02-01 PROCEDURE — 99222 1ST HOSP IP/OBS MODERATE 55: CPT | Mod: 25 | Performed by: INTERNAL MEDICINE

## 2025-02-01 PROCEDURE — 255N000002 HC RX 255 OP 636: Performed by: INTERNAL MEDICINE

## 2025-02-01 PROCEDURE — 70549 MR ANGIOGRAPH NECK W/O&W/DYE: CPT | Mod: 26 | Performed by: RADIOLOGY

## 2025-02-01 PROCEDURE — 70551 MRI BRAIN STEM W/O DYE: CPT | Mod: 26 | Performed by: RADIOLOGY

## 2025-02-01 PROCEDURE — 85018 HEMOGLOBIN: CPT | Performed by: STUDENT IN AN ORGANIZED HEALTH CARE EDUCATION/TRAINING PROGRAM

## 2025-02-01 PROCEDURE — 999N000157 HC STATISTIC RCP TIME EA 10 MIN

## 2025-02-01 PROCEDURE — 999N000111 HC STATISTIC OT IP EVAL DEFER

## 2025-02-01 RX ORDER — DEXTROSE MONOHYDRATE 25 G/50ML
25-50 INJECTION, SOLUTION INTRAVENOUS
Status: DISCONTINUED | OUTPATIENT
Start: 2025-02-01 | End: 2025-02-02 | Stop reason: HOSPADM

## 2025-02-01 RX ORDER — NALOXONE HYDROCHLORIDE 0.4 MG/ML
0.2 INJECTION, SOLUTION INTRAMUSCULAR; INTRAVENOUS; SUBCUTANEOUS
Status: DISCONTINUED | OUTPATIENT
Start: 2025-02-01 | End: 2025-02-02 | Stop reason: HOSPADM

## 2025-02-01 RX ORDER — DULOXETIN HYDROCHLORIDE 60 MG/1
60 CAPSULE, DELAYED RELEASE ORAL 2 TIMES DAILY
Status: DISCONTINUED | OUTPATIENT
Start: 2025-02-01 | End: 2025-02-02 | Stop reason: HOSPADM

## 2025-02-01 RX ORDER — OXYCODONE HYDROCHLORIDE 10 MG/1
10 TABLET ORAL
Status: COMPLETED | OUTPATIENT
Start: 2025-02-01 | End: 2025-02-01

## 2025-02-01 RX ORDER — NALOXONE HYDROCHLORIDE 0.4 MG/ML
0.4 INJECTION, SOLUTION INTRAMUSCULAR; INTRAVENOUS; SUBCUTANEOUS
Status: DISCONTINUED | OUTPATIENT
Start: 2025-02-01 | End: 2025-02-02 | Stop reason: HOSPADM

## 2025-02-01 RX ORDER — ASPIRIN 81 MG/1
81 TABLET, CHEWABLE ORAL DAILY
Status: DISCONTINUED | OUTPATIENT
Start: 2025-02-01 | End: 2025-02-02 | Stop reason: HOSPADM

## 2025-02-01 RX ORDER — METHOCARBAMOL 500 MG/1
500 TABLET, FILM COATED ORAL
Status: COMPLETED | OUTPATIENT
Start: 2025-02-01 | End: 2025-02-01

## 2025-02-01 RX ORDER — NICOTINE POLACRILEX 4 MG
15-30 LOZENGE BUCCAL
Status: DISCONTINUED | OUTPATIENT
Start: 2025-02-01 | End: 2025-02-02 | Stop reason: HOSPADM

## 2025-02-01 RX ORDER — LIRAGLUTIDE 6 MG/ML
0.6 INJECTION SUBCUTANEOUS DAILY
Status: DISCONTINUED | OUTPATIENT
Start: 2025-02-01 | End: 2025-02-02 | Stop reason: HOSPADM

## 2025-02-01 RX ORDER — TAMSULOSIN HYDROCHLORIDE 0.4 MG/1
0.4 CAPSULE ORAL DAILY
Status: DISCONTINUED | OUTPATIENT
Start: 2025-02-02 | End: 2025-02-02

## 2025-02-01 RX ORDER — GADOBUTROL 604.72 MG/ML
0.1 INJECTION INTRAVENOUS ONCE
Status: COMPLETED | OUTPATIENT
Start: 2025-02-01 | End: 2025-02-01

## 2025-02-01 RX ORDER — METOPROLOL TARTRATE 25 MG/1
25 TABLET, FILM COATED ORAL 2 TIMES DAILY
Status: DISCONTINUED | OUTPATIENT
Start: 2025-02-01 | End: 2025-02-01

## 2025-02-01 RX ORDER — ACETAMINOPHEN 325 MG/1
975 TABLET ORAL
Status: COMPLETED | OUTPATIENT
Start: 2025-02-01 | End: 2025-02-01

## 2025-02-01 RX ORDER — ATORVASTATIN CALCIUM 40 MG/1
40 TABLET, FILM COATED ORAL DAILY
Status: DISCONTINUED | OUTPATIENT
Start: 2025-02-01 | End: 2025-02-02 | Stop reason: HOSPADM

## 2025-02-01 RX ORDER — TAMSULOSIN HYDROCHLORIDE 0.4 MG/1
0.4 CAPSULE ORAL 2 TIMES DAILY
Status: DISCONTINUED | OUTPATIENT
Start: 2025-02-01 | End: 2025-02-01

## 2025-02-01 RX ORDER — WARFARIN SODIUM 5 MG/1
5 TABLET ORAL
Status: COMPLETED | OUTPATIENT
Start: 2025-02-01 | End: 2025-02-01

## 2025-02-01 RX ADMIN — DULOXETINE HYDROCHLORIDE 60 MG: 60 CAPSULE, DELAYED RELEASE ORAL at 20:46

## 2025-02-01 RX ADMIN — DULOXETINE HYDROCHLORIDE 60 MG: 60 CAPSULE, DELAYED RELEASE ORAL at 08:55

## 2025-02-01 RX ADMIN — GADOBUTROL 11.8 ML: 604.72 INJECTION INTRAVENOUS at 18:53

## 2025-02-01 RX ADMIN — ASPIRIN 81 MG CHEWABLE TABLET 81 MG: 81 TABLET CHEWABLE at 08:55

## 2025-02-01 RX ADMIN — OXYCODONE HYDROCHLORIDE 10 MG: 10 TABLET ORAL at 15:42

## 2025-02-01 RX ADMIN — TAMSULOSIN HYDROCHLORIDE 0.4 MG: 0.4 CAPSULE ORAL at 08:55

## 2025-02-01 RX ADMIN — ACETAMINOPHEN 650 MG: 325 TABLET, FILM COATED ORAL at 10:05

## 2025-02-01 RX ADMIN — METOPROLOL TARTRATE 12.5 MG: 25 TABLET, FILM COATED ORAL at 20:45

## 2025-02-01 RX ADMIN — METHOCARBAMOL 500 MG: 500 TABLET ORAL at 15:41

## 2025-02-01 RX ADMIN — LIDOCAINE 4% 3 PATCH: 40 PATCH TOPICAL at 20:46

## 2025-02-01 RX ADMIN — ATORVASTATIN CALCIUM 40 MG: 40 TABLET, FILM COATED ORAL at 08:56

## 2025-02-01 RX ADMIN — ACETAMINOPHEN 975 MG: 325 TABLET, FILM COATED ORAL at 15:41

## 2025-02-01 RX ADMIN — WARFARIN SODIUM 5 MG: 5 TABLET ORAL at 19:23

## 2025-02-01 RX ADMIN — INSULIN GLARGINE 10 UNITS: 100 INJECTION, SOLUTION SUBCUTANEOUS at 22:34

## 2025-02-01 ASSESSMENT — ACTIVITIES OF DAILY LIVING (ADL)
ADLS_ACUITY_SCORE: 28
ADLS_ACUITY_SCORE: 42
ADLS_ACUITY_SCORE: 28
ADLS_ACUITY_SCORE: 24
ADLS_ACUITY_SCORE: 28
ADLS_ACUITY_SCORE: 42
ADLS_ACUITY_SCORE: 24
DEPENDENT_IADLS:: INDEPENDENT
ADLS_ACUITY_SCORE: 28
ADLS_ACUITY_SCORE: 50
ADLS_ACUITY_SCORE: 24
ADLS_ACUITY_SCORE: 24
ADLS_ACUITY_SCORE: 28
ADLS_ACUITY_SCORE: 24

## 2025-02-01 NOTE — ED NOTES
Pt brought back from MRI without completing the imaging due to pt not being able to tolerate lying flat due to shoulder pain though dilaudid was given at MRI. Oral oxy is given in addition then to try again later. [facility] team notified.

## 2025-02-01 NOTE — CONSULTS
Cardiology Consult Note - Virtual Consult as patient is on [employer]    Date of Service: 02/01/2025  Patient: Cam Walden  MRN: [id number]  Admission Date: 1/31/2025  Hospital Day: 1    Reason for Consult:  Syncope in patient with recent TAVR (1/8/2025)     Cardiology Problem List:  Syncope - likely orthostatic   Aortic Stenosis s/p Recent TAVR 1/8/2025  Atrial fibrillation/flutter s/p ablation 12/2024  Non-obstructive CAD  Orthostatic Hypotension    Assessment:   Cam Walden is a 74 year old male with a history of aortic stenosis s/p TAVR 1/8/2025, atrial fibrillation/flutter s/p ablation 12/2024, nonobstructive CAD, orthostatic hypotension, who presents following a syncopal episode after positional changes. Cardiology is consulted for recommendations regarding syncope management.     Description of patient's syncopal episodes is consistent with orthostatic hypotension as it was provoked following a rapid positional change. However, given patient's prior history of atrial arrhythmia, aortic stenosis with TAVR, and orthostatic hypotension will explore other etiologies as outlined below. Evaluated by neurology yesterday and no further neurologic work-up indicated.     Notably, his clinical description is reportedly similar to the prior episodes of syncope he has been experiencing for the past few months.     Recommendations:   Obtain TTE to evaluate for structural or valvular etiology of his syncope given recent TAVR  Check orthostatic vital signs  Monitor on telemetry. Discharge on [facility] with outpatient cardiology follow-up  If orthostatic vital signs are positive, would consider discontinuing or reducing his metoprolol tartrate or flomax as these may be contributing     Patient  was discussed with attending physician Dr. Ireland . Cardiology will sign off at this time. However, please do not hesitate to reach out with any questions or if any of the above diagnostic studies are abnormal warranting  "further recommendations. Please don't hesitate to contact our team with any additional questions or concerns.    Darrell Chi MD  Cardiology Fellow PGY4    Subjective   History of Present Illness:    74 year old male with a history of aortic stenosis s/p TAVR 1/8/2025, atrial fibrillation/flutter s/p flutter ablation 12/2024, nonobstructive CAD, orthostatic hypotension,    Patient has been hospitalized at [facility] in December 2024 and January 2025.  Review of available records demonstrated that patient was hospitalized from 12/13 - 12/22/2024 at which time he underwent an atrial flutter ablation as well as coronary angiogram that demonstrated nonobstructive CAD.  He was subsequently rehospitalized in January 2025 after presenting with an episode of syncope.  He underwent TAVR on 1/8/2025 with postprocedural course complicated by orthostatic hypotension that improved with IV fluids at that time. He was discharged on 1/11/2025 with the following relevant cardiac medications: Warfarin for atrial fibrillation, aspirin 81, atorvastatin 20, metoprolol tartrate 50 mg twice daily.  He is also on Flomax.    He returns to the emergency department following a syncopal episode and is admitted to [facility]. History is obtained from chart review and discussion with primary team as this consult is virtual and I am unable to evaluate the patient in-person.     Per H & P: \"This morning patient stood up quickly and was using his cane when he felt lightheadedness and fell. Fall was apparently witnessed by 2 nurses and patient was lowered to the ground by a nurse and did not hit her head on the floor. Per report, and chart review, patient apparently had an episode of LOC lasting for around 30 seconds. No chest pain, palpitations, sob, coughing, kalyan, headaches, neck stiffness. Patient denies any new headaches, neck stiffness, blurry vision, double vision. No FND. No history of seizures.\"    Per neurology consult note: \"He states he used to " "have syncopal episodes prior to his TAVR. States the event in question yesterday was identical to his previous syncopal episodes. He denies any postictal state. Was told that he was unconscious for about 30 seconds but immediately recognizes surroundings and to what happened. He did get a strong sensation of lightheadedness prior. No other focal aura noted prior.\"      Relevant cardiac studies this admission:  -Troponin is flat at 29 -> 28  -proBNP normal 243  -ECG demonstrated sinus rhythm, left axis deviation, nonspecific ST and T wave changes, but no findings concerning for acute ischemia.  - Telemetry: sinus rhythm, no significant atrial or ventricular arrhythmia  - TTE pending      Review of Systems:  A comprehensive ROS was performed and found to be negative or non-contributory with the exception of that noted in the HPI above.    Past Medical History:   Diagnosis Date    Aortic stenosis     mild    Atrial fibrillation (H)     Atrial flutter (H) 8/21/2017    Atrial flutter with rapid ventricular response (H)     Cancer (H)     lymp    Colon polyps 08/08/2016    Per colonoscopy 8/8/16.  Large and small.  Some removed. Others to be removed surgically. (per patient)    Diabetes mellitus (H)     DM II    Diabetes mellitus, type II (H)     Diabetic polyneuropathy associated with type 2 diabetes mellitus (H) 12/16/2020    Hyperlipidemia     Hypertension     Morbid obesity (H)     JOHNNY (obstructive sleep apnea)     no cpap     Past Surgical History:   Procedure Laterality Date    CARDIOVERSION  08/25/2017    CYSTOURETEROSCOPY, WITH LITHOTRIPSY USING TAMMY 120P LASER AND URETERAL STENT INSERTION Right 12/1/2021    Procedure: CYSTOURETEROSCOPY, RIGHT RETROGRADE PYELOGRAM, WITH LITHOTRIPSY USING TAMMY 120P LASER AND URETERAL STENT INSERTION;  Surgeon: Darrell Suarez MD;  [place] [facility] [place]    IR CHEST PORT PLACEMENT > 5 YRS OF AGE  8/24/2020    IR PORT PLACEMENT >5 YEARS  8/24/2020     Social History "     Socioeconomic History    Marital status: Single    Number of children: 0   Tobacco Use    Smoking status: Never     Passive exposure: Past    Smokeless tobacco: Never   Vaping Use    Vaping status: Never Used   Substance and Sexual Activity    Alcohol use: No    Drug use: No   Other Topics Concern    Parent/sibling w/ CABG, MI or angioplasty before 65F 55M? No   Social History Narrative    Patient lives alone.     Social Drivers of Health     Financial Resource Strain: Low Risk  (2/1/2025)    Financial Resource Strain     Within the past 12 months, have you or your family members you live with been unable to get utilities (heat, electricity) when it was really needed?: No   Food Insecurity: Low Risk  (2/1/2025)    Food Insecurity     Within the past 12 months, did you worry that your food would run out before you got money to buy more?: No     Within the past 12 months, did the food you bought just not last and you didn t have money to get more?: No   Transportation Needs: Low Risk  (2/1/2025)    Transportation Needs     Within the past 12 months, has lack of transportation kept you from medical appointments, getting your medicines, non-medical meetings or appointments, work, or from getting things that you need?: No   Physical Activity: Unknown (6/25/2024)    Exercise Vital Sign     Days of Exercise per Week: 0 days   Stress: No Stress Concern Present (6/25/2024)    [employer] - Occupational Stress Questionnaire     Feeling of Stress : Not at all   Social Connections: Socially Integrated (1/5/2025)    [facility]    Social Connections     Do you often feel lonely or isolated from those around you?: 0   Interpersonal Safety: Low Risk  (6/25/2024)    Interpersonal Safety     Do you feel physically and emotionally safe where you currently live?: Yes     Within the past 12 months, have you been hit, slapped, kicked or otherwise physically  hurt by someone?: No     Within the past 12 months, have you been humiliated or emotionally abused in other ways by your partner or ex-partner?: No   Housing Stability: Low Risk  (2/1/2025)    Housing Stability     Do you have housing? : Yes     Are you worried about losing your housing?: No      Family History   Problem Relation Age of Onset    Dementia Mother     Asthma Father     Glaucoma No family hx of      Facility-Administered Medications Prior to Admission   Medication Dose Route Frequency Provider Last Rate Last Admin    cyanocobalamin injection 1,000 mcg  1,000 mcg Intramuscular Q30 Days    1,000 mcg at 07/22/24 1036     Medications Prior to Admission   Medication Sig Dispense Refill Last Dose/Taking    aspirin (ASA) 81 MG chewable tablet Take 81 mg by mouth daily.   1/30/2025    atorvastatin (LIPITOR) 20 MG tablet Take 1 tablet (20 mg) by mouth daily 90 tablet 3 1/30/2025    cholecalciferol (VITAMIN D3) 25 mcg (1000 units) capsule Take 1 capsule by mouth daily   1/30/2025    DULoxetine (CYMBALTA) 60 MG capsule Take 1 capsule (60 mg) by mouth 2 times daily. 180 capsule 1 1/31/2025 Morning    insulin glargine (LANTUS PEN) 100 UNIT/ML pen Inject 10 Units subcutaneously at bedtime.   1/30/2025    liraglutide (VICTOZA) 18 MG/3ML solution Inject 0.6 mg subcutaneously daily.   1/30/2025    metFORMIN (GLUCOPHAGE) 1000 MG tablet TAKE 1 TABLET BY MOUTH TWICE A DAY WITH FOOD (Patient taking differently: Take 500 mg by mouth 2 times daily. TAKE 1 TABLET BY MOUTH TWICE A DAY WITH FOOD) 200 tablet 1 1/31/2025 Morning    metoprolol tartrate (LOPRESSOR) 100 MG tablet TAKE 1 TABLET BY MOUTH TWICE A DAY (Patient taking differently: Take 50 mg by mouth 2 times daily.) 180 tablet 2 1/30/2025    polyethylene glycol (MIRALAX) 17 GM/Dose powder Take 1 Capful by mouth as needed for constipation.   1/30/2025    tamsulosin (FLOMAX) 0.4 MG capsule TAKE 2 CAPSULES BY MOUTH EVERY  capsule 1 1/30/2025    alcohol swab prep pads  Use to swab area of injection/richelle as directed. 100 each 3     blood glucose calibration (NO BRAND SPECIFIED) solution To accompany: Blood Glucose Monitor Brands: per insurance. 5 each 11     blood glucose monitoring (NO BRAND SPECIFIED) meter device kit Use to test blood sugar 2 times daily or as directed. Preferred blood glucose meter OR supplies to accompany: Blood Glucose Monitor Brands: per insurance. 1 kit 0     Continuous Glucose Sensor (DEXCOM G7 SENSOR) MISC 1 each every 10 days Change sensor every 10 days (Patient not taking: Reported on 12/24/2024) 9 each 5     hydrocortisone 2.5 % cream APPLY TWICE A DAY AS NEEDED TOPICALLY MIX 1:1 WITH KETOCONAZOLE       ketoconazole (NIZORAL) 2 % external cream Apply topically daily as needed for itching To rash/itching in groin 60 g 1     miconazole (MICATIN) 2 % external powder Apply topically daily as needed for itching or other (groin area) 85 g 1      Current Facility-Administered Medications   Medication Dose Route Frequency Provider Last Rate Last Admin    acetaminophen (TYLENOL) tablet 650 mg  650 mg Oral Q4H PRN Praneeth Walsh MD        Or    acetaminophen (TYLENOL) Suppository 650 mg  650 mg Rectal Q4H PRN Praneeth Walsh MD        aspirin (ASA) chewable tablet 81 mg  81 mg Oral Daily Praneeth Walsh MD        atorvastatin (LIPITOR) tablet 40 mg  40 mg Oral Daily Praneeth Walsh MD        calcium carbonate (TUMS) chewable tablet 1,000 mg  1,000 mg Oral 4x Daily PRN Praneeth Walsh MD        DULoxetine (CYMBALTA) DR capsule 60 mg  60 mg Oral BID Praneeth Walsh MD        Lidocaine (LIDOCARE) 4 % Patch 3 patch  3 patch Transdermal Q24h Mikala Baker PA-C   3 patch at 01/31/25 2157    lidocaine (LMX4) cream   Topical Q1H PRN Praneeth Walsh MD        lidocaine 1 % 0.1-1 mL  0.1-1 mL Other Q1H PRN Praneeth Walsh MD        [Held by provider] metFORMIN (GLUCOPHAGE) tablet 1,000 mg  1,000 mg Oral BID  w/meals Praneeth Walsh MD        metoprolol tartrate (LOPRESSOR) tablet 25 mg  25 mg Oral BID Erica Waddell MD        oxyCODONE (ROXICODONE) tablet 5 mg  5 mg Oral Q4H PRN Mikala Baker PA-C   5 mg at 01/31/25 2041    polyethylene glycol (MIRALAX) Packet 17 g  17 g Oral BID PRN Praneeth Walsh MD        senna-docusate (SENOKOT-S/PERICOLACE) 8.6-50 MG per tablet 1 tablet  1 tablet Oral BID PRN Praneeth Walsh MD        Or    senna-docusate (SENOKOT-S/PERICOLACE) 8.6-50 MG per tablet 2 tablet  2 tablet Oral BID PRN Praneeth Walsh MD        sodium chloride (PF) 0.9% PF flush 3 mL  3 mL Intracatheter Q8H Praneeth Walsh MD        sodium chloride (PF) 0.9% PF flush 3 mL  3 mL Intracatheter q1 min prn Praneeth Walsh MD        tamsulosin (FLOMAX) capsule 0.4 mg  0.4 mg Oral BID Praneeth Walsh MD        Warfarin Dose Required Daily - Pharmacist Managed  1 each Oral See Admin Instructions Praneeth Walsh MD           Objective   Physical Exam:    Blood pressure (!) 144/79, pulse 79, temperature 97.6  F (36.4  C), temperature source Oral, resp. rate 16, height 1.829 m (6'), weight 118 kg (260 lb 2.3 oz), SpO2 96%.    Exam:  No exam performed as this was a virtual consult for [employer].     Labs & Studies: I personally reviewed the following studies:  CMP  Recent Labs   Lab 02/01/25  0746 01/31/25  2338 01/31/25  1549 01/31/25  1345     --   --  137   POTASSIUM 4.3  --   --  4.3   CHLORIDE 102  --   --  101   CO2 26  --   --  21*   ANIONGAP 10  --   --  15   * 152*  --  142*   BUN 18.0  --   --  20.2   CR 1.45*  --   --  1.42*   GFRESTIMATED 51*  --   --  53*   ABBI 9.3  --   --  10.2   MAG 1.8  --  1.8  --    PROTTOTAL 7.0  --   --  7.5   ALBUMIN 3.7  --   --  3.9   BILITOTAL 0.8  --   --  0.7   ALKPHOS 74  --   --  85   AST 19  --   --  26   ALT 26  --   --  28     CBC  Recent Labs   Lab 02/01/25  0746 01/31/25  1400   WBC 7.7 7.8   RBC 3.45* 3.69*    HGB 10.0* 10.4*   HCT 31.4* 33.9*   MCV 91 92   MCH 29.0 28.2   MCHC 31.8 30.7*   RDW 17.2* 17.2*    280     INR  Recent Labs   Lab 02/01/25  0746 01/31/25  2326 01/31/25  1345 01/27/25  0440   INR 2.96* 2.99* 2.77* 2.85*            No data to display                  No results found for this or any previous visit (from the past 4320 hours).    Imaging:  Reviewed    I saw and evaluated patient with CV fellow. I examined patient with CV fellow. I discussed the results with patient and CV fellow. I discussed our plan with patient and CV fellow.  I agree with CV fellow's note and I edited the CV fellow's note to make it a more comprehensive document.    I spent 25 min with CV fellow and patient during the virtual visit.    Sincere Ireland MD, PhD  Professor of Medicine  Division of Cardiology

## 2025-02-01 NOTE — PROGRESS NOTES
02/01/25 1032   Appointment Info   Signing Clinician's Name / Credentials (PT) Jodee Leon, ELIZABETH       Present no   Language English   Living Environment   People in Home friend(s)   Current Living Arrangements house   Home Accessibility stairs to enter home   Number of Stairs, Main Entrance 4   Stair Railings, Main Entrance railing on left side (ascending)   Number of Stairs, Within Home, Primary greater than 10 stairs   Stair Railings, Within Home, Primary railing on right side (ascending)   Transportation Anticipated family or friend will provide   Living Environment Comments Pt reported being in a TCU prior to hospital admission and plans to return to TCU.  Pt reported having 4 steps then a landing then 10 more steps.   Self-Care   Usual Activity Tolerance moderate   Current Activity Tolerance fair   Regular Exercise Other (see comments)  (working with)   Equipment Currently Used at Home walker, rolling;shower chair   Fall history within last six months yes   Number of times patient has fallen within last six months 8   Activity/Exercise/Self-Care Comment Pt reported that his friend who is a PCA assist with grocery shopping, house keeping task, and showering.   General Information   Onset of Illness/Injury or Date of Surgery 01/31/25   Referring Physician Praneeth Walsh MD   Patient/Family Therapy Goals Statement (PT) Pt would like to get back to how he felt before surgery   Pertinent History of Current Problem (include personal factors and/or comorbidities that impact the POC) Pt  is a 74 year old male who has a history of severe Aortic Stenosis s.o TAVR, HFrEF 45%, Afib on warfarin, s/p flutter ablation, CKD stage 3B, CAD, JOHNNY, T2DM. He also has a history of large B cell lymphoma s/p chemotherapy 2018.  Pt admitted after a syncopal fall at TCU.   Existing Precautions/Restrictions fall  (bed alarm)   Heart Disease Risk Factors Overweight;Diabetes;High blood  pressure;Dislipidemia;Medical history   General Observations Pt supine in bed at start of PT evaluation, agreeable to PT evaluation.   Cognition   Affect/Mental Status (Cognition) WNL   Pain Assessment   Patient Currently in Pain Yes, see Vital Sign flowsheet   Integumentary/Edema   Integumentary/Edema no deficits were identifed   Posture    Posture Forward head position;Protracted shoulders   Range of Motion (ROM)   Range of Motion ROM is WNL   Strength (Manual Muscle Testing)   Strength (Manual Muscle Testing) strength is WNL;strength is WFL   Bed Mobility   Comment, (Bed Mobility) Supine to/from sitting with mod I   Transfers   Comment, (Transfers) Sit to/from standing with FWW and CGA x 1.   Gait/Stairs (Locomotion)   Comment, (Gait/Stairs) Pt ambulated 5' with FWW and CGA x 1.   Balance   Balance Comments Pt demonstrated good sitting balance at EOB and fair standing balance with FWW.   Sensory Examination   Sensory Perception patient reports no sensory changes  (Pt reported having neuropathy in hands and feet at baseline.)   Clinical Impression   Criteria for Skilled Therapeutic Intervention Yes, treatment indicated   PT Diagnosis (PT) Impaired functional mobility.   Influenced by the following impairments syncope, deconditioned, complex medical history/comorbidities   Functional limitations due to impairments Impaired transfers and gait.   Clinical Presentation (PT Evaluation Complexity) stable   Clinical Presentation Rationale Per clinical judgement   Clinical Decision Making (Complexity) low complexity   Planned Therapy Interventions (PT) gait training;transfer training   Risk & Benefits of therapy have been explained evaluation/treatment results reviewed;care plan/treatment goals reviewed;risks/benefits reviewed;patient   Therapy Certification   Start of care date 02/01/25   Certification date from 02/01/25   Certification date to 02/15/25   Medical Diagnosis syncope with fall   Physical Therapy Goals   PT  Frequency 4x/week   PT Predicted Duration/Target Date for Goal Attainment 02/15/25   PT Goals Transfers;Gait;Stairs   PT: Transfers Modified independent;Sit to/from stand;Bed to/from chair;Assistive device   PT: Gait Modified independent;Rolling walker;Greater than 200 feet   PT: Stairs Supervision/stand-by assist;8 stairs;Rail on left   PT Discharge Planning   PT Plan Continue skilled PT for transfers and gait.  Monitor BP   PT Discharge Recommendation (DC Rec) Transitional Care Facility   PT Rationale for DC Rec Pt reported he was at a TCU prior to admission and is planning to return to TCU.  Pt is below his baseline and would benefit from TCU for strengthening and increase independence with all functional mobility.   PT Brief overview of current status Pt is assist of 1 with FWW   PT Total Distance Amb During Session (feet) 15     [facility]                                                                                   OUTPATIENT PHYSICAL THERAPY    PLAN OF TREATMENT FOR OUTPATIENT REHABILITATION   Patient's Last Name, First Name, Cam Maddox YOB: 1950   Provider's Name   [facility]   Medical Record No.  [id number]     Onset Date: 01/31/25 Start of Care Date: (P) 02/01/25     Medical Diagnosis:  (P) syncope with fall               PT Diagnosis:  Impaired functional mobility. Certification Dates:  From: (P) 02/01/25  To: (P) 02/15/25       See note for plan of treatment, functional goals, and certification details.    I CERTIFY THE NEED FOR THESE SERVICES FURNISHED UNDER        THIS PLAN OF TREATMENT AND WHILE UNDER MY CARE (Physician co-signature of this document indicates review and certification of the therapy plan).

## 2025-02-01 NOTE — PROGRESS NOTES
[facility]    Medicine Progress Note - Hospitalist Service, GOLD TEAM 17    Date of Admission:  1/31/2025    Assessment & Plan   Cam Walden is a 74 year old male who has a history of severe Aortic Stenosis s.o TAVR, HFrEF 45%, Afib on warfarin, s/p flutter ablation, CKD stage 3B, CAD, JOHNNY, T2DM. He also has a history of large B cell lymphoma s/p chemotherapy 2018. Patient was at his assisted living facility after recent TAVR on 1/8. AM of admission  patient stood up quickly and was using his cane when he felt lightheadedness and fell. Fall was apparently witnessed by 2 nurses and patient was lowered to the ground by a nurse and did not hit her head on the floor. Per report, and chart review, patient apparently had an episode of LOC lasting for around 30 seconds. Staff also noticed tremors of both lower extremities , no upper extremities shaking , no tongue bite or incontinence . Also denied  chest pain, palpitations, sob, coughing, kalyan, headaches, neck stiffness. No abd pain, nausea, emesis, diarrhea, bloody stools, emesis, dysuria, flank pain, skin rashes. Patient has been tolerating po without issues. No fevers or recent chills. Patient denies any new headaches, neck stiffness, blurry vision, double vision. No FND. No history of seizures. He was admitted for further workup for his syncope.       2/1  - + orthostatic  hypotension tachycardia   - echo looks good   - reduced metoprolol to 12.5 mg bid with holding parameters , tamsulosin to once a day   - agreeable to trying MRI with premedication for shoulder pain     Presyncope  Lightheadedness   Witnessed seizure like activity at assisted living was felt to be less likely to have had seizure by neurology   -  he did have syncope x 2  prior to TAVR   - per TCU notes he has had ongoing dizziness with some orthostatic hypotension  and his metoprolol was reduced   - per my discussing with patient   this  happened  "after he stood up , he remember his legs shaking, as mentioned there was no urine or bowel incontinence, did not bite tongue, there was no confusion after   -  patient  seen by Neurology ,\"concerning for convulsive syncope in the setting of known aortic valve issues \" seizure felt to be less likely and no EEG rec at this point    - 1/31 head CT  showed no acute intracranial pathology , incidental arachnoid cyst in post fossa    - MRA Head and Neck pending, he refused MRI 1/31 due to shoulder pain   - Echo  EF 60-65% ,global RV function is normal, normal doppler assessment of TAVR , no pericardial effusion    - EKG sinus , q in III aVF that is old   - troponin 29-->28    He did have carotid US pre TAVR 12/2024   - asked cardiology  to see for possible cardiac causes   -  continue telemetry   - did receive IVF bolus  - restarted metoprolol with holding parameters , reduced to 12.5 mg bid  - also reduced tamsulosin to daily and if continue with orthostatic hypotension consider stopping   - discharge  on zio patch with out patient  cardiology follow up  though will ask him to see his cardiologist ASAP post discharge  so results can go to them      Orthostatic hypotension  - suspect contributing to above  -  does have orthostatic hypotension, tachycardia  blood pressure  154/88 heart rate 84-->sitting 149/93 heart rate 90   ---> standing 119/74 heart rate 121   - medications adjusted as above   - we did discuss how to get out of bed     Left upper extr weakness  - per neorology he could possibly have stroke around tie of TAVR as patient  noted the weaknes sin rehab   - CT head did not show acute process   - MRI  head and neck, he refused due to  right shoulder , agreed to try with pain medications, if cannot do neurology still would like  CTA       Right shoulder pain  - since TAVR, would have him see orthopedic surgery  as out patient      Critical aortic stenosis , status post   urgent  TAVR1/8/2025  Afib on Warfarin " s/p flutter ablation 12/18/2024   - continue home warfarin per cardiology  )   - pharmacist to dose warfarin  - monitor right groin, small open wound with clear serous drainage, not tender no erythema         HFrEF (EF 45% on last echo done at [facility] 1/8/2025  Nonobstructive CAD   - Unclear dose of home metoprolol. Per medication history, appears that he was prescribed 50 mg metoprolol tartrate BID. Patient states that he was taking 25 mg metoprolol tartrate BID.  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   - status post   chemo with [facility] that he completed in 2020    -he was to follow up  with hematology , I see he  hematology had been trying to reach him for follow up       History of severe hyperuricemia  - defer back to PMD      History of nephrolithiasis   - has 1.3 cm right renal pelvis stone  - no sign of obstruction       BPH  -his meds had been reduced due to orthotasis  in past      Generalized Deconditioning:  --- OT/ PT consult           Diet: Combination Diet Regular Diet Adult, Regular Diet    DVT Prophylaxis: Warfarin  Gutierrez Catheter: Not present  Lines: PRESENT             Cardiac Monitoring: ACTIVE order. Indication: Electrolyte Imbalance (24 hours)- Magnesium <1.3 mg/ml; Potassium < =2.8 or > 5.5 mg/ml  Code Status: Full Code      Clinically Significant Risk Factors Present on Admission                 # Drug Induced Platelet Defect: home medication list includes an antiplatelet medication        #  Anemia: based on hgb <11      # DMII: A1C = 11.3 % (Ref range: 0.0 - 5.6 %) within past 6 months    # Obesity: Estimated body mass index is 35.28 kg/m  as calculated from the following:    Height as of this encounter: 1.829 m (6').    Weight as of this encounter: 118 kg (260 lb 2.3 oz).              Social Drivers of Health    Tobacco Use: Unknown (1/15/2025)    Received from [employer]    Patient History     Smoking Tobacco Use: Never     Smokeless Tobacco Use: Unknown   Physical Activity: Unknown (6/25/2024)    Exercise Vital Sign     Days of Exercise per Week: 0 days          Disposition Plan     Medically Ready for Discharge: Anticipated Tomorrow             Erica Waddell MD  Hospitalist Service, GOLD TEAM 17  [facility]  Securely message with [employer] (more info)  Text page via [facility] Paging/Directory   See signed in provider for up to date coverage information  ______________________________________________________________________    Interval History   doing ok, denies any chest pain  no shortness of breath  no nausea vomiting , he cannot lay flat due to right shoulder pain but ok to try MRI with pain meds    Physical Exam   Vital Signs: Temp: 98  F (36.7  C) Temp src: Oral BP: (!) 144/79 Pulse: 79   Resp: 18 SpO2: 95 % O2 Device: None (Room air)    Weight: 260 lbs 2.28 oz  General appearence: awake alert  in  no apparent distress  RESPIRATORY: lungs clear    CARDIOVASCULAR:S1 S2  tachycardic  GASTROINTESTINAL:soft, non-distended , non-tender , + bowel sounds  SKIN: warm and dry, no mottling noted   NEUROLOGIC; awake alert and oriented,   EXTREMITIES: no clubbing, cyanosis or edema , moves all extremity,   Right groin small wound form TAVR, some serous clear drainage , no erythema     Data     I have personally reviewed the following data over the past 24 hrs:    7.7  \   10.0 (L)   / 234     138 102 18.0 /  144 (H)   4.3 26 1.45 (H) \     ALT: 26 AST: 19 AP:  74 TBILI: 0.8   ALB: 3.7 TOT PROTEIN: 7.0 LIPASE: 28     Trop: 28 (H) BNP: 243     TSH: 1.52 T4: N/A A1C: N/A     Procal: N/A CRP: N/A Lactic Acid: 2.2 (H)       INR:  2.96 (H) PTT:  N/A   D-dimer:  N/A Fibrinogen:  N/A       Imaging results reviewed over the past 24 hrs:   Recent Results (from the past 24 hours)   CT Head w/o Contrast    Narrative    EXAM: CT HEAD W/O CONTRAST  1/31/2025 2:29 PM     HISTORY:  syncope, sz?       COMPARISON:  11/13/2020 PET/CT and 1/24/2023 CT neck    TECHNIQUE: Using multidetector thin collimation helical acquisition  technique, axial, coronal and sagittal CT images from the skull base  to the vertex were obtained without intravenous contrast.   (topogram) image(s) also obtained and reviewed.    FINDINGS:    No acute intracranial hemorrhage, mass effect, or midline shift. No  acute loss of gray-white matter differentiation in the cerebral  hemispheres. The ventricles are proportionate to the cerebral sulci.  Clear basal cisterns. Prominent arachnoid cyst abutting left medial  cerebellar hemisphere directly lateral to the internal occipital  crest. Mild-to-moderate global cerebral volume atrophy. Patchy  periventricular hypoattenuation suggestive of chronic small vessel  ischemic disease.    The bony calvaria in the bones of the skull base are normal. The  visualized portions of the paranasal sinuses and mastoid air cells are  clear. Orbits are unremarkable.       Impression    IMPRESSION:   1. No acute intracranial pathology.  2. Mild to moderate global cerebral loss with mild leukoaraiosis.  3. Incidental arachnoid cyst in the posterior fossa.    I have personally reviewed the examination and initial interpretation  and I agree with the findings.    ROSE YU MD         SYSTEM ID:  [id number]

## 2025-02-01 NOTE — PLAN OF CARE
Occupational Therapy: Orders received. Chart reviewed and discussed with care team.? Occupational Therapy not indicated due to per discussion with PT, pt plans to return to TCU with no new IP OT needs at this time. Pt is OBS and needs can be met by one therapy discipline. ? Defer discharge recommendations to PT and medical team.? Will complete orders.

## 2025-02-01 NOTE — ED NOTES
"Pt refused to go back to MRI for imaging after pt pain was controlled.Pt stated \" I believe I will still hurt when I lay flat and I can't do it\". Gold provider- Mikala Baker was updated.  "

## 2025-02-01 NOTE — PHARMACY
Clinical Pharmacy - Warfarin Dosing Consult     Pharmacy has been consulted to manage this patient s warfarin therapy.  Indication: Atrial Fibrillation;Bioprosthetic Heart Valve  Therapy Goal: INR 2-3  OP Anticoag Clinic: [facility]  Warfarin Prior to Admission: Yes  Warfarin PTA Regimen: MWF 10mg, EOD 5mg  Significant drug interactions: aspirin increased bleeding risk    INR   Date Value Ref Range Status   01/31/2025 2.77 (H) 0.85 - 1.15 Final   01/27/2025 2.85 (H) 0.85 - 1.15 Final       Recommend warfarin 7.5 mg today.  Pharmacy will monitor Cam Walden daily and order warfarin doses to achieve specified goal.      Please contact pharmacy as soon as possible if the warfarin needs to be held for a procedure or if the warfarin goals change.       Brett Lopez, AllisonD

## 2025-02-01 NOTE — PLAN OF CARE
Goal Outcome Evaluation:      Plan of Care Reviewed With: patient          Outcome Evaluation: Anticipate patient will return to TCU when medically ready.

## 2025-02-01 NOTE — PROGRESS NOTES
PRIMARY DIAGNOSIS: ACUTE PAIN  OUTPATIENT/OBSERVATION GOALS TO BE MET BEFORE DISCHARGE:  1. Pain Status: Improved with use of alternative comfort measures i.e.: positioning    2. Return to near baseline physical activity: Yes    3. Cleared for discharge by consultants (if involved): N/A    Discharge Planner Nurse   Safe discharge environment identified: Yes  Barriers to discharge: Yes       Entered by: Miranda Orourke RN 02/01/2025 2:16 AM     Nurse to notify provider when observation goals have been met and patient is ready for discharge.    A/Ox4. VSS on RA. Denies pain. Tolerating regular diet well. Voiding spontaneously. Up assist of one with walker. PIV SL.    - report called to [facility] 8 MS, EMS estimated to arrive by 0300

## 2025-02-01 NOTE — PHARMACY-ADMISSION MEDICATION HISTORY
Pharmacist Admission Medication History    Admission medication history is complete. The information provided in this note is only as accurate as the sources available at the time of the update.    Information Source(s): Facility (TCU/NH/) medication list/MAR via N/A    Pertinent Information:  Fill history inconsistent with TCU records.   Miralax prescription written as prn but takes daily.    Changes made to PTA medication list:  Added:   Liraglutide 0.6mg daily   Miralax 1 cap prn  Deleted:   Semaglutide  Apixaban  Changed:   Metformin 1000mg BID to 500mg BID     Allergies reviewed with patient and updates made in EHR: yes    Medication History Completed By: Brett Lopez RPH 1/31/2025 10:45 PM    Current Facility-Administered Medications for the 1/31/25 encounter (Hospital Encounter)   Medication    cyanocobalamin injection 1,000 mcg     PTA Med List   Medication Sig Last Dose/Taking    aspirin (ASA) 81 MG chewable tablet Take 81 mg by mouth daily. 1/30/2025    atorvastatin (LIPITOR) 20 MG tablet Take 1 tablet (20 mg) by mouth daily 1/30/2025    cholecalciferol (VITAMIN D3) 25 mcg (1000 units) capsule Take 1 capsule by mouth daily 1/30/2025    DULoxetine (CYMBALTA) 60 MG capsule Take 1 capsule (60 mg) by mouth 2 times daily. 1/31/2025 Morning    insulin glargine (LANTUS PEN) 100 UNIT/ML pen Inject 10 Units subcutaneously at bedtime. 1/30/2025    liraglutide (VICTOZA) 18 MG/3ML solution Inject 0.6 mg subcutaneously daily. 1/30/2025    metFORMIN (GLUCOPHAGE) 1000 MG tablet TAKE 1 TABLET BY MOUTH TWICE A DAY WITH FOOD (Patient taking differently: Take 500 mg by mouth 2 times daily. TAKE 1 TABLET BY MOUTH TWICE A DAY WITH FOOD) 1/31/2025 Morning    metoprolol tartrate (LOPRESSOR) 100 MG tablet TAKE 1 TABLET BY MOUTH TWICE A DAY (Patient taking differently: Take 50 mg by mouth 2 times daily.) 1/30/2025    polyethylene glycol (MIRALAX) 17 GM/Dose powder Take 1 Capful by mouth as needed for constipation. 1/30/2025     tamsulosin (FLOMAX) 0.4 MG capsule TAKE 2 CAPSULES BY MOUTH EVERY DAY 1/30/2025

## 2025-02-01 NOTE — PLAN OF CARE
8 Admission Note    Reason for admission: Syncope and collapse   Primary team notified of pt arrival.  Admitted from: [facility] ED  Via: ambulance  Accompanied by: EMS  Belongings: Placed in closet; valuables sent home with family  Admission Required Doc Completed: Yes  Teaching: Orientation to unit and call light- call light within reach, use of console, meal times, when to call for the RN, and enforced importance of safety.  IV Access: R, PIV, R/Chest port not accessed   Telemetry: Yes  Ht./Wt.: Completed  Code Status verified on armband: Yes  2 RN Skin Assessment Completed with: Vee MONTERO  Suction/Ambu bag/Flowmeter at bedside: Yes    Pt status:   BP (!) 144/79   Pulse 79   Temp 98  F (36.7  C) (Oral)   Resp 18   Wt 115.7 kg (254 lb 15.7 oz)   SpO2 95%   BMI 34.58 kg/m       Goal Outcome Evaluation:  A&Ox4. CMS intact. Denies, nausea,vomiting, SOB, and chest pain.  Up w/A1 WGB. voiding adequate amounts via urinal. LBM: 1/31. Denies pain.  Continue with POC.

## 2025-02-01 NOTE — PLAN OF CARE
Goal Outcome Evaluation:      Plan of Care Reviewed With: patient    Overall Patient Progress: improvingOverall Patient Progress: improving      VS: BP (!) 144/79   Pulse 79   Temp 97.6  F (36.4  C) (Oral)   Resp 16   Ht 1.829 m (6')   Wt 118 kg (260 lb 2.3 oz)   SpO2 96%   BMI 35.28 kg/m      On TELE, Sinus rhythm   O2: 96% on RA no complaints of SOB or chest pain.   Output: Voiding adequate amounts w/o pain or difficulty using bedside urinal.   Last BM: 1/30   Activity: Ax1 with walker and gb.    Skin: Bruise to R hand and arm. Small incision to R pelvis from previous procedure prior to admission.   Pain: 1/10 managed with scheduled medication.   CMS: A&Ox4.Able to make needs known. Baseline neuropathy to BUE and BLE.   Dressing: None.   Diet: Regular, tolerating well. No complaints of nausea or vomiting.   LDA: LPIV SL.   Equipment: Cardiac monitoring, walker, urinal, gb, Personal belongings   Plan: Continue with POC.   Additional Info: MRI completed this shift.

## 2025-02-01 NOTE — CONSULTS
Care Management Initial Consult    General Information  Assessment completed with: Patient,    Type of CM/SW Visit: Initial Assessment    Primary Care Provider verified and updated as needed: Yes   Readmission within the last 30 days: previous discharge plan unsuccessful   Return Category: New Diagnosis  Reason for Consult: discharge planning  Advance Care Planning: Advance Care Planning Reviewed: present on chart        Communication Assessment  Patient's communication style: spoken language (English or Bilingual)    Hearing Difficulty or Deaf: no   Wear Glasses or Blind: no    Cognitive  Cognitive/Neuro/Behavioral: WDL                      Living Environment:   People in home: alone     Current living Arrangements: house (Came from [facility])      Able to return to prior arrangements: yes     Family/Social Support:  Care provided by:  (Self at baseline but came from [facility])  Provides care for: no one  Marital Status: Single  Support system: Friend          Description of Support System: Supportive, Involved       Current Resources:   Patient receiving home care services: No     Community Resources: Transitional Care  Equipment currently used at home:  (None at baseline)  Supplies currently used at home: None    Employment/Financial:  Employment Status: retired        Financial Concerns: none   Referral to Financial Worker: No     Does the patient's insurance plan have a 3 day qualifying hospital stay waiver?  Yes     Which insurance plan 3 day waiver is available? Alternative insurance waiver    Will the waiver be used for post-acute placement? No    Lifestyle & Psychosocial Needs:  Social Drivers of Health     Food Insecurity: Low Risk  (2/1/2025)    Food Insecurity     Within the past 12 months, did you worry that your food would run out before you got money to buy more?: No     Within the past 12 months, did the food you bought just not last and you didn t have money to get more?: No   Depression: Not at risk  (6/25/2024)    PHQ-2     PHQ-2 Score: 0   Housing Stability: Low Risk  (2/1/2025)    Housing Stability     Do you have housing? : Yes     Are you worried about losing your housing?: No   Tobacco Use: Unknown (1/15/2025)    [facility]    Patient History     Smoking Tobacco Use: Never     Smokeless Tobacco Use: Unknown     Passive Exposure: Not on file   Financial Resource Strain: Low Risk  (2/1/2025)    Financial Resource Strain     Within the past 12 months, have you or your family members you live with been unable to get utilities (heat, electricity) when it was really needed?: No   Alcohol Use: Not on file   Transportation Needs: Low Risk  (2/1/2025)    Transportation Needs     Within the past 12 months, has lack of transportation kept you from medical appointments, getting your medicines, non-medical meetings or appointments, work, or from getting things that you need?: No   Physical Activity: Unknown (6/25/2024)    Exercise Vital Sign     Days of Exercise per Week: 0 days     Minutes of Exercise per Session: Not on file   Interpersonal Safety: Low Risk  (6/25/2024)    Interpersonal Safety     Do you feel physically and emotionally safe where you currently live?: Yes     Within the past 12 months, have you been hit, slapped, kicked or otherwise physically hurt by someone?: No     Within the past 12 months, have you been humiliated or emotionally abused in other ways by your partner or ex-partner?: No   Stress: No Stress Concern Present (6/25/2024)    [employer] - Occupational Stress Questionnaire     Feeling of Stress : Not at all   Social Connections: Socially Integrated (1/5/2025)    Received from [facility]    Social Connections     Do you often feel lonely or isolated from those around you?: 0   Health Literacy: Not on file       Functional Status:  Prior to admission patient needed assistance:   Dependent ADLs:: Independent  "(prior to last hospitalization)  Dependent IADLs:: Independent (prior to last hospitalization)     Mental Health Status:  Mental Health Status: No Current Concerns       Chemical Dependency Status:  Chemical Dependency Status: No Current Concerns           Values/Beliefs:  Spiritual, Cultural Beliefs, Jehovah's witness Practices, Values that affect care: no             Discussed  Partnership in Safe Discharge Planning  document with patient/family: No    Additional Information:  Per provider's note, patient \"is a 74 year old male who has a history of severe Aortic Stenosis s.o TAVR, HFrEF 45%, Afib on warfarin, s/p flutter ablation, CKD stage 3B, CAD, JOHNNY, T2DM. He also has a history of large B cell lymphoma s/p chemotherapy 2018. Patient was at his assisted living facility after recent TAVR on 1/8. AM of admission  patient stood up quickly and was using his cane when he felt lightheadedness and fell. Fall was apparently witnessed by 2 nurses and patient was lowered to the ground by a nurse and did not hit her head on the floor. Per report, and chart review, patient apparently had an episode of LOC lasting for around 30 seconds. Staff also noticed tremors of both lower extremities , no upper extremities shaking , no tongue bite or incontinence . Also denied  chest pain, palpitations, sob, coughing, kalyan, headaches, neck stiffness. No abd pain, nausea, emesis, diarrhea, bloody stools, emesis, dysuria, flank pain, skin rashes. Patient has been tolerating po without issues. No fevers or recent chills. Patient denies any new headaches, neck stiffness, blurry vision, double vision. No FND. No history of seizures. He was admitted for further workup for his syncope\".     Met with patient to introduce self/role and complete initial assessment. Patient was recently discharged from [facility] to [facility] and then transferred to [facility]. Prior to TCU, he was living at home alone and was independent. He is " planning on having his friend Tootie, who is a PCA, live with him after he is discharged from the TCU. He is planning to get a shower chair and walker as well. Patient confirmed that he would like to discharge back to [facility] at discharge. He stated that he has a bed hold. Left  for admissions and requested a call back to confirm. Patient stated that his insurance changed to [employer] this month but does not have his insurance card here.     Discussed the Medicare Outpatient Observation Notice. Patient signed and was given a copy.     Next Steps:  Contact registration to update his insurance information in Epic.    Confirm bed hold with [facility] [address], [place] [address]  Phone- [phone]   Fax- [phone]         MAY Bullard   SEARCHABLE in [facility] - search CARE COORDINATOR     [place] [employer] [place] ([id number]) Saturday & Sunday; ([phone]) [facility] Recognized Holidays     Units: 5 Ortho Vocera & 5 Med Surg Vocera  Pager: [phone]    Units: 6 Med Surg Vocera & 8 Med Surg Vocera  Pager [phone]

## 2025-02-02 ENCOUNTER — LAB REQUISITION (OUTPATIENT)
Dept: LAB | Facility: CLINIC | Age: 75
End: 2025-02-02
Payer: COMMERCIAL

## 2025-02-02 ENCOUNTER — TELEPHONE (OUTPATIENT)
Dept: FAMILY MEDICINE | Facility: CLINIC | Age: 75
End: 2025-02-02
Payer: COMMERCIAL

## 2025-02-02 VITALS
HEART RATE: 80 BPM | BODY MASS INDEX: 35.24 KG/M2 | WEIGHT: 260.14 LBS | TEMPERATURE: 98.1 F | RESPIRATION RATE: 18 BRPM | SYSTOLIC BLOOD PRESSURE: 109 MMHG | HEIGHT: 72 IN | DIASTOLIC BLOOD PRESSURE: 73 MMHG | OXYGEN SATURATION: 97 %

## 2025-02-02 DIAGNOSIS — Z13.9 ENCOUNTER FOR SCREENING INVOLVING SOCIAL DETERMINANTS OF HEALTH (SDOH): Primary | ICD-10-CM

## 2025-02-02 DIAGNOSIS — I48.0 PAROXYSMAL ATRIAL FIBRILLATION (H): ICD-10-CM

## 2025-02-02 LAB
GLUCOSE BLDC GLUCOMTR-MCNC: 138 MG/DL (ref 70–99)
GLUCOSE BLDC GLUCOMTR-MCNC: 153 MG/DL (ref 70–99)
GLUCOSE BLDC GLUCOMTR-MCNC: 168 MG/DL (ref 70–99)
INR PPP: 3.26 (ref 0.85–1.15)
MAGNESIUM SERPL-MCNC: 1.8 MG/DL (ref 1.7–2.3)
POTASSIUM SERPL-SCNC: 4.4 MMOL/L (ref 3.4–5.3)

## 2025-02-02 PROCEDURE — 83735 ASSAY OF MAGNESIUM: CPT | Performed by: STUDENT IN AN ORGANIZED HEALTH CARE EDUCATION/TRAINING PROGRAM

## 2025-02-02 PROCEDURE — 36415 COLL VENOUS BLD VENIPUNCTURE: CPT | Performed by: STUDENT IN AN ORGANIZED HEALTH CARE EDUCATION/TRAINING PROGRAM

## 2025-02-02 PROCEDURE — 250N000013 HC RX MED GY IP 250 OP 250 PS 637: Performed by: STUDENT IN AN ORGANIZED HEALTH CARE EDUCATION/TRAINING PROGRAM

## 2025-02-02 PROCEDURE — 84132 ASSAY OF SERUM POTASSIUM: CPT | Performed by: INTERNAL MEDICINE

## 2025-02-02 PROCEDURE — 85610 PROTHROMBIN TIME: CPT | Performed by: STUDENT IN AN ORGANIZED HEALTH CARE EDUCATION/TRAINING PROGRAM

## 2025-02-02 PROCEDURE — 250N000013 HC RX MED GY IP 250 OP 250 PS 637: Performed by: INTERNAL MEDICINE

## 2025-02-02 PROCEDURE — 99239 HOSP IP/OBS DSCHRG MGMT >30: CPT | Performed by: INTERNAL MEDICINE

## 2025-02-02 PROCEDURE — 82962 GLUCOSE BLOOD TEST: CPT

## 2025-02-02 PROCEDURE — G0378 HOSPITAL OBSERVATION PER HR: HCPCS

## 2025-02-02 RX ORDER — ACETAMINOPHEN 325 MG/1
650 TABLET ORAL EVERY 4 HOURS PRN
Status: SHIPPED
Start: 2025-02-02

## 2025-02-02 RX ORDER — METHOCARBAMOL 500 MG/1
500 TABLET, FILM COATED ORAL 4 TIMES DAILY PRN
Status: SHIPPED
Start: 2025-02-02

## 2025-02-02 RX ORDER — WARFARIN SODIUM 3 MG/1
3 TABLET ORAL
Status: DISCONTINUED | OUTPATIENT
Start: 2025-02-02 | End: 2025-02-02 | Stop reason: HOSPADM

## 2025-02-02 RX ORDER — METOPROLOL TARTRATE 25 MG/1
12.5 TABLET, FILM COATED ORAL 2 TIMES DAILY
Status: SHIPPED
Start: 2025-02-02

## 2025-02-02 RX ORDER — NICOTINE POLACRILEX 4 MG
15-30 LOZENGE BUCCAL
Status: SHIPPED
Start: 2025-02-02

## 2025-02-02 RX ORDER — WARFARIN SODIUM 5 MG/1
TABLET ORAL
Status: SHIPPED
Start: 2025-02-02

## 2025-02-02 RX ORDER — WARFARIN SODIUM 5 MG/1
TABLET ORAL
Status: SHIPPED
Start: 2025-02-02 | End: 2025-02-02

## 2025-02-02 RX ADMIN — METOPROLOL TARTRATE 12.5 MG: 25 TABLET, FILM COATED ORAL at 08:11

## 2025-02-02 RX ADMIN — TAMSULOSIN HYDROCHLORIDE 0.4 MG: 0.4 CAPSULE ORAL at 08:11

## 2025-02-02 RX ADMIN — ATORVASTATIN CALCIUM 40 MG: 40 TABLET, FILM COATED ORAL at 08:11

## 2025-02-02 RX ADMIN — DULOXETINE HYDROCHLORIDE 60 MG: 60 CAPSULE, DELAYED RELEASE ORAL at 08:10

## 2025-02-02 RX ADMIN — ASPIRIN 81 MG CHEWABLE TABLET 81 MG: 81 TABLET CHEWABLE at 08:10

## 2025-02-02 ASSESSMENT — ACTIVITIES OF DAILY LIVING (ADL)
ADLS_ACUITY_SCORE: 28
ADLS_ACUITY_SCORE: 25
ADLS_ACUITY_SCORE: 28
ADLS_ACUITY_SCORE: 25
ADLS_ACUITY_SCORE: 28
ADLS_ACUITY_SCORE: 28
ADLS_ACUITY_SCORE: 25
ADLS_ACUITY_SCORE: 28

## 2025-02-02 NOTE — PLAN OF CARE
Physical Therapy Discharge Summary    Reason for therapy discharge:    Discharged to transitional care facility.    Progress towards therapy goal(s). See goals on Care Plan in [facility] electronic health record for goal details.  Goals not met.  Barriers to achieving goals:   Pt was seen for evaluation only.    Therapy recommendation(s):    Continued therapy is recommended.  Rationale/Recommendations:  Pt would benefit from further PT for strengthening and increase independence with all functional mobility.

## 2025-02-02 NOTE — TELEPHONE ENCOUNTER
FYI - Status Update    Who is Calling: family member, son    Update:  pt's son calling seeking a letter to [facility] and [facility].stating due to his disability/ condition he will now need doctors and nurse supervision more than 60 days. That it is a medical necessities that pt need help and need to stay at [facility]. (Pt choice)    Does caller want a call/response back: Yes     Could we send this information to you in [facility] or would you prefer to receive a phone call?:   Patient would like to be contacted via [facility]   Or call son.

## 2025-02-02 NOTE — PLAN OF CARE
Goal Outcome Evaluation:      Plan of Care Reviewed With: patient    Overall Patient Progress: improvingOverall Patient Progress: improving      VS: /73 (BP Location: Right arm)   Pulse 80   Temp 98.1  F (36.7  C) (Oral)   Resp 18   Ht 1.829 m (6')   Wt 118 kg (260 lb 2.3 oz)   SpO2 97%   BMI 35.28 kg/m       O2: 97% on RA no complaints of SOB or chest pain.   Output: Voiding adequate amounts w/o pain or difficulty using bedside urinal.   Last BM: 2/1.   Activity: Ax1 with walker and gb    Skin: Surgical incision to R hip NAEEM. Bruising to R hand and R AC.   Pain: Denies.   CMS: A&Ox4. Able to make needs known. CMS intact.   Dressing: None.   Diet: Regular, tolerating well. No complaints of nausea or vomiting.   LDA: None.   Equipment: IV pole, Personal belongings   Plan: Discharge today, see below.   Additional Info:      Pt discharged from unit back to TCU via ride from patients son around 1315. Full AVS printed and sent with patient, who was instructed to give to TCU staff. Patient left with all personal belongings.

## 2025-02-02 NOTE — PLAN OF CARE
VS: /73 (BP Location: Left arm)   Pulse 79   Temp 97.6  F (36.4  C) (Oral)   Resp 18   Ht 1.829 m (6')   Wt 118 kg (260 lb 2.3 oz)   SpO2 95%   BMI 35.28 kg/m      O2: Stable on room air no complaints of SOB or CP.   Neuro: Aox4 Able to make needs known. Baseline neuropathy to BUE and BLE.    Bowel/Bladder: Voiding adequate amounts w/o pain or difficulty using bedside urinal. Bowel sounds in all 4 quadrants.   LBM: 1/30   Diet: Regular, tolerating well. No complaints of nausea or vomiting.    Skin: R hand and AC bruising   Pain: No complaints of pain this shift   Activity: A1 with walker and belt   Dressings: None   LDA: L PIV- SL   Equipment: Cardiac monitoring, walker, urinal, gb, Personal belongings    Plan: Continue with plan of care   Additional Info: No acute changes this shift       Lisa Solitario RN on 2/2/2025 at 2:32 AM       Goal Outcome Evaluation:                 Outcome Evaluation: No acute changes this shift

## 2025-02-02 NOTE — DISCHARGE SUMMARY
[facility]  Hospitalist Discharge Summary      Date of Admission:  1/31/2025  Date of Discharge:  2/2/2025   Discharging Provider: Erica Waddell MD  Discharge Service: Hospitalist Service, GOLD TEAM 17    Discharge Diagnoses        Presyncope  Lightheadedness  Possible  convulsive syncope ,  Suspect syncope due to orthostasis    Orthostatic hypotension   Right  upper extr weakness  Right shoulder pain  Critical aortic stenosis , status post   urgent  TAVR1/8/2025  Afib on Warfarin s/p flutter ablation 12/18/2024   HFrEF (EF 45% on last echo done at [facility] 1/8/2025  Nonobstructive CAD   JOHNNY  CKD stage 3b  T2DM  Hyperlipidemia  Chronic Anemia  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    Weight as of this encounter: 118 kg (260 lb 2.3 oz).       Follow-ups Needed After Discharge   Follow-up Appointments       Follow Up and recommended labs and tests      Follow up with [facility] physician.  The following labs/tests are recommended: INR check 2/3 and warfarin needs to be adjusted, needs ZIO 30 day  patch set up next week , also needs right shoulder evaluated   Also need to monitor orthostatic vitals and medications adjusted, also monitor blood sugar, metformin on hold    Follow up  with Dr Yudith Pretty, cardiology in next 1-2 weeks  .                Discharge Disposition   Transferred to TCU   Condition at discharge: Stable    Hospital Course   Cam Walden is a 74 year old male who has a history of severe Aortic Stenosis s.o TAVR, HFrEF 45%, Afib on warfarin, s/p flutter ablation, CKD stage 3B, CAD,  "JOHNNY, T2DM. He also has a history of large B cell lymphoma s/p chemotherapy 2018. Patient was at his assisted living facility after recent TAVR on 1/8. AM of admission  patient stood up quickly and was using his cane when he felt lightheadedness and fell. Fall was apparently witnessed by 2 nurses and patient was lowered to the ground by a nurse and did not hit her head on the floor. Per report, and chart review, patient apparently had an episode of LOC lasting for around 30 seconds. Staff also noticed tremors of both lower extremities , no upper extremities shaking , no tongue bite or incontinence . Also denied  chest pain, palpitations, sob, coughing, kalyan, headaches, neck stiffness. No abd pain, nausea, emesis, diarrhea, bloody stools, emesis, dysuria, flank pain, skin rashes. Patient has been tolerating po without issues. No fevers or recent chills. Patient denies any new headaches, neck stiffness, blurry vision, double vision. No FND. No history of seizures. He was admitted for further workup for his syncope.          Presyncope  Lightheadedness  Witnessed seizure like activity at assisted living was felt to be less likely to have had seizure by neurology   -  he did have syncope x 2  prior to TAVR   - per TCU notes he has had ongoing dizziness with some orthostatic hypotension  and his metoprolol was reduced   - per my discussing with patient   this  happened after he stood up , he remember his legs shaking, as mentioned there was no urine or bowel incontinence, did not bite tongue, there was no confusion after   -  patient  seen by Neurology ,\"concerning for convulsive syncope in the setting of known aortic valve issues \" seizure felt to be less likely and no EEG rec at this point    - likely had syncope due to orthostasis, we did discuss how he should be getting up and out of bed   - 1/31 head CT  showed no acute intracranial pathology , incidental arachnoid cyst in post fossa    - MRA Head and Neck  2/1 did " not show any acute intracranial pathology , has mild leukokraurosis  and mild  atrophy , MRA of neck patent major cervical vasculature   - Echo  EF 60-65% ,global RV function is normal, normal doppler assessment of TAVR , no pericardial effusion    - EKG sinus , q in III aVF that is old   - troponin 29-->28   - appreciate card input  - out patient  zio patch and fi with cardiology     -  reviewed telemetry and no arhythmia noted that could cause syncope   - did receive IVF bolus in ED   - restarted metoprolol with holding parameters at  reduced  dose of  12.5 mg bid  - also reduced tamsulosin to daily  but still had orthostatic hypotension 2/2 so stopped  - continue to monitor orthostatic vitals and adjust medications as needed as out patient       Orthostatic hypotension  - suspect contributing to above  -  does have orthostatic hypotension, tachycardia  2/1  blood pressure  154/88 heart rate 84-->sitting 149/93 heart rate 90   ---> standing 119/74 heart rate 121   - medications adjusted as above   - we did discuss how to get out of bed      Right  upper extr weakness  - per neorology he could possibly have stroke around tie of TAVR as patient  noted the [facility] rehab   - CT head did not show acute process   - MRI  head and neck done 2/1 , no stroke noted       Right shoulder pain  - worse since TAVR, would have him see orthopedic surgery  as out patient    - he has had issues with his  right shoulder and has decreased ROM      Critical aortic stenosis , status post   urgent  TAVR1/8/2025  Afib on Warfarin s/p flutter ablation 12/18/2024   - continue home warfarin   - monitor right groin, small open wound with clear serous drainage, not tender no erythema         HFrEF (EF 45% on last echo done at [facility] 1/8/2025  Nonobstructive CAD   - as above his metoprolol dose was reduced     - continue home aspirin  - euvolemic on exam      JOHNNY  - continue CPAP       CKD stage 3b  - creatinine 1.42 on admission  -  baseline creatinine ~ 1.4-1.7    - avoid nephrotoxic agents         T2DM  - Hold metformin in setting of metabolic acidosis  - continue on LDSSI  - continue home Lantus 10U at bedtime     Hyperlipidemia  - continue statins      Chronic Anemia  - per heme he had been on vit B12 supplements   - Hg stable ~ 10      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   - status post   chemo with [facility] that he completed in 2020    -he was to follow up  with hematology , I see he  hematology had been trying to reach him for follow up        History of severe hyperuricemia  - defer back to PMD       History of nephrolithiasis   - has 1.3 cm right renal pelvis stone  - no sign of obstruction       BPH  -stopped tamsulosin due to ongoing orthotasis,  monitor voiding as out patient   past        Generalized Deconditioning:   Continue physical therapy  Occupational therapy              Consultations This Hospital Stay   PHYSICAL THERAPY ADULT IP CONSULT  OCCUPATIONAL THERAPY ADULT IP CONSULT  PHARMACY TO DOSE WARFARIN  CARDIOLOGY GENERAL ADULT IP CONSULT  CARE MANAGEMENT / SOCIAL WORK IP CONSULT  PHYSICAL THERAPY ADULT IP CONSULT  OCCUPATIONAL THERAPY ADULT IP CONSULT    Code Status   Full Code    Time Spent on this Encounter   I, Erica Waddell MD, personally saw the patient today and spent greater than 30 minutes discharging this patient.       Erica Waddell MD  [facility] [address]  [address] [place] [id number]  Phone: [phone]  Fax: [phone]  ______________________________________________________________________    Physical Exam   Vital Signs: Temp: 98.1  F (36.7  C) Temp src: Oral BP: 109/73 Pulse: 80   Resp: 18 SpO2: 94 % O2 Device: None (Room air)    Weight: 260 lbs 2.28 oz       General appearence: awake alert  in  no apparent distress  Left sub conjuctival hemorrhage   RESPIRATORY: lungs clear    CARDIOVASCULAR:S1 S2  tachycardic  GASTROINTESTINAL:soft, non-distended , non-tender , +  bowel sounds  SKIN: warm and dry, no mottling noted   NEUROLOGIC; awake alert and oriented,   EXTREMITIES: no clubbing, cyanosis or edema , decreased ROM right shoulder    Right groin small wound form TAVR, some serous clear drainage , no erythema      Primary Care Physician   Kamaljit Plata    Discharge Orders      INR    Please call PMD with results     Primary Care - Care Coordination Referral      General info for SNF    Length of Stay Estimate: Short Term Care: Estimated # of Days <30  Condition at Discharge: Stable  Level of care:skilled   Rehabilitation Potential: Good  Admission H&P remains valid and up-to-date: Yes  Recent Chemotherapy: N/A  Use Nursing Home Standing Orders: Yes     Mantoux instructions    Give two-step Mantoux (PPD) Per Facility Policy Yes     Follow Up and recommended labs and tests    Follow up with intermediate physician.  The following labs/tests are recommended: INR check 2/3 and warfarin needs to be adjusted, needs ZIO 30 day  patch set up next week , also needs right shoulder evaluated   Also need to monitor orthostatic vitals and medications adjusted, also monitor blood sugar, metformin on hold    Follow up  with Dr Yudith Pretty, cardiology in next 1-2 weeks  .     Reason for your hospital stay    You had a syncopal episode geoffrey related to orthostatic hypotension     Activity - Up with nursing assistance     Daily weights    Call Provider for weight gain of more than 2 pounds per day or 5 pounds per week.     Additional Discharge Instructions    Please do bid orthostatic vitals, blood pressure  heart rate and if there is a drop with blood pressure  or increase in his heart rate then call PMD     CPAP - Continue Home CPAP    Continue Home CPAP per home equipment settings.     Full Code     Physical Therapy Adult Consult    Evaluate and treat as clinically indicated.    Reason:  weakness     Occupational Therapy Adult Consult    Evaluate and treat as clinically indicated.    Reason:   weakness     Fall precautions     Diet    Follow this diet upon discharge:  Regular Diet       Significant Results and Procedures   Most Recent 3 CBC's:  Recent Labs   Lab Test 02/01/25  0746 01/31/25  1400 01/23/25  0520 01/20/25  0455 05/06/24  0921   WBC 7.7 7.8  --   --  9.0   HGB 10.0* 10.4* 9.4*   < > 11.9*   MCV 91 92  --   --  91    280  --   --  219    < > = values in this interval not displayed.     Most Recent 3 BMP's:  Recent Labs   Lab Test 02/02/25  0706 02/02/25  0153 02/01/25  2228 02/01/25  1235 02/01/25  0746 01/31/25  2338 01/31/25  1345 01/23/25  0520   NA  --   --   --   --  138  --  137 138   POTASSIUM  --   --   --   --  4.3  --  4.3 4.5   CHLORIDE  --   --   --   --  102  --  101 99   CO2  --   --   --   --  26  --  21* 25   BUN  --   --   --   --  18.0  --  20.2 23.2*   CR  --   --   --   --  1.45*  --  1.42* 1.50*   ANIONGAP  --   --   --   --  10  --  15 14   ABBI  --   --   --   --  9.3  --  10.2 9.7   * 138* 177*   < > 144*   < > 142* 137*    < > = values in this interval not displayed.     Most Recent 2 LFT's:  Recent Labs   Lab Test 02/01/25  0746 01/31/25  1345   AST 19 26   ALT 26 28   ALKPHOS 74 85   BILITOTAL 0.8 0.7     Most Recent 3 INR's:  Recent Labs   Lab Test 02/02/25  0710 02/01/25  0746 01/31/25  2326   INR 3.26* 2.96* 2.99*     Most Recent 3 Troponin's:No lab results found.  Most Recent D-dimer:No lab results found.  7-Day Micro Results       No results found for the last 168 hours.        ,   Results for orders placed or performed during the hospital encounter of 01/31/25   CT Head w/o Contrast    Narrative    EXAM: CT HEAD W/O CONTRAST  1/31/2025 2:29 PM     HISTORY:  syncope, sz?       COMPARISON:  11/13/2020 PET/CT and 1/24/2023 CT neck    TECHNIQUE: Using multidetector thin collimation helical acquisition  technique, axial, coronal and sagittal CT images from the skull base  to the vertex were obtained without intravenous contrast.   (topogram)  image(s) also obtained and reviewed.    FINDINGS:    No acute intracranial hemorrhage, mass effect, or midline shift. No  acute loss of gray-white matter differentiation in the cerebral  hemispheres. The ventricles are proportionate to the cerebral sulci.  Clear basal cisterns. Prominent arachnoid cyst abutting left medial  cerebellar hemisphere directly lateral to the internal occipital  crest. Mild-to-moderate global cerebral volume atrophy. Patchy  periventricular hypoattenuation suggestive of chronic small vessel  ischemic disease.    The bony calvaria in the bones of the skull base are normal. The  visualized portions of the paranasal sinuses and mastoid air cells are  clear. Orbits are unremarkable.       Impression    IMPRESSION:   1. No acute intracranial pathology.  2. Mild to moderate global cerebral loss with mild leukoaraiosis.  3. Incidental arachnoid cyst in the posterior fossa.    I have personally reviewed the examination and initial interpretation  and I agree with the findings.    ROSE YU MD         SYSTEM ID:  [id number]   MR Brain w/o Contrast    Narrative    EXAM: MR BRAIN W/O CONTRAST  2/1/2025 6:52 PM     HISTORY: syncopal episode, l arm weakness, rule out stroke       COMPARISON: CT abdomen 1/31/2025, MR neck 2/1/2025    TECHNIQUE: Sagittal and axial T1-weighted, coronal and axial  diffusion-weighted with ADC map, axial susceptibility weighted,   images of the brain were obtained without intravenous contrast    FINDINGS:    There is no mass effect, midline shift, or intracranial hemorrhage.  The ventricles are proportionate to the cerebral sulci. Mild cerebral  atrophy. Diffusion and susceptibility weighted images are negative for  acute/focal abnormality. Major intracranial vascular structures are  within normal limits. Scattered T2 intensities throughout the cerebral  white matter, likely sequela of chronic microangiopathic ischemic  disease. Prominent left paramedian posterior fossa  arachnoid cyst.    T2 intensity within the petrous apex of the right temporal bone  surrounding the normal petrous segment of the internal carotid artery,  correlates with fluid-filled petrous apex on CT head 1/31/2025.    No suspicious abnormality of the skull marrow signal. T2 intense  mucosal thickening within the right maxillary sinus, otherwise clear  paranasal sinuses. Mastoid air cells are clear. No focal abnormality  of the pituitary gland, sella, skull base and upper cervical spinal  structures on sagittal images. The orbits are normal.      Impression    IMPRESSION:  1. No acute intracranial pathology.  2. Mild leukoaraiosis and mild cerebral atrophy.    I have personally reviewed the examination and initial interpretation  and I agree with the findings.    ROSE YU MD         SYSTEM ID:  [id number]   MRA Neck (Carotids) wo & w Contrast    Narrative    EXAM: MRA NECK (CAROTIDS) W/O & W CONTRAST  2/1/2025 6:52 PM     HISTORY:  l arm weakness, looking at carotids       COMPARISON:  Same day MRI brain. Multiple priors.    TECHNIQUE: Neck MRA: Limited non contrast 2DTOF images were obtained  of the mid-cervical region. Following intravenous gadolinium-based  contrast administration, a contrast enhanced MRA of the neck/cervical  vessels was performed.  Three-dimensional reconstructions of the neck and head MRA were  created, which were reviewed by the radiologist.    CONTRAST: 11.8ml gadavist.    FINDINGS:  Neck MRA demonstrates patent major cervical vasculature. No  significant stenosis. The normal distal right internal carotid artery  measures 5 mm. The normal distal left internal carotid artery measures  5 mm.      Impression    IMPRESSION: Neck MRA demonstrates patent major cervical vasculature  without significant stenosis.    I have personally reviewed the examination and initial interpretation  and I agree with the findings.    ROSE YU MD         SYSTEM ID:  [id number]   Echo Complete     Value     LVEF  60-65%    [facility]    [id number]  [id number]  [id number]  [id number]^EMILYSINS^AGUSTIN     [facility]  Echocardi[facility]  [address] [place] [id number]     Name: MARILYNN MOULTON  MRN: [id number]  : 1950  Study Date: 2025 09:28 AM  Age: 74 yrs  Gender: Male  Patient Location: [facility]  Reason For Study: Syncope  Ordering Physician: AGUSTIN OGLESBY  Performed By: Beatrice Stinson     BSA: 2.4 m2  Height: 72 in  Weight: 260 lb  HR: 78  BP: 144/92 mmHg  ______________________________________________________________________________  Procedure  Echocardiogram with two-dimensional, color and spectral Doppler.  ______________________________________________________________________________  Interpretation Summary  Global and regional left ventricular function is normal with an EF of 60-65%.  Global right ventricular function is normal.  S/P TAVR with 26 mm Pryor S3 at [facility] on 2025. Normal doppler  assessment (PV 2.2cm/s, MG 10mmHg).  The inferior vena cava is normal.  No pericardial effusion.     There is no prior study for direct comparison, but findings are similar to  post-TAVR TTE report from [facility] on 25 in CareEverywhere.  ______________________________________________________________________________  Left Ventricle  Global and regional left ventricular function is normal with an EF of 60-65%.  Left ventricular size is normal. Borderline concentric wall thickening  consistent with left ventricular hypertrophy is present. Grade I or early  diastolic dysfunction. No regional wall motion abnormalities are seen.     Right Ventricle  The right ventricle is normal size. Global right ventricular function is  normal.     Atria  The atria cannot be assessed.     Mitral Valve  Moderate mitral annular calcification is present. On Doppler interrogation,  there is no significant stenosis or regurgitation.     Aortic Valve  Transcutaneous aortic valve  replacement is present. The prosthetic aortic  valve is well-seated. There is no paravalvular regurgitation. There is no  valvular regurgitation. Doppler interrogation of the aortic valve is normal.  The peak velocity across the aortic valve is 2.2 m/sec. The mean gradient is  10 mmHg. The effective orifice area is 3 cm^2.     Tricuspid Valve  The tricuspid valve is normal. The peak velocity of the tricuspid regurgitant  jet is not obtainable. Pulmonary artery systolic pressure cannot be assessed.     Pulmonic Valve  The valve leaflets are not well visualized. On Doppler interrogation, there is  no significant stenosis or regurgitation.     Vessels  Normal diameter aortic root and proximal ascending aorta. The inferior vena  cava is normal.     Pericardium  No pericardial effusion is present.     Compared to Previous Study  There is no prior study for direct comparison.  ______________________________________________________________________________  MMode/2D Measurements & Calculations     IVSd: 1.3 cm  LVIDd: 5.0 cm  LVIDs: 3.3 cm  LVPWd: 1.2 cm  FS: 34.5 %  LV mass(C)d: 240.3 grams  LV mass(C)dI: 100.9 grams/m2  asc Aorta Diam: 4.0 cm  LVOT diam: 2.4 cm  LVOT area: 4.5 cm2  Asc Ao diam index BSA (cm/m2): 1.7  Asc Ao diam index Ht(cm/m): 2.2  RWT: 0.48  TAPSE: 2.0 cm     Doppler Measurements & Calculations  MV E max jose roberto: 74.7 cm/sec  MV A max jose roberto: 97.5 cm/sec  MV E/A: 0.77  MV dec time: 0.18 sec  Ao V2 max: 220.0 cm/sec  Ao max P.4 mmHg  Ao V2 mean: 146.0 cm/sec  Ao mean PG: 10.0 mmHg  Ao V2 VTI: 42.5 cm  JOANNA(I,D): 3.1 cm2  JOANNA(V,D): 2.9 cm2  Ao acc time: 0.07 sec     LV V1 max P.1 mmHg  LV V1 max: 142.0 cm/sec  LV V1 VTI: 28.9 cm  SV(LVOT): 130.7 ml  SI(LVOT): 54.9 ml/m2  PA acc time: 0.13 sec  TR max jose roberto: 226.0 cm/sec  TR max P.4 mmHg  AV Jose Roberto Ratio (DI): 0.65  JOANNA Index (cm2/m2): 1.3  E/E' av.0  Lateral E/e': 12.3  Medial E/e': 13.7  RV S Jose Roberto: 13.8 cm/sec      ______________________________________________________________________________  Report approved by: Christopher Curran MD on 02/01/2025 12:46 PM             Discharge Medications   Current Discharge Medication List        START taking these medications    Details   acetaminophen (TYLENOL) 325 MG tablet Take 2 tablets (650 mg) by mouth every 4 hours as needed for mild pain or other (and adjunct with moderate or severe pain or per patient request).    Associated Diagnoses: Pain      glucose 40 % (400 mg/mL) gel Take 15-30 g by mouth every 15 minutes as needed for low blood sugar.    Associated Diagnoses: Type 2 diabetes mellitus with stage 3a chronic kidney disease, without long-term current use of insulin (H)      methocarbamol (ROBAXIN) 500 MG tablet Take 1 tablet (500 mg) by mouth 4 times daily as needed for muscle spasms.    Associated Diagnoses: Right shoulder pain, unspecified chronicity      warfarin ANTICOAGULANT (COUMADIN) 5 MG tablet Take 10 mg every M/W/F and 5 mg rest of the  days, check INR 2/3 and call PMD    Associated Diagnoses: Paroxysmal atrial fibrillation (H)           CONTINUE these medications which have CHANGED    Details   metoprolol tartrate (LOPRESSOR) 25 MG tablet Take 0.5 tablets (12.5 mg) by mouth 2 times daily.    Associated Diagnoses: Paroxysmal atrial fibrillation (H)           CONTINUE these medications which have NOT CHANGED    Details   aspirin (ASA) 81 MG chewable tablet Take 81 mg by mouth daily.      atorvastatin (LIPITOR) 20 MG tablet Take 1 tablet (20 mg) by mouth daily  Qty: 90 tablet, Refills: 3    Associated Diagnoses: Hyperlipidemia LDL goal <130      cholecalciferol (VITAMIN D3) 25 mcg (1000 units) capsule Take 1 capsule by mouth daily      DULoxetine (CYMBALTA) 60 MG capsule Take 1 capsule (60 mg) by mouth 2 times daily.  Qty: 180 capsule, Refills: 1    Associated Diagnoses: Diabetic polyneuropathy associated with type 2 diabetes mellitus (H)      insulin glargine (LANTUS  PEN) 100 UNIT/ML pen Inject 10 Units subcutaneously at bedtime.      liraglutide (VICTOZA) 18 MG/3ML solution Inject 0.6 mg subcutaneously daily.      polyethylene glycol (MIRALAX) 17 GM/Dose powder Take 1 Capful by mouth as needed for constipation.      alcohol swab prep pads Use to swab area of injection/richelle as directed.  Qty: 100 each, Refills: 3    Associated Diagnoses: Type 2 diabetes mellitus with stage 3a chronic kidney disease, without long-term current use of insulin (H)      blood glucose calibration (NO BRAND SPECIFIED) solution To accompany: Blood Glucose Monitor Brands: per insurance.  Qty: 5 each, Refills: 11    Associated Diagnoses: Type 2 diabetes mellitus with stage 3a chronic kidney disease, without long-term current use of insulin (H)      blood glucose monitoring (NO BRAND SPECIFIED) meter device kit Use to test blood sugar 2 times daily or as directed. Preferred blood glucose meter OR supplies to accompany: Blood Glucose Monitor Brands: per insurance.  Qty: 1 kit, Refills: 0    Associated Diagnoses: Type 2 diabetes mellitus with stage 3a chronic kidney disease, without long-term current use of insulin (H)      Continuous Glucose Sensor (DEXCOM G7 SENSOR) MISC 1 each every 10 days Change sensor every 10 days  Qty: 9 each, Refills: 5    Associated Diagnoses: Type 2 diabetes mellitus with stage 3a chronic kidney disease, without long-term current use of insulin (H)      hydrocortisone 2.5 % cream APPLY TWICE A DAY AS NEEDED TOPICALLY MIX 1:1 WITH KETOCONAZOLE      ketoconazole (NIZORAL) 2 % external cream Apply topically daily as needed for itching To rash/itching in groin  Qty: 60 g, Refills: 1    Associated Diagnoses: Tinea cruris      miconazole (MICATIN) 2 % external powder Apply topically daily as needed for itching or other (groin area)  Qty: 85 g, Refills: 1    Associated Diagnoses: Tinea cruris           STOP taking these medications       metFORMIN (GLUCOPHAGE) 1000 MG tablet Comments:    Reason for Stopping:         tamsulosin (FLOMAX) 0.4 MG capsule Comments:   Reason for Stopping:             Allergies   Allergies   Allergen Reactions    Lisinopril Cough    Trulicity [Dulaglutide] Nausea

## 2025-02-02 NOTE — LETTER
February 4, 2025      Cam Walden  [address]  [place] [address]        To Whom It May Concern,       Due to his disability , Recent hospitalizationand current medical conditions Cam Walden will now need doctors and nurse supervision more than 60 days. That it is a medical necessities that patient  needs help and needs to stay at [facility].     Sincerely,        Kamaljit Plata MD    Electronically signed

## 2025-02-02 NOTE — PROGRESS NOTES
Care Management Discharge Note    Discharge Date: 02/01/2025       Discharge Disposition: Transitional Care    [facility]  [address] [place]  [address]  P: [phone]  F: [phone]    Discharge Services: None    Discharge DME: None    Discharge Transportation:     Private pay costs discussed: Not applicable    Does the patient's insurance plan have a 3 day qualifying hospital stay waiver?  Yes     Which insurance plan 3 day waiver is available? Alternative insurance waiver    Will the waiver be used for post-acute placement? No    PAS Confirmation Code:  N/A, return to facility  Patient/family educated on Medicare website which has current facility and service quality ratings: no    Education Provided on the Discharge Plan: Yes  Persons Notified of Discharge Plans: bedside nurse, charge nurse, patient, provider, facility  Patient/Family in Agreement with the Plan: yes    Handoff Referral Completed: Yes, HUMA PCP: Internal handoff referral completed    Additional Information:    CHAPIN called [facility] and spoke with House Supervisor (P: [phone]). She confirmed that patient can return today any time (although earlier the better). Requested orders be faxed to [phone].    CHAPIN spoke with patient at bedside and informed him of discharge. He asked that SW call his son, Tootie, to inform him of discharge and ask that he come here to bring patient to [facility]. Patient stated that he feels comfortable getting into and out of the vehicle with Jahmichael's assistance.     CHAPIN called Tootie and introduced self/role. Tootie will be here at 1pm to pick patient up and bring him to [facility]. Tootie stated that patient received a new insurance ([employer]) as of 2/1/25. Tootie also requested a letter from patient's doctor stating that patient needs to stay at [facility] for 60 days. SW encouraged Tootie/patient to send message to patient's PCP via Anzode. CHAPIN also stated that patient can bring his insurance card  to his next appointment.    SW sent message to [employer] informing them of the insurance change.       9:55 AM  Orders sent to facility via fax    Received call from nurse at facility, they need a clarification on medication dosage.    Spoke with hospitalist via Vocera, she fixed the orders.    11:01 AM  Resent orders via communications tab.    Received call from Sherita nurse, orders are good now.      Madonna Givens, STORMYW LSW  2/2/2025       Social Work and Care Management Department       SEARCHABLE in [facility] - search SOCIAL WORK       [place] (0800 - 1630) Saturday and Sunday     Units: 4A Vocera, 4C Vocera, & 4E Vocera        Units: 5A 6385-4749 Vocera, 5A 1154-8349 Vocera , BMT SW 1 BMT SW 2, BMT SW 3 & BMT SW 4  5C Off Service 5401 - 5416  5C Off Service [phone]     Units: 6A Vocera & 6B Vocera      Units: 6C Vocera     Units: 7A Vocera & 7B Vocera      Units: 7C Med Surg 7401 thru 7418 and 7C Med Surg 7502 thru 7521      Unit: [place] ED Vocera & [place] Obs Vocera     [facility] (0136-8070) Saturday and Sunday      Units: 5 Ortho Vocera, 5 Med Surg Vocera & WB ED Vocera     Units: 6 Med Surg Vocera, 8 Med Surg Vocera, & 10 ICU Vocera      After hours Vocera [facility] and After Hours Vocera [place]     Please NOTE changes to times below:    **Saturday & Sunday (1630 - 2030)    **Mon-Fri (8572-4620)     **FV Recognized Holidays  (6557-5015)    Units: ALL   - see above VOCERA links to units

## 2025-02-03 ENCOUNTER — PATIENT OUTREACH (OUTPATIENT)
Dept: CARE COORDINATION | Facility: CLINIC | Age: 75
End: 2025-02-03

## 2025-02-03 ENCOUNTER — TELEPHONE (OUTPATIENT)
Dept: FAMILY MEDICINE | Facility: CLINIC | Age: 75
End: 2025-02-03
Payer: COMMERCIAL

## 2025-02-03 ENCOUNTER — DOCUMENTATION ONLY (OUTPATIENT)
Dept: OTHER | Facility: CLINIC | Age: 75
End: 2025-02-03

## 2025-02-03 LAB — INR PPP: 3.44 (ref 0.85–1.15)

## 2025-02-03 PROCEDURE — P9604 ONE-WAY ALLOW PRORATED TRIP: HCPCS | Mod: ORL | Performed by: FAMILY MEDICINE

## 2025-02-03 PROCEDURE — 85610 PROTHROMBIN TIME: CPT | Mod: ORL | Performed by: FAMILY MEDICINE

## 2025-02-03 PROCEDURE — 36415 COLL VENOUS BLD VENIPUNCTURE: CPT | Mod: ORL | Performed by: FAMILY MEDICINE

## 2025-02-03 NOTE — PROGRESS NOTES
Clinic Care Coordination Contact  Care Coordination Transition Communication    Clinical Data: Patient was hospitalized at [facility] from 1/31/25 to 2/2/25 with diagnosis of syncope and fall.     Assessment: Patient has returned to TCU for short term rehabilitation:    Facility Name: [facility]  Transition Communication:  Notified facility of Primary Care- Care Coordination support via Epic fax.    Plan: Care Coordinator will await notification from facility staff informing of patient's discharge plans/needs. Care Coordinator will review chart and outreach to facility staff every 2-4 weeks and as needed.     DIVYA Monteiro   Social Work Primary Care Clinic Care Coordinator   [phone]  [email or web address]

## 2025-02-03 NOTE — LETTER
[facility]   To:   [facility]          Please give to facility    From:   [facility] Care Coordinator with [facility]     Patient Name:  Cam Walden YOB: 1950   Admit date: 2/2/25      *Information Needed:  Please contact me when the patient will discharge (or if they will move to long term care)- include the discharge date, disposition, and main diagnosis. We work with patients after discharge from their primary care clinic setting.   If the patient is discharged with home care services, please provide the name of the agency    Ok to Phone or Email with information       Thank You,   DIVYA Monteiro   Social Work Primary Care Clinic Care Coordinator   [phone]  [email or web address]

## 2025-02-03 NOTE — TELEPHONE ENCOUNTER
I sent carvedilol and lisinopril to your pharmacy. Looks like cardiology had originally sent to [facility] and I initially refilled to same.       Per pharmacist: Patient is requesting refills for carvedilol 12.5mg and lisinopril 10mg. If refill is appropriate, please send a new rx to [facility]. Thank you!    Patient had a fall with a seizure on Friday 1/31 at the TCU. They went to the hospital and were discharged back to the TCU yesterday 2/2. TCU staff calling as they have an MTM appointment listed for patient tomorrow. Per chart review, someone already cancelled this in patient's MyChart.     Dominique Castellanos RN  [facility]

## 2025-02-06 ENCOUNTER — LAB REQUISITION (OUTPATIENT)
Dept: LAB | Facility: CLINIC | Age: 75
End: 2025-02-06
Payer: COMMERCIAL

## 2025-02-06 DIAGNOSIS — I48.0 PAROXYSMAL ATRIAL FIBRILLATION (H): ICD-10-CM

## 2025-02-10 ENCOUNTER — LAB REQUISITION (OUTPATIENT)
Dept: LAB | Facility: CLINIC | Age: 75
End: 2025-02-10
Payer: COMMERCIAL

## 2025-02-10 ENCOUNTER — DOCUMENTATION ONLY (OUTPATIENT)
Dept: ANTICOAGULATION | Facility: CLINIC | Age: 75
End: 2025-02-10
Payer: COMMERCIAL

## 2025-02-10 DIAGNOSIS — I48.20 CHRONIC ATRIAL FIBRILLATION (H): Primary | ICD-10-CM

## 2025-02-10 DIAGNOSIS — D64.9 ANEMIA, UNSPECIFIED: ICD-10-CM

## 2025-02-10 DIAGNOSIS — I10 ESSENTIAL (PRIMARY) HYPERTENSION: ICD-10-CM

## 2025-02-10 LAB — INR PPP: 3.34 (ref 0.85–1.15)

## 2025-02-10 PROCEDURE — P9604 ONE-WAY ALLOW PRORATED TRIP: HCPCS | Performed by: NURSE PRACTITIONER

## 2025-02-10 PROCEDURE — 36415 COLL VENOUS BLD VENIPUNCTURE: CPT | Performed by: NURSE PRACTITIONER

## 2025-02-10 PROCEDURE — 85610 PROTHROMBIN TIME: CPT | Performed by: NURSE PRACTITIONER

## 2025-02-10 NOTE — PROGRESS NOTES
"ANTICOAGULATION  MANAGEMENT: Discharge Review    Cam Walden chart reviewed for anticoagulation continuity of care    Emergency room visit on 1/31 for fall.    Discharge disposition: TCU    Results:    No results for input(s): \"INR\", \"[id number]\", \"F2\", \"ALMWH\", \"AAUFH\" in the last 168 hours.  Anticoagulation inpatient management:     home regimen continued    Anticoagulation discharge instructions:     Warfarin dosing: home regimen continued   Bridging: No   INR goal change: No      Medication changes affecting anticoagulation: No    Additional factors affecting anticoagulation: No     PLAN     No adjustment to anticoagulation plan needed    Patient not contacted  ACC will resume monitoring upon discharge from TCU    Anticoagulation Calendar updated    Jaqueline Chua, RN  2/10/2025  Anticoagulation Clinic  [facility] for routing messages: p ANTICOAG [address]  [facility] patient phone line: [phone]  "

## 2025-02-11 LAB
ANION GAP SERPL CALCULATED.3IONS-SCNC: 14 MMOL/L (ref 7–15)
BUN SERPL-MCNC: 16.8 MG/DL (ref 8–23)
CALCIUM SERPL-MCNC: 9.3 MG/DL (ref 8.8–10.4)
CHLORIDE SERPL-SCNC: 105 MMOL/L (ref 98–107)
CREAT SERPL-MCNC: 1.36 MG/DL (ref 0.67–1.17)
EGFRCR SERPLBLD CKD-EPI 2021: 55 ML/MIN/1.73M2
GLUCOSE SERPL-MCNC: 151 MG/DL (ref 70–99)
HCO3 SERPL-SCNC: 23 MMOL/L (ref 22–29)
HGB BLD-MCNC: 9.8 G/DL (ref 13.3–17.7)
POTASSIUM SERPL-SCNC: 5.1 MMOL/L (ref 3.4–5.3)
SODIUM SERPL-SCNC: 142 MMOL/L (ref 135–145)

## 2025-02-11 PROCEDURE — P9604 ONE-WAY ALLOW PRORATED TRIP: HCPCS | Performed by: NURSE PRACTITIONER

## 2025-02-11 PROCEDURE — 80048 BASIC METABOLIC PNL TOTAL CA: CPT | Performed by: NURSE PRACTITIONER

## 2025-02-11 PROCEDURE — 85018 HEMOGLOBIN: CPT | Performed by: NURSE PRACTITIONER

## 2025-02-11 PROCEDURE — 36415 COLL VENOUS BLD VENIPUNCTURE: CPT | Performed by: NURSE PRACTITIONER

## 2025-02-12 ENCOUNTER — LAB REQUISITION (OUTPATIENT)
Dept: LAB | Facility: CLINIC | Age: 75
End: 2025-02-12
Payer: COMMERCIAL

## 2025-02-12 DIAGNOSIS — I48.0 PAROXYSMAL ATRIAL FIBRILLATION (H): ICD-10-CM

## 2025-02-17 ENCOUNTER — ANTICOAGULATION THERAPY VISIT (OUTPATIENT)
Dept: ANTICOAGULATION | Facility: CLINIC | Age: 75
End: 2025-02-17

## 2025-02-17 DIAGNOSIS — I48.20 CHRONIC ATRIAL FIBRILLATION (H): Primary | ICD-10-CM

## 2025-02-17 LAB — INR PPP: 4.5 (ref 0.85–1.15)

## 2025-02-17 PROCEDURE — 36415 COLL VENOUS BLD VENIPUNCTURE: CPT | Mod: ORL | Performed by: NURSE PRACTITIONER

## 2025-02-17 PROCEDURE — 85610 PROTHROMBIN TIME: CPT | Mod: ORL | Performed by: NURSE PRACTITIONER

## 2025-02-17 PROCEDURE — P9604 ONE-WAY ALLOW PRORATED TRIP: HCPCS | Mod: ORL | Performed by: NURSE PRACTITIONER

## 2025-02-18 ENCOUNTER — LAB REQUISITION (OUTPATIENT)
Dept: LAB | Facility: CLINIC | Age: 75
End: 2025-02-18
Payer: COMMERCIAL

## 2025-02-18 ENCOUNTER — OFFICE VISIT (OUTPATIENT)
Dept: CARDIOLOGY | Facility: CLINIC | Age: 75
End: 2025-02-18
Payer: COMMERCIAL

## 2025-02-18 VITALS
RESPIRATION RATE: 16 BRPM | OXYGEN SATURATION: 96 % | SYSTOLIC BLOOD PRESSURE: 124 MMHG | DIASTOLIC BLOOD PRESSURE: 82 MMHG | BODY MASS INDEX: 35.53 KG/M2 | WEIGHT: 262 LBS | HEART RATE: 103 BPM

## 2025-02-18 DIAGNOSIS — I48.21 PERMANENT ATRIAL FIBRILLATION (H): ICD-10-CM

## 2025-02-18 DIAGNOSIS — Z95.2 S/P TAVR (TRANSCATHETER AORTIC VALVE REPLACEMENT): ICD-10-CM

## 2025-02-18 DIAGNOSIS — I35.0 AORTIC VALVE STENOSIS, MODERATE: ICD-10-CM

## 2025-02-18 DIAGNOSIS — D50.9 IRON DEFICIENCY ANEMIA, UNSPECIFIED IRON DEFICIENCY ANEMIA TYPE: ICD-10-CM

## 2025-02-18 DIAGNOSIS — I48.20 CHRONIC ATRIAL FIBRILLATION (H): Primary | ICD-10-CM

## 2025-02-18 LAB
ERYTHROCYTE [DISTWIDTH] IN BLOOD BY AUTOMATED COUNT: 16.6 % (ref 10–15)
FERRITIN SERPL-MCNC: 29 NG/ML (ref 31–409)
HCT VFR BLD AUTO: 33.7 % (ref 40–53)
HGB BLD-MCNC: 10.7 G/DL (ref 13.3–17.7)
MCH RBC QN AUTO: 28.3 PG (ref 26.5–33)
MCHC RBC AUTO-ENTMCNC: 31.8 G/DL (ref 31.5–36.5)
MCV RBC AUTO: 89 FL (ref 78–100)
PLATELET # BLD AUTO: 222 10E3/UL (ref 150–450)
RBC # BLD AUTO: 3.78 10E6/UL (ref 4.4–5.9)
WBC # BLD AUTO: 8.7 10E3/UL (ref 4–11)

## 2025-02-18 PROCEDURE — 36415 COLL VENOUS BLD VENIPUNCTURE: CPT | Performed by: STUDENT IN AN ORGANIZED HEALTH CARE EDUCATION/TRAINING PROGRAM

## 2025-02-18 PROCEDURE — 82728 ASSAY OF FERRITIN: CPT | Performed by: STUDENT IN AN ORGANIZED HEALTH CARE EDUCATION/TRAINING PROGRAM

## 2025-02-18 PROCEDURE — 99204 OFFICE O/P NEW MOD 45 MIN: CPT | Performed by: STUDENT IN AN ORGANIZED HEALTH CARE EDUCATION/TRAINING PROGRAM

## 2025-02-18 PROCEDURE — 85027 COMPLETE CBC AUTOMATED: CPT | Performed by: STUDENT IN AN ORGANIZED HEALTH CARE EDUCATION/TRAINING PROGRAM

## 2025-02-18 PROCEDURE — G2211 COMPLEX E/M VISIT ADD ON: HCPCS | Performed by: STUDENT IN AN ORGANIZED HEALTH CARE EDUCATION/TRAINING PROGRAM

## 2025-02-18 RX ORDER — MIDODRINE HYDROCHLORIDE 2.5 MG/1
2.5 TABLET ORAL 3 TIMES DAILY
COMMUNITY
Start: 2025-02-10 | End: 2026-02-12

## 2025-02-18 RX ORDER — TAMSULOSIN HYDROCHLORIDE 0.4 MG/1
2 CAPSULE ORAL DAILY
COMMUNITY
Start: 2025-01-10 | End: 2026-02-06

## 2025-02-18 NOTE — LETTER
2/18/2025    Kamaljit Plata MD  [address] [place] [address]    RE: Cam Walden       Dear Colleague,     I had the pleasure of seeing Cam Walden in the [facility].  Cardiology Clinic    Cam Walden is a 75 year old with history of type 2 diabetes, severe AS status post urgent TAVR 1/2025, A-fib/flutter status post ablation, CKD, and syncope who presents for follow-up.    Cam was independent about a year ago and then he started having syncopal events in the fall.  This ultimately led to a hospitalization and urgent TAVR in January 2025.  He also had an A-fib/flutter ablation.  He was previously on Eliquis and was transition to warfarin as Eliquis caused him to have difficulty eating due to of abnormal taste.  Following discharge she did have a readmission for syncope from 1/31 to 2/2/2025.  Was felt to be due to orthostatic syncope and improved with decreasing metoprolol and IV fluids.    He was discharged to an assisted living facility which she is getting rehab at now and doing very well.  He is now becoming more more independent in his activities of daily life and his plan is to return home to live with a friend eventually.    His dizziness and lightheadedness is significantly improved.  He is on midodrine 2.5 mg 3 times daily.  He has not had a syncopal event since discharge.    General: Well appearing, appears stated age.  CV: Normal rate and rhythm. No m/r/g. No JVD.   Pulm: Normal effort. Normal breath sounds.  Lower extremities: No lower extremity edema.    Labs:   Reviewed in epic.  Creatinine 1.36  Hemoglobin 9.8  Platelets 234  LDL 80    Testing:  TTE 2/1/2025: Well-seated TAVR with no significant gradient, EF 60 to 65%, no other valvular abnormalities (my interpretation).  MRI brain 1/31/2025: No significant intracranial stenosis  EKG 1/31/2025: Sinus rhythm, left axis deviation, poor R wave progression (my interpretation)    Assessment and Plan:    1.  Aortic  stenosis status post urgent TAVR.  Echo with no significant gradient, appears hemodynamically stable at present.    2.  Orthostatic syncope.  Will stop metoprolol.  Symptoms improved on midodrine, can uptitrate to 5 mg 3 times daily as needed.    3.  Atrial fibrillation/flutter status post ablation 12/2024.  Continue warfarin.  Stop aspirin.  Patient not interested in DOAC at present.    4.  Anemia.  Will check CBC, ferritin today.  Today I suspect he is iron deficient and this may contribute to his orthostasis.    The longitudinal plan of care for the diagnosis(es)/condition(s) as documented were addressed during this visit. Due to the added complexity in care, I will continue to support Cam in the subsequent management and with ongoing continuity of care.    Return in about 6 months (around 8/18/2025).    Jet Muller MD  Interventional Cardiology      Thank you for allowing me to participate in the care of your patient.      Sincerely,     Jet Muller MD     [facility]  cc:   Kamaljit Plata MD  [address] [place] [address]

## 2025-02-18 NOTE — PROGRESS NOTES
Cardiology Clinic    Cam Walden is a 75 year old ***    General: Well appearing, appears stated age.  CV: Normal rate and rhythm. No m/r/g. No JVD.   Pulm: Normal effort. Normal breath sounds.  Lower extremities: No lower extremity edema.    Labs:   Reviewed in epic.  ***    Testing:  ***    Assessment and Plan:  ***    No follow-ups on file.    Jet Muller MD  Interventional Cardiology

## 2025-02-18 NOTE — PATIENT INSTRUCTIONS
Stop metoprolol. I'm less worried about your blood pressure and more worried about you being dizzy.    We can increase the midrodrine to 5 mg three times daily if things get worse.     Your ultrasound looks great.     STOP aspirin. When on warfarin this doesn't offer any benefits, but does increase bleeding.     Labs today.

## 2025-02-18 NOTE — PROGRESS NOTES
Cardiology Clinic    Cam Walden is a 75 year old with history of type 2 diabetes, severe AS status post urgent TAVR 1/2025, A-fib/flutter status post ablation, CKD, and syncope who presents for follow-up.    Cam was independent about a year ago and then he started having syncopal events in the fall.  This ultimately led to a hospitalization and urgent TAVR in January 2025.  He also had an A-fib/flutter ablation.  He was previously on Eliquis and was transition to warfarin as Eliquis caused him to have difficulty eating due to of abnormal taste.  Following discharge she did have a readmission for syncope from 1/31 to 2/2/2025.  Was felt to be due to orthostatic syncope and improved with decreasing metoprolol and IV fluids.    He was discharged to an assisted living facility which she is getting rehab at now and doing very well.  He is now becoming more more independent in his activities of daily life and his plan is to return home to live with a friend eventually.    His dizziness and lightheadedness is significantly improved.  He is on midodrine 2.5 mg 3 times daily.  He has not had a syncopal event since discharge.    General: Well appearing, appears stated age.  CV: Normal rate and rhythm. No m/r/g. No JVD.   Pulm: Normal effort. Normal breath sounds.  Lower extremities: No lower extremity edema.    Labs:   Reviewed in epic.  Creatinine 1.36  Hemoglobin 9.8  Platelets 234  LDL 80    Testing:  TTE 2/1/2025: Well-seated TAVR with no significant gradient, EF 60 to 65%, no other valvular abnormalities (my interpretation).  MRI brain 1/31/2025: No significant intracranial stenosis  EKG 1/31/2025: Sinus rhythm, left axis deviation, poor R wave progression (my interpretation)    Assessment and Plan:    1.  Aortic stenosis status post urgent TAVR.  Echo with no significant gradient, appears hemodynamically stable at present.    2.  Orthostatic syncope.  Will stop metoprolol.  Symptoms improved on midodrine, can  uptitrate to 5 mg 3 times daily as needed.    3.  Atrial fibrillation/flutter status post ablation 12/2024.  Continue warfarin.  Stop aspirin.  Patient not interested in DOAC at present.    4.  Anemia.  Will check CBC, ferritin today.  Today I suspect he is iron deficient and this may contribute to his orthostasis.    The longitudinal plan of care for the diagnosis(es)/condition(s) as documented were addressed during this visit. Due to the added complexity in care, I will continue to support Cam in the subsequent management and with ongoing continuity of care.    Return in about 6 months (around 8/18/2025).    Jet Muller MD  Interventional Cardiology

## 2025-02-20 ENCOUNTER — PATIENT OUTREACH (OUTPATIENT)
Dept: CARE COORDINATION | Facility: CLINIC | Age: 75
End: 2025-02-20
Payer: COMMERCIAL

## 2025-02-20 LAB — INR PPP: 2.58 (ref 0.85–1.15)

## 2025-02-20 PROCEDURE — 36415 COLL VENOUS BLD VENIPUNCTURE: CPT | Performed by: FAMILY MEDICINE

## 2025-02-20 PROCEDURE — 85610 PROTHROMBIN TIME: CPT | Performed by: FAMILY MEDICINE

## 2025-02-20 PROCEDURE — P9604 ONE-WAY ALLOW PRORATED TRIP: HCPCS | Performed by: FAMILY MEDICINE

## 2025-02-20 NOTE — PROGRESS NOTES
Clinic Care Coordination Contact    Situation: Patient chart reviewed by care coordinator.    Background: Pt is still in [facility]     Assessment: Pt reminds nursing home    Plan/Recommendations: will continue to monitor chart and outreached upon discharge

## 2025-02-24 ENCOUNTER — LAB REQUISITION (OUTPATIENT)
Dept: LAB | Facility: CLINIC | Age: 75
End: 2025-02-24
Payer: COMMERCIAL

## 2025-02-24 DIAGNOSIS — I48.0 PAROXYSMAL ATRIAL FIBRILLATION (H): ICD-10-CM

## 2025-02-24 NOTE — PROGRESS NOTES
Cam came to chemo infusion this morning for his next dose of Vitmain B12.  VSS.  Pt assessed.  Vitmain B12 was given into his right deltoid.  He tolerated the injection well.  Cam left clinic ambulatory.   RRT Clinical Rounding Nurse Update    Vitals:    02/23/25 1921 02/23/25 2102 02/24/25 0017 02/24/25 0315   BP:  109/81 90/65    Pulse: (!) 101 91 93    Resp: 20 19 19    Temp:  98.2 °F (36.8 °C) 98.2 °F (36.8 °C)    TempSrc:  Oral Axillary    SpO2: 90% 93% 96% 98%   Weight:       Height:            DETERIORATION INDEX SCORE: 45    ASSESSMENT:  Previous outreach assessment was reviewed. There have been no significant changes since previous assessment. Pt on BiPAP 50%.    PLAN:  Will discharge from RRT Clinical Rounding Program per protocol. Please call if needed.    Floyd Rivers RN  [facility]: [phone]  [facility]: [phone]

## 2025-02-27 DIAGNOSIS — D50.9 IRON DEFICIENCY ANEMIA, UNSPECIFIED IRON DEFICIENCY ANEMIA TYPE: Primary | ICD-10-CM

## 2025-02-27 DIAGNOSIS — R79.0 LOW FERRITIN: ICD-10-CM

## 2025-02-27 LAB — INR PPP: 1.99 (ref 0.85–1.15)

## 2025-02-27 PROCEDURE — 85610 PROTHROMBIN TIME: CPT | Performed by: FAMILY MEDICINE

## 2025-02-27 PROCEDURE — 36415 COLL VENOUS BLD VENIPUNCTURE: CPT | Performed by: FAMILY MEDICINE

## 2025-02-27 PROCEDURE — P9604 ONE-WAY ALLOW PRORATED TRIP: HCPCS | Performed by: FAMILY MEDICINE

## 2025-02-27 RX ORDER — FERROUS SULFATE 325(65) MG
325 TABLET ORAL EVERY OTHER DAY
Qty: 45 TABLET | Refills: 2 | Status: SHIPPED | OUTPATIENT
Start: 2025-02-27

## 2025-02-28 ENCOUNTER — LAB REQUISITION (OUTPATIENT)
Dept: LAB | Facility: CLINIC | Age: 75
End: 2025-02-28
Payer: COMMERCIAL

## 2025-02-28 DIAGNOSIS — D64.9 ANEMIA, UNSPECIFIED: ICD-10-CM

## 2025-03-03 LAB — HGB BLD-MCNC: 10.8 G/DL (ref 13.3–17.7)

## 2025-03-03 PROCEDURE — 85018 HEMOGLOBIN: CPT | Performed by: NURSE PRACTITIONER

## 2025-03-03 PROCEDURE — P9604 ONE-WAY ALLOW PRORATED TRIP: HCPCS | Performed by: NURSE PRACTITIONER

## 2025-03-03 PROCEDURE — 36415 COLL VENOUS BLD VENIPUNCTURE: CPT | Performed by: NURSE PRACTITIONER

## 2025-03-04 ENCOUNTER — LAB REQUISITION (OUTPATIENT)
Dept: LAB | Facility: CLINIC | Age: 75
End: 2025-03-04
Payer: COMMERCIAL

## 2025-03-04 DIAGNOSIS — I48.0 PAROXYSMAL ATRIAL FIBRILLATION (H): ICD-10-CM

## 2025-03-06 ENCOUNTER — PATIENT OUTREACH (OUTPATIENT)
Dept: CARE COORDINATION | Facility: CLINIC | Age: 75
End: 2025-03-06
Payer: COMMERCIAL

## 2025-03-06 LAB — INR PPP: 1.86 (ref 0.85–1.15)

## 2025-03-06 PROCEDURE — 36415 COLL VENOUS BLD VENIPUNCTURE: CPT | Performed by: FAMILY MEDICINE

## 2025-03-06 PROCEDURE — P9604 ONE-WAY ALLOW PRORATED TRIP: HCPCS | Performed by: FAMILY MEDICINE

## 2025-03-06 PROCEDURE — 85610 PROTHROMBIN TIME: CPT | Performed by: FAMILY MEDICINE

## 2025-03-06 NOTE — PROGRESS NOTES
Clinic Care Coordination Contact    Situation: Patient chart reviewed by care coordinator.    Background: CHAPIN GENTILE confirmed pt is still in [facility] by calling the agency.     Assessment: Pt reminds nursing home     Plan/Recommendations: CHAPIN GENTILE will continue to monitor chart and outreached upon discharge    DIVYA Del Toro   Social Work Primary Care Clinic Care Coordinator   [facility] [phone] [email or web address]

## 2025-03-12 ENCOUNTER — LAB REQUISITION (OUTPATIENT)
Dept: LAB | Facility: CLINIC | Age: 75
End: 2025-03-12
Payer: COMMERCIAL

## 2025-03-12 ENCOUNTER — TELEPHONE (OUTPATIENT)
Dept: FAMILY MEDICINE | Facility: CLINIC | Age: 75
End: 2025-03-12
Payer: COMMERCIAL

## 2025-03-12 DIAGNOSIS — I48.0 PAROXYSMAL ATRIAL FIBRILLATION (H): ICD-10-CM

## 2025-03-12 NOTE — TELEPHONE ENCOUNTER
Dr. Plata-Please review and advise if agree to delay start of home care.        Home Care is calling regarding an established patient with  [employer] [facility].  Requesting orders from: Kamaljit Plata  PROVIDER AUTHORIZATION REQUIRED: RN unable to provide verbal approval for all orders.  See below for additional information.  RN will contact Home care with information after provider review.  Is this a request for a temporary pause in the home care episode?  No    Orders Requested  Delay in start of care to 3/15/25 due to staffing      Caller: nurse Alcira  Home Care Agency: [facility]  Phone number Home Care can be reached at: [phone]   Okay to leave a detailed message?: No    Thank you!  STORMY RayN, RN-[facility] Primary [facility]

## 2025-03-13 NOTE — TELEPHONE ENCOUNTER
RN attempted to contact patient's home care nurse, Rylee, but no answer. Left detailed message on her confidential and secure voicemail that ok to delay care until 3/15/25 per Dr Plata  No further action needed    Alcira An RN  [facility]      Kamaljit Plata MD Physician Signed2:20 PM     Okay to start home care on March 15     Kamaljit Plata MD

## 2025-03-13 NOTE — TELEPHONE ENCOUNTER
SCOT Mckee calling back to inquire status of verbal order requested below; needs approval from PCP for delay in start of care until 3/15 as documented below.     Please return call to SCOT Mckee at [phone]. Ok to [facility].     Minal Mac RN BSN  [facility]

## 2025-03-17 ENCOUNTER — TELEPHONE (OUTPATIENT)
Dept: FAMILY MEDICINE | Facility: CLINIC | Age: 75
End: 2025-03-17
Payer: COMMERCIAL

## 2025-03-17 DIAGNOSIS — E11.22 TYPE 2 DIABETES MELLITUS WITH STAGE 3A CHRONIC KIDNEY DISEASE, WITHOUT LONG-TERM CURRENT USE OF INSULIN (H): Primary | ICD-10-CM

## 2025-03-17 DIAGNOSIS — N18.31 TYPE 2 DIABETES MELLITUS WITH STAGE 3A CHRONIC KIDNEY DISEASE, WITHOUT LONG-TERM CURRENT USE OF INSULIN (H): Primary | ICD-10-CM

## 2025-03-17 DIAGNOSIS — K59.00 CONSTIPATION, UNSPECIFIED CONSTIPATION TYPE: ICD-10-CM

## 2025-03-17 DIAGNOSIS — N40.1 BENIGN LOCALIZED PROSTATIC HYPERPLASIA WITH LOWER URINARY TRACT SYMPTOMS (LUTS): Primary | ICD-10-CM

## 2025-03-17 RX ORDER — POLYETHYLENE GLYCOL 3350 17 G/17G
1 POWDER, FOR SOLUTION ORAL DAILY PRN
Qty: 510 G | Refills: 11 | Status: CANCELLED | OUTPATIENT
Start: 2025-03-17

## 2025-03-17 RX ORDER — NICOTINE POLACRILEX 4 MG
15 LOZENGE BUCCAL
Qty: 112.5 G | Refills: 11 | Status: CANCELLED | OUTPATIENT
Start: 2025-03-17

## 2025-03-17 RX ORDER — TAMSULOSIN HYDROCHLORIDE 0.4 MG/1
0.8 CAPSULE ORAL DAILY
Qty: 180 CAPSULE | Refills: 1 | Status: CANCELLED | OUTPATIENT
Start: 2025-03-17

## 2025-03-17 RX ORDER — TAMSULOSIN HYDROCHLORIDE 0.4 MG/1
0.8 CAPSULE ORAL DAILY
Qty: 180 CAPSULE | Refills: 1 | Status: SHIPPED | OUTPATIENT
Start: 2025-03-17

## 2025-03-17 NOTE — TELEPHONE ENCOUNTER
Dr. Plata-Please review and advise/sign if agree.    Call received from ELIZABETH Vivas, with [facility] calling to report medication issues and home care order request:    Ortega saw patient on 3/15/25  Interaction warning between Duloxetine 60 mg and Warfarin 5 mg and 7.5 mg  Want to ensure PCP aware  Patient is supposed to be taking insulin daily  When discharged by TCU, was not given any needles for insulin  Has not had insulin since 3/11/25  Patient was supposed to  insulin needles this weekend  On TCU medication list but not in patient's home:  Acetaminophen-patient says no longer in pain and not needed  Glucose gel  Flomax (per review of outside medication reconciliation list, Flomax dose is 0.4 mg capsule: Take 2 capsules by mouth daily)  Miralax    Informed Ortega writer will send message to Dr. Plata.        Home Care is calling regarding an established patient with  [facility].  Requesting orders from: Kamaljit Plata  RN APPROVED: RN able to provide verbal orders.  Home Care will send orders for signature.  RN will close encounter.  Is this a request for a temporary pause in the home care episode?  No    Orders Requested    Physical Therapy  Request for initial certification (first set of orders)   Frequency: 1 time per week for 5 weeks  RN gave verbal order: Yes      Skilled Nursing  Request for initial evaluation and treatment (one time)   RN gave verbal order: Yes      Caller: ELIZABETH Vivas  Home Care Agency: [facility]  Phone number Home Care can be reached at: [phone]   Okay to leave a detailed message?: Yes    Thank you!  MANSI Ray, RN-[facility] Primary Care

## 2025-03-18 NOTE — TELEPHONE ENCOUNTER
Ortega saw patient on 3/15/25  Interaction warning between Duloxetine 60 mg and Warfarin 5 mg and 7.5 mg  Want to ensure PCP aware-->Just continue to monitor his INR per Inr clinic.  Patient is supposed to be taking insulin daily  When discharged by TCU, was not given any needles for insulin  Has not had insulin since 3/11/25  Patient was supposed to  insulin needles this weekend-->Resume insulin therapy and monitor blood sugar as usual(3 times a day)  On TCU medication list but not in patient's home:  Acetaminophen-patient says no longer in pain and not needed-->ok to hold and use prn  Glucose gel--->prn for low blood sugar  Flomax (per review of outside medication reconciliation list, Flomax dose is 0.4 mg capsule: Take 2 capsules by mouth daily)  Miralax--->Rx sent

## 2025-03-19 NOTE — TELEPHONE ENCOUNTER
Called patient, spoke with Health Care Agent Tootie.   Relayed PCP response.  Per Tootie, patient picked up insulin, pen needles, and Miralax from pharmacy yesterday.  No further action needed at this time.    Sheeba Snell RN  [facility]

## 2025-03-21 ENCOUNTER — MYC MEDICAL ADVICE (OUTPATIENT)
Dept: FAMILY MEDICINE | Facility: CLINIC | Age: 75
End: 2025-03-21
Payer: COMMERCIAL

## 2025-03-22 DIAGNOSIS — I48.0 PAROXYSMAL ATRIAL FIBRILLATION (H): ICD-10-CM

## 2025-03-24 ENCOUNTER — TELEPHONE (OUTPATIENT)
Dept: FAMILY MEDICINE | Facility: CLINIC | Age: 75
End: 2025-03-24
Payer: COMMERCIAL

## 2025-03-24 DIAGNOSIS — E11.22 TYPE 2 DIABETES MELLITUS WITH STAGE 3A CHRONIC KIDNEY DISEASE, WITHOUT LONG-TERM CURRENT USE OF INSULIN (H): Primary | ICD-10-CM

## 2025-03-24 DIAGNOSIS — N18.31 TYPE 2 DIABETES MELLITUS WITH STAGE 3A CHRONIC KIDNEY DISEASE, WITHOUT LONG-TERM CURRENT USE OF INSULIN (H): Primary | ICD-10-CM

## 2025-03-24 DIAGNOSIS — I48.20 CHRONIC ATRIAL FIBRILLATION (H): Primary | ICD-10-CM

## 2025-03-24 RX ORDER — WARFARIN SODIUM 5 MG/1
TABLET ORAL
Qty: 180 TABLET | Refills: 1 | Status: SHIPPED | OUTPATIENT
Start: 2025-03-24

## 2025-03-24 NOTE — TELEPHONE ENCOUNTER
Clinic RN: Please contact patient because I do not see any meds on medication list that need a pen needle. Do we need to know what medication he is on that needs this needle?  There are not orders in chart, that I can see, for needles. So once we figure out what this patient needs, it will have to go to provider.

## 2025-03-24 NOTE — TELEPHONE ENCOUNTER
Contacted patient and pt's son and they both do not know what type of needle used and don't have any left. Lantus started while patient was in a TCU and no needles were ordered.  I contacted [facility] for refill request and they also have no pen needles on file.  Per [facility], will order generic version 32 g X 44 mm or whatever is covered by insurance    Message routed to PCP, Dr Plata for sonya An RN  [facility]

## 2025-03-24 NOTE — TELEPHONE ENCOUNTER
Medication Question or Refill    Contacts       Contact Date/Time Type Contact Phone/Fax    03/24/2025 11:47 AM CDT Phone (Incoming) Cam Walden (Self) [phone] (M)        What medication are you calling about (include dose and sig)?: PATIENT WAS GIVEN THE PEN AT THE NURSING HOME ,BUT NO NEEDLES    I CAN;T FIND NEEDLES ON HIS MEDICATION LIST  CAN THESE BE ORDERED ASAP    Preferred Pharmacy:   [facility]/pharmacy #[id number] - [place], [place] - [address]  [address] [place] [address]  Phone: [phone] Fax: [phone]      Controlled Substance Agreement on file:   CSA -- Patient Level:    CSA: None found at the patient level.       Who prescribed the medication?: PCP    Do you need a refill? Yes, HE NEEDS THE NEEDLES ONLY    When did you use the medication last? HE IS OURT. WAS GIVEN EVERYTHING BUT THE NEEDLES    Patient offered an appointment? No    Do you have any questions or concerns?  No      Could we send this information to you in [facility] or would you prefer to receive a phone call?:   Patient would prefer a phone call   Okay to leave a detailed message?: Yes at Home number on file [phone] (home)

## 2025-03-24 NOTE — TELEPHONE ENCOUNTER
ANTICOAGULATION MANAGEMENT:  Medication Refill    Anticoagulation Summary  As of 2/17/2025      Warfarin maintenance plan:  10 mg (5 mg x 2) every Mon, Wed, Fri; 5 mg (5 mg x 1) all other days   Next INR check:  2/17/2025   Target end date:  Indefinite    Indications    Atrial flutter (H) (Resolved) [I48.92]  Chronic atrial fibrillation (H) [I48.20]                 Anticoagulation Care Providers       Provider Role Specialty Phone number    Kamaljit Plata MD Referring Family Medicine [phone]            Refill Criteria    Visit with referring provider/group: Meets criteria: visit within referring provider group in the last 15 months on 11/19/24    ACC referral last signed: 12/19/2024; within last year:  Yes    Lab monitoring is up to date (not exceeding 2 weeks overdue): Yes    Cam meets all criteria for refill. Rx instructions and quantity supplied updated to match patient's current dosing plan. Warfarin 90 day supply with 1 refill granted per ACC protocol     Jaqueline Chua RN  Anticoagulation Clinic

## 2025-03-25 ENCOUNTER — TELEPHONE (OUTPATIENT)
Dept: FAMILY MEDICINE | Facility: CLINIC | Age: 75
End: 2025-03-25
Payer: COMMERCIAL

## 2025-03-25 NOTE — TELEPHONE ENCOUNTER
Writer spoke with son, Tootie (CTC in chart), and relayed message.    Sheeba Snell RN  [facility]

## 2025-03-25 NOTE — TELEPHONE ENCOUNTER
Home Care is calling regarding an established patient with [facility].  Requesting orders from: Kamaljit Plata  FYI: RN able to provide verbal orders.  Sending as FYI for BP reported by OT. Please see notes below.   Is this a request for a temporary pause in the home care episode?  No    Orders Requested    Occupational Therapy  Request for initial certification (first set of orders)   Frequency: 1x/wk for 3 weeks  RN gave verbal order: Yes       Contacts       Contact Date/Time Type Contact Phone/Fax    03/25/2025 03:22 PM CDT Phone (Incoming) Camelia (OT) [phone]     [facility], secure voicemail            OT also reported the below:  BP was above parameters today at 158/72 mmHg  [facility] has been working with pt for 2 weeks now: BP have been above home care parameters. Some examples are: 165/100, 166/100, 120/76      Nicholas Watt RN

## 2025-03-31 ENCOUNTER — TELEPHONE (OUTPATIENT)
Dept: FAMILY MEDICINE | Facility: CLINIC | Age: 75
End: 2025-03-31

## 2025-03-31 NOTE — TELEPHONE ENCOUNTER
Forms/Letter Request    Type of form/letter: Home Health - medication interaction      Do we have the form/letter: Yes:     Who is the form from? Home care-[facility]    Where did/will the form come from? form was faxed in    When is form/letter needed by: na    How would you like the form/letter returned: Fax : [phone]

## 2025-03-31 NOTE — TELEPHONE ENCOUNTER
Forms/Letter Request    Type of form/letter: Home Health Certification and Plan of Care  #[id number]      Do we have the form/letter: Yes:     Who is the form from? Home care-[facility]    Where did/will the form come from? form was faxed in    When is form/letter needed by: na    How would you like the form/letter returned: Fax : [phone]

## 2025-04-02 NOTE — TELEPHONE ENCOUNTER
My Chart message sent to patient  Dr Boni Escalante would like you to keep a log of your blood pressure with the date and time and bring it to your next appointment. Looks like you missed an appointment with Dr Plata. You can call [facility] at  [phone] to reschedule your appointment . Thank you        [facility] RN      Rx sent, Continue to monitor BP and keep a log   Dr Boni An RN  [facility]

## 2025-04-30 NOTE — PROGRESS NOTES
Chronic, not at goal (unstable), diet and lifestyle modifications recommended Will begin to monitor at home, if continued to be elevated will start lisinopril           [facility] Transition of Care Encounter  Assessment & Plan                                                     Post Discharge Medication Reconciliation Status: discharge medications reconciled and changed, per note/orders    B-cell lymphoma: Patient to continue to follow with [facility] oncology.    Hypokalemia: Last potassium was at goal.  Sounds like his diarrhea has improved, reviewed that it would be okay to use loperamide as needed.  He did not  the potassium packet, but at this point it would be okay to continue this potassium pills and have his potassium rechecked on Monday.  In the future, can back off on potassium supplement as his diarrhea resolves.      Type 2 Diabetes: Last A1c is not at goal.  Unable to fully assess his diabetes since he has not been checking his blood sugars.  Reviewed the seriousness of not checking his blood sugars due to being on glipizide and not eating regularly.  I do agree that he has lost a significant amount of weight since I last saw him a year ago, at the time he was 303 pounds.  However we have checked his A1c in October despite the weight loss and it was elevated.  Reviewed that he very likely needs additional therapy for his diabetes, but he refuses today and would like to wait to see his next A1c which she assumes will be better.  However, unable to know if it is better since he does not have home blood sugar readings.  We reviewed the importance of well-controlled blood sugars in the setting of cancer.  It sounds like she is having an improved appetite now, therefore I encouraged him to check his blood sugars again at home.  I will follow-up with him in mid January for a future A1c check.  Plan:  1.  Restart checking home blood sugars    Atrial flutter.  Last INR slightly below goal 2-3, but he will have this rechecked on Monday.  Continue to follow with cardiology.      Follow Up  Mid January for diabetes follow-up    Subjective & Objective                                                        Cam Walden is a 70 y.o. male called for a transitions of care visit. he was discharged from [facility] on 11/30/2020 for hypokalemia.  Of note, I last spoke with patient July 2020.    Patient consented to a telehealth visit: Yes    Chief Complaint: Patient is very tired.  He was not very forthcoming with information on the phone, and was quiet.    Medication Adherence/Access: No issues.  In the past, he has not been open to medication changes or additional therapies.    B-cell lymphoma: Seen by Dr. García on 7/23/20 for a neck mass and CT on 7/26/20.  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  PET scan on 8/27/2020 showed stage I disease.  Port placement on 8/24/2020.  Started 3 cycles of R-CHOP chemotherapy (every 3 weeks). First cycle started 9/15/2020.  Repeat PET on 11/13/2020 showed complete response.  Was recommended 1 more cycle of chemo and no radiation.  Today he was very quiet on the phone.  Reports that he is tired, however he did not elicit much information on his current status.    Hypokalemia: Noted due to diarrhea.  Was given IV potassium plus p.o. on 11/16/2020, and was recommended to start potassium 20 MEQ twice daily and closely monitor potassium level and increase potassium in his diet.  On 11/19/2020, was recommended to increase potassium to 20 M EQ 3 times daily.  On 11/25/2020 potassium was rechecked again and it was recommended to increase supplement to 4 times a day, however on that day he went to the ER.  Reports that he is currently taking the potassium 3 times a day.  He denies any issues swallowing it. Was discharged home on potassium sodium phosphate packet to use twice daily for 2 doses, however he did not receive this.  Denies any more diarrhea at home, not like before at least.  He is taking Imodium twice a day.  Last K = 3.9 on 11/30/2020  Last mag = 2 on 11/30/2020    Type 2 Diabetes: Currently taking metformin  1000 mg two times a day and glipizide XL 10 mg once daily.   Tests BG 0 times daily fasting AM.  He does not have any blood sugars to report.  Denies any feeling of hypoglycemia.  Last A1c checked 10/13/2020 =8.6%, previously 7.9% on 7/13/2020.  He thinks his A1c will be much better because he has lost weight, and is not eating as much.  Today she tells me what he plans on eating and he will be eating 3 times today.  Microalbumin checked 7/13/2020  Last lipids checked 7/13/2020. Taking atorvastatin 40 mg daily.   Wt Readings from Last 3 Encounters:   11/28/20 (!) 262 lb 4.8 oz (119 kg)   11/16/20 (!) 255 lb 12.8 oz (116 kg)   11/16/20 (!) 256 lb (116.1 kg)       Atrial flutter.  Follows with cardiology.  Continues warfarin and metoprolol tartrate 100 mg twice daily.   Last INR = 1.97 on 11/30/2020        PMH: reviewed in EPIC   Allergies/ADRs: reviewed in EPIC   Alcohol: Reviewed in epic  Tobacco:   Social History     Tobacco Use   Smoking Status Never Smoker   Smokeless Tobacco Never Used     Recent Vitals:   BP Readings from Last 3 Encounters:   11/30/20 139/78   11/16/20 136/80   11/13/20 120/60      Wt Readings from Last 3 Encounters:   11/28/20 (!) 262 lb 4.8 oz (119 kg)   11/16/20 (!) 255 lb 12.8 oz (116 kg)   11/16/20 (!) 256 lb (116.1 kg)     ----------------    The patient declined an after visit summary    I spent 15 minutes with this patient today;  . All changes were made via collaborative practice agreement with Kamaljit Plata MD. A copy of the visit note was provided to the patient's provider.     Angie Russell, Pharm.D., [facility]  Medication Therapy Management Pharmacist  [facility]    Telemedicine Visit Details    Type of service:  Telephone     Start Time: 11:10 AM  End Time (time video/phone call stopped): 11:25 AM    Originating Location (pt. Location): Home    Distant Location (provider location):  [place] [facility]    Mode of  Communication:   Telephone     Current Outpatient Medications   Medication Sig Dispense Refill     atorvastatin (LIPITOR) 40 MG tablet Take 1 tablet (40 mg total) by mouth daily. 90 tablet 3     blood glucose test (ACCU-CHEK SMARTVIEW TEST STRIP) strips USE TO CHECK BLOOD SUGARS DAILY AS DIRECTED 100 strip PRN     cholecalciferol, vitamin D3, (VITAMIN D3) 1,000 unit capsule Take 1 capsule (1,000 Units total) by mouth daily. 100 capsule 2     DULoxetine (CYMBALTA) 30 MG capsule TAKE 1 CAPSULE BY MOUTH TWICE A  capsule 2     generic lancets (ACCU-CHEK FASTCLIX) Use to check blood sugar twice per day. 102 each 11     glipiZIDE (GLUCOTROL XL) 10 MG 24 hr tablet TAKE 1 TABLET BY MOUTH EVERY DAY IN THE MORNING 90 tablet 2     hydroCHLOROthiazide (HYDRODIURIL) 25 MG tablet Take 1 tablet (25 mg total) by mouth daily. 90 tablet 2     losartan (COZAAR) 100 MG tablet TAKE 1 TABLET BY MOUTH EVERY DAY 90 tablet 3     metFORMIN (GLUCOPHAGE) 1000 MG tablet TAKE 1 TABLET BY MOUTH TWICE A DAY WITH MEALS 180 tablet 3     metoprolol tartrate (LOPRESSOR) 100 MG tablet TAKE 1 TABLET BY MOUTH TWICE A DAY. 180 tablet 3     ondansetron (ZOFRAN) 8 MG tablet Take 1 tablet (8 mg total) by mouth every 6 (six) hours as needed for nausea. 30 tablet 1     potassium chloride (K-DUR,KLOR-CON) 20 MEQ tablet Take 1 tablet (20 mEq total) by mouth 2 (two) times a day. 60 tablet 0     prochlorperazine (COMPAZINE) 10 MG tablet Take 1 tablet (10 mg total) by mouth every 6 (six) hours as needed (For breakthrough nausea/vomiting). 30 tablet 1     simethicone (MYLICON) 80 MG chewable tablet Chew 80 mg every 6 (six) hours as needed for flatulence.       tamsulosin (FLOMAX) 0.4 mg cap Take 2 capsules (0.8 mg total) by mouth daily. 180 capsule 2     warfarin ANTICOAGULANT (COUMADIN/JANTOVEN) 5 MG tablet Take 0.5-1 tablets (2.5-5 mg total) by mouth See Admin Instructions. Take 2.5 mg on Tuesday and Friday.  Take 5 mg on all other days. 30 tablet 0     No  current facility-administered medications for this visit.
